# Patient Record
Sex: MALE | Race: WHITE | NOT HISPANIC OR LATINO | URBAN - METROPOLITAN AREA
[De-identification: names, ages, dates, MRNs, and addresses within clinical notes are randomized per-mention and may not be internally consistent; named-entity substitution may affect disease eponyms.]

---

## 2018-05-24 ENCOUNTER — OUTPATIENT (OUTPATIENT)
Dept: OUTPATIENT SERVICES | Facility: HOSPITAL | Age: 63
LOS: 1 days | Discharge: HOME | End: 2018-05-24

## 2018-05-24 DIAGNOSIS — R10.10 UPPER ABDOMINAL PAIN, UNSPECIFIED: ICD-10-CM

## 2020-11-15 ENCOUNTER — EMERGENCY (EMERGENCY)
Facility: HOSPITAL | Age: 65
LOS: 0 days | Discharge: HOME | End: 2020-11-15
Attending: EMERGENCY MEDICINE | Admitting: EMERGENCY MEDICINE
Payer: COMMERCIAL

## 2020-11-15 VITALS
DIASTOLIC BLOOD PRESSURE: 110 MMHG | RESPIRATION RATE: 18 BRPM | SYSTOLIC BLOOD PRESSURE: 170 MMHG | TEMPERATURE: 99 F | HEART RATE: 89 BPM

## 2020-11-15 DIAGNOSIS — I10 ESSENTIAL (PRIMARY) HYPERTENSION: ICD-10-CM

## 2020-11-15 PROCEDURE — 99283 EMERGENCY DEPT VISIT LOW MDM: CPT

## 2020-11-15 NOTE — ED PROVIDER NOTE - PATIENT PORTAL LINK FT
You can access the FollowMyHealth Patient Portal offered by [facility] by registering at the following website: [email or web address]. By joining [email or web address]’s FollowMyHealth portal, you will also be able to view your health information using other applications (apps) compatible with our system.

## 2020-11-15 NOTE — ED PROVIDER NOTE - NS ED ROS FT
Constitutional: See HPI.  Eyes: No visual changes, eye pain or discharge. No Photophobia  ENMT: No hearing changes, pain, discharge or infections. No neck pain or stiffness. No limited ROM  Cardiac: No  edema. No chest pain with exertion.  Respiratory: No cough or respiratory distress.   GI: No nausea, vomiting, diarrhea or abdominal pain.  : No dysuria, frequency or burning. No Discharge  MS: No myalgia, muscle weakness, joint pain or back pain.  Neuro: No headache or weakness. No LOC.  Skin: No skin rash.  Except as documented in the HPI, all other systems are negative.

## 2020-11-15 NOTE — ED PROVIDER NOTE - OBJECTIVE STATEMENT
66 y/o M p/w shortness of breath when he has N95 on at work only, denies any shortness of breath now. No shortness of breath w/ exertion or chest pain, no fevers/chills, no cough or congestion,

## 2020-11-15 NOTE — ED ADULT NURSE NOTE - CHIEF COMPLAINT QUOTE
Patient who is employee at [employer] who stated he felt anxiety and SOB due to wearing 2 masks at work. Denies SOB CP fevers and chills currently

## 2020-11-15 NOTE — ED ADULT NURSE NOTE - OBJECTIVE STATEMENT
pt here reports pt here reports was at work wearing N95 and blue mask over when became sob. currently denies BL lungs CTA/ RR even unlabored resting comfortably . no cp/cough . as per pt symptoms have resolved. no other complaints.

## 2020-11-15 NOTE — ED PROVIDER NOTE - PHYSICAL EXAMINATION
VITAL SIGNS: I have reviewed nursing notes and confirm.  CONSTITUTIONAL: well-appearing, non-toxic, NAD  SKIN: Warm dry, normal skin turgor  HEAD: NCAT  EYES: EOMI, PERRLA, no scleral icterus  ENT: Moist mucous membranes, normal pharynx with no erythema or exudates  NECK: Supple; non tender. Full ROM. No cervical LAD  CARD: RRR, no murmurs, rubs or gallops  RESP: clear to ausculation b/l.  No rales, rhonchi, or wheezing.  ABD: soft, + BS, non-tender, non-distended, no rebound or guarding. No CVA tenderness  EXT: Full ROM, no bony tenderness, no pedal edema, no calf tenderness  NEURO: normal motor. normal sensory. Normal gait.  PSYCH: Cooperative, appropriate.

## 2020-11-15 NOTE — ED PROVIDER NOTE - ATTENDING CONTRIBUTION TO CARE
65yr old male here for eval. 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 he was wearing 2 masks, was breathing heavy due to that, and was sent here for eval. pt denies any compalints, states he has no sx's, and that he was sent here but doesn't feel he needed to be sent. pt doesn't want BP to be rechecked. here on exam pt no distress, s1s2 ctab soft nt/nd, neuro grossly unremarkable.      impression sob at NH, no sx's now bp elevated but pt asymptomatic and doesn't want any repeat bp testing

## 2020-11-15 NOTE — ED ADULT TRIAGE NOTE - CHIEF COMPLAINT QUOTE
Patient who is employee at [employer] who stated he felt anxiety and SOB due to wearing 2 masks at work. Denies SOB CP fevers and chills currently

## 2021-10-04 ENCOUNTER — INPATIENT (INPATIENT)
Facility: HOSPITAL | Age: 66
LOS: 28 days | Discharge: NURSING, REHAB OR LONG-TERM CARE | End: 2021-11-02
Attending: HOSPITALIST | Admitting: HOSPITALIST
Payer: MEDICARE

## 2021-10-04 VITALS
DIASTOLIC BLOOD PRESSURE: 58 MMHG | HEART RATE: 170 BPM | SYSTOLIC BLOOD PRESSURE: 102 MMHG | OXYGEN SATURATION: 80 % | WEIGHT: 164.91 LBS | RESPIRATION RATE: 26 BRPM

## 2021-10-04 DIAGNOSIS — I85.11 SECONDARY ESOPHAGEAL VARICES WITH BLEEDING: ICD-10-CM

## 2021-10-04 DIAGNOSIS — K31.89 OTHER DISEASES OF STOMACH AND DUODENUM: ICD-10-CM

## 2021-10-04 DIAGNOSIS — M79.A3 NONTRAUMATIC COMPARTMENT SYNDROME OF ABDOMEN: ICD-10-CM

## 2021-10-04 DIAGNOSIS — Z51.5 ENCOUNTER FOR PALLIATIVE CARE: ICD-10-CM

## 2021-10-04 DIAGNOSIS — E87.2 ACIDOSIS: ICD-10-CM

## 2021-10-04 DIAGNOSIS — I81 PORTAL VEIN THROMBOSIS: ICD-10-CM

## 2021-10-04 DIAGNOSIS — I86.4 GASTRIC VARICES: ICD-10-CM

## 2021-10-04 DIAGNOSIS — K92.0 HEMATEMESIS: ICD-10-CM

## 2021-10-04 DIAGNOSIS — E83.39 OTHER DISORDERS OF PHOSPHORUS METABOLISM: ICD-10-CM

## 2021-10-04 DIAGNOSIS — K74.60 UNSPECIFIED CIRRHOSIS OF LIVER: ICD-10-CM

## 2021-10-04 DIAGNOSIS — I47.2 VENTRICULAR TACHYCARDIA: ICD-10-CM

## 2021-10-04 DIAGNOSIS — E87.1 HYPO-OSMOLALITY AND HYPONATREMIA: ICD-10-CM

## 2021-10-04 DIAGNOSIS — I10 ESSENTIAL (PRIMARY) HYPERTENSION: ICD-10-CM

## 2021-10-04 DIAGNOSIS — R41.82 ALTERED MENTAL STATUS, UNSPECIFIED: ICD-10-CM

## 2021-10-04 DIAGNOSIS — K76.7 HEPATORENAL SYNDROME: ICD-10-CM

## 2021-10-04 DIAGNOSIS — J44.9 CHRONIC OBSTRUCTIVE PULMONARY DISEASE, UNSPECIFIED: ICD-10-CM

## 2021-10-04 DIAGNOSIS — D62 ACUTE POSTHEMORRHAGIC ANEMIA: ICD-10-CM

## 2021-10-04 DIAGNOSIS — N17.9 ACUTE KIDNEY FAILURE, UNSPECIFIED: ICD-10-CM

## 2021-10-04 DIAGNOSIS — T83.89XA OTHER SPECIFIED COMPLICATION OF GENITOURINARY PROSTHETIC DEVICES, IMPLANTS AND GRAFTS, INITIAL ENCOUNTER: ICD-10-CM

## 2021-10-04 DIAGNOSIS — A41.9 SEPSIS, UNSPECIFIED ORGANISM: ICD-10-CM

## 2021-10-04 DIAGNOSIS — R18.8 OTHER ASCITES: ICD-10-CM

## 2021-10-04 DIAGNOSIS — K76.6 PORTAL HYPERTENSION: ICD-10-CM

## 2021-10-04 DIAGNOSIS — R65.21 SEVERE SEPSIS WITH SEPTIC SHOCK: ICD-10-CM

## 2021-10-04 DIAGNOSIS — D69.6 THROMBOCYTOPENIA, UNSPECIFIED: ICD-10-CM

## 2021-10-04 DIAGNOSIS — B18.2 CHRONIC VIRAL HEPATITIS C: ICD-10-CM

## 2021-10-04 DIAGNOSIS — E87.0 HYPEROSMOLALITY AND HYPERNATREMIA: ICD-10-CM

## 2021-10-04 DIAGNOSIS — K72.90 HEPATIC FAILURE, UNSPECIFIED WITHOUT COMA: ICD-10-CM

## 2021-10-04 DIAGNOSIS — E87.5 HYPERKALEMIA: ICD-10-CM

## 2021-10-04 DIAGNOSIS — B96.89 OTHER SPECIFIED BACTERIAL AGENTS AS THE CAUSE OF DISEASES CLASSIFIED ELSEWHERE: ICD-10-CM

## 2021-10-04 DIAGNOSIS — E87.6 HYPOKALEMIA: ICD-10-CM

## 2021-10-04 DIAGNOSIS — C22.0 LIVER CELL CARCINOMA: ICD-10-CM

## 2021-10-04 DIAGNOSIS — J96.00 ACUTE RESPIRATORY FAILURE, UNSPECIFIED WHETHER WITH HYPOXIA OR HYPERCAPNIA: ICD-10-CM

## 2021-10-04 DIAGNOSIS — K22.4 DYSKINESIA OF ESOPHAGUS: ICD-10-CM

## 2021-10-04 LAB
ALBUMIN SERPL ELPH-MCNC: 1.8 G/DL — LOW (ref 3.5–5.2)
ALBUMIN SERPL ELPH-MCNC: 1.8 G/DL — LOW (ref 3.5–5.2)
ALBUMIN SERPL ELPH-MCNC: 1.9 G/DL — LOW (ref 3.5–5.2)
ALP SERPL-CCNC: 247 U/L — HIGH (ref 30–115)
ALP SERPL-CCNC: 260 U/L — HIGH (ref 30–115)
ALP SERPL-CCNC: 290 U/L — HIGH (ref 30–115)
ALT FLD-CCNC: 115 U/L — HIGH (ref 0–41)
ALT FLD-CCNC: 143 U/L — HIGH (ref 0–41)
ALT FLD-CCNC: 159 U/L — HIGH (ref 0–41)
AMMONIA BLD-MCNC: 188 UMOL/L — CRITICAL HIGH (ref 11–55)
ANION GAP SERPL CALC-SCNC: 13 MMOL/L — SIGNIFICANT CHANGE UP (ref 7–14)
ANION GAP SERPL CALC-SCNC: 14 MMOL/L — SIGNIFICANT CHANGE UP (ref 7–14)
ANION GAP SERPL CALC-SCNC: 16 MMOL/L — HIGH (ref 7–14)
ANION GAP SERPL CALC-SCNC: 19 MMOL/L — HIGH (ref 7–14)
ANION GAP SERPL CALC-SCNC: 20 MMOL/L — HIGH (ref 7–14)
ANISOCYTOSIS BLD QL: SLIGHT — SIGNIFICANT CHANGE UP
APPEARANCE UR: CLEAR — SIGNIFICANT CHANGE UP
APTT BLD: 29.4 SEC — SIGNIFICANT CHANGE UP (ref 27–39.2)
AST SERPL-CCNC: 106 U/L — HIGH (ref 0–41)
AST SERPL-CCNC: 136 U/L — HIGH (ref 0–41)
AST SERPL-CCNC: 211 U/L — HIGH (ref 0–41)
BASE EXCESS BLDV CALC-SCNC: -10.1 MMOL/L — LOW (ref -2–3)
BASE EXCESS BLDV CALC-SCNC: -7.3 MMOL/L — LOW (ref -2–3)
BASE EXCESS BLDV CALC-SCNC: -9.5 MMOL/L — LOW (ref -2–3)
BASE EXCESS BLDV CALC-SCNC: -9.5 MMOL/L — LOW (ref -2–3)
BASOPHILS # BLD AUTO: 0 K/UL — SIGNIFICANT CHANGE UP (ref 0–0.2)
BASOPHILS NFR BLD AUTO: 0 % — SIGNIFICANT CHANGE UP (ref 0–1)
BILIRUB SERPL-MCNC: 1.1 MG/DL — SIGNIFICANT CHANGE UP (ref 0.2–1.2)
BILIRUB SERPL-MCNC: 1.1 MG/DL — SIGNIFICANT CHANGE UP (ref 0.2–1.2)
BILIRUB SERPL-MCNC: 2.5 MG/DL — HIGH (ref 0.2–1.2)
BILIRUB UR-MCNC: NEGATIVE — SIGNIFICANT CHANGE UP
BLD GP AB SCN SERPL QL: SIGNIFICANT CHANGE UP
BUN SERPL-MCNC: 68 MG/DL — CRITICAL HIGH (ref 10–20)
BUN SERPL-MCNC: 68 MG/DL — CRITICAL HIGH (ref 10–20)
BUN SERPL-MCNC: 69 MG/DL — CRITICAL HIGH (ref 10–20)
BUN SERPL-MCNC: 70 MG/DL — CRITICAL HIGH (ref 10–20)
BUN SERPL-MCNC: 75 MG/DL — CRITICAL HIGH (ref 10–20)
BURR CELLS BLD QL SMEAR: PRESENT — SIGNIFICANT CHANGE UP
CA-I SERPL-SCNC: 1.06 MMOL/L — LOW (ref 1.15–1.33)
CA-I SERPL-SCNC: 1.16 MMOL/L — SIGNIFICANT CHANGE UP (ref 1.15–1.33)
CALCIUM SERPL-MCNC: 7.3 MG/DL — LOW (ref 8.5–10.1)
CALCIUM SERPL-MCNC: 7.5 MG/DL — LOW (ref 8.5–10.1)
CALCIUM SERPL-MCNC: 7.5 MG/DL — LOW (ref 8.5–10.1)
CALCIUM SERPL-MCNC: 7.6 MG/DL — LOW (ref 8.5–10.1)
CALCIUM SERPL-MCNC: 7.9 MG/DL — LOW (ref 8.5–10.1)
CHLORIDE SERPL-SCNC: 88 MMOL/L — LOW (ref 98–110)
CHLORIDE SERPL-SCNC: 91 MMOL/L — LOW (ref 98–110)
CHLORIDE SERPL-SCNC: 91 MMOL/L — LOW (ref 98–110)
CHLORIDE SERPL-SCNC: 92 MMOL/L — LOW (ref 98–110)
CHLORIDE SERPL-SCNC: 93 MMOL/L — LOW (ref 98–110)
CO2 SERPL-SCNC: 11 MMOL/L — LOW (ref 17–32)
CO2 SERPL-SCNC: 12 MMOL/L — LOW (ref 17–32)
CO2 SERPL-SCNC: 14 MMOL/L — LOW (ref 17–32)
CO2 SERPL-SCNC: 15 MMOL/L — LOW (ref 17–32)
CO2 SERPL-SCNC: 15 MMOL/L — LOW (ref 17–32)
COLOR SPEC: YELLOW — SIGNIFICANT CHANGE UP
CREAT ?TM UR-MCNC: <4 MG/DL — SIGNIFICANT CHANGE UP
CREAT SERPL-MCNC: 0.9 MG/DL — SIGNIFICANT CHANGE UP (ref 0.7–1.5)
CREAT SERPL-MCNC: 1 MG/DL — SIGNIFICANT CHANGE UP (ref 0.7–1.5)
CREAT SERPL-MCNC: 1 MG/DL — SIGNIFICANT CHANGE UP (ref 0.7–1.5)
CREAT SERPL-MCNC: 1.1 MG/DL — SIGNIFICANT CHANGE UP (ref 0.7–1.5)
CREAT SERPL-MCNC: 1.2 MG/DL — SIGNIFICANT CHANGE UP (ref 0.7–1.5)
DIFF PNL FLD: NEGATIVE — SIGNIFICANT CHANGE UP
EOSINOPHIL # BLD AUTO: 0 K/UL — SIGNIFICANT CHANGE UP (ref 0–0.7)
EOSINOPHIL NFR BLD AUTO: 0 % — SIGNIFICANT CHANGE UP (ref 0–8)
GAS PNL BLDV: 115 MMOL/L — CRITICAL LOW (ref 136–145)
GAS PNL BLDV: 116 MMOL/L — CRITICAL LOW (ref 136–145)
GAS PNL BLDV: 122 MMOL/L — LOW (ref 136–145)
GAS PNL BLDV: SIGNIFICANT CHANGE UP
GIANT PLATELETS BLD QL SMEAR: PRESENT — SIGNIFICANT CHANGE UP
GLUCOSE BLDC GLUCOMTR-MCNC: 163 MG/DL — HIGH (ref 70–99)
GLUCOSE BLDC GLUCOMTR-MCNC: 187 MG/DL — HIGH (ref 70–99)
GLUCOSE BLDC GLUCOMTR-MCNC: 188 MG/DL — HIGH (ref 70–99)
GLUCOSE BLDC GLUCOMTR-MCNC: 209 MG/DL — HIGH (ref 70–99)
GLUCOSE BLDC GLUCOMTR-MCNC: 210 MG/DL — HIGH (ref 70–99)
GLUCOSE SERPL-MCNC: 160 MG/DL — HIGH (ref 70–99)
GLUCOSE SERPL-MCNC: 185 MG/DL — HIGH (ref 70–99)
GLUCOSE SERPL-MCNC: 199 MG/DL — HIGH (ref 70–99)
GLUCOSE SERPL-MCNC: 261 MG/DL — HIGH (ref 70–99)
GLUCOSE SERPL-MCNC: 273 MG/DL — HIGH (ref 70–99)
GLUCOSE UR QL: ABNORMAL
HCO3 BLDV-SCNC: 14 MMOL/L — LOW (ref 22–29)
HCO3 BLDV-SCNC: 17 MMOL/L — LOW (ref 22–29)
HCT VFR BLD CALC: 24 % — LOW (ref 42–52)
HCT VFR BLD CALC: 24.5 % — LOW (ref 42–52)
HCT VFR BLDA CALC: 26 % — LOW (ref 39–51)
HCT VFR BLDA CALC: 41 % — SIGNIFICANT CHANGE UP (ref 39–51)
HGB BLD CALC-MCNC: 13.7 G/DL — SIGNIFICANT CHANGE UP (ref 12.6–17.4)
HGB BLD CALC-MCNC: 8.8 G/DL — LOW (ref 12.6–17.4)
HGB BLD-MCNC: 7.9 G/DL — LOW (ref 14–18)
HGB BLD-MCNC: 8.3 G/DL — LOW (ref 14–18)
INR BLD: 1.73 RATIO — HIGH (ref 0.65–1.3)
KETONES UR-MCNC: NEGATIVE — SIGNIFICANT CHANGE UP
LACTATE BLDV-MCNC: 2.9 MMOL/L — HIGH (ref 0.5–2)
LACTATE BLDV-MCNC: 4.6 MMOL/L — CRITICAL HIGH (ref 0.5–2)
LACTATE BLDV-MCNC: 5.4 MMOL/L — CRITICAL HIGH (ref 0.5–2)
LACTATE BLDV-MCNC: 5.5 MMOL/L — CRITICAL HIGH (ref 0.5–2)
LEUKOCYTE ESTERASE UR-ACNC: NEGATIVE — SIGNIFICANT CHANGE UP
LIDOCAIN IGE QN: 71 U/L — HIGH (ref 7–60)
LYMPHOCYTES # BLD AUTO: 1.4 K/UL — SIGNIFICANT CHANGE UP (ref 1.2–3.4)
LYMPHOCYTES # BLD AUTO: 5.3 % — LOW (ref 20.5–51.1)
MACROCYTES BLD QL: SLIGHT — SIGNIFICANT CHANGE UP
MAGNESIUM SERPL-MCNC: 2.4 MG/DL — SIGNIFICANT CHANGE UP (ref 1.8–2.4)
MANUAL SMEAR VERIFICATION: SIGNIFICANT CHANGE UP
MCHC RBC-ENTMCNC: 28.3 PG — SIGNIFICANT CHANGE UP (ref 27–31)
MCHC RBC-ENTMCNC: 28.8 PG — SIGNIFICANT CHANGE UP (ref 27–31)
MCHC RBC-ENTMCNC: 32.9 G/DL — SIGNIFICANT CHANGE UP (ref 32–37)
MCHC RBC-ENTMCNC: 33.9 G/DL — SIGNIFICANT CHANGE UP (ref 32–37)
MCV RBC AUTO: 85.1 FL — SIGNIFICANT CHANGE UP (ref 80–94)
MCV RBC AUTO: 86 FL — SIGNIFICANT CHANGE UP (ref 80–94)
MONOCYTES # BLD AUTO: 2.09 K/UL — HIGH (ref 0.1–0.6)
MONOCYTES NFR BLD AUTO: 7.9 % — SIGNIFICANT CHANGE UP (ref 1.7–9.3)
MYELOCYTES NFR BLD: 0.9 % — HIGH (ref 0–0)
NEUTROPHILS # BLD AUTO: 22.7 K/UL — HIGH (ref 1.4–6.5)
NEUTROPHILS NFR BLD AUTO: 84.1 % — HIGH (ref 42.2–75.2)
NEUTS BAND # BLD: 1.8 % — SIGNIFICANT CHANGE UP (ref 0–6)
NITRITE UR-MCNC: NEGATIVE — SIGNIFICANT CHANGE UP
NRBC # BLD: 0 /100 WBCS — SIGNIFICANT CHANGE UP (ref 0–0)
OSMOLALITY UR: 606 MOS/KG — SIGNIFICANT CHANGE UP (ref 50–1200)
PCO2 BLDV: 25 MMHG — LOW (ref 42–55)
PCO2 BLDV: 31 MMHG — LOW (ref 42–55)
PH BLDV: 7.35 — SIGNIFICANT CHANGE UP (ref 7.32–7.43)
PH BLDV: 7.36 — SIGNIFICANT CHANGE UP (ref 7.32–7.43)
PH UR: 6 — SIGNIFICANT CHANGE UP (ref 5–8)
PLAT MORPH BLD: NORMAL — SIGNIFICANT CHANGE UP
PLATELET # BLD AUTO: 294 K/UL — SIGNIFICANT CHANGE UP (ref 130–400)
PLATELET # BLD AUTO: 508 K/UL — HIGH (ref 130–400)
PO2 BLDV: 36 MMHG — SIGNIFICANT CHANGE UP
PO2 BLDV: 38 MMHG — SIGNIFICANT CHANGE UP
PO2 BLDV: 39 MMHG — SIGNIFICANT CHANGE UP
PO2 BLDV: 51 MMHG — SIGNIFICANT CHANGE UP
POIKILOCYTOSIS BLD QL AUTO: SLIGHT — SIGNIFICANT CHANGE UP
POLYCHROMASIA BLD QL SMEAR: SIGNIFICANT CHANGE UP
POTASSIUM BLDV-SCNC: 6.4 MMOL/L — CRITICAL HIGH (ref 3.5–5.1)
POTASSIUM BLDV-SCNC: 6.6 MMOL/L — CRITICAL HIGH (ref 3.5–5.1)
POTASSIUM BLDV-SCNC: 8 MMOL/L — CRITICAL HIGH (ref 3.5–5.1)
POTASSIUM SERPL-MCNC: 6.1 MMOL/L — CRITICAL HIGH (ref 3.5–5)
POTASSIUM SERPL-MCNC: 6.3 MMOL/L — CRITICAL HIGH (ref 3.5–5)
POTASSIUM SERPL-MCNC: 6.6 MMOL/L — CRITICAL HIGH (ref 3.5–5)
POTASSIUM SERPL-MCNC: 6.8 MMOL/L — CRITICAL HIGH (ref 3.5–5)
POTASSIUM SERPL-MCNC: SIGNIFICANT CHANGE UP MMOL/L (ref 3.5–5)
POTASSIUM SERPL-SCNC: 6.1 MMOL/L — CRITICAL HIGH (ref 3.5–5)
POTASSIUM SERPL-SCNC: 6.3 MMOL/L — CRITICAL HIGH (ref 3.5–5)
POTASSIUM SERPL-SCNC: 6.6 MMOL/L — CRITICAL HIGH (ref 3.5–5)
POTASSIUM SERPL-SCNC: 6.8 MMOL/L — CRITICAL HIGH (ref 3.5–5)
POTASSIUM SERPL-SCNC: SIGNIFICANT CHANGE UP MMOL/L (ref 3.5–5)
PROT SERPL-MCNC: 4.8 G/DL — LOW (ref 6–8)
PROT SERPL-MCNC: 5.3 G/DL — LOW (ref 6–8)
PROT SERPL-MCNC: 6.1 G/DL — SIGNIFICANT CHANGE UP (ref 6–8)
PROT UR-MCNC: NEGATIVE — SIGNIFICANT CHANGE UP
PROTHROM AB SERPL-ACNC: 19.8 SEC — HIGH (ref 9.95–12.87)
RBC # BLD: 2.79 M/UL — LOW (ref 4.7–6.1)
RBC # BLD: 2.88 M/UL — LOW (ref 4.7–6.1)
RBC # FLD: 19.3 % — HIGH (ref 11.5–14.5)
RBC # FLD: 19.7 % — HIGH (ref 11.5–14.5)
RBC BLD AUTO: ABNORMAL
SAO2 % BLDV: 55.5 % — SIGNIFICANT CHANGE UP
SAO2 % BLDV: 59.1 % — SIGNIFICANT CHANGE UP
SAO2 % BLDV: 60.3 % — SIGNIFICANT CHANGE UP
SAO2 % BLDV: 80.4 % — SIGNIFICANT CHANGE UP
SARS-COV-2 RNA SPEC QL NAA+PROBE: SIGNIFICANT CHANGE UP
SODIUM SERPL-SCNC: 119 MMOL/L — CRITICAL LOW (ref 135–146)
SODIUM SERPL-SCNC: 120 MMOL/L — LOW (ref 135–146)
SODIUM SERPL-SCNC: 125 MMOL/L — LOW (ref 135–146)
SODIUM UR-SCNC: 20 MMOL/L — SIGNIFICANT CHANGE UP
SP GR SPEC: 1.02 — SIGNIFICANT CHANGE UP (ref 1.01–1.03)
TROPONIN T SERPL-MCNC: <0.01 NG/ML — SIGNIFICANT CHANGE UP
UROBILINOGEN FLD QL: SIGNIFICANT CHANGE UP
UUN UR-MCNC: 940 MG/DL — SIGNIFICANT CHANGE UP
WBC # BLD: 26.1 K/UL — HIGH (ref 4.8–10.8)
WBC # BLD: 26.43 K/UL — HIGH (ref 4.8–10.8)
WBC # FLD AUTO: 26.1 K/UL — HIGH (ref 4.8–10.8)
WBC # FLD AUTO: 26.43 K/UL — HIGH (ref 4.8–10.8)

## 2021-10-04 PROCEDURE — 93010 ELECTROCARDIOGRAM REPORT: CPT | Mod: 77

## 2021-10-04 PROCEDURE — 99291 CRITICAL CARE FIRST HOUR: CPT | Mod: 25

## 2021-10-04 PROCEDURE — 74177 CT ABD & PELVIS W/CONTRAST: CPT | Mod: 26,MA

## 2021-10-04 PROCEDURE — 93010 ELECTROCARDIOGRAM REPORT: CPT | Mod: 76

## 2021-10-04 PROCEDURE — 71275 CT ANGIOGRAPHY CHEST: CPT | Mod: 26,MA

## 2021-10-04 PROCEDURE — 99222 1ST HOSP IP/OBS MODERATE 55: CPT

## 2021-10-04 PROCEDURE — 76705 ECHO EXAM OF ABDOMEN: CPT | Mod: 26

## 2021-10-04 PROCEDURE — 92960 CARDIOVERSION ELECTRIC EXT: CPT

## 2021-10-04 PROCEDURE — 99254 IP/OBS CNSLTJ NEW/EST MOD 60: CPT | Mod: 25

## 2021-10-04 PROCEDURE — 99291 CRITICAL CARE FIRST HOUR: CPT

## 2021-10-04 PROCEDURE — 43244 EGD VARICES LIGATION: CPT | Mod: XU

## 2021-10-04 PROCEDURE — 43255 EGD CONTROL BLEEDING ANY: CPT

## 2021-10-04 PROCEDURE — 93970 EXTREMITY STUDY: CPT | Mod: 26

## 2021-10-04 PROCEDURE — 71045 X-RAY EXAM CHEST 1 VIEW: CPT | Mod: 26

## 2021-10-04 RX ORDER — SODIUM ZIRCONIUM CYCLOSILICATE 10 G/10G
10 POWDER, FOR SUSPENSION ORAL
Refills: 0 | Status: DISCONTINUED | OUTPATIENT
Start: 2021-10-04 | End: 2021-10-04

## 2021-10-04 RX ORDER — INSULIN HUMAN 100 [IU]/ML
10 INJECTION, SOLUTION SUBCUTANEOUS ONCE
Refills: 0 | Status: COMPLETED | OUTPATIENT
Start: 2021-10-04 | End: 2021-10-04

## 2021-10-04 RX ORDER — PANTOPRAZOLE SODIUM 20 MG/1
4 TABLET, DELAYED RELEASE ORAL
Qty: 80 | Refills: 0 | Status: DISCONTINUED | OUTPATIENT
Start: 2021-10-04 | End: 2021-10-05

## 2021-10-04 RX ORDER — MIDAZOLAM HYDROCHLORIDE 1 MG/ML
0.02 INJECTION, SOLUTION INTRAMUSCULAR; INTRAVENOUS
Qty: 100 | Refills: 0 | Status: DISCONTINUED | OUTPATIENT
Start: 2021-10-04 | End: 2021-10-04

## 2021-10-04 RX ORDER — SODIUM POLYSTYRENE SULFONATE 4.1 MEQ/G
30 POWDER, FOR SUSPENSION ORAL ONCE
Refills: 0 | Status: DISCONTINUED | OUTPATIENT
Start: 2021-10-04 | End: 2021-10-04

## 2021-10-04 RX ORDER — INSULIN GLARGINE 100 [IU]/ML
15 INJECTION, SOLUTION SUBCUTANEOUS AT BEDTIME
Refills: 0 | Status: DISCONTINUED | OUTPATIENT
Start: 2021-10-04 | End: 2021-10-08

## 2021-10-04 RX ORDER — SODIUM ZIRCONIUM CYCLOSILICATE 10 G/10G
10 POWDER, FOR SUSPENSION ORAL ONCE
Refills: 0 | Status: COMPLETED | OUTPATIENT
Start: 2021-10-04 | End: 2021-10-04

## 2021-10-04 RX ORDER — DEXTROSE 50 % IN WATER 50 %
25 SYRINGE (ML) INTRAVENOUS ONCE
Refills: 0 | Status: DISCONTINUED | OUTPATIENT
Start: 2021-10-04 | End: 2021-11-02

## 2021-10-04 RX ORDER — MIDAZOLAM HYDROCHLORIDE 1 MG/ML
5 INJECTION, SOLUTION INTRAMUSCULAR; INTRAVENOUS ONCE
Refills: 0 | Status: DISCONTINUED | OUTPATIENT
Start: 2021-10-04 | End: 2021-10-04

## 2021-10-04 RX ORDER — DEXTROSE 50 % IN WATER 50 %
50 SYRINGE (ML) INTRAVENOUS ONCE
Refills: 0 | Status: COMPLETED | OUTPATIENT
Start: 2021-10-04 | End: 2021-10-04

## 2021-10-04 RX ORDER — GLUCAGON INJECTION, SOLUTION 0.5 MG/.1ML
1 INJECTION, SOLUTION SUBCUTANEOUS ONCE
Refills: 0 | Status: DISCONTINUED | OUTPATIENT
Start: 2021-10-04 | End: 2021-11-02

## 2021-10-04 RX ORDER — CHLORHEXIDINE GLUCONATE 213 G/1000ML
15 SOLUTION TOPICAL EVERY 12 HOURS
Refills: 0 | Status: DISCONTINUED | OUTPATIENT
Start: 2021-10-04 | End: 2021-10-05

## 2021-10-04 RX ORDER — CALCIUM CHLORIDE
500 POWDER (GRAM) MISCELLANEOUS ONCE
Refills: 0 | Status: DISCONTINUED | OUTPATIENT
Start: 2021-10-04 | End: 2021-10-04

## 2021-10-04 RX ORDER — SODIUM CHLORIDE 9 MG/ML
1000 INJECTION, SOLUTION INTRAVENOUS
Refills: 0 | Status: DISCONTINUED | OUTPATIENT
Start: 2021-10-04 | End: 2021-11-02

## 2021-10-04 RX ORDER — POLYETHYLENE GLYCOL 3350 17 G/17G
17 POWDER, FOR SOLUTION ORAL DAILY
Refills: 0 | Status: DISCONTINUED | OUTPATIENT
Start: 2021-10-04 | End: 2021-10-04

## 2021-10-04 RX ORDER — OCTREOTIDE ACETATE 200 UG/ML
50 INJECTION, SOLUTION INTRAVENOUS; SUBCUTANEOUS
Qty: 500 | Refills: 0 | Status: DISCONTINUED | OUTPATIENT
Start: 2021-10-04 | End: 2021-10-10

## 2021-10-04 RX ORDER — CALCIUM GLUCONATE 100 MG/ML
1 VIAL (ML) INTRAVENOUS ONCE
Refills: 0 | Status: COMPLETED | OUTPATIENT
Start: 2021-10-04 | End: 2021-10-04

## 2021-10-04 RX ORDER — PANTOPRAZOLE SODIUM 20 MG/1
40 TABLET, DELAYED RELEASE ORAL EVERY 12 HOURS
Refills: 0 | Status: DISCONTINUED | OUTPATIENT
Start: 2021-10-04 | End: 2021-10-04

## 2021-10-04 RX ORDER — DEXMEDETOMIDINE HYDROCHLORIDE IN 0.9% SODIUM CHLORIDE 4 UG/ML
0.2 INJECTION INTRAVENOUS
Qty: 200 | Refills: 0 | Status: DISCONTINUED | OUTPATIENT
Start: 2021-10-04 | End: 2021-10-05

## 2021-10-04 RX ORDER — CHLORHEXIDINE GLUCONATE 213 G/1000ML
1 SOLUTION TOPICAL DAILY
Refills: 0 | Status: DISCONTINUED | OUTPATIENT
Start: 2021-10-04 | End: 2021-11-02

## 2021-10-04 RX ORDER — FLUMAZENIL 0.1 MG/ML
0.2 VIAL (ML) INTRAVENOUS ONCE
Refills: 0 | Status: COMPLETED | OUTPATIENT
Start: 2021-10-04 | End: 2021-10-04

## 2021-10-04 RX ORDER — ALBUTEROL 90 UG/1
10 AEROSOL, METERED ORAL ONCE
Refills: 0 | Status: COMPLETED | OUTPATIENT
Start: 2021-10-04 | End: 2021-10-04

## 2021-10-04 RX ORDER — FENTANYL CITRATE 50 UG/ML
25 INJECTION INTRAVENOUS ONCE
Refills: 0 | Status: DISCONTINUED | OUTPATIENT
Start: 2021-10-04 | End: 2021-10-04

## 2021-10-04 RX ORDER — HEPARIN SODIUM 5000 [USP'U]/ML
5000 INJECTION INTRAVENOUS; SUBCUTANEOUS EVERY 8 HOURS
Refills: 0 | Status: DISCONTINUED | OUTPATIENT
Start: 2021-10-04 | End: 2021-10-04

## 2021-10-04 RX ORDER — INSULIN LISPRO 100/ML
VIAL (ML) SUBCUTANEOUS
Refills: 0 | Status: DISCONTINUED | OUTPATIENT
Start: 2021-10-04 | End: 2021-11-02

## 2021-10-04 RX ORDER — SODIUM CHLORIDE 9 MG/ML
3000 INJECTION INTRAMUSCULAR; INTRAVENOUS; SUBCUTANEOUS ONCE
Refills: 0 | Status: COMPLETED | OUTPATIENT
Start: 2021-10-04 | End: 2021-10-04

## 2021-10-04 RX ORDER — BUDESONIDE AND FORMOTEROL FUMARATE DIHYDRATE 160; 4.5 UG/1; UG/1
2 AEROSOL RESPIRATORY (INHALATION)
Refills: 0 | Status: DISCONTINUED | OUTPATIENT
Start: 2021-10-04 | End: 2021-11-02

## 2021-10-04 RX ORDER — FOLIC ACID 0.8 MG
1 TABLET ORAL DAILY
Refills: 0 | Status: DISCONTINUED | OUTPATIENT
Start: 2021-10-04 | End: 2021-10-04

## 2021-10-04 RX ORDER — INSULIN LISPRO 100/ML
5 VIAL (ML) SUBCUTANEOUS
Refills: 0 | Status: DISCONTINUED | OUTPATIENT
Start: 2021-10-04 | End: 2021-11-02

## 2021-10-04 RX ORDER — CEFEPIME 1 G/1
2000 INJECTION, POWDER, FOR SOLUTION INTRAMUSCULAR; INTRAVENOUS EVERY 8 HOURS
Refills: 0 | Status: DISCONTINUED | OUTPATIENT
Start: 2021-10-04 | End: 2021-10-07

## 2021-10-04 RX ORDER — LACTULOSE 10 G/15ML
20 SOLUTION ORAL THREE TIMES A DAY
Refills: 0 | Status: DISCONTINUED | OUTPATIENT
Start: 2021-10-04 | End: 2021-10-04

## 2021-10-04 RX ORDER — ENOXAPARIN SODIUM 100 MG/ML
70 INJECTION SUBCUTANEOUS EVERY 12 HOURS
Refills: 0 | Status: DISCONTINUED | OUTPATIENT
Start: 2021-10-04 | End: 2021-10-04

## 2021-10-04 RX ORDER — SENNA PLUS 8.6 MG/1
2 TABLET ORAL AT BEDTIME
Refills: 0 | Status: DISCONTINUED | OUTPATIENT
Start: 2021-10-04 | End: 2021-10-04

## 2021-10-04 RX ORDER — SODIUM ZIRCONIUM CYCLOSILICATE 10 G/10G
5 POWDER, FOR SUSPENSION ORAL EVERY 12 HOURS
Refills: 0 | Status: DISCONTINUED | OUTPATIENT
Start: 2021-10-04 | End: 2021-10-04

## 2021-10-04 RX ORDER — SODIUM BICARBONATE 1 MEQ/ML
0.25 SYRINGE (ML) INTRAVENOUS
Qty: 150 | Refills: 0 | Status: DISCONTINUED | OUTPATIENT
Start: 2021-10-04 | End: 2021-10-05

## 2021-10-04 RX ORDER — DEXTROSE 50 % IN WATER 50 %
15 SYRINGE (ML) INTRAVENOUS ONCE
Refills: 0 | Status: DISCONTINUED | OUTPATIENT
Start: 2021-10-04 | End: 2021-11-02

## 2021-10-04 RX ORDER — SODIUM CHLORIDE 9 MG/ML
1000 INJECTION INTRAMUSCULAR; INTRAVENOUS; SUBCUTANEOUS ONCE
Refills: 0 | Status: COMPLETED | OUTPATIENT
Start: 2021-10-04 | End: 2021-10-04

## 2021-10-04 RX ORDER — PANTOPRAZOLE SODIUM 20 MG/1
40 TABLET, DELAYED RELEASE ORAL
Refills: 0 | Status: DISCONTINUED | OUTPATIENT
Start: 2021-10-04 | End: 2021-10-04

## 2021-10-04 RX ORDER — VANCOMYCIN HCL 1 G
1000 VIAL (EA) INTRAVENOUS ONCE
Refills: 0 | Status: COMPLETED | OUTPATIENT
Start: 2021-10-04 | End: 2021-10-04

## 2021-10-04 RX ORDER — APIXABAN 2.5 MG/1
5 TABLET, FILM COATED ORAL EVERY 12 HOURS
Refills: 0 | Status: DISCONTINUED | OUTPATIENT
Start: 2021-10-04 | End: 2021-10-04

## 2021-10-04 RX ORDER — CALCIUM GLUCONATE 100 MG/ML
2 VIAL (ML) INTRAVENOUS ONCE
Refills: 0 | Status: COMPLETED | OUTPATIENT
Start: 2021-10-04 | End: 2021-10-04

## 2021-10-04 RX ORDER — CEFEPIME 1 G/1
2000 INJECTION, POWDER, FOR SOLUTION INTRAMUSCULAR; INTRAVENOUS ONCE
Refills: 0 | Status: COMPLETED | OUTPATIENT
Start: 2021-10-04 | End: 2021-10-04

## 2021-10-04 RX ORDER — CEFTRIAXONE 500 MG/1
2000 INJECTION, POWDER, FOR SOLUTION INTRAMUSCULAR; INTRAVENOUS EVERY 24 HOURS
Refills: 0 | Status: DISCONTINUED | OUTPATIENT
Start: 2021-10-04 | End: 2021-10-04

## 2021-10-04 RX ORDER — NOREPINEPHRINE BITARTRATE/D5W 8 MG/250ML
0.05 PLASTIC BAG, INJECTION (ML) INTRAVENOUS
Qty: 8 | Refills: 0 | Status: DISCONTINUED | OUTPATIENT
Start: 2021-10-04 | End: 2021-10-16

## 2021-10-04 RX ORDER — DEXTROSE 50 % IN WATER 50 %
12.5 SYRINGE (ML) INTRAVENOUS ONCE
Refills: 0 | Status: DISCONTINUED | OUTPATIENT
Start: 2021-10-04 | End: 2021-11-02

## 2021-10-04 RX ADMIN — Medication 0.2 MILLIGRAM(S): at 22:05

## 2021-10-04 RX ADMIN — Medication 100 GRAM(S): at 09:13

## 2021-10-04 RX ADMIN — OCTREOTIDE ACETATE 10 MICROGRAM(S)/HR: 200 INJECTION, SOLUTION INTRAVENOUS; SUBCUTANEOUS at 21:00

## 2021-10-04 RX ADMIN — SODIUM CHLORIDE 2000 MILLILITER(S): 9 INJECTION INTRAMUSCULAR; INTRAVENOUS; SUBCUTANEOUS at 22:00

## 2021-10-04 RX ADMIN — CEFEPIME 100 MILLIGRAM(S): 1 INJECTION, POWDER, FOR SOLUTION INTRAMUSCULAR; INTRAVENOUS at 02:04

## 2021-10-04 RX ADMIN — Medication 200 GRAM(S): at 01:50

## 2021-10-04 RX ADMIN — INSULIN HUMAN 10 UNIT(S): 100 INJECTION, SOLUTION SUBCUTANEOUS at 11:07

## 2021-10-04 RX ADMIN — PANTOPRAZOLE SODIUM 40 MILLIGRAM(S): 20 TABLET, DELAYED RELEASE ORAL at 05:12

## 2021-10-04 RX ADMIN — Medication 250 MILLIGRAM(S): at 02:04

## 2021-10-04 RX ADMIN — Medication 50 MILLILITER(S): at 01:51

## 2021-10-04 RX ADMIN — CEFEPIME 100 MILLIGRAM(S): 1 INJECTION, POWDER, FOR SOLUTION INTRAMUSCULAR; INTRAVENOUS at 22:19

## 2021-10-04 RX ADMIN — LACTULOSE 20 GRAM(S): 10 SOLUTION ORAL at 13:47

## 2021-10-04 RX ADMIN — Medication 50 MILLILITER(S): at 18:05

## 2021-10-04 RX ADMIN — Medication 125 MEQ/KG/HR: at 02:38

## 2021-10-04 RX ADMIN — SODIUM ZIRCONIUM CYCLOSILICATE 10 GRAM(S): 10 POWDER, FOR SUSPENSION ORAL at 05:11

## 2021-10-04 RX ADMIN — INSULIN HUMAN 10 UNIT(S): 100 INJECTION, SOLUTION SUBCUTANEOUS at 01:50

## 2021-10-04 RX ADMIN — SODIUM CHLORIDE 3000 MILLILITER(S): 9 INJECTION INTRAMUSCULAR; INTRAVENOUS; SUBCUTANEOUS at 02:04

## 2021-10-04 RX ADMIN — INSULIN GLARGINE 15 UNIT(S): 100 INJECTION, SOLUTION SUBCUTANEOUS at 22:52

## 2021-10-04 RX ADMIN — INSULIN HUMAN 10 UNIT(S): 100 INJECTION, SOLUTION SUBCUTANEOUS at 18:05

## 2021-10-04 RX ADMIN — LACTULOSE 20 GRAM(S): 10 SOLUTION ORAL at 05:11

## 2021-10-04 RX ADMIN — ALBUTEROL 10 MILLIGRAM(S): 90 AEROSOL, METERED ORAL at 05:29

## 2021-10-04 RX ADMIN — DEXMEDETOMIDINE HYDROCHLORIDE IN 0.9% SODIUM CHLORIDE 3.74 MICROGRAM(S)/KG/HR: 4 INJECTION INTRAVENOUS at 21:00

## 2021-10-04 RX ADMIN — PANTOPRAZOLE SODIUM 374 MG/KG/HR: 20 TABLET, DELAYED RELEASE ORAL at 21:00

## 2021-10-04 RX ADMIN — OCTREOTIDE ACETATE 10 MICROGRAM(S)/HR: 200 INJECTION, SOLUTION INTRAVENOUS; SUBCUTANEOUS at 06:42

## 2021-10-04 RX ADMIN — Medication 0.2 MILLIGRAM(S): at 21:59

## 2021-10-04 RX ADMIN — Medication 1 TABLET(S): at 13:47

## 2021-10-04 RX ADMIN — MIDAZOLAM HYDROCHLORIDE 5 MILLIGRAM(S): 1 INJECTION, SOLUTION INTRAMUSCULAR; INTRAVENOUS at 21:40

## 2021-10-04 RX ADMIN — Medication 1 MILLIGRAM(S): at 13:48

## 2021-10-04 RX ADMIN — Medication 7.01 MICROGRAM(S)/KG/MIN: at 22:45

## 2021-10-04 RX ADMIN — Medication 50 MILLILITER(S): at 06:42

## 2021-10-04 RX ADMIN — Medication 125 MEQ/KG/HR: at 21:00

## 2021-10-04 RX ADMIN — CEFTRIAXONE 100 MILLIGRAM(S): 500 INJECTION, POWDER, FOR SOLUTION INTRAMUSCULAR; INTRAVENOUS at 05:47

## 2021-10-04 RX ADMIN — CEFEPIME 100 MILLIGRAM(S): 1 INJECTION, POWDER, FOR SOLUTION INTRAMUSCULAR; INTRAVENOUS at 13:59

## 2021-10-04 RX ADMIN — INSULIN HUMAN 10 UNIT(S): 100 INJECTION, SOLUTION SUBCUTANEOUS at 06:42

## 2021-10-04 NOTE — ED PROVIDER NOTE - DATE/TIME 1
Check HCV viral genotype(apparently was not drawn); in view of the CKD, recommend 12 weeks of Glecapravir-Pibrentasvir daily    04-Oct-2021 01:14

## 2021-10-04 NOTE — ED ADULT NURSE REASSESSMENT NOTE - NS ED NURSE REASSESS COMMENT FT1
Pt remains alert & oriented x4. CTs complete. Cardiac & o2 monitoring maintained. Pt denies any pain or discomfort at this moment. Repeat EKG done & reviewed by MD. Pt remains alert & oriented x4. CTs complete. Cardiac & o2 monitoring maintained. Pt denies any pain or discomfort at this moment. Repeat EKG done & reviewed by MD. RCW port accessed. Patient has RLQ denver cath, not accessed.

## 2021-10-04 NOTE — CONSULT NOTE ADULT - ASSESSMENT
67 yo male, with h/o HTN, drug abuse, Hepatitis C s/p INF, Liver cirrhosis s/p [place] shunt, COPD, portal vein thrombosis on Eliquis, HCC since January 2021 on chemotherapy, presented for weakness  Patient reports having weakness, decreased po intake since few weeks. He also has not been sleeping. He reports constipation and hematemesis for 1 day, minimal amount.    -Hematemesis/melena DD: likely from esophageal varices  -Decompensated liver cirrhosis sec to hepatitis C MELD Na score 26  -HCC on chemotherapy   -Left main and right portal vein thrombus on eliquis last dose 10/3 AM  -Transaminitis Mixed pattern  -Severe hyponatremia/Hyperkalemia    recs:   will plan for emergent EGD for GI bleed  Active type and screen  2 18 gauge  c/w IV PPI  c/w octreotide  on cefepime  small ascites s/p denver catheter (Please send the fluid for analysis and please talk to IR for removal of fluid)   67 yo male, with h/o HTN, drug abuse, Hepatitis C s/p INF, Liver cirrhosis s/p [place] shunt, COPD, portal vein thrombosis on Eliquis, HCC since January 2021 on chemotherapy, presented for weakness  Patient reports having weakness, decreased po intake since few weeks. He also has not been sleeping. He reports constipation and hematemesis for 1 day, minimal amount.    -Hematemesis/melena DD: likely from esophageal varices  -Decompensated liver cirrhosis sec to hepatitis C MELD Na score 26  -HCC on chemotherapy   -Left main and right portal vein thrombus on eliquis last dose 10/3 AM  -Transaminitis Mixed pattern  -Severe hyponatremia/Hyperkalemia    recs:   will plan for emergent EGD for GI bleed  Active type and screen  2 18 gauge  c/w IV PPI  c/w octreotide  on cefepime  small ascites s/p denver catheter (Please send the fluid for analysis and please talk to IR for removal of fluid)  aldosterone was stopped due to hyperkalemia  please obtain records from [facility]

## 2021-10-04 NOTE — CHART NOTE - NSCHARTNOTEFT_GEN_A_CORE
I discontinued all po Meds  patient intubated and has to Be NPO for now     make sure to resume   rifaximin  lactulose  miralax   multivitamin  folic acid  Lokelma    Patient off AC as per GI

## 2021-10-04 NOTE — ED PROVIDER NOTE - PHYSICAL EXAMINATION
CONST: well nourished  HEAD:  normocephalic, atraumatic  EYES: PERRLA, conjunctivae without injection, drainage or discharge  ENMT:  tympanic membranes pearly gray with normal landmarks; nasal mucosa moist; mouth moist without ulcerations or lesions; throat moist without erythema, exudate, ulcerations or lesions  NECK:  supple, no masses, no significant lymphadenopathy  CARDIAC:  v tach  RESP:  respiratory rate and effort appear normal for age; lungs are clear to auscultation bilaterally; no rales or wheezes  ABDOMEN:  + ascites with drain, soft, nontender, + BS  LYMPHATICS:  no significant lymphadenopathy  MUSCULOSKELETAL/NEURO: CN II-XII grossly intact, sensation intact throughout, HOWARD, normal movement, normal tone  SKIN: + jaundice, well-perfused; warm and dry

## 2021-10-04 NOTE — CONSULT NOTE ADULT - SUBJECTIVE AND OBJECTIVE BOX
Gastroenterology Consultation:    Patient is a 66y old  Male who presents with a chief complaint of weakness  Hyperkalemia  VT (04 Oct 2021 17:43)      Admitted on: 10-04-21  HPI:  67 yo male, with h/o HTN, drug abuse, Hepatitis C s/p INF, Liver cirrhosis s/p [place] shunt, COPD, portal vein thrombosis on Eliquis, HCC since January 2021 on chemotherapy, presented for weakness  Patient reports having weakness, decreased po intake since few weeks. He also has not been sleeping. He reports constipation and hematemesis for 1 day, minimal amount.  Denies fever, chills, chest pain, cough, sputum , SOB, diarrhea, dysuria  He has a [place] shunt  that was drained one day prior to admission    ED course : mental status alteration, sustained VT  bpm with BP 82/49 mmHg s/p shock + calcium gluconate  Patient has Hyperkalemia 6.8 CO2 14 s/p Bicarb drip, renal called, sp berry with good UO   (04 Oct 2021 06:15)      Prior EGD: long time ago as per patient  Prior Colonoscopy: long time ago as per patient      PAST MEDICAL & SURGICAL HISTORY:  HTN (hypertension)    Hepatitis C  2007    Drug abuse    Cancer, hepatocellular  January 2021    COPD, mild        FAMILY HISTORY:      Social History:  Tobacco:  Alcohol:  Drugs:    Home Medications:  Eliquis 5 mg oral tablet: 1 tab(s) orally 2 times a day (04 Oct 2021 06:13)  fluticasone-salmeterol 55 mcg-14 mcg/inh inhalation powder: 1 puff(s) inhaled 2 times a day (04 Oct 2021 06:14)  Protonix 40 mg oral delayed release tablet: 1 tab(s) orally once a day (04 Oct 2021 06:13)  spironolactone 50 mg oral tablet: 1 tab(s) orally once a day (04 Oct 2021 06:13)    MEDICATIONS  (STANDING):  budesonide  80 MICROgram(s)/formoterol 4.5 MICROgram(s) Inhaler 2 Puff(s) Inhalation two times a day  cefepime   IVPB 2000 milliGRAM(s) IV Intermittent every 8 hours  chlorhexidine 4% Liquid 1 Application(s) Topical daily  dextrose 40% Gel 15 Gram(s) Oral once  dextrose 5%. 1000 milliLiter(s) (50 mL/Hr) IV Continuous <Continuous>  dextrose 5%. 1000 milliLiter(s) (100 mL/Hr) IV Continuous <Continuous>  dextrose 50% Injectable 25 Gram(s) IV Push once  dextrose 50% Injectable 12.5 Gram(s) IV Push once  dextrose 50% Injectable 25 Gram(s) IV Push once  enoxaparin Injectable 70 milliGRAM(s) SubCutaneous every 12 hours  folic acid 1 milliGRAM(s) Oral daily  glucagon  Injectable 1 milliGRAM(s) IntraMuscular once  insulin glargine Injectable (LANTUS) 15 Unit(s) SubCutaneous at bedtime  insulin lispro (ADMELOG) corrective regimen sliding scale   SubCutaneous three times a day before meals  insulin lispro Injectable (ADMELOG) 5 Unit(s) SubCutaneous three times a day before meals  lactulose Syrup 20 Gram(s) Oral three times a day  multivitamin 1 Tablet(s) Oral daily  octreotide  Infusion 50 MICROgram(s)/Hr (10 mL/Hr) IV Continuous <Continuous>  pantoprazole    Tablet 40 milliGRAM(s) Oral every 12 hours  polyethylene glycol 3350 17 Gram(s) Oral daily  propranolol 10 milliGRAM(s) Oral two times a day  rifAXIMin 550 milliGRAM(s) Oral two times a day  senna 2 Tablet(s) Oral at bedtime  sodium bicarbonate  Infusion 0.251 mEq/kG/Hr (125 mL/Hr) IV Continuous <Continuous>  sodium zirconium cyclosilicate 10 Gram(s) Oral two times a day    MEDICATIONS  (PRN):      Allergies  No Known Allergies      Review of Systems:   Constitutional:  No Fever, No Chills  ENT/Mouth:  No Hearing Changes,  No Difficulty Swallowing  Eyes:  No Eye Pain, No Vision Changes  Cardiovascular:  No Chest Pain, No Palpitations  Respiratory:  No Cough, No Dyspnea  Gastrointestinal:  As described in HPI  Musculoskeletal:  No Joint Swelling, No Back Pain  Skin:  No Skin Lesions, No Jaundice  Neuro:  No Syncope, No Dizziness  Heme/Lymph:  No Bruising, No Bleeding.          Physical Examination:  T(C): 37 (10-04-21 @ 18:03), Max: 37 (10-04-21 @ 02:55)  HR: 102 (10-04-21 @ 18:03) (92 - 210)  BP: 108/58 (10-04-21 @ 18:03) (80/52 - 108/58)  RR: 22 (10-04-21 @ 18:03) (18 - 26)  SpO2: 100% (10-04-21 @ 18:03) (80% - 100%)    Weight (kg): 74.8 (10-04-21 @ 18:03)    10-04-21 @ 07:01  -  10-04-21 @ 18:28  --------------------------------------------------------  IN: 0 mL / OUT: 800 mL / NET: -800 mL        Constitutional: No acute distress.  Eyes:. Conjunctivae are clear, Sclera is non-icteric.  Ears Nose and Throat: The external ears are normal appearing,  Oral mucosa is pink and moist.  Respiratory:  No signs of respiratory distress. Lung sounds are clear bilaterally.  Cardiovascular:  S1 S2, Regular rate and rhythm.  GI: Abdomen is distended, non tender, +[place] catheter  Neuro: No Tremor, No involuntary movements  Skin: No rashes, No Jaundice.          Data:                        7.9    26.10 )-----------( 294      ( 04 Oct 2021 14:42 )             24.0     Hgb Trend:  7.9  10-04-21 @ 14:42  8.3  10-04-21 @ 01:00        10-04    125<L>  |  91<L>  |  70<HH>  ----------------------------<  160<H>  6.1<HH>   |  15<L>  |  1.0    Ca    7.5<L>      04 Oct 2021 14:42  Mg     2.4     10-04    TPro  5.3<L>  /  Alb  1.8<L>  /  TBili  1.1  /  DBili  x   /  AST  211<H>  /  ALT  159<H>  /  AlkPhos  260<H>  10-04    Liver panel trend:  TBili 1.1   /      /      /   AlkP 260   /   Tptn 5.3   /   Alb 1.8    /   DBili --      10-04  TBili 1.1   /      /      /   AlkP 247   /   Tptn 4.8   /   Alb 1.8    /   DBili --      10-04  TBili 2.5   /      /      /   AlkP 290   /   Tptn 6.1   /   Alb 1.9    /   DBili --      10-04      PT/INR - ( 04 Oct 2021 01:00 )   PT: 19.80 sec;   INR: 1.73 ratio         PTT - ( 04 Oct 2021 01:00 )  PTT:29.4 sec        Radiology:  CT Abdomen and Pelvis w/ IV Cont:   EXAM:  CT ABDOMEN AND PELVIS IC        EXAM:  CT ANGIO CHEST PULM ART WAWIC          *** ADDENDUM 10/04/2021  ***    Findings were discussed with Dr. Ventura at 6:38 AM    --- End of Report ---    *** END OF ADDENDUM 10/04/2021  ***        PROCEDURE DATE:  10/04/2021            INTERPRETATION:  CLINICAL STATEMENT: Chest pain, shortness of breath and hemoptysis. History of HCC and chronic hepatitis C.    TECHNIQUE: Multislice helical sections were obtained from the thoracic inlet to the lung bases during rapid administration of 100cc Omnipaque 350 intravenous contrast using a CTA protocol. Subsequently, axial CT sections were obtained from the domes of the diaphragms to the pubic symphysis. Thin sections were reconstructed through the pulmonary vasculature. MIP reformats were added.    COMPARISON CT: None.      FINDINGS:    CHEST:    PULMONARY EMBOLUS: No evidence for acute central or segmental pulmonary embolus.    LUNGS/PLEURA/PERICARDIUM:: Centrilobular and paraseptal emphysematous changes. Trace debris within the trachea. No focal consolidation, pleural effusion, or pneumothorax.    MEDIASTINUM/THORACIC NODES: Visualized portion of the thyroid gland is unremarkable. No enlarged thoracic lymph nodes. Heart size is within normal limits. No pericardial effusion. 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 Normal caliber main pulmonary artery. Right chest wall Mediport catheter with tip in the proximal right atrium.      ABDOMEN/PELVIS:    HEPATOBILIARY: The liver demonstrates morphologic changes of cirrhosis. There are secondary signs of portal hypertension such as paraesophageal varices. There is diffuse heterogeneity of the liver with innumerable hypodensities, inherently limited in evaluation on this single phase of CT contrast. A more focal 8 cm region of hypoattenuation in the posterior right hepatic lobe may correspond to the provided history of HCC, again limited. There is contrast filling of the splenic vein and SMV with hypodense main portal vein, left main and right portal veins compatible with thrombus. Cholelithiasis    SPLEEN: Mildly enlarged spleen to 13.5 cm.    PANCREAS: Unremarkable.    ADRENAL GLANDS: Unremarkable.    KIDNEYS: Symmetric pattern of renal enhancement. No hydronephrosis. Left upper pole cyst and subcentimeter hypodensity too small to characterize. 1.2 cm left renal hypodensity (8/145) measures higher attenuation than simple fluid and is indeterminate.    ABDOMINOPELVIC NODES: Unremarkable.    PELVIC ORGANS: Berry catheter within the urinary bladder.    PERITONEUM/MESENTERY/BOWEL: No evidence for bowel obstruction. Questionable gastric wall thickening. A catheter enters the right abdomen with tip in the left lower quadrant. Overall small volume abdominopelvic ascites. No free air..    BONES/SOFT TISSUES: Degenerative changes of the spine.    OTHER: Calcific atherosclerotic disease of the aorta and its branches.      IMPRESSION:    No evidence for acute pulmonary embolus.    Cirrhosis and portal hypertension.    Innumerable hepatic hypodensities with more focal 8 cm region of hypoattenuation in the posterior right hepatic lobe, limited in evaluation on a single phase of contrast but may correspond to reported history of HCC.    There is hypoattenuation within the a portion of the left, the right and main portal veins with extension into the portal confluence and a portion of the splenic and superior mesenteric veins compatible with portal vein thrombus. The caliber of these veins are larger than the opacified portions of the splenic vein, SMV and IMV and therefore may be acute.    Small volume ascites.    Suggestion of gastric wall thickening. Correlate for gastritis.    --- End of Report ---      ***Please see theaddendum at the top of this report. It may contain additional important information or changes.****      BROOKS CONCEPCION MD; Resident Radiologist  This document has been electronically signed.  KIA GABRIEL MD; Attending Radiologist  This document has been electronically signed. Oct  4 2021  6:33AM  Addend:KIA GABRIEL MD; Attending Radiologist  This addendum was electronically signed on: Oct  4 2021  8:18AM. (10-04-21 @ 04:30)       Gastroenterology Consultation:    Patient is a 66y old  Male who presents with a chief complaint of weakness  Hyperkalemia  VT (04 Oct 2021 17:43)      Admitted on: 10-04-21  HPI:  65 yo male, with h/o HTN, drug abuse, Hepatitis C s/p INF, Liver cirrhosis s/p [place] shunt, COPD, portal vein thrombosis on Eliquis, HCC since January 2021 on chemotherapy, presented for weakness  Patient reports having weakness, decreased po intake since few weeks. He also has not been sleeping. He reports constipation and hematemesis for 1 day, minimal amount.  Denies fever, chills, chest pain, cough, sputum , SOB, diarrhea, dysuria  He has a [place] shunt  that was drained one day prior to admission    ED course : mental status alteration, sustained VT  bpm with BP 82/49 mmHg s/p shock + calcium gluconate  Patient has Hyperkalemia 6.8 CO2 14 s/p Bicarb drip, renal called, sp berry with good UO   (04 Oct 2021 06:15)      Prior EGD: long time ago as per patient  Prior Colonoscopy: long time ago as per patient      PAST MEDICAL & SURGICAL HISTORY:  HTN (hypertension)    Hepatitis C  2007    Drug abuse    Cancer, hepatocellular  January 2021    COPD, mild        FAMILY HISTORY:  No family h/o Gi cnacers    Social History:  Tobacco: N  Alcohol: N  Drugs: N    Home Medications:  Eliquis 5 mg oral tablet: 1 tab(s) orally 2 times a day (04 Oct 2021 06:13)  fluticasone-salmeterol 55 mcg-14 mcg/inh inhalation powder: 1 puff(s) inhaled 2 times a day (04 Oct 2021 06:14)  Protonix 40 mg oral delayed release tablet: 1 tab(s) orally once a day (04 Oct 2021 06:13)  spironolactone 50 mg oral tablet: 1 tab(s) orally once a day (04 Oct 2021 06:13)    MEDICATIONS  (STANDING):  budesonide  80 MICROgram(s)/formoterol 4.5 MICROgram(s) Inhaler 2 Puff(s) Inhalation two times a day  cefepime   IVPB 2000 milliGRAM(s) IV Intermittent every 8 hours  chlorhexidine 4% Liquid 1 Application(s) Topical daily  dextrose 40% Gel 15 Gram(s) Oral once  dextrose 5%. 1000 milliLiter(s) (50 mL/Hr) IV Continuous <Continuous>  dextrose 5%. 1000 milliLiter(s) (100 mL/Hr) IV Continuous <Continuous>  dextrose 50% Injectable 25 Gram(s) IV Push once  dextrose 50% Injectable 12.5 Gram(s) IV Push once  dextrose 50% Injectable 25 Gram(s) IV Push once  enoxaparin Injectable 70 milliGRAM(s) SubCutaneous every 12 hours  folic acid 1 milliGRAM(s) Oral daily  glucagon  Injectable 1 milliGRAM(s) IntraMuscular once  insulin glargine Injectable (LANTUS) 15 Unit(s) SubCutaneous at bedtime  insulin lispro (ADMELOG) corrective regimen sliding scale   SubCutaneous three times a day before meals  insulin lispro Injectable (ADMELOG) 5 Unit(s) SubCutaneous three times a day before meals  lactulose Syrup 20 Gram(s) Oral three times a day  multivitamin 1 Tablet(s) Oral daily  octreotide  Infusion 50 MICROgram(s)/Hr (10 mL/Hr) IV Continuous <Continuous>  pantoprazole    Tablet 40 milliGRAM(s) Oral every 12 hours  polyethylene glycol 3350 17 Gram(s) Oral daily  propranolol 10 milliGRAM(s) Oral two times a day  rifAXIMin 550 milliGRAM(s) Oral two times a day  senna 2 Tablet(s) Oral at bedtime  sodium bicarbonate  Infusion 0.251 mEq/kG/Hr (125 mL/Hr) IV Continuous <Continuous>  sodium zirconium cyclosilicate 10 Gram(s) Oral two times a day    MEDICATIONS  (PRN):      Allergies  No Known Allergies      Review of Systems:   Constitutional:  No Fever, No Chills  ENT/Mouth:  No Hearing Changes,  No Difficulty Swallowing  Eyes:  No Eye Pain, No Vision Changes  Cardiovascular:  No Chest Pain, No Palpitations  Respiratory:  No Cough, No Dyspnea  Gastrointestinal:  As described in HPI  Musculoskeletal:  No Joint Swelling, No Back Pain  Skin:  No Skin Lesions, No Jaundice  Neuro:  No Syncope, No Dizziness  Heme/Lymph:  No Bruising, No Bleeding.          Physical Examination:  T(C): 37 (10-04-21 @ 18:03), Max: 37 (10-04-21 @ 02:55)  HR: 102 (10-04-21 @ 18:03) (92 - 210)  BP: 108/58 (10-04-21 @ 18:03) (80/52 - 108/58)  RR: 22 (10-04-21 @ 18:03) (18 - 26)  SpO2: 100% (10-04-21 @ 18:03) (80% - 100%)    Weight (kg): 74.8 (10-04-21 @ 18:03)    10-04-21 @ 07:01  -  10-04-21 @ 18:28  --------------------------------------------------------  IN: 0 mL / OUT: 800 mL / NET: -800 mL        Constitutional: No acute distress.  Eyes:. Conjunctivae are clear, Sclera is non-icteric.  Ears Nose and Throat: The external ears are normal appearing,  Oral mucosa is pink and moist.  Respiratory:  No signs of respiratory distress. Lung sounds are clear bilaterally.  Cardiovascular:  S1 S2, Regular rate and rhythm.  GI: Abdomen is distended, non tender, +[place] catheter  Neuro: No Tremor, No involuntary movements  Skin: No rashes, No Jaundice.          Data:                        7.9    26.10 )-----------( 294      ( 04 Oct 2021 14:42 )             24.0     Hgb Trend:  7.9  10-04-21 @ 14:42  8.3  10-04-21 @ 01:00        10-04    125<L>  |  91<L>  |  70<HH>  ----------------------------<  160<H>  6.1<HH>   |  15<L>  |  1.0    Ca    7.5<L>      04 Oct 2021 14:42  Mg     2.4     10-04    TPro  5.3<L>  /  Alb  1.8<L>  /  TBili  1.1  /  DBili  x   /  AST  211<H>  /  ALT  159<H>  /  AlkPhos  260<H>  10-04    Liver panel trend:  TBili 1.1   /      /      /   AlkP 260   /   Tptn 5.3   /   Alb 1.8    /   DBili --      10-04  TBili 1.1   /      /      /   AlkP 247   /   Tptn 4.8   /   Alb 1.8    /   DBili --      10-04  TBili 2.5   /      /      /   AlkP 290   /   Tptn 6.1   /   Alb 1.9    /   DBili --      10-04      PT/INR - ( 04 Oct 2021 01:00 )   PT: 19.80 sec;   INR: 1.73 ratio         PTT - ( 04 Oct 2021 01:00 )  PTT:29.4 sec        Radiology:  CT Abdomen and Pelvis w/ IV Cont:   EXAM:  CT ABDOMEN AND PELVIS IC        EXAM:  CT ANGIO CHEST PULM ART WAWIC          *** ADDENDUM 10/04/2021  ***    Findings were discussed with Dr. Ventura at 6:38 AM    --- End of Report ---    *** END OF ADDENDUM 10/04/2021  ***        PROCEDURE DATE:  10/04/2021            INTERPRETATION:  CLINICAL STATEMENT: Chest pain, shortness of breath and hemoptysis. History of HCC and chronic hepatitis C.    TECHNIQUE: Multislice helical sections were obtained from the thoracic inlet to the lung bases during rapid administration of 100cc Omnipaque 350 intravenous contrast using a CTA protocol. Subsequently, axial CT sections were obtained from the domes of the diaphragms to the pubic symphysis. Thin sections were reconstructed through the pulmonary vasculature. MIP reformats were added.    COMPARISON CT: None.      FINDINGS:    CHEST:    PULMONARY EMBOLUS: No evidence for acute central or segmental pulmonary embolus.    LUNGS/PLEURA/PERICARDIUM:: Centrilobular and paraseptal emphysematous changes. Trace debris within the trachea. No focal consolidation, pleural effusion, or pneumothorax.    MEDIASTINUM/THORACIC NODES: Visualized portion of the thyroid gland is unremarkable. No enlarged thoracic lymph nodes. Heart size is within normal limits. No pericardial effusion. 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 Normal caliber main pulmonary artery. Right chest wall Mediport catheter with tip in the proximal right atrium.      ABDOMEN/PELVIS:    HEPATOBILIARY: The liver demonstrates morphologic changes of cirrhosis. There are secondary signs of portal hypertension such as paraesophageal varices. There is diffuse heterogeneity of the liver with innumerable hypodensities, inherently limited in evaluation on this single phase of CT contrast. A more focal 8 cm region of hypoattenuation in the posterior right hepatic lobe may correspond to the provided history of HCC, again limited. There is contrast filling of the splenic vein and SMV with hypodense main portal vein, left main and right portal veins compatible with thrombus. Cholelithiasis    SPLEEN: Mildly enlarged spleen to 13.5 cm.    PANCREAS: Unremarkable.    ADRENAL GLANDS: Unremarkable.    KIDNEYS: Symmetric pattern of renal enhancement. No hydronephrosis. Left upper pole cyst and subcentimeter hypodensity too small to characterize. 1.2 cm left renal hypodensity (8/145) measures higher attenuation than simple fluid and is indeterminate.    ABDOMINOPELVIC NODES: Unremarkable.    PELVIC ORGANS: Berry catheter within the urinary bladder.    PERITONEUM/MESENTERY/BOWEL: No evidence for bowel obstruction. Questionable gastric wall thickening. A catheter enters the right abdomen with tip in the left lower quadrant. Overall small volume abdominopelvic ascites. No free air..    BONES/SOFT TISSUES: Degenerative changes of the spine.    OTHER: Calcific atherosclerotic disease of the aorta and its branches.      IMPRESSION:    No evidence for acute pulmonary embolus.    Cirrhosis and portal hypertension.    Innumerable hepatic hypodensities with more focal 8 cm region of hypoattenuation in the posterior right hepatic lobe, limited in evaluation on a single phase of contrast but may correspond to reported history of HCC.    There is hypoattenuation within the a portion of the left, the right and main portal veins with extension into the portal confluence and a portion of the splenic and superior mesenteric veins compatible with portal vein thrombus. The caliber of these veins are larger than the opacified portions of the splenic vein, SMV and IMV and therefore may be acute.    Small volume ascites.    Suggestion of gastric wall thickening. Correlate for gastritis.    --- End of Report ---      ***Please see theaddendum at the top of this report. It may contain additional important information or changes.****      BROOKS CONCEPCION MD; Resident Radiologist  This document has been electronically signed.  KIA GABRIEL MD; Attending Radiologist  This document has been electronically signed. Oct  4 2021  6:33AM  Addend:KIA GABRIEL MD; Attending Radiologist  This addendum was electronically signed on: Oct  4 2021  8:18AM. (10-04-21 @ 04:30)

## 2021-10-04 NOTE — H&P ADULT - NSHPLABSRESULTS_GEN_ALL_CORE
8.3    26.43 )-----------( 508      ( 04 Oct 2021 01:00 )             24.5     10-04    120<L>  |  93<L>  |  68<HH>  ----------------------------<  273<H>  6.8<HH>   |  14<L>  |  0.9    Ca    7.3<L>      04 Oct 2021 03:39    TPro  4.8<L>  /  Alb  1.8<L>  /  TBili  1.1  /  DBili  x   /  AST  106<H>  /  ALT  115<H>  /  AlkPhos  247<H>  10-04      < from: CT Abdomen and Pelvis w/ IV Cont (10.04.21 @ 04:30) >      No pulmonary embolism.    Irregular, nodular liver contour with multiple areas of ill-defined hypodensity and moderate volume abdominal pelvic ascites. Findings are compatible with reported HCC/chronic hepatocellular disease.    < end of copied text >

## 2021-10-04 NOTE — CHART NOTE - NSCHARTNOTEFT_GEN_A_CORE
patient had one BM c/w melena  patient was agitated on max dose of precedex  Given Versed 5 mg iv push resulting in hypotension MAP dropped to 50 and patient was deeply sedated  s/p NS bolus and 0.2 flumazenil x 2  effect reversed

## 2021-10-04 NOTE — SWALLOW BEDSIDE ASSESSMENT ADULT - SWALLOW EVAL: DIAGNOSIS
mild oral dysphagia for Dysphagia Diet II mechanical soft consistency with ground meat, thins w/no overt s/s of aspiration/penetration

## 2021-10-04 NOTE — CONSULT NOTE ADULT - SUBJECTIVE AND OBJECTIVE BOX
HISTORY OF PRESENT ILLNESS:   65 yo male, with h/o HTN, drug abuse, Hepatitis C and HCC on chemo, Liver cirrhosis s/p [place] shunt, COPD, portal vein thrombosis on Eliquis, HCC since January 2021 on chemotherapy, presented for weakness  Patient reports having weakness, decreased po intake since few weeks. He also has not been sleeping. He reports constipation and hematemesis for 1 day, minimal amount.  Denies fever, chills, chest pain, cough, sputum , SOB, diarrhea, dysuria      PAST MEDICAL & SURGICAL HISTORY  HTN (hypertension)    Hepatitis C  2007    Drug abuse    Cancer, hepatocellular  January 2021    COPD, mild        FAMILY HISTORY:  FAMILY HISTORY:      SOCIAL HISTORY:  []smoker  []Alcohol  []Drug    ROS:  Negative except as mentioned in HPI    ALLERGIES:  No Known Allergies      MEDICATIONS:  MEDICATIONS  (STANDING):  budesonide  80 MICROgram(s)/formoterol 4.5 MICROgram(s) Inhaler 2 Puff(s) Inhalation two times a day  cefepime   IVPB 2000 milliGRAM(s) IV Intermittent every 8 hours  chlorhexidine 4% Liquid 1 Application(s) Topical daily  dextrose 40% Gel 15 Gram(s) Oral once  dextrose 5%. 1000 milliLiter(s) (50 mL/Hr) IV Continuous <Continuous>  dextrose 5%. 1000 milliLiter(s) (100 mL/Hr) IV Continuous <Continuous>  dextrose 50% Injectable 25 Gram(s) IV Push once  dextrose 50% Injectable 12.5 Gram(s) IV Push once  dextrose 50% Injectable 25 Gram(s) IV Push once  dextrose 50% Injectable 50 milliLiter(s) IV Push once  enoxaparin Injectable 70 milliGRAM(s) SubCutaneous every 12 hours  folic acid 1 milliGRAM(s) Oral daily  glucagon  Injectable 1 milliGRAM(s) IntraMuscular once  insulin glargine Injectable (LANTUS) 15 Unit(s) SubCutaneous at bedtime  insulin lispro (ADMELOG) corrective regimen sliding scale   SubCutaneous three times a day before meals  insulin lispro Injectable (ADMELOG) 5 Unit(s) SubCutaneous three times a day before meals  insulin regular  human recombinant 10 Unit(s) IV Push once  lactulose Syrup 20 Gram(s) Oral three times a day  multivitamin 1 Tablet(s) Oral daily  octreotide  Infusion 50 MICROgram(s)/Hr (10 mL/Hr) IV Continuous <Continuous>  pantoprazole    Tablet 40 milliGRAM(s) Oral every 12 hours  polyethylene glycol 3350 17 Gram(s) Oral daily  propranolol 10 milliGRAM(s) Oral two times a day  rifAXIMin 550 milliGRAM(s) Oral two times a day  senna 2 Tablet(s) Oral at bedtime  sodium bicarbonate  Infusion 0.251 mEq/kG/Hr (125 mL/Hr) IV Continuous <Continuous>  sodium zirconium cyclosilicate 10 Gram(s) Oral two times a day    MEDICATIONS  (PRN):      HOME MEDICATIONS:  Home Medications:  Eliquis 5 mg oral tablet: 1 tab(s) orally 2 times a day (04 Oct 2021 06:13)  fluticasone-salmeterol 55 mcg-14 mcg/inh inhalation powder: 1 puff(s) inhaled 2 times a day (04 Oct 2021 06:14)  Protonix 40 mg oral delayed release tablet: 1 tab(s) orally once a day (04 Oct 2021 06:13)  spironolactone 50 mg oral tablet: 1 tab(s) orally once a day (04 Oct 2021 06:13)      VITALS:   T(F): 98.6 (10-04 @ 15:30), Max: 98.6 (10-04 @ 02:55)  HR: 102 (10-04 @ 15:27) (92 - 210)  BP: 108/58 (10-04 @ 15:27) (80/52 - 108/58)  BP(mean): 72 (10-04 @ 05:35) (72 - 72)  RR: 22 (10-04 @ 15:27) (18 - 26)  SpO2: 100% (10-04 @ 15:27) (80% - 100%)    I&O's Summary    04 Oct 2021 07:01  -  04 Oct 2021 17:43  --------------------------------------------------------  IN: 0 mL / OUT: 800 mL / NET: -800 mL        PHYSICAL EXAM:  GEN: Not in acute distress  HEENT: NCAT, PERRL, EOMI  LUNGS: Clear to auscultation bilaterally   CARDIOVASCULAR: RRR, S1/S2 present, no murmurs, rubs or gallops, no JVD, + PP bilaterally  ABD: Soft, non-tender, non-distended  EXT: No ALIX  SKIN: Intact  NEURO: AAOx3    LABS:                        7.9    26.10 )-----------( 294      ( 04 Oct 2021 14:42 )             24.0     10-04    125<L>  |  91<L>  |  70<HH>  ----------------------------<  160<H>  6.1<HH>   |  15<L>  |  1.0    Ca    7.5<L>      04 Oct 2021 14:42  Mg     2.4     10-04    TPro  5.3<L>  /  Alb  1.8<L>  /  TBili  1.1  /  DBili  x   /  AST  211<H>  /  ALT  159<H>  /  AlkPhos  260<H>  10-04    PT/INR - ( 04 Oct 2021 01:00 )   PT: 19.80 sec;   INR: 1.73 ratio         PTT - ( 04 Oct 2021 01:00 )  PTT:29.4 sec  Troponin T, Serum: <0.01 ng/mL (10-04-21 @ 01:00)    Troponin <0.01, CKMB --, CK --/ 10-04-21 @ 01:00        Hemoglobin A1C   Thyroid      -CXR:  -TTE:  -CCTA:  -STRESS TEST:  -CATHETERIZATION:  -ECG:   -Telemetry:   HISTORY OF PRESENT ILLNESS:   65 yo male, with h/o HTN, drug abuse, Hepatitis C and HCC on chemo, Liver cirrhosis s/p [place] shunt, COPD, portal vein thrombosis on Eliquis, HCC since January 2021 on chemotherapy, presented for weakness and hematemesis. Cardiology consulted for EKG abnormality and pre-op for urgent EGD. Patient reports having weakness, decreased po intake since few weeks. He also has not been sleeping. He reports constipation and hematemesis for 1 day, minimal amount. Denies fever, chills, chest pain, cough, sputum , SOB, diarrhea, dysuria. Patient had no documented cardiac history. Reports baseline ambulating 3-4 blocks or 1-2 flights of stairs without exertional symptoms.      PAST MEDICAL & SURGICAL HISTORY  HTN (hypertension)    Hepatitis C  2007    Drug abuse    Cancer, hepatocellular  January 2021    COPD, mild        FAMILY HISTORY:  FAMILY HISTORY:      SOCIAL HISTORY:  []smoker  []Alcohol  [x]Drug    ROS:  Negative except as mentioned in HPI    ALLERGIES:  No Known Allergies      MEDICATIONS:  MEDICATIONS  (STANDING):  budesonide  80 MICROgram(s)/formoterol 4.5 MICROgram(s) Inhaler 2 Puff(s) Inhalation two times a day  cefepime   IVPB 2000 milliGRAM(s) IV Intermittent every 8 hours  chlorhexidine 4% Liquid 1 Application(s) Topical daily  dextrose 40% Gel 15 Gram(s) Oral once  dextrose 5%. 1000 milliLiter(s) (50 mL/Hr) IV Continuous <Continuous>  dextrose 5%. 1000 milliLiter(s) (100 mL/Hr) IV Continuous <Continuous>  dextrose 50% Injectable 25 Gram(s) IV Push once  dextrose 50% Injectable 12.5 Gram(s) IV Push once  dextrose 50% Injectable 25 Gram(s) IV Push once  dextrose 50% Injectable 50 milliLiter(s) IV Push once  enoxaparin Injectable 70 milliGRAM(s) SubCutaneous every 12 hours  folic acid 1 milliGRAM(s) Oral daily  glucagon  Injectable 1 milliGRAM(s) IntraMuscular once  insulin glargine Injectable (LANTUS) 15 Unit(s) SubCutaneous at bedtime  insulin lispro (ADMELOG) corrective regimen sliding scale   SubCutaneous three times a day before meals  insulin lispro Injectable (ADMELOG) 5 Unit(s) SubCutaneous three times a day before meals  insulin regular  human recombinant 10 Unit(s) IV Push once  lactulose Syrup 20 Gram(s) Oral three times a day  multivitamin 1 Tablet(s) Oral daily  octreotide  Infusion 50 MICROgram(s)/Hr (10 mL/Hr) IV Continuous <Continuous>  pantoprazole    Tablet 40 milliGRAM(s) Oral every 12 hours  polyethylene glycol 3350 17 Gram(s) Oral daily  propranolol 10 milliGRAM(s) Oral two times a day  rifAXIMin 550 milliGRAM(s) Oral two times a day  senna 2 Tablet(s) Oral at bedtime  sodium bicarbonate  Infusion 0.251 mEq/kG/Hr (125 mL/Hr) IV Continuous <Continuous>  sodium zirconium cyclosilicate 10 Gram(s) Oral two times a day    MEDICATIONS  (PRN):      HOME MEDICATIONS:  Home Medications:  Eliquis 5 mg oral tablet: 1 tab(s) orally 2 times a day (04 Oct 2021 06:13)  fluticasone-salmeterol 55 mcg-14 mcg/inh inhalation powder: 1 puff(s) inhaled 2 times a day (04 Oct 2021 06:14)  Protonix 40 mg oral delayed release tablet: 1 tab(s) orally once a day (04 Oct 2021 06:13)  spironolactone 50 mg oral tablet: 1 tab(s) orally once a day (04 Oct 2021 06:13)      VITALS:   T(F): 98.6 (10-04 @ 15:30), Max: 98.6 (10-04 @ 02:55)  HR: 102 (10-04 @ 15:27) (92 - 210)  BP: 108/58 (10-04 @ 15:27) (80/52 - 108/58)  BP(mean): 72 (10-04 @ 05:35) (72 - 72)  RR: 22 (10-04 @ 15:27) (18 - 26)  SpO2: 100% (10-04 @ 15:27) (80% - 100%)    I&O's Summary    04 Oct 2021 07:01  -  04 Oct 2021 17:43  --------------------------------------------------------  IN: 0 mL / OUT: 800 mL / NET: -800 mL        PHYSICAL EXAM:  GEN: Not in acute distress  HEENT: NCAT, PERRL, EOMI  LUNGS: Clear to auscultation bilaterally   CARDIOVASCULAR: tachycardia  ABD: Soft, non-tender, non-distended  EXT: No ALIX  SKIN: Intact  NEURO: AAOx3    LABS:                        7.9    26.10 )-----------( 294      ( 04 Oct 2021 14:42 )             24.0     10-04    125<L>  |  91<L>  |  70<HH>  ----------------------------<  160<H>  6.1<HH>   |  15<L>  |  1.0    Ca    7.5<L>      04 Oct 2021 14:42  Mg     2.4     10-04    TPro  5.3<L>  /  Alb  1.8<L>  /  TBili  1.1  /  DBili  x   /  AST  211<H>  /  ALT  159<H>  /  AlkPhos  260<H>  10-04    PT/INR - ( 04 Oct 2021 01:00 )   PT: 19.80 sec;   INR: 1.73 ratio         PTT - ( 04 Oct 2021 01:00 )  PTT:29.4 sec  Troponin T, Serum: <0.01 ng/mL (10-04-21 @ 01:00)    Troponin <0.01, CKMB --, CK --/ 10-04-21 @ 01:00        Hemoglobin A1C   Thyroid    EKG: sinus, widened QRS and peaked T wave related to hyperK. No recordings of VT.

## 2021-10-04 NOTE — CONSULT NOTE ADULT - ASSESSMENT
* SUMMARY: Patient presented with multiple comorbidities. No documented cardiac history of active cardiac symptoms at present. EKG changes related to hyperK.   - Please correct K immediately to prevent further arrythmia.  - Patient at elevated risk to proceed with low risk surgery. The planned procedure is urgent. No additional cardiac testing required at this time.    * Patient-based characteristics (Functional capacity)  Patient is able to achieve more than 4 MET (walk 4 blocks, climb 2 flights of stairs, etc...)          Y [X] / N []    High-risk patient features:  - Recent (<30 days) or active MI          Y [] / N [X]  - Unstable or severe angina          Y [] / N [X]  - Decompensated heart failure, or worsening or new-onset heart failure          Y [] / N [X]  - Severe valvular disease          Y [] / N [X]  - Significant arrhythmia (Tachy- or Bradyarrhythmia)          Y [] / N [X] EKG reveals sinus tach with QRS/T wave changes related to electrolyte abnormalities.    * Surgery/Procedure-based characteristics (Type of surgery)  - Low-risk procedure (outpatient procedure, elective, endoscopy, etc...)          Y [x] / N []  - Elevated or Moderate-risk procedure (Inpatient)          Y [] / N []  - High-risk procedure (urgent/emergent procedure, Intrathoracic, vascular, etc...)          Y [] / N []    * Revised Cardiac Risk Index (RCRI)  1- History of ischemic heart disease          Y [] / N [X]  2- History of congestive heart failure          Y [] / N [X]  3- History of stroke/TIA          Y [] / N [X]  4- History of insulin-dependent diabetes          Y [] / N [X]  5- Chronic kidney disease (Cr >2mg/dL)          Y [] / N [X]  6- Undergoing suprainguinal vascular, intraperitoneal, or intrathoracic surgery          Y [] / N [X]    * IMPRESSION & RECOMMENDATIONS:  Elevated-risk patient for a Low (<1%) risk surgery/procedure  No further cardiac work-up is needed at the moment. Will proceed with procedure after correction of electrolyte abnormalities.    - This consult serves only as a tono-operative cardiac risk stratification and evaluation to predict 30-days cardiac complications risk and mortality. The decision to proceed with the surgery/procedure is made by the performing physician and the patient -

## 2021-10-04 NOTE — ED PROVIDER NOTE - ATTENDING CONTRIBUTION TO CARE
66 y.o. male, PMH of HCC (not currently on chemo since 8/2021), chronic Hep C, HTN, COPD, BIBA for weakness, decreased appetite for days, CP/SOB which started today associated with hemoptysis. No fever/chills, abdominal pain. On exam, pt appears weak/uncomfortable, awake/alert, head NC/AT, CN II-XII intact, MM dry, lungs CTA B/L, CV S1S2 tachy/regular, abdomen soft/NT/ND/(+)BS/(+) PleurX drain in the abdomen, ext (-) edema, motor 5/5x4, sensation intact. EKG (+) vtach. Pt is hypotensive. Pt shocked with conversion into SR. IVF started. QRS complex noted to be wide. Ca given for possible hyperkalemia. Will do labs/CT/UA/XR and reevaluate.

## 2021-10-04 NOTE — H&P ADULT - NSHPSOCIALHISTORY_GEN_ALL_CORE
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

## 2021-10-04 NOTE — ED ADULT NURSE NOTE - TEMPLATE LIST FOR HEAD TO TOE ASSESSMENT
Quality 155 (Denominator): Falls Plan Of Care: Plan of Care not Documented, Reason not Otherwise Specified Quality 110: Preventive Care And Screening: Influenza Immunization: Influenza Immunization previously received during influenza season Quality 47: Advance Care Plan: Advance Care Planning discussed and documented in the medical record; patient did not wish or was not able to name a surrogate decision maker or provide an advance care plan. Quality 131: Pain Assessment And Follow-Up: Pain assessment using a standardized tool is documented as negative, no follow-up plan required Detail Level: Detailed Quality 111:Pneumonia Vaccination Status For Older Adults: Pneumococcal Vaccination not Administered or Previously Received, Reason not Otherwise Specified Quality 154 Part A: Falls: Risk Assessment (Should Be Reported With Measure 155.): Falls risk assessment completed and documented in the past 12 months. Quality 402: Tobacco Use And Help With Quitting Among Adolescents: Patient screened for tobacco and never smoked Quality 431: Preventive Care And Screening: Unhealthy Alcohol Use - Screening: Patient screened for unhealthy alcohol use using a single question and scores less than 2 times per year Quality 154 Part B: Falls: Risk Screening (Should Be Reported With Measure 155.): Patient screened for future fall risk; documentation of no falls in the past year or only one fall without injury in the past year General

## 2021-10-04 NOTE — CHART NOTE - NSCHARTNOTEFT_GEN_A_CORE
Findings:   Esophagus Protruding lesions 4 cords of grade IV varices were seen in the lower third of the esophagus and middle third of the esophagus. There were stigmata of recent bleeding. 5 bands were applied for hemostasis successfully. Bands were placed using the [employer] multiband ligator kit which was attached to the tip of the endoscope.   Stomach Mucosa Diffuse congestion, petechiae and mosaic mucosal pattern of the mucosa was noted in the fundus and stomach body. These findings were suggestive of portal hypertensive gastropathy.   Duodenum Mucosa Normal mucosa was noted in the whole examined duodenum.      Plan  NPO   Continue PPI drip  Continue octreotide drip  NO NG tube  IR evaluation, if he rebeleeds again will need TIPS  Keep intubated

## 2021-10-04 NOTE — CONSULT NOTE ADULT - PROBLEM SELECTOR RECOMMENDATION 9
GOC unable to be discussed as patient does not have capacity and the primary team has yet to contact a NOK. Patient with overall poor prognosis.  -Full code  -Ongoing medical management  -Primary team continues to look for a NOK/surrogate  -Palliative care will be available for GOC discussions as able/appropriate

## 2021-10-04 NOTE — H&P ADULT - NSHPPHYSICALEXAM_GEN_ALL_CORE
PHYSICAL EXAM:      Constitutional: NAD    Eyes: pale conjunctiva    ENMT: dry mouth, evidence of blood back throat    Neck: no JVD    Respiratory: clear to auscultation b/l     Cardiovascular: RRR no audible murmur, regular s1s2    Gastrointestinal: soft, distended, dullness, decreased BS , hepatomegaly, [place] shunt site clean    Extremities: no LE edema or tenderness    Neurological: Alert x oriented x 3 non focal, grossly intact, slow speech, asterexis    Skin: pale PHYSICAL EXAM:      Constitutional: ILL looking    Eyes: pale conjunctiva    ENMT: dry mouth, evidence of blood back throat    Neck: no JVD    Respiratory: clear to auscultation b/l     Cardiovascular: RRR no audible murmur, regular s1s2    Gastrointestinal: soft, distended, dullness, decreased BS , hepatomegaly, [place] shunt site clean    Extremities: no LE edema or tenderness    Neurological: Alert x oriented x 3 non focal, grossly intact, slow speech, asterexis    Skin: pale

## 2021-10-04 NOTE — H&P ADULT - NSICDXPASTMEDICALHX_GEN_ALL_CORE_FT
PAST MEDICAL HISTORY:  Cancer, hepatocellular January 2021    COPD, mild     Drug abuse     Hepatitis C 2007    HTN (hypertension)

## 2021-10-04 NOTE — H&P ADULT - ASSESSMENT
IMPRESSION:    Hepatic encephalopathy  Decompensated liver cirrhosis. Has [place] shunt  Sepsis likely from SBP  Hematemesis  Acute portal Vein thrombosis   Hyperkalemia with sustained VT s/p shock  Hyponatremia likely from volume overload  Hepatocellular carcinoma with transaminitis on chemotherapy  Hepatitis C treated with INF  COPD  Hypertension, now hypotensive     PLAN:    CNS: avoid cns depressants seizure precautions     HEENT: Oral care    PULMONARY:  HOB @ 45 degrees    CARDIOVASCULAR: Keep I < O , check cardiac ECHO, propranolol 10 q 12 keep HR 50-60    GI: GI prophylaxis.  Feeding , octreotide drip, GI eval for EGD, bowel regimen aim 3 BM/day , rifaximin q 12 , abdominal US    RENAL: Bicarb drip 125 cc/hr , lokelma BID, low K diet, Follow up lytes.  Correct as needed, nephro consult     INFECTIOUS DISEASE: Follow up cultures, peritoneal fluid analysis and culture, cefepime 2 g q 8 ; HepC viral load    HEMATOLOGICAL:  Hold home Eliquis therapeutic Lovenox for portal vein thrombosis; Vascular consult    ENDOCRINE:  Follow up FS.  Insulin protocol if needed. keep -180. avoid hypoglycemia    MUSCULOSKELETAL: bed rest for now    Poor prognosis    Dispo ICU    Full Code      IMPRESSION:    Hepatic encephalopathy ( ammonia 188)  Decompensated liver cirrhosis. Has [place] cathter  Sepsis ro SBP  Hematemesis ( esophageal/ gastric varices)  Acute portal Vein thrombosis   Hyperkalemia with sustained VT s/p shock, creat 0.9 was on aldactone  Hyponatremia likely from volume overload  Hepatocellular carcinoma with transaminitis on chemotherapy  Hepatitis C treated with INF  COPD      PLAN:    CNS: avoid cns depressants seizure precautions     HEENT: Oral care    PULMONARY:  HOB @ 45 degrees    CARDIOVASCULAR: Keep I < O , check cardiac ECHO, propranolol 10 q 12 keep HR 50-60    GI: GI prophylaxis.  Feeding , octreotide/ protonix drip, GI eval for EGD, bowel regimen aim 3 BM/day , rifaximin q 12, send fluid for gram satin / cx)    RENAL: Bicarb drip 125 cc/hr , lokelma tID, low K diet, Follow up lytes.  Correct as needed, nephro consult ( called)    INFECTIOUS DISEASE: Follow up cultures, peritoneal fluid analysis and culture, cefepime 2 g q 8 ; HepC viral load, vanco x1    HEMATOLOGICAL:  Hold home Eliquis therapeutic Lovenox for portal vein thrombosis; Vascular consult    ENDOCRINE:  Follow up FS.  Insulin protocol if needed. keep -180. avoid hypoglycemia    MUSCULOSKELETAL: bed rest for now    Poor prognosis    Dispo ICU    palliative care eval

## 2021-10-04 NOTE — ED PROVIDER NOTE - OBJECTIVE STATEMENT
Pt is a 65 y/o male with PMH of HCC (not currently on chemo since 8/2021), chronic Hep C, HTN presenting for chest pain, SOB and hemoptysis. Pt complaining of mid sternal nonradiating chest pain. Reports decreased po intake for 7 days. On arrival, ECG showed v tach at 170 bpm, hypoxic 80% on RA and /58. Pt discontinued chemo in 8/2021 due to running out of insurance. No fever, chills, vomiting or diarrhea.

## 2021-10-04 NOTE — H&P ADULT - ATTENDING COMMENTS
events noted,  liver cirrhosis./ hep c/ HCC/ [place] abdonen/ presented with hematemesis/ severe acidosis/ hyperkalemia/ v tach for cardioversion/ sepsis present on admission RO SBP/ sp berry with good urine OP  hematemesis/ octreotide/ protonix/ serial CBC/ lactulose/ rifaximin  Serial CMP for hyerpk/ renal eval, dc aldactone  sent perit fluid for gram stain/ cx  IV ABX/ bicarb drip  LE doppler  all over very poor prognosis  palliative care eval events noted,  liver cirrhosis./ hep c/ HCC/ [place] abdonen/ presented with hematemesis/ severe acidosis/ hyperkalemia/ v tach for cardioversion/ sepsis present on admission RO SBP/ sp berry with good urine OP  hematemesis/ octreotide/ protonix/ serial CBC/ lactulose/ rifaximin  Serial CMP for hyerpk/ hyponatremia renal eval, dc aldactone  sent perit fluid for gram stain/ cx  cotisol level/ hydrocortisone 100 q 8  IV ABX/ bicarb drip  LE doppler  all over very poor prognosis  palliative care eval

## 2021-10-04 NOTE — CONSULT NOTE ADULT - ASSESSMENT
Pt with Decompensated liver cirrhosis, Hep C-s/p IFN, ascites -[place] cath, HCCa with transaminitis on chemotherapy  presents with weakness, found to have K 6.8 (was on Aldactone) , sustained VT -s/p shock; met acidosis, hepatic encephalopathy - Ammonia 188.    Hyperkalemia - due to volume depletion and aldosterone blockade  - improving with IVF and Insulin/Glucose, Lokelma  - last K 6.1, pt is making urine  Hyponatremia - Urine na 20, Osm 600 c/w intravascular volume depletion  -improving with hydration  - avoid rise in Na>8 mE/day  Met acidosis - high lactic acid, hypoperfusion, improving  - cont Bicar b drip at 125 cc/hr  Sepsis - r/o SBP - cont Cefipime  - f/u BCx, ID consult  Hematemesis ( esophageal/ gastric varices)  Acute portal Vein thrombosis - cont eliquis, seen by vasc sx  GI f/u    will follow

## 2021-10-04 NOTE — CONSULT NOTE ADULT - SUBJECTIVE AND OBJECTIVE BOX
SUZANNE BAZZI          MRN-[id number]              CC: weakness    HPI:  65 yo male, with h/o HTN, drug abuse, Hepatitis C s/p INF, Liver cirrhosis s/p [place] shunt, COPD, DVT? on Eliquis, HCC since 2021 on chemotherapy, presented for weakness  Patient reports having weakness, decreased po intake since few weeks. He also has not been sleeping. He reports constipation and hematemesis for 1 day, minimal amount.  Denies fever, chills, chest pain, cough, sputum , SOB, diarrhea, dysuria  He has a [place] shunt  that was drained one day prior to admission    ED course : mental status alteration, sustained VT  bpm with BP 82/49 mmHg s/p shock + calcium gluconate  Patient has Hyperkalemia 6.8 CO2 14 s/p Bicarb drip, renal called, sp berry with good UO   (04 Oct 2021 06:15)      PAST MEDICAL & SURGICAL HISTORY:  HTN (hypertension)    Hepatitis C      Drug abuse    Cancer, hepatocellular  2021    COPD, mild    FAMILY HISTORY:   Reviewed and found non contributory in mother or father    SOCIAL HISTORY: Pt resides at home      ROS:	    Patient was altered and unable to complete full ROS, but able to deny any symptoms asked                     Dyspnea (Jeffrey 0-10): 0                       N/V (Y/N): No                             Secretions (Y/N) : No                                          Agitation(Y/N): No                              Pain (Y/N): No                                 -Provocation/Palliation: N/A  -Quality/Quantity: N/A  -Radiating: N/A  -Severity: No pain  -Timing/Frequency: N/A  -Impact on ADLs: N/A    General:  +tired  HEENT:    Denied  Neck:  Denied  CVS:  Denied  Resp:  Denied  GI:  Denied    :  Denied  Musc:  Denied  Neuro:  Denied  Psych:  Denied  Skin:  Denied  Lymph:  Denied    Last BM: Unknown    Allergies  No Known Allergies    Opiate Naive (Y/N):  Y  -iStop reviewed (Y/N):  Y  Ref#:   [id number]  Last Rx 2021 alprazolam 0.5mg           Medications:	      MEDICATIONS  (STANDING):  budesonide  80 MICROgram(s)/formoterol 4.5 MICROgram(s) Inhaler 2 Puff(s) Inhalation two times a day  cefepime   IVPB 2000 milliGRAM(s) IV Intermittent every 8 hours  chlorhexidine 0.12% Liquid 15 milliLiter(s) Oral Mucosa every 12 hours  chlorhexidine 4% Liquid 1 Application(s) Topical daily  dexMEDEtomidine Infusion 0.2 MICROgram(s)/kG/Hr (3.74 mL/Hr) IV Continuous <Continuous>  dextrose 40% Gel 15 Gram(s) Oral once  dextrose 5%. 1000 milliLiter(s) (50 mL/Hr) IV Continuous <Continuous>  dextrose 5%. 1000 milliLiter(s) (100 mL/Hr) IV Continuous <Continuous>  dextrose 50% Injectable 25 Gram(s) IV Push once  dextrose 50% Injectable 12.5 Gram(s) IV Push once  dextrose 50% Injectable 25 Gram(s) IV Push once  folic acid 1 milliGRAM(s) Oral daily  glucagon  Injectable 1 milliGRAM(s) IntraMuscular once  insulin glargine Injectable (LANTUS) 15 Unit(s) SubCutaneous at bedtime  insulin lispro (ADMELOG) corrective regimen sliding scale   SubCutaneous three times a day before meals  insulin lispro Injectable (ADMELOG) 5 Unit(s) SubCutaneous three times a day before meals  lactulose Syrup 20 Gram(s) Oral three times a day  multivitamin 1 Tablet(s) Oral daily  octreotide  Infusion 50 MICROgram(s)/Hr (10 mL/Hr) IV Continuous <Continuous>  pantoprazole Infusion - Peds 4 mG/kG/Hr (374 mL/Hr) IV Continuous <Continuous>  polyethylene glycol 3350 17 Gram(s) Oral daily  propranolol 10 milliGRAM(s) Oral two times a day  rifAXIMin 550 milliGRAM(s) Oral two times a day  senna 2 Tablet(s) Oral at bedtime  sodium bicarbonate  Infusion 0.251 mEq/kG/Hr (125 mL/Hr) IV Continuous <Continuous>  sodium zirconium cyclosilicate 10 Gram(s) Oral two times a day    MEDICATIONS  (PRN):      Labs:	    CBC:                        7.9    26.10 )-----------( 294      ( 04 Oct 2021 14:42 )             24.0     CMP:    10-04    125<L>  |  91<L>  |  70<HH>  ----------------------------<  160<H>  6.1<HH>   |  15<L>  |  1.0    Ca    7.5<L>      04 Oct 2021 14:42  Mg     2.4     10    TPro  5.3<L>  /  Alb  1.8<L>  /  TBili  1.1  /  DBili  x   /  AST  211<H>  /  ALT  159<H>  /  AlkPhos  260<H>  10-04       PT/INR - ( 04 Oct 2021 01:00 )   PT: 19.80 sec;   INR: 1.73 ratio         PTT - ( 04 Oct 2021 01:00 )  PTT:29.4 sec     Urinalysis Basic - ( 04 Oct 2021 04:38 )    Color: Yellow / Appearance: Clear / S.025 / pH: x  Gluc: x / Ketone: Negative  / Bili: Negative / Urobili: <2 mg/dL   Blood: x / Protein: Negative / Nitrite: Negative   Leuk Esterase: Negative / RBC: x / WBC x   Sq Epi: x / Non Sq Epi: x / Bacteria: x    Radiology:	   CT AP  No evidence for acute pulmonary embolus.    Cirrhosis and portal hypertension.    Innumerable hepatic hypodensities with more focal 8 cm region of hypoattenuation in the posterior right hepatic lobe, limited in evaluation on a single phase of contrast but may correspond to reported history of HCC.    There is hypoattenuation within the a portion of the left, the right and main portal veins with extension into the portal confluence and a portion of the splenic and superior mesenteric veins compatible with portal vein thrombus. The caliber of these veins are larger than the opacified portions of the splenic vein, SMV and IMV and therefore may be acute.    Small volume ascites.    Suggestion of gastric wall thickening. Correlate for gastritis.        EK Lead ECG:   Ventricular Rate 194 BPM    Atrial Rate 99 BPM    QRS Duration 76 ms    Q-T Interval 172 ms    QTC Calculation(Bazett) 309 ms    P Axis 71 degrees    R Axis 59 degrees    T Axis 58 degrees    Diagnosis Line Sinus tachycardia  Low voltage QRS  Nonspecific T wave abnormality  Abnormal ECG      Imaging Personally Reviewed:  [x] YES  [ ] NO    Consultant(s) Notes Reviewed:  [x] YES  [ ] NO  Care Discussed with Consultants/Other Providers [x] YES  [ ] NO    PEx:	  T(C): 37 (10-04-21 @ 18:03), Max: 37 (10-04-21 @ 02:55)  HR: 102 (10-04-21 @ 18:03) (92 - 210)  BP: 108/58 (10-04-21 @ 18:03) (80/52 - 108/58)  RR: 22 (10-04-21 @ 18:03) (18 - 26)  SpO2: 100% (10-04-21 @ 18:03) (80% - 100%)  Wt(kg): --  Daily     Daily     General:  found in bed in NAD, vitals as above  HEENT:  EOMI  CVS: tachycardic  Resp: Unlabored Non tachypneic No increased WOB  Musc: No clubbing  Neuro: Follows simple commands, able to answer simple questions  Psych: Calm, AAOx1-2  Skin: no rash  Lymph: no adenopathy     Preadmit Karnofsky: Unknown %           Current Karnofsky:    30 %  http://www.npcrc.org/files/news/karnofsky_performance_scale.pdf   http://www.npcrc.org/files/news/palliative_performance_scale_[email or web address].pdf  Cachexia (Y/N): N  BMI: Unknown    Advanced Directives:	     Full Code  Decision maker: The patient is unable to participate in complex medical decision making conversations.   Legal surrogate: Unknown    REFERRALS	        Palliative Med        Unit SW/Case [facility]

## 2021-10-04 NOTE — ED ADULT NURSE NOTE - NSIMPLEMENTINTERV_GEN_ALL_ED
Implemented All Fall with Harm Risk Interventions:  [place] to call system. Call bell, personal items and telephone within reach. Instruct patient to call for assistance. Room bathroom lighting operational. Non-slip footwear when patient is off stretcher. Physically safe environment: no spills, clutter or unnecessary equipment. Stretcher in lowest position, wheels locked, appropriate side rails in place. Provide visual cue, wrist band, yellow gown, etc. Monitor gait and stability. Monitor for mental status changes and reorient to person, place, and time. Review medications for side effects contributing to fall risk. Reinforce activity limits and safety measures with patient and family. Provide visual clues: red socks.

## 2021-10-04 NOTE — ED PROVIDER NOTE - NS ED ROS FT
Review of Systems:  CONSTITUTIONAL: No fever, No diaphoresis, No weight change  SKIN: No rash  HEMATOLOGIC: No abnormal bleeding or bruising  EYES: No eye pain, No blurred vision  ENT: No change in hearing, No sore throat, No neck pain, No rhinorrhea, No ear pain  RESPIRATORY: + shortness of breath, + hemoptysis, No cough  CARDIAC: + chest pain, No palpitations  GI: No abdominal pain, No nausea, No vomiting, No diarrhea, No constipation, No bright red blood per rectum or melena. No flank pain  : No dysuria, frequency, hematuria.   ENDO: No polydypsia, No polyuria, No heat/cold intolerance  MUSCULOSKELETAL: No joint pain, No swelling, No back pain  NEUROLOGIC: No numbness, No focal weakness, No headache, No dizziness  All other systems negative, unless specified in HPI

## 2021-10-04 NOTE — CONSULT NOTE ADULT - PROBLEM SELECTOR RECOMMENDATION 2
-ongoing treatment per MICU team, GI, nephrology  Recommend non-pharmacological interventions to prevent/treat delirium  - maintain day/night light cycles  - optimize sleep-wake cycle, minimize environmental noise  - reorientation frequently  - use verbal redirection as first line  - minimize restraints and lines  - ensure good bladder/bowel function  - ensure adequate pain control, use opioid sparing regimens when possible  - minimize use of anticholinergic, antihistaminic, and benzodiazepine medications

## 2021-10-04 NOTE — CONSULT NOTE ADULT - ASSESSMENT
65 yo male, with h/o HTN, drug abuse, Hepatitis C s/p INF, Liver cirrhosis s/p [place] shunt, COPD, DVT? on Eliquis, HCC since January 2021 on chemotherapy, presented for weakness  Patient reports having weakness, decreased po intake since few weeks. He also has not been sleeping. He reports constipation and hematemesis for 1 day, minimal amount.    Vascular surgery called for CT findings of portal vein thrombosis  continue Eliquis  treat abdominal pelvic ascites  follow up in the office in 2 weeks after discharge for repeat venous dplx    SPECTRA [id number]

## 2021-10-04 NOTE — ED ADULT NURSE REASSESSMENT NOTE - NS ED NURSE REASSESS COMMENT FT1
Pt. at ENDO. Spoke with SHEELA Tarango from Endo, informing that patient will be going over to PACU after procedure. Call made to PACU spoke with SHEELA Funez, report was given from ED stand point.

## 2021-10-04 NOTE — H&P ADULT - HISTORY OF PRESENT ILLNESS
67 yo male, with h/o HTN, drug abuse, Hepatitis C s/p INF, Liver cirrhosis s/p [place] shunt, COPD, DVT? on Eliquis, HCC since January 2021 on chemotherapy, presented for weakness  Patient reports having weakness, decreased po intake since few weeks. He also has not been sleeping. He reports constipation and hematemesis for 1 day, minimal amount.  Denies fever, chills, chest pain, cough, sputum , SOB, diarrhea, dysuria  He has a [place] shunt  that was drained one day prior to admission    ED course : mental status alteration, sustained VT  bpm with BP 82/49 mmHg s/p shock + calcium gluconate  Patient has Hyperkalemia 6.8 CO2 14 s/p Bicarb drip   65 yo male, with h/o HTN, drug abuse, Hepatitis C s/p INF, Liver cirrhosis s/p [place] shunt, COPD, DVT? on Eliquis, HCC since January 2021 on chemotherapy, presented for weakness  Patient reports having weakness, decreased po intake since few weeks. He also has not been sleeping. He reports constipation and hematemesis for 1 day, minimal amount.  Denies fever, chills, chest pain, cough, sputum , SOB, diarrhea, dysuria  He has a [place] shunt  that was drained one day prior to admission    ED course : mental status alteration, sustained VT  bpm with BP 82/49 mmHg s/p shock + calcium gluconate  Patient has Hyperkalemia 6.8 CO2 14 s/p Bicarb drip, renal called, sp berry with good UO

## 2021-10-04 NOTE — CONSULT NOTE ADULT - SUBJECTIVE AND OBJECTIVE BOX
VASCULAR SURGERY CONSULT NOTE      HPI:  65 yo male, with h/o HTN, drug abuse, Hepatitis C s/p INF, Liver cirrhosis s/p [place] shunt, COPD, DVT? on Eliquis, HCC since 2021 on chemotherapy, presented for weakness  Patient reports having weakness, decreased po intake since few weeks. He also has not been sleeping. He reports constipation and hematemesis for 1 day, minimal amount.  Denies fever, chills, chest pain, cough, sputum , SOB, diarrhea, dysuria  He has a [place] shunt  that was drained one day prior to admission    ED course : mental status alteration, sustained VT  bpm with BP 82/49 mmHg s/p shock + calcium gluconate  Patient has Hyperkalemia 6.8 CO2 14 s/p Bicarb drip, renal called, sp berry with good UO   (04 Oct 2021 06:15)        PAST MEDICAL & SURGICAL HISTORY:  HTN (hypertension)    Hepatitis C      Drug abuse    Cancer, hepatocellular  2021    COPD, mild      No Known Allergies    Home Medications:  Eliquis 5 mg oral tablet: 1 tab(s) orally 2 times a day (04 Oct 2021 06:13)  fluticasone-salmeterol 55 mcg-14 mcg/inh inhalation powder: 1 puff(s) inhaled 2 times a day (04 Oct 2021 06:14)  Protonix 40 mg oral delayed release tablet: 1 tab(s) orally once a day (04 Oct 2021 06:13)  spironolactone 50 mg oral tablet: 1 tab(s) orally once a day (04 Oct 2021 06:13)    No permtinent family history of PVD    REVIEW OF SYSTEMS:  GENERAL:                                         negative  SKIN:                                                 negative  OPTHALMOLOGIC:                          negative  ENMT:                                               negative  RESPIRATORY AND THORAX:        negative  CARDIOVASCULAR:                         see HPI  GASTROINTESTINAL:                       negative  NEPHROLOGY:                                  negative  MUSCULOSKELETAL:                       negative  NEUROLOGIC:                                   negative  PSYCHIATRIC:                                    negative  HEMATOLOGY/LYMPHATICS:        see HPI  ENDOCRINE:                                     negative  ALLERGIC/IMMUNOLOGIC:            negative    12 point ROS otherwise normal except as stated in HPI    PHYSICAL EXAM  Vital Signs Last 24 Hrs  T(C): 36.6 (04 Oct 2021 08:24), Max: 37 (04 Oct 2021 02:55)  T(F): 97.9 (04 Oct 2021 08:24), Max: 98.6 (04 Oct 2021 02:55)  HR: 94 (04 Oct 2021 08:24) (92 - 210)  BP: 84/60 (04 Oct 2021 08:24) (80/52 - 106/66)  BP(mean): 72 (04 Oct 2021 05:35) (72 - 72)  RR: 26 (04 Oct 2021 08:24) (18 - 26)  SpO2: 99% (04 Oct 2021 08:24) (80% - 100%)    Appearance: Normal	  HEENT:   Normal oral mucosa, PERRL, EOMI	  Neck: Supple, - JVD;    Cardiovascular: Normal S1 S2, No JVD, No murmurs,   Respiratory: Lungs clear to auscultation, No Rales, Rhonchi, Wheezing	  Gastrointestinal:  distended, decreased BS , hepatomegaly, [place] shunt site clean	  Skin: No rashes, No ecchymoses, No cyanosis  Extremities: Normal range of motion, No clubbing, cyanosis or edema  Neurologic: Non-focal  Psychiatry: A & O x 3, Mood & affect appropriate        MEDICATIONS:   MEDICATIONS  (STANDING):  budesonide  80 MICROgram(s)/formoterol 4.5 MICROgram(s) Inhaler 2 Puff(s) Inhalation two times a day  calcium gluconate IVPB 1 Gram(s) IV Intermittent once  cefepime   IVPB 2000 milliGRAM(s) IV Intermittent every 8 hours  chlorhexidine 4% Liquid 1 Application(s) Topical daily  dextrose 40% Gel 15 Gram(s) Oral once  dextrose 5%. 1000 milliLiter(s) (50 mL/Hr) IV Continuous <Continuous>  dextrose 5%. 1000 milliLiter(s) (100 mL/Hr) IV Continuous <Continuous>  dextrose 50% Injectable 25 Gram(s) IV Push once  dextrose 50% Injectable 12.5 Gram(s) IV Push once  dextrose 50% Injectable 25 Gram(s) IV Push once  enoxaparin Injectable 70 milliGRAM(s) SubCutaneous every 12 hours  folic acid 1 milliGRAM(s) Oral daily  glucagon  Injectable 1 milliGRAM(s) IntraMuscular once  insulin glargine Injectable (LANTUS) 15 Unit(s) SubCutaneous at bedtime  insulin lispro (ADMELOG) corrective regimen sliding scale   SubCutaneous three times a day before meals  insulin lispro Injectable (ADMELOG) 5 Unit(s) SubCutaneous three times a day before meals  lactulose Syrup 20 Gram(s) Oral three times a day  multivitamin 1 Tablet(s) Oral daily  octreotide  Infusion 50 MICROgram(s)/Hr (10 mL/Hr) IV Continuous <Continuous>  pantoprazole    Tablet 40 milliGRAM(s) Oral every 12 hours  polyethylene glycol 3350 17 Gram(s) Oral daily  propranolol 10 milliGRAM(s) Oral two times a day  rifAXIMin 550 milliGRAM(s) Oral two times a day  senna 2 Tablet(s) Oral at bedtime  sodium bicarbonate  Infusion 0.251 mEq/kG/Hr (125 mL/Hr) IV Continuous <Continuous>  sodium zirconium cyclosilicate 10 Gram(s) Oral two times a day    MEDICATIONS  (PRN):      LAB/STUDIES:                        8.3    26.43 )-----------( 508      ( 04 Oct 2021 01:00 )             24.5     10-04    120<L>  |  93<L>  |  68<HH>  ----------------------------<  273<H>  6.8<HH>   |  14<L>  |  0.9    Ca    7.3<L>      04 Oct 2021 03:39    TPro  4.8<L>  /  Alb  1.8<L>  /  TBili  1.1  /  DBili  x   /  AST  106<H>  /  ALT  115<H>  /  AlkPhos  247<H>  10-04    PT/INR - ( 04 Oct 2021 01:00 )   PT: 19.80 sec;   INR: 1.73 ratio         PTT - ( 04 Oct 2021 01:00 )  PTT:29.4 sec  LIVER FUNCTIONS - ( 04 Oct 2021 02:05 )  Alb: 1.8 g/dL / Pro: 4.8 g/dL / ALK PHOS: 247 U/L / ALT: 115 U/L / AST: 106 U/L / GGT: x             Urinalysis Basic - ( 04 Oct 2021 04:38 )    Color: Yellow / Appearance: Clear / S.025 / pH: x  Gluc: x / Ketone: Negative  / Bili: Negative / Urobili: <2 mg/dL   Blood: x / Protein: Negative / Nitrite: Negative   Leuk Esterase: Negative / RBC: x / WBC x   Sq Epi: x / Non Sq Epi: x / Bacteria: x      CARDIAC MARKERS ( 04 Oct 2021 01:00 )  x     / <0.01 ng/mL / x     / x     / x          IMAGING:    < from: CT Abdomen and Pelvis w/ IV Cont (10.04.21 @ 04:30) >  There is hypoattenuation within the a portion of the left, the right and main portal veins with extension into the portal confluence and a portion of the splenic and superior mesenteric veins compatible with portal vein thrombus. The caliber of these veins are larger than the opacified portions of the splenic vein, SMV and IMV and therefore may be acute.        < from: CT Angio Chest PE Protocol w/ IV Cont (10.04.21 @ 04:30) >  No evidence for acute pulmonary embolus.

## 2021-10-04 NOTE — ED PROVIDER NOTE - PROGRESS NOTE DETAILS
PS: Pt in v tach, shocked now in sinus. Labs significant for Na 115 and K 8.0. 3L NS, 2g calcium gluconate, insulin, dextrose and albuterol ordered. TA: Spoke with Dr. De La Rosa (nephro); recommended adding bicarb drip.  Pending TA: Spoke with Dr. De La Rosa; stated patient can get lokelma and admit to ICU. PS: Portal vein thrombus on CT Abdomen. Pt already on eliquis. Vascular aware

## 2021-10-04 NOTE — CONSULT NOTE ADULT - ASSESSMENT
66yMale being evaluated for GOC.    Patient seen at bedside. He was oriented to self and symptoms. He denied any symptoms.    Primary team has been unable to contact a NOK/surrogate.  They will continue to look for a NOK.  Palliative care will be available for additional GOC discussions if a surrogate can be found or if the patient is able to make decisions for himself.      MEDD (morphine equivalent daily dose): NA      See Recs below.    Please call [phone] with questions or concerns 24/7.   We will continue to follow.     Discussed with primary MD.

## 2021-10-05 DIAGNOSIS — K74.60 UNSPECIFIED CIRRHOSIS OF LIVER: ICD-10-CM

## 2021-10-05 LAB
ALBUMIN SERPL ELPH-MCNC: 1.8 G/DL — LOW (ref 3.5–5.2)
ALP SERPL-CCNC: 317 U/L — HIGH (ref 30–115)
ALT FLD-CCNC: 667 U/L — HIGH (ref 0–41)
ANION GAP SERPL CALC-SCNC: 17 MMOL/L — HIGH (ref 7–14)
AST SERPL-CCNC: 1079 U/L — HIGH (ref 0–41)
B PERT IGG+IGM PNL SER: CLEAR — SIGNIFICANT CHANGE UP
B-OH-BUTYR SERPL-SCNC: <0.2 MMOL/L — SIGNIFICANT CHANGE UP
BASE EXCESS BLDA CALC-SCNC: -0.3 MMOL/L — SIGNIFICANT CHANGE UP (ref -2–3)
BASE EXCESS BLDA CALC-SCNC: 0.4 MMOL/L — SIGNIFICANT CHANGE UP (ref -2–3)
BILIRUB DIRECT SERPL-MCNC: 0.7 MG/DL — HIGH (ref 0–0.2)
BILIRUB INDIRECT FLD-MCNC: 0.5 MG/DL — SIGNIFICANT CHANGE UP (ref 0.2–1.2)
BILIRUB SERPL-MCNC: 1.2 MG/DL — SIGNIFICANT CHANGE UP (ref 0.2–1.2)
BLD GP AB SCN SERPL QL: SIGNIFICANT CHANGE UP
BUN SERPL-MCNC: 78 MG/DL — CRITICAL HIGH (ref 10–20)
CALCIUM SERPL-MCNC: 7.2 MG/DL — LOW (ref 8.5–10.1)
CHLORIDE SERPL-SCNC: 91 MMOL/L — LOW (ref 98–110)
CO2 SERPL-SCNC: 20 MMOL/L — SIGNIFICANT CHANGE UP (ref 17–32)
COLOR FLD: SIGNIFICANT CHANGE UP
CREAT SERPL-MCNC: 1.2 MG/DL — SIGNIFICANT CHANGE UP (ref 0.7–1.5)
ETHANOL SERPL-MCNC: <10 MG/DL — SIGNIFICANT CHANGE UP
FLUID INTAKE SUBSTANCE CLASS: SIGNIFICANT CHANGE UP
FLUID SEGMENTED GRANULOCYTES: 6 % — SIGNIFICANT CHANGE UP
FOLATE SERPL-MCNC: 10.7 NG/ML — SIGNIFICANT CHANGE UP
FOLATE+VIT B12 SERBLD-IMP: 10 % — SIGNIFICANT CHANGE UP
GAS PNL BLDA: SIGNIFICANT CHANGE UP
GAS PNL BLDA: SIGNIFICANT CHANGE UP
GLUCOSE BLDC GLUCOMTR-MCNC: 113 MG/DL — HIGH (ref 70–99)
GLUCOSE BLDC GLUCOMTR-MCNC: 162 MG/DL — HIGH (ref 70–99)
GLUCOSE BLDC GLUCOMTR-MCNC: 170 MG/DL — HIGH (ref 70–99)
GLUCOSE BLDC GLUCOMTR-MCNC: 248 MG/DL — HIGH (ref 70–99)
GLUCOSE BLDC GLUCOMTR-MCNC: 97 MG/DL — SIGNIFICANT CHANGE UP (ref 70–99)
GLUCOSE SERPL-MCNC: 232 MG/DL — HIGH (ref 70–99)
HCO3 BLDA-SCNC: 22 MMOL/L — SIGNIFICANT CHANGE UP (ref 21–28)
HCO3 BLDA-SCNC: 23 MMOL/L — SIGNIFICANT CHANGE UP (ref 21–28)
HCT VFR BLD CALC: 16.7 % — LOW (ref 42–52)
HCT VFR BLD CALC: 28.8 % — LOW (ref 42–52)
HCT VFR BLD CALC: 29.1 % — LOW (ref 42–52)
HCV AB S/CO SERPL IA: 75.9 COI — HIGH
HCV AB SERPL-IMP: REACTIVE
HGB BLD-MCNC: 10 G/DL — LOW (ref 14–18)
HGB BLD-MCNC: 5.5 G/DL — CRITICAL LOW (ref 14–18)
HGB BLD-MCNC: 9.8 G/DL — LOW (ref 14–18)
HOROWITZ INDEX BLDA+IHG-RTO: 40 — SIGNIFICANT CHANGE UP
LACTATE SERPL-SCNC: 2.9 MMOL/L — HIGH (ref 0.7–2)
LACTATE SERPL-SCNC: 4.8 MMOL/L — CRITICAL HIGH (ref 0.7–2)
LYMPHOCYTES # FLD: 58 — SIGNIFICANT CHANGE UP
MAGNESIUM SERPL-MCNC: 2.3 MG/DL — SIGNIFICANT CHANGE UP (ref 1.8–2.4)
MCHC RBC-ENTMCNC: 28.2 PG — SIGNIFICANT CHANGE UP (ref 27–31)
MCHC RBC-ENTMCNC: 29 PG — SIGNIFICANT CHANGE UP (ref 27–31)
MCHC RBC-ENTMCNC: 29.1 PG — SIGNIFICANT CHANGE UP (ref 27–31)
MCHC RBC-ENTMCNC: 32.9 G/DL — SIGNIFICANT CHANGE UP (ref 32–37)
MCHC RBC-ENTMCNC: 34 G/DL — SIGNIFICANT CHANGE UP (ref 32–37)
MCHC RBC-ENTMCNC: 34.4 G/DL — SIGNIFICANT CHANGE UP (ref 32–37)
MCV RBC AUTO: 84.6 FL — SIGNIFICANT CHANGE UP (ref 80–94)
MCV RBC AUTO: 85.2 FL — SIGNIFICANT CHANGE UP (ref 80–94)
MCV RBC AUTO: 85.6 FL — SIGNIFICANT CHANGE UP (ref 80–94)
MESOTHL CELL # FLD: 1 % — SIGNIFICANT CHANGE UP
MONOS+MACROS # FLD: 25 % — SIGNIFICANT CHANGE UP
MRSA PCR RESULT.: NEGATIVE — SIGNIFICANT CHANGE UP
NRBC # BLD: 0 /100 WBCS — SIGNIFICANT CHANGE UP (ref 0–0)
OSMOLALITY SERPL: 297 MOS/KG — SIGNIFICANT CHANGE UP (ref 280–301)
PCO2 BLDA: 29 MMHG — LOW (ref 35–48)
PCO2 BLDA: 30 MMHG — LOW (ref 35–48)
PH BLDA: 7.48 — HIGH (ref 7.35–7.45)
PH BLDA: 7.5 — HIGH (ref 7.35–7.45)
PHOSPHATE SERPL-MCNC: 5.9 MG/DL — HIGH (ref 2.1–4.9)
PLATELET # BLD AUTO: 314 K/UL — SIGNIFICANT CHANGE UP (ref 130–400)
PLATELET # BLD AUTO: 324 K/UL — SIGNIFICANT CHANGE UP (ref 130–400)
PLATELET # BLD AUTO: 378 K/UL — SIGNIFICANT CHANGE UP (ref 130–400)
PO2 BLDA: 149 MMHG — HIGH (ref 83–108)
PO2 BLDA: 193 MMHG — HIGH (ref 83–108)
POTASSIUM SERPL-MCNC: 5.8 MMOL/L — HIGH (ref 3.5–5)
POTASSIUM SERPL-SCNC: 5.8 MMOL/L — HIGH (ref 3.5–5)
PROT SERPL-MCNC: 5.4 G/DL — LOW (ref 6–8)
RBC # BLD: 1.95 M/UL — LOW (ref 4.7–6.1)
RBC # BLD: 3.38 M/UL — LOW (ref 4.7–6.1)
RBC # BLD: 3.44 M/UL — LOW (ref 4.7–6.1)
RBC # FLD: 17.3 % — HIGH (ref 11.5–14.5)
RBC # FLD: 17.4 % — HIGH (ref 11.5–14.5)
RBC # FLD: 19.2 % — HIGH (ref 11.5–14.5)
RCV VOL RI: 0 /UL — SIGNIFICANT CHANGE UP (ref 0–0)
SALICYLATES SERPL-MCNC: <0.3 MG/DL — LOW (ref 4–30)
SAO2 % BLDA: 99.4 % — HIGH (ref 94–98)
SAO2 % BLDA: 99.6 % — HIGH (ref 94–98)
SODIUM SERPL-SCNC: 128 MMOL/L — LOW (ref 135–146)
TOTAL NUCLEATED CELL COUNT, BODY FLUID: 45 /UL — SIGNIFICANT CHANGE UP
TUBE TYPE: SIGNIFICANT CHANGE UP
VIT B12 SERPL-MCNC: >2000 PG/ML — HIGH (ref 232–1245)
WBC # BLD: 26.7 K/UL — HIGH (ref 4.8–10.8)
WBC # BLD: 27.87 K/UL — HIGH (ref 4.8–10.8)
WBC # BLD: 30.82 K/UL — HIGH (ref 4.8–10.8)
WBC # FLD AUTO: 26.7 K/UL — HIGH (ref 4.8–10.8)
WBC # FLD AUTO: 27.87 K/UL — HIGH (ref 4.8–10.8)
WBC # FLD AUTO: 30.82 K/UL — HIGH (ref 4.8–10.8)

## 2021-10-05 PROCEDURE — 99251: CPT

## 2021-10-05 PROCEDURE — 71045 X-RAY EXAM CHEST 1 VIEW: CPT | Mod: 26,77

## 2021-10-05 PROCEDURE — 99232 SBSQ HOSP IP/OBS MODERATE 35: CPT

## 2021-10-05 PROCEDURE — 99291 CRITICAL CARE FIRST HOUR: CPT

## 2021-10-05 PROCEDURE — 71045 X-RAY EXAM CHEST 1 VIEW: CPT | Mod: 26

## 2021-10-05 PROCEDURE — 99233 SBSQ HOSP IP/OBS HIGH 50: CPT

## 2021-10-05 RX ORDER — PANTOPRAZOLE SODIUM 20 MG/1
8 TABLET, DELAYED RELEASE ORAL
Qty: 80 | Refills: 0 | Status: DISCONTINUED | OUTPATIENT
Start: 2021-10-05 | End: 2021-10-10

## 2021-10-05 RX ORDER — DEXMEDETOMIDINE HYDROCHLORIDE IN 0.9% SODIUM CHLORIDE 4 UG/ML
0.2 INJECTION INTRAVENOUS
Qty: 200 | Refills: 0 | Status: DISCONTINUED | OUTPATIENT
Start: 2021-10-05 | End: 2021-10-05

## 2021-10-05 RX ORDER — MIDAZOLAM HYDROCHLORIDE 1 MG/ML
2 INJECTION, SOLUTION INTRAMUSCULAR; INTRAVENOUS ONCE
Refills: 0 | Status: DISCONTINUED | OUTPATIENT
Start: 2021-10-05 | End: 2021-10-05

## 2021-10-05 RX ORDER — INSULIN HUMAN 100 [IU]/ML
10 INJECTION, SOLUTION SUBCUTANEOUS ONCE
Refills: 0 | Status: COMPLETED | OUTPATIENT
Start: 2021-10-05 | End: 2021-10-05

## 2021-10-05 RX ORDER — DEXMEDETOMIDINE HYDROCHLORIDE IN 0.9% SODIUM CHLORIDE 4 UG/ML
0.2 INJECTION INTRAVENOUS
Qty: 400 | Refills: 0 | Status: DISCONTINUED | OUTPATIENT
Start: 2021-10-05 | End: 2021-10-13

## 2021-10-05 RX ORDER — FENTANYL CITRATE 50 UG/ML
0.5 INJECTION INTRAVENOUS
Qty: 2500 | Refills: 0 | Status: DISCONTINUED | OUTPATIENT
Start: 2021-10-05 | End: 2021-10-06

## 2021-10-05 RX ORDER — ACETAMINOPHEN 500 MG
325 TABLET ORAL ONCE
Refills: 0 | Status: COMPLETED | OUTPATIENT
Start: 2021-10-05 | End: 2021-10-05

## 2021-10-05 RX ORDER — CHLORHEXIDINE GLUCONATE 213 G/1000ML
15 SOLUTION TOPICAL
Refills: 0 | Status: DISCONTINUED | OUTPATIENT
Start: 2021-10-05 | End: 2021-10-13

## 2021-10-05 RX ORDER — IBUPROFEN 200 MG
200 TABLET ORAL ONCE
Refills: 0 | Status: DISCONTINUED | OUTPATIENT
Start: 2021-10-05 | End: 2021-10-05

## 2021-10-05 RX ORDER — LACTULOSE 10 G/15ML
200 SOLUTION ORAL THREE TIMES A DAY
Refills: 0 | Status: DISCONTINUED | OUTPATIENT
Start: 2021-10-05 | End: 2021-10-10

## 2021-10-05 RX ORDER — DEXTROSE 50 % IN WATER 50 %
50 SYRINGE (ML) INTRAVENOUS ONCE
Refills: 0 | Status: COMPLETED | OUTPATIENT
Start: 2021-10-05 | End: 2021-10-05

## 2021-10-05 RX ORDER — SODIUM CHLORIDE 9 MG/ML
1000 INJECTION INTRAMUSCULAR; INTRAVENOUS; SUBCUTANEOUS ONCE
Refills: 0 | Status: COMPLETED | OUTPATIENT
Start: 2021-10-05 | End: 2021-10-05

## 2021-10-05 RX ADMIN — LACTULOSE 200 GRAM(S): 10 SOLUTION ORAL at 22:03

## 2021-10-05 RX ADMIN — CEFEPIME 100 MILLIGRAM(S): 1 INJECTION, POWDER, FOR SOLUTION INTRAMUSCULAR; INTRAVENOUS at 13:07

## 2021-10-05 RX ADMIN — Medication 325 MILLIGRAM(S): at 16:10

## 2021-10-05 RX ADMIN — MIDAZOLAM HYDROCHLORIDE 2 MILLIGRAM(S): 1 INJECTION, SOLUTION INTRAMUSCULAR; INTRAVENOUS at 10:14

## 2021-10-05 RX ADMIN — FENTANYL CITRATE 3.79 MICROGRAM(S)/KG/HR: 50 INJECTION INTRAVENOUS at 12:16

## 2021-10-05 RX ADMIN — CHLORHEXIDINE GLUCONATE 1 APPLICATION(S): 213 SOLUTION TOPICAL at 11:27

## 2021-10-05 RX ADMIN — CEFEPIME 100 MILLIGRAM(S): 1 INJECTION, POWDER, FOR SOLUTION INTRAMUSCULAR; INTRAVENOUS at 06:49

## 2021-10-05 RX ADMIN — INSULIN HUMAN 10 UNIT(S): 100 INJECTION, SOLUTION SUBCUTANEOUS at 08:08

## 2021-10-05 RX ADMIN — INSULIN GLARGINE 15 UNIT(S): 100 INJECTION, SOLUTION SUBCUTANEOUS at 22:50

## 2021-10-05 RX ADMIN — CHLORHEXIDINE GLUCONATE 15 MILLILITER(S): 213 SOLUTION TOPICAL at 17:07

## 2021-10-05 RX ADMIN — CEFEPIME 100 MILLIGRAM(S): 1 INJECTION, POWDER, FOR SOLUTION INTRAMUSCULAR; INTRAVENOUS at 22:02

## 2021-10-05 RX ADMIN — LACTULOSE 200 GRAM(S): 10 SOLUTION ORAL at 16:29

## 2021-10-05 RX ADMIN — Medication 2: at 08:04

## 2021-10-05 RX ADMIN — CHLORHEXIDINE GLUCONATE 15 MILLILITER(S): 213 SOLUTION TOPICAL at 11:27

## 2021-10-05 RX ADMIN — Medication 50 MILLILITER(S): at 08:05

## 2021-10-05 RX ADMIN — SODIUM CHLORIDE 2000 MILLILITER(S): 9 INJECTION INTRAMUSCULAR; INTRAVENOUS; SUBCUTANEOUS at 08:16

## 2021-10-05 RX ADMIN — DEXMEDETOMIDINE HYDROCHLORIDE IN 0.9% SODIUM CHLORIDE 3.79 MICROGRAM(S)/KG/HR: 4 INJECTION INTRAVENOUS at 02:17

## 2021-10-05 RX ADMIN — PANTOPRAZOLE SODIUM 10 MG/HR: 20 TABLET, DELAYED RELEASE ORAL at 02:17

## 2021-10-05 NOTE — PATIENT PROFILE ADULT - PRO INTERPRETER NEED 2
Spoke w mom to notify of Insurance not covering Flovent, will switch to Arnuity.  Mom will call and schedule teaching session for Arnuity w Lelia Brar at [phone] for instruction/ demonstration of proper use.   Mom agreeable to plan.  
English

## 2021-10-05 NOTE — PROGRESS NOTE ADULT - SUBJECTIVE AND OBJECTIVE BOX
Patient is a 66y old  Male who presents with a chief complaint of weakness  Hyperkalemia  VT (04 Oct 2021 20:33)        HPI:  65 yo male, with h/o HTN, drug abuse, Hepatitis C s/p INF, Liver cirrhosis s/p [place] shunt, COPD, DVT? on Eliquis, HCC since 2021 on chemotherapy, presented for weakness  Patient reports having weakness, decreased po intake since few weeks. He also has not been sleeping. He reports constipation and hematemesis for 1 day, minimal amount.  Denies fever, chills, chest pain, cough, sputum , SOB, diarrhea, dysuria  He has a [place] shunt  that was drained one day prior to admission    ED course : mental status alteration, sustained VT  bpm with BP 82/49 mmHg s/p shock + calcium gluconate  Patient has Hyperkalemia 6.8 CO2 14 s/p Bicarb drip, renal called, sp berry with good UO   (04 Oct 2021 06:15)      Pt evaluated on rounds.  I reviewed the radiology tests and hospital record.    I reviewed previous notes on this patient.    Interval Events: No overnight events.    REVIEW OF SYSTEMS:   see HPI      OBJECTIVE:  ICU Vital Signs Last 24 Hrs  T(C): 36.4 (05 Oct 2021 04:00), Max: 37 (04 Oct 2021 15:30)  T(F): 97.5 (05 Oct 2021 04:00), Max: 98.6 (04 Oct 2021 15:30)  HR: 85 (05 Oct 2021 06:00) (76 - 115)  BP: 103/73 (05 Oct 2021 05:00) (61/44 - 141/73)  BP(mean): 87 (05 Oct 2021 05:00) (58 - 88)  RR: 22 (05 Oct 2021 06:00) (10 - 36)  SpO2: 100% (05 Oct 2021 06:00) (99% - 100%)    Mode: AC/ CMV (Assist Control/ Continuous Mandatory Ventilation), RR (machine): 10, TV (machine): 470, FiO2: 40, PEEP: 5, ITime: 5, MAP: 10, PIP: 22    10-04 @ 07:01  -  10-05 @ 06:17  --------------------------------------------------------  IN: 2909.4 mL / OUT: 1270 mL / NET: 1639.4 mL      CAPILLARY BLOOD GLUCOSE      POCT Blood Glucose.: 209 mg/dL (04 Oct 2021 23:33)        PHYSICAL EXAM:     · CONSTITUTIONAL:   not septic appearing,   ill appearing  NAD    · ENMT:   Airway patent,   Nasal mucosa clear.  Mouth with normal mucosa.   No thrush      · CARDIAC:   Normal rate,   regular rhythm.    Heart sounds S1, S2.   No murmurs, no rubs or gallops on auscultation  no edema        CAROTID:   normal systolic impulse  no bruits    · RESPIRATORY:   rales  normal chest expansion  no retractions or use of accessory muscles  palpation of chest is normal with no fremitus  percussion of chest demonstrates no hyperresonance or dullness    · GASTROINTESTINAL:  Abdomen soft, distended  non-tender,   + BS  liver/spleen not palpable    · MUSCULOSKELETAL:   no clubbing, cyanosis      · SKIN:   Skin normal color for race,   warm, dry   No evidence of rash.        · HEME LYMPH:   no splenomegaly.  No cervical  lymphadenopathy.  no inguinal lymphadenopathy    HOSPITAL MEDICATIONS:  MEDICATIONS  (STANDING):  budesonide  80 MICROgram(s)/formoterol 4.5 MICROgram(s) Inhaler 2 Puff(s) Inhalation two times a day  cefepime   IVPB 2000 milliGRAM(s) IV Intermittent every 8 hours  chlorhexidine 0.12% Liquid 15 milliLiter(s) Oral Mucosa two times a day  chlorhexidine 4% Liquid 1 Application(s) Topical daily  dexMEDEtomidine Infusion 0.2 MICROgram(s)/kG/Hr (3.79 mL/Hr) IV Continuous <Continuous>  dextrose 40% Gel 15 Gram(s) Oral once  dextrose 5%. 1000 milliLiter(s) (50 mL/Hr) IV Continuous <Continuous>  dextrose 5%. 1000 milliLiter(s) (100 mL/Hr) IV Continuous <Continuous>  dextrose 50% Injectable 25 Gram(s) IV Push once  dextrose 50% Injectable 12.5 Gram(s) IV Push once  dextrose 50% Injectable 25 Gram(s) IV Push once  glucagon  Injectable 1 milliGRAM(s) IntraMuscular once  insulin glargine Injectable (LANTUS) 15 Unit(s) SubCutaneous at bedtime  insulin lispro (ADMELOG) corrective regimen sliding scale   SubCutaneous three times a day before meals  insulin lispro Injectable (ADMELOG) 5 Unit(s) SubCutaneous three times a day before meals  norepinephrine Infusion 0.05 MICROgram(s)/kG/Min (7.01 mL/Hr) IV Continuous <Continuous>  octreotide  Infusion 50 MICROgram(s)/Hr (10 mL/Hr) IV Continuous <Continuous>  pantoprazole Infusion 8 mG/Hr (10 mL/Hr) IV Continuous <Continuous>  sodium bicarbonate  Infusion 0.251 mEq/kG/Hr (125 mL/Hr) IV Continuous <Continuous>    MEDICATIONS  (PRN):    sodium chloride 0.9% Bolus:   1000 milliLiter(s), IV Bolus, once, infuse over 30 Minute(s), Stop After 1 Doses  sodium chloride 0.9% Bolus:   3000 milliLiter(s), IV Bolus, once, infuse over 60 Minute(s), Stop After 1 Doses  Provider's Contact #: [phone]      LABS:                        5.5    26.70 )-----------( 378      ( 05 Oct 2021 00:41 )             16.7     10-    125<L>  |  91<L>  |  70<HH>  ----------------------------<  160<H>  6.1<HH>   |  15<L>  |  1.0    Ca    7.5<L>      04 Oct 2021 14:42  Mg     2.4     10-    TPro  5.3<L>  /  Alb  1.8<L>  /  TBili  1.1  /  DBili  x   /  AST  211<H>  /  ALT  159<H>  /  AlkPhos  260<H>  10-04    PT/INR - ( 04 Oct 2021 01:00 )   PT: 19.80 sec;   INR: 1.73 ratio         PTT - ( 04 Oct 2021 01:00 )  PTT:29.4 sec  Urinalysis Basic - ( 04 Oct 2021 04:38 )    Color: Yellow / Appearance: Clear / S.025 / pH: x  Gluc: x / Ketone: Negative  / Bili: Negative / Urobili: <2 mg/dL   Blood: x / Protein: Negative / Nitrite: Negative   Leuk Esterase: Negative / RBC: x / WBC x   Sq Epi: x / Non Sq Epi: x / Bacteria: x      Arterial Blood Gas:  10-05 @ 03:46  7.50/29/193/23/99.6/0.4  ABG lactate: --    Venous Blood Gas:  10-04 @ 07:28  7.35/31/51/17/80.4  VBG Lactate: 2.90  Venous Blood Gas:  10-04 @ 02:45  7.36/25/38/14/59.1  VBG Lactate: 4.60  Venous Blood Gas:  10-04 @ 01:19  7.36/25/39/14/60.3  VBG Lactate: 5.50  Venous Blood Gas:  10-04 @ 01:07  7.36/25/36/14/55.5  VBG Lactate: 5.40    CARDIAC MARKERS ( 04 Oct 2021 01:00 )  x     / <0.01 ng/mL / x     / x     / x          COVID-19 PCR: NotDetec (04 Oct 2021 02:17)    Mode: AC/ CMV (Assist Control/ Continuous Mandatory Ventilation)  RR (machine): 10  TV (machine): 470  FiO2: 40  PEEP: 5  ITime: 5  MAP: 10  PIP: 22      ABG - ( 05 Oct 2021 03:46 )  pH, Arterial: 7.50  pH, Blood: x     /  pCO2: 29    /  pO2: 193   / HCO3: 23    / Base Excess: 0.4   /  SaO2: 99.6                  RADIOLOGY: Today I personally interpreted the latest CXR and other pertinent films.

## 2021-10-05 NOTE — PROGRESS NOTE ADULT - SUBJECTIVE AND OBJECTIVE BOX
SUZANNE BAZZI             MRN-[id number]      Patient is a 66y old Male who presents with a chief complaint of weakness  Hyperkalemia  VT (05 Oct 2021 12:39)     SUBJECTIVE:  -Patient seen at bedside  -He was intubated overnight and remains sedated on exam  -No nonverbal signs of pain or distress on exam    ROS:  UNABLE TO OBTAIN  due to: patient intubated    PEx:   T(C): 38.6 (10-05-21 @ 12:00), Max: 38.6 (10-05-21 @ 12:00)  HR: 88 (10-05-21 @ 14:00) (76 - 115)  BP: 90/55 (10-05-21 @ 14:00) (61/44 - 155/88)  RR: 23 (10-05-21 @ 14:00) (10 - 36)  SpO2: 100% (10-05-21 @ 14:00) (100% - 100%)  Wt(kg): --            General:  found in bed intubated in NAD, vitals as above  Eyes: closed  ENMT: ET tube in place  CVS: tachycardic  Resp: Unlabored Non tachypneic No increased WOB  Musc: No clubbing  Neuro: unable to follow commands  Psych: Calm, AAOx0  Skin: no rash  Lymph: no adenopathy     ALLERGIES: No Known Allergies    Labs:	    CBC:                        10.0   27.87 )-----------( 314      ( 05 Oct 2021 13:32 )             29.1     CMP:    10-05    128<L>  |  91<L>  |  78<HH>  ----------------------------<  232<H>  5.8<H>   |  20  |  1.2    Ca    7.2<L>      05 Oct 2021 06:21  Phos  5.9     10-05  Mg     2.3     10-05    TPro  5.4<L>  /  Alb  1.8<L>  /  TBili  1.2  /  DBili  0.7<H>  /  AST  1079<H>  /  ALT  667<H>  /  AlkPhos  317<H>  10-05       PT/INR - ( 04 Oct 2021 01:00 )   PT: 19.80 sec;   INR: 1.73 ratio         PTT - ( 04 Oct 2021 01:00 )  PTT:29.4 sec       RADIOLOGY  CXR  Low lung volumes leads to magnification of the pulmonary interstitium.    EKG  12 Lead ECG:   Systolic BP 80 mmHg    Diastolic BP 51 mmHg    Ventricular Rate 206 BPM    Atrial Rate 241 BPM    QRS Duration 72 ms    Q-T Interval 206 ms    QTC Calculation(Bazett) 381 ms    R Axis 55 degrees    T Axis 63 degrees    Diagnosis Line Supraventricular tachycardia with occasional Premature ventricular complexes  Low voltage QRS  Nonspecific T wave abnormality  Abnormal ECG    Imaging Personally Reviewed:  [x] YES  [ ] NO    Consultant(s) Notes Reviewed:  [x] YES  [ ] NO  Care Discussed with Consultants/Other Providers [x] YES  [ ] NO    Medications:	      MEDICATIONS  (STANDING):  acetaminophen  Suppository .. 325 milliGRAM(s) Rectal once  budesonide  80 MICROgram(s)/formoterol 4.5 MICROgram(s) Inhaler 2 Puff(s) Inhalation two times a day  cefepime   IVPB 2000 milliGRAM(s) IV Intermittent every 8 hours  chlorhexidine 0.12% Liquid 15 milliLiter(s) Oral Mucosa two times a day  chlorhexidine 4% Liquid 1 Application(s) Topical daily  dexMEDEtomidine Infusion 0.2 MICROgram(s)/kG/Hr (3.79 mL/Hr) IV Continuous <Continuous>  dextrose 40% Gel 15 Gram(s) Oral once  dextrose 5%. 1000 milliLiter(s) (50 mL/Hr) IV Continuous <Continuous>  dextrose 5%. 1000 milliLiter(s) (100 mL/Hr) IV Continuous <Continuous>  dextrose 50% Injectable 25 Gram(s) IV Push once  dextrose 50% Injectable 12.5 Gram(s) IV Push once  dextrose 50% Injectable 25 Gram(s) IV Push once  fentaNYL   Infusion. 0.5 MICROgram(s)/kG/Hr (3.79 mL/Hr) IV Continuous <Continuous>  glucagon  Injectable 1 milliGRAM(s) IntraMuscular once  insulin glargine Injectable (LANTUS) 15 Unit(s) SubCutaneous at bedtime  insulin lispro (ADMELOG) corrective regimen sliding scale   SubCutaneous three times a day before meals  insulin lispro Injectable (ADMELOG) 5 Unit(s) SubCutaneous three times a day before meals  norepinephrine Infusion 0.05 MICROgram(s)/kG/Min (7.01 mL/Hr) IV Continuous <Continuous>  octreotide  Infusion 50 MICROgram(s)/Hr (10 mL/Hr) IV Continuous <Continuous>  pantoprazole Infusion 8 mG/Hr (10 mL/Hr) IV Continuous <Continuous>    MEDICATIONS  (PRN):        ADVANCED DIRECTIVES:            FULL CODE         DECISION MAKER: Patient [  ]  Family [  ]  Other [ x] Possibly Friend Fallon      PSYCHOSOCIAL-SPIRITUAL ASSESSMENT:       Reviewed    CURRENT DISPO PLAN:         WILL REMAIN IN HOSPITAL    REFERRALS	        Palliative Med        Unit SW/Case Mgmt

## 2021-10-05 NOTE — PROGRESS NOTE ADULT - ASSESSMENT
ASSESSMENT:     Hepatic encephalopathy ( ammonia 188)  Decompensated liver cirrhosis. Has [place] cathter  Sepsis ro SBP  Hematemesis ( esophageal/ gastric varices)  Acute portal Vein thrombosis   Hyperkalemia with sustained VT s/p shock, creat 0.9 was on aldactone  Hyponatremia likely from volume overload  Hepatocellular carcinoma with transaminitis on chemotherapy  Hepatitis C treated with INF  COPD  HAGMA      PLAN:    CNS: keep sedate as needed      HEENT: Oral care    PULMONARY:  HOB @ 45 degrees  taper O2  decrease MVe   no SBT until GI w/u completed    CARDIOVASCULAR: Keep I < O ,   cardiac ECHO,   propranolol 10 q 12 keep HR 50-60      GI: GI prophylaxis.  NPO,   octreotide/ protonix drip,   GI management   EGD - grade IV lower and mid 1/3 esophageal varices with 5 bands placed successfully.   bowel regimen aim 3 BM/day ,   rifaximin q 12,   send fluid and f/u gram stain and Cx    RENAL:   D/C bicarb drip   lokelma tID,   low K diet,   Follow up lytes.    Correct as needed,   nephro consult     INFECTIOUS DISEASE:   Follow up cultures,   peritoneal fluid analysis and culture, if + remove access   cefepime 2 g q 8 ;   HepC viral load f/u   vanco x1    HEMATOLOGICAL:    Hold home Eliquis  Per vascular - D/C AC and restart once Pt is stable. F/u OP for Duplex  doppler LE   transfuse Hgb >8  serial HH    ENDOCRINE:    check ketone B hydroxy butyrate, ETOH level, serum OSM, ASA  Follow up FS.    Insulin protocol if needed.   keep -180.   avoid hypoglycemia    MUSCULOSKELETAL: bed rest     Poor prognosis    Dispo ICU    palliative care eval

## 2021-10-05 NOTE — PROGRESS NOTE ADULT - ASSESSMENT
65 yo male, with h/o HTN, drug abuse, Hepatitis C s/p INF, Liver cirrhosis s/p [place] shunt, COPD, DVT? on Eliquis, HCC since January 2021 on chemotherapy, presented for weakness  Patient reports having weakness, decreased po intake since few weeks. He also has not been sleeping. He reports constipation and hematemesis for 1 day, minimal amount. Pt had an   EGD with banding of varices yesterday and subsequently had a drop in hgb by 3 pont pt had been given 4 unit prbc today.      PLan  Pt is actively bleeding AC is contraindicated at this time  We recommend AC as per primary team once pt is stable  treat abdominal pelvic ascites  follow up in the office in 2 weeks after discharge for repeat venous dplx  Vascular surgery to sign off  Recall PRN    SPECTRA [id number]

## 2021-10-05 NOTE — PROGRESS NOTE ADULT - SUBJECTIVE AND OBJECTIVE BOX
Gastroenterology progress note:     Patient is a 66y old  Male who presents with a chief complaint of weakness  Hyperkalemia  VT (05 Oct 2021 06:17)       Admitted on: 10-04-21    We are following the patient for cirrhosis and GI bleed     Interval History:  Drop in Hb and 3 episodes of melena yesterday, still intubated on        PAST MEDICAL & SURGICAL HISTORY:  HTN (hypertension)    Hepatitis C  2007    Drug abuse    Cancer, hepatocellular  January 2021    COPD, mild        MEDICATIONS  (STANDING):  budesonide  80 MICROgram(s)/formoterol 4.5 MICROgram(s) Inhaler 2 Puff(s) Inhalation two times a day  cefepime   IVPB 2000 milliGRAM(s) IV Intermittent every 8 hours  chlorhexidine 0.12% Liquid 15 milliLiter(s) Oral Mucosa two times a day  chlorhexidine 4% Liquid 1 Application(s) Topical daily  dexMEDEtomidine Infusion 0.2 MICROgram(s)/kG/Hr (3.79 mL/Hr) IV Continuous <Continuous>  dextrose 40% Gel 15 Gram(s) Oral once  dextrose 5%. 1000 milliLiter(s) (50 mL/Hr) IV Continuous <Continuous>  dextrose 5%. 1000 milliLiter(s) (100 mL/Hr) IV Continuous <Continuous>  dextrose 50% Injectable 25 Gram(s) IV Push once  dextrose 50% Injectable 12.5 Gram(s) IV Push once  dextrose 50% Injectable 25 Gram(s) IV Push once  glucagon  Injectable 1 milliGRAM(s) IntraMuscular once  insulin glargine Injectable (LANTUS) 15 Unit(s) SubCutaneous at bedtime  insulin lispro (ADMELOG) corrective regimen sliding scale   SubCutaneous three times a day before meals  insulin lispro Injectable (ADMELOG) 5 Unit(s) SubCutaneous three times a day before meals  norepinephrine Infusion 0.05 MICROgram(s)/kG/Min (7.01 mL/Hr) IV Continuous <Continuous>  octreotide  Infusion 50 MICROgram(s)/Hr (10 mL/Hr) IV Continuous <Continuous>  pantoprazole Infusion 8 mG/Hr (10 mL/Hr) IV Continuous <Continuous>    MEDICATIONS  (PRN):      Allergies  No Known Allergies      Review of Systems:   Cardiovascular:  No Chest Pain, No Palpitations  Respiratory:  No Cough, No Dyspnea  Gastrointestinal:  As described in HPI    Physical Examination:  T(C): 36.6 (10-05-21 @ 08:00), Max: 37 (10-04-21 @ 15:30)  HR: 88 (10-05-21 @ 08:00) (76 - 115)  BP: 95/61 (10-05-21 @ 08:00) (61/44 - 141/73)  RR: 25 (10-05-21 @ 08:00) (10 - 36)  SpO2: 100% (10-05-21 @ 08:00) (100% - 100%)  Weight (kg): 75.7 (10-04-21 @ 23:55)    10-04-21 @ 07:01  -  10-05-21 @ 07:00  --------------------------------------------------------  IN: 3205.5 mL / OUT: 1290 mL / NET: 1915.5 mL    10-05-21 @ 07:01  -  10-05-21 @ 09:36  --------------------------------------------------------  IN: 442.2 mL / OUT: 60 mL / NET: 382.2 mL      Constitutional: No acute distress.  Respiratory:  No signs of respiratory distress. Lung sounds are clear bilaterally.  Cardiovascular:  S1 S2, Regular rate and rhythm.  Abdominal: Abdomen is soft, symmetric, and non-tender without distention. There are no visible lesions or scars. Bowel sounds are present and normoactive in all four quadrants. No masses, hepatomegaly, or splenomegaly are noted.   Skin: No rashes, No Jaundice.        Data:                        9.8    30.82 )-----------( 324      ( 05 Oct 2021 08:15 )             28.8     Hgb trend:  9.8  10-05-21 @ 08:15  5.5  10-05-21 @ 00:41  7.9  10-04-21 @ 14:42  8.3  10-04-21 @ 01:00      10-05-21 @ 07:01  -  10-05-21 @ 09:36  --------------------------------------------------------  IN: 300 mL      10-05    128<L>  |  91<L>  |  78<HH>  ----------------------------<  232<H>  5.8<H>   |  20  |  1.2    Ca    7.2<L>      05 Oct 2021 06:21  Phos  5.9     10-05  Mg     2.3     10-05    TPro  5.4<L>  /  Alb  1.8<L>  /  TBili  1.2  /  DBili  0.7<H>  /  AST  1079<H>  /  ALT  667<H>  /  AlkPhos  317<H>  10-05    Liver panel trend:  TBili 1.2   /   AST 1079   /      /   AlkP 317   /   Tptn 5.4   /   Alb 1.8    /   DBili 0.7      10-05  TBili 1.1   /      /      /   AlkP 260   /   Tptn 5.3   /   Alb 1.8    /   DBili --      10-04  TBili 1.1   /      /      /   AlkP 247   /   Tptn 4.8   /   Alb 1.8    /   DBili --      10-04  TBili 2.5   /      /      /   AlkP 290   /   Tptn 6.1   /   Alb 1.9    /   DBili --      10-04      PT/INR - ( 04 Oct 2021 01:00 )   PT: 19.80 sec;   INR: 1.73 ratio         PTT - ( 04 Oct 2021 01:00 )  PTT:29.4 sec       Radiology:       Gastroenterology progress note:     Patient is a 66y old  Male who presents with a chief complaint of weakness  Hyperkalemia  VT (05 Oct 2021 06:17)       Admitted on: 10-04-21    We are following the patient for cirrhosis and GI bleed     Interval History:  Drop in Hb and 3 episodes of melena yesterday, still intubated on pressors, precedex.        PAST MEDICAL & SURGICAL HISTORY:  HTN (hypertension)    Hepatitis C  2007    Drug abuse    Cancer, hepatocellular  January 2021    COPD, mild        MEDICATIONS  (STANDING):  budesonide  80 MICROgram(s)/formoterol 4.5 MICROgram(s) Inhaler 2 Puff(s) Inhalation two times a day  cefepime   IVPB 2000 milliGRAM(s) IV Intermittent every 8 hours  chlorhexidine 0.12% Liquid 15 milliLiter(s) Oral Mucosa two times a day  chlorhexidine 4% Liquid 1 Application(s) Topical daily  dexMEDEtomidine Infusion 0.2 MICROgram(s)/kG/Hr (3.79 mL/Hr) IV Continuous <Continuous>  dextrose 40% Gel 15 Gram(s) Oral once  dextrose 5%. 1000 milliLiter(s) (50 mL/Hr) IV Continuous <Continuous>  dextrose 5%. 1000 milliLiter(s) (100 mL/Hr) IV Continuous <Continuous>  dextrose 50% Injectable 25 Gram(s) IV Push once  dextrose 50% Injectable 12.5 Gram(s) IV Push once  dextrose 50% Injectable 25 Gram(s) IV Push once  glucagon  Injectable 1 milliGRAM(s) IntraMuscular once  insulin glargine Injectable (LANTUS) 15 Unit(s) SubCutaneous at bedtime  insulin lispro (ADMELOG) corrective regimen sliding scale   SubCutaneous three times a day before meals  insulin lispro Injectable (ADMELOG) 5 Unit(s) SubCutaneous three times a day before meals  norepinephrine Infusion 0.05 MICROgram(s)/kG/Min (7.01 mL/Hr) IV Continuous <Continuous>  octreotide  Infusion 50 MICROgram(s)/Hr (10 mL/Hr) IV Continuous <Continuous>  pantoprazole Infusion 8 mG/Hr (10 mL/Hr) IV Continuous <Continuous>    MEDICATIONS  (PRN):      Allergies  No Known Allergies      Review of Systems:   couldn't obtain   Physical Examination:  T(C): 36.6 (10-05-21 @ 08:00), Max: 37 (10-04-21 @ 15:30)  HR: 88 (10-05-21 @ 08:00) (76 - 115)  BP: 95/61 (10-05-21 @ 08:00) (61/44 - 141/73)  RR: 25 (10-05-21 @ 08:00) (10 - 36)  SpO2: 100% (10-05-21 @ 08:00) (100% - 100%)  Weight (kg): 75.7 (10-04-21 @ 23:55)    10-04-21 @ 07:01  -  10-05-21 @ 07:00  --------------------------------------------------------  IN: 3205.5 mL / OUT: 1290 mL / NET: 1915.5 mL    10-05-21 @ 07:01  -  10-05-21 @ 09:36  --------------------------------------------------------  IN: 442.2 mL / OUT: 60 mL / NET: 382.2 mL      Constitutional: intubated  Respiratory:  No signs of respiratory distress. Lung sounds are clear bilaterally.  Cardiovascular:  S1 S2, Regular rate and rhythm.  Abdominal: Abdomen is soft, symmetric, and non-tender without distention.   Skin: No rashes, No Jaundice.        Data:                        9.8    30.82 )-----------( 324      ( 05 Oct 2021 08:15 )             28.8     Hgb trend:  9.8  10-05-21 @ 08:15  5.5  10-05-21 @ 00:41  7.9  10-04-21 @ 14:42  8.3  10-04-21 @ 01:00      10-05-21 @ 07:01  -  10-05-21 @ 09:36  --------------------------------------------------------  IN: 300 mL      10-05    128<L>  |  91<L>  |  78<HH>  ----------------------------<  232<H>  5.8<H>   |  20  |  1.2    Ca    7.2<L>      05 Oct 2021 06:21  Phos  5.9     10-05  Mg     2.3     10-05    TPro  5.4<L>  /  Alb  1.8<L>  /  TBili  1.2  /  DBili  0.7<H>  /  AST  1079<H>  /  ALT  667<H>  /  AlkPhos  317<H>  10-05    Liver panel trend:  TBili 1.2   /   AST 1079   /      /   AlkP 317   /   Tptn 5.4   /   Alb 1.8    /   DBili 0.7      10-05  TBili 1.1   /      /      /   AlkP 260   /   Tptn 5.3   /   Alb 1.8    /   DBili --      10-04  TBili 1.1   /      /      /   AlkP 247   /   Tptn 4.8   /   Alb 1.8    /   DBili --      10-04  TBili 2.5   /      /      /   AlkP 290   /   Tptn 6.1   /   Alb 1.9    /   DBili --      10-04      PT/INR - ( 04 Oct 2021 01:00 )   PT: 19.80 sec;   INR: 1.73 ratio         PTT - ( 04 Oct 2021 01:00 )  PTT:29.4 sec       Radiology:

## 2021-10-05 NOTE — PROGRESS NOTE ADULT - ASSESSMENT
66yMale being evaluated for GOC.    Patient seen at bedside. He was intubated and unable to participate in exam.    Spoke with primary team today.  It was noted that the only NOK that was able to be reached was patient's friend, Fallon.  Shared with primary team that if the patient's friend is the only contact that can be reached and no other NOK/family can be contacted, Fallon can act as the patient's surrogate if she wishes.  Primary team to speak with Fallon, and palliative care will follow up tomorrow as appropriate.      MEDD (morphine equivalent daily dose): NA      See Recs below.    Please call [phone] with questions or concerns 24/7.   We will continue to follow.     Discussed with primary MD.

## 2021-10-05 NOTE — CHART NOTE - NSCHARTNOTEFT_GEN_A_CORE
Pt remained hypotensive (~70's systolic) on levophed after transfer to CCU.     Stat CBC ~12:30am showed Hb 5.5.     2u pRBC ordered. Pt's girlfriend Fallon was made aware of transfusion, however 2 physician consent was obtained because there is no documentation that she is the healthcare proxy.    Spoke with IR Fellow ~3am: Pt is a poor candidate for TIPS and has several contraindications for procedure; however, day-team will assess patient for possible removal of [place] cath.

## 2021-10-05 NOTE — PROGRESS NOTE ADULT - ASSESSMENT
Pt with Decompensated liver cirrhosis, Hep C-s/p IFN, ascites -[place] cath, HCCa with transaminitis on chemotherapy  presents with weakness, found to have K 6.8 (was on Aldactone) , sustained VT -s/p shock; met acidosis, hepatic encephalopathy - Ammonia 188.    Hyperkalemia - due to volume depletion and aldosterone blockade, GIB  - improving with IVF and Insulin/Glucose, Lokelma  - last K 5.8, pt is making urine  Hyponatremia - Urine na 20, Osm 600 c/w intravascular volume depletion  -improving with hydration  - avoid rise in Na>8 mE/day  Met acidosis - high lactic acid, hypoperfusion, improving  - off Bicar b drip,   Sepsis - r/o SBP - cont Cefipime  - f/u BCx, ID consult  -pressors requiring  Hematemesis ( esophageal/ gastric varices) on OCtrotide  Acute portal Vein thrombosis - cont eliquis, seen by vasc sx  Prognosis guarded    will follow

## 2021-10-05 NOTE — PROGRESS NOTE ADULT - ASSESSMENT
ASSESSMENT:     Hepatic encephalopathy ( ammonia 188)  Decompensated liver cirrhosis. Has [place] cathter  Sepsis r/o SBP  Upper GI Bleed ( esophageal/ gastric varices)  Acute portal Vein thrombosis   Hyperkalemia with sustained VT s/p shock, creat 0.9 was on aldactone  Hyponatremia likely from volume overload  Hepatocellular carcinoma with transaminitis on chemotherapy  Hepatitis C treated with INF  COPD  HAGMA      PLAN:    CNS: keep sedate as needed      HEENT: Oral care    PULMONARY:    - HOB @ 45 degrees  - taper O2  - decrease MVe   - Decrease TV to 420  - no SBT until GI w/u completed    CARDIOVASCULAR:   - Keep I < O ,   - cardiac ECHO,   - holding propranolol 10mg q 12 keep HR 50-60      GI: GI prophylaxis.  NPO,   - octreotide/ protonix drip,   - GI management   - EGD - grade IV lower and mid 1/3 esophageal varices with 5 bands placed successfully.   - Lactulose bowel regimen aim 3 BM/day , (Will be giving enemas for now)  - NO NGT can be placed this time   - rifaximin q 12, (on hold for now)  - f/u Paracentesis and send fluid and f/u gram stain and Cx    RENAL:   - D/C bicarb drip  - lokelma tID,  - low K diet,   - Follow up lytes.    - Correct as needed,  - nephro consult     INFECTIOUS DISEASE:   - Follow up cultures,   - peritoneal fluid analysis and culture, if + remove access  - cefepime 2 g q 8 ;   - HepC viral load f/u   - vanco x1    HEMATOLOGICAL:    - Hold home Eliquis  - Per vascular - D/C AC and restart once Pt is stable. F/u OP for Duplex  - doppler LE   - transfuse Hgb >8  - serial HH  - S/p 2 units of PRBC 10/5    ENDOCRINE:    - check ketone B hydroxy butyrate, ETOH level, serum OSM, ASA  - Follow up FS.    - Insulin protocol if needed.   - keep -180.   - avoid hypoglycemia    MUSCULOSKELETAL: bed rest     Poor prognosis    Dispo ICU    palliative care eval ASSESSMENT:     Hepatic encephalopathy ( ammonia 188)  Decompensated liver cirrhosis. Has [place] cathter  Sepsis r/o SBP  Upper GI Bleed ( esophageal/ gastric varices)  Acute portal Vein thrombosis   Hyperkalemia with sustained VT s/p shock, creat 0.9 was on aldactone  Hyponatremia likely from volume overload  Hepatocellular carcinoma with transaminitis on chemotherapy  Hepatitis C treated with INF  COPD  HAGMA      PLAN:    CNS: keep sedate as needed      HEENT: Oral care    PULMONARY:    - HOB @ 45 degrees  - taper O2  - decrease MVe   - Decrease TV to 420  - no SBT until GI w/u completed    CARDIOVASCULAR:   - Keep I < O ,   - cardiac ECHO,   - holding propranolol 10mg q 12 keep HR 50-60      GI: GI prophylaxis.  NPO,   - octreotide/ protonix drip,   - GI management   - EGD - grade IV lower and mid 1/3 esophageal varices with 5 bands placed successfully.   - Lactulose bowel regimen aim 3 BM/day , (Will be giving enemas for now)  - NO NGT can be placed this time   - rifaximin q 12, (on hold for now)  - will take out [place] catheter and send fluid and f/u gram stain and Cx    RENAL:   - D/C bicarb drip  - lokelma tID,  - low K diet,   - Follow up lytes.    - Correct as needed,  - f/u nephro consult     INFECTIOUS DISEASE:   - Follow up cultures,   - peritoneal fluid analysis and culture, if + remove access  - cefepime 2 g q 8 ;   - HepC viral load f/u   - vanco x1    HEMATOLOGICAL:    - Hold home Eliquis  - Per vascular - D/C AC and restart once Pt is stable. F/u OP for Duplex  - doppler LE   - transfuse Hgb >8  - serial HH  - S/p 2 units of PRBC 10/5    ENDOCRINE:    - check ketone B hydroxy butyrate, ETOH level, serum OSM, ASA  - Follow up FS.    - Insulin protocol if needed.   - keep -180.   - avoid hypoglycemia    MUSCULOSKELETAL: bed rest     Poor prognosis    Dispo ICU    palliative care eval ASSESSMENT:     Hepatic encephalopathy ( ammonia 188)  Decompensated liver cirrhosis. Has [place] cathter  Sepsis r/o SBP  Upper GI Bleed ( esophageal/ gastric varices)  Acute portal Vein thrombosis   Hyperkalemia with sustained VT s/p shock, creat 0.9 was on aldactone  Hyponatremia likely from volume overload  Hepatocellular carcinoma with transaminitis on chemotherapy  Hepatitis C treated with INF  COPD  HAGMA      PLAN:    CNS: keep sedate as needed      HEENT: Oral care    PULMONARY:    - HOB @ 45 degrees  - taper O2  - decrease MVe   - Decrease TV to 420  - no SBT until GI w/u completed    CARDIOVASCULAR:   - Keep I < O ,   - cardiac ECHO,   - holding propranolol 10mg q 12 keep HR 50-60    GI: GI prophylaxis.  NPO,   - octreotide/ protonix drip,   - GI management   - EGD - grade IV lower and mid 1/3 esophageal varices with 5 bands placed successfully.   - Lactulose bowel regimen aim 3 BM/day , (Will be giving enemas for now)  - NO NGT can be placed this time   - rifaximin q 12, (on hold for now)  - will take out [place] catheter and send fluid and f/u gram stain and Cx  - Spoke to Dr. Rhodes (Patient's gastroenterologist) [phone]    RENAL:   - D/C bicarb drip  - lokelma tID,  - low K diet,   - Follow up lytes.    - Correct as needed,  - f/u nephro consult     INFECTIOUS DISEASE:   - Follow up cultures,   - peritoneal fluid analysis and culture, if + remove access  - cefepime 2 g q 8 ;   - HepC viral load f/u   - vanco x1    HEMATOLOGICAL:    - Hold home Eliquis  - Per vascular - D/C AC and restart once Pt is stable. F/u OP for Duplex  - doppler LE   - transfuse Hgb >8  - serial HH  - S/p 2 units of PRBC 10/5    ENDOCRINE:    - check ketone B hydroxy butyrate, ETOH level, serum OSM, ASA  - Follow up FS.    - Insulin protocol if needed.   - keep -180.   - avoid hypoglycemia    MUSCULOSKELETAL: bed rest     Poor prognosis    Dispo ICU    palliative care eval ASSESSMENT:     Hepatic encephalopathy ( ammonia 188)  Decompensated liver cirrhosis. Has [place] cathter  Sepsis r/o SBP  Upper GI Bleed ( esophageal/ gastric varices)  Acute portal Vein thrombosis   Hyperkalemia with sustained VT s/p shock, creat 0.9 was on aldactone  Hyponatremia likely from volume overload  Hepatocellular carcinoma with transaminitis on chemotherapy  Hepatitis C treated with INF  COPD  HAGMA      PLAN:    CNS: keep sedate as needed      HEENT: Oral care    PULMONARY:    - HOB @ 45 degrees  - taper O2  - decrease MVe   - Decrease TV to 420  - no SBT until GI w/u completed    CARDIOVASCULAR:   - Keep I < O ,   - cardiac ECHO,   - holding propranolol 10mg q 12 keep HR 50-60    GI: GI prophylaxis.  NPO,   - octreotide/ protonix drip,   - GI management   - EGD - grade IV lower and mid 1/3 esophageal varices with 5 bands placed successfully.   - Lactulose bowel regimen aim 3 BM/day , (Will be giving enemas for now)  - NO NGT can be placed this time   - rifaximin q 12, (on hold for now)  - will drain [place] catheter and send fluid and f/u gram stain and Cx  - IR was consulted: Pt is a poor candidate for TIPS and has several contraindications for procedure  - Spoke to Dr. Rhodes (Patient's gastroenterologist) [phone]    RENAL:   - D/C bicarb drip  - lokelma tID,  - low K diet,   - Follow up lytes.    - Correct as needed,  - f/u nephro consult     INFECTIOUS DISEASE:   - Follow up cultures,   - peritoneal fluid analysis and culture, if + remove access  - cefepime 2 g q 8 ;   - HepC viral load f/u   - vanco x1    HEMATOLOGICAL:    - Hold home Eliquis  - Per vascular - D/C AC and restart once Pt is stable. F/u OP for Duplex  - doppler LE   - transfuse Hgb >8  - serial HH  - S/p 2 units of PRBC 10/5    ENDOCRINE:    - check ketone B hydroxy butyrate, ETOH level, serum OSM, ASA  - Follow up FS.    - Insulin protocol if needed.   - keep -180.   - avoid hypoglycemia    MUSCULOSKELETAL: bed rest     Poor prognosis    Dispo ICU    palliative care eval ASSESSMENT:     Hepatic encephalopathy ( ammonia 188)  Decompensated liver cirrhosis. Has [place] cathter  Sepsis r/o SBP  Upper GI Bleed ( esophageal/ gastric varices)  Acute portal Vein thrombosis   Hyperkalemia with sustained VT s/p shock, creat 0.9 was on aldactone  Hyponatremia likely from volume overload  Hepatocellular carcinoma with transaminitis on chemotherapy  Hepatitis C treated with INF  COPD  HAGMA      PLAN:    CNS: keep sedate as needed      HEENT: Oral care    PULMONARY:    - HOB @ 45 degrees  - taper O2  - decrease MVe   - Decrease TV to 420  - no SBT until GI w/u completed    CARDIOVASCULAR:   - Keep I < O ,   - cardiac ECHO,   - holding propranolol 10mg q 12 keep HR 50-60    GI: GI prophylaxis.  NPO,   - octreotide/ protonix drip,   - GI management   - EGD - grade IV lower and mid 1/3 esophageal varices with 5 bands placed successfully.   - Lactulose bowel regimen aim 3 BM/day , (Will be giving enemas for now)  - NO NGT can be placed this time   - rifaximin q 12, (on hold for now)  - will drain [place] catheter and send fluid and f/u gram stain and Cx  - IR was consulted: Pt is a poor candidate for TIPS and has several contraindications for procedure.   - reached out to IR to drain the [place] catheter    - Spoke to Dr. Rhodes (Patient's gastroenterologist) [phone]  - Reached put to patient's oncologist Dr. Gibson [phone], expecting a call back   - [facility] is sending patient's medical records     RENAL:   - D/C bicarb drip  - lokelma tID,  - low K diet,   - Follow up lytes.    - Correct as needed,  - f/u nephro consult     INFECTIOUS DISEASE:   - Follow up cultures,   - peritoneal fluid analysis and culture, if + remove access  - cefepime 2 g q 8 ;   - HepC viral load f/u   - vanco x1    HEMATOLOGICAL:    - Hold home Eliquis  - Per vascular - D/C AC and restart once Pt is stable. F/u OP for Duplex  - doppler LE   - transfuse Hgb >8  - serial HH  - S/p 2 units of PRBC 10/5    ENDOCRINE:    - check ketone B hydroxy butyrate, ETOH level, serum OSM, ASA  - Follow up FS.    - Insulin protocol if needed.   - keep -180.   - avoid hypoglycemia    MUSCULOSKELETAL: bed rest     Poor prognosis    Dispo ICU    palliative care eval ASSESSMENT:     Hepatic encephalopathy ( ammonia 188)  Decompensated liver cirrhosis. Has [place] cathter  Sepsis r/o SBP  Upper GI Bleed ( esophageal/ gastric varices)  Acute portal Vein thrombosis   Hyperkalemia with sustained VT s/p shock, creat 0.9 was on aldactone  Hyponatremia likely from volume overload  Hepatocellular carcinoma with transaminitis on chemotherapy  Hepatitis C treated with INF  COPD  HAGMA      PLAN:    CNS: keep sedate as needed      HEENT: Oral care    PULMONARY:    - HOB @ 45 degrees  - taper O2  - decrease MVe   - Decrease TV to 420  - no SBT until GI w/u completed    CARDIOVASCULAR:   - Keep I < O ,   - cardiac ECHO,   - holding propranolol 10mg q 12 keep HR 50-60    GI: GI prophylaxis.  NPO,   - octreotide/ protonix drip,   - GI management   - EGD - grade IV lower and mid 1/3 esophageal varices with 5 bands placed successfully.   - Lactulose bowel regimen aim 3 BM/day , (Will be giving enemas for now)  - NO NGT can be placed this time   - rifaximin q 12, (on hold for now)  - will drain [place] catheter and send fluid and f/u gram stain and Cx  - IR was consulted: Pt is a poor candidate for TIPS and has several contraindications for procedure.   - reached out to IR to drain the [place] catheter    - Spoke to Dr. Rhodes (Patient's gastroenterologist) [phone]  - [facility] is sending patient's medical records   - Reached put to patient's oncologist Dr. Gibson [phone],Patient is on Elotuzumab and Bevacizumab, last chemo was 9/2/2021, patient wasn't able to get anymore chemo therapy due to lack of insurance     RENAL:   - D/C bicarb drip  - lokelma tID,  - low K diet,   - Follow up lytes.    - Correct as needed,  - f/u nephro consult     INFECTIOUS DISEASE:   - Follow up cultures,   - peritoneal fluid analysis and culture, if + remove access  - cefepime 2 g q 8 ;   - HepC viral load f/u   - vanco x1    HEMATOLOGICAL:    - Hold home Eliquis  - Per vascular - D/C AC and restart once Pt is stable. F/u OP for Duplex  - doppler LE   - transfuse Hgb >8  - serial HH  - S/p 2 units of PRBC 10/5    ENDOCRINE:    - check ketone B hydroxy butyrate, ETOH level, serum OSM, ASA  - Follow up FS.    - Insulin protocol if needed.   - keep -180.   - avoid hypoglycemia    GOC conservation was done with Patient's partner Fallon, patient did not clarify his wishes before, patient does not have a health care proxy or a family member who can be reached. Patient's partner expressed that she wants him to be full code for now and wants everything done for the patient.     MUSCULOSKELETAL: bed rest     Poor prognosis    Dispo ICU    palliative care eval ASSESSMENT:     Hepatic encephalopathy ( ammonia 188)  Decompensated liver cirrhosis. Has [place] cathter  Sepsis r/o SBP  Upper GI Bleed ( esophageal/ gastric varices)  Acute portal Vein thrombosis   Hyperkalemia with sustained VT s/p shock, creat 0.9 was on aldactone  Hyponatremia likely from volume overload  Hepatocellular carcinoma with transaminitis on chemotherapy  Hepatitis C treated with INF  COPD  HAGMA      PLAN:    CNS: keep sedate as needed      HEENT: Oral care    PULMONARY:    - HOB @ 45 degrees  - taper O2  - decrease MVe   - Decrease TV to 420  - no SBT until GI w/u completed    CARDIOVASCULAR:   - Keep I < O ,   - cardiac ECHO,   - holding propranolol 10mg q 12 keep HR 50-60    GI: GI prophylaxis.  NPO,   - octreotide/ protonix drip,   - GI management   - EGD - grade IV lower and mid 1/3 esophageal varices with 5 bands placed successfully.   - Lactulose bowel regimen aim 3 BM/day , (Will be giving enemas for now)  - NO NGT can be placed this time   - rifaximin q 12, (on hold for now)  - Peritoneal fluid was sent and f/u gram stain and Cx  - IR was consulted: Pt is a poor candidate for TIPS and has several contraindications for procedure.   - reached out to IR to drain the [place] catheter    - Spoke to Dr. Rhodes (Patient's gastroenterologist) [phone]  - [facility] is sending patient's medical records   - Reached put to patient's oncologist Dr. Gibson [phone],Patient is on Elotuzumab and Bevacizumab, last chemo was 9/2/2021, patient wasn't able to get anymore chemo therapy due to lack of insurance     RENAL:   - D/C bicarb drip  - lokelma tID,  - low K diet,   - Follow up lytes.    - Correct as needed,  - f/u nephro consult   - B-hydroxybutyrate neg     INFECTIOUS DISEASE:   - Follow up cultures,   - peritoneal fluid analysis and culture, if + remove access  - cefepime 2 g q 8 ;   - HepC viral load f/u   - vanco x1    HEMATOLOGICAL:    - Hold home Eliquis  - Per vascular - D/C AC and restart once Pt is stable. F/u OP for Duplex  - doppler LE   - transfuse Hgb >8  - serial HH  - S/p 2 units of PRBC 10/5    ENDOCRINE:    - check ketone B hydroxy butyrate, ETOH level, serum OSM, ASA  - Follow up FS.    - Insulin protocol if needed.   - keep -180.   - avoid hypoglycemia    GOC conservation was done with Patient's partner Fallon, patient did not clarify his wishes before, patient does not have a health care proxy or a family member who can be reached. Patient's partner expressed that she wants him to be full code for now and wants everything done for the patient.     MUSCULOSKELETAL: bed rest     Poor prognosis    Dispo ICU    palliative care eval ASSESSMENT:     Hepatic encephalopathy ( ammonia 188)  Decompensated liver cirrhosis. Has [place] cathter  Sepsis r/o SBP  Upper GI Bleed ( esophageal/ gastric varices)  Acute portal Vein thrombosis   Hyperkalemia with sustained VT s/p shock, creat 0.9 was on aldactone  Hyponatremia likely from volume overload  Hepatocellular carcinoma with transaminitis on chemotherapy  Hepatitis C treated with INF  COPD  HAGMA      PLAN:    CNS: keep sedate as needed      HEENT: Oral care    PULMONARY:    - HOB @ 45 degrees  - taper O2  - decrease MVe   - Decrease TV to 420  - no SBT until GI w/u completed    CARDIOVASCULAR:   - Keep I < O ,   - cardiac ECHO,   - holding propranolol 10mg q 12 keep HR 50-60    GI: GI prophylaxis.  NPO,   - octreotide/ protonix drip,   - GI management   - EGD - grade IV lower and mid 1/3 esophageal varices with 5 bands placed successfully.   - Lactulose bowel regimen aim 3 BM/day , (Will be giving enemas for now)  - NO NGT can be placed this time   - rifaximin q 12, (on hold for now)  - Peritoneal fluid was sent and f/u gram stain and Cx  - IR was consulted: Pt is a poor candidate for TIPS and has several contraindications for procedure.   - reached out to IR to drain the [place] catheter    - Spoke to Dr. Rhodes (Patient's gastroenterologist) [phone]  - [facility] is sending patient's medical records   - Reached put to patient's oncologist Dr. Gibson [phone],Patient is on Elotuzumab and Bevacizumab, last chemo was 9/2/2021, patient wasn't able to get anymore chemo therapy due to lack of insurance     RENAL:   - D/C bicarb drip  - lokelma tID,  - low K diet,   - Follow up lytes.    - Correct as needed,  - f/u nephro consult   - B-hydroxybutyrate neg     INFECTIOUS DISEASE:   - Follow up cultures,   - peritoneal fluid analysis and culture, if + remove access  - cefepime 2 g q 8 ;   - HepC viral load f/u   - vanco x1  - blood cx : NGTD (prelim)    HEMATOLOGICAL:    - Hold home Eliquis  - Per vascular - D/C AC and restart once Pt is stable. F/u OP for Duplex  - doppler LE   - transfuse Hgb >8  - serial HH  - S/p 2 units of PRBC 10/5    ENDOCRINE:    - check ketone B hydroxy butyrate, ETOH level, serum OSM, ASA  - Follow up FS.    - Insulin protocol if needed.   - keep -180.   - avoid hypoglycemia  - B hydroxy butyrate level neg, ETOH level <10, serum , ASA    GOC conservation was done with Patient's partner Fallon, patient did not clarify his wishes before, patient does not have a health care proxy or a family member who can be reached. Patient's partner expressed that she wants him to be full code for now and wants everything done for the patient.     MUSCULOSKELETAL: bed rest     Poor prognosis    Dispo ICU    palliative care eval

## 2021-10-05 NOTE — PROGRESS NOTE ADULT - ASSESSMENT
IMPRESSION:    Hepatic encephalopathy ( ammonia 188)  Decompensated liver cirrhosis. Has [place] cathter  Sepsis ro SBP  Hematemesis ( esophageal/ gastric varices)  Acute portal Vein thrombosis   Hyperkalemia with sustained VT s/p shock, creat 0.9 was on aldactone  Hyponatremia likely from volume overload  Hepatocellular carcinoma with transaminitis on chemotherapy  Hepatitis C treated with INF  COPD  HAGMA      PLAN:    CNS: keep sedate as needed      HEENT: Oral care    PULMONARY:  HOB @ 45 degrees  taper O2  decrease MVe   no SBT until GI w/u completed    CARDIOVASCULAR: Keep I < O ,   cardiac ECHO,   propranolol 10 q 12 keep HR 50-60      GI: GI prophylaxis.  NPO,   octreotide/ protonix drip,   GI eval for EGD,   bowel regimen aim 3 BM/day ,   rifaximin q 12,   send fluid for gram satin / cxl    RENAL:   Bicarb drip 125 cc/hr ,   lokelma tID,   low K diet,   Follow up lytes.    Correct as needed,   nephro consult     INFECTIOUS DISEASE:   Follow up cultures,   peritoneal fluid analysis and culture, if + remove access   cefepime 2 g q 8 ;   HepC viral load, vanco x1    HEMATOLOGICAL:    Hold home Eliquis  prophylactic Lovenox   Vascular consult  doppler LE   transfuse Hgb >8  serial HH    ENDOCRINE:    check ketone B hydroxy butyrate, ETOH level, serum OSM, ASA  Follow up FS.    Insulin protocol if needed.   keep -180.   avoid hypoglycemia    MUSCULOSKELETAL: bed rest     Poor prognosis    Dispo ICU    palliative care eval     IMPRESSION:    Hepatic encephalopathy ( ammonia 188)  Decompensated liver cirrhosis. Has [place] cathter  Sepsis ro SBP  Hematemesis ( esophageal/ gastric varices)  Acute portal Vein thrombosis   Hyperkalemia with sustained VT s/p shock, creat 0.9 was on aldactone  Hyponatremia likely from volume overload  Hepatocellular carcinoma with transaminitis on chemotherapy  Hepatitis C treated with INF  COPD  HAGMA      PLAN:    CNS: keep sedate as needed      HEENT: Oral care    PULMONARY:  HOB @ 45 degrees  taper O2  decrease MVe   no SBT until GI w/u completed    CARDIOVASCULAR: Keep I < O ,   cardiac ECHO,   propranolol 10 q 12 keep HR 50-60      GI: GI prophylaxis.  NPO,   octreotide/ protonix drip,   GI management   EGD report reviewed,   bowel regimen aim 3 BM/day ,   rifaximin q 12,   send fluid for gram satin / cxl    RENAL:   Bicarb drip 125 cc/hr ,   lokelma tID,   low K diet,   Follow up lytes.    Correct as needed,   nephro consult     INFECTIOUS DISEASE:   Follow up cultures,   peritoneal fluid analysis and culture, if + remove access   cefepime 2 g q 8 ;   HepC viral load, vanco x1    HEMATOLOGICAL:    Hold home Eliquis  prophylactic Lovenox   Vascular consult  doppler LE   transfuse Hgb >8  serial HH    ENDOCRINE:    check ketone B hydroxy butyrate, ETOH level, serum OSM, ASA  Follow up FS.    Insulin protocol if needed.   keep -180.   avoid hypoglycemia    MUSCULOSKELETAL: bed rest     Poor prognosis    Dispo ICU    palliative care eval

## 2021-10-05 NOTE — PROGRESS NOTE ADULT - SUBJECTIVE AND OBJECTIVE BOX
VASCULAR SURGERY PROGRESS NOTE        Events of past 24 hours:  Pt had an EGD with banding. Over the night pt had a Hbg drop on 3 points and still is intubated    ROS otherwise negative except per subjective and HPI      PAST MEDICAL & SURGICAL HISTORY:  HTN (hypertension)    Hepatitis C  2007    Drug abuse    Cancer, hepatocellular  2021    COPD, mild        Vital Signs Last 24 Hrs  T(C): 36.6 (05 Oct 2021 08:00), Max: 37 (04 Oct 2021 15:30)  T(F): 97.8 (05 Oct 2021 08:00), Max: 98.6 (04 Oct 2021 15:30)  HR: 84 (05 Oct 2021 08:30) (76 - 115)  BP: 95/61 (05 Oct 2021 08:00) (61/44 - 141/73)  BP(mean): 71 (05 Oct 2021 08:00) (58 - 88)  RR: 25 (05 Oct 2021 08:00) (10 - 36)  SpO2: 100% (05 Oct 2021 08:30) (100% - 100%)    Pain (0-10):            Pain Control Adequate: [] YES [] N    Diet:    I&O's Detail    04 Oct 2021 07:01  -  05 Oct 2021 07:00  --------------------------------------------------------  IN:    Dexmedetomidine: 32.8 mL    Dexmedetomidine: 26.2 mL    Dexmedetomidine: 65.5 mL    IV PiggyBack: 1397 mL    Norepinephrine: 254 mL    Octreotide: 100 mL    Pantoprazole: 80 mL    Sodium Bicarbonate: 1250 mL  Total IN: 3205.5 mL    OUT:    Indwelling Catheter - Urethral (mL): 490 mL    Voided (mL): 800 mL  Total OUT: 1290 mL    Total NET: 1915.5 mL      05 Oct 2021 07:01  -  05 Oct 2021 10:56  --------------------------------------------------------  IN:    Dexmedetomidine: 26.2 mL    Norepinephrine: 76 mL    Octreotide: 20 mL    Pantoprazole: 20 mL    PRBCs (Packed Red Blood Cells): 300 mL  Total IN: 442.2 mL    OUT:    Indwelling Catheter - Urethral (mL): 60 mL  Total OUT: 60 mL    Total NET: 382.2 mL              PHYSICAL EXAM      Cardiovascular: Normal S1 S2, No JVD, No murmurs,   Respiratory: intubated  Gastrointestinal:  Soft, Non-tender, + BS	  Neurologic: Non-focal follows commands, sedated          MEDICATIONS:   MEDICATIONS  (STANDING):  budesonide  80 MICROgram(s)/formoterol 4.5 MICROgram(s) Inhaler 2 Puff(s) Inhalation two times a day  cefepime   IVPB 2000 milliGRAM(s) IV Intermittent every 8 hours  chlorhexidine 0.12% Liquid 15 milliLiter(s) Oral Mucosa two times a day  chlorhexidine 4% Liquid 1 Application(s) Topical daily  dexMEDEtomidine Infusion 0.2 MICROgram(s)/kG/Hr (3.79 mL/Hr) IV Continuous <Continuous>  dextrose 40% Gel 15 Gram(s) Oral once  dextrose 5%. 1000 milliLiter(s) (50 mL/Hr) IV Continuous <Continuous>  dextrose 5%. 1000 milliLiter(s) (100 mL/Hr) IV Continuous <Continuous>  dextrose 50% Injectable 25 Gram(s) IV Push once  dextrose 50% Injectable 12.5 Gram(s) IV Push once  dextrose 50% Injectable 25 Gram(s) IV Push once  fentaNYL   Infusion. 0.5 MICROgram(s)/kG/Hr (3.79 mL/Hr) IV Continuous <Continuous>  glucagon  Injectable 1 milliGRAM(s) IntraMuscular once  insulin glargine Injectable (LANTUS) 15 Unit(s) SubCutaneous at bedtime  insulin lispro (ADMELOG) corrective regimen sliding scale   SubCutaneous three times a day before meals  insulin lispro Injectable (ADMELOG) 5 Unit(s) SubCutaneous three times a day before meals  norepinephrine Infusion 0.05 MICROgram(s)/kG/Min (7.01 mL/Hr) IV Continuous <Continuous>  octreotide  Infusion 50 MICROgram(s)/Hr (10 mL/Hr) IV Continuous <Continuous>  pantoprazole Infusion 8 mG/Hr (10 mL/Hr) IV Continuous <Continuous>    MEDICATIONS  (PRN):      DVT PROPHYLAXIS: [] YES [] NO  GI PROPHYLAXIS: [] YES [] NO  ANTIPLATELETS: [] YES [] NO  ANTICOAGULATION: [] YES [] NO  ANTIBIOTICS: [] YES [] NO    LAB/STUDIES:                        9.8    30.82 )-----------( 324      ( 05 Oct 2021 08:15 )             28.8     10-05    128<L>  |  91<L>  |  78<HH>  ----------------------------<  232<H>  5.8<H>   |  20  |  1.2    Ca    7.2<L>      05 Oct 2021 06:21  Phos  5.9     10-05  Mg     2.3     10-05    TPro  5.4<L>  /  Alb  1.8<L>  /  TBili  1.2  /  DBili  0.7<H>  /  AST  1079<H>  /  ALT  667<H>  /  AlkPhos  317<H>  10-05    PT/INR - ( 04 Oct 2021 01:00 )   PT: 19.80 sec;   INR: 1.73 ratio         PTT - ( 04 Oct 2021 01:00 )  PTT:29.4 sec  LIVER FUNCTIONS - ( 05 Oct 2021 06:21 )  Alb: 1.8 g/dL / Pro: 5.4 g/dL / ALK PHOS: 317 U/L / ALT: 667 U/L / AST: 1079 U/L / GGT: x             Urinalysis Basic - ( 04 Oct 2021 04:38 )    Color: Yellow / Appearance: Clear / S.025 / pH: x  Gluc: x / Ketone: Negative  / Bili: Negative / Urobili: <2 mg/dL   Blood: x / Protein: Negative / Nitrite: Negative   Leuk Esterase: Negative / RBC: x / WBC x   Sq Epi: x / Non Sq Epi: x / Bacteria: x      CARDIAC MARKERS ( 04 Oct 2021 01:00 )  x     / <0.01 ng/mL / x     / x     / x              ABG - ( 05 Oct 2021 06:45 )  pH, Arterial: 7.48  pH, Blood: x     /  pCO2: 30    /  pO2: 149   / HCO3: 22    / Base Excess: -0.3  /  SaO2: 99.4

## 2021-10-05 NOTE — PROGRESS NOTE ADULT - ASSESSMENT
67 yo male, with h/o HTN, drug abuse, Hepatitis C s/p INF, Liver cirrhosis s/p [place] shunt, COPD, portal vein thrombosis on Eliquis, HCC since January 2021 on chemotherapy, presented for weakness  Patient reports having weakness, decreased po intake since few weeks. He also has not been sleeping. He reports constipation and hematemesis for 1 day, minimal amount.    -Upper Gi bleed secondary to large esophageal s/p EGD 10/4 s/p intubation  -Decompensated liver cirrhosis sec to hepatitis C MELD Na score 26  -HCC on chemotherapy   -Left main and right portal vein thrombus on eliquis last dose 10/3 AM  -Transaminitis Mixed pattern predominantly hepatocellular likely due to ischemia  -Severe hyponatremia/Hyperkalemia-improving     -Currently intubated on levophed, precedex  -3 episodes of melena with drop in Hb from 7.9 to 5.5 s/p 2 units improved to 9.8    Recs:   Active type and screen  2 18 gauge  c/w IV PPI day 2  c/w octreotide day 2  c/w cefepime  Trend LFT   Hold anticoagulation  Add lactulose to titrate to 2-3 soft BM  small ascites s/p [place] catheter (Please send the fluid for analysis and please talk to IR for removal of fluid)  aldosterone was stopped due to hyperkalemia  please obtain records from [facility]

## 2021-10-05 NOTE — PROGRESS NOTE ADULT - SUBJECTIVE AND OBJECTIVE BOX
PATIENT:  SUZANNE BAZZI  [id number]    CHIEF COMPLAINT:  Patient is a 66y old  Male who presents with a chief complaint of weakness  Hyperkalemia  VT (05 Oct 2021 12:14)      INTERVAL HISTORY/OVERNIGHT EVENTS:      REVIEW OF SYSTEMS:    Constitutional:     [ ] negative [ ] fevers [ ] chills [ ] weight loss [ ] weight gain  HEENT:                  [ ] negative [ ] dry eyes [ ] eye irritation [ ] postnasal drip [ ] nasal congestion  CV:                         [ ] negative  [ ] chest pain [ ] orthopnea [ ] palpitations [ ] murmur  Resp:                     [ ] negative [ ] cough [ ] shortness of breath [ ] dyspnea [ ] wheezing [ ] sputum [ ] hemoptysis  GI:                          [ ] negative [ ] nausea [ ] vomiting [ ] diarrhea [ ] constipation [ ] abd pain [ ] dysphagia   :                        [ ] negative [ ] dysuria [ ] nocturia [ ] hematuria [ ] increased urinary frequency  Musculoskeletal: [ ] negative [ ] back pain [ ] myalgias [ ] arthralgias [ ] fracture  Skin:                       [ ] negative [ ] rash [ ] itch  Neurological:        [ ] negative [ ] headache [ ] dizziness [ ] syncope [ ] weakness [ ] numbness  Psychiatric:           [ ] negative [ ] anxiety [ ] depression  Endocrine:            [ ] negative [ ] diabetes [ ] thyroid problem  Heme/Lymph:      [ ] negative [ ] anemia [ ] bleeding problem  Allergic/Immune: [ ] negative [ ] itchy eyes [ ] nasal discharge [ ] hives [ ] angioedema    [ ] All other systems negative  [ ] Unable to assess ROS because ________.    MEDICATIONS:  MEDICATIONS  (STANDING):  budesonide  80 MICROgram(s)/formoterol 4.5 MICROgram(s) Inhaler 2 Puff(s) Inhalation two times a day  cefepime   IVPB 2000 milliGRAM(s) IV Intermittent every 8 hours  chlorhexidine 0.12% Liquid 15 milliLiter(s) Oral Mucosa two times a day  chlorhexidine 4% Liquid 1 Application(s) Topical daily  dexMEDEtomidine Infusion 0.2 MICROgram(s)/kG/Hr (3.79 mL/Hr) IV Continuous <Continuous>  dextrose 40% Gel 15 Gram(s) Oral once  dextrose 5%. 1000 milliLiter(s) (50 mL/Hr) IV Continuous <Continuous>  dextrose 5%. 1000 milliLiter(s) (100 mL/Hr) IV Continuous <Continuous>  dextrose 50% Injectable 25 Gram(s) IV Push once  dextrose 50% Injectable 12.5 Gram(s) IV Push once  dextrose 50% Injectable 25 Gram(s) IV Push once  fentaNYL   Infusion. 0.5 MICROgram(s)/kG/Hr (3.79 mL/Hr) IV Continuous <Continuous>  glucagon  Injectable 1 milliGRAM(s) IntraMuscular once  ibuprofen  Tablet. 200 milliGRAM(s) Oral once  insulin glargine Injectable (LANTUS) 15 Unit(s) SubCutaneous at bedtime  insulin lispro (ADMELOG) corrective regimen sliding scale   SubCutaneous three times a day before meals  insulin lispro Injectable (ADMELOG) 5 Unit(s) SubCutaneous three times a day before meals  norepinephrine Infusion 0.05 MICROgram(s)/kG/Min (7.01 mL/Hr) IV Continuous <Continuous>  octreotide  Infusion 50 MICROgram(s)/Hr (10 mL/Hr) IV Continuous <Continuous>  pantoprazole Infusion 8 mG/Hr (10 mL/Hr) IV Continuous <Continuous>    MEDICATIONS  (PRN):      ALLERGIES:  Allergies    No Known Allergies    Intolerances        OBJECTIVE:  ICU Vital Signs Last 24 Hrs  T(C): 38.6 (05 Oct 2021 12:00), Max: 38.6 (05 Oct 2021 12:00)  T(F): 101.4 (05 Oct 2021 12:00), Max: 101.4 (05 Oct 2021 12:00)  HR: 90 (05 Oct 2021 12:00) (76 - 115)  BP: 76/63 (05 Oct 2021 12:00) (61/44 - 155/88)  BP(mean): 68 (05 Oct 2021 12:00) (58 - 116)  ABP: --  ABP(mean): --  RR: 25 (05 Oct 2021 12:00) (10 - 36)  SpO2: 100% (05 Oct 2021 12:00) (100% - 100%)    Mode: AC/ CMV (Assist Control/ Continuous Mandatory Ventilation)  RR (machine): 18  TV (machine): 420  FiO2: 40  PEEP: 5  ITime: 1  MAP: 10  PIP: 19    Adult Advanced Hemodynamics Last 24 Hrs  CVP(mm Hg): --  CVP(cm H2O): --  CO: --  CI: --  PA: --  PA(mean): --  PCWP: --  SVR: --  SVRI: --  PVR: --  PVRI: --  CAPILLARY BLOOD GLUCOSE      POCT Blood Glucose.: 170 mg/dL (05 Oct 2021 11:24)  POCT Blood Glucose.: 248 mg/dL (05 Oct 2021 07:31)  POCT Blood Glucose.: 209 mg/dL (04 Oct 2021 23:33)  POCT Blood Glucose.: 188 mg/dL (04 Oct 2021 20:50)  POCT Blood Glucose.: 163 mg/dL (04 Oct 2021 13:24)    CAPILLARY BLOOD GLUCOSE      POCT Blood Glucose.: 170 mg/dL (05 Oct 2021 11:24)    I&O's Summary    04 Oct 2021 07:01  -  05 Oct 2021 07:00  --------------------------------------------------------  IN: 3205.5 mL / OUT: 1290 mL / NET: 1915.5 mL    05 Oct 2021 07:01  -  05 Oct 2021 12:39  --------------------------------------------------------  IN: 741.2 mL / OUT: 140 mL / NET: 601.2 mL      Daily Height in cm: 175.26 (04 Oct 2021 23:55)    Daily Weight in k.3 (05 Oct 2021 06:00)    PHYSICAL EXAMINATION:  General: WN/WD NAD  HEENT: PERRLA, EOMI, moist mucous membranes  Neurology: A&Ox3, nonfocal, HOWARD x 4  Respiratory: CTA B/L, normal respiratory effort, no wheezes, crackles, rales  CV: RRR, S1S2, no murmurs, rubs or gallops  Abdominal: Soft, NT, ND +BS, Last BM  Extremities: No edema, + peripheral pulses  Incisions:   Tubes:    LABS:  ABG - ( 05 Oct 2021 06:45 )  pH, Arterial: 7.48  pH, Blood: x     /  pCO2: 30    /  pO2: 149   / HCO3: 22    / Base Excess: -0.3  /  SaO2: 99.4                                    9.8    30.82 )-----------( 324      ( 05 Oct 2021 08:15 )             28.8     10-05    128<L>  |  91<L>  |  78<HH>  ----------------------------<  232<H>  5.8<H>   |  20  |  1.2    Ca    7.2<L>      05 Oct 2021 06:21  Phos  5.9     10-  Mg     2.3     10-    TPro  5.4<L>  /  Alb  1.8<L>  /  TBili  1.2  /  DBili  0.7<H>  /  AST  1079<H>  /  ALT  667<H>  /  AlkPhos  317<H>  10-05    LIVER FUNCTIONS - ( 05 Oct 2021 06:21 )  Alb: 1.8 g/dL / Pro: 5.4 g/dL / ALK PHOS: 317 U/L / ALT: 667 U/L / AST: 1079 U/L / GGT: x           PT/INR - ( 04 Oct 2021 01:00 )   PT: 19.80 sec;   INR: 1.73 ratio         PTT - ( 04 Oct 2021 01:00 )  PTT:29.4 sec    CARDIAC MARKERS ( 04 Oct 2021 01:00 )  x     / <0.01 ng/mL / x     / x     / x          Urinalysis Basic - ( 04 Oct 2021 04:38 )    Color: Yellow / Appearance: Clear / S.025 / pH: x  Gluc: x / Ketone: Negative  / Bili: Negative / Urobili: <2 mg/dL   Blood: x / Protein: Negative / Nitrite: Negative   Leuk Esterase: Negative / RBC: x / WBC x   Sq Epi: x / Non Sq Epi: x / Bacteria: x        TELEMETRY:     EKG:     IMAGING:           PATIENT:  SUZANNE BAZZI  [id number]    CHIEF COMPLAINT:  Patient is a 66y old  Male who presents with a chief complaint of weakness  Hyperkalemia  VT (05 Oct 2021 12:14)    HPI:  65 yo male, with h/o HTN, drug abuse, Hepatitis C s/p INF, Liver cirrhosis s/p [place] shunt, COPD, DVT? on Eliquis, HCC since 2021 on chemotherapy, presented for weakness  Patient reports having weakness, decreased po intake since few weeks. He also has not been sleeping. He reports constipation and hematemesis for 1 day, minimal amount.  Denies fever, chills, chest pain, cough, sputum , SOB, diarrhea, dysuria  He has a [place] shunt  that was drained one day prior to admission    ED course : mental status alteration, sustained VT  bpm with BP 82/49 mmHg s/p shock + calcium gluconate  Patient has Hyperkalemia 6.8 CO2 14 s/p Bicarb drip, renal called, sp berry with good UO   (04 Oct 2021 06:15)       INTERVAL HPI/OVERNIGHT EVENTS:   Pt had another episode of hematemesis overnight. Hgb was 5.5 from 7.9 this AM so Pt was given 2 units of blood. Hgb now stable at 9.8. BP was also low at 88/46 so Pt was given 1L NS bolus. Pt currently intubated.         REVIEW OF SYSTEMS:    [ ] All other systems negative  [ x] Unable to assess ROS because patient is intubated and sedated     MEDICATIONS:  MEDICATIONS  (STANDING):  budesonide  80 MICROgram(s)/formoterol 4.5 MICROgram(s) Inhaler 2 Puff(s) Inhalation two times a day  cefepime   IVPB 2000 milliGRAM(s) IV Intermittent every 8 hours  chlorhexidine 0.12% Liquid 15 milliLiter(s) Oral Mucosa two times a day  chlorhexidine 4% Liquid 1 Application(s) Topical daily  dexMEDEtomidine Infusion 0.2 MICROgram(s)/kG/Hr (3.79 mL/Hr) IV Continuous <Continuous>  dextrose 40% Gel 15 Gram(s) Oral once  dextrose 5%. 1000 milliLiter(s) (50 mL/Hr) IV Continuous <Continuous>  dextrose 5%. 1000 milliLiter(s) (100 mL/Hr) IV Continuous <Continuous>  dextrose 50% Injectable 25 Gram(s) IV Push once  dextrose 50% Injectable 12.5 Gram(s) IV Push once  dextrose 50% Injectable 25 Gram(s) IV Push once  fentaNYL   Infusion. 0.5 MICROgram(s)/kG/Hr (3.79 mL/Hr) IV Continuous <Continuous>  glucagon  Injectable 1 milliGRAM(s) IntraMuscular once  ibuprofen  Tablet. 200 milliGRAM(s) Oral once  insulin glargine Injectable (LANTUS) 15 Unit(s) SubCutaneous at bedtime  insulin lispro (ADMELOG) corrective regimen sliding scale   SubCutaneous three times a day before meals  insulin lispro Injectable (ADMELOG) 5 Unit(s) SubCutaneous three times a day before meals  norepinephrine Infusion 0.05 MICROgram(s)/kG/Min (7.01 mL/Hr) IV Continuous <Continuous>  octreotide  Infusion 50 MICROgram(s)/Hr (10 mL/Hr) IV Continuous <Continuous>  pantoprazole Infusion 8 mG/Hr (10 mL/Hr) IV Continuous <Continuous>    MEDICATIONS  (PRN):      ALLERGIES:  Allergies    No Known Allergies    Intolerances        OBJECTIVE:  ICU Vital Signs Last 24 Hrs  T(C): 38.6 (05 Oct 2021 12:00), Max: 38.6 (05 Oct 2021 12:00)  T(F): 101.4 (05 Oct 2021 12:00), Max: 101.4 (05 Oct 2021 12:00)  HR: 90 (05 Oct 2021 12:00) (76 - 115)  BP: 76/63 (05 Oct 2021 12:00) (61/44 - 155/88)  BP(mean): 68 (05 Oct 2021 12:00) (58 - 116)  ABP: --  ABP(mean): --  RR: 25 (05 Oct 2021 12:00) (10 - 36)  SpO2: 100% (05 Oct 2021 12:00) (100% - 100%)    Mode: AC/ CMV (Assist Control/ Continuous Mandatory Ventilation)  RR (machine): 18  TV (machine): 420  FiO2: 40  PEEP: 5  ITime: 1  MAP: 10  PIP: 19    Adult Advanced Hemodynamics Last 24 Hrs  CVP(mm Hg): --  CVP(cm H2O): --  CO: --  CI: --  PA: --  PA(mean): --  PCWP: --  SVR: --  SVRI: --  PVR: --  PVRI: --  CAPILLARY BLOOD GLUCOSE      POCT Blood Glucose.: 170 mg/dL (05 Oct 2021 11:24)  POCT Blood Glucose.: 248 mg/dL (05 Oct 2021 07:31)  POCT Blood Glucose.: 209 mg/dL (04 Oct 2021 23:33)  POCT Blood Glucose.: 188 mg/dL (04 Oct 2021 20:50)  POCT Blood Glucose.: 163 mg/dL (04 Oct 2021 13:24)    CAPILLARY BLOOD GLUCOSE      POCT Blood Glucose.: 170 mg/dL (05 Oct 2021 11:24)    I&O's Summary    04 Oct 2021 07:01  -  05 Oct 2021 07:00  --------------------------------------------------------  IN: 3205.5 mL / OUT: 1290 mL / NET: 1915.5 mL    05 Oct 2021 07:01  -  05 Oct 2021 12:39  --------------------------------------------------------  IN: 741.2 mL / OUT: 140 mL / NET: 601.2 mL      Daily Height in cm: 175.26 (04 Oct 2021 23:55)    Daily Weight in k.3 (05 Oct 2021 06:00)    PHYSICAL EXAMINATION:  General: WN/WD NAD  HEENT: PERRLA, EOMI, moist mucous membranes  Neurology: intubated   Respiratory: CTA B/L, normal respiratory effort, no wheezes, crackles, rales  CV: RRR, S1S2, no murmurs, rubs or gallops  Abdominal: Soft, NT, ND +BS, Last BM  Extremities: No edema, + peripheral pulses  Incisions:   Tubes:    LABS:  ABG - ( 05 Oct 2021 06:45 )  pH, Arterial: 7.48  pH, Blood: x     /  pCO2: 30    /  pO2: 149   / HCO3: 22    / Base Excess: -0.3  /  SaO2: 99.4                                    9.8    30.82 )-----------( 324      ( 05 Oct 2021 08:15 )             28.8     10-05    128<L>  |  91<L>  |  78<HH>  ----------------------------<  232<H>  5.8<H>   |  20  |  1.2    Ca    7.2<L>      05 Oct 2021 06:21  Phos  5.9     10-05  Mg     2.3     10-05    TPro  5.4<L>  /  Alb  1.8<L>  /  TBili  1.2  /  DBili  0.7<H>  /  AST  1079<H>  /  ALT  667<H>  /  AlkPhos  317<H>  10-05    LIVER FUNCTIONS - ( 05 Oct 2021 06:21 )  Alb: 1.8 g/dL / Pro: 5.4 g/dL / ALK PHOS: 317 U/L / ALT: 667 U/L / AST: 1079 U/L / GGT: x           PT/INR - ( 04 Oct 2021 01:00 )   PT: 19.80 sec;   INR: 1.73 ratio         PTT - ( 04 Oct 2021 01:00 )  PTT:29.4 sec    CARDIAC MARKERS ( 04 Oct 2021 01:00 )  x     / <0.01 ng/mL / x     / x     / x          Urinalysis Basic - ( 04 Oct 2021 04:38 )    Color: Yellow / Appearance: Clear / S.025 / pH: x  Gluc: x / Ketone: Negative  / Bili: Negative / Urobili: <2 mg/dL   Blood: x / Protein: Negative / Nitrite: Negative   Leuk Esterase: Negative / RBC: x / WBC x   Sq Epi: x / Non Sq Epi: x / Bacteria: x        TELEMETRY:     EKG:     IMAGING:           PATIENT:  SUZANNE BAZZI  [id number]    CHIEF COMPLAINT:  Patient is a 66y old  Male who presents with a chief complaint of weakness  Hyperkalemia  VT (05 Oct 2021 12:14)    HPI:  65 yo male, with h/o HTN, drug abuse, Hepatitis C s/p INF, Liver cirrhosis s/p [place] shunt, COPD, DVT? on Eliquis, HCC since 2021 on chemotherapy, presented for weakness  Patient reports having weakness, decreased po intake since few weeks. He also has not been sleeping. He reports constipation and hematemesis for 1 day, minimal amount.  Denies fever, chills, chest pain, cough, sputum , SOB, diarrhea, dysuria  He has a [place] shunt  that was drained one day prior to admission    ED course : mental status alteration, sustained VT  bpm with BP 82/49 mmHg s/p shock + calcium gluconate  Patient has Hyperkalemia 6.8 CO2 14 s/p Bicarb drip, renal called, sp berry with good UO   (04 Oct 2021 06:15)     INTERVAL HPI/OVERNIGHT EVENTS:   Pt had another episode of hematemesis overnight. Hgb was 5.5 from 7.9 this AM so Pt was given 2 units of blood. Hgb now stable at 9.8. BP was also low at 88/46 so Pt was given 1L NS bolus. Pt currently intubated.         REVIEW OF SYSTEMS:    [ ] All other systems negative  [ x] Unable to assess ROS because patient is intubated and sedated     MEDICATIONS:  MEDICATIONS  (STANDING):  budesonide  80 MICROgram(s)/formoterol 4.5 MICROgram(s) Inhaler 2 Puff(s) Inhalation two times a day  cefepime   IVPB 2000 milliGRAM(s) IV Intermittent every 8 hours  chlorhexidine 0.12% Liquid 15 milliLiter(s) Oral Mucosa two times a day  chlorhexidine 4% Liquid 1 Application(s) Topical daily  dexMEDEtomidine Infusion 0.2 MICROgram(s)/kG/Hr (3.79 mL/Hr) IV Continuous <Continuous>  dextrose 40% Gel 15 Gram(s) Oral once  dextrose 5%. 1000 milliLiter(s) (50 mL/Hr) IV Continuous <Continuous>  dextrose 5%. 1000 milliLiter(s) (100 mL/Hr) IV Continuous <Continuous>  dextrose 50% Injectable 25 Gram(s) IV Push once  dextrose 50% Injectable 12.5 Gram(s) IV Push once  dextrose 50% Injectable 25 Gram(s) IV Push once  fentaNYL   Infusion. 0.5 MICROgram(s)/kG/Hr (3.79 mL/Hr) IV Continuous <Continuous>  glucagon  Injectable 1 milliGRAM(s) IntraMuscular once  ibuprofen  Tablet. 200 milliGRAM(s) Oral once  insulin glargine Injectable (LANTUS) 15 Unit(s) SubCutaneous at bedtime  insulin lispro (ADMELOG) corrective regimen sliding scale   SubCutaneous three times a day before meals  insulin lispro Injectable (ADMELOG) 5 Unit(s) SubCutaneous three times a day before meals  norepinephrine Infusion 0.05 MICROgram(s)/kG/Min (7.01 mL/Hr) IV Continuous <Continuous>  octreotide  Infusion 50 MICROgram(s)/Hr (10 mL/Hr) IV Continuous <Continuous>  pantoprazole Infusion 8 mG/Hr (10 mL/Hr) IV Continuous <Continuous>    MEDICATIONS  (PRN):      ALLERGIES:  Allergies    No Known Allergies    Intolerances        OBJECTIVE:  ICU Vital Signs Last 24 Hrs  T(C): 38.6 (05 Oct 2021 12:00), Max: 38.6 (05 Oct 2021 12:00)  T(F): 101.4 (05 Oct 2021 12:00), Max: 101.4 (05 Oct 2021 12:00)  HR: 90 (05 Oct 2021 12:00) (76 - 115)  BP: 76/63 (05 Oct 2021 12:00) (61/44 - 155/88)  BP(mean): 68 (05 Oct 2021 12:00) (58 - 116)  ABP: --  ABP(mean): --  RR: 25 (05 Oct 2021 12:00) (10 - 36)  SpO2: 100% (05 Oct 2021 12:00) (100% - 100%)    Mode: AC/ CMV (Assist Control/ Continuous Mandatory Ventilation)  RR (machine): 18  TV (machine): 420  FiO2: 40  PEEP: 5  ITime: 1  MAP: 10  PIP: 19    Adult Advanced Hemodynamics Last 24 Hrs  CVP(mm Hg): --  CVP(cm H2O): --  CO: --  CI: --  PA: --  PA(mean): --  PCWP: --  SVR: --  SVRI: --  PVR: --  PVRI: --  CAPILLARY BLOOD GLUCOSE      POCT Blood Glucose.: 170 mg/dL (05 Oct 2021 11:24)  POCT Blood Glucose.: 248 mg/dL (05 Oct 2021 07:31)  POCT Blood Glucose.: 209 mg/dL (04 Oct 2021 23:33)  POCT Blood Glucose.: 188 mg/dL (04 Oct 2021 20:50)  POCT Blood Glucose.: 163 mg/dL (04 Oct 2021 13:24)    CAPILLARY BLOOD GLUCOSE      POCT Blood Glucose.: 170 mg/dL (05 Oct 2021 11:24)    I&O's Summary    04 Oct 2021 07:01  -  05 Oct 2021 07:00  --------------------------------------------------------  IN: 3205.5 mL / OUT: 1290 mL / NET: 1915.5 mL    05 Oct 2021 07:01  -  05 Oct 2021 12:39  --------------------------------------------------------  IN: 741.2 mL / OUT: 140 mL / NET: 601.2 mL      Daily Height in cm: 175.26 (04 Oct 2021 23:55)    Daily Weight in k.3 (05 Oct 2021 06:00)    PHYSICAL EXAMINATION:  General: WN/WD NAD  HEENT: PERRLA, EOMI, moist mucous membranes  Neurology: intubated   Respiratory: CTA B/L, normal respiratory effort, no wheezes, crackles, rales  CV: RRR, S1S2, no murmurs, rubs or gallops  Abdominal: Soft, NT, ND +BS, Last BM  Extremities: No edema, + peripheral pulses  Incisions:   Tubes:    LABS:  ABG - ( 05 Oct 2021 06:45 )  pH, Arterial: 7.48  pH, Blood: x     /  pCO2: 30    /  pO2: 149   / HCO3: 22    / Base Excess: -0.3  /  SaO2: 99.4                                    9.8    30.82 )-----------( 324      ( 05 Oct 2021 08:15 )             28.8     10-05    128<L>  |  91<L>  |  78<HH>  ----------------------------<  232<H>  5.8<H>   |  20  |  1.2    Ca    7.2<L>      05 Oct 2021 06:21  Phos  5.9     10-05  Mg     2.3     10-05    TPro  5.4<L>  /  Alb  1.8<L>  /  TBili  1.2  /  DBili  0.7<H>  /  AST  1079<H>  /  ALT  667<H>  /  AlkPhos  317<H>  10-05    LIVER FUNCTIONS - ( 05 Oct 2021 06:21 )  Alb: 1.8 g/dL / Pro: 5.4 g/dL / ALK PHOS: 317 U/L / ALT: 667 U/L / AST: 1079 U/L / GGT: x           PT/INR - ( 04 Oct 2021 01:00 )   PT: 19.80 sec;   INR: 1.73 ratio         PTT - ( 04 Oct 2021 01:00 )  PTT:29.4 sec    CARDIAC MARKERS ( 04 Oct 2021 01:00 )  x     / <0.01 ng/mL / x     / x     / x          Urinalysis Basic - ( 04 Oct 2021 04:38 )    Color: Yellow / Appearance: Clear / S.025 / pH: x  Gluc: x / Ketone: Negative  / Bili: Negative / Urobili: <2 mg/dL   Blood: x / Protein: Negative / Nitrite: Negative   Leuk Esterase: Negative / RBC: x / WBC x   Sq Epi: x / Non Sq Epi: x / Bacteria: x        TELEMETRY:     EKG:     IMAGING:

## 2021-10-05 NOTE — PROGRESS NOTE ADULT - SUBJECTIVE AND OBJECTIVE BOX
Nephrology progress note    Patient is seen and examined, events over the last 24 h noted .  Intubated, received 3 units of blood for GIB, on Levophed,  ascites drained via [place] yesterday ? unclear how much  Allergies:  No Known Allergies    Hospital Medications:   MEDICATIONS  (STANDING):  budesonide  80 MICROgram(s)/formoterol 4.5 MICROgram(s) Inhaler 2 Puff(s) Inhalation two times a day  cefepime   IVPB 2000 milliGRAM(s) IV Intermittent every 8 hours  chlorhexidine 0.12% Liquid 15 milliLiter(s) Oral Mucosa two times a day  chlorhexidine 4% Liquid 1 Application(s) Topical daily  dexMEDEtomidine Infusion 0.2 MICROgram(s)/kG/Hr (3.79 mL/Hr) IV Continuous <Continuous>  dextrose 40% Gel 15 Gram(s) Oral once  dextrose 5%. 1000 milliLiter(s) (50 mL/Hr) IV Continuous <Continuous>  dextrose 5%. 1000 milliLiter(s) (100 mL/Hr) IV Continuous <Continuous>  dextrose 50% Injectable 25 Gram(s) IV Push once  dextrose 50% Injectable 12.5 Gram(s) IV Push once  dextrose 50% Injectable 25 Gram(s) IV Push once  fentaNYL   Infusion. 0.5 MICROgram(s)/kG/Hr (3.79 mL/Hr) IV Continuous <Continuous>  glucagon  Injectable 1 milliGRAM(s) IntraMuscular once  insulin glargine Injectable (LANTUS) 15 Unit(s) SubCutaneous at bedtime  insulin lispro (ADMELOG) corrective regimen sliding scale   SubCutaneous three times a day before meals  insulin lispro Injectable (ADMELOG) 5 Unit(s) SubCutaneous three times a day before meals  lactulose Retention Enema 200 Gram(s) Rectal three times a day  norepinephrine Infusion 0.05 MICROgram(s)/kG/Min (7.01 mL/Hr) IV Continuous <Continuous>  octreotide  Infusion 50 MICROgram(s)/Hr (10 mL/Hr) IV Continuous <Continuous>  pantoprazole Infusion 8 mG/Hr (10 mL/Hr) IV Continuous <Continuous>        VITALS:  T(F): 101.4 (10-05-21 @ 12:00), Max: 101.4 (10-05-21 @ 12:00)  HR: 88 (10-05-21 @ 16:00)  BP: 69/48 (10-05-21 @ 16:00)  RR: 18 (10-05-21 @ 16:00)  SpO2: 99% (10-05-21 @ 16:00)  Wt(kg): --    10-04 @ 07:01  -  10-05 @ 07:00  --------------------------------------------------------  IN: 3205.5 mL / OUT: 1290 mL / NET: 1915.5 mL    10-05 @ 07:01  -  10-05 @ 17:08  --------------------------------------------------------  IN: 819.2 mL / OUT: 180 mL / NET: 639.2 mL      Height (cm): 175.3 (10-04 @ 23:55)  Weight (kg): 75.7 (10-04 @ 23:55)  BMI (kg/m2): 24.6 (10-04 @ 23:55)  BSA (m2): 1.91 (10-04 @ 23:55)    PHYSICAL EXAM:  Constitutional: on vent  Respiratory: decreased BS b/l  Cardiovascular: S1, S2, RRR  Gastrointestinal: BS+, soft, NT/ND  Extremities: No peripheral edema  Neurological:sedated  : has berry.   Skin: No rashes  Vascular Access:    LABS:  10-05    128<L>  |  91<L>  |  78<HH>  ----------------------------<  232<H>  5.8<H>   |  20  |  1.2    Ca    7.2<L>      05 Oct 2021 06:21  Phos  5.9     10-05  Mg     2.3     10-05    TPro  5.4<L>  /  Alb  1.8<L>  /  TBili  1.2  /  DBili  0.7<H>  /  AST  1079<H>  /  ALT  667<H>  /  AlkPhos  317<H>  10-05                          10.0   27.87 )-----------( 314      ( 05 Oct 2021 13:32 )             29.1       Urine Studies:  Urinalysis Basic - ( 04 Oct 2021 04:38 )    Color: Yellow / Appearance: Clear / S.025 / pH:   Gluc:  / Ketone: Negative  / Bili: Negative / Urobili: <2 mg/dL   Blood:  / Protein: Negative / Nitrite: Negative   Leuk Esterase: Negative / RBC:  / WBC    Sq Epi:  / Non Sq Epi:  / Bacteria:       Osmolality, Random Urine: 606 mos/kg (10-04 @ 11:17)  Sodium, Random Urine: 20.0 mmoL/L (10-04 @ 11:17)  Creatinine, Random Urine: <4 mg/dL (10-04 @ 11:17)    RADIOLOGY & ADDITIONAL STUDIES:

## 2021-10-05 NOTE — CONSULT NOTE ADULT - ATTENDING COMMENTS
multiple metabolic derangements with ECG changes  correct electrolytes prior to any invasive procedures  recall prn
recommend bedside diagnostic paracentesis   MELD > 20 -  too high risk to perform TIPS at this time.
66-year-old man known to have a Hx of substance abuse, HCV  SP tx with tenofovor, history of cirrhosis decompensated by HCC ?TIV on chemo (sorifinib?), ascites recently SP permanent catheter placed at [email or web address]], Unclear hx of hepatic encephalopathy but currently at least PSE grade 2-3, denies Hx of varies/bleeding who presented to the ED for “spitting up blood at home”. In the ED he had 3 large melanic BMs. In the ED he had reportedly an episode of VT SP cardio version, He also has profound hyponatremia and hyperkelimia.  We recommend Stat cardiac evaluation to perform safely an urgent EGD for suspected variceal bleeding. Rocephine, octreotide and PPI drips. paracentesis and remove the abdominal catheter. Intubation for airway protection. Discussed with patient and available  (allegedly girlfriend) the prognosis which is grim.  Hold AC for now and clarify (with radiology once stable or [facility] [[email or web address]]) whether the reported PVT is tumor in vein (given the history of HCC) or an actual chronic thromboenbolus that warrants AC.

## 2021-10-05 NOTE — CONSULT NOTE ADULT - SUBJECTIVE AND OBJECTIVE BOX
INTERVENTIONAL RADIOLOGY CONSULT:     Procedure Requested: ascites    HPI:  67 yo male, with h/o HTN, drug abuse, Hepatitis C s/p INF, Liver cirrhosis s/p [place] shunt, COPD, DVT? on Eliquis, HCC since January 2021 on chemotherapy, presented for weakness  Patient reports having weakness, decreased po intake since few weeks. He also has not been sleeping. He reports constipation and hematemesis for 1 day, minimal amount.  Denies fever, chills, chest pain, cough, sputum , SOB, diarrhea, dysuria  He has a [place] shunt  that was drained one day prior to admission    ED course : mental status alteration, sustained VT  bpm with BP 82/49 mmHg s/p shock + calcium gluconate  Patient has Hyperkalemia 6.8 CO2 14 s/p Bicarb drip, renal called, sp berry with good UO   (04 Oct 2021 06:15)      PAST MEDICAL & SURGICAL HISTORY:  HTN (hypertension)    Hepatitis C  2007    Drug abuse    Cancer, hepatocellular  January 2021    COPD, mild        MEDICATIONS  (STANDING):  budesonide  80 MICROgram(s)/formoterol 4.5 MICROgram(s) Inhaler 2 Puff(s) Inhalation two times a day  cefepime   IVPB 2000 milliGRAM(s) IV Intermittent every 8 hours  chlorhexidine 0.12% Liquid 15 milliLiter(s) Oral Mucosa two times a day  chlorhexidine 4% Liquid 1 Application(s) Topical daily  dexMEDEtomidine Infusion 0.2 MICROgram(s)/kG/Hr (3.79 mL/Hr) IV Continuous <Continuous>  dextrose 40% Gel 15 Gram(s) Oral once  dextrose 5%. 1000 milliLiter(s) (50 mL/Hr) IV Continuous <Continuous>  dextrose 5%. 1000 milliLiter(s) (100 mL/Hr) IV Continuous <Continuous>  dextrose 50% Injectable 25 Gram(s) IV Push once  dextrose 50% Injectable 12.5 Gram(s) IV Push once  dextrose 50% Injectable 25 Gram(s) IV Push once  fentaNYL   Infusion. 0.5 MICROgram(s)/kG/Hr (3.79 mL/Hr) IV Continuous <Continuous>  glucagon  Injectable 1 milliGRAM(s) IntraMuscular once  insulin glargine Injectable (LANTUS) 15 Unit(s) SubCutaneous at bedtime  insulin lispro (ADMELOG) corrective regimen sliding scale   SubCutaneous three times a day before meals  insulin lispro Injectable (ADMELOG) 5 Unit(s) SubCutaneous three times a day before meals  norepinephrine Infusion 0.05 MICROgram(s)/kG/Min (7.01 mL/Hr) IV Continuous <Continuous>  octreotide  Infusion 50 MICROgram(s)/Hr (10 mL/Hr) IV Continuous <Continuous>  pantoprazole Infusion 8 mG/Hr (10 mL/Hr) IV Continuous <Continuous>    MEDICATIONS  (PRN):      Allergies    No Known Allergies    Intolerances        Social History:   Reviewed in chart    FAMILY HISTORY:  Reviewed in chart    Physical Exam:   Vital Signs Last 24 Hrs  T(C): 36.6 (05 Oct 2021 08:00), Max: 37 (04 Oct 2021 15:30)  T(F): 97.8 (05 Oct 2021 08:00), Max: 98.6 (04 Oct 2021 15:30)  HR: 84 (05 Oct 2021 08:30) (76 - 115)  BP: 95/61 (05 Oct 2021 08:00) (61/44 - 141/73)  BP(mean): 71 (05 Oct 2021 08:00) (58 - 88)  RR: 25 (05 Oct 2021 08:00) (10 - 36)  SpO2: 100% (05 Oct 2021 08:30) (100% - 100%)      Labs:                         9.8    30.82 )-----------( 324      ( 05 Oct 2021 08:15 )             28.8     10-05    128<L>  |  91<L>  |  78<HH>  ----------------------------<  232<H>  5.8<H>   |  20  |  1.2    Ca    7.2<L>      05 Oct 2021 06:21  Phos  5.9     10-05  Mg     2.3     10-05    TPro  5.4<L>  /  Alb  1.8<L>  /  TBili  1.2  /  DBili  0.7<H>  /  AST  1079<H>  /  ALT  667<H>  /  AlkPhos  317<H>  10-05    PT/INR - ( 04 Oct 2021 01:00 )   PT: 19.80 sec;   INR: 1.73 ratio         PTT - ( 04 Oct 2021 01:00 )  PTT:29.4 sec    Pertinent labs:                      9.8    30.82 )-----------( 324      ( 05 Oct 2021 08:15 )             28.8       10-05    128<L>  |  91<L>  |  78<HH>  ----------------------------<  232<H>  5.8<H>   |  20  |  1.2    Ca    7.2<L>      05 Oct 2021 06:21  Phos  5.9     10-05  Mg     2.3     10-05    TPro  5.4<L>  /  Alb  1.8<L>  /  TBili  1.2  /  DBili  0.7<H>  /  AST  1079<H>  /  ALT  667<H>  /  AlkPhos  317<H>  10-05      PT/INR - ( 04 Oct 2021 01:00 )   PT: 19.80 sec;   INR: 1.73 ratio         PTT - ( 04 Oct 2021 01:00 )  PTT:29.4 sec    Radiology & Additional Studies:     Radiology imaging reviewed.       ASSESSMENT/ PLAN:     66 year old  male with hep c cirrhosis s/p INF s/p [place] shunt, HCC treated since Jan 2021, COPD, DVT on Eliquis presenting with multiple medical problems. Patient with hepatic encephalopathy, acute portal vein thrombosis, sepsis with ascites and suspicion for SBP, hematemesis. GI EGD s/p 5 bands of esophageal varices. Patient anemic from GIB, requiring transfusions. Currently on levophed for BP support. IR consulted for ascites, paracentesis for fluid analysis and cultures.    -Patient is in ICU and on pressors for BP support. Anemic with risk for GIB, esophageal varices  -Recommend paracentesis at bedside  -follow up para fluid analysis and cultures    Thank you for the courtesy of this consult, please call [phone] with any further questions.

## 2021-10-05 NOTE — PROGRESS NOTE ADULT - SUBJECTIVE AND OBJECTIVE BOX
Patient is a 66y old  Male who presents with a chief complaint of weakness  Hyperkalemia  VT (05 Oct 2021 10:55)    HPI:  67 yo male, with h/o HTN, drug abuse, Hepatitis C s/p INF, Liver cirrhosis s/p [place] shunt, COPD, DVT? on Eliquis, HCC since 2021 on chemotherapy, presented for weakness  Patient reports having weakness, decreased po intake since few weeks. He also has not been sleeping. He reports constipation and hematemesis for 1 day, minimal amount.  Denies fever, chills, chest pain, cough, sputum , SOB, diarrhea, dysuria  He has a [place] shunt  that was drained one day prior to admission    ED course : mental status alteration, sustained VT  bpm with BP 82/49 mmHg s/p shock + calcium gluconate  Patient has Hyperkalemia 6.8 CO2 14 s/p Bicarb drip, renal called, sp berry with good UO   (04 Oct 2021 06:15)       INTERVAL HPI/OVERNIGHT EVENTS:   Pt had another episode of hematemesis overnight. Hgb was 5.5 from 7.9 this AM so Pt was given 2 units of blood. Hgb now stable at 9.8. BP was also low at 88/46 so Pt was given 1L NS bolus. Pt currently intubated.     Subjective:    ICU Vital Signs Last 24 Hrs  T(C): 36.6 (05 Oct 2021 08:00), Max: 37 (04 Oct 2021 15:30)  T(F): 97.8 (05 Oct 2021 08:00), Max: 98.6 (04 Oct 2021 15:30)  HR: 84 (05 Oct 2021 08:30) (76 - 115)  BP: 95/61 (05 Oct 2021 08:00) (61/44 - 141/73)  BP(mean): 71 (05 Oct 2021 08:00) (58 - 88)  ABP: --  ABP(mean): --  RR: 25 (05 Oct 2021 08:00) (10 - 36)  SpO2: 100% (05 Oct 2021 08:30) (100% - 100%)    I&O's Summary    04 Oct 2021 07:01  -  05 Oct 2021 07:00  --------------------------------------------------------  IN: 3205.5 mL / OUT: 1290 mL / NET: 1915.5 mL    05 Oct 2021 07:01  -  05 Oct 2021 11:25  --------------------------------------------------------  IN: 442.2 mL / OUT: 60 mL / NET: 382.2 mL      Mode: AC/ CMV (Assist Control/ Continuous Mandatory Ventilation)  RR (machine): 18  TV (machine): 420  FiO2: 40  PEEP: 5  ITime: 1  MAP: 10  PIP: 19      Daily Height in cm: 175.26 (04 Oct 2021 23:55)    Daily Weight in k.3 (05 Oct 2021 06:00)    Adult Advanced Hemodynamics Last 24 Hrs  CVP(mm Hg): --  CVP(cm H2O): --  CO: --  CI: --  PA: --  PA(mean): --  PCWP: --  SVR: --  SVRI: --  PVR: --  PVRI: --    EKG/Telemetry Events:    MEDICATIONS  (STANDING):  budesonide  80 MICROgram(s)/formoterol 4.5 MICROgram(s) Inhaler 2 Puff(s) Inhalation two times a day  cefepime   IVPB 2000 milliGRAM(s) IV Intermittent every 8 hours  chlorhexidine 0.12% Liquid 15 milliLiter(s) Oral Mucosa two times a day  chlorhexidine 4% Liquid 1 Application(s) Topical daily  dexMEDEtomidine Infusion 0.2 MICROgram(s)/kG/Hr (3.79 mL/Hr) IV Continuous <Continuous>  dextrose 40% Gel 15 Gram(s) Oral once  dextrose 5%. 1000 milliLiter(s) (50 mL/Hr) IV Continuous <Continuous>  dextrose 5%. 1000 milliLiter(s) (100 mL/Hr) IV Continuous <Continuous>  dextrose 50% Injectable 25 Gram(s) IV Push once  dextrose 50% Injectable 12.5 Gram(s) IV Push once  dextrose 50% Injectable 25 Gram(s) IV Push once  fentaNYL   Infusion. 0.5 MICROgram(s)/kG/Hr (3.79 mL/Hr) IV Continuous <Continuous>  glucagon  Injectable 1 milliGRAM(s) IntraMuscular once  insulin glargine Injectable (LANTUS) 15 Unit(s) SubCutaneous at bedtime  insulin lispro (ADMELOG) corrective regimen sliding scale   SubCutaneous three times a day before meals  insulin lispro Injectable (ADMELOG) 5 Unit(s) SubCutaneous three times a day before meals  norepinephrine Infusion 0.05 MICROgram(s)/kG/Min (7.01 mL/Hr) IV Continuous <Continuous>  octreotide  Infusion 50 MICROgram(s)/Hr (10 mL/Hr) IV Continuous <Continuous>  pantoprazole Infusion 8 mG/Hr (10 mL/Hr) IV Continuous <Continuous>    MEDICATIONS  (PRN):      PHYSICAL EXAM:  GENERAL:   HEAD:  Atraumatic, Normocephalic  EYES: EOMI, PERRLA, conjunctiva and sclera clear  NECK: Supple, No JVD, Normal thyroid, no enlarged nodes  NERVOUS SYSTEM:  Alert & Awake.   CHEST/LUNG: B/L good air entry; No rales, rhonchi, or wheezing  HEART: S1S2 normal, no S3, Regular rate and rhythm; No murmurs  ABDOMEN: Soft, Nontender, Nondistended; Bowel sounds present  EXTREMITIES:  2+ Peripheral Pulses, No clubbing, cyanosis, or edema  LYMPH: No lymphadenopathy noted  SKIN: No rashes or lesions    LABS:                        9.8    30.82 )-----------( 324      ( 05 Oct 2021 08:15 )             28.8     10-05    128<L>  |  91<L>  |  78<HH>  ----------------------------<  232<H>  5.8<H>   |  20  |  1.2    Ca    7.2<L>      05 Oct 2021 06:21  Phos  5.9     10-05  Mg     2.3     10-05    TPro  5.4<L>  /  Alb  1.8<L>  /  TBili  1.2  /  DBili  0.7<H>  /  AST  1079<H>  /  ALT  667<H>  /  AlkPhos  317<H>  10-05    LIVER FUNCTIONS - ( 05 Oct 2021 06:21 )  Alb: 1.8 g/dL / Pro: 5.4 g/dL / ALK PHOS: 317 U/L / ALT: 667 U/L / AST: 1079 U/L / GGT: x           PT/INR - ( 04 Oct 2021 01:00 )   PT: 19.80 sec;   INR: 1.73 ratio         PTT - ( 04 Oct 2021 01:00 )  PTT:29.4 sec  CAPILLARY BLOOD GLUCOSE      POCT Blood Glucose.: 248 mg/dL (05 Oct 2021 07:31)  POCT Blood Glucose.: 209 mg/dL (04 Oct 2021 23:33)  POCT Blood Glucose.: 188 mg/dL (04 Oct 2021 20:50)  POCT Blood Glucose.: 163 mg/dL (04 Oct 2021 13:24)    ABG - ( 05 Oct 2021 06:45 )  pH, Arterial: 7.48  pH, Blood: x     /  pCO2: 30    /  pO2: 149   / HCO3: 22    / Base Excess: -0.3  /  SaO2: 99.4                CARDIAC MARKERS ( 04 Oct 2021 01:00 )  x     / <0.01 ng/mL / x     / x     / x          Urinalysis Basic - ( 04 Oct 2021 04:38 )    Color: Yellow / Appearance: Clear / S.025 / pH: x  Gluc: x / Ketone: Negative  / Bili: Negative / Urobili: <2 mg/dL   Blood: x / Protein: Negative / Nitrite: Negative   Leuk Esterase: Negative / RBC: x / WBC x   Sq Epi: x / Non Sq Epi: x / Bacteria: x          RADIOLOGY & ADDITIONAL TESTS:  CXR:        Care Discussed with Consultants/Other Providers [ x] YES  [ ] NO

## 2021-10-06 LAB
ALBUMIN FLD-MCNC: 0.2 G/DL — SIGNIFICANT CHANGE UP
ALBUMIN SERPL ELPH-MCNC: 1.6 G/DL — LOW (ref 3.5–5.2)
ALBUMIN SERPL ELPH-MCNC: 1.7 G/DL — LOW (ref 3.5–5.2)
ALBUMIN SERPL ELPH-MCNC: 1.8 G/DL — LOW (ref 3.5–5.2)
ALP SERPL-CCNC: 281 U/L — HIGH (ref 30–115)
ALP SERPL-CCNC: 282 U/L — HIGH (ref 30–115)
ALP SERPL-CCNC: 296 U/L — HIGH (ref 30–115)
ALT FLD-CCNC: 424 U/L — HIGH (ref 0–41)
ALT FLD-CCNC: 458 U/L — HIGH (ref 0–41)
ALT FLD-CCNC: 511 U/L — HIGH (ref 0–41)
ANION GAP SERPL CALC-SCNC: 15 MMOL/L — HIGH (ref 7–14)
ANION GAP SERPL CALC-SCNC: 17 MMOL/L — HIGH (ref 7–14)
ANION GAP SERPL CALC-SCNC: 18 MMOL/L — HIGH (ref 7–14)
APTT BLD: 33.2 SEC — SIGNIFICANT CHANGE UP (ref 27–39.2)
AST SERPL-CCNC: 236 U/L — HIGH (ref 0–41)
AST SERPL-CCNC: 282 U/L — HIGH (ref 0–41)
AST SERPL-CCNC: 383 U/L — HIGH (ref 0–41)
BASE EXCESS BLDA CALC-SCNC: -3.8 MMOL/L — LOW (ref -2–3)
BILIRUB SERPL-MCNC: 1.7 MG/DL — HIGH (ref 0.2–1.2)
BILIRUB SERPL-MCNC: 1.9 MG/DL — HIGH (ref 0.2–1.2)
BILIRUB SERPL-MCNC: 2.2 MG/DL — HIGH (ref 0.2–1.2)
BUN SERPL-MCNC: 86 MG/DL — CRITICAL HIGH (ref 10–20)
BUN SERPL-MCNC: 87 MG/DL — CRITICAL HIGH (ref 10–20)
CALCIUM SERPL-MCNC: 7.1 MG/DL — LOW (ref 8.5–10.1)
CALCIUM SERPL-MCNC: 7.3 MG/DL — LOW (ref 8.5–10.1)
CALCIUM SERPL-MCNC: 7.5 MG/DL — LOW (ref 8.5–10.1)
CHLORIDE SERPL-SCNC: 95 MMOL/L — LOW (ref 98–110)
CHLORIDE SERPL-SCNC: 96 MMOL/L — LOW (ref 98–110)
CHLORIDE SERPL-SCNC: 96 MMOL/L — LOW (ref 98–110)
CO2 SERPL-SCNC: 16 MMOL/L — LOW (ref 17–32)
CO2 SERPL-SCNC: 17 MMOL/L — SIGNIFICANT CHANGE UP (ref 17–32)
CO2 SERPL-SCNC: 18 MMOL/L — SIGNIFICANT CHANGE UP (ref 17–32)
COVID-19 SPIKE DOMAIN AB INTERP: POSITIVE
COVID-19 SPIKE DOMAIN ANTIBODY RESULT: >250 U/ML — HIGH
CREAT SERPL-MCNC: 1.5 MG/DL — SIGNIFICANT CHANGE UP (ref 0.7–1.5)
CREAT SERPL-MCNC: 1.5 MG/DL — SIGNIFICANT CHANGE UP (ref 0.7–1.5)
CREAT SERPL-MCNC: 1.6 MG/DL — HIGH (ref 0.7–1.5)
GLUCOSE BLDC GLUCOMTR-MCNC: 170 MG/DL — HIGH (ref 70–99)
GLUCOSE BLDC GLUCOMTR-MCNC: 187 MG/DL — HIGH (ref 70–99)
GLUCOSE BLDC GLUCOMTR-MCNC: 189 MG/DL — HIGH (ref 70–99)
GLUCOSE FLD-MCNC: 220 MG/DL — SIGNIFICANT CHANGE UP
GLUCOSE SERPL-MCNC: 132 MG/DL — HIGH (ref 70–99)
GLUCOSE SERPL-MCNC: 176 MG/DL — HIGH (ref 70–99)
GLUCOSE SERPL-MCNC: 191 MG/DL — HIGH (ref 70–99)
GRAM STN FLD: SIGNIFICANT CHANGE UP
HCO3 BLDA-SCNC: 21 MMOL/L — SIGNIFICANT CHANGE UP (ref 21–28)
HCT VFR BLD CALC: 28.3 % — LOW (ref 42–52)
HCT VFR BLD CALC: 28.3 % — LOW (ref 42–52)
HCT VFR BLD CALC: 28.4 % — LOW (ref 42–52)
HCT VFR BLD CALC: 28.8 % — LOW (ref 42–52)
HGB BLD-MCNC: 9.6 G/DL — LOW (ref 14–18)
HGB BLD-MCNC: 9.6 G/DL — LOW (ref 14–18)
HGB BLD-MCNC: 9.8 G/DL — LOW (ref 14–18)
HGB BLD-MCNC: 9.8 G/DL — LOW (ref 14–18)
INR BLD: 2.38 RATIO — HIGH (ref 0.65–1.3)
LACTATE SERPL-SCNC: 2.8 MMOL/L — HIGH (ref 0.7–2)
LDH SERPL L TO P-CCNC: 93 U/L — SIGNIFICANT CHANGE UP
MAGNESIUM SERPL-MCNC: 2.3 MG/DL — SIGNIFICANT CHANGE UP (ref 1.8–2.4)
MCHC RBC-ENTMCNC: 28.7 PG — SIGNIFICANT CHANGE UP (ref 27–31)
MCHC RBC-ENTMCNC: 28.9 PG — SIGNIFICANT CHANGE UP (ref 27–31)
MCHC RBC-ENTMCNC: 29.2 PG — SIGNIFICANT CHANGE UP (ref 27–31)
MCHC RBC-ENTMCNC: 29.4 PG — SIGNIFICANT CHANGE UP (ref 27–31)
MCHC RBC-ENTMCNC: 33.3 G/DL — SIGNIFICANT CHANGE UP (ref 32–37)
MCHC RBC-ENTMCNC: 33.8 G/DL — SIGNIFICANT CHANGE UP (ref 32–37)
MCHC RBC-ENTMCNC: 34.6 G/DL — SIGNIFICANT CHANGE UP (ref 32–37)
MCHC RBC-ENTMCNC: 34.6 G/DL — SIGNIFICANT CHANGE UP (ref 32–37)
MCV RBC AUTO: 84.2 FL — SIGNIFICANT CHANGE UP (ref 80–94)
MCV RBC AUTO: 85 FL — SIGNIFICANT CHANGE UP (ref 80–94)
MCV RBC AUTO: 85 FL — SIGNIFICANT CHANGE UP (ref 80–94)
MCV RBC AUTO: 86.7 FL — SIGNIFICANT CHANGE UP (ref 80–94)
MELD SCORE WITH DIALYSIS: 33 POINTS — SIGNIFICANT CHANGE UP
MELD SCORE WITHOUT DIALYSIS: 27 POINTS — SIGNIFICANT CHANGE UP
NRBC # BLD: 0 /100 WBCS — SIGNIFICANT CHANGE UP (ref 0–0)
PCO2 BLDA: 35 MMHG — SIGNIFICANT CHANGE UP (ref 35–48)
PH BLDA: 7.38 — SIGNIFICANT CHANGE UP (ref 7.35–7.45)
PLATELET # BLD AUTO: 310 K/UL — SIGNIFICANT CHANGE UP (ref 130–400)
PLATELET # BLD AUTO: 313 K/UL — SIGNIFICANT CHANGE UP (ref 130–400)
PLATELET # BLD AUTO: 328 K/UL — SIGNIFICANT CHANGE UP (ref 130–400)
PLATELET # BLD AUTO: 336 K/UL — SIGNIFICANT CHANGE UP (ref 130–400)
PO2 BLDA: 142 MMHG — HIGH (ref 83–108)
POTASSIUM SERPL-MCNC: 6 MMOL/L — CRITICAL HIGH (ref 3.5–5)
POTASSIUM SERPL-MCNC: 6.4 MMOL/L — CRITICAL HIGH (ref 3.5–5)
POTASSIUM SERPL-SCNC: 6 MMOL/L — CRITICAL HIGH (ref 3.5–5)
POTASSIUM SERPL-SCNC: 6.4 MMOL/L — CRITICAL HIGH (ref 3.5–5)
PROT FLD-MCNC: <1 G/DL — SIGNIFICANT CHANGE UP
PROT SERPL-MCNC: 5.3 G/DL — LOW (ref 6–8)
PROT SERPL-MCNC: 5.3 G/DL — LOW (ref 6–8)
PROT SERPL-MCNC: 5.4 G/DL — LOW (ref 6–8)
PROTHROM AB SERPL-ACNC: 27.1 SEC — HIGH (ref 9.95–12.87)
RBC # BLD: 3.32 M/UL — LOW (ref 4.7–6.1)
RBC # BLD: 3.33 M/UL — LOW (ref 4.7–6.1)
RBC # BLD: 3.34 M/UL — LOW (ref 4.7–6.1)
RBC # BLD: 3.36 M/UL — LOW (ref 4.7–6.1)
RBC # FLD: 17.9 % — HIGH (ref 11.5–14.5)
RBC # FLD: 18.2 % — HIGH (ref 11.5–14.5)
RBC # FLD: 18.4 % — HIGH (ref 11.5–14.5)
RBC # FLD: 18.4 % — HIGH (ref 11.5–14.5)
SAO2 % BLDA: 99.4 % — HIGH (ref 94–98)
SARS-COV-2 IGG+IGM SERPL QL IA: >250 U/ML — HIGH
SARS-COV-2 IGG+IGM SERPL QL IA: POSITIVE
SODIUM SERPL-SCNC: 129 MMOL/L — LOW (ref 135–146)
SODIUM SERPL-SCNC: 129 MMOL/L — LOW (ref 135–146)
SODIUM SERPL-SCNC: 130 MMOL/L — LOW (ref 135–146)
SPECIMEN SOURCE: SIGNIFICANT CHANGE UP
WBC # BLD: 26.25 K/UL — HIGH (ref 4.8–10.8)
WBC # BLD: 27.76 K/UL — HIGH (ref 4.8–10.8)
WBC # BLD: 28.48 K/UL — HIGH (ref 4.8–10.8)
WBC # BLD: 31.2 K/UL — HIGH (ref 4.8–10.8)
WBC # FLD AUTO: 26.25 K/UL — HIGH (ref 4.8–10.8)
WBC # FLD AUTO: 27.76 K/UL — HIGH (ref 4.8–10.8)
WBC # FLD AUTO: 28.48 K/UL — HIGH (ref 4.8–10.8)
WBC # FLD AUTO: 31.2 K/UL — HIGH (ref 4.8–10.8)

## 2021-10-06 PROCEDURE — 93306 TTE W/DOPPLER COMPLETE: CPT | Mod: 26

## 2021-10-06 PROCEDURE — 99233 SBSQ HOSP IP/OBS HIGH 50: CPT

## 2021-10-06 PROCEDURE — 99291 CRITICAL CARE FIRST HOUR: CPT

## 2021-10-06 RX ORDER — SODIUM POLYSTYRENE SULFONATE 4.1 MEQ/G
15 POWDER, FOR SUSPENSION ORAL DAILY
Refills: 0 | Status: COMPLETED | OUTPATIENT
Start: 2021-10-06 | End: 2021-10-06

## 2021-10-06 RX ORDER — FENTANYL CITRATE 50 UG/ML
0.5 INJECTION INTRAVENOUS
Qty: 5000 | Refills: 0 | Status: DISCONTINUED | OUTPATIENT
Start: 2021-10-06 | End: 2021-10-06

## 2021-10-06 RX ORDER — SODIUM POLYSTYRENE SULFONATE 4.1 MEQ/G
15 POWDER, FOR SUSPENSION ORAL DAILY
Refills: 0 | Status: DISCONTINUED | OUTPATIENT
Start: 2021-10-06 | End: 2021-10-07

## 2021-10-06 RX ORDER — FENTANYL CITRATE 50 UG/ML
0.5 INJECTION INTRAVENOUS
Qty: 2500 | Refills: 0 | Status: DISCONTINUED | OUTPATIENT
Start: 2021-10-06 | End: 2021-10-06

## 2021-10-06 RX ORDER — METRONIDAZOLE 500 MG
TABLET ORAL
Refills: 0 | Status: DISCONTINUED | OUTPATIENT
Start: 2021-10-06 | End: 2021-10-13

## 2021-10-06 RX ORDER — FENTANYL CITRATE 50 UG/ML
1.25 INJECTION INTRAVENOUS
Qty: 2500 | Refills: 0 | Status: DISCONTINUED | OUTPATIENT
Start: 2021-10-06 | End: 2021-10-06

## 2021-10-06 RX ORDER — SODIUM ZIRCONIUM CYCLOSILICATE 10 G/10G
10 POWDER, FOR SUSPENSION ORAL ONCE
Refills: 0 | Status: DISCONTINUED | OUTPATIENT
Start: 2021-10-06 | End: 2021-10-06

## 2021-10-06 RX ORDER — CALCIUM GLUCONATE 100 MG/ML
1 VIAL (ML) INTRAVENOUS ONCE
Refills: 0 | Status: COMPLETED | OUTPATIENT
Start: 2021-10-06 | End: 2021-10-06

## 2021-10-06 RX ORDER — FENTANYL CITRATE 50 UG/ML
0.5 INJECTION INTRAVENOUS
Qty: 2500 | Refills: 0 | Status: DISCONTINUED | OUTPATIENT
Start: 2021-10-06 | End: 2021-10-13

## 2021-10-06 RX ORDER — DEXTROSE 50 % IN WATER 50 %
50 SYRINGE (ML) INTRAVENOUS ONCE
Refills: 0 | Status: COMPLETED | OUTPATIENT
Start: 2021-10-06 | End: 2021-10-06

## 2021-10-06 RX ORDER — INSULIN HUMAN 100 [IU]/ML
10 INJECTION, SOLUTION SUBCUTANEOUS ONCE
Refills: 0 | Status: COMPLETED | OUTPATIENT
Start: 2021-10-06 | End: 2021-10-06

## 2021-10-06 RX ORDER — METRONIDAZOLE 500 MG
500 TABLET ORAL EVERY 8 HOURS
Refills: 0 | Status: DISCONTINUED | OUTPATIENT
Start: 2021-10-06 | End: 2021-10-13

## 2021-10-06 RX ORDER — METRONIDAZOLE 500 MG
500 TABLET ORAL ONCE
Refills: 0 | Status: COMPLETED | OUTPATIENT
Start: 2021-10-06 | End: 2021-10-06

## 2021-10-06 RX ORDER — SODIUM CHLORIDE 9 MG/ML
500 INJECTION INTRAMUSCULAR; INTRAVENOUS; SUBCUTANEOUS ONCE
Refills: 0 | Status: COMPLETED | OUTPATIENT
Start: 2021-10-06 | End: 2021-10-06

## 2021-10-06 RX ORDER — PHYTONADIONE (VIT K1) 5 MG
10 TABLET ORAL DAILY
Refills: 0 | Status: DISCONTINUED | OUTPATIENT
Start: 2021-10-06 | End: 2021-10-07

## 2021-10-06 RX ADMIN — Medication 1: at 08:00

## 2021-10-06 RX ADMIN — Medication 7.01 MICROGRAM(S)/KG/MIN: at 22:15

## 2021-10-06 RX ADMIN — INSULIN GLARGINE 15 UNIT(S): 100 INJECTION, SOLUTION SUBCUTANEOUS at 22:14

## 2021-10-06 RX ADMIN — Medication 100 MILLIGRAM(S): at 12:52

## 2021-10-06 RX ADMIN — CHLORHEXIDINE GLUCONATE 15 MILLILITER(S): 213 SOLUTION TOPICAL at 16:58

## 2021-10-06 RX ADMIN — Medication 1: at 12:04

## 2021-10-06 RX ADMIN — SODIUM POLYSTYRENE SULFONATE 15 GRAM(S): 4.1 POWDER, FOR SUSPENSION ORAL at 16:58

## 2021-10-06 RX ADMIN — Medication 50 MILLILITER(S): at 06:02

## 2021-10-06 RX ADMIN — CHLORHEXIDINE GLUCONATE 1 APPLICATION(S): 213 SOLUTION TOPICAL at 11:18

## 2021-10-06 RX ADMIN — INSULIN HUMAN 10 UNIT(S): 100 INJECTION, SOLUTION SUBCUTANEOUS at 06:02

## 2021-10-06 RX ADMIN — CEFEPIME 100 MILLIGRAM(S): 1 INJECTION, POWDER, FOR SOLUTION INTRAMUSCULAR; INTRAVENOUS at 22:07

## 2021-10-06 RX ADMIN — Medication 50 MILLILITER(S): at 17:29

## 2021-10-06 RX ADMIN — INSULIN HUMAN 10 UNIT(S): 100 INJECTION, SOLUTION SUBCUTANEOUS at 16:58

## 2021-10-06 RX ADMIN — CEFEPIME 100 MILLIGRAM(S): 1 INJECTION, POWDER, FOR SOLUTION INTRAMUSCULAR; INTRAVENOUS at 06:06

## 2021-10-06 RX ADMIN — SODIUM CHLORIDE 2000 MILLILITER(S): 9 INJECTION INTRAMUSCULAR; INTRAVENOUS; SUBCUTANEOUS at 09:15

## 2021-10-06 RX ADMIN — LACTULOSE 200 GRAM(S): 10 SOLUTION ORAL at 15:59

## 2021-10-06 RX ADMIN — Medication 1: at 16:56

## 2021-10-06 RX ADMIN — Medication 100 MILLIGRAM(S): at 22:06

## 2021-10-06 RX ADMIN — SODIUM POLYSTYRENE SULFONATE 15 GRAM(S): 4.1 POWDER, FOR SUSPENSION ORAL at 11:18

## 2021-10-06 RX ADMIN — Medication 100 GRAM(S): at 18:53

## 2021-10-06 RX ADMIN — CHLORHEXIDINE GLUCONATE 15 MILLILITER(S): 213 SOLUTION TOPICAL at 06:03

## 2021-10-06 RX ADMIN — CEFEPIME 100 MILLIGRAM(S): 1 INJECTION, POWDER, FOR SOLUTION INTRAMUSCULAR; INTRAVENOUS at 16:01

## 2021-10-06 RX ADMIN — LACTULOSE 200 GRAM(S): 10 SOLUTION ORAL at 21:09

## 2021-10-06 RX ADMIN — DEXMEDETOMIDINE HYDROCHLORIDE IN 0.9% SODIUM CHLORIDE 3.79 MICROGRAM(S)/KG/HR: 4 INJECTION INTRAVENOUS at 20:55

## 2021-10-06 RX ADMIN — Medication 100 GRAM(S): at 06:13

## 2021-10-06 NOTE — CONSULT NOTE ADULT - ASSESSMENT
Advanced hepato cellular carcinoma  Cirrhosis  Ascites  Severe anemia secondary to GI bleeding from varices.  Hypotension  Electrolyte abnormalities.  EBER  History of hepatitis C  Leukocytosis  fever.      Please hemoglobin of at least 8  Antibiotics as per ID  Rest of the treatment as per CCU Attending physician

## 2021-10-06 NOTE — PROGRESS NOTE ADULT - SUBJECTIVE AND OBJECTIVE BOX
Nephrology progress note    THIS IS AN INCOMPLETE NOTE . FULL NOTE TO FOLLOW SHORTLY    Patient is seen and examined, events over the last 24 h noted .    Allergies:  No Known Allergies    Hospital Medications:   MEDICATIONS  (STANDING):  budesonide  80 MICROgram(s)/formoterol 4.5 MICROgram(s) Inhaler 2 Puff(s) Inhalation two times a day  cefepime   IVPB 2000 milliGRAM(s) IV Intermittent every 8 hours  chlorhexidine 0.12% Liquid 15 milliLiter(s) Oral Mucosa two times a day  chlorhexidine 4% Liquid 1 Application(s) Topical daily  dexMEDEtomidine Infusion 0.2 MICROgram(s)/kG/Hr (3.79 mL/Hr) IV Continuous <Continuous>  dextrose 40% Gel 15 Gram(s) Oral once  dextrose 5%. 1000 milliLiter(s) (50 mL/Hr) IV Continuous <Continuous>  dextrose 5%. 1000 milliLiter(s) (100 mL/Hr) IV Continuous <Continuous>  dextrose 50% Injectable 25 Gram(s) IV Push once  dextrose 50% Injectable 12.5 Gram(s) IV Push once  dextrose 50% Injectable 25 Gram(s) IV Push once  glucagon  Injectable 1 milliGRAM(s) IntraMuscular once  insulin glargine Injectable (LANTUS) 15 Unit(s) SubCutaneous at bedtime  insulin lispro (ADMELOG) corrective regimen sliding scale   SubCutaneous three times a day before meals  insulin lispro Injectable (ADMELOG) 5 Unit(s) SubCutaneous three times a day before meals  lactulose Retention Enema 200 Gram(s) Rectal three times a day  norepinephrine Infusion 0.05 MICROgram(s)/kG/Min (7.01 mL/Hr) IV Continuous <Continuous>  octreotide  Infusion 50 MICROgram(s)/Hr (10 mL/Hr) IV Continuous <Continuous>  pantoprazole Infusion 8 mG/Hr (10 mL/Hr) IV Continuous <Continuous>  sodium polystyrene sulfonate Enema 15 Gram(s) Rectal daily        VITALS:  T(F): 100.3 (10-06-21 @ 09:00), Max: 101.4 (10-05-21 @ 12:00)  HR: 84 (10-06-21 @ 09:00)  BP: 98/63 (10-06-21 @ 09:00)  RR: 16 (10-06-21 @ 09:00)  SpO2: 99% (10-06-21 @ 09:00)  Wt(kg): --    10-04 @ 07:  -  10-05 @ 07:00  --------------------------------------------------------  IN: 3205.5 mL / OUT: 1290 mL / NET: 1915.5 mL    10-05 @ :  -  10-06 @ 07:00  --------------------------------------------------------  IN: 2589.2 mL / OUT: 460 mL / NET: 2129.2 mL    10-06 @ 07:01  -  10-06 @ 09:47  --------------------------------------------------------  IN: 0 mL / OUT: 45 mL / NET: -45 mL          PHYSICAL EXAM:  Constitutional: NAD  HEENT: anicteric sclera, oropharynx clear, MMM  Neck: No JVD  Respiratory: CTAB, no wheezes, rales or rhonchi  Cardiovascular: S1, S2, RRR  Gastrointestinal: BS+, soft, NT/ND  Extremities: No cyanosis or clubbing. No peripheral edema  :  No berry.   Skin: No rashes    LABS:  10-06    129<L>  |  96<L>  |  86<HH>  ----------------------------<  132<H>  6.4<HH>   |  18  |  1.5    Potassium Trend: 6.4<--, 5.8<--, 6.1<--, 6.3<--, 6.8<--  SODIUM TREND:  Sodium 129 [10-06 @ 04:50]  Sodium 128 [10-05 @ 06:21]  Sodium 125 [10-04 @ 14:42]  Sodium 120 [10-04 @ 10:34]  Sodium 120 [10-04 @ 03:39]  Sodium 119 [10-04 @ 02:05]  Sodium 120 [10-04 @ 01:00]    Ca    7.1<L>      06 Oct 2021 04:50  Phos  5.9     10-05  Mg     2.3     10-06    TPro  5.3<L>  /  Alb  1.7<L>  /  TBili  1.7<H>  /  DBili      /  AST  383<H>  /  ALT  511<H>  /  AlkPhos  282<H>  10-06                          9.8    28.48 )-----------( 310      ( 06 Oct 2021 04:50 )             28.3       Urine Studies:  Urinalysis Basic - ( 04 Oct 2021 04:38 )    Color: Yellow / Appearance: Clear / S.025 / pH:   Gluc:  / Ketone: Negative  / Bili: Negative / Urobili: <2 mg/dL   Blood:  / Protein: Negative / Nitrite: Negative   Leuk Esterase: Negative / RBC:  / WBC    Sq Epi:  / Non Sq Epi:  / Bacteria:       Osmolality, Random Urine: 606 mos/kg (10-04 @ 11:17)  Sodium, Random Urine: 20.0 mmoL/L (10-04 @ 11:17)  Creatinine, Random Urine: <4 mg/dL (10-04 @ 11:17)    RADIOLOGY & ADDITIONAL STUDIES:   Nephrology progress note  Patient is seen and examined, events over the last 24 h noted .  intubated on MV    Allergies:  No Known Allergies    Hospital Medications:   MEDICATIONS  (STANDING):  budesonide  80 MICROgram(s)/formoterol 4.5 MICROgram(s) Inhaler 2 Puff(s) Inhalation two times a day  cefepime   IVPB 2000 milliGRAM(s) IV Intermittent every 8 hours  dexMEDEtomidine Infusion 0.2 MICROgram(s)/kG/Hr (3.79 mL/Hr) IV Continuous <Continuous>  glucagon  Injectable 1 milliGRAM(s) IntraMuscular once  insulin glargine Injectable (LANTUS) 15 Unit(s) SubCutaneous at bedtime  insulin lispro (ADMELOG) corrective regimen sliding scale   SubCutaneous three times a day before meals  insulin lispro Injectable (ADMELOG) 5 Unit(s) SubCutaneous three times a day before meals  lactulose Retention Enema 200 Gram(s) Rectal three times a day  norepinephrine Infusion 0.05 MICROgram(s)/kG/Min (7.01 mL/Hr) IV Continuous <Continuous>  octreotide  Infusion 50 MICROgram(s)/Hr (10 mL/Hr) IV Continuous <Continuous>  pantoprazole Infusion 8 mG/Hr (10 mL/Hr) IV Continuous <Continuous>  sodium polystyrene sulfonate Enema 15 Gram(s) Rectal daily        VITALS:  T(F): 100.3 (10-06-21 @ 09:00), Max: 101.4 (10-05-21 @ 12:00)  HR: 84 (10-06-21 @ 09:00)  BP: 98/63 (10-06-21 @ 09:00)  RR: 16 (10-06-21 @ 09:00)  SpO2: 99% (10-06-21 @ 09:00)      10-04 @ :  -  10-05 @ 07:00  --------------------------------------------------------  IN: 3205.5 mL / OUT: 1290 mL / NET: 1915.5 mL    10-05 @ 07:01  -  10-06 @ 07:00  --------------------------------------------------------  IN: 2589.2 mL / OUT: 460 mL / NET: 2129.2 mL    10-06 @ 07:01  -  10-06 @ 09:47  --------------------------------------------------------  IN: 0 mL / OUT: 45 mL / NET: -45 mL          PHYSICAL EXAM:  Constitutional: intubated on MV  Neck: No JVD  Respiratory: Crackles at base   Cardiovascular: S1, S2, RRR  Gastrointestinal: BS+, soft, NT/ND  Extremities: No cyanosis or clubbing. No peripheral edema  :  kristi  Skin: No rashes    LABS:  10-06    129<L>  |  96<L>  |  86<HH>  ----------------------------<  132<H>  6.4<HH>   |  18  |  1.5    Potassium Trend: 6.4<--, 5.8<--, 6.1<--, 6.3<--, 6.8<--  SODIUM TREND:  Sodium 129 [10-06 @ 04:50]  Sodium 128 [10-05 @ 06:21]  Sodium 125 [10-04 @ 14:42]  Sodium 120 [10-04 @ 10:34]  Sodium 120 [10-04 @ 03:39]  Sodium 119 [10-04 @ 02:05]  Sodium 120 [10-04 @ 01:00]    Ca    7.1<L>      06 Oct 2021 04:50  Phos  5.9     10-05  Mg     2.3     10-06    TPro  5.3<L>  /  Alb  1.7<L>  /  TBili  1.7<H>  /  DBili      /  AST  383<H>  /  ALT  511<H>  /  AlkPhos  282<H>  10-06                          9.8    28.48 )-----------( 310      ( 06 Oct 2021 04:50 )             28.3       Urine Studies:  Urinalysis Basic - ( 04 Oct 2021 04:38 )    Color: Yellow / Appearance: Clear / S.025 / pH:   Gluc:  / Ketone: Negative  / Bili: Negative / Urobili: <2 mg/dL   Blood:  / Protein: Negative / Nitrite: Negative   Leuk Esterase: Negative / RBC:  / WBC    Sq Epi:  / Non Sq Epi:  / Bacteria:       Osmolality, Random Urine: 606 mos/kg (10-04 @ 11:17)  Sodium, Random Urine: 20.0 mmoL/L (10-04 @ 11:17)  Creatinine, Random Urine: <4 mg/dL (10-04 @ 11:17)    RADIOLOGY & ADDITIONAL STUDIES:

## 2021-10-06 NOTE — PROGRESS NOTE ADULT - SUBJECTIVE AND OBJECTIVE BOX
Patient is a 66y old  Male who presents with a chief complaint of weakness  Hyperkalemia  VT (05 Oct 2021 17:07)        HPI:  67 yo male, with h/o HTN, drug abuse, Hepatitis C s/p INF, Liver cirrhosis s/p [place] shunt, COPD, DVT? on Eliquis, HCC since January 2021 on chemotherapy, presented for weakness  Patient reports having weakness, decreased po intake since few weeks. He also has not been sleeping. He reports constipation and hematemesis for 1 day, minimal amount.  Denies fever, chills, chest pain, cough, sputum , SOB, diarrhea, dysuria  He has a [place] shunt  that was drained one day prior to admission    ED course : mental status alteration, sustained VT  bpm with BP 82/49 mmHg s/p shock + calcium gluconate  Patient has Hyperkalemia 6.8 CO2 14 s/p Bicarb drip, renal called, sp berry with good UO   (04 Oct 2021 06:15)      Pt evaluated on rounds.  I reviewed the radiology tests and hospital record.    I reviewed previous notes on this patient.    Interval Events: No overnight events.    REVIEW OF SYSTEMS:   see HPI      OBJECTIVE:  ICU Vital Signs Last 24 Hrs  T(C): 38 (06 Oct 2021 06:00), Max: 38.6 (05 Oct 2021 12:00)  T(F): 100.4 (06 Oct 2021 06:00), Max: 101.4 (05 Oct 2021 12:00)  HR: 95 (06 Oct 2021 07:00) (84 - 95)  BP: 83/58 (06 Oct 2021 06:00) (69/48 - 155/88)  BP(mean): 65 (06 Oct 2021 06:00) (62 - 116)  ABP: --  ABP(mean): --  RR: 18 (06 Oct 2021 07:00) (12 - 29)  SpO2: 99% (06 Oct 2021 07:00) (98% - 100%)    Mode: AC/ CMV (Assist Control/ Continuous Mandatory Ventilation), RR (machine): 18, TV (machine): 420, FiO2: 30, PEEP: 5, ITime: 1, MAP: 11, PIP: 28    10-04 @ 07:01  -  10-05 @ 07:00  --------------------------------------------------------  IN: 3205.5 mL / OUT: 1290 mL / NET: 1915.5 mL    10-05 @ 07:01  -  10-06 @ 06:24  --------------------------------------------------------  IN: 2589.2 mL / OUT: 460 mL / NET: 2129.2 mL      CAPILLARY BLOOD GLUCOSE      POCT Blood Glucose.: 113 mg/dL (05 Oct 2021 22:12)        PHYSICAL EXAM:     · CONSTITUTIONAL:   septic appearing,   well nourished,   NAD    · ENMT:   Airway patent,   Nasal mucosa clear.  Mouth with normal mucosa.   No thrush    · EYES:   Clear bilaterally,   pupils equal,   round and reactive to light.    · CARDIAC:   Normal rate,   regular rhythm.    Heart sounds S1, S2.   No murmurs, no rubs or gallops on auscultation  no edema        CAROTID:   normal systolic impulse  no bruits    · RESPIRATORY:   rales  normal chest expansion  no retractions or use of accessory muscles  palpation of chest is normal with no fremitus  percussion of chest demonstrates no hyperresonance or dullness    · GASTROINTESTINAL:  Abdomen soft,   non-tender,   + BS  liver/spleen not palpable    · MUSCULOSKELETAL:   no clubbing, cyanosis      · SKIN:   Skin normal color for race,   warm, dry   No evidence of rash.        · HEME LYMPH:   no splenomegaly.  No cervical  lymphadenopathy.  no inguinal lymphadenopathy    HOSPITAL MEDICATIONS:  MEDICATIONS  (STANDING):  budesonide  80 MICROgram(s)/formoterol 4.5 MICROgram(s) Inhaler 2 Puff(s) Inhalation two times a day  cefepime   IVPB 2000 milliGRAM(s) IV Intermittent every 8 hours  chlorhexidine 0.12% Liquid 15 milliLiter(s) Oral Mucosa two times a day  chlorhexidine 4% Liquid 1 Application(s) Topical daily  dexMEDEtomidine Infusion 0.2 MICROgram(s)/kG/Hr (3.79 mL/Hr) IV Continuous <Continuous>  dextrose 40% Gel 15 Gram(s) Oral once  dextrose 5%. 1000 milliLiter(s) (50 mL/Hr) IV Continuous <Continuous>  dextrose 5%. 1000 milliLiter(s) (100 mL/Hr) IV Continuous <Continuous>  dextrose 50% Injectable 25 Gram(s) IV Push once  dextrose 50% Injectable 12.5 Gram(s) IV Push once  dextrose 50% Injectable 25 Gram(s) IV Push once  fentaNYL   Infusion. 0.5 MICROgram(s)/kG/Hr (3.79 mL/Hr) IV Continuous <Continuous>  glucagon  Injectable 1 milliGRAM(s) IntraMuscular once  insulin glargine Injectable (LANTUS) 15 Unit(s) SubCutaneous at bedtime  insulin lispro (ADMELOG) corrective regimen sliding scale   SubCutaneous three times a day before meals  insulin lispro Injectable (ADMELOG) 5 Unit(s) SubCutaneous three times a day before meals  lactulose Retention Enema 200 Gram(s) Rectal three times a day  norepinephrine Infusion 0.05 MICROgram(s)/kG/Min (7.01 mL/Hr) IV Continuous <Continuous>  octreotide  Infusion 50 MICROgram(s)/Hr (10 mL/Hr) IV Continuous <Continuous>  pantoprazole Infusion 8 mG/Hr (10 mL/Hr) IV Continuous <Continuous>    MEDICATIONS  (PRN):    sodium chloride 0.9% Bolus:   1000 milliLiter(s), IV Bolus, once, infuse over 30 Minute(s), Stop After 1 Doses  sodium chloride 0.9% Bolus:   1000 milliLiter(s), IV Bolus, once, infuse over 30 Minute(s), Stop After 1 Doses  sodium chloride 0.9% Bolus:   3000 milliLiter(s), IV Bolus, once, infuse over 60 Minute(s), Stop After 1 Doses  Provider's Contact #: [phone]      LABS:                        9.8    28.48 )-----------( 310      ( 06 Oct 2021 04:50 )             28.3     10-06    129<L>  |  96<L>  |  86<HH>  ----------------------------<  132<H>  6.4<HH>   |  18  |  1.5    Ca    7.1<L>      06 Oct 2021 04:50  Phos  5.9     10-05  Mg     2.3     10-06    TPro  5.3<L>  /  Alb  1.7<L>  /  TBili  1.7<H>  /  DBili  x   /  AST  383<H>  /  ALT  511<H>  /  AlkPhos  282<H>  10-06    PT/INR - ( 06 Oct 2021 04:50 )   PT: 27.10 sec;   INR: 2.38 ratio         PTT - ( 06 Oct 2021 04:50 )  PTT:33.2 sec    Arterial Blood Gas:  10-06 @ 03:21  7.38/35/142/21/99.4/-3.8  ABG lactate: --  Arterial Blood Gas:  10-05 @ 15:22  7.45/33/142/23/99.4/-0.5  ABG lactate: --  Arterial Blood Gas:  10-05 @ 06:45  7.48/30/149/22/99.4/-0.3  ABG lactate: --  Arterial Blood Gas:  10-05 @ 03:46  7.50/29/193/23/99.6/0.4  ABG lactate: --    Venous Blood Gas:  10-04 @ 07:28  7.35/31/51/17/80.4  VBG Lactate: 2.90        COVID-19 PCR: NotDetec (04 Oct 2021 02:17)    Mode: AC/ CMV (Assist Control/ Continuous Mandatory Ventilation)  RR (machine): 18  TV (machine): 420  FiO2: 30  PEEP: 5  ITime: 1  MAP: 11  PIP: 28      ABG - ( 06 Oct 2021 03:21 )  pH, Arterial: 7.38  pH, Blood: x     /  pCO2: 35    /  pO2: 142   / HCO3: 21    / Base Excess: -3.8  /  SaO2: 99.4                  RADIOLOGY: Today I personally interpreted the latest CXR and other pertinent films.

## 2021-10-06 NOTE — PROGRESS NOTE ADULT - SUBJECTIVE AND OBJECTIVE BOX
Gastroenterology progress note:     Patient is a 66y old  Male who presents with a chief complaint of weakness  Hyperkalemia  VT (06 Oct 2021 07:19)       Admitted on: 10-04-21    We are following the patient for:     Interval History:         PAST MEDICAL & SURGICAL HISTORY:  HTN (hypertension)    Hepatitis C  2007    Drug abuse    Cancer, hepatocellular  January 2021    COPD, mild        MEDICATIONS  (STANDING):  budesonide  80 MICROgram(s)/formoterol 4.5 MICROgram(s) Inhaler 2 Puff(s) Inhalation two times a day  cefepime   IVPB 2000 milliGRAM(s) IV Intermittent every 8 hours  chlorhexidine 0.12% Liquid 15 milliLiter(s) Oral Mucosa two times a day  chlorhexidine 4% Liquid 1 Application(s) Topical daily  dexMEDEtomidine Infusion 0.2 MICROgram(s)/kG/Hr (3.79 mL/Hr) IV Continuous <Continuous>  dextrose 40% Gel 15 Gram(s) Oral once  dextrose 5%. 1000 milliLiter(s) (50 mL/Hr) IV Continuous <Continuous>  dextrose 5%. 1000 milliLiter(s) (100 mL/Hr) IV Continuous <Continuous>  dextrose 50% Injectable 25 Gram(s) IV Push once  dextrose 50% Injectable 12.5 Gram(s) IV Push once  dextrose 50% Injectable 25 Gram(s) IV Push once  glucagon  Injectable 1 milliGRAM(s) IntraMuscular once  insulin glargine Injectable (LANTUS) 15 Unit(s) SubCutaneous at bedtime  insulin lispro (ADMELOG) corrective regimen sliding scale   SubCutaneous three times a day before meals  insulin lispro Injectable (ADMELOG) 5 Unit(s) SubCutaneous three times a day before meals  lactulose Retention Enema 200 Gram(s) Rectal three times a day  norepinephrine Infusion 0.05 MICROgram(s)/kG/Min (7.01 mL/Hr) IV Continuous <Continuous>  octreotide  Infusion 50 MICROgram(s)/Hr (10 mL/Hr) IV Continuous <Continuous>  pantoprazole Infusion 8 mG/Hr (10 mL/Hr) IV Continuous <Continuous>  sodium polystyrene sulfonate Enema 15 Gram(s) Rectal daily    MEDICATIONS  (PRN):      Allergies  No Known Allergies      Review of Systems:   Cardiovascular:  No Chest Pain, No Palpitations  Respiratory:  No Cough, No Dyspnea  Gastrointestinal:  As described in HPI    Physical Examination:  T(C): 37.9 (10-06-21 @ 09:00), Max: 38.6 (10-05-21 @ 12:00)  HR: 84 (10-06-21 @ 09:00) (84 - 95)  BP: 98/63 (10-06-21 @ 09:00) (69/48 - 155/88)  RR: 16 (10-06-21 @ 09:00) (12 - 29)  SpO2: 99% (10-06-21 @ 09:00) (98% - 100%)      10-05-21 @ 07:01  -  10-06-21 @ 07:00  --------------------------------------------------------  IN: 2589.2 mL / OUT: 460 mL / NET: 2129.2 mL    10-06-21 @ 07:01  -  10-06-21 @ 09:32  --------------------------------------------------------  IN: 0 mL / OUT: 45 mL / NET: -45 mL      Constitutional: No acute distress.  Respiratory:  No signs of respiratory distress. Lung sounds are clear bilaterally.  Cardiovascular:  S1 S2, Regular rate and rhythm.  Abdominal: Abdomen is soft, symmetric, and non-tender without distention. There are no visible lesions or scars. Bowel sounds are present and normoactive in all four quadrants. No masses, hepatomegaly, or splenomegaly are noted.   Skin: No rashes, No Jaundice.        Data:                        9.8    28.48 )-----------( 310      ( 06 Oct 2021 04:50 )             28.3     Hgb trend:  9.8  10-06-21 @ 04:50  9.8  10-06-21 @ 00:30  10.0  10-05-21 @ 13:32  9.8  10-05-21 @ 08:15  5.5  10-05-21 @ 00:41  7.9  10-04-21 @ 14:42  8.3  10-04-21 @ 01:00      10-05-21 @ 07:01  -  10-06-21 @ 07:00  --------------------------------------------------------  IN: 300 mL      10-06    129<L>  |  96<L>  |  86<HH>  ----------------------------<  132<H>  6.4<HH>   |  18  |  1.5    Ca    7.1<L>      06 Oct 2021 04:50  Phos  5.9     10-05  Mg     2.3     10-06    TPro  5.3<L>  /  Alb  1.7<L>  /  TBili  1.7<H>  /  DBili  x   /  AST  383<H>  /  ALT  511<H>  /  AlkPhos  282<H>  10-06    Liver panel trend:  TBili 1.7   /      /      /   AlkP 282   /   Tptn 5.3   /   Alb 1.7    /   DBili --      10-06  TBili 1.2   /   AST 1079   /      /   AlkP 317   /   Tptn 5.4   /   Alb 1.8    /   DBili 0.7      10-05  TBili 1.1   /      /      /   AlkP 260   /   Tptn 5.3   /   Alb 1.8    /   DBili --      10-04  TBili 1.1   /      /      /   AlkP 247   /   Tptn 4.8   /   Alb 1.8    /   DBili --      10-04  TBili 2.5   /      /      /   AlkP 290   /   Tptn 6.1   /   Alb 1.9    /   DBili --      10-04      PT/INR - ( 06 Oct 2021 04:50 )   PT: 27.10 sec;   INR: 2.38 ratio         PTT - ( 06 Oct 2021 04:50 )  PTT:33.2 sec    Culture - Fungal, Body Fluid (collected 05 Oct 2021 18:53)  Source: .Body Fluid Peritoneal Fluid  Preliminary Report (06 Oct 2021 06:52):    Testing in progress    Culture - Body Fluid with Gram Stain (collected 05 Oct 2021 18:53)  Source: .Body Fluid Peritoneal Fluid  Gram Stain (06 Oct 2021 02:48):    polymorphonuclear leukocytes seen    No organisms seen    by cytocentrifuge    Culture - Blood (collected 04 Oct 2021 02:15)  Source: .Blood Blood  Preliminary Report (05 Oct 2021 14:01):    No growth to date.    Culture - Blood (collected 04 Oct 2021 02:10)  Source: .Blood Blood  Preliminary Report (05 Oct 2021 14:01):    No growth to date.         Radiology:    US Abdomen Upper Quadrant Right:   EXAM:  US ABDOMEN RT UPR QUADRANT            PROCEDURE DATE:  10/04/2021            INTERPRETATION:  CLINICAL INFORMATION: History of hepatitis C complicated by cirrhosis, follow-up exam.    COMPARISON: CT of the abdomen and pelvis performed on 10/4/2021 and abdominal sonogram performed on 5/24/2018.    TECHNIQUE: Sonography of the right upper quadrant.    FINDINGS:    Liver: Cirrhotic morphology. No flow demonstrated in the main portal vein.  Bile ducts: Normal caliber. Common bile duct measures 0.6 cm.  Gallbladder: Incompletely distended containing echogenic sludge and stones. Negatively reported sonographic Hurtado's sign.  Pancreas: Visualized portions are sonographically unremarkable.  Right kidney: 14.2 cm. No hydronephrosis.  Ascites: Perihepatic ascites.  IVC: Visualized portions are normal in caliber.    IMPRESSION:      1. 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  2. Cholelithiasis and echogenic tumefactive sludge in the otherwise unremarkable gallbladder.    --- End of Report ---            MONY RAWLS MD; Resident Radiologist  This document has been electronically signed.  MARLENI SANFORD MD; Attending Radiologist  This document has been electronically signed. Oct  5 2021  9:57AM (10-04-21 @ 18:11)     Gastroenterology progress note:     Patient is a 66y old  Male who presents with a chief complaint of weakness  Hyperkalemia  VT (06 Oct 2021 07:19)       Admitted on: 10-04-21    We are following the patient for: cirrhosis and Gi bleed     Interval History:  5 episodes of melena last night.        PAST MEDICAL & SURGICAL HISTORY:  HTN (hypertension)    Hepatitis C  2007    Drug abuse    Cancer, hepatocellular  January 2021    COPD, mild        MEDICATIONS  (STANDING):  budesonide  80 MICROgram(s)/formoterol 4.5 MICROgram(s) Inhaler 2 Puff(s) Inhalation two times a day  cefepime   IVPB 2000 milliGRAM(s) IV Intermittent every 8 hours  chlorhexidine 0.12% Liquid 15 milliLiter(s) Oral Mucosa two times a day  chlorhexidine 4% Liquid 1 Application(s) Topical daily  dexMEDEtomidine Infusion 0.2 MICROgram(s)/kG/Hr (3.79 mL/Hr) IV Continuous <Continuous>  dextrose 40% Gel 15 Gram(s) Oral once  dextrose 5%. 1000 milliLiter(s) (50 mL/Hr) IV Continuous <Continuous>  dextrose 5%. 1000 milliLiter(s) (100 mL/Hr) IV Continuous <Continuous>  dextrose 50% Injectable 25 Gram(s) IV Push once  dextrose 50% Injectable 12.5 Gram(s) IV Push once  dextrose 50% Injectable 25 Gram(s) IV Push once  glucagon  Injectable 1 milliGRAM(s) IntraMuscular once  insulin glargine Injectable (LANTUS) 15 Unit(s) SubCutaneous at bedtime  insulin lispro (ADMELOG) corrective regimen sliding scale   SubCutaneous three times a day before meals  insulin lispro Injectable (ADMELOG) 5 Unit(s) SubCutaneous three times a day before meals  lactulose Retention Enema 200 Gram(s) Rectal three times a day  norepinephrine Infusion 0.05 MICROgram(s)/kG/Min (7.01 mL/Hr) IV Continuous <Continuous>  octreotide  Infusion 50 MICROgram(s)/Hr (10 mL/Hr) IV Continuous <Continuous>  pantoprazole Infusion 8 mG/Hr (10 mL/Hr) IV Continuous <Continuous>  sodium polystyrene sulfonate Enema 15 Gram(s) Rectal daily    MEDICATIONS  (PRN):      Allergies  No Known Allergies      Review of Systems:   Cardiovascular:  No Chest Pain, No Palpitations  Respiratory:  No Cough, No Dyspnea  Gastrointestinal:  As described in HPI    Physical Examination:  T(C): 37.9 (10-06-21 @ 09:00), Max: 38.6 (10-05-21 @ 12:00)  HR: 84 (10-06-21 @ 09:00) (84 - 95)  BP: 98/63 (10-06-21 @ 09:00) (69/48 - 155/88)  RR: 16 (10-06-21 @ 09:00) (12 - 29)  SpO2: 99% (10-06-21 @ 09:00) (98% - 100%)      10-05-21 @ 07:01  -  10-06-21 @ 07:00  --------------------------------------------------------  IN: 2589.2 mL / OUT: 460 mL / NET: 2129.2 mL    10-06-21 @ 07:01  -  10-06-21 @ 09:32  --------------------------------------------------------  IN: 0 mL / OUT: 45 mL / NET: -45 mL      Constitutional: Still intubated  Respiratory:  No signs of respiratory distress. Lung sounds are clear bilaterally.  Cardiovascular:  S1 S2, Regular rate and rhythm.  Abdominal: Abdomen is soft, symmetric, and non-tender without distention. There are no visible lesions or scars. Bowel sounds are present and normoactive in all four quadrants. No masses, hepatomegaly, or splenomegaly are noted.   Skin: No rashes, No Jaundice.        Data:                        9.8    28.48 )-----------( 310      ( 06 Oct 2021 04:50 )             28.3     Hgb trend:  9.8  10-06-21 @ 04:50  9.8  10-06-21 @ 00:30  10.0  10-05-21 @ 13:32  9.8  10-05-21 @ 08:15  5.5  10-05-21 @ 00:41  7.9  10-04-21 @ 14:42  8.3  10-04-21 @ 01:00      10-05-21 @ 07:01  -  10-06-21 @ 07:00  --------------------------------------------------------  IN: 300 mL      10-06    129<L>  |  96<L>  |  86<HH>  ----------------------------<  132<H>  6.4<HH>   |  18  |  1.5    Ca    7.1<L>      06 Oct 2021 04:50  Phos  5.9     10-05  Mg     2.3     10-06    TPro  5.3<L>  /  Alb  1.7<L>  /  TBili  1.7<H>  /  DBili  x   /  AST  383<H>  /  ALT  511<H>  /  AlkPhos  282<H>  10-06    Liver panel trend:  TBili 1.7   /      /      /   AlkP 282   /   Tptn 5.3   /   Alb 1.7    /   DBili --      10-06  TBili 1.2   /   AST 1079   /      /   AlkP 317   /   Tptn 5.4   /   Alb 1.8    /   DBili 0.7      10-05  TBili 1.1   /      /      /   AlkP 260   /   Tptn 5.3   /   Alb 1.8    /   DBili --      10-04  TBili 1.1   /      /      /   AlkP 247   /   Tptn 4.8   /   Alb 1.8    /   DBili --      10-04  TBili 2.5   /      /      /   AlkP 290   /   Tptn 6.1   /   Alb 1.9    /   DBili --      10-04      PT/INR - ( 06 Oct 2021 04:50 )   PT: 27.10 sec;   INR: 2.38 ratio         PTT - ( 06 Oct 2021 04:50 )  PTT:33.2 sec    Culture - Fungal, Body Fluid (collected 05 Oct 2021 18:53)  Source: .Body Fluid Peritoneal Fluid  Preliminary Report (06 Oct 2021 06:52):    Testing in progress    Culture - Body Fluid with Gram Stain (collected 05 Oct 2021 18:53)  Source: .Body Fluid Peritoneal Fluid  Gram Stain (06 Oct 2021 02:48):    polymorphonuclear leukocytes seen    No organisms seen    by cytocentrifuge    Culture - Blood (collected 04 Oct 2021 02:15)  Source: .Blood Blood  Preliminary Report (05 Oct 2021 14:01):    No growth to date.    Culture - Blood (collected 04 Oct 2021 02:10)  Source: .Blood Blood  Preliminary Report (05 Oct 2021 14:01):    No growth to date.         Radiology:    US Abdomen Upper Quadrant Right:   EXAM:  US ABDOMEN RT UPR QUADRANT            PROCEDURE DATE:  10/04/2021            INTERPRETATION:  CLINICAL INFORMATION: History of hepatitis C complicated by cirrhosis, follow-up exam.    COMPARISON: CT of the abdomen and pelvis performed on 10/4/2021 and abdominal sonogram performed on 5/24/2018.    TECHNIQUE: Sonography of the right upper quadrant.    FINDINGS:    Liver: Cirrhotic morphology. No flow demonstrated in the main portal vein.  Bile ducts: Normal caliber. Common bile duct measures 0.6 cm.  Gallbladder: Incompletely distended containing echogenic sludge and stones. Negatively reported sonographic Hurtado's sign.  Pancreas: Visualized portions are sonographically unremarkable.  Right kidney: 14.2 cm. No hydronephrosis.  Ascites: Perihepatic ascites.  IVC: Visualized portions are normal in caliber.    IMPRESSION:      1. 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  2. Cholelithiasis and echogenic tumefactive sludge in the otherwise unremarkable gallbladder.    --- End of Report ---            MONY RAWLS MD; Resident Radiologist  This document has been electronically signed.  MARLENI SANFORD MD; Attending Radiologist  This document has been electronically signed. Oct  5 2021  9:57AM (10-04-21 @ 18:11)

## 2021-10-06 NOTE — DIETITIAN INITIAL EVALUATION ADULT. - PERTINENT MEDS FT
insulin glargine, insulin lispro, phytonadione, kayexalate enema, lactulose, protonix, levophed at 7.01 mL/hr

## 2021-10-06 NOTE — PROGRESS NOTE ADULT - SUBJECTIVE AND OBJECTIVE BOX
PATIENT:  SUZANNE BAZZI  [id number]    CHIEF COMPLAINT:  Patient is a 66y old  Male who presents with a chief complaint of weakness  Hyperkalemia  VT (06 Oct 2021 06:22)      INTERVAL HISTORY/OVERNIGHT EVENTS:    65 yo male, with h/o HTN, drug abuse, Hepatitis C s/p INF, Liver cirrhosis s/p [place] shunt, COPD, DVT? on Eliquis, HCC since 2021 on chemotherapy, presented for weakness  Patient reports having weakness, decreased po intake since few weeks. He also has not been sleeping. He reports constipation and hematemesis for 1 day, minimal amount.  Denies fever, chills, chest pain, cough, sputum , SOB, diarrhea, dysuria  He has a [place] shunt  that was drained one day prior to admission    ED course : mental status alteration, sustained VT  bpm with BP 82/49 mmHg s/p shock + calcium gluconate  Patient has Hyperkalemia 6.8 CO2 14 s/p Bicarb drip, renal called, sp berry with good UO   (04 Oct 2021 06:15)     INTERVAL HPI/OVERNIGHT EVENTS:   10/05/2021: Pt had another episode of hematemesis overnight. Hgb was 5.5 from 7.9 this AM so Pt was given 2 units of blood. Hgb now stable at 9.8. BP was also low at 88/46 so Pt was given 1L NS bolus. Pt currently intubated.   10/06/2021 Patient had 5 episodes of melena overnight, patient remains hypotensive, patient is easily arousable. Patient was febrile over night       REVIEW OF SYSTEMS:      [ ] All other systems negative  [x ] Unable to assess ROS because patient is intubated     MEDICATIONS:  MEDICATIONS  (STANDING):  budesonide  80 MICROgram(s)/formoterol 4.5 MICROgram(s) Inhaler 2 Puff(s) Inhalation two times a day  cefepime   IVPB 2000 milliGRAM(s) IV Intermittent every 8 hours  chlorhexidine 0.12% Liquid 15 milliLiter(s) Oral Mucosa two times a day  chlorhexidine 4% Liquid 1 Application(s) Topical daily  dexMEDEtomidine Infusion 0.2 MICROgram(s)/kG/Hr (3.79 mL/Hr) IV Continuous <Continuous>  dextrose 40% Gel 15 Gram(s) Oral once  dextrose 5%. 1000 milliLiter(s) (50 mL/Hr) IV Continuous <Continuous>  dextrose 5%. 1000 milliLiter(s) (100 mL/Hr) IV Continuous <Continuous>  dextrose 50% Injectable 25 Gram(s) IV Push once  dextrose 50% Injectable 12.5 Gram(s) IV Push once  dextrose 50% Injectable 25 Gram(s) IV Push once  fentaNYL   Infusion..... 1.248 MICROgram(s)/kG/Hr (1.89 mL/Hr) IV Continuous <Continuous>  glucagon  Injectable 1 milliGRAM(s) IntraMuscular once  insulin glargine Injectable (LANTUS) 15 Unit(s) SubCutaneous at bedtime  insulin lispro (ADMELOG) corrective regimen sliding scale   SubCutaneous three times a day before meals  insulin lispro Injectable (ADMELOG) 5 Unit(s) SubCutaneous three times a day before meals  lactulose Retention Enema 200 Gram(s) Rectal three times a day  norepinephrine Infusion 0.05 MICROgram(s)/kG/Min (7.01 mL/Hr) IV Continuous <Continuous>  octreotide  Infusion 50 MICROgram(s)/Hr (10 mL/Hr) IV Continuous <Continuous>  pantoprazole Infusion 8 mG/Hr (10 mL/Hr) IV Continuous <Continuous>    MEDICATIONS  (PRN):      ALLERGIES:  Allergies    No Known Allergies    Intolerances        OBJECTIVE:  ICU Vital Signs Last 24 Hrs  T(C): 37.7 (06 Oct 2021 07:00), Max: 38.6 (05 Oct 2021 12:00)  T(F): 99.8 (06 Oct 2021 07:00), Max: 101.4 (05 Oct 2021 12:00)  HR: 95 (06 Oct 2021 07:00) (84 - 95)  BP: 83/58 (06 Oct 2021 06:00) (69/48 - 155/88)  BP(mean): 65 (06 Oct 2021 06:00) (62 - 116)  ABP: --  ABP(mean): --  RR: 18 (06 Oct 2021 07:00) (12 - 29)  SpO2: 99% (06 Oct 2021 07:00) (98% - 100%)    Mode: AC/ CMV (Assist Control/ Continuous Mandatory Ventilation)  RR (machine): 18  TV (machine): 420  FiO2: 30  PEEP: 5  ITime: 1  MAP: 11  PIP: 28    Adult Advanced Hemodynamics Last 24 Hrs  CVP(mm Hg): --  CVP(cm H2O): --  CO: --  CI: --  PA: --  PA(mean): --  PCWP: --  SVR: --  SVRI: --  PVR: --  PVRI: --  CAPILLARY BLOOD GLUCOSE      POCT Blood Glucose.: 113 mg/dL (05 Oct 2021 22:12)  POCT Blood Glucose.: 97 mg/dL (05 Oct 2021 16:07)  POCT Blood Glucose.: 170 mg/dL (05 Oct 2021 11:24)  POCT Blood Glucose.: 248 mg/dL (05 Oct 2021 07:31)    CAPILLARY BLOOD GLUCOSE      POCT Blood Glucose.: 113 mg/dL (05 Oct 2021 22:12)    I&O's Summary    05 Oct 2021 07:01  -  06 Oct 2021 07:00  --------------------------------------------------------  IN: 2589.2 mL / OUT: 460 mL / NET: 2129.2 mL      Daily     Daily Weight in k (06 Oct 2021 06:00)    PHYSICAL EXAMINATION:  General: WN/WD NAD  HEENT: PERRLA, EOMI, moist mucous membranes  Neurology: intubated   Respiratory: CTA B/L, normal respiratory effort, no wheezes, crackles, rales  CV: RRR, S1S2, no murmurs, rubs or gallops  Abdominal: Soft, NT, + distended, +BS, Last BM  Extremities: No edema, + peripheral pulses  Incisions:   Tubes:    LABS:  ABG - ( 06 Oct 2021 03:21 )  pH, Arterial: 7.38  pH, Blood: x     /  pCO2: 35    /  pO2: 142   / HCO3: 21    / Base Excess: -3.8  /  SaO2: 99.4                                    9.8    28.48 )-----------( 310      ( 06 Oct 2021 04:50 )             28.3     10-06    129<L>  |  96<L>  |  86<HH>  ----------------------------<  132<H>  6.4<HH>   |  18  |  1.5    Ca    7.1<L>      06 Oct 2021 04:50  Phos  5.9     10-05  Mg     2.3     10-06    TPro  5.3<L>  /  Alb  1.7<L>  /  TBili  1.7<H>  /  DBili  x   /  AST  383<H>  /  ALT  511<H>  /  AlkPhos  282<H>  10-06    LIVER FUNCTIONS - ( 06 Oct 2021 04:50 )  Alb: 1.7 g/dL / Pro: 5.3 g/dL / ALK PHOS: 282 U/L / ALT: 511 U/L / AST: 383 U/L / GGT: x           PT/INR - ( 06 Oct 2021 04:50 )   PT: 27.10 sec;   INR: 2.38 ratio         PTT - ( 06 Oct 2021 04:50 )  PTT:33.2 sec            TELEMETRY:     EKG:     IMAGING:

## 2021-10-06 NOTE — DIETITIAN INITIAL EVALUATION ADULT. - OTHER INFO
Pertinent Medical Information: Admitted with weakness - decreased po intake over past few weeks + constipation & hematemesis x1 day PTP. Noted hyperkalemia (noted d/t volume depletion and aldosterone blockade, GIB), VT on admission. Hematemesis noted overnight 10/5 + 5 episodes of melena overnight 10/6. Upper GI Bleed; s/p EGD (esophageal/ gastric varices) noted. No NGT can be placed at this time per GI. Hepatic encephalopathy; decompensated liver cirrhosis. COPD noted. Intubated at this time. Sepsis noted on pressor. Tmax 24 hours 38.1. VE 12.    Ht: 175.3 cm. Wt: 75.7 kg (10/4 dosing wt). Daily wts this admit: 78.3 kg (10/5), 82 kg (10/6). BMI: 24.6 (using dosing wt). IBW: 72.7 kg.    Abd noted distended. No edema noted. Last BM 10/6 (melena). Skin noted intact.    Currently NPO as of 10/4.

## 2021-10-06 NOTE — CONSULT NOTE ADULT - ASSESSMENT
ASSESSMENT  65 yo male, with h/o HTN, drug abuse, Hepatitis C s/p INF, Liver cirrhosis s/p [place] shunt, COPD, DVT? on Eliquis, HCC since January 2021 on chemotherapy, presented for weakness    IMPRESSION  #Decompensated Cirrhosis   #Hep C with HCC on chemotherapy  #Variceal Bleed    #Fevers + Leukocytosis   - BLood Cx 10/4 NG  - s/p paracentesis 10/5 - negative for SBP  #Abx allergy: NKDA      RECOMMENDATIONS  - no clear bacterial source of infection at this time - leukocytosis and fevers possibly reactive from severe variceal bleed  - repeat 2 sets of blood cultures during next fevers  - follow-up fungitell  - continue cefepime - decrease to 1g q 12 hours for CrCl   - continue flagyl 500 mg TID  - check serum cryptococcal antigen   - if with worsening clinical decompensation - ie worsening pressor requirements not attributable to acute anemia, can start caspofungin 70 mg x 1 - followed by 35 mg daily     Please call or message on [employer] Teams if with any questions.  Spectra [phone]

## 2021-10-06 NOTE — DIETITIAN INITIAL EVALUATION ADULT. - ADD RECOMMEND
Recommendation: Enteral nutrition contraindicated at this time in setting of abd distention, pressor use with hypotension + melena reported. Consult nutrition support team to evaluate.

## 2021-10-06 NOTE — CONSULT NOTE ADULT - SUBJECTIVE AND OBJECTIVE BOX
Patient is a 66y old  Male who presents with a chief complaint of weakness  Hyperkalemia  VT (06 Oct 2021 15:33)  Patient seen in CCU this morning at 9:10AM  Patient known to me since January 2021 when he presented with HCC. He has massive ascites. Interventional radiology inserted a peritoneal drainage catheter after frequent paracentesis initially.   Now, patient does frequent drainage at least once a week at home.  Patient was started on Bevacizumab and Atezolizumab every 3 weeks thru port a cath  Patient had partial response after 4 cycles on PET CT scan and he was continued on the same therapy.  He is on Apixaban for portal vein thrombosis secondary to tumor.  Now he is admitted with GI bleeding with severe hypotension, mental changes and severe anemia.  He is S/P upper endoscopy on 10/04/2021 which showed variceal bleeding.  After the endoscopy, apparently, he decompensated,  intubated and he was moved to CCU.  He has leukocytosis and fever of 100.9 since yesterday.  All the history was obtained from the chart because patient is intubated and sedated.      HPI:  65 yo male, with h/o HTN, drug abuse, Hepatitis C s/p INF, Liver cirrhosis s/p [place] shunt, COPD, DVT? on Eliquis, HCC since January 2021 on chemotherapy, presented for weakness  Patient reports having weakness, decreased po intake since few weeks. He also has not been sleeping. He reports constipation and hematemesis for 1 day, minimal amount.  Denies fever, chills, chest pain, cough, sputum , SOB, diarrhea, dysuria  He has a [place] shunt  that was drained one day prior to admission    ED course : mental status alteration, sustained VT  bpm with BP 82/49 mmHg s/p shock + calcium gluconate  Patient has Hyperkalemia 6.8 CO2 14 s/p Bicarb drip, renal called, sp berry with good UO   (04 Oct 2021 06:15)       ROS:  Negative except for:    PAST MEDICAL & SURGICAL HISTORY:  HTN (hypertension)    Hepatitis C  2007    Drug abuse    Cancer, hepatocellular  January 2021    COPD, mild        SOCIAL HISTORY:    FAMILY HISTORY:      MEDICATIONS  (STANDING):  budesonide  80 MICROgram(s)/formoterol 4.5 MICROgram(s) Inhaler 2 Puff(s) Inhalation two times a day  calcium gluconate IVPB 1 Gram(s) IV Intermittent once  cefepime   IVPB 2000 milliGRAM(s) IV Intermittent every 8 hours  chlorhexidine 0.12% Liquid 15 milliLiter(s) Oral Mucosa two times a day  chlorhexidine 4% Liquid 1 Application(s) Topical daily  dexMEDEtomidine Infusion 0.2 MICROgram(s)/kG/Hr (3.79 mL/Hr) IV Continuous <Continuous>  dextrose 40% Gel 15 Gram(s) Oral once  dextrose 5%. 1000 milliLiter(s) (50 mL/Hr) IV Continuous <Continuous>  dextrose 5%. 1000 milliLiter(s) (100 mL/Hr) IV Continuous <Continuous>  dextrose 50% Injectable 25 Gram(s) IV Push once  dextrose 50% Injectable 12.5 Gram(s) IV Push once  dextrose 50% Injectable 25 Gram(s) IV Push once  fentaNYL   Infusion. 0.5 MICROgram(s)/kG/Hr (3.79 mL/Hr) IV Continuous <Continuous>  glucagon  Injectable 1 milliGRAM(s) IntraMuscular once  insulin glargine Injectable (LANTUS) 15 Unit(s) SubCutaneous at bedtime  insulin lispro (ADMELOG) corrective regimen sliding scale   SubCutaneous three times a day before meals  insulin lispro Injectable (ADMELOG) 5 Unit(s) SubCutaneous three times a day before meals  insulin regular  human recombinant 10 Unit(s) IV Push once  lactulose Retention Enema 200 Gram(s) Rectal three times a day  metroNIDAZOLE  IVPB      metroNIDAZOLE  IVPB 500 milliGRAM(s) IV Intermittent every 8 hours  norepinephrine Infusion 0.05 MICROgram(s)/kG/Min (7.01 mL/Hr) IV Continuous <Continuous>  octreotide  Infusion 50 MICROgram(s)/Hr (10 mL/Hr) IV Continuous <Continuous>  pantoprazole Infusion 8 mG/Hr (10 mL/Hr) IV Continuous <Continuous>  phytonadione   Solution 10 milliGRAM(s) Oral daily  sodium polystyrene sulfonate Enema 15 Gram(s) Rectal daily  sodium polystyrene sulfonate Enema 15 Gram(s) Rectal daily    MEDICATIONS  (PRN):      Allergies    No Known Allergies    Intolerances        Vital Signs Last 24 Hrs  T(C): 37.6 (06 Oct 2021 16:00), Max: 38.3 (05 Oct 2021 18:00)  T(F): 99.6 (06 Oct 2021 16:00), Max: 100.9 (05 Oct 2021 18:00)  HR: 98 (06 Oct 2021 16:00) (84 - 98)  BP: 104/69 (06 Oct 2021 16:00) (77/55 - 106/65)  BP(mean): 80 (06 Oct 2021 16:00) (62 - 80)  RR: 18 (06 Oct 2021 16:00) (12 - 19)  SpO2: 98% (06 Oct 2021 16:00) (98% - 100%)    PHYSICAL EXAM  General: adult in NAD  HEENT: clear oropharynx, anicteric sclera, pink conjunctiva  Neck: supple  CV: normal S1/S2 with no murmur rubs or gallops  Lungs: positive air movement b/l ant lungs,clear to auscultation, no wheezes, no rales  Abdomen: Distended, ascites  Ext: no clubbing cyanosis or edema  Skin: no rashes and no petechiae  Neuro: Intubated and sedated      LABS:                          9.6    27.76 )-----------( 328      ( 06 Oct 2021 14:28 )             28.4         Mean Cell Volume : 85.0 fL  Mean Cell Hemoglobin : 28.7 pg  Mean Cell Hemoglobin Concentration : 33.8 g/dL  Auto Neutrophil # : x  Auto Lymphocyte # : x  Auto Monocyte # : x  Auto Eosinophil # : x  Auto Basophil # : x  Auto Neutrophil % : x  Auto Lymphocyte % : x  Auto Monocyte % : x  Auto Eosinophil % : x  Auto Basophil % : x      Serial CBC's  10-06 @ 14:28  Hct-28.4 / Hgb-9.6 / Plat-328 / RBC-3.34 / WBC-27.76  Serial CBC's  10-06 @ 04:50  Hct-28.3 / Hgb-9.8 / Plat-310 / RBC-3.33 / WBC-28.48  Serial CBC's  10-06 @ 00:30  Hct-28.3 / Hgb-9.8 / Plat-313 / RBC-3.36 / WBC-31.20  Serial CBC's  10-05 @ 13:32  Hct-29.1 / Hgb-10.0 / Plat-314 / RBC-3.44 / WBC-27.87  Serial CBC's  10-05 @ 08:15  Hct-28.8 / Hgb-9.8 / Plat-324 / RBC-3.38 / WBC-30.82  Serial CBC's  10-05 @ 00:41  Hct-16.7 / Hgb-5.5 / Plat-378 / RBC-1.95 / WBC-26.70  Serial CBC's  10-04 @ 14:42  Hct-24.0 / Hgb-7.9 / Plat-294 / RBC-2.79 / WBC-26.10  Serial CBC's  10-04 @ 01:00  Hct-24.5 / Hgb-8.3 / Plat-508 / RBC-2.88 / WBC-26.43      10-06    129<L>  |  96<L>  |  87<HH>  ----------------------------<  176<H>  6.0<HH>   |  16<L>  |  1.6<H>    Ca    7.3<L>      06 Oct 2021 14:27  Phos  5.9     10-05  Mg     2.3     10-06    TPro  5.3<L>  /  Alb  1.8<L>  /  TBili  1.9<H>  /  DBili  x   /  AST  282<H>  /  ALT  458<H>  /  AlkPhos  296<H>  10-06      PT/INR - ( 06 Oct 2021 04:50 )   PT: 27.10 sec;   INR: 2.38 ratio         PTT - ( 06 Oct 2021 04:50 )  PTT:33.2 sec    Folate, Serum: 10.7 ng/mL (10-05 @ 06:21)  Vitamin B12, Serum: >2000 pg/mL (10-05 @ 06:21)              BLOOD SMEAR INTERPRETATION:       RADIOLOGY & ADDITIONAL STUDIES:

## 2021-10-06 NOTE — PROGRESS NOTE ADULT - ASSESSMENT
IMPRESSION:    Hepatic encephalopathy ( ammonia 188)  Decompensated liver cirrhosis. Has [place] cathter  Sepsis ro SBP  Hematemesis ( esophageal/ gastric varices)  Acute portal Vein thrombosis   Hyperkalemia with sustained VT s/p shock, creat 0.9 was on aldactone  Hyponatremia likely from volume overload  Hepatocellular carcinoma with transaminitis on chemotherapy  Hepatitis C treated with INF  COPD  HAGMA      PLAN:    CNS: SAT  keep sedate as needed if stays on vent      HEENT: Oral care    PULMONARY:  HOB @ 45 degrees  continue O2 as necessary to maintain sats > 90%<94  SBT if no plans for repeat EGD    CARDIOVASCULAR: Keep I < O ,   cardiac ECHO,   propranolol 10 q 12 keep HR 50-60      GI: GI prophylaxis.  NPO,   octreotide/ protonix drip,   GI management   EGD report reviewed,   bowel regimen aim 3 BM/day ,   rifaximin q 12,   send fluid for gram satin / cxl    RENAL:   lokelma tID,  low K diet,   Follow up lytes.    Correct as needed,  nephro consult     INFECTIOUS DISEASE:   Follow up cultures,   nasal MRSA  peritoneal fluid analysis and culture,   cefepime 2 g q 8 ;   HepC viral load,     HEMATOLOGICAL:    Hold home Eliquis  prophylactic Lovenox   doppler LE neg  transfuse Hgb >8  serial HH    ENDOCRINE:    Follow up FS.    Insulin protocol if needed.   keep -180.   avoid hypoglycemia    MUSCULOSKELETAL: bed rest     Poor prognosis    Dispo ICU    palliative care eval reviewed

## 2021-10-06 NOTE — PROGRESS NOTE ADULT - ASSESSMENT
Pt with Decompensated liver cirrhosis, Hep C-s/p IFN, ascites -[place] cath, HCCa with transaminitis on chemotherapy  presents with weakness, found to have K 6.8 (was on Aldactone) , sustained VT -s/p shock; met acidosis, hepatic encephalopathy - Ammonia 188.    Hyperkalemia - due to volume depletion and aldosterone blockade, GIB  - improving with IVF and Insulin/Glucose, Lokelma  off lokelma now please resume at 10 g po q8h if possible   - last K 6.4 from 5.8, pt is making urine  Hyponatremia - Urine na 20, Osm 600 c/w intravascular volume depletion  -improving with hydration continue   - avoid rise in Na>8 mE/day  Met acidosis - high lactic acid, hypoperfusion, improving  - off Bicarb drip,   Sepsis - r/o SBP - cont Cefipime  -pressors requiring  Hematemesis ( esophageal/ gastric varices) on OCtrotide appreciate GI  Acute portal Vein thrombosis - cont eliquis, seen by vasc sx  Prognosis guarded    will follow

## 2021-10-06 NOTE — PROGRESS NOTE ADULT - ASSESSMENT
IMPRESSION    Hepatic encephalopathy ( ammonia 188)  Decompensated liver cirrhosis. Has Denver cathter  Sepsis r/o SBP  Upper GI Bleed ( esophageal/ gastric varices)  Acute portal Vein thrombosis   Hyperkalemia with sustained VT s/p shock, creat 0.9 was on aldactone  Hyponatremia likely from volume overload  Hepatocellular carcinoma with transaminitis on chemotherapy  Hepatitis C treated with INF  COPD  HAGMA      PLAN:    CNS: keep sedate as needed      HEENT: Oral care    PULMONARY:    - HOB @ 45 degrees  - taper O2  - decrease MVe   - Decrease TV to 420  - no SBT until GI w/u completed    CARDIOVASCULAR:   - Keep I < O ,   - cardiac ECHO,   - holding propranolol 10mg q 12 keep HR 50-60    GI: GI prophylaxis.  NPO,   - octreotide/ protonix drip,   - GI management   - EGD - grade IV lower and mid 1/3 esophageal varices with 5 bands placed successfully.   - Lactulose bowel regimen aim 3 BM/day , (Will be giving enemas for now)  - NO NGT can be placed this time   - rifaximin q 12, (on hold for now)  - Peritoneal fluid was sent and f/u gram stain and Cx  - IR was consulted: Pt is a poor candidate for TIPS and has several contraindications for procedure.   - reached out to IR to drain the Denver catheter    - Spoke to Dr. Rhodes (Patient's gastroenterologist) [phone]  - [facility] is sending patient's medical records   - Reached put to patient's oncologist Dr. Gibson [phone],Patient is on Elotuzumab and Bevacizumab, last chemo was 9/2/2021, patient wasn't able to get anymore chemo therapy due to lack of insurance     RENAL:   - D/C bicarb drip  - lokelma tID,  - low K diet,   - Follow up lytes.    - Correct as needed,  - f/u nephro consult   - B-hydroxybutyrate neg     INFECTIOUS DISEASE:   - Follow up cultures,   - peritoneal fluid analysis and culture, if + remove access  - cefepime 2 g q 8 ;   - HepC viral load f/u   - vanco x1  - blood cx : NGTD (prelim)    HEMATOLOGICAL:    - Hold home Eliquis  - Per vascular - D/C AC and restart once Pt is stable. F/u OP for Duplex  - doppler LE   - transfuse Hgb >8  - serial HH  - S/p 2 units of PRBC 10/5    ENDOCRINE:    - check ketone B hydroxy butyrate, ETOH level, serum OSM, ASA  - Follow up FS.    - Insulin protocol if needed.   - keep -180.   - avoid hypoglycemia  - B hydroxy butyrate level neg, ETOH level <10, serum , ASA    GOC conservation was done with Patient's partner Fallon, patient did not clarify his wishes before, patient does not have a health care proxy or a family member who can be reached. Patient's partner expressed that she wants him to be full code for now and wants everything done for the patient.     MUSCULOSKELETAL: bed rest     Poor prognosis    Dispo ICU    palliative care eval   IMPRESSION    Hepatic encephalopathy ( ammonia 188)  Decompensated liver cirrhosis. Has Denver cathter  Sepsis r/o SBP  Upper GI Bleed ( esophageal/ gastric varices)  Acute portal Vein thrombosis   Hyperkalemia with sustained VT s/p shock, creat 0.9 was on aldactone  Hyponatremia likely from volume overload  Hepatocellular carcinoma with transaminitis on chemotherapy  Hepatitis C treated with INF  COPD  HAGMA      PLAN:    CNS: keep sedate as needed      HEENT: Oral care    PULMONARY:    - HOB @ 45 degrees  - taper O2  - decrease MVe   - Decrease TV to 420  - no SBT until GI w/u completed    CARDIOVASCULAR:   - Keep I < O ,   - cardiac ECHO,   - holding propranolol 10mg q 12 keep HR 50-60  - Will give 500 bolus of NS X1  - Keep bp above 100 systolic     GI: GI prophylaxis.  NPO,   - octreotide/ protonix drip,   - GI management   - EGD - grade IV lower and mid 1/3 esophageal varices with 5 bands placed successfully.   - Lactulose bowel regimen aim 3 BM/day , (Will be giving enemas for now)  - NO NGT can be placed this time   - rifaximin q 12, (on hold for now)  - Peritoneal fluid was sent and f/u gram stain and Cx : negative culture   - IR was consulted: Pt is a poor candidate for TIPS and has several contraindications for procedure.   - reached out to IR to drain the Denver catheter    - Spoke to Dr. Rhodes (Patient's gastroenterologist) [phone]  - [facility] is sending patient's medical records   - Reached put to patient's oncologist Dr. Gibson [phone],Patient is on Elotuzumab and Bevacizumab, last chemo was 9/2/2021, patient wasn't able to get anymore chemo therapy due to lack of insurance     RENAL:   - D/C bicarb drip  - lokelma tID,  - low K diet,   - Follow up lytes.    - Correct as needed,  - f/u nephro consult   - B-hydroxybutyrate neg     INFECTIOUS DISEASE:   - Follow up cultures,   - peritoneal fluid analysis and culture, if + remove access  - cefepime 2 g q 8 ;   - HepC viral load f/u   - vanco x1  - blood cx : NGTD (prelim)    HEMATOLOGICAL:    - Hold home Eliquis  - Per vascular - D/C AC and restart once Pt is stable. F/u OP for Duplex  - doppler LE   - transfuse Hgb >8  - serial HH  - S/p 2 units of PRBC 10/5    ENDOCRINE:    - check ketone B hydroxy butyrate, ETOH level, serum OSM, ASA  - Follow up FS.    - Insulin protocol if needed.   - keep -180.   - avoid hypoglycemia  - B hydroxy butyrate level neg, ETOH level <10, serum , ASA    GOC conservation was done with Patient's partner Fallon, patient did not clarify his wishes before, patient does not have a health care proxy or a family member who can be reached. Patient's partner expressed that she wants him to be full code for now and wants everything done for the patient.     MUSCULOSKELETAL: bed rest     Poor prognosis    Dispo ICU    palliative care eval   IMPRESSION    Hepatic encephalopathy ( ammonia 188)  Decompensated liver cirrhosis. Has [place] cathter  Sepsis r/o SBP  Upper GI Bleed ( esophageal/ gastric varices)  Acute portal Vein thrombosis   Hyperkalemia with sustained VT s/p shock, creat 0.9 was on aldactone  Hyponatremia likely from volume overload  Hepatocellular carcinoma with transaminitis on chemotherapy  Hepatitis C treated with INF  COPD  HAGMA      PLAN:    CNS: keep sedate as needed      HEENT: Oral care    PULMONARY:    - HOB @ 45 degrees  - taper O2  - decrease MVe   - Decrease TV to 420  - no SBT until GI w/u completed    CARDIOVASCULAR:   - Keep I < O ,   - cardiac ECHO,   - holding propranolol 10mg q 12 keep HR 50-60  - Will give 500 bolus of NS X1  - Keep bp above 100 systolic     GI: GI prophylaxis.  NPO,   - octreotide/ protonix drip,   - GI management   - EGD - grade IV lower and mid 1/3 esophageal varices with 5 bands placed successfully.   - Lactulose bowel regimen aim 3 BM/day , (Will be giving enemas for now)  - NO NGT can be placed this time   - rifaximin q 12, (on hold for now)  - Peritoneal fluid was sent and f/u gram stain and Cx : negative culture   - IR was consulted: Pt is a poor candidate for TIPS and has several contraindications for procedure.   - reached out to IR to drain the Denver catheter    - Spoke to Dr. Rhodes (Patient's gastroenterologist) [phone]  - [facility] is sending patient's medical records   - Reached put to patient's oncologist Dr. Gibson [phone],Patient is on Elotuzumab and Bevacizumab, last chemo was 9/2/2021, patient wasn't able to get anymore chemo therapy due to lack of insurance     RENAL:   - D/C bicarb drip  - lokelma tID,  - low K diet,   - Follow up lytes.    - Correct as needed,  - f/u nephro consult   - B-hydroxybutyrate neg     INFECTIOUS DISEASE:   - Follow up cultures,   - peritoneal fluid analysis and culture, if + remove access  - cefepime 2 g q 8 ;   - HepC viral load f/u   - vanco x1  - blood cx : NGTD (prelim)  - f/u UA and Ucx   - f/u procal and fungitail     HEMATOLOGICAL:    - Hold home Eliquis  - Per vascular - D/C AC and restart once Pt is stable. F/u OP for Duplex  - doppler LE   - transfuse Hgb >8  - serial HH  - S/p 2 units of PRBC 10/5    ENDOCRINE:    - check ketone B hydroxy butyrate, ETOH level, serum OSM, ASA  - Follow up FS.    - Insulin protocol if needed.   - keep -180.   - avoid hypoglycemia  - B hydroxy butyrate level neg, ETOH level <10, serum , ASA    GOC conservation was done with Patient's partner Fallon, patient did not clarify his wishes before, patient does not have a health care proxy or a family member who can be reached. Patient's partner expressed that she wants him to be full code for now and wants everything done for the patient.     MUSCULOSKELETAL: bed rest     Poor prognosis    Dispo ICU    palliative care ming   IMPRESSION    Hepatic encephalopathy ( ammonia 188)  Decompensated liver cirrhosis. Has Denver cathter  Sepsis r/o SBP  Upper GI Bleed ( esophageal/ gastric varices)  Acute portal Vein thrombosis   Hyperkalemia with sustained VT s/p shock, creat 0.9 was on aldactone  Hyponatremia likely from volume overload  Hepatocellular carcinoma with transaminitis on chemotherapy  Hepatitis C treated with INF  COPD  HAGMA      PLAN:    CNS: keep sedate as needed  - Trial of SAT, if tolerates then SBT       HEENT: Oral care    PULMONARY:    - HOB @ 45 degrees  - taper O2  - decrease MVe   - Decrease TV to 420  - no SBT until GI w/u completed    CARDIOVASCULAR:   - Keep I < O ,   - cardiac ECHO,   - holding propranolol 10mg q 12 keep HR 50-60  - Will give 500 bolus of NS X1  - Keep bp above 100 systolic     GI: GI prophylaxis.  NPO,   - octreotide/ protonix drip,   - GI management   - EGD - grade IV lower and mid 1/3 esophageal varices with 5 bands placed successfully.   - Lactulose bowel regimen aim 3 BM/day , (Will be giving enemas for now)  - NO NGT can be placed this time   - rifaximin q 12, (on hold for now)  - Peritoneal fluid was sent and f/u gram stain and Cx : negative culture   - IR was consulted: Pt is a poor candidate for TIPS and has several contraindications for procedure.   - reached out to IR to drain the Denver catheter    - Spoke to Dr. Rhodes (Patient's gastroenterologist) [phone]  - [facility] is sending patient's medical records   - Reached put to patient's oncologist Dr. Gibson [phone],Patient is on Elotuzumab and Bevacizumab, last chemo was 9/2/2021, patient wasn't able to get anymore chemo therapy due to lack of insurance     RENAL:   - D/C bicarb drip  - lokelma tID,  - low K diet,   - Follow up lytes.    - Correct as needed,  - f/u nephro consult   - Hyperkalemia Kayexalate enema, insulin +dextrose +calcium gluconate     INFECTIOUS DISEASE:   - Follow up cultures,   - peritoneal fluid analysis and culture, if + remove access  - cefepime 2 g q 8 ;   - HepC viral load : 75.9 and reactive   - vanco x1  - blood cx : NGTD   - Peritoneal fluid cx : NG   - f/u UA and Ucx   - f/u procal and Fungitell     HEMATOLOGICAL:    - Hold home Eliquis  - Per vascular - D/C AC and restart once Pt is stable. F/u OP for Duplex  - doppler LE   - transfuse Hgb >8  - serial HH  - S/p 2 units of PRBC 10/5    ENDOCRINE:    - check ketone B hydroxy butyrate, ETOH level, serum OSM, ASA  - Follow up FS.    - Insulin protocol if needed.   - keep -180.   - avoid hypoglycemia  - B hydroxy butyrate level neg, ETOH level <10, serum , ASA  - B-hydroxybutyrate, salicylate blood alcohol: neg     10/5/2021: GOC conservation was done with Patient's partner Fallon, patient did not clarify his wishes before, patient does not have a health care proxy or a family member who can be reached. Patient's partner expressed that she wants him to be full code for now and wants everything done for the patient.     MUSCULOSKELETAL: bed rest     Poor prognosis    Dispo ICU    palliative care ming   IMPRESSION    Hepatic encephalopathy ( ammonia 188)  Decompensated liver cirrhosis. Has [place] cathter  Sepsis r/o SBP  Upper GI Bleed ( esophageal/ gastric varices)  Acute portal Vein thrombosis   Hyperkalemia with sustained VT s/p shock, creat 0.9 was on aldactone  Hyponatremia likely from volume overload  Hepatocellular carcinoma with transaminitis on chemotherapy  Hepatitis C treated with INF  COPD  HAGMA      PLAN:    CNS: keep sedate as needed  - Trial of SAT, if tolerates then SBT       HEENT: Oral care    PULMONARY:    - HOB @ 45 degrees  - taper O2  - decrease MVe   - Decrease TV to 420  - no SBT until GI w/u completed    CARDIOVASCULAR:   - Keep I < O ,   - cardiac ECHO,   - holding propranolol 10mg q 12 keep HR 50-60  - Will give 500 bolus of NS X1  - Keep bp above 100 systolic     GI: GI prophylaxis.  NPO,   - octreotide/ protonix drip,   - GI management   - EGD - grade IV lower and mid 1/3 esophageal varices with 5 bands placed successfully.   - Lactulose bowel regimen aim 3 BM/day , (Will be giving enemas for now)  - NO NGT can be placed this time   - rifaximin q 12, (on hold for now)  - Peritoneal fluid was sent and f/u gram stain and Cx : negative culture   - IR was consulted: Pt is a poor candidate for TIPS and has several contraindications for procedure.   - reached out to IR to drain the Denver catheter    - starting vit K 10mg daily today, will start after 3 doses   - Spoke to Dr. Rhodes (Patient's gastroenterologist) [phone]  - [facility] is sending patient's medical records   - Reached put to patient's oncologist Dr. Gibson [phone],Patient is on Elotuzumab and Bevacizumab, last chemo was 9/2/2021, patient wasn't able to get anymore chemo therapy due to lack of insurance     RENAL:   - D/C bicarb drip  - lokelma tID,  - low K diet,   - Follow up lytes.    - Correct as needed,  - f/u nephro consult   - Hyperkalemia Kayexalate enema, insulin +dextrose +calcium gluconate     INFECTIOUS DISEASE:   - Follow up cultures,   - peritoneal fluid analysis and culture, if + remove access  - cefepime 2 g q 8 ;   - HepC viral load : 75.9 and reactive   - vanco x1  - blood cx : NGTD   - Peritoneal fluid cx : NG   - f/u UA and Ucx   - f/u procal and Fungitell     HEMATOLOGICAL:    - Hold home Eliquis  - Per vascular - D/C AC and restart once Pt is stable. F/u OP for Duplex  - doppler LE   - transfuse Hgb >8  - serial HH  - S/p 2 units of PRBC 10/5    ENDOCRINE:    - check ketone B hydroxy butyrate, ETOH level, serum OSM, ASA  - Follow up FS.    - Insulin protocol if needed.   - keep -180.   - avoid hypoglycemia  - B hydroxy butyrate level neg, ETOH level <10, serum , ASA  - B-hydroxybutyrate, salicylate blood alcohol: neg     10/5/2021: GOC conservation was done with Patient's partner Fallon, patient did not clarify his wishes before, patient does not have a health care proxy or a family member who can be reached. Patient's partner expressed that she wants him to be full code for now and wants everything done for the patient.     MUSCULOSKELETAL: bed rest     Poor prognosis    Dispo ICU    palliative care eval   IMPRESSION    Hepatic encephalopathy ( ammonia 188)  Decompensated liver cirrhosis. Has [place] cathter  Sepsis r/o SBP  Upper GI Bleed ( esophageal/ gastric varices)  Acute portal Vein thrombosis   Hyperkalemia with sustained VT s/p shock, creat 0.9 was on aldactone  Hyponatremia likely from volume overload  Hepatocellular carcinoma with transaminitis on chemotherapy  Hepatitis C treated with INF  COPD  HAGMA      PLAN:    CNS: keep sedate as needed  - Trial of SAT, if tolerates then SBT       HEENT: Oral care    PULMONARY:    - HOB @ 45 degrees  - taper O2  - decrease MVe   - Decrease TV to 420  - no SBT until GI w/u completed    CARDIOVASCULAR:   - Keep I < O ,   - cardiac ECHO,   - holding propranolol 10mg q 12 keep HR 50-60  - Will give 500 bolus of NS X1  - Keep bp above 100 systolic     GI: GI prophylaxis.  NPO,   - octreotide/ protonix drip,   - GI management   - EGD - grade IV lower and mid 1/3 esophageal varices with 5 bands placed successfully.   - Lactulose bowel regimen aim 3 BM/day , (Will be giving enemas for now)  - NO NGT can be placed this time   - rifaximin q 12, (on hold for now)  - Peritoneal fluid was sent and f/u gram stain and Cx : negative culture   - IR was consulted: Pt is a poor candidate for TIPS and has several contraindications for procedure.   - reached out to IR to drain the Denver catheter    - starting vit K 10mg daily today, will start after 3 doses   - Spoke to Dr. Rhodes (Patient's gastroenterologist) [phone]  - [facility] is sending patient's medical records   - Reached put to patient's oncologist Dr. Gibson [phone],Patient is on Elotuzumab and Bevacizumab, last chemo was 9/2/2021, patient wasn't able to get anymore chemo therapy due to lack of insurance     RENAL:   - D/C bicarb drip  - lokelma tID,  - low K diet,   - Follow up lytes.    - Correct as needed,  - f/u nephro consult   - Hyperkalemia Kayexalate enema, insulin +dextrose +calcium gluconate     INFECTIOUS DISEASE:   - Follow up cultures,   - peritoneal fluid analysis and culture, if + remove access  - cefepime 2 g q 8 ; starting flagyl 500mg iv q8  - HepC viral load : 75.9 and reactive   - vanco x1  - blood cx : NGTD   - Peritoneal fluid cx : NG   - UA: negative   - f/u procal and Fungitell   - ID recs are appreciated     HEMATOLOGICAL:    - Hold home Eliquis  - Per vascular - D/C AC and restart once Pt is stable. F/u OP for Duplex  - doppler LE   - transfuse Hgb >8  - serial HH  - S/p 2 units of PRBC 10/5     ENDOCRINE:    - check ketone B hydroxy butyrate, ETOH level, serum OSM, ASA  - Follow up FS.    - Insulin protocol if needed.   - keep -180.   - avoid hypoglycemia  - B hydroxy butyrate level neg, ETOH level <10, serum , ASA  - B-hydroxybutyrate, salicylate blood alcohol: neg     10/5/2021: GOC conservation was done with Patient's partner Fallon, patient did not clarify his wishes before, patient does not have a health care proxy or a family member who can be reached. Patient's partner expressed that she wants him to be full code for now and wants everything done for the patient.     MUSCULOSKELETAL: bed rest     Poor prognosis    Dispo ICU    palliative care eval

## 2021-10-06 NOTE — DIETITIAN INITIAL EVALUATION ADULT. - OTHER CALCULATIONS
Energy: 1989 kcal/day ([id number]). Protein:  g/day (1.2-1.5 g/kg ABW) - critical illness vs renal function considered. Fluids: per LIP

## 2021-10-06 NOTE — CONSULT NOTE ADULT - SUBJECTIVE AND OBJECTIVE BOX
JLUISSUZANNE  66y, Male  Allergy: No Known Allergies      CHIEF COMPLAINT: weakness  Hyperkalemia  VT (06 Oct 2021 09:46)      LOS  2d    HPI:  65 yo male, with h/o HTN, drug abuse, Hepatitis C s/p INF, Liver cirrhosis s/p [place] shunt, COPD, DVT? on Eliquis, HCC since January 2021 on chemotherapy, presented for weakness  Patient reports having weakness, decreased po intake since few weeks. He also has not been sleeping. He reports constipation and hematemesis for 1 day, minimal amount.  Denies fever, chills, chest pain, cough, sputum , SOB, diarrhea, dysuria  He has a [place] shunt  that was drained one day prior to admission    ED course : mental status alteration, sustained VT  bpm with BP 82/49 mmHg s/p shock + calcium gluconate  Patient has Hyperkalemia 6.8 CO2 14 s/p Bicarb drip, renal called, sp berry with good UO   (04 Oct 2021 06:15)      INFECTIOUS DISEASE HISTORY:  History as above.   Found to have melena   S/p ERCP 10/4 with grade IV varices with stigmata of recent bleeding - 5 band applied    Hgb as low as 5.5 on 10/5.   Remains intubated on levophed.   Fevers starting on 10/5.     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)        ROS  unable to obtain history secondary to patient's mental status    VITALS:  T(F): 100, Max: 100.9 (10-05-21 @ 18:00)  HR: 94  BP: 106/65  RR: 18Vital Signs Last 24 Hrs  T(C): 37.8 (06 Oct 2021 14:00), Max: 38.3 (05 Oct 2021 18:00)  T(F): 100 (06 Oct 2021 14:00), Max: 100.9 (05 Oct 2021 18:00)  HR: 94 (06 Oct 2021 14:00) (84 - 95)  BP: 106/65 (06 Oct 2021 14:00) (69/48 - 106/65)  BP(mean): 75 (06 Oct 2021 14:00) (62 - 87)  RR: 18 (06 Oct 2021 14:00) (12 - 19)  SpO2: 100% (06 Oct 2021 14:00) (98% - 100%)    PHYSICAL EXAM:  Gen: intubated  HEENT: Normocephalic, atraumatic; icteric   Neck: supple, no lymphadenopathy  CV: Regular rate & regular rhythm  Lungs: decreased BS at bases, no fremitus  Abdomen: Soft, BS present  Ext: Warm, well perfused  Neuro: non focal, awake  Skin: no rash, no erythema  Lines: no phlebitis    TESTS & MEASUREMENTS:                        9.6    27.76 )-----------( 328      ( 06 Oct 2021 14:28 )             28.4     10-06    129<L>  |  96<L>  |  86<HH>  ----------------------------<  132<H>  6.4<HH>   |  18  |  1.5    Ca    7.1<L>      06 Oct 2021 04:50  Phos  5.9     10-05  Mg     2.3     10-06    TPro  5.3<L>  /  Alb  1.7<L>  /  TBili  1.7<H>  /  DBili  x   /  AST  383<H>  /  ALT  511<H>  /  AlkPhos  282<H>  10-06    eGFR if Non African American: 48 mL/min/1.73M2 (10-06-21 @ 04:50)  eGFR if African American: 55 mL/min/1.73M2 (10-06-21 @ 04:50)    LIVER FUNCTIONS - ( 06 Oct 2021 04:50 )  Alb: 1.7 g/dL / Pro: 5.3 g/dL / ALK PHOS: 282 U/L / ALT: 511 U/L / AST: 383 U/L / GGT: x               Culture - Fungal, Body Fluid (collected 10-05-21 @ 18:53)  Source: .Body Fluid Peritoneal Fluid  Preliminary Report (10-06-21 @ 06:52):    Testing in progress    Culture - Body Fluid with Gram Stain (collected 10-05-21 @ 18:53)  Source: .Body Fluid Peritoneal Fluid  Gram Stain (10-06-21 @ 02:48):    polymorphonuclear leukocytes seen    No organisms seen    by cytocentrifuge    Culture - Blood (collected 10-04-21 @ 02:15)  Source: .Blood Blood  Preliminary Report (10-05-21 @ 14:01):    No growth to date.    Culture - Blood (collected 10-04-21 @ 02:10)  Source: .Blood Blood  Preliminary Report (10-05-21 @ 14:01):    No growth to date.        Lactate, Blood: 2.9 mmol/L (10-05-21 @ 13:32)  Lactate, Blood: 4.8 mmol/L (10-05-21 @ 06:21)  Blood Gas Venous - Lactate: 2.90 mmol/L (10-04-21 @ 07:28)  Blood Gas Venous - Lactate: 4.60 mmol/L (10-04-21 @ 02:45)  Blood Gas Venous - Lactate: 5.50 mmol/L (10-04-21 @ 01:19)  Blood Gas Venous - Lactate: 5.40 mmol/L (10-04-21 @ 01:07)      INFECTIOUS DISEASES TESTING  MRSA PCR Result.: Negative (10-05-21 @ 05:14)  COVID-19 PCR: NotDetec (10-04-21 @ 02:17)      RADIOLOGY & ADDITIONAL TESTS:  I have personally reviewed the last Chest xray  CXR  Xray Chest 1 View- PORTABLE-Urgent:   EXAM:  XR CHEST PORTABLE URGENT 1V            PROCEDURE DATE:  10/04/2021            INTERPRETATION:  Clinical History / Reason for exam: Chest pain.    Comparison : Chest radiograph None.    Technique/Positioning: Frontal portable, low lung volumes.    Findings:    Support devices: Right-sided central venous catheter.    Cardiac/mediastinum/hilum: Unremarkable.    Lung parenchyma/Pleura: Within normal limits.    Skeleton/soft tissues: Unremarkable.    Impression:    No radiographic evidence ofacute cardiopulmonary disease.        --- End of Report ---              ITZ LLAMAS MD; Attending Interventional Radiologist  This document has been electronically signed. Oct  4 2021  8:42AM (10-04-21 @ 01:43)      CT  CT Angio Chest PE Protocol w/ IV Cont:   EXAM:  CT ABDOMEN AND PELVIS IC        EXAM:  CT ANGIO CHEST PULM ART WAWIC          *** ADDENDUM 10/04/2021  ***    Findings were discussed with Dr. Asaad at 6:38 AM    --- End of Report ---    *** END OF ADDENDUM 10/04/2021  ***        PROCEDURE DATE:  10/04/2021            INTERPRETATION:  CLINICAL STATEMENT: Chest pain, shortness of breath and hemoptysis. History of HCC and chronic hepatitis C.    TECHNIQUE: Multislice helical sections were obtained from the thoracic inlet to the lung basesduring rapid administration of 100cc Omnipaque 350 intravenous contrast using a CTA protocol. Subsequently, axial CT sections were obtained from the domes of the diaphragms to the pubic symphysis. Thin sections were reconstructed through the pulmonary vasculature. MIP reformats were added.    COMPARISON CT: None.      FINDINGS:    CHEST:    PULMONARY EMBOLUS: No evidence for acute central or segmental pulmonary embolus.    LUNGS/PLEURA/PERICARDIUM:: Centrilobular and paraseptal emphysematous changes. Trace debris within the trachea. No focal consolidation, pleural effusion, or pneumothorax.    MEDIASTINUM/THORACIC NODES: Visualized portion of the thyroid gland is unremarkable. No enlarged thoracic lymph nodes. Heart size is within normal limits. No pericardial effusion. 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 Normal caliber main pulmonary artery. Right chest wall Mediport catheter with tip in the proximal right atrium.      ABDOMEN/PELVIS:    HEPATOBILIARY: The liver demonstrates morphologic changes of cirrhosis. There are secondary signs of portal hypertension such as paraesophageal varices. There is diffuse heterogeneity of the liver with innumerable hypodensities, inherently limited in evaluation on this single phase of CT contrast. A more focal 8 cm region of hypoattenuation in the posterior right hepatic lobe may correspond to the provided history of HCC, again limited. There is contrast filling of the splenic vein and SMV with hypodense main portal vein, left main and right portal veins compatible with thrombus. Cholelithiasis    SPLEEN: Mildly enlarged spleen to 13.5 cm.    PANCREAS: Unremarkable.    ADRENAL GLANDS: Unremarkable.    KIDNEYS: Symmetric pattern of renal enhancement. No hydronephrosis. Left upper pole cyst and subcentimeter hypodensity too small to characterize. 1.2 cm left renal hypodensity (8/145) measures higher attenuation than simple fluid and is indeterminate.    ABDOMINOPELVIC NODES: Unremarkable.    PELVIC ORGANS: Berry catheter within the urinary bladder.    PERITONEUM/MESENTERY/BOWEL: No evidence for bowel obstruction. Questionable gastric wall thickening. A catheter enters the right abdomen with tip in the left lower quadrant. Overall small volume abdominopelvic ascites. No free air..    BONES/SOFT TISSUES: Degenerative changes of the spine.    OTHER: Calcific atherosclerotic disease of the aorta and its branches.      IMPRESSION:    No evidence for acute pulmonary embolus.    Cirrhosis and portal hypertension.    Innumerable hepatic hypodensities with more focal 8 cm region of hypoattenuation in the posterior right hepatic lobe, limited in evaluation on a single phase of contrast but may correspond to reported history of HCC.    There is hypoattenuation within the a portion of the left, the right and main portal veins with extension into the portal confluence and a portion of the splenic and superior mesenteric veins compatible with portal vein thrombus. The caliber of these veins are larger than the opacified portions of the splenic vein, SMV and IMV and therefore may be acute.    Small volume ascites.    Suggestion of gastric wall thickening. Correlate for gastritis.    --- End of Report ---      ***Please see theaddendum at the top of this report. It may contain additional important information or changes.****      BROOKS CONCEPCION MD; Resident Radiologist  This document has been electronically signed.  KIA GABRIEL MD; Attending Radiologist  This document has been electronically signed. Oct  4 2021  6:33AM  Addend:KIA GABRIEL MD; Attending Radiologist  This addendum was electronically signed on: Oct  4 2021  8:18AM. (10-04-21 @ 04:30)      CARDIOLOGY TESTING  12 Lead ECG:   Systolic BP 80 mmHg    Diastolic BP 51 mmHg    Ventricular Rate 206 BPM    Atrial Rate 241 BPM    QRS Duration 72 ms    Q-T Interval 206 ms    QTC Calculation(Bazett) 381 ms    R Axis 55 degrees    T Axis 63 degrees    Diagnosis Line Supraventricular tachycardia with occasional Premature ventricular complexes  Low voltage QRS  Nonspecific T wave abnormality  Abnormal ECG    Confirmed by KRISHNA ZARCO MD ([id number]) on 10/4/2021 11:18:34 PM (10-04-21 @ 20:37)  12 Lead ECG:   Ventricular Rate 194 BPM    Atrial Rate 99 BPM    QRS Duration 76 ms    Q-T Interval 172 ms    QTC Calculation(Bazett) 309 ms    P Axis 71 degrees    R Axis 59 degrees    T Axis 58 degrees    Diagnosis Line Sinus tachycardia  Low voltage QRS  Nonspecific T wave abnormality  Abnormal ECG    Confirmed by Leonel Miller ([id number]) on 10/4/2021 6:37:48 AM (10-04-21 @ 04:09)      MEDICATIONS  budesonide  80 MICROgram(s)/formoterol 4.5 MICROgram(s) Inhaler 2 Inhalation two times a day  cefepime   IVPB 2000 IV Intermittent every 8 hours  chlorhexidine 0.12% Liquid 15 Oral Mucosa two times a day  chlorhexidine 4% Liquid 1 Topical daily  dexMEDEtomidine Infusion 0.2 IV Continuous <Continuous>  dextrose 40% Gel 15 Oral once  dextrose 5%. 1000 IV Continuous <Continuous>  dextrose 5%. 1000 IV Continuous <Continuous>  dextrose 50% Injectable 25 IV Push once  dextrose 50% Injectable 12.5 IV Push once  dextrose 50% Injectable 25 IV Push once  fentaNYL   Infusion. 0.5 IV Continuous <Continuous>  glucagon  Injectable 1 IntraMuscular once  insulin glargine Injectable (LANTUS) 15 SubCutaneous at bedtime  insulin lispro (ADMELOG) corrective regimen sliding scale  SubCutaneous three times a day before meals  insulin lispro Injectable (ADMELOG) 5 SubCutaneous three times a day before meals  lactulose Retention Enema 200 Rectal three times a day  metroNIDAZOLE  IVPB     metroNIDAZOLE  IVPB 500 IV Intermittent every 8 hours  norepinephrine Infusion 0.05 IV Continuous <Continuous>  octreotide  Infusion 50 IV Continuous <Continuous>  pantoprazole Infusion 8 IV Continuous <Continuous>  phytonadione   Solution 10 Oral daily  sodium polystyrene sulfonate Enema 15 Rectal daily      Weight  Weight (kg): 75.7 (10-04-21 @ 23:55)    ANTIBIOTICS:  cefepime   IVPB 2000 milliGRAM(s) IV Intermittent every 8 hours  metroNIDAZOLE  IVPB      metroNIDAZOLE  IVPB 500 milliGRAM(s) IV Intermittent every 8 hours      ALLERGIES:  No Known Allergies

## 2021-10-06 NOTE — DIETITIAN INITIAL EVALUATION ADULT. - PERTINENT LABORATORY DATA
10/6: RBC-3.34, H/H-9.6/28.4, POCT gluc-129, K-6.0, Cl-96, BUN-87, creat-1.6, gluc-176, corrected Ca-9.06, POCT gluc-170 (12:01) vs 187 (7:52); 10/5: PO4-5.9

## 2021-10-06 NOTE — PROGRESS NOTE ADULT - ASSESSMENT
65 yo male, with h/o HTN, drug abuse, Hepatitis C s/p INF, Liver cirrhosis s/p [place] shunt, COPD, portal vein thrombosis on Eliquis, HCC since January 2021 on chemotherapy, presented for weakness  Patient reports having weakness, decreased po intake since few weeks. He also has not been sleeping. He reports constipation and hematemesis for 1 day, minimal amount.    -Upper Gi bleed secondary to large esophageal s/p EGD 10/4 s/p intubation  -Decompensated liver cirrhosis sec to hepatitis C MELD Na score 26  -HCC on chemotherapy   -Left main and right portal vein thrombus on eliquis last dose 10/3 AM  -Transaminitis Mixed pattern predominantly hepatocellular likely due to ischemia  -Severe hyponatremia/Hyperkalemia-improving     -Currently intubated on levophed, precedex  -5 episodes of melena last night (on lactulose) no drop in Hb  -s/p ascitic fluid analysis no evidence of SBP, Albumin and total protein pending    Recs:   Active type and screen  2 18 gauge  c/w IV PPI day 3  c/w octreotide day 3  c/w cefepime add flagyl  Trend LFT   Hold anticoagulation  c/w lactulose to titrate to 2-3 soft BM  Vit K PO 10 mg for 3 days  small ascites s/p denver catheter   aldosterone was stopped due to hyperkalemia  please obtain records from [facility] 67 yo male, with h/o HTN, drug abuse, Hepatitis C s/p INF, Liver cirrhosis s/p [place] shunt, COPD, portal vein thrombosis on Eliquis, HCC since January 2021 on chemotherapy, presented for weakness  Patient reports having weakness, decreased po intake since few weeks. He also has not been sleeping. He reports constipation and hematemesis for 1 day, minimal amount.    -Upper Gi bleed secondary to large esophageal s/p EGD 10/4 s/p intubation  -Decompensated liver cirrhosis sec to hepatitis C MELD Na score 26 at the time of admission  -HCC on chemotherapy   -Left main and right portal vein thrombus on eliquis last dose 10/3 AM  -Transaminitis Mixed pattern predominantly hepatocellular likely due to ischemia  -Severe hyponatremia/Hyperkalemia-improving   -Septic shock on Abx    -Currently intubated on levophed, precedex  -5 episodes of melena last night (on lactulose) no drop in Hb  -s/p ascitic fluid analysis no evidence of SBP, Albumin and total protein pending  -MELD na 27 today  -worsening creatinine       Recs:   Active type and screen  2 18 gauge  c/w IV PPI day 3  c/w octreotide day 3  c/w cefepime add flagyl  Trend LFT   Hold anticoagulation  c/w lactulose to titrate to 2-3 soft BM  Vit K PO 10 mg for 3 days  small ascites s/p denver catheter   aldosterone was stopped due to hyperkalemia  please obtain records from [facility]

## 2021-10-06 NOTE — CONSULT NOTE ADULT - TIME BILLING
Initial consultation. Exhaustive compiling of data from charts since patient is intubated and sedated. Coordinated care with attending physician
I have personally seen and examined this patient.    I have reviewed all pertinent clinical information and reviewed all relevant imaging and diagnostic studies personally.   I counseled the patient about diagnostic testing and treatment plan. All questions were answered.   I discussed recommendations with the primary team.

## 2021-10-06 NOTE — CHART NOTE - NSCHARTNOTEFT_GEN_A_CORE
PALLIATIVE MEDICINE INTERDISCIPLINARY TEAM NOTE    Provider:  [x   ]Social Work   [   ]          [x   ] Initial visit [   ] Follow up    Family or contact name / phone #   Met with: [ x  ] Patient:  patient was observed to be resting comfortably  [ x  ] Family   [    ] Other:  T/C to Fallon Whaley, Significant Other (SO)    Primary Language: [   ] English [   ] Other*:                      *Interpretation provided by:    SUPPORT DIAGNOSES            (Check all that apply)  [   ] Psychosocial spiritual assessment (PSSA)  [   ] EOL issues  [   ] Cultural / spiritual concerns  [   ] Pain / suffering  [   ] Dementia / AMS  [   ] Other:  [   ] AD issues  [   ] Grief / loss / sadness  [   ] Discharge issues  [  x ] Distress / coping:  SO    PSYCHOSOCIAL ASSESSMENT OF PATIENT         (Check all that apply)  [ x  ] Initial Assessment            [   ] Reassessment          [   ] Not Applicable this visit    Pain/suffering acuity:  unable to assess  [   ] None to mild (0-3)           [   ] Moderate (4-6)        [   ] High (7-10)    Mental Status:  unable to assess  [   ] Alert/oriented (x3)          [   ] Confused/Altered(x2/x1)         [   ] Non-resp    Functional status:  [   ] Independent w ADLs      [   ] Needs Assistance             [  x ] Bedbound/Full Care    Coping:  unable to assess  [   ] Coping well                     [   ] Coping w/difficulty            [   ] Poor coping    Support system:  [ x  ] Strong                              [   ] Adequate                        [   ] Inadequate      Past history and medications for:   unknown    [ ] Anxiety       [ ] Depression    [ ] Sleep disorders     SPIRITUAL ASSESSMENT  Caodaism/Spiritual practice: ___________________________    Role of organized Pentec[employer]al:  [   ] Important                     [   ] Some (fam tradition, cultural)               [   ] None    Effects on medical care:  [   ] Yes, _____________________________________                         [   ] None    Cultural/Faith need:  [   ] Yes, _____________________________________                         [   ] None    Refer to Pastoral Care:  [   ] Yes           [   ] No, not at this time    SERVICE PROVIDED  [   ]PSSA                                                                             [   ]Discharge support / facilitation  [ x  ]AD / goals of care counseling                                  [   ]EOL / death / bereavement counseling  [ x ]Counseling / support:  SO                                             [   ] Family meeting  [   ]Prayer / sacrament / ritual                                      [   ] Referral   [   ]Other                                                                       NOTE and Plan of Care (PoC):    Patient is a 66 year old male.  Patient was admitted on 10/4/21, dx:  Acute Kidney Failure, Hyperkalemia.    Patient was observed to be resting comfortably.  T/C to Fallon Whaley, Significant Other:  Reviewed and provided information regarding Advanced Directives.  SO discussed that at this time, she wants patient to remain a FC.  SO expressed sadness and concerns regarding patient's disease process.  Support rendered.  Provided information with regard to the financial guidelines for Medicaid.

## 2021-10-07 LAB
ALBUMIN SERPL ELPH-MCNC: 1.6 G/DL — LOW (ref 3.5–5.2)
ALBUMIN SERPL ELPH-MCNC: 1.7 G/DL — LOW (ref 3.5–5.2)
ALBUMIN SERPL ELPH-MCNC: 1.8 G/DL — LOW (ref 3.5–5.2)
ALP SERPL-CCNC: 254 U/L — HIGH (ref 30–115)
ALP SERPL-CCNC: 282 U/L — HIGH (ref 30–115)
ALP SERPL-CCNC: 291 U/L — HIGH (ref 30–115)
ALT FLD-CCNC: 331 U/L — HIGH (ref 0–41)
ALT FLD-CCNC: 335 U/L — HIGH (ref 0–41)
ALT FLD-CCNC: 375 U/L — HIGH (ref 0–41)
AMMONIA BLD-MCNC: 80 UMOL/L — HIGH (ref 11–55)
ANION GAP SERPL CALC-SCNC: 12 MMOL/L — SIGNIFICANT CHANGE UP (ref 7–14)
ANION GAP SERPL CALC-SCNC: 14 MMOL/L — SIGNIFICANT CHANGE UP (ref 7–14)
ANION GAP SERPL CALC-SCNC: 15 MMOL/L — HIGH (ref 7–14)
APTT BLD: 31.8 SEC — SIGNIFICANT CHANGE UP (ref 27–39.2)
AST SERPL-CCNC: 172 U/L — HIGH (ref 0–41)
AST SERPL-CCNC: 186 U/L — HIGH (ref 0–41)
AST SERPL-CCNC: 200 U/L — HIGH (ref 0–41)
BASE EXCESS BLDA CALC-SCNC: -6.4 MMOL/L — LOW (ref -2–3)
BILIRUB SERPL-MCNC: 1.9 MG/DL — HIGH (ref 0.2–1.2)
BILIRUB SERPL-MCNC: 1.9 MG/DL — HIGH (ref 0.2–1.2)
BILIRUB SERPL-MCNC: 2 MG/DL — HIGH (ref 0.2–1.2)
BUN SERPL-MCNC: 93 MG/DL — CRITICAL HIGH (ref 10–20)
BUN SERPL-MCNC: 98 MG/DL — CRITICAL HIGH (ref 10–20)
BUN SERPL-MCNC: 99 MG/DL — CRITICAL HIGH (ref 10–20)
BUN SERPL-MCNC: 99 MG/DL — CRITICAL HIGH (ref 10–20)
CALCIUM SERPL-MCNC: 7.2 MG/DL — LOW (ref 8.5–10.1)
CALCIUM SERPL-MCNC: 7.3 MG/DL — LOW (ref 8.5–10.1)
CALCIUM SERPL-MCNC: 7.4 MG/DL — LOW (ref 8.5–10.1)
CHLORIDE SERPL-SCNC: 101 MMOL/L — SIGNIFICANT CHANGE UP (ref 98–110)
CHLORIDE SERPL-SCNC: 102 MMOL/L — SIGNIFICANT CHANGE UP (ref 98–110)
CHLORIDE SERPL-SCNC: 98 MMOL/L — SIGNIFICANT CHANGE UP (ref 98–110)
CO2 SERPL-SCNC: 15 MMOL/L — LOW (ref 17–32)
CO2 SERPL-SCNC: 17 MMOL/L — SIGNIFICANT CHANGE UP (ref 17–32)
CO2 SERPL-SCNC: 17 MMOL/L — SIGNIFICANT CHANGE UP (ref 17–32)
CREAT SERPL-MCNC: 1.6 MG/DL — HIGH (ref 0.7–1.5)
CREAT SERPL-MCNC: 1.6 MG/DL — HIGH (ref 0.7–1.5)
CREAT SERPL-MCNC: 1.8 MG/DL — HIGH (ref 0.7–1.5)
CRYPTOC AG FLD QL: NEGATIVE — SIGNIFICANT CHANGE UP
GLUCOSE BLDC GLUCOMTR-MCNC: 122 MG/DL — HIGH (ref 70–99)
GLUCOSE BLDC GLUCOMTR-MCNC: 133 MG/DL — HIGH (ref 70–99)
GLUCOSE BLDC GLUCOMTR-MCNC: 151 MG/DL — HIGH (ref 70–99)
GLUCOSE BLDC GLUCOMTR-MCNC: 171 MG/DL — HIGH (ref 70–99)
GLUCOSE BLDC GLUCOMTR-MCNC: 179 MG/DL — HIGH (ref 70–99)
GLUCOSE SERPL-MCNC: 125 MG/DL — HIGH (ref 70–99)
GLUCOSE SERPL-MCNC: 145 MG/DL — HIGH (ref 70–99)
GLUCOSE SERPL-MCNC: 192 MG/DL — HIGH (ref 70–99)
HCO3 BLDA-SCNC: 19 MMOL/L — LOW (ref 21–28)
HCT VFR BLD CALC: 27.2 % — LOW (ref 42–52)
HCT VFR BLD CALC: 27.4 % — LOW (ref 42–52)
HCT VFR BLD CALC: 27.8 % — LOW (ref 42–52)
HGB BLD-MCNC: 9.2 G/DL — LOW (ref 14–18)
HGB BLD-MCNC: 9.3 G/DL — LOW (ref 14–18)
HGB BLD-MCNC: 9.3 G/DL — LOW (ref 14–18)
HOROWITZ INDEX BLDA+IHG-RTO: 30 — SIGNIFICANT CHANGE UP
INR BLD: 2.48 RATIO — HIGH (ref 0.65–1.3)
LACTATE SERPL-SCNC: 2.7 MMOL/L — HIGH (ref 0.7–2)
MAGNESIUM SERPL-MCNC: 2.4 MG/DL — SIGNIFICANT CHANGE UP (ref 1.8–2.4)
MAGNESIUM SERPL-MCNC: 2.6 MG/DL — HIGH (ref 1.8–2.4)
MCHC RBC-ENTMCNC: 29.2 PG — SIGNIFICANT CHANGE UP (ref 27–31)
MCHC RBC-ENTMCNC: 29.3 PG — SIGNIFICANT CHANGE UP (ref 27–31)
MCHC RBC-ENTMCNC: 29.9 PG — SIGNIFICANT CHANGE UP (ref 27–31)
MCHC RBC-ENTMCNC: 33.5 G/DL — SIGNIFICANT CHANGE UP (ref 32–37)
MCHC RBC-ENTMCNC: 33.6 G/DL — SIGNIFICANT CHANGE UP (ref 32–37)
MCHC RBC-ENTMCNC: 34.2 G/DL — SIGNIFICANT CHANGE UP (ref 32–37)
MCV RBC AUTO: 87.3 FL — SIGNIFICANT CHANGE UP (ref 80–94)
MCV RBC AUTO: 87.4 FL — SIGNIFICANT CHANGE UP (ref 80–94)
MCV RBC AUTO: 87.5 FL — SIGNIFICANT CHANGE UP (ref 80–94)
NRBC # BLD: 0 /100 WBCS — SIGNIFICANT CHANGE UP (ref 0–0)
PCO2 BLDA: 37 MMHG — SIGNIFICANT CHANGE UP (ref 35–48)
PH BLDA: 7.32 — LOW (ref 7.35–7.45)
PLATELET # BLD AUTO: 232 K/UL — SIGNIFICANT CHANGE UP (ref 130–400)
PLATELET # BLD AUTO: 236 K/UL — SIGNIFICANT CHANGE UP (ref 130–400)
PLATELET # BLD AUTO: 319 K/UL — SIGNIFICANT CHANGE UP (ref 130–400)
PO2 BLDA: 93 MMHG — SIGNIFICANT CHANGE UP (ref 83–108)
POTASSIUM SERPL-MCNC: 5.2 MMOL/L — HIGH (ref 3.5–5)
POTASSIUM SERPL-MCNC: 5.4 MMOL/L — HIGH (ref 3.5–5)
POTASSIUM SERPL-MCNC: 5.6 MMOL/L — HIGH (ref 3.5–5)
POTASSIUM SERPL-MCNC: 6.1 MMOL/L — CRITICAL HIGH (ref 3.5–5)
POTASSIUM SERPL-SCNC: 5.2 MMOL/L — HIGH (ref 3.5–5)
POTASSIUM SERPL-SCNC: 5.4 MMOL/L — HIGH (ref 3.5–5)
POTASSIUM SERPL-SCNC: 5.6 MMOL/L — HIGH (ref 3.5–5)
POTASSIUM SERPL-SCNC: 6.1 MMOL/L — CRITICAL HIGH (ref 3.5–5)
PROCALCITONIN SERPL-MCNC: 12.5 NG/ML — HIGH (ref 0.02–0.1)
PROCALCITONIN SERPL-MCNC: 15.6 NG/ML — HIGH (ref 0.02–0.1)
PROT SERPL-MCNC: 5.1 G/DL — LOW (ref 6–8)
PROT SERPL-MCNC: 5.1 G/DL — LOW (ref 6–8)
PROT SERPL-MCNC: 5.2 G/DL — LOW (ref 6–8)
PROTHROM AB SERPL-ACNC: 28.3 SEC — HIGH (ref 9.95–12.87)
RBC # BLD: 3.11 M/UL — LOW (ref 4.7–6.1)
RBC # BLD: 3.14 M/UL — LOW (ref 4.7–6.1)
RBC # BLD: 3.18 M/UL — LOW (ref 4.7–6.1)
RBC # FLD: 18.4 % — HIGH (ref 11.5–14.5)
RBC # FLD: 18.7 % — HIGH (ref 11.5–14.5)
RBC # FLD: 18.8 % — HIGH (ref 11.5–14.5)
SAO2 % BLDA: 98.3 % — HIGH (ref 94–98)
SODIUM SERPL-SCNC: 129 MMOL/L — LOW (ref 135–146)
SODIUM SERPL-SCNC: 130 MMOL/L — LOW (ref 135–146)
SODIUM SERPL-SCNC: 132 MMOL/L — LOW (ref 135–146)
WBC # BLD: 24.67 K/UL — HIGH (ref 4.8–10.8)
WBC # BLD: 27.2 K/UL — HIGH (ref 4.8–10.8)
WBC # BLD: 28.21 K/UL — HIGH (ref 4.8–10.8)
WBC # FLD AUTO: 24.67 K/UL — HIGH (ref 4.8–10.8)
WBC # FLD AUTO: 27.2 K/UL — HIGH (ref 4.8–10.8)
WBC # FLD AUTO: 28.21 K/UL — HIGH (ref 4.8–10.8)

## 2021-10-07 PROCEDURE — 93010 ELECTROCARDIOGRAM REPORT: CPT

## 2021-10-07 PROCEDURE — 99291 CRITICAL CARE FIRST HOUR: CPT

## 2021-10-07 PROCEDURE — 71045 X-RAY EXAM CHEST 1 VIEW: CPT | Mod: 26

## 2021-10-07 PROCEDURE — 71045 X-RAY EXAM CHEST 1 VIEW: CPT | Mod: 26,77

## 2021-10-07 PROCEDURE — 99233 SBSQ HOSP IP/OBS HIGH 50: CPT

## 2021-10-07 RX ORDER — CEFEPIME 1 G/1
1000 INJECTION, POWDER, FOR SOLUTION INTRAMUSCULAR; INTRAVENOUS EVERY 12 HOURS
Refills: 0 | Status: DISCONTINUED | OUTPATIENT
Start: 2021-10-07 | End: 2021-10-13

## 2021-10-07 RX ORDER — DEXTROSE 50 % IN WATER 50 %
50 SYRINGE (ML) INTRAVENOUS ONCE
Refills: 0 | Status: COMPLETED | OUTPATIENT
Start: 2021-10-07 | End: 2021-10-07

## 2021-10-07 RX ORDER — INSULIN HUMAN 100 [IU]/ML
10 INJECTION, SOLUTION SUBCUTANEOUS ONCE
Refills: 0 | Status: COMPLETED | OUTPATIENT
Start: 2021-10-07 | End: 2021-10-07

## 2021-10-07 RX ORDER — PHYTONADIONE (VIT K1) 5 MG
10 TABLET ORAL DAILY
Refills: 0 | Status: COMPLETED | OUTPATIENT
Start: 2021-10-07 | End: 2021-10-10

## 2021-10-07 RX ORDER — SODIUM CHLORIDE 9 MG/ML
500 INJECTION INTRAMUSCULAR; INTRAVENOUS; SUBCUTANEOUS ONCE
Refills: 0 | Status: COMPLETED | OUTPATIENT
Start: 2021-10-07 | End: 2021-10-07

## 2021-10-07 RX ORDER — ELECTROLYTE SOLUTION,INJ
1 VIAL (ML) INTRAVENOUS
Refills: 0 | Status: DISCONTINUED | OUTPATIENT
Start: 2021-10-07 | End: 2021-10-07

## 2021-10-07 RX ORDER — SODIUM CHLORIDE 9 MG/ML
1000 INJECTION, SOLUTION INTRAVENOUS
Refills: 0 | Status: DISCONTINUED | OUTPATIENT
Start: 2021-10-07 | End: 2021-10-11

## 2021-10-07 RX ORDER — CASPOFUNGIN ACETATE 7 MG/ML
50 INJECTION, POWDER, LYOPHILIZED, FOR SOLUTION INTRAVENOUS EVERY 24 HOURS
Refills: 0 | Status: DISCONTINUED | OUTPATIENT
Start: 2021-10-08 | End: 2021-10-11

## 2021-10-07 RX ORDER — CASPOFUNGIN ACETATE 7 MG/ML
INJECTION, POWDER, LYOPHILIZED, FOR SOLUTION INTRAVENOUS
Refills: 0 | Status: DISCONTINUED | OUTPATIENT
Start: 2021-10-07 | End: 2021-10-11

## 2021-10-07 RX ORDER — CASPOFUNGIN ACETATE 7 MG/ML
70 INJECTION, POWDER, LYOPHILIZED, FOR SOLUTION INTRAVENOUS ONCE
Refills: 0 | Status: COMPLETED | OUTPATIENT
Start: 2021-10-07 | End: 2021-10-07

## 2021-10-07 RX ORDER — SODIUM POLYSTYRENE SULFONATE 4.1 MEQ/G
15 POWDER, FOR SUSPENSION ORAL
Refills: 0 | Status: DISCONTINUED | OUTPATIENT
Start: 2021-10-07 | End: 2021-10-10

## 2021-10-07 RX ORDER — ENOXAPARIN SODIUM 100 MG/ML
40 INJECTION SUBCUTANEOUS DAILY
Refills: 0 | Status: DISCONTINUED | OUTPATIENT
Start: 2021-10-07 | End: 2021-10-08

## 2021-10-07 RX ORDER — ENOXAPARIN SODIUM 100 MG/ML
70 INJECTION SUBCUTANEOUS EVERY 12 HOURS
Refills: 0 | Status: DISCONTINUED | OUTPATIENT
Start: 2021-10-07 | End: 2021-10-07

## 2021-10-07 RX ADMIN — LACTULOSE 200 GRAM(S): 10 SOLUTION ORAL at 05:14

## 2021-10-07 RX ADMIN — LACTULOSE 200 GRAM(S): 10 SOLUTION ORAL at 22:11

## 2021-10-07 RX ADMIN — Medication 100 MILLIGRAM(S): at 21:06

## 2021-10-07 RX ADMIN — DEXMEDETOMIDINE HYDROCHLORIDE IN 0.9% SODIUM CHLORIDE 3.79 MICROGRAM(S)/KG/HR: 4 INJECTION INTRAVENOUS at 03:55

## 2021-10-07 RX ADMIN — INSULIN HUMAN 10 UNIT(S): 100 INJECTION, SOLUTION SUBCUTANEOUS at 06:02

## 2021-10-07 RX ADMIN — CASPOFUNGIN ACETATE 260 MILLIGRAM(S): 7 INJECTION, POWDER, LYOPHILIZED, FOR SOLUTION INTRAVENOUS at 08:49

## 2021-10-07 RX ADMIN — CHLORHEXIDINE GLUCONATE 15 MILLILITER(S): 213 SOLUTION TOPICAL at 05:14

## 2021-10-07 RX ADMIN — Medication 1 EACH: at 21:04

## 2021-10-07 RX ADMIN — OCTREOTIDE ACETATE 10 MICROGRAM(S)/HR: 200 INJECTION, SOLUTION INTRAVENOUS; SUBCUTANEOUS at 03:54

## 2021-10-07 RX ADMIN — SODIUM CHLORIDE 2000 MILLILITER(S): 9 INJECTION INTRAMUSCULAR; INTRAVENOUS; SUBCUTANEOUS at 07:11

## 2021-10-07 RX ADMIN — PANTOPRAZOLE SODIUM 10 MG/HR: 20 TABLET, DELAYED RELEASE ORAL at 03:55

## 2021-10-07 RX ADMIN — SODIUM CHLORIDE 75 MILLILITER(S): 9 INJECTION, SOLUTION INTRAVENOUS at 12:07

## 2021-10-07 RX ADMIN — CHLORHEXIDINE GLUCONATE 15 MILLILITER(S): 213 SOLUTION TOPICAL at 17:04

## 2021-10-07 RX ADMIN — CEFEPIME 100 MILLIGRAM(S): 1 INJECTION, POWDER, FOR SOLUTION INTRAMUSCULAR; INTRAVENOUS at 17:05

## 2021-10-07 RX ADMIN — Medication 102 MILLIGRAM(S): at 13:00

## 2021-10-07 RX ADMIN — CEFEPIME 100 MILLIGRAM(S): 1 INJECTION, POWDER, FOR SOLUTION INTRAMUSCULAR; INTRAVENOUS at 05:14

## 2021-10-07 RX ADMIN — Medication 100 MILLIGRAM(S): at 13:21

## 2021-10-07 RX ADMIN — INSULIN HUMAN 10 UNIT(S): 100 INJECTION, SOLUTION SUBCUTANEOUS at 03:02

## 2021-10-07 RX ADMIN — CHLORHEXIDINE GLUCONATE 1 APPLICATION(S): 213 SOLUTION TOPICAL at 12:06

## 2021-10-07 RX ADMIN — Medication 50 MILLILITER(S): at 06:03

## 2021-10-07 RX ADMIN — Medication 100 MILLIGRAM(S): at 06:02

## 2021-10-07 RX ADMIN — LACTULOSE 200 GRAM(S): 10 SOLUTION ORAL at 13:22

## 2021-10-07 RX ADMIN — Medication 50 MILLILITER(S): at 03:03

## 2021-10-07 RX ADMIN — INSULIN GLARGINE 15 UNIT(S): 100 INJECTION, SOLUTION SUBCUTANEOUS at 21:15

## 2021-10-07 RX ADMIN — SODIUM POLYSTYRENE SULFONATE 15 GRAM(S): 4.1 POWDER, FOR SUSPENSION ORAL at 18:02

## 2021-10-07 RX ADMIN — SODIUM POLYSTYRENE SULFONATE 15 GRAM(S): 4.1 POWDER, FOR SUSPENSION ORAL at 09:12

## 2021-10-07 RX ADMIN — Medication 1: at 07:38

## 2021-10-07 NOTE — CHART NOTE - NSCHARTNOTEFT_GEN_A_CORE
NUTRITION SUPPORT CONSULTATION    HPI:  67 yo male, with h/o HTN, drug abuse, Hepatitis C s/p INF, Liver cirrhosis s/p [place] shunt, COPD, DVT? on Eliquis, HCC since January 2021 on chemotherapy, presented for weakness  Patient reports having weakness, decreased po intake since few weeks. He also has not been sleeping. He reports constipation and hematemesis for 1 day, minimal amount.  Denies fever, chills, chest pain, cough, sputum , SOB, diarrhea, dysuria  He has a [place] shunt  that was drained one day prior to admission    ED course : mental status alteration, sustained VT  bpm with BP 82/49 mmHg s/p shock + calcium gluconate  Patient has Hyperkalemia 6.8 CO2 14 s/p Bicarb drip, renal called, sp berry with good UO   (04 Oct 2021 06:15)      PAST MEDICAL & SURGICAL HISTORY:  HTN (hypertension)  Hepatitis C  2007  Drug abuse  Cancer, hepatocellular  January 2021  COPD, mild    Allergies  No Known Allergies    MEDICATIONS  (STANDING):  budesonide  80 MICROgram(s)/formoterol 4.5 MICROgram(s) Inhaler 2 Puff(s) Inhalation two times a day  caspofungin IVPB      cefepime   IVPB 1000 milliGRAM(s) IV Intermittent every 12 hours  chlorhexidine 0.12% Liquid 15 milliLiter(s) Oral Mucosa two times a day  chlorhexidine 4% Liquid 1 Application(s) Topical daily  dexMEDEtomidine Infusion 0.2 MICROgram(s)/kG/Hr (3.79 mL/Hr) IV Continuous <Continuous>  dextrose 40% Gel 15 Gram(s) Oral once  dextrose 5%. 1000 milliLiter(s) (50 mL/Hr) IV Continuous <Continuous>  dextrose 5%. 1000 milliLiter(s) (100 mL/Hr) IV Continuous <Continuous>  dextrose 50% Injectable 25 Gram(s) IV Push once  dextrose 50% Injectable 12.5 Gram(s) IV Push once  dextrose 50% Injectable 25 Gram(s) IV Push once  fentaNYL   Infusion. 0.5 MICROgram(s)/kG/Hr (3.79 mL/Hr) IV Continuous <Continuous>  glucagon  Injectable 1 milliGRAM(s) IntraMuscular once  insulin glargine Injectable (LANTUS) 15 Unit(s) SubCutaneous at bedtime  insulin lispro (ADMELOG) corrective regimen sliding scale   SubCutaneous three times a day before meals  insulin lispro Injectable (ADMELOG) 5 Unit(s) SubCutaneous three times a day before meals  lactulose Retention Enema 200 Gram(s) Rectal three times a day  metroNIDAZOLE  IVPB      metroNIDAZOLE  IVPB 500 milliGRAM(s) IV Intermittent every 8 hours  norepinephrine Infusion 0.05 MICROgram(s)/kG/Min (7.01 mL/Hr) IV Continuous <Continuous>  octreotide  Infusion 50 MICROgram(s)/Hr (10 mL/Hr) IV Continuous <Continuous>  pantoprazole Infusion 8 mG/Hr (10 mL/Hr) IV Continuous <Continuous>  phytonadione   Solution 10 milliGRAM(s) Oral daily  sodium polystyrene sulfonate Enema 15 Gram(s) Rectal two times a day    ICU Vital Signs Last 24 Hrs  T(C): 38.2 (07 Oct 2021 08:00), Max: 38.3 (07 Oct 2021 07:00)  T(F): 100.8 (07 Oct 2021 08:00), Max: 100.9 (07 Oct 2021 07:00)  HR: 88 (07 Oct 2021 08:18) (86 - 98)  BP: 96/60 (07 Oct 2021 08:00) (84/51 - 117/62)  BP(mean): 70 (07 Oct 2021 08:00) (41 - 88)  RR: 17 (07 Oct 2021 08:00) (10 - 20)  SpO2: 99% (07 Oct 2021 08:18) (96% - 100%)    Drug Dosing Weight  Height (cm): 175.3 (04 Oct 2021 23:55)  Weight (kg): 75.7 (04 Oct 2021 23:55)  BMI (kg/m2): 24.6 (04 Oct 2021 23:55)  BSA (m2): 1.91 (04 Oct 2021 23:55)    EXAM  Vented, sedated   Abd: +ascites, +[place] cath  Skin: no breakdown  Enteral access: none  IV access: Left IJ MLC, +port    LABS  10-07    129<L>  |  98  |  99<HH>  ----------------------------<  192<H>  5.6<H>   |  17  |  1.6<H>    Ca    7.4<L>      07 Oct 2021 04:20  Mg     2.6     10-07  Phosphorus Level, Serum: 5.9 mg/dL (10.05.21 @ 06:21)    TPro  5.1<L>  /  Alb  1.7<L>  /  TBili  2.0<H>  /  DBili  x   /  AST  200<H>  /  ALT  375<H>  /  AlkPhos  282<H>  10-07                        9.3    27.20 )-----------( 319      ( 07 Oct 2021 04:20 )             27.8     CAPILLARY BLOOD GLUCOSE  POCT Blood Glucose.: 179 mg/dL (07 Oct 2021 07:33)  POCT Blood Glucose.: 189 mg/dL (06 Oct 2021 22:12)  POCT Blood Glucose.: 170 mg/dL (06 Oct 2021 12:01)    Diet, NPO (10-04-21 @ 20:18)    ASSESSMENT  67 yo male, with h/o HTN, drug abuse, Hepatitis C s/p INF, Liver cirrhosis s/p [place] shunt, COPD, DVT? on Eliquis, HCC since January 2021 on chemotherapy, presented for weakness    - advanced hepatocellular carcinoma with transaminitis on chemo  - decompensated cirrhosis, ascites with [place] cath  - hematemesis 2nd esophageal, gastric varices  - severe anemia secondary to variceal bleed  - EBER  - hyperkalemia with sustained VT s/p shock  - hep C  - fevers, leukocytosis  - COPD    PLAN  - NUTRITION SUPPORT CONSULTATION    HPI:  65 yo male, with h/o HTN, drug abuse, Hepatitis C s/p INF, Liver cirrhosis s/p [place] shunt, COPD, DVT? on Eliquis, HCC since January 2021 on chemotherapy, presented for weakness  Patient reports having weakness, decreased po intake since few weeks. He also has not been sleeping. He reports constipation and hematemesis for 1 day, minimal amount.  Denies fever, chills, chest pain, cough, sputum , SOB, diarrhea, dysuria  He has a [place] shunt  that was drained one day prior to admission    ED course : mental status alteration, sustained VT  bpm with BP 82/49 mmHg s/p shock + calcium gluconate  Patient has Hyperkalemia 6.8 CO2 14 s/p Bicarb drip, renal called, sp berry with good UO   (04 Oct 2021 06:15)    10/05/2021: Pt had another episode of hematemesis overnight. Hgb was 5.5 from 7.9 this AM so Pt was given 2 units of blood. Hgb now stable at 9.8. BP was also low at 88/46 so Pt was given 1L NS bolus. Pt currently intubated.   10/06/2021 Patient had 5 episodes of melena overnight, patient remains hypotensive, patient is easily arousable. Patient was febrile over night.   - SAT and SBT was tried. Patient was able to stay off sedation through out the day but was lethargic Failed SBT   10/7/2021: Patient is now having brown stool. Patient became agitated overnight and needed to be sedated overnight. Will be retry SAT then SBT today 10/05/2021: Pt had another episode of hematemesis overnight. Hgb was 5.5 from 7.9 this AM so Pt was given 2 units of blood. Hgb now stable at 9.8. BP was also low at 88/46 so Pt was given 1L NS bolus. Pt currently intubated.     NG/OGT not able to be placed 2nd varices     PAST MEDICAL & SURGICAL HISTORY:  HTN (hypertension)  Hepatitis C  2007  Drug abuse  Cancer, hepatocellular  January 2021  COPD, mild    Allergies  No Known Allergies    MEDICATIONS  (STANDING):  budesonide  80 MICROgram(s)/formoterol 4.5 MICROgram(s) Inhaler 2 Puff(s) Inhalation two times a day  caspofungin IVPB      cefepime   IVPB 1000 milliGRAM(s) IV Intermittent every 12 hours  chlorhexidine 0.12% Liquid 15 milliLiter(s) Oral Mucosa two times a day  chlorhexidine 4% Liquid 1 Application(s) Topical daily  dexMEDEtomidine Infusion 0.2 MICROgram(s)/kG/Hr (3.79 mL/Hr) IV Continuous <Continuous>  dextrose 40% Gel 15 Gram(s) Oral once  dextrose 5%. 1000 milliLiter(s) (50 mL/Hr) IV Continuous <Continuous>  dextrose 5%. 1000 milliLiter(s) (100 mL/Hr) IV Continuous <Continuous>  dextrose 50% Injectable 25 Gram(s) IV Push once  dextrose 50% Injectable 12.5 Gram(s) IV Push once  dextrose 50% Injectable 25 Gram(s) IV Push once  fentaNYL   Infusion. 0.5 MICROgram(s)/kG/Hr (3.79 mL/Hr) IV Continuous <Continuous>  glucagon  Injectable 1 milliGRAM(s) IntraMuscular once  insulin glargine Injectable (LANTUS) 15 Unit(s) SubCutaneous at bedtime  insulin lispro (ADMELOG) corrective regimen sliding scale   SubCutaneous three times a day before meals  insulin lispro Injectable (ADMELOG) 5 Unit(s) SubCutaneous three times a day before meals  lactulose Retention Enema 200 Gram(s) Rectal three times a day  metroNIDAZOLE  IVPB      metroNIDAZOLE  IVPB 500 milliGRAM(s) IV Intermittent every 8 hours  norepinephrine Infusion 0.05 MICROgram(s)/kG/Min (7.01 mL/Hr) IV Continuous <Continuous>  octreotide  Infusion 50 MICROgram(s)/Hr (10 mL/Hr) IV Continuous <Continuous>  pantoprazole Infusion 8 mG/Hr (10 mL/Hr) IV Continuous <Continuous>  phytonadione   Solution 10 milliGRAM(s) Oral daily  sodium polystyrene sulfonate Enema 15 Gram(s) Rectal two times a day    ICU Vital Signs Last 24 Hrs  T(C): 38.2 (07 Oct 2021 08:00), Max: 38.3 (07 Oct 2021 07:00)  T(F): 100.8 (07 Oct 2021 08:00), Max: 100.9 (07 Oct 2021 07:00)  HR: 88 (07 Oct 2021 08:18) (86 - 98)  BP: 96/60 (07 Oct 2021 08:00) (84/51 - 117/62)  BP(mean): 70 (07 Oct 2021 08:00) (41 - 88)  RR: 17 (07 Oct 2021 08:00) (10 - 20)  SpO2: 99% (07 Oct 2021 08:18) (96% - 100%)    Drug Dosing Weight  Height (cm): 175.3 (04 Oct 2021 23:55)  Weight (kg): 75.7 (04 Oct 2021 23:55)  BMI (kg/m2): 24.6 (04 Oct 2021 23:55)  BSA (m2): 1.91 (04 Oct 2021 23:55)    EXAM  Vented, awake  +temporal wasting  Abd: distended +ascites, +[place] cath  Skin: no breakdown  Enteral access: none  IV access: Left IJ MLC, +Rt EJ single lumen    LABS  10-07    129<L>  |  98  |  99<HH>  ----------------------------<  192<H>  5.6<H>   |  17  |  1.6<H>    Ca    7.4<L>      07 Oct 2021 04:20  Mg     2.6     10-07  Phosphorus Level, Serum: 5.9 mg/dL (10.05.21 @ 06:21)    TPro  5.1<L>  /  Alb  1.7<L>  /  TBili  2.0<H>  /  DBili  x   /  AST  200<H>  /  ALT  375<H>  /  AlkPhos  282<H>  10-07                        9.3    27.20 )-----------( 319      ( 07 Oct 2021 04:20 )             27.8     CAPILLARY BLOOD GLUCOSE  POCT Blood Glucose.: 179 mg/dL (07 Oct 2021 07:33)  POCT Blood Glucose.: 189 mg/dL (06 Oct 2021 22:12)  POCT Blood Glucose.: 170 mg/dL (06 Oct 2021 12:01)    Diet, NPO (10-04-21 @ 20:18)    ASSESSMENT  65 yo male, with h/o HTN, drug abuse, Hepatitis C s/p INF, Liver cirrhosis s/p [place] shunt, COPD, DVT? on Eliquis, HCC since January 2021 on chemotherapy, presented for weakness    - advanced hepatocellular carcinoma with transaminitis on chemo  - decompensated cirrhosis, ascites with [place] cath  - hematemesis 2nd esophageal, gastric varices  - severe anemia secondary to variceal bleed  - EBER  - hyperkalemia with sustained VT s/p shock  - hep C  - fevers, leukocytosis  - COPD    PLAN  - start TPN tonight  - d/c IVF's when TPN begins  - bmp, in phos, mg, TG level in am  - will follow NUTRITION SUPPORT CONSULTATION    65 yo male, with h/o HTN, drug abuse, Hepatitis C s/p INF, Liver cirrhosis s/p [place] shunt, COPD, DVT? on Eliquis, HCC since January 2021 on chemotherapy, presented for weakness. Patient reports having weakness, decreased po intake since few weeks. He also has not been sleeping. He reports constipation and hematemesis for 1 day, minimal amount.  Denies fever, chills, chest pain, cough, sputum , SOB, diarrhea, dysuria  He has a [place] shunt  that was drained one day prior to admission    ED course : mental status alteration, sustained VT  bpm with BP 82/49 mmHg s/p shock + calcium gluconate  Patient has Hyperkalemia 6.8 CO2 14 s/p Bicarb drip, renal called, sp berry with good UO   (04 Oct 2021 06:15)    10/05/2021: Pt had another episode of hematemesis overnight. Hgb was 5.5 from 7.9 this AM so Pt was given 2 units of blood. Hgb now stable at 9.8. BP was also low at 88/46 so Pt was given 1L NS bolus. Pt currently intubated.   10/06/2021 Patient had 5 episodes of melena overnight, patient remains hypotensive, patient is easily arousable. Patient was febrile over night.   - SAT and SBT was tried. Patient was able to stay off sedation through out the day but was lethargic Failed SBT   10/7/2021: Patient is now having brown stool. Patient became agitated overnight and needed to be sedated overnight. Will be retry SAT then SBT today 10/05/2021: Pt had another episode of hematemesis overnight. Hgb was 5.5 from 7.9 this AM so Pt was given 2 units of blood. Hgb now stable at 9.8. BP was also low at 88/46 so Pt was given 1L NS bolus. Pt currently intubated.     NG/OGT not able to be placed 2nd varices     PAST MEDICAL & SURGICAL HISTORY:  HTN (hypertension)  Hepatitis C 2007  Drug abuse  Cancer, hepatocellular diagnosed January 2021  COPD, mild    Allergies  No Known Allergies    MEDICATIONS  (STANDING):  budesonide  80 MICROgram(s)/formoterol 4.5 MICROgram(s) Inhaler 2 Puff(s) Inhalation two times a day  caspofungin IVPB      cefepime   IVPB 1000 milliGRAM(s) IV Intermittent every 12 hours  chlorhexidine 0.12% Liquid 15 milliLiter(s) Oral Mucosa two times a day  chlorhexidine 4% Liquid 1 Application(s) Topical daily  dexMEDEtomidine Infusion 0.2 MICROgram(s)/kG/Hr (3.79 mL/Hr) IV Continuous <Continuous>  fentaNYL   Infusion. 0.5 MICROgram(s)/kG/Hr (3.79 mL/Hr) IV Continuous <Continuous>  insulin glargine Injectable (LANTUS) 15 Unit(s) SubCutaneous at bedtime  insulin lispro (ADMELOG) corrective regimen sliding scale   SubCutaneous three times a day before meals  insulin lispro Injectable (ADMELOG) 5 Unit(s) SubCutaneous three times a day before meals  lactulose Retention Enema 200 Gram(s) Rectal three times a day  metroNIDAZOLE  IVPB      metroNIDAZOLE  IVPB 500 milliGRAM(s) IV Intermittent every 8 hours  norepinephrine Infusion 0.05 MICROgram(s)/kG/Min (7.01 mL/Hr) IV Continuous <Continuous>  octreotide  Infusion 50 MICROgram(s)/Hr (10 mL/Hr) IV Continuous <Continuous>  pantoprazole Infusion 8 mG/Hr (10 mL/Hr) IV Continuous <Continuous>  phytonadione   Solution 10 milliGRAM(s) Oral daily  sodium polystyrene sulfonate Enema 15 Gram(s) Rectal two times a day    ICU Vital Signs Last 24 Hrs  T(C): 38.2 (07 Oct 2021 08:00), Max: 38.3 (07 Oct 2021 07:00)  T(F): 100.8 (07 Oct 2021 08:00), Max: 100.9 (07 Oct 2021 07:00)  HR: 88 (07 Oct 2021 08:18) (86 - 98)  BP: 96/60 (07 Oct 2021 08:00) (84/51 - 117/62)  BP(mean): 70 (07 Oct 2021 08:00) (41 - 88)  RR: 17 (07 Oct 2021 08:00) (10 - 20)  SpO2: 99% (07 Oct 2021 08:18) (96% - 100%)    Drug Dosing Weight  Height (cm): 175.3 (04 Oct 2021 23:55)  Weight (kg): 75.7 (04 Oct 2021 23:55)  BMI (kg/m2): 24.6 (04 Oct 2021 23:55)  BSA (m2): 1.91 (04 Oct 2021 23:55)    EXAM  Vented, awake, NO OG OR NG TUBE  +temporal wasting, + jaundice  Abd: distended +ascites, +[place] cath  Skin: no breakdown  Enteral access: none  IV access: Left IJ MLC, +Rt EJ single lumen, R arm periph locked access  PORT - RIGHT CHEST - ACCESSED AND WITH CAPPED EXTENSION TUBING ON IT. DRESSING NOT DATED OR LABELED.    LABS  10-07    129<L>  |  98  |  99<HH>  ----------------------------<  192<H>  5.6<H>   |  17  |  1.6<H>    Ca    7.4<L>      07 Oct 2021 04:20  Mg     2.6     10-07  Phosphorus Level, Serum: 5.9 mg/dL (10.05.21 @ 06:21)    TPro  5.1<L>  /  Alb  1.7<L>  /  TBili  2.0<H>  /  DBili  x   /  AST  200<H>  /  ALT  375<H>  /  AlkPhos  282<H>  10-07                        9.3    27.20 )-----------( 319      ( 07 Oct 2021 04:20 )             27.8     CAPILLARY BLOOD GLUCOSE  POCT Blood Glucose.: 179 mg/dL (07 Oct 2021 07:33)  POCT Blood Glucose.: 189 mg/dL (06 Oct 2021 22:12)  POCT Blood Glucose.: 170 mg/dL (06 Oct 2021 12:01)    Diet, NPO (10-04-21 @ 20:18)    ASSESSMENT  65 yo male, with h/o HTN, drug abuse, Hepatitis C s/p INF, Liver cirrhosis s/p [place] shunt, COPD, DVT? on Eliquis, HCC since January 2021 on chemotherapy, presented for weakness    - advanced hepatocellular carcinoma with transaminitis on chemo  - decompensated cirrhosis, ascites with [place] cath  - hematemesis 2nd esophageal, gastric varices  - severe anemia secondary to variceal bleed  - EBER  - hyperkalemia with sustained VT s/p shock  - hep C  - fevers, leukocytosis  - COPD  - sepsis without clear source    PLAN  - start TPN tonight  - d/c IVF's when TPN begins  - bmp, in phos, mg, TG level in am  - will follow  - discover when port accessed and dressed - if not known or if done > 6 days ago, should flush and heparinized port and remove Thompson needle now  d/w residents NUTRITION SUPPORT CONSULTATION    67 yo male, with h/o HTN, drug abuse, Hepatitis C s/p INF, Liver cirrhosis s/p [place] shunt, COPD, DVT? on Eliquis, HCC since January 2021 on chemotherapy, presented for weakness. Patient reports having weakness, decreased po intake since few weeks. He also has not been sleeping. He reports constipation and hematemesis for 1 day, minimal amount.  Denies fever, chills, chest pain, cough, sputum , SOB, diarrhea, dysuria  He has a [place] shunt  that was drained one day prior to admission    ED course : mental status alteration, sustained VT  bpm with BP 82/49 mmHg s/p shock + calcium gluconate  Patient has Hyperkalemia 6.8 CO2 14 s/p Bicarb drip, renal called, sp berry with good UO   (04 Oct 2021 06:15)    10/05/2021: Pt had another episode of hematemesis overnight. Hgb was 5.5 from 7.9 this AM so Pt was given 2 units of blood. Hgb now stable at 9.8. BP was also low at 88/46 so Pt was given 1L NS bolus. Pt currently intubated.   10/06/2021 Patient had 5 episodes of melena overnight, patient remains hypotensive, patient is easily arousable. Patient was febrile over night.   - SAT and SBT was tried. Patient was able to stay off sedation through out the day but was lethargic Failed SBT   10/7/2021: Patient is now having brown stool. Patient became agitated overnight and needed to be sedated overnight. Will be retry SAT then SBT today 10/05/2021: Pt had another episode of hematemesis overnight. Hgb was 5.5 from 7.9 this AM so Pt was given 2 units of blood. Hgb now stable at 9.8. BP was also low at 88/46 so Pt was given 1L NS bolus. Pt currently intubated.     NG/OGT not able to be placed 2nd varices     PAST MEDICAL & SURGICAL HISTORY:  HTN (hypertension)  Hepatitis C 2007  Drug abuse  Cancer, hepatocellular diagnosed January 2021  COPD, mild    Allergies  No Known Allergies    MEDICATIONS  (STANDING):  budesonide  80 MICROgram(s)/formoterol 4.5 MICROgram(s) Inhaler 2 Puff(s) Inhalation two times a day  caspofungin IVPB      cefepime   IVPB 1000 milliGRAM(s) IV Intermittent every 12 hours  chlorhexidine 0.12% Liquid 15 milliLiter(s) Oral Mucosa two times a day  chlorhexidine 4% Liquid 1 Application(s) Topical daily  dexMEDEtomidine Infusion 0.2 MICROgram(s)/kG/Hr (3.79 mL/Hr) IV Continuous <Continuous>  fentaNYL   Infusion. 0.5 MICROgram(s)/kG/Hr (3.79 mL/Hr) IV Continuous <Continuous>  insulin glargine Injectable (LANTUS) 15 Unit(s) SubCutaneous at bedtime  insulin lispro (ADMELOG) corrective regimen sliding scale   SubCutaneous three times a day before meals  insulin lispro Injectable (ADMELOG) 5 Unit(s) SubCutaneous three times a day before meals  lactulose Retention Enema 200 Gram(s) Rectal three times a day  metroNIDAZOLE  IVPB      metroNIDAZOLE  IVPB 500 milliGRAM(s) IV Intermittent every 8 hours  norepinephrine Infusion 0.05 MICROgram(s)/kG/Min (7.01 mL/Hr) IV Continuous <Continuous>  octreotide  Infusion 50 MICROgram(s)/Hr (10 mL/Hr) IV Continuous <Continuous>  pantoprazole Infusion 8 mG/Hr (10 mL/Hr) IV Continuous <Continuous>  phytonadione   Solution 10 milliGRAM(s) Oral daily  sodium polystyrene sulfonate Enema 15 Gram(s) Rectal two times a day    ICU Vital Signs Last 24 Hrs  T(C): 38.2 (07 Oct 2021 08:00), Max: 38.3 (07 Oct 2021 07:00)  T(F): 100.8 (07 Oct 2021 08:00), Max: 100.9 (07 Oct 2021 07:00)  HR: 88 (07 Oct 2021 08:18) (86 - 98)  BP: 96/60 (07 Oct 2021 08:00) (84/51 - 117/62)  BP(mean): 70 (07 Oct 2021 08:00) (41 - 88)  RR: 17 (07 Oct 2021 08:00) (10 - 20)  SpO2: 99% (07 Oct 2021 08:18) (96% - 100%)    Drug Dosing Weight  Height (cm): 175.3 (04 Oct 2021 23:55)  Weight (kg): 75.7 (04 Oct 2021 23:55)  BMI (kg/m2): 24.6 (04 Oct 2021 23:55)  BSA (m2): 1.91 (04 Oct 2021 23:55)    EXAM  Vented, awake, NO OG OR NG TUBE  +temporal wasting, + jaundice  Abd: distended +ascites, +[place] cath  Skin: no breakdown  Enteral access: none  IV access: Left IJ MLC, +Rt EJ single lumen, R arm periph locked access; Rt CW port accessed  PORT - RIGHT CHEST - ACCESSED AND WITH CAPPED EXTENSION TUBING ON IT. DRESSING NOT DATED OR LABELED.    LABS  10-07    129<L>  |  98  |  99<HH>  ----------------------------<  192<H>  5.6<H>   |  17  |  1.6<H>    Ca    7.4<L>      07 Oct 2021 04:20  Mg     2.6     10-07  Phosphorus Level, Serum: 5.9 mg/dL (10.05.21 @ 06:21)    TPro  5.1<L>  /  Alb  1.7<L>  /  TBili  2.0<H>  /  DBili  x   /  AST  200<H>  /  ALT  375<H>  /  AlkPhos  282<H>  10-07                        9.3    27.20 )-----------( 319      ( 07 Oct 2021 04:20 )             27.8     CAPILLARY BLOOD GLUCOSE  POCT Blood Glucose.: 179 mg/dL (07 Oct 2021 07:33)  POCT Blood Glucose.: 189 mg/dL (06 Oct 2021 22:12)  POCT Blood Glucose.: 170 mg/dL (06 Oct 2021 12:01)    Diet, NPO (10-04-21 @ 20:18)    ASSESSMENT  67 yo male, with h/o HTN, drug abuse, Hepatitis C s/p INF, Liver cirrhosis s/p [place] shunt, COPD, DVT? on Eliquis, HCC since January 2021 on chemotherapy, presented for weakness    - advanced hepatocellular carcinoma with transaminitis on chemo  - decompensated cirrhosis, ascites with [place] cath  - hematemesis 2nd esophageal, gastric varices  - severe anemia secondary to variceal bleed  - EBER  - hyperkalemia with sustained VT s/p shock  - hep C  - fevers, leukocytosis  - COPD  - sepsis without clear source    PLAN  - start TPN tonight  - d/c IVF's when TPN begins  - bmp, in phos, mg, TG level in am  - will follow  - discover when port accessed and dressed - if not known or if done > 6 days ago, should flush and heparinized port and remove Thompson needle now  d/w residents

## 2021-10-07 NOTE — PROGRESS NOTE ADULT - ASSESSMENT
PLAN:    CNS: keep sedate as needed  - Trial of SAT, if tolerates then SBT     HEENT: Oral care    PULMONARY:    - HOB @ 45 degrees  - taper O2  - TV to 420      CARDIOVASCULAR:   - Keep I < O ,   - cardiac ECHO done 10/06, f/u reads   - propranolol 10mg q 12 keep HR 50-60  - Will give 500 bolus of NS X1  - Keep bp above 100 systolic     GI: GI prophylaxis.  NPO,   - octreotide/ protonix drip,   - GI management   - EGD - grade IV lower and mid 1/3 esophageal varices with 5 bands placed successfully.   - Lactulose bowel regimen aim 3 BM/day , (Will be giving enemas for now)  - NO NGT can be placed this time   - rifaximin q 12, (on hold for now)  - Peritoneal fluid was sent and f/u gram stain and Cx : negative culture   - IR was consulted: Pt is a poor candidate for TIPS and has several contraindications for procedure.   - reached out to IR to drain the Denver catheter    - starting vit K 10mg daily today, will start after 3 doses   - Spoke to Dr. Rhodes (Patient's gastroenterologist) [phone]  - [facility] is sending patient's medical records   - Patient was seen  oncologist Dr. Gibson [phone]    RENAL:   - D/C bicarb drip  - lokelma tID,  - low K diet,   - Follow up lytes.    - Correct as needed,  - f/u nephro consult   - Hyperkalemia Kayexalate enema, insulin +dextrose +calcium gluconate     INFECTIOUS DISEASE:   - Follow up cultures,   - peritoneal fluid analysis and culture, if + remove access  - cefepime decreased to 1g q 12 ; flagyl 500mg iv q8. if with worsening clinical decompensation and is not associated with acute anemia, will start caspofungin 70 mg x 1 - followed by 35 mg daily   - HepC viral load : 75.9 and reactive   - vanco x1  - blood cx : NGTD   - Peritoneal fluid cx : NG   - UA: negative   - Procal 15.60   - f/u Fungitell   - ID recs are appreciated     HEMATOLOGICAL:    - Hold home Eliquis  - Per vascular - D/C AC and restart once Pt is stable. F/u OP for Duplex  - doppler LE   - transfuse Hgb >8  - serial HH  - S/p 2 units of PRBC 10/5     ENDOCRINE:    - check ketone B hydroxy butyrate, ETOH level, serum OSM, ASA  - Follow up FS.    - Insulin protocol if needed.   - keep -180.   - avoid hypoglycemia  - B hydroxy butyrate level neg, ETOH level <10, serum , ASA  - B-hydroxybutyrate, salicylate blood alcohol: neg     10/5/2021: GOC conservation was done with Patient's partner Fallon, patient did not clarify his wishes before, patient does not have a health care proxy or a family member who can be reached. Patient's partner expressed that she wants him to be full code for now and wants everything done for the patient.     MUSCULOSKELETAL: bed rest     Poor prognosis    Dispo ICU    palliative care eval   IMPRESSION   Hepatic encephalopathy ( ammonia 188)  Decompensated liver cirrhosis. Has Denver catheter  Sepsis   Hematemesis ( esophageal/ gastric varices)  Acute portal Vein thrombosis   Hyperkalemia with sustained VT s/p shock, creat 0.9 was on aldactone  Hyponatremia likely from volume overload  Hepatocellular carcinoma with transaminitis on chemotherapy  Hepatitis C treated with INF  COPD  HAGMA      PLAN:    CNS: keep sedate as needed  - Trial of SAT, if tolerates then SBT     HEENT: Oral care    PULMONARY:    - HOB @ 45 degrees  - taper O2  - TV to 420      CARDIOVASCULAR:   - Keep I < O ,   - cardiac ECHO done 10/06, f/u reads   - propranolol 10mg q 12 keep HR 50-60  - Will give 500 bolus of NS X1  - Keep bp above 100 systolic     GI: GI prophylaxis.  NPO,   - octreotide/ protonix drip,   - GI management   - EGD - grade IV lower and mid 1/3 esophageal varices with 5 bands placed successfully.   - Lactulose bowel regimen aim 3 BM/day , (Will be giving enemas for now)  - NO NGT can be placed this time   - rifaximin q 12, (on hold for now)  - Peritoneal fluid was sent and f/u gram stain and Cx : negative culture   - IR was consulted: Pt is a poor candidate for TIPS and has several contraindications for procedure.   - reached out to IR to drain the Denver catheter    - starting vit K 10mg daily today, will start after 3 doses   - Spoke to Dr. Rhodes (Patient's gastroenterologist) [phone]  - [facility] is sending patient's medical records   - Patient was seen  oncologist Dr. Gibson [phone]    RENAL:   - D/C bicarb drip  - lokelma tID,  - low K diet,   - Follow up lytes.    - Correct as needed,  - f/u nephro consult   - Hyperkalemia Kayexalate enema, insulin +dextrose +calcium gluconate     INFECTIOUS DISEASE:   - Follow up cultures,   - peritoneal fluid analysis and culture, if + remove access  - cefepime decreased to 1g q 12 ; flagyl 500mg iv q8. if with worsening clinical decompensation and is not associated with acute anemia, will start caspofungin 70 mg x 1 - followed by 35 mg daily   - HepC viral load : 75.9 and reactive   - vanco x1  - blood cx : NGTD   - Peritoneal fluid cx : NG   - UA: negative   - Procal 15.60   - f/u Fungitell   - ID recs are appreciated     HEMATOLOGICAL:    - Hold home Eliquis  - Per vascular - D/C AC and restart once Pt is stable. F/u OP for Duplex  - doppler LE   - transfuse Hgb >8  - serial HH  - S/p 2 units of PRBC 10/5     ENDOCRINE:    - check ketone B hydroxy butyrate, ETOH level, serum OSM, ASA  - Follow up FS.    - Insulin protocol if needed.   - keep -180.   - avoid hypoglycemia  - B hydroxy butyrate level neg, ETOH level <10, serum , ASA  - B-hydroxybutyrate, salicylate blood alcohol: neg     10/5/2021: GOC conservation was done with Patient's partner Fallon, patient did not clarify his wishes before, patient does not have a health care proxy or a family member who can be reached. Patient's partner expressed that she wants him to be full code for now and wants everything done for the patient.     MUSCULOSKELETAL: bed rest     Poor prognosis    Dispo ICU    palliative care eval   IMPRESSION   Hepatic encephalopathy ( ammonia 188)  Decompensated liver cirrhosis. Has Denver catheter  Sepsis   Hematemesis ( esophageal/ gastric varices)  Acute portal Vein thrombosis   Hyperkalemia with sustained VT s/p shock, creat 0.9 was on aldactone  Hyponatremia likely from volume overload  Hepatocellular carcinoma with transaminitis on chemotherapy  Hepatitis C treated with INF  COPD  HAGMA      PLAN:    CNS: keep sedate as needed  - Trial of SAT, if tolerates then SBT     HEENT: Oral care    PULMONARY:    - HOB @ 45 degrees  - taper O2  - TV to 420      CARDIOVASCULAR:   - Keep I < O ,   - cardiac ECHO done 10/06, f/u reads   - propranolol 10mg q 12 keep HR 50-60  - Will give 500 bolus of NS X1  - Keep bp above 100 systolic     GI: GI prophylaxis.  NPO,   - octreotide/ protonix drip,   - GI management   - EGD - grade IV lower and mid 1/3 esophageal varices with 5 bands placed successfully.   - Lactulose bowel regimen aim 3 BM/day , (Will be giving enemas for now)  - NO NGT can be placed this time   - rifaximin q 12, (on hold for now)  - Peritoneal fluid was sent and f/u gram stain and Cx : negative culture   - IR was consulted: Pt is a poor candidate for TIPS and has several contraindications for procedure.   - reached out to IR to drain the Denver catheter    - starting vit K 10mg daily today, will start after 3 doses   - Spoke to Dr. Rhodes (Patient's gastroenterologist) [phone]  - [facility] is sending patient's medical records   - Patient was seen  oncologist Dr. Gibson [phone]    RENAL:   - D/C bicarb drip  - lokelma tID,  - low K diet,   - Follow up lytes.    - Correct as needed,  - f/u nephro consult   - Hyperkalemia Kayexalate enema, insulin +dextrose +calcium gluconate     INFECTIOUS DISEASE:   - Follow up cultures,   - peritoneal fluid analysis and culture, if + remove access  - cefepime decreased to 1g q 12 ; flagyl 500mg iv q8.  starting caspofungin 70 mg x 1 - followed by 35 mg daily   - HepC viral load : 75.9 and reactive   - vanco x1  - blood cx : NGTD   - Peritoneal fluid cx : NG   - UA: negative   - Procal 15.60   - f/u Fungitell   - ID recs are appreciated     HEMATOLOGICAL:    - Hold home Eliquis  - Per vascular - D/C AC and restart once Pt is stable. F/u OP for Duplex  - doppler LE   - transfuse Hgb >8  - serial HH  - S/p 2 units of PRBC 10/5     ENDOCRINE:    - check ketone B hydroxy butyrate, ETOH level, serum OSM, ASA  - Follow up FS.    - Insulin protocol if needed.   - keep -180.   - avoid hypoglycemia  - B hydroxy butyrate level neg, ETOH level <10, serum , ASA  - B-hydroxybutyrate, salicylate blood alcohol: neg   - f/u nutrition eval     10/5/2021: GOC conservation was done with Patient's partner Fallon, patient did not clarify his wishes before, patient does not have a health care proxy or a family member who can be reached. Patient's partner expressed that she wants him to be full code for now and wants everything done for the patient.     MUSCULOSKELETAL: bed rest     Poor prognosis    Dispo ICU    palliative care eval   IMPRESSION   Hepatic encephalopathy ( ammonia 188)  Decompensated liver cirrhosis. Has Denver catheter  Sepsis   Hematemesis ( esophageal/ gastric varices)  Acute portal Vein thrombosis   Hyperkalemia with sustained VT s/p shock, creat 0.9 was on aldactone  Hyponatremia likely from volume overload  Hepatocellular carcinoma with transaminitis on chemotherapy  Hepatitis C treated with INF  COPD  HAGMA      PLAN:    CNS: keep sedate as needed  - Trial of SAT, if tolerates then SBT     HEENT: Oral care    PULMONARY:    - HOB @ 45 degrees  - taper O2  - TV to 420      CARDIOVASCULAR:   - Keep I < O ,   - cardiac ECHO done 10/06, f/u reads   - propranolol 10mg q 12 keep HR 50-60  - Will give 500 bolus of NS X2  - Keep bp above 100 systolic     GI: GI prophylaxis.  NPO,   - octreotide/ protonix drip,   - GI management   - EGD - grade IV lower and mid 1/3 esophageal varices with 5 bands placed successfully.   - Lactulose bowel regimen aim 3 BM/day , (Will be giving enemas for now)  - NO NGT can be placed this time   - rifaximin q 12, (on hold for now)  - Peritoneal fluid was sent and f/u gram stain and Cx : negative culture   - IR was consulted: Pt is a poor candidate for TIPS and has several contraindications for procedure.   - reached out to IR to drain the Denver catheter    - starting vit K 10mg daily today, will start after 3 doses   - Spoke to Dr. Rhodes (Patient's gastroenterologist) [phone]  - [facility] is sending patient's medical records   - Patient was seen  oncologist Dr. Gibson [phone]    RENAL:   - D/C bicarb drip  - lokelma tID,  - low K diet,   - Follow up lytes.    - Correct as needed,  - f/u nephro consult   - Hyperkalemia Kayexalate enema, insulin +dextrose +calcium gluconate     INFECTIOUS DISEASE:   - Follow up cultures,   - peritoneal fluid analysis and culture, if + remove access  - cefepime decreased to 1g q 12 ; flagyl 500mg iv q8.  starting caspofungin 70 mg x 1 - followed by 35 mg daily   - HepC viral load : 75.9 and reactive   - vanco x1  - blood cx : NGTD   - Peritoneal fluid cx : NG   - UA: negative   - Procal 15.60   - f/u Fungitell   - ID recs are appreciated     HEMATOLOGICAL:    - Hold home Eliquis  - Per vascular - D/C AC and restart once Pt is stable. F/u OP for Duplex  - doppler LE   - transfuse Hgb >8  - serial HH  - S/p 2 units of PRBC 10/5     ENDOCRINE:    - check ketone B hydroxy butyrate, ETOH level, serum OSM, ASA  - Follow up FS.    - Insulin protocol if needed.   - keep -180.   - avoid hypoglycemia  - B hydroxy butyrate level neg, ETOH level <10, serum , ASA  - B-hydroxybutyrate, salicylate blood alcohol: neg   - f/u nutrition eval     10/5/2021: GOC conservation was done with Patient's partner Fallon, patient did not clarify his wishes before, patient does not have a health care proxy or a family member who can be reached. Patient's partner expressed that she wants him to be full code for now and wants everything done for the patient.     MUSCULOSKELETAL: bed rest     Poor prognosis    Dispo ICU    palliative care eval

## 2021-10-07 NOTE — PROGRESS NOTE ADULT - SUBJECTIVE AND OBJECTIVE BOX
Gastroenterology progress note:     Patient is a 66y old  Male who presents with a chief complaint of weakness  Hyperkalemia  VT (07 Oct 2021 09:03)       Admitted on: 10-04-21    We are following the patient for cirrhosis and GI bleed     Interval History:  Still intubated failed SBT. had 6 bowel movements dark brown stools       PAST MEDICAL & SURGICAL HISTORY:  HTN (hypertension)    Hepatitis C  2007    Drug abuse    Cancer, hepatocellular  January 2021    COPD, mild        MEDICATIONS  (STANDING):  budesonide  80 MICROgram(s)/formoterol 4.5 MICROgram(s) Inhaler 2 Puff(s) Inhalation two times a day  caspofungin IVPB      cefepime   IVPB 1000 milliGRAM(s) IV Intermittent every 12 hours  chlorhexidine 0.12% Liquid 15 milliLiter(s) Oral Mucosa two times a day  chlorhexidine 4% Liquid 1 Application(s) Topical daily  dexMEDEtomidine Infusion 0.2 MICROgram(s)/kG/Hr (3.79 mL/Hr) IV Continuous <Continuous>  dextrose 40% Gel 15 Gram(s) Oral once  dextrose 5%. 1000 milliLiter(s) (50 mL/Hr) IV Continuous <Continuous>  dextrose 5%. 1000 milliLiter(s) (100 mL/Hr) IV Continuous <Continuous>  dextrose 50% Injectable 25 Gram(s) IV Push once  dextrose 50% Injectable 12.5 Gram(s) IV Push once  dextrose 50% Injectable 25 Gram(s) IV Push once  fentaNYL   Infusion. 0.5 MICROgram(s)/kG/Hr (3.79 mL/Hr) IV Continuous <Continuous>  glucagon  Injectable 1 milliGRAM(s) IntraMuscular once  insulin glargine Injectable (LANTUS) 15 Unit(s) SubCutaneous at bedtime  insulin lispro (ADMELOG) corrective regimen sliding scale   SubCutaneous three times a day before meals  insulin lispro Injectable (ADMELOG) 5 Unit(s) SubCutaneous three times a day before meals  lactated ringers. 1000 milliLiter(s) (75 mL/Hr) IV Continuous <Continuous>  lactulose Retention Enema 200 Gram(s) Rectal three times a day  metroNIDAZOLE  IVPB      metroNIDAZOLE  IVPB 500 milliGRAM(s) IV Intermittent every 8 hours  norepinephrine Infusion 0.05 MICROgram(s)/kG/Min (7.01 mL/Hr) IV Continuous <Continuous>  octreotide  Infusion 50 MICROgram(s)/Hr (10 mL/Hr) IV Continuous <Continuous>  pantoprazole Infusion 8 mG/Hr (10 mL/Hr) IV Continuous <Continuous>  Parenteral Nutrition - Adult 1 Each (70 mL/Hr) TPN Continuous <Continuous>  phytonadione  IVPB 10 milliGRAM(s) IV Intermittent daily  sodium polystyrene sulfonate Enema 15 Gram(s) Rectal two times a day    MEDICATIONS  (PRN):      Allergies  No Known Allergies      Review of Systems:   couldn't obtain     Physical Examination:  T(C): 36.5 (10-07-21 @ 16:00), Max: 38.3 (10-07-21 @ 07:00)  HR: 82 (10-07-21 @ 16:00) (82 - 98)  BP: 104/72 (10-07-21 @ 16:00) (84/51 - 117/62)  RR: 18 (10-07-21 @ 16:00) (14 - 22)  SpO2: 98% (10-07-21 @ 16:00) (96% - 99%)      10-06-21 @ 07:01  -  10-07-21 @ 07:00  --------------------------------------------------------  IN: 3294 mL / OUT: 720 mL / NET: 2574 mL    10-07-21 @ 07:01  -  10-07-21 @ 17:58  --------------------------------------------------------  IN: 1137 mL / OUT: 290 mL / NET: 847 mL      Constitutional: No acute distress.  Respiratory:  No signs of respiratory distress. Lung sounds are clear bilaterally.  Cardiovascular:  S1 S2, Regular rate and rhythm.  Abdominal: Abdomen is soft, symmetric, and non-tender without distention.    Skin: No rashes, No Jaundice.        Data:                        9.2    24.67 )-----------( 232      ( 07 Oct 2021 16:10 )             27.4     Hgb trend:  9.2  10-07-21 @ 16:10  9.3  10-07-21 @ 04:20  9.6  10-06-21 @ 20:00  9.6  10-06-21 @ 14:28  9.8  10-06-21 @ 04:50  9.8  10-06-21 @ 00:30  10.0  10-05-21 @ 13:32  9.8  10-05-21 @ 08:15  5.5  10-05-21 @ 00:41      10-05-21 @ 07:01  -  10-06-21 @ 07:00  --------------------------------------------------------  IN: 300 mL      10-07    132<L>  |  102  |  99<HH>  ----------------------------<  145<H>  5.4<H>   |  15<L>  |  1.8<H>    Ca    7.2<L>      07 Oct 2021 16:10  Mg     2.6     10-07    TPro  5.1<L>  /  Alb  1.6<L>  /  TBili  1.9<H>  /  DBili  x   /  AST  172<H>  /  ALT  331<H>  /  AlkPhos  254<H>  10-07    Liver panel trend:  TBili 1.9   /      /      /   AlkP 254   /   Tptn 5.1   /   Alb 1.6    /   DBili --      10-07  TBili 1.9   /      /      /   AlkP 291   /   Tptn 5.2   /   Alb 1.8    /   DBili --      10-07  TBili 2.0   /      /      /   AlkP 282   /   Tptn 5.1   /   Alb 1.7    /   DBili --      10-07  TBili 2.2   /      /      /   AlkP 281   /   Tptn 5.4   /   Alb 1.6    /   DBili --      10-06  TBili 1.9   /      /      /   AlkP 296   /   Tptn 5.3   /   Alb 1.8    /   DBili --      10-06  TBili 1.7   /      /      /   AlkP 282   /   Tptn 5.3   /   Alb 1.7    /   DBili --      10-06  TBili 1.2   /   AST 1079   /      /   AlkP 317   /   Tptn 5.4   /   Alb 1.8    /   DBili 0.7      10-05  TBili 1.1   /      /      /   AlkP 260   /   Tptn 5.3   /   Alb 1.8    /   DBili --      10-04  TBili 1.1   /      /      /   AlkP 247   /   Tptn 4.8   /   Alb 1.8    /   DBili --      10-04  TBili 2.5   /      /      /   AlkP 290   /   Tptn 6.1   /   Alb 1.9    /   DBili --      10-04      PT/INR - ( 07 Oct 2021 04:20 )   PT: 28.30 sec;   INR: 2.48 ratio         PTT - ( 07 Oct 2021 04:20 )  PTT:31.8 sec    Culture - Fungal, Body Fluid (collected 05 Oct 2021 18:53)  Source: .Body Fluid Peritoneal Fluid  Preliminary Report (06 Oct 2021 06:52):    Testing in progress    Culture - Body Fluid with Gram Stain (collected 05 Oct 2021 18:53)  Source: .Body Fluid Peritoneal Fluid  Gram Stain (06 Oct 2021 02:48):    polymorphonuclear leukocytes seen    No organisms seen    by cytocentrifuge  Preliminary Report (06 Oct 2021 18:41):    No growth         Radiology:

## 2021-10-07 NOTE — PROGRESS NOTE ADULT - ASSESSMENT
IMPRESSION:    Hepatic encephalopathy ( ammonia 188)  Decompensated liver cirrhosis. Has [place] catheter  Sepsis   Hematemesis ( esophageal/ gastric varices)  Acute portal Vein thrombosis   Hyperkalemia with sustained VT s/p shock, creat 0.9 was on aldactone  Hyponatremia likely from volume overload  Hepatocellular carcinoma with transaminitis on chemotherapy  Hepatitis C treated with INF  COPD  HAGMA      PLAN:    CNS: SAT  keep sedate as needed if stays on vent      HEENT: Oral care    PULMONARY:  HOB @ 45 degrees  continue O2 as necessary to maintain sats > 90%<94  SBT     CARDIOVASCULAR: Keep I < O ,   cardiac ECHO,   propranolol 10 q 12 keep HR 50-60      GI: GI prophylaxis.  NPO,   octreotide/ protonix drip,   GI management   EGD report reviewed,   bowel regimen aim 3 BM/day ,   rifaximin q 12,   needs nutrition    RENAL:   lokelma tID,  low K diet,   Follow up lytes.    Correct as needed,  nephro consult     INFECTIOUS DISEASE:   Follow up cultures,   nasal MRSA  peritoneal fluid analysis and culture,   cefepime 2 g q 8 ;   HepC viral load,     HEMATOLOGICAL:    Hold home Eliquis  prophylactic Lovenox   doppler LE neg  transfuse Hgb >8  serial HH    ENDOCRINE:    Follow up FS.    Insulin protocol if needed.   keep -180.   avoid hypoglycemia    MUSCULOSKELETAL: bed rest     Poor prognosis      palliative care eval reviewed

## 2021-10-07 NOTE — PROGRESS NOTE ADULT - SUBJECTIVE AND OBJECTIVE BOX
PATIENT:  SUZANNE BAZZI  [id number]    CHIEF COMPLAINT:  Patient is a 66y old  Male who presents with a chief complaint of weakness  Hyperkalemia  VT (07 Oct 2021 05:59)    67 yo male, with h/o HTN, drug abuse, Hepatitis C s/p INF, Liver cirrhosis s/p [place] shunt, COPD, DVT? on Eliquis, HCC since 2021 on chemotherapy, presented for weakness  Patient reports having weakness, decreased po intake since few weeks. He also has not been sleeping. He reports constipation and hematemesis for 1 day, minimal amount.  Denies fever, chills, chest pain, cough, sputum , SOB, diarrhea, dysuria  He has a [place] shunt  that was drained one day prior to admission    ED course : mental status alteration, sustained VT  bpm with BP 82/49 mmHg s/p shock + calcium gluconate  Patient has Hyperkalemia 6.8 CO2 14 s/p Bicarb drip, renal called, sp berry with good UO   (04 Oct 2021 06:15)     INTERVAL HPI/OVERNIGHT EVENTS:   10/05/2021: Pt had another episode of hematemesis overnight. Hgb was 5.5 from 7.9 this AM so Pt was given 2 units of blood. Hgb now stable at 9.8. BP was also low at 88/46 so Pt was given 1L NS bolus. Pt currently intubated.   10/06/2021 Patient had 5 episodes of melena overnight, patient remains hypotensive, patient is easily arousable. Patient was febrile over night   10/7/2021:      REVIEW OF SYSTEMS:        [ ] All other systems negative  [x ] Unable to assess ROS because patient is intubated     MEDICATIONS:  MEDICATIONS  (STANDING):  budesonide  80 MICROgram(s)/formoterol 4.5 MICROgram(s) Inhaler 2 Puff(s) Inhalation two times a day  cefepime   IVPB 1000 milliGRAM(s) IV Intermittent every 12 hours  chlorhexidine 0.12% Liquid 15 milliLiter(s) Oral Mucosa two times a day  chlorhexidine 4% Liquid 1 Application(s) Topical daily  dexMEDEtomidine Infusion 0.2 MICROgram(s)/kG/Hr (3.79 mL/Hr) IV Continuous <Continuous>  dextrose 40% Gel 15 Gram(s) Oral once  dextrose 5%. 1000 milliLiter(s) (50 mL/Hr) IV Continuous <Continuous>  dextrose 5%. 1000 milliLiter(s) (100 mL/Hr) IV Continuous <Continuous>  dextrose 50% Injectable 25 Gram(s) IV Push once  dextrose 50% Injectable 12.5 Gram(s) IV Push once  dextrose 50% Injectable 25 Gram(s) IV Push once  fentaNYL   Infusion. 0.5 MICROgram(s)/kG/Hr (3.79 mL/Hr) IV Continuous <Continuous>  glucagon  Injectable 1 milliGRAM(s) IntraMuscular once  insulin glargine Injectable (LANTUS) 15 Unit(s) SubCutaneous at bedtime  insulin lispro (ADMELOG) corrective regimen sliding scale   SubCutaneous three times a day before meals  insulin lispro Injectable (ADMELOG) 5 Unit(s) SubCutaneous three times a day before meals  lactulose Retention Enema 200 Gram(s) Rectal three times a day  metroNIDAZOLE  IVPB      metroNIDAZOLE  IVPB 500 milliGRAM(s) IV Intermittent every 8 hours  norepinephrine Infusion 0.05 MICROgram(s)/kG/Min (7.01 mL/Hr) IV Continuous <Continuous>  octreotide  Infusion 50 MICROgram(s)/Hr (10 mL/Hr) IV Continuous <Continuous>  pantoprazole Infusion 8 mG/Hr (10 mL/Hr) IV Continuous <Continuous>  phytonadione   Solution 10 milliGRAM(s) Oral daily  sodium polystyrene sulfonate Enema 15 Gram(s) Rectal daily    MEDICATIONS  (PRN):      ALLERGIES:  Allergies    No Known Allergies    Intolerances        OBJECTIVE:  ICU Vital Signs Last 24 Hrs  T(C): 38.2 (07 Oct 2021 06:00), Max: 38.2 (07 Oct 2021 06:00)  T(F): 100.7 (07 Oct 2021 06:00), Max: 100.7 (07 Oct 2021 06:00)  HR: 86 (07 Oct 2021 06:13) (84 - 98)  BP: 90/63 (07 Oct 2021 06:13) (84/51 - 117/62)  BP(mean): 70 (07 Oct 2021 06:13) (41 - 88)  ABP: --  ABP(mean): --  RR: 18 (07 Oct 2021 06:13) (10 - 20)  SpO2: 99% (07 Oct 2021 06:13) (96% - 100%)    Mode: AC/ CMV (Assist Control/ Continuous Mandatory Ventilation)  RR (machine): 18  TV (machine): 420  FiO2: 30  PEEP: 5  ITime: 1  MAP: 9  PIP: 21    Adult Advanced Hemodynamics Last 24 Hrs  CVP(mm Hg): --  CVP(cm H2O): --  CO: --  CI: --  PA: --  PA(mean): --  PCWP: --  SVR: --  SVRI: --  PVR: --  PVRI: --  CAPILLARY BLOOD GLUCOSE      POCT Blood Glucose.: 189 mg/dL (06 Oct 2021 22:12)  POCT Blood Glucose.: 170 mg/dL (06 Oct 2021 12:01)  POCT Blood Glucose.: 187 mg/dL (06 Oct 2021 07:57)    CAPILLARY BLOOD GLUCOSE      POCT Blood Glucose.: 189 mg/dL (06 Oct 2021 22:12)    I&O's Summary    05 Oct 2021 07:01  -  06 Oct 2021 07:00  --------------------------------------------------------  IN: 2589.2 mL / OUT: 460 mL / NET: 2129.2 mL    06 Oct 2021 07:01  -  07 Oct 2021 06:22  --------------------------------------------------------  IN: 3294 mL / OUT: 720 mL / NET: 2574 mL      Daily     Daily Weight in k.9 (07 Oct 2021 04:00)    PHYSICAL EXAMINATION:  General: WN/WD NAD  HEENT: PERRLA, EOMI, moist mucous membranes  Neurology: intubated   Respiratory: CTA B/L, normal respiratory effort, no wheezes, crackles, rales  CV: RRR, S1S2, no murmurs, rubs or gallops  Abdominal: Soft, NT, + distended, +BS, Last BM  Extremities: No edema, + peripheral pulses  Incisions:   Tubes:    LABS:  ABG - ( 07 Oct 2021 04:54 )  pH, Arterial: 7.32  pH, Blood: x     /  pCO2: 37    /  pO2: 93    / HCO3: 19    / Base Excess: -6.4  /  SaO2: 98.3                                    9.3    27.20 )-----------( 319      ( 07 Oct 2021 04:20 )             27.8     10-07    129<L>  |  98  |  99<HH>  ----------------------------<  192<H>  5.6<H>   |  17  |  1.6<H>    Ca    7.4<L>      07 Oct 2021 04:20  Mg     2.6     10-07    TPro  5.1<L>  /  Alb  1.7<L>  /  TBili  2.0<H>  /  DBili  x   /  AST  200<H>  /  ALT  375<H>  /  AlkPhos  282<H>  10-07    LIVER FUNCTIONS - ( 07 Oct 2021 04:20 )  Alb: 1.7 g/dL / Pro: 5.1 g/dL / ALK PHOS: 282 U/L / ALT: 375 U/L / AST: 200 U/L / GGT: x           PT/INR - ( 07 Oct 2021 04:20 )   PT: 28.30 sec;   INR: 2.48 ratio         PTT - ( 07 Oct 2021 04:20 )  PTT:31.8 sec            TELEMETRY:     EKG:     IMAGING:           PATIENT:  SUZANNE BAZZI  [id number]    CHIEF COMPLAINT:  Patient is a 66y old  Male who presents with a chief complaint of weakness  Hyperkalemia  VT (07 Oct 2021 05:59)    67 yo male, with h/o HTN, drug abuse, Hepatitis C s/p INF, Liver cirrhosis s/p [place] shunt, COPD, DVT? on Eliquis, HCC since 2021 on chemotherapy, presented for weakness  Patient reports having weakness, decreased po intake since few weeks. He also has not been sleeping. He reports constipation and hematemesis for 1 day, minimal amount.  Denies fever, chills, chest pain, cough, sputum , SOB, diarrhea, dysuria  He has a [place] shunt  that was drained one day prior to admission    ED course : mental status alteration, sustained VT  bpm with BP 82/49 mmHg s/p shock + calcium gluconate  Patient has Hyperkalemia 6.8 CO2 14 s/p Bicarb drip, renal called, sp berry with good UO   (04 Oct 2021 06:15)     INTERVAL HPI/OVERNIGHT EVENTS:   10/05/2021: Pt had another episode of hematemesis overnight. Hgb was 5.5 from 7.9 this AM so Pt was given 2 units of blood. Hgb now stable at 9.8. BP was also low at 88/46 so Pt was given 1L NS bolus. Pt currently intubated.   10/06/2021 Patient had 5 episodes of melena overnight, patient remains hypotensive, patient is easily arousable. Patient was febrile over night.   - SAT and SBT was tried. Patient was able to stay off sedation through out the day but was lethargic Failed SBT   10/7/2021: Patient is now having brown stool. Patient became agitated overnight and needed to be sedated overnight. Will be retry SAT then SBT today       REVIEW OF SYSTEMS:        [ ] All other systems negative  [x ] Unable to assess ROS because patient is intubated     MEDICATIONS:  MEDICATIONS  (STANDING):  budesonide  80 MICROgram(s)/formoterol 4.5 MICROgram(s) Inhaler 2 Puff(s) Inhalation two times a day  cefepime   IVPB 1000 milliGRAM(s) IV Intermittent every 12 hours  chlorhexidine 0.12% Liquid 15 milliLiter(s) Oral Mucosa two times a day  chlorhexidine 4% Liquid 1 Application(s) Topical daily  dexMEDEtomidine Infusion 0.2 MICROgram(s)/kG/Hr (3.79 mL/Hr) IV Continuous <Continuous>  dextrose 40% Gel 15 Gram(s) Oral once  dextrose 5%. 1000 milliLiter(s) (50 mL/Hr) IV Continuous <Continuous>  dextrose 5%. 1000 milliLiter(s) (100 mL/Hr) IV Continuous <Continuous>  dextrose 50% Injectable 25 Gram(s) IV Push once  dextrose 50% Injectable 12.5 Gram(s) IV Push once  dextrose 50% Injectable 25 Gram(s) IV Push once  fentaNYL   Infusion. 0.5 MICROgram(s)/kG/Hr (3.79 mL/Hr) IV Continuous <Continuous>  glucagon  Injectable 1 milliGRAM(s) IntraMuscular once  insulin glargine Injectable (LANTUS) 15 Unit(s) SubCutaneous at bedtime  insulin lispro (ADMELOG) corrective regimen sliding scale   SubCutaneous three times a day before meals  insulin lispro Injectable (ADMELOG) 5 Unit(s) SubCutaneous three times a day before meals  lactulose Retention Enema 200 Gram(s) Rectal three times a day  metroNIDAZOLE  IVPB      metroNIDAZOLE  IVPB 500 milliGRAM(s) IV Intermittent every 8 hours  norepinephrine Infusion 0.05 MICROgram(s)/kG/Min (7.01 mL/Hr) IV Continuous <Continuous>  octreotide  Infusion 50 MICROgram(s)/Hr (10 mL/Hr) IV Continuous <Continuous>  pantoprazole Infusion 8 mG/Hr (10 mL/Hr) IV Continuous <Continuous>  phytonadione   Solution 10 milliGRAM(s) Oral daily  sodium polystyrene sulfonate Enema 15 Gram(s) Rectal daily    MEDICATIONS  (PRN):      ALLERGIES:  Allergies    No Known Allergies    Intolerances        OBJECTIVE:  ICU Vital Signs Last 24 Hrs  T(C): 38.2 (07 Oct 2021 06:00), Max: 38.2 (07 Oct 2021 06:00)  T(F): 100.7 (07 Oct 2021 06:00), Max: 100.7 (07 Oct 2021 06:00)  HR: 86 (07 Oct 2021 06:13) (84 - 98)  BP: 90/63 (07 Oct 2021 06:13) (84/51 - 117/62)  BP(mean): 70 (07 Oct 2021 06:13) (41 - 88)  ABP: --  ABP(mean): --  RR: 18 (07 Oct 2021 06:13) (10 - 20)  SpO2: 99% (07 Oct 2021 06:13) (96% - 100%)    Mode: AC/ CMV (Assist Control/ Continuous Mandatory Ventilation)  RR (machine): 18  TV (machine): 420  FiO2: 30  PEEP: 5  ITime: 1  MAP: 9  PIP: 21    Adult Advanced Hemodynamics Last 24 Hrs  CVP(mm Hg): --  CVP(cm H2O): --  CO: --  CI: --  PA: --  PA(mean): --  PCWP: --  SVR: --  SVRI: --  PVR: --  PVRI: --  CAPILLARY BLOOD GLUCOSE      POCT Blood Glucose.: 189 mg/dL (06 Oct 2021 22:12)  POCT Blood Glucose.: 170 mg/dL (06 Oct 2021 12:01)  POCT Blood Glucose.: 187 mg/dL (06 Oct 2021 07:57)    CAPILLARY BLOOD GLUCOSE      POCT Blood Glucose.: 189 mg/dL (06 Oct 2021 22:12)    I&O's Summary    05 Oct 2021 07:01  -  06 Oct 2021 07:00  --------------------------------------------------------  IN: 2589.2 mL / OUT: 460 mL / NET: 2129.2 mL    06 Oct 2021 07:01  -  07 Oct 2021 06:22  --------------------------------------------------------  IN: 3294 mL / OUT: 720 mL / NET: 2574 mL      Daily     Daily Weight in k.9 (07 Oct 2021 04:00)    PHYSICAL EXAMINATION:  General: WN/WD NAD  HEENT: PERRLA, EOMI, moist mucous membranes  Neurology: intubated   Respiratory: CTA B/L, normal respiratory effort, no wheezes, crackles, rales  CV: RRR, S1S2, no murmurs, rubs or gallops  Abdominal: Soft, NT, + distended, +BS, Last BM  Extremities: No edema, + peripheral pulses  Incisions:   Tubes:    LABS:  ABG - ( 07 Oct 2021 04:54 )  pH, Arterial: 7.32  pH, Blood: x     /  pCO2: 37    /  pO2: 93    / HCO3: 19    / Base Excess: -6.4  /  SaO2: 98.3                                    9.3    27.20 )-----------( 319      ( 07 Oct 2021 04:20 )             27.8     10-    129<L>  |  98  |  99<HH>  ----------------------------<  192<H>  5.6<H>   |  17  |  1.6<H>    Ca    7.4<L>      07 Oct 2021 04:20  Mg     2.6     10-07    TPro  5.1<L>  /  Alb  1.7<L>  /  TBili  2.0<H>  /  DBili  x   /  AST  200<H>  /  ALT  375<H>  /  AlkPhos  282<H>  10-    LIVER FUNCTIONS - ( 07 Oct 2021 04:20 )  Alb: 1.7 g/dL / Pro: 5.1 g/dL / ALK PHOS: 282 U/L / ALT: 375 U/L / AST: 200 U/L / GGT: x           PT/INR - ( 07 Oct 2021 04:20 )   PT: 28.30 sec;   INR: 2.48 ratio         PTT - ( 07 Oct 2021 04:20 )  PTT:31.8 sec            TELEMETRY:     EKG:     IMAGING:

## 2021-10-07 NOTE — PROGRESS NOTE ADULT - ASSESSMENT
Pt with Decompensated liver cirrhosis, Hep C-s/p IFN, ascites -[place] cath, HCCa with transaminitis on chemotherapy  presents with weakness, found to have K 6.8 (was on Aldactone) , sustained VT -s/p shock; met acidosis, hepatic encephalopathy - Ammonia 188.    Hyperkalemia - due to volume depletion and aldosterone blockade, GIB  - improving with IVF and Insulin/Glucose, sp SPS enema   - last K 5.6 from 6.4 from 5.8, pt is making urine  Hyponatremia - Urine na 20, Osm 600 c/w intravascular volume depletion  -improving with hydration continue   Met acidosis - high lactic acid, hypoperfusion, improving  - off Bicarb drip,   Sepsis - r/o SBP - cont Cefipime  -pressors requiring  Hematemesis ( esophageal/ gastric varices) on OCtrotide appreciate GI  Acute portal Vein thrombosis - cont eliquis, seen by vasc sx  Prognosis guarded    will follow

## 2021-10-07 NOTE — PROGRESS NOTE ADULT - SUBJECTIVE AND OBJECTIVE BOX
Patient is a 66y old  Male who presents with a chief complaint of weakness  Hyperkalemia  VT (06 Oct 2021 16:52)        HPI:  65 yo male, with h/o HTN, drug abuse, Hepatitis C s/p INF, Liver cirrhosis s/p [place] shunt, COPD, DVT? on Eliquis, HCC since January 2021 on chemotherapy, presented for weakness  Patient reports having weakness, decreased po intake since few weeks. He also has not been sleeping. He reports constipation and hematemesis for 1 day, minimal amount.  Denies fever, chills, chest pain, cough, sputum , SOB, diarrhea, dysuria  He has a [place] shunt  that was drained one day prior to admission    ED course : mental status alteration, sustained VT  bpm with BP 82/49 mmHg s/p shock + calcium gluconate  Patient has Hyperkalemia 6.8 CO2 14 s/p Bicarb drip, renal called, sp berry with good UO   (04 Oct 2021 06:15)      Pt evaluated on rounds.  I reviewed the radiology tests and hospital record.    I reviewed previous notes on this patient.    Interval Events: No overnight events.    REVIEW OF SYSTEMS:   see HPI      OBJECTIVE:  ICU Vital Signs Last 24 Hrs  T(C): 37.9 (07 Oct 2021 04:00), Max: 38.1 (06 Oct 2021 11:00)  T(F): 100.2 (07 Oct 2021 04:00), Max: 100.5 (06 Oct 2021 11:00)  HR: 90 (07 Oct 2021 05:00) (84 - 98)  BP: 86/52 (07 Oct 2021 05:00) (83/58 - 117/62)  BP(mean): 60 (07 Oct 2021 05:00) (41 - 88)    RR: 18 (07 Oct 2021 05:00) (10 - 20)  SpO2: 97% (07 Oct 2021 05:00) (96% - 100%)    Mode: AC/ CMV (Assist Control/ Continuous Mandatory Ventilation), RR (machine): 18, TV (machine): 420, FiO2: 30, PEEP: 5, ITime: 1, MAP: 9, PIP: 21    10-05 @ 07:01  -  10-06 @ 07:00  --------------------------------------------------------  IN: 2589.2 mL / OUT: 460 mL / NET: 2129.2 mL    10-06 @ 07:01  -  10-07 @ 05:59  --------------------------------------------------------  IN: 3199 mL / OUT: 685 mL / NET: 2514 mL      CAPILLARY BLOOD GLUCOSE      POCT Blood Glucose.: 189 mg/dL (06 Oct 2021 22:12)        PHYSICAL EXAM:     · CONSTITUTIONAL:   not septic appearing,   well nourished,   NAD    · ENMT:   Airway patent,   Nasal mucosa clear.  Mouth with normal mucosa.   No thrush    · EYES:   Clear bilaterally,   pupils equal,   round and reactive to light.    · CARDIAC:   Normal rate,   regular rhythm.    Heart sounds S1, S2.   No murmurs, no rubs or gallops on auscultation  no edema        CAROTID:   normal systolic impulse  no bruits    · RESPIRATORY:   rales  normal chest expansion  no retractions or use of accessory muscles  palpation of chest is normal with no fremitus  percussion of chest demonstrates no hyperresonance or dullness    · GASTROINTESTINAL:  Abdomen soft,   non-tender,   + BS  liver/spleen not palpable    · MUSCULOSKELETAL:   no clubbing, cyanosis      · SKIN:   Skin normal color for race,   warm, dry   No evidence of rash.        · HEME LYMPH:   no splenomegaly.  No cervical  lymphadenopathy.  no inguinal lymphadenopathy    HOSPITAL MEDICATIONS:  MEDICATIONS  (STANDING):  budesonide  80 MICROgram(s)/formoterol 4.5 MICROgram(s) Inhaler 2 Puff(s) Inhalation two times a day  cefepime   IVPB 2000 milliGRAM(s) IV Intermittent every 8 hours  chlorhexidine 0.12% Liquid 15 milliLiter(s) Oral Mucosa two times a day  chlorhexidine 4% Liquid 1 Application(s) Topical daily  dexMEDEtomidine Infusion 0.2 MICROgram(s)/kG/Hr (3.79 mL/Hr) IV Continuous <Continuous>  dextrose 40% Gel 15 Gram(s) Oral once  dextrose 5%. 1000 milliLiter(s) (50 mL/Hr) IV Continuous <Continuous>  dextrose 5%. 1000 milliLiter(s) (100 mL/Hr) IV Continuous <Continuous>  dextrose 50% Injectable 25 Gram(s) IV Push once  dextrose 50% Injectable 12.5 Gram(s) IV Push once  dextrose 50% Injectable 25 Gram(s) IV Push once  dextrose 50% Injectable 50 milliLiter(s) IV Push once  fentaNYL   Infusion. 0.5 MICROgram(s)/kG/Hr (3.79 mL/Hr) IV Continuous <Continuous>  glucagon  Injectable 1 milliGRAM(s) IntraMuscular once  insulin glargine Injectable (LANTUS) 15 Unit(s) SubCutaneous at bedtime  insulin lispro (ADMELOG) corrective regimen sliding scale   SubCutaneous three times a day before meals  insulin lispro Injectable (ADMELOG) 5 Unit(s) SubCutaneous three times a day before meals  insulin regular  human recombinant 10 Unit(s) IV Push once  lactulose Retention Enema 200 Gram(s) Rectal three times a day  metroNIDAZOLE  IVPB      metroNIDAZOLE  IVPB 500 milliGRAM(s) IV Intermittent every 8 hours  norepinephrine Infusion 0.05 MICROgram(s)/kG/Min (7.01 mL/Hr) IV Continuous <Continuous>  octreotide  Infusion 50 MICROgram(s)/Hr (10 mL/Hr) IV Continuous <Continuous>  pantoprazole Infusion 8 mG/Hr (10 mL/Hr) IV Continuous <Continuous>  phytonadione   Solution 10 milliGRAM(s) Oral daily  sodium polystyrene sulfonate Enema 15 Gram(s) Rectal daily    MEDICATIONS  (PRN):    sodium chloride 0.9% Bolus:   500 milliLiter(s), IV Bolus, once, infuse over 15 Minute(s), Stop After 1 Doses  sodium chloride 0.9% Bolus:   1000 milliLiter(s), IV Bolus, once, infuse over 30 Minute(s), Stop After 1 Doses  sodium chloride 0.9% Bolus:   1000 milliLiter(s), IV Bolus, once, infuse over 30 Minute(s), Stop After 1 Doses  sodium chloride 0.9% Bolus:   3000 milliLiter(s), IV Bolus, once, infuse over 60 Minute(s), Stop After 1 Doses  Provider's Contact #: [phone]      LABS:                        9.3    27.20 )-----------( 319      ( 07 Oct 2021 04:20 )             27.8     10-07    129<L>  |  98  |  99<HH>  ----------------------------<  192<H>  5.6<H>   |  17  |  1.6<H>    Ca    7.4<L>      07 Oct 2021 04:20  Phos  5.9     10-05  Mg     2.6     10-07    TPro  5.1<L>  /  Alb  1.7<L>  /  TBili  2.0<H>  /  DBili  x   /  AST  200<H>  /  ALT  375<H>  /  AlkPhos  282<H>  10-07    PT/INR - ( 07 Oct 2021 04:20 )   PT: 28.30 sec;   INR: 2.48 ratio         PTT - ( 07 Oct 2021 04:20 )  PTT:31.8 sec    Arterial Blood Gas:  10-07 @ 04:54  7.32/37/93/19/98.3/-6.4  ABG lactate: --  Arterial Blood Gas:  10-06 @ 03:21  7.38/35/142/21/99.4/-3.8  ABG lactate: --  Arterial Blood Gas:  10-05 @ 15:22  7.45/33/142/23/99.4/-0.5  ABG lactate: --  Arterial Blood Gas:  10-05 @ 06:45  7.48/30/149/22/99.4/-0.3  ABG lactate: --          COVID-19 PCR: NotDetec (04 Oct 2021 02:17)    Mode: AC/ CMV (Assist Control/ Continuous Mandatory Ventilation)  RR (machine): 18  TV (machine): 420  FiO2: 30  PEEP: 5  ITime: 1  MAP: 9  PIP: 21      ABG - ( 07 Oct 2021 04:54 )  pH, Arterial: 7.32  pH, Blood: x     /  pCO2: 37    /  pO2: 93    / HCO3: 19    / Base Excess: -6.4  /  SaO2: 98.3                  RADIOLOGY: Today I personally interpreted the latest CXR and other pertinent films.

## 2021-10-07 NOTE — PROGRESS NOTE ADULT - SUBJECTIVE AND OBJECTIVE BOX
Nephrology progress note    THIS IS AN INCOMPLETE NOTE . FULL NOTE TO FOLLOW SHORTLY    Patient is seen and examined, events over the last 24 h noted .    Allergies:  No Known Allergies    Hospital Medications:   MEDICATIONS  (STANDING):  budesonide  80 MICROgram(s)/formoterol 4.5 MICROgram(s) Inhaler 2 Puff(s) Inhalation two times a day  caspofungin IVPB      cefepime   IVPB 1000 milliGRAM(s) IV Intermittent every 12 hours  chlorhexidine 0.12% Liquid 15 milliLiter(s) Oral Mucosa two times a day  chlorhexidine 4% Liquid 1 Application(s) Topical daily  dexMEDEtomidine Infusion 0.2 MICROgram(s)/kG/Hr (3.79 mL/Hr) IV Continuous <Continuous>  dextrose 40% Gel 15 Gram(s) Oral once  dextrose 5%. 1000 milliLiter(s) (50 mL/Hr) IV Continuous <Continuous>  dextrose 5%. 1000 milliLiter(s) (100 mL/Hr) IV Continuous <Continuous>  dextrose 50% Injectable 25 Gram(s) IV Push once  dextrose 50% Injectable 12.5 Gram(s) IV Push once  dextrose 50% Injectable 25 Gram(s) IV Push once  fentaNYL   Infusion. 0.5 MICROgram(s)/kG/Hr (3.79 mL/Hr) IV Continuous <Continuous>  glucagon  Injectable 1 milliGRAM(s) IntraMuscular once  insulin glargine Injectable (LANTUS) 15 Unit(s) SubCutaneous at bedtime  insulin lispro (ADMELOG) corrective regimen sliding scale   SubCutaneous three times a day before meals  insulin lispro Injectable (ADMELOG) 5 Unit(s) SubCutaneous three times a day before meals  lactulose Retention Enema 200 Gram(s) Rectal three times a day  metroNIDAZOLE  IVPB      metroNIDAZOLE  IVPB 500 milliGRAM(s) IV Intermittent every 8 hours  norepinephrine Infusion 0.05 MICROgram(s)/kG/Min (7.01 mL/Hr) IV Continuous <Continuous>  octreotide  Infusion 50 MICROgram(s)/Hr (10 mL/Hr) IV Continuous <Continuous>  pantoprazole Infusion 8 mG/Hr (10 mL/Hr) IV Continuous <Continuous>  phytonadione   Solution 10 milliGRAM(s) Oral daily  sodium polystyrene sulfonate Enema 15 Gram(s) Rectal two times a day        VITALS:  T(F): 100.8 (10-07-21 @ 08:00), Max: 100.9 (10-07-21 @ 07:00)  HR: 88 (10-07-21 @ 08:18)  BP: 96/60 (10-07-21 @ 08:00)  RR: 17 (10-07-21 @ 08:00)  SpO2: 99% (10-07-21 @ 08:18)  Wt(kg): --    10-05 @ 07:01  -  10-06 @ 07:00  --------------------------------------------------------  IN: 2589.2 mL / OUT: 460 mL / NET: 2129.2 mL    10-06 @ 07:  -  10-07 @ 07:00  --------------------------------------------------------  IN: 3294 mL / OUT: 720 mL / NET: 2574 mL    10-07 @ 07:01  -  10-07 @ 09:03  --------------------------------------------------------  IN: 95 mL / OUT: 40 mL / NET: 55 mL          PHYSICAL EXAM:  Constitutional: NAD  HEENT: anicteric sclera, oropharynx clear, MMM  Neck: No JVD  Respiratory: CTAB, no wheezes, rales or rhonchi  Cardiovascular: S1, S2, RRR  Gastrointestinal: BS+, soft, NT/ND  Extremities: No cyanosis or clubbing. No peripheral edema  :  No berry.   Skin: No rashes    LABS:  10-07    129<L>  |  98  |  99<HH>  ----------------------------<  192<H>  5.6<H>   |  17  |  1.6<H>    Ca    7.4<L>      07 Oct 2021 04:20  Mg     2.6     10-07    TPro  5.1<L>  /  Alb  1.7<L>  /  TBili  2.0<H>  /  DBili      /  AST  200<H>  /  ALT  375<H>  /  AlkPhos  282<H>  10-07                          9.3    27.20 )-----------( 319      ( 07 Oct 2021 04:20 )             27.8       Urine Studies:  Urinalysis Basic - ( 04 Oct 2021 04:38 )    Color: Yellow / Appearance: Clear / S.025 / pH:   Gluc:  / Ketone: Negative  / Bili: Negative / Urobili: <2 mg/dL   Blood:  / Protein: Negative / Nitrite: Negative   Leuk Esterase: Negative / RBC:  / WBC    Sq Epi:  / Non Sq Epi:  / Bacteria:       Osmolality, Random Urine: 606 mos/kg (10-04 @ 11:17)  Sodium, Random Urine: 20.0 mmoL/L (10-04 @ 11:17)  Creatinine, Random Urine: <4 mg/dL (10-04 @ 11:17)    RADIOLOGY & ADDITIONAL STUDIES:   Nephrology progress note  Patient is seen and examined, events over the last 24 h noted .  No new events     Allergies:  No Known Allergies    Hospital Medications:   MEDICATIONS  (STANDING):  budesonide  80 MICROgram(s)/formoterol 4.5 MICROgram(s) Inhaler 2 Puff(s) Inhalation two times a day  caspofungin IVPB      cefepime   IVPB 1000 milliGRAM(s) IV Intermittent every 12 hours  dexMEDEtomidine Infusion 0.2 MICROgram(s)/kG/Hr (3.79 mL/Hr) IV Continuous <Continuous>  fentaNYL   Infusion. 0.5 MICROgram(s)/kG/Hr (3.79 mL/Hr) IV Continuous <Continuous>  glucagon  Injectable 1 milliGRAM(s) IntraMuscular once  insulin glargine Injectable (LANTUS) 15 Unit(s) SubCutaneous at bedtime  insulin lispro (ADMELOG) corrective regimen sliding scale   SubCutaneous three times a day before meals  insulin lispro Injectable (ADMELOG) 5 Unit(s) SubCutaneous three times a day before meals  lactulose Retention Enema 200 Gram(s) Rectal three times a day     metroNIDAZOLE  IVPB 500 milliGRAM(s) IV Intermittent every 8 hours  norepinephrine Infusion 0.05 MICROgram(s)/kG/Min (7.01 mL/Hr) IV Continuous <Continuous>  octreotide  Infusion 50 MICROgram(s)/Hr (10 mL/Hr) IV Continuous <Continuous>  pantoprazole Infusion 8 mG/Hr (10 mL/Hr) IV Continuous <Continuous>  phytonadione   Solution 10 milliGRAM(s) Oral daily  sodium polystyrene sulfonate Enema 15 Gram(s) Rectal two times a day        VITALS:  T(F): 100.8 (10-07-21 @ 08:00), Max: 100.9 (10-07-21 @ 07:00)  HR: 88 (10-07-21 @ 08:18)  BP: 96/60 (10-07-21 @ 08:00)  RR: 17 (10-07-21 @ 08:00)  SpO2: 99% (10-07-21 @ 08:18)      10-05 @ 07:01  -  10-06 @ 07:00  --------------------------------------------------------  IN: 2589.2 mL / OUT: 460 mL / NET: 2129.2 mL    10-06 @ 07:01  -  10-07 @ 07:00  --------------------------------------------------------  IN: 3294 mL / OUT: 720 mL / NET: 2574 mL    10-07 @ 07:  -  10-07 @ 09:03  --------------------------------------------------------  IN: 95 mL / OUT: 40 mL / NET: 55 mL          PHYSICAL EXAM:  Constitutional: intubated on MV   Neck: No JVD  Respiratory: CTAB,   Cardiovascular: S1, S2, RRR  Gastrointestinal: BS+, soft, NT/ND  Extremities: No cyanosis or clubbing. No peripheral edema  :  No berry.   Skin: No rashes    LABS:  10-07    129<L>  |  98  |  99<HH>  ----------------------------<  192<H>  5.6<H>   |  17  |  1.6<H>  Potassium Trend: 5.6<--, 6.1<--, 6.0<--, 6.4<--, 5.8<--  SODIUM TREND:  Sodium 129 [10-07 @ 04:20]  Sodium 130 [10-06 @ 20:00]  Sodium 129 [10-06 @ 14:27]  Sodium 129 [10-06 @ 04:50]  Sodium 128 [10-05 @ 06:21]  Sodium 125 [10-04 @ 14:42]  Sodium 120 [10-04 @ 10:34]  Sodium 120 [10-04 @ 03:39]  Sodium 119 [10-04 @ 02:05]  Sodium 120 [10-04 @ 01:00]    Ca    7.4<L>      07 Oct 2021 04:20  Mg     2.6     10-07    TPro  5.1<L>  /  Alb  1.7<L>  /  TBili  2.0<H>  /  DBili      /  AST  200<H>  /  ALT  375<H>  /  AlkPhos  282<H>  10-07                          9.3    27.20 )-----------( 319      ( 07 Oct 2021 04:20 )             27.8       Urine Studies:  Urinalysis Basic - ( 04 Oct 2021 04:38 )    Color: Yellow / Appearance: Clear / S.025 / pH:   Gluc:  / Ketone: Negative  / Bili: Negative / Urobili: <2 mg/dL   Blood:  / Protein: Negative / Nitrite: Negative   Leuk Esterase: Negative / RBC:  / WBC    Sq Epi:  / Non Sq Epi:  / Bacteria:       Osmolality, Random Urine: 606 mos/kg (10-04 @ 11:17)  Sodium, Random Urine: 20.0 mmoL/L (10-04 @ 11:17)  Creatinine, Random Urine: <4 mg/dL (10-04 @ 11:17)    RADIOLOGY & ADDITIONAL STUDIES:

## 2021-10-07 NOTE — PROGRESS NOTE ADULT - ASSESSMENT
65 yo male, with h/o HTN, drug abuse, Hepatitis C s/p INF, Liver cirrhosis s/p [place] shunt, COPD, portal vein thrombosis on Eliquis, HCC since January 2021 on chemotherapy, presented for weakness  Patient reports having weakness, decreased po intake since few weeks. He also has not been sleeping. He reports constipation and hematemesis for 1 day, minimal amount.    -Upper Gi bleed secondary to large esophageal s/p EGD 10/4 s/p intubation  -Decompensated liver cirrhosis sec to hepatitis C MELD Na score 26 at the time of admission  -HCC on chemotherapy   -Left main and right portal vein thrombus on eliquis last dose 10/3 AM  -Transaminitis Mixed pattern predominantly hepatocellular likely due to ischemia  -Severe hyponatremia/Hyperkalemia-improving   -Septic shock on Abx    -Currently intubated on levophed, precedex  -5 episodes of melena last night (on lactulose) no drop in Hb  -s/p ascitic fluid analysis no evidence of SBP, Albumin and total protein pending  -MELD na 27 today  -worsening creatinine       Recs:   Active type and screen  2 18 gauge  c/w IV PPI day 3  c/w octreotide day 3  c/w cefepime add flagyl  Trend LFT   Hold anticoagulation  c/w lactulose to titrate to 2-3 soft BM  Vit K PO 10 mg for 3 days  small ascites s/p [place] catheter   aldosterone was stopped due to hyperkalemia  please obtain [facility] 65 yo male, with h/o HTN, drug abuse, Hepatitis C s/p INF, Liver cirrhosis s/p [place] shunt, COPD, portal vein thrombosis on Eliquis, HCC since January 2021 on chemotherapy, presented for weakness  Patient reports having weakness, decreased po intake since few weeks. He also has not been sleeping. He reports constipation and hematemesis for 1 day, minimal amount.    -Upper Gi bleed secondary to large esophageal s/p EGD 10/4 s/p intubation  -Decompensated liver cirrhosis sec to hepatitis C MELD Na score 26 at the time of admission  -HCC on chemotherapy   -Left main and right portal vein thrombus on eliquis last dose 10/3 AM  -Transaminitis Mixed pattern predominantly hepatocellular likely due to ischemia-improving  -Severe hyponatremia/Hyperkalemia-improving   -Septic shock on Abx    -Currently intubated on levophed, precedex (failed SBT today)  -6 episodes of dark brown stools  -s/p ascitic fluid analysis no evidence of SBP, SAAG 1.6 and TP<1  -MELD na 28 today  -Slightly worsened creatinine      Recs:   c/w IV PPI day 4  c/w octreotide day 4  c/w cefepime add flagyl  given TP<1(c/w abx for SBP prophylaxis)  c/w lactulose to titrate to 2-3 soft BM  Vit K PO 10 mg for 3 days (day 2)  Trend LFT   Active type and screen  2 18 gauge  Hold anticoagulation, can start DVT ppx  please call IR for removal of [place] catheter

## 2021-10-08 LAB
ALBUMIN SERPL ELPH-MCNC: 1.5 G/DL — LOW (ref 3.5–5.2)
ALBUMIN SERPL ELPH-MCNC: 1.6 G/DL — LOW (ref 3.5–5.2)
ALP SERPL-CCNC: 239 U/L — HIGH (ref 30–115)
ALP SERPL-CCNC: 245 U/L — HIGH (ref 30–115)
ALT FLD-CCNC: 216 U/L — HIGH (ref 0–41)
ALT FLD-CCNC: 279 U/L — HIGH (ref 0–41)
ANION GAP SERPL CALC-SCNC: 13 MMOL/L — SIGNIFICANT CHANGE UP (ref 7–14)
ANION GAP SERPL CALC-SCNC: 15 MMOL/L — HIGH (ref 7–14)
APTT BLD: 30.7 SEC — SIGNIFICANT CHANGE UP (ref 27–39.2)
AST SERPL-CCNC: 119 U/L — HIGH (ref 0–41)
AST SERPL-CCNC: 74 U/L — HIGH (ref 0–41)
BASE EXCESS BLDA CALC-SCNC: -6.9 MMOL/L — LOW (ref -2–3)
BILIRUB SERPL-MCNC: 1.3 MG/DL — HIGH (ref 0.2–1.2)
BILIRUB SERPL-MCNC: 2.2 MG/DL — HIGH (ref 0.2–1.2)
BUN SERPL-MCNC: 102 MG/DL — CRITICAL HIGH (ref 10–20)
CALCIUM SERPL-MCNC: 7.4 MG/DL — LOW (ref 8.5–10.1)
CALCIUM SERPL-MCNC: 7.4 MG/DL — LOW (ref 8.5–10.1)
CHLORIDE SERPL-SCNC: 102 MMOL/L — SIGNIFICANT CHANGE UP (ref 98–110)
CHLORIDE SERPL-SCNC: 106 MMOL/L — SIGNIFICANT CHANGE UP (ref 98–110)
CO2 SERPL-SCNC: 17 MMOL/L — SIGNIFICANT CHANGE UP (ref 17–32)
CO2 SERPL-SCNC: 18 MMOL/L — SIGNIFICANT CHANGE UP (ref 17–32)
CREAT SERPL-MCNC: 1.5 MG/DL — SIGNIFICANT CHANGE UP (ref 0.7–1.5)
CREAT SERPL-MCNC: 1.7 MG/DL — HIGH (ref 0.7–1.5)
FUNGITELL: 46 PG/ML — SIGNIFICANT CHANGE UP
FUNGITELL: <31 PG/ML — SIGNIFICANT CHANGE UP
GAS PNL BLDA: SIGNIFICANT CHANGE UP
GLUCOSE BLDC GLUCOMTR-MCNC: 169 MG/DL — HIGH (ref 70–99)
GLUCOSE BLDC GLUCOMTR-MCNC: 176 MG/DL — HIGH (ref 70–99)
GLUCOSE BLDC GLUCOMTR-MCNC: 219 MG/DL — HIGH (ref 70–99)
GLUCOSE BLDC GLUCOMTR-MCNC: 269 MG/DL — HIGH (ref 70–99)
GLUCOSE SERPL-MCNC: 193 MG/DL — HIGH (ref 70–99)
GLUCOSE SERPL-MCNC: 273 MG/DL — HIGH (ref 70–99)
HCO3 BLDA-SCNC: 18 MMOL/L — LOW (ref 21–28)
HCT VFR BLD CALC: 26.7 % — LOW (ref 42–52)
HCT VFR BLD CALC: 26.9 % — LOW (ref 42–52)
HCV RNA FLD QL NAA+PROBE: SIGNIFICANT CHANGE UP
HGB BLD-MCNC: 8.8 G/DL — LOW (ref 14–18)
HGB BLD-MCNC: 9 G/DL — LOW (ref 14–18)
HOROWITZ INDEX BLDA+IHG-RTO: 30 — SIGNIFICANT CHANGE UP
INR BLD: 2.02 RATIO — HIGH (ref 0.65–1.3)
LACTATE SERPL-SCNC: 1.5 MMOL/L — SIGNIFICANT CHANGE UP (ref 0.7–2)
MAGNESIUM SERPL-MCNC: 2.4 MG/DL — SIGNIFICANT CHANGE UP (ref 1.8–2.4)
MCHC RBC-ENTMCNC: 29 PG — SIGNIFICANT CHANGE UP (ref 27–31)
MCHC RBC-ENTMCNC: 29.5 PG — SIGNIFICANT CHANGE UP (ref 27–31)
MCHC RBC-ENTMCNC: 33 G/DL — SIGNIFICANT CHANGE UP (ref 32–37)
MCHC RBC-ENTMCNC: 33.5 G/DL — SIGNIFICANT CHANGE UP (ref 32–37)
MCV RBC AUTO: 88.1 FL — SIGNIFICANT CHANGE UP (ref 80–94)
MCV RBC AUTO: 88.2 FL — SIGNIFICANT CHANGE UP (ref 80–94)
METHANOL BLD-MCNC: <.01 G/DL — SIGNIFICANT CHANGE UP (ref 0–0.01)
NRBC # BLD: 0 /100 WBCS — SIGNIFICANT CHANGE UP (ref 0–0)
NRBC # BLD: 0 /100 WBCS — SIGNIFICANT CHANGE UP (ref 0–0)
PCO2 BLDA: 36 MMHG — SIGNIFICANT CHANGE UP (ref 35–48)
PH BLDA: 7.32 — LOW (ref 7.35–7.45)
PHOSPHATE SERPL-MCNC: 5.8 MG/DL — HIGH (ref 2.1–4.9)
PLATELET # BLD AUTO: 165 K/UL — SIGNIFICANT CHANGE UP (ref 130–400)
PLATELET # BLD AUTO: 213 K/UL — SIGNIFICANT CHANGE UP (ref 130–400)
PO2 BLDA: 113 MMHG — HIGH (ref 83–108)
POTASSIUM SERPL-MCNC: 3.7 MMOL/L — SIGNIFICANT CHANGE UP (ref 3.5–5)
POTASSIUM SERPL-MCNC: 4.7 MMOL/L — SIGNIFICANT CHANGE UP (ref 3.5–5)
POTASSIUM SERPL-SCNC: 3.7 MMOL/L — SIGNIFICANT CHANGE UP (ref 3.5–5)
POTASSIUM SERPL-SCNC: 4.7 MMOL/L — SIGNIFICANT CHANGE UP (ref 3.5–5)
PROT SERPL-MCNC: 5 G/DL — LOW (ref 6–8)
PROT SERPL-MCNC: 5.1 G/DL — LOW (ref 6–8)
PROTHROM AB SERPL-ACNC: 23.1 SEC — HIGH (ref 9.95–12.87)
RBC # BLD: 3.03 M/UL — LOW (ref 4.7–6.1)
RBC # BLD: 3.05 M/UL — LOW (ref 4.7–6.1)
RBC # FLD: 18.6 % — HIGH (ref 11.5–14.5)
RBC # FLD: 18.7 % — HIGH (ref 11.5–14.5)
SAO2 % BLDA: 99.5 % — HIGH (ref 94–98)
SODIUM SERPL-SCNC: 132 MMOL/L — LOW (ref 135–146)
SODIUM SERPL-SCNC: 139 MMOL/L — SIGNIFICANT CHANGE UP (ref 135–146)
TRIGL SERPL-MCNC: 133 MG/DL — SIGNIFICANT CHANGE UP
WBC # BLD: 15.9 K/UL — HIGH (ref 4.8–10.8)
WBC # BLD: 21.24 K/UL — HIGH (ref 4.8–10.8)
WBC # FLD AUTO: 15.9 K/UL — HIGH (ref 4.8–10.8)
WBC # FLD AUTO: 21.24 K/UL — HIGH (ref 4.8–10.8)

## 2021-10-08 PROCEDURE — 99233 SBSQ HOSP IP/OBS HIGH 50: CPT

## 2021-10-08 PROCEDURE — 71045 X-RAY EXAM CHEST 1 VIEW: CPT | Mod: 26

## 2021-10-08 RX ORDER — INSULIN GLARGINE 100 [IU]/ML
20 INJECTION, SOLUTION SUBCUTANEOUS AT BEDTIME
Refills: 0 | Status: DISCONTINUED | OUTPATIENT
Start: 2021-10-08 | End: 2021-11-02

## 2021-10-08 RX ORDER — ELECTROLYTE SOLUTION,INJ
1 VIAL (ML) INTRAVENOUS
Refills: 0 | Status: DISCONTINUED | OUTPATIENT
Start: 2021-10-08 | End: 2021-10-08

## 2021-10-08 RX ORDER — I.V. FAT EMULSION 20 G/100ML
1.32 EMULSION INTRAVENOUS
Qty: 100.15 | Refills: 0 | Status: DISCONTINUED | OUTPATIENT
Start: 2021-10-08 | End: 2021-10-08

## 2021-10-08 RX ORDER — HEPARIN SODIUM 5000 [USP'U]/ML
5000 INJECTION INTRAVENOUS; SUBCUTANEOUS EVERY 8 HOURS
Refills: 0 | Status: DISCONTINUED | OUTPATIENT
Start: 2021-10-08 | End: 2021-10-19

## 2021-10-08 RX ADMIN — LACTULOSE 200 GRAM(S): 10 SOLUTION ORAL at 14:41

## 2021-10-08 RX ADMIN — Medication 1: at 17:36

## 2021-10-08 RX ADMIN — Medication 102 MILLIGRAM(S): at 13:39

## 2021-10-08 RX ADMIN — SODIUM POLYSTYRENE SULFONATE 15 GRAM(S): 4.1 POWDER, FOR SUSPENSION ORAL at 05:02

## 2021-10-08 RX ADMIN — Medication 100 MILLIGRAM(S): at 13:30

## 2021-10-08 RX ADMIN — PANTOPRAZOLE SODIUM 10 MG/HR: 20 TABLET, DELAYED RELEASE ORAL at 18:14

## 2021-10-08 RX ADMIN — Medication 7.01 MICROGRAM(S)/KG/MIN: at 18:14

## 2021-10-08 RX ADMIN — I.V. FAT EMULSION 31.3 GM/KG/DAY: 20 EMULSION INTRAVENOUS at 21:04

## 2021-10-08 RX ADMIN — HEPARIN SODIUM 5000 UNIT(S): 5000 INJECTION INTRAVENOUS; SUBCUTANEOUS at 21:03

## 2021-10-08 RX ADMIN — INSULIN GLARGINE 20 UNIT(S): 100 INJECTION, SOLUTION SUBCUTANEOUS at 21:03

## 2021-10-08 RX ADMIN — Medication 5 UNIT(S): at 17:36

## 2021-10-08 RX ADMIN — CHLORHEXIDINE GLUCONATE 1 APPLICATION(S): 213 SOLUTION TOPICAL at 13:31

## 2021-10-08 RX ADMIN — CHLORHEXIDINE GLUCONATE 15 MILLILITER(S): 213 SOLUTION TOPICAL at 05:02

## 2021-10-08 RX ADMIN — CHLORHEXIDINE GLUCONATE 15 MILLILITER(S): 213 SOLUTION TOPICAL at 17:37

## 2021-10-08 RX ADMIN — CEFEPIME 100 MILLIGRAM(S): 1 INJECTION, POWDER, FOR SOLUTION INTRAMUSCULAR; INTRAVENOUS at 05:01

## 2021-10-08 RX ADMIN — OCTREOTIDE ACETATE 10 MICROGRAM(S)/HR: 200 INJECTION, SOLUTION INTRAVENOUS; SUBCUTANEOUS at 18:14

## 2021-10-08 RX ADMIN — SODIUM CHLORIDE 75 MILLILITER(S): 9 INJECTION, SOLUTION INTRAVENOUS at 18:15

## 2021-10-08 RX ADMIN — DEXMEDETOMIDINE HYDROCHLORIDE IN 0.9% SODIUM CHLORIDE 3.79 MICROGRAM(S)/KG/HR: 4 INJECTION INTRAVENOUS at 18:14

## 2021-10-08 RX ADMIN — Medication 100 MILLIGRAM(S): at 21:04

## 2021-10-08 RX ADMIN — CASPOFUNGIN ACETATE 260 MILLIGRAM(S): 7 INJECTION, POWDER, LYOPHILIZED, FOR SOLUTION INTRAVENOUS at 09:06

## 2021-10-08 RX ADMIN — Medication 5 UNIT(S): at 13:28

## 2021-10-08 RX ADMIN — HEPARIN SODIUM 5000 UNIT(S): 5000 INJECTION INTRAVENOUS; SUBCUTANEOUS at 13:29

## 2021-10-08 RX ADMIN — Medication 5 UNIT(S): at 09:01

## 2021-10-08 RX ADMIN — LACTULOSE 200 GRAM(S): 10 SOLUTION ORAL at 05:03

## 2021-10-08 RX ADMIN — Medication 3: at 09:02

## 2021-10-08 RX ADMIN — CEFEPIME 100 MILLIGRAM(S): 1 INJECTION, POWDER, FOR SOLUTION INTRAMUSCULAR; INTRAVENOUS at 17:40

## 2021-10-08 RX ADMIN — Medication 100 MILLIGRAM(S): at 05:01

## 2021-10-08 RX ADMIN — Medication 2: at 13:29

## 2021-10-08 RX ADMIN — SODIUM POLYSTYRENE SULFONATE 15 GRAM(S): 4.1 POWDER, FOR SUSPENSION ORAL at 17:37

## 2021-10-08 RX ADMIN — Medication 1 EACH: at 21:04

## 2021-10-08 NOTE — CHART NOTE - NSCHARTNOTEFT_GEN_A_CORE
T(F): 96.6 (10-08-21 @ 04:00), Max: 99.7 (10-07-21 @ 10:00)  HR: 73 (10-08-21 @ 07:00) (70 - 92)  BP: 105/71 (10-08-21 @ 07:00) (98/62 - 135/83)  RR: 18 (10-08-21 @ 07:00) (17 - 22)  SpO2: 98% (10-08-21 @ 07:00) (96% - 99%)  Mode: AC/ CMV (Assist Control/ Continuous Mandatory Ventilation)  RR (machine): 18  TV (machine): 420  FiO2: 30  PEEP: 5  ITime: 1  MAP: 9  PIP: 20    I&O's Detail    07 Oct 2021 07:01  -  08 Oct 2021 07:00  --------------------------------------------------------  IN:    Dexmedetomidine: 106 mL    FentaNYL: 142 mL    IV PiggyBack: 400 mL    Lactated Ringers: 600 mL    Norepinephrine: 1159 mL    Octreotide: 240 mL    Pantoprazole: 240 mL    TPN (Total Parenteral Nutrition): 770 mL  Total IN: 3657 mL    OUT:    Indwelling Catheter - Urethral (mL): 735 mL  Total OUT: 735 mL    Total NET: 2922 mL    10-08    132<L>  |  102  |  102<HH>  ----------------------------<  273<H>  4.7   |  17  |  1.7<H>    Ca    7.4<L>      08 Oct 2021 04:30  Phos  5.8     10-08  Mg     2.4     10-08  Triglycerides, Serum: 133 mg/dL (10.08.21 @ 04:30)    TPro  5.0<L>  /  Alb  1.6<L>  /  TBili  2.2<H>  /  DBili  x   /  AST  119<H>  /  ALT  279<H>  /  AlkPhos  239<H>  10-08                        8.8    21.24 )-----------( 213      ( 08 Oct 2021 04:30 )             26.7     CAPILLARY BLOOD GLUCOSE  POCT Blood Glucose.: 269 mg/dL (08 Oct 2021 08:24)  POCT Blood Glucose.: 151 mg/dL (07 Oct 2021 21:14)  POCT Blood Glucose.: 133 mg/dL (07 Oct 2021 16:27)  POCT Blood Glucose.: 122 mg/dL (07 Oct 2021 11:49)     Daily Weight in k.8 (08 Oct 2021 06:00)    Diet, NPO (10-04-21 @ 20:18)     ASSESSMENT  67 yo male, with h/o HTN, drug abuse, Hepatitis C s/p INF, Liver cirrhosis s/p [place] shunt, COPD, DVT? on Eliquis, HCC since 2021 on chemotherapy, presented for weakness    - advanced hepatocellular carcinoma with transaminitis on chemo  - decompensated cirrhosis, ascites with [place] cath  - hematemesis 2nd esophageal, gastric varices  - severe anemia secondary to variceal bleed  - EBER  - hyperkalemia with sustained VT s/p shock  - hep C  - fevers, leukocytosis  - COPD  - sepsis without clear source    PLAN  - cont TPN tonight  - daily bmp, in phos, mg while on parenteral nutrition  - will follow  - discover when port accessed and dressed - if not known or if done > 6 days ago, should flush and heparinized port and remove Thompson needle now Vented, sedated. Opens eyes  Tm 99.7  NPO, TPN started last night via port  +levophed, octreotide, protonix drips  [place] cath in place, not draining (awaiting connector). +ascites    T(F): 96.6 (10-08-21 @ 04:00), Max: 99.7 (10-07-21 @ 10:00)  HR: 73 (10-08-21 @ 07:00) (70 - 92)  BP: 105/71 (10-08-21 @ 07:00) (98/62 - 135/83)  RR: 18 (10-08-21 @ 07:00) (17 - 22)  SpO2: 98% (10-08-21 @ 07:00) (96% - 99%)  Mode: AC/ CMV (Assist Control/ Continuous Mandatory Ventilation)  RR (machine): 18  TV (machine): 420  FiO2: 30  PEEP: 5  ITime: 1  MAP: 9  PIP: 20    I&O's Detail    07 Oct 2021 07:01  -  08 Oct 2021 07:00  --------------------------------------------------------  IN:    Dexmedetomidine: 106 mL    FentaNYL: 142 mL    IV PiggyBack: 400 mL    Lactated Ringers: 600 mL    Norepinephrine: 1159 mL    Octreotide: 240 mL    Pantoprazole: 240 mL    TPN (Total Parenteral Nutrition): 770 mL  Total IN: 3657 mL    OUT:    Indwelling Catheter - Urethral (mL): 735 mL  Total OUT: 735 mL    Total NET: 2922 mL    10    132<L>  |  102  |  102<HH>  ----------------------------<  273<H>  4.7   |  17  |  1.7<H>    Ca    7.4<L>      08 Oct 2021 04:30  Phos  5.8     10  Mg     2.4     10-08  Triglycerides, Serum: 133 mg/dL (10.08.21 @ 04:30)    TPro  5.0<L>  /  Alb  1.6<L>  /  TBili  2.2<H>  /  DBili  x   /  AST  119<H>  /  ALT  279<H>  /  AlkPhos  239<H>  10-08                        8.8    21.24 )-----------( 213      ( 08 Oct 2021 04:30 )             26.7     CAPILLARY BLOOD GLUCOSE  POCT Blood Glucose.: 269 mg/dL (08 Oct 2021 08:24)  POCT Blood Glucose.: 151 mg/dL (07 Oct 2021 21:14)  POCT Blood Glucose.: 133 mg/dL (07 Oct 2021 16:27)  POCT Blood Glucose.: 122 mg/dL (07 Oct 2021 11:49)     Daily Weight in k.8 (08 Oct 2021 06:00)    Diet, NPO (10-04-21 @ 20:18)     ASSESSMENT  67 yo male, with h/o HTN, drug abuse, Hepatitis C s/p INF, Liver cirrhosis s/p [place] shunt, COPD, DVT? on Eliquis, HCC since 2021 on chemotherapy, presented for weakness    - advanced hepatocellular carcinoma with transaminitis on chemo  - decompensated cirrhosis, ascites with [place] cath  - hematemesis 2nd esophageal, gastric varices  - severe anemia secondary to variceal bleed  - EBER  - hyperkalemia with sustained VT s/p shock  - hep C  - fevers, leukocytosis  - COPD  - sepsis without clear source    PLAN  - cont TPN tonight via blue port of TLC  - daily bmp, in phos, mg while on parenteral nutrition  - will follow  - discover when port accessed and dressed - if not known or if done > 6 days ago, should flush and heparinized port and remove [place] needle now Vented, sedated. Opens eyes, moves hands  Tm 99.7  NPO, TPN started last night via port - issues d/w RN and NM  +levophed, octreotide, protonix drips  [place] cath in place, not draining (awaiting connector). +ascites    T(F): 96.6 (10-08-21 @ 04:00), Max: 99.7 (10-07-21 @ 10:00)  HR: 73 (10-08-21 @ 07:00) (70 - 92)  BP: 105/71 (10-08-21 @ 07:00) (98/62 - 135/83)  RR: 18 (10-08-21 @ 07:00) (17 - 22)  SpO2: 98% (10-08-21 @ :00) (96% - 99%)  Mode: AC/ CMV (Assist Control/ Continuous Mandatory Ventilation)  RR (machine): 18  TV (machine): 420  FiO2: 30  PEEP: 5  ITime: 1  MAP: 9  PIP: 20    I&O's Detail    07 Oct 2021 07:01  -  08 Oct 2021 07:00  --------------------------------------------------------  IN:    Dexmedetomidine: 106 mL    FentaNYL: 142 mL    IV PiggyBack: 400 mL    Lactated Ringers: 600 mL    Norepinephrine: 1159 mL    Octreotide: 240 mL    Pantoprazole: 240 mL    TPN (Total Parenteral Nutrition): 770 mL  Total IN: 3657 mL    OUT:    Indwelling Catheter - Urethral (mL): 735 mL  Total OUT: 735 mL    Total NET: 2922 mL    10-08    132<L>  |  102  |  102<HH>  ----------------------------<  273<H>  4.7   |  17  |  1.7<H>    Ca    7.4<L>      08 Oct 2021 04:30  Phos  5.8     10-08  Mg     2.4     10-08  Triglycerides, Serum: 133 mg/dL (10.08.21 @ 04:30)    TPro  5.0<L>  /  Alb  1.6<L>  /  TBili  2.2<H>  /  DBili  x   /  AST  119<H>  /  ALT  279<H>  /  AlkPhos  239<H>  10-08                        8.8    21.24 )-----------( 213      ( 08 Oct 2021 04:30 )             26.7     CAPILLARY BLOOD GLUCOSE  POCT Blood Glucose.: 269 mg/dL (08 Oct 2021 08:24)  POCT Blood Glucose.: 151 mg/dL (07 Oct 2021 21:14)  POCT Blood Glucose.: 133 mg/dL (07 Oct 2021 16:27)  POCT Blood Glucose.: 122 mg/dL (07 Oct 2021 11:49)     Daily Weight in k.8 (08 Oct 2021 06:00)    Diet, NPO (10-04-21 @ 20:18)     ASSESSMENT  65 yo male, with h/o HTN, drug abuse, Hepatitis C s/p INF, Liver cirrhosis s/p [place] shunt, COPD, DVT? on Eliquis, HCC since 2021 on chemotherapy, presented for weakness    - advanced hepatocellular carcinoma with transaminitis on chemo  - decompensated cirrhosis, ascites with [place] cath  - hematemesis 2nd esophageal, gastric varices  - severe anemia secondary to variceal bleed  - EBER  - hyperkalemia with sustained VT s/p shock  - hep C  - fevers, leukocytosis  - COPD  - sepsis without clear source    PLAN  - cont TPN tonight via blue port of TLC  - need to heparinize, & de-access port - then can re-access it (& dress & date it) if needed  - daily bmp, in phos, mg while on parenteral nutrition  - will follow  - discover when port accessed and dressed - if not known or if done > 6 days ago, should flush and heparinized port and remove Thompson needle now

## 2021-10-08 NOTE — PROGRESS NOTE ADULT - SUBJECTIVE AND OBJECTIVE BOX
PATIENT:  SUZANNE BAZZI  [id number]    CHIEF COMPLAINT:  Patient is a 66y old  Male who presents with a chief complaint of weakness  Hyperkalemia  VT (08 Oct 2021 06:28)    67 yo male, with h/o HTN, drug abuse, Hepatitis C s/p INF, Liver cirrhosis s/p [place] shunt, COPD, DVT? on Eliquis, HCC since 2021 on chemotherapy, presented for weakness  Patient reports having weakness, decreased po intake since few weeks. He also has not been sleeping. He reports constipation and hematemesis for 1 day, minimal amount.  Denies fever, chills, chest pain, cough, sputum , SOB, diarrhea, dysuria  He has a [place] shunt  that was drained one day prior to admission    ED course : mental status alteration, sustained VT  bpm with BP 82/49 mmHg s/p shock + calcium gluconate  Patient has Hyperkalemia 6.8 CO2 14 s/p Bicarb drip, renal called, sp berry with good UO   (04 Oct 2021 06:15)    CCU course   10/05/2021: Pt had another episode of hematemesis overnight. Hgb was 5.5 from 7.9 this AM so Pt was given 2 units of blood. Hgb now stable at 9.8. BP was also low at 88/46 so Pt was given 1L NS bolus. Pt currently intubated.   10/06/2021 Patient had 5 episodes of melena overnight, patient remains hypotensive, patient is easily arousable. Patient was febrile over night.   - SAT and SBT was tried. Patient was able to stay off sedation through out the day but was lethargic Failed SBT   10/7/2021: Patient is now having brown stool. Patient became agitated overnight and needed to be sedated overnight. Will be retry SAT then SBT today       INTERVAL HISTORY/OVERNIGHT EVENTS: Patient has 2 brown BM. Patient failed SAT and SBT. will retry again today. Patient remained afebrile overnight.       REVIEW OF SYSTEMS:        [ ] All other systems negative  [x] Unable to assess ROS because patient is intubated       MEDICATIONS:  MEDICATIONS  (STANDING):  budesonide  80 MICROgram(s)/formoterol 4.5 MICROgram(s) Inhaler 2 Puff(s) Inhalation two times a day  caspofungin IVPB      caspofungin IVPB 50 milliGRAM(s) IV Intermittent every 24 hours  cefepime   IVPB 1000 milliGRAM(s) IV Intermittent every 12 hours  chlorhexidine 0.12% Liquid 15 milliLiter(s) Oral Mucosa two times a day  chlorhexidine 4% Liquid 1 Application(s) Topical daily  dexMEDEtomidine Infusion 0.2 MICROgram(s)/kG/Hr (3.79 mL/Hr) IV Continuous <Continuous>  dextrose 40% Gel 15 Gram(s) Oral once  dextrose 5%. 1000 milliLiter(s) (50 mL/Hr) IV Continuous <Continuous>  dextrose 5%. 1000 milliLiter(s) (100 mL/Hr) IV Continuous <Continuous>  dextrose 50% Injectable 25 Gram(s) IV Push once  dextrose 50% Injectable 12.5 Gram(s) IV Push once  dextrose 50% Injectable 25 Gram(s) IV Push once  fentaNYL   Infusion. 0.5 MICROgram(s)/kG/Hr (3.79 mL/Hr) IV Continuous <Continuous>  glucagon  Injectable 1 milliGRAM(s) IntraMuscular once  heparin   Injectable 5000 Unit(s) SubCutaneous every 8 hours  insulin glargine Injectable (LANTUS) 15 Unit(s) SubCutaneous at bedtime  insulin lispro (ADMELOG) corrective regimen sliding scale   SubCutaneous three times a day before meals  insulin lispro Injectable (ADMELOG) 5 Unit(s) SubCutaneous three times a day before meals  lactated ringers. 1000 milliLiter(s) (75 mL/Hr) IV Continuous <Continuous>  lactulose Retention Enema 200 Gram(s) Rectal three times a day  metroNIDAZOLE  IVPB      metroNIDAZOLE  IVPB 500 milliGRAM(s) IV Intermittent every 8 hours  norepinephrine Infusion 0.05 MICROgram(s)/kG/Min (7.01 mL/Hr) IV Continuous <Continuous>  octreotide  Infusion 50 MICROgram(s)/Hr (10 mL/Hr) IV Continuous <Continuous>  pantoprazole Infusion 8 mG/Hr (10 mL/Hr) IV Continuous <Continuous>  Parenteral Nutrition - Adult 1 Each (70 mL/Hr) TPN Continuous <Continuous>  phytonadione  IVPB 10 milliGRAM(s) IV Intermittent daily  sodium polystyrene sulfonate Enema 15 Gram(s) Rectal two times a day    MEDICATIONS  (PRN):      ALLERGIES:  Allergies    No Known Allergies    Intolerances        OBJECTIVE:  ICU Vital Signs Last 24 Hrs  T(C): 35.9 (08 Oct 2021 04:00), Max: 37.6 (07 Oct 2021 10:00)  T(F): 96.6 (08 Oct 2021 04:00), Max: 99.7 (07 Oct 2021 10:00)  HR: 73 (08 Oct 2021 07:00) (70 - 92)  BP: 105/71 (08 Oct 2021 07:00) (98/62 - 135/83)  BP(mean): 82 (08 Oct 2021 07:00) (73 - 100)  ABP: --  ABP(mean): --  RR: 18 (08 Oct 2021 07:00) (17 - 22)  SpO2: 98% (08 Oct 2021 07:00) (96% - 99%)    Mode: AC/ CMV (Assist Control/ Continuous Mandatory Ventilation)  RR (machine): 18  TV (machine): 420  FiO2: 30  PEEP: 5  ITime: 1  MAP: 9  PIP: 20    Adult Advanced Hemodynamics Last 24 Hrs  CVP(mm Hg): --  CVP(cm H2O): --  CO: --  CI: --  PA: --  PA(mean): --  PCWP: --  SVR: --  SVRI: --  PVR: --  PVRI: --  CAPILLARY BLOOD GLUCOSE      POCT Blood Glucose.: 151 mg/dL (07 Oct 2021 21:14)  POCT Blood Glucose.: 133 mg/dL (07 Oct 2021 16:27)  POCT Blood Glucose.: 122 mg/dL (07 Oct 2021 11:49)    CAPILLARY BLOOD GLUCOSE      POCT Blood Glucose.: 151 mg/dL (07 Oct 2021 21:14)    I&O's Summary    07 Oct 2021 07:01  -  08 Oct 2021 07:00  --------------------------------------------------------  IN: 3657 mL / OUT: 735 mL / NET: 2922 mL      Daily     Daily Weight in k.8 (08 Oct 2021 06:00)    PHYSICAL EXAMINATION:    General: WN/WD NAD  HEENT: PERRLA, EOMI, moist mucous membranes  Neurology: intubated   Respiratory: CTA B/L, normal respiratory effort, no wheezes, crackles, rales  CV: RRR, S1S2, no murmurs, rubs or gallops  Abdominal: Soft, NT, + distended, +BS, Last BM  Extremities: No edema, + peripheral pulses  Incisions:   Tubes:      LABS:  ABG - ( 08 Oct 2021 05:00 )  pH, Arterial: 7.32  pH, Blood: x     /  pCO2: 36    /  pO2: 113   / HCO3: 18    / Base Excess: -6.9  /  SaO2: 99.5                                    8.8    21.24 )-----------( 213      ( 08 Oct 2021 04:30 )             26.7     10-    132<L>  |  102  |  102<HH>  ----------------------------<  273<H>  4.7   |  17  |  1.7<H>    Ca    7.4<L>      08 Oct 2021 04:30  Phos  5.8     10-  Mg     2.4     10-    TPro  5.0<L>  /  Alb  1.6<L>  /  TBili  2.2<H>  /  DBili  x   /  AST  119<H>  /  ALT  279<H>  /  AlkPhos  239<H>  10-08    LIVER FUNCTIONS - ( 08 Oct 2021 04:30 )  Alb: 1.6 g/dL / Pro: 5.0 g/dL / ALK PHOS: 239 U/L / ALT: 279 U/L / AST: 119 U/L / GGT: x           PT/INR - ( 08 Oct 2021 04:30 )   PT: 23.10 sec;   INR: 2.02 ratio         PTT - ( 08 Oct 2021 04:30 )  PTT:30.7 sec            TELEMETRY:     EKG:     IMAGING:

## 2021-10-08 NOTE — PROGRESS NOTE ADULT - ASSESSMENT
IMPRESSION:    Hepatic encephalopathy ( ammonia 188)  Decompensated liver cirrhosis. Has [place] catheter  Sepsis   Hematemesis ( esophageal/ gastric varices)  Acute portal Vein thrombosis   Hyperkalemia with sustained VT s/p shock, creat 0.9 was on aldactone  Hyponatremia likely from volume overload  Hepatocellular carcinoma with transaminitis on chemotherapy  Hepatitis C treated with INF  COPD  HAGMA      PLAN:    CNS: SAT  keep sedate as needed if stays on vent      HEENT: Oral care    PULMONARY:  HOB @ 45 degrees  continue O2 as necessary to maintain sats > 90%<94  SBT     CARDIOVASCULAR: Keep I < O ,   cardiac ECHO,   propranolol 10 q 12 keep HR 50-60      GI: GI prophylaxis.  NPO,   octreotide/ protonix drip,   GI management   bowel regimen aim 3 BM/day ,   rifaximin q 12,   needs nutrition    RENAL:   lokelma tID,  low K diet,   Follow up lytes.    Correct as needed,      INFECTIOUS DISEASE:   Follow up cultures,   nasal MRSA  peritoneal fluid analysis and culture,   cefepime 2 g q 8 ;   HepC viral load,     HEMATOLOGICAL:    Hold home Eliquis  prophylactic Lovenox   transfuse Hgb >8  serial HH    ENDOCRINE:    Follow up FS.    Insulin protocol if needed.   keep -180.   avoid hypoglycemia    MUSCULOSKELETAL: bed rest     Poor prognosis      palliative care eval f/u

## 2021-10-08 NOTE — PROGRESS NOTE ADULT - SUBJECTIVE AND OBJECTIVE BOX
Nephrology progress note    Patient was seen and examined, events over the last 24 h noted .    Allergies:  No Known Allergies    Hospital Medications:   MEDICATIONS  (STANDING):  budesonide  80 MICROgram(s)/formoterol 4.5 MICROgram(s) Inhaler 2 Puff(s) Inhalation two times a day  caspofungin IVPB      caspofungin IVPB 50 milliGRAM(s) IV Intermittent every 24 hours  cefepime   IVPB 1000 milliGRAM(s) IV Intermittent every 12 hours  chlorhexidine 0.12% Liquid 15 milliLiter(s) Oral Mucosa two times a day  chlorhexidine 4% Liquid 1 Application(s) Topical daily  dexMEDEtomidine Infusion 0.2 MICROgram(s)/kG/Hr (3.79 mL/Hr) IV Continuous <Continuous>  dextrose 40% Gel 15 Gram(s) Oral once  dextrose 5%. 1000 milliLiter(s) (50 mL/Hr) IV Continuous <Continuous>  dextrose 5%. 1000 milliLiter(s) (100 mL/Hr) IV Continuous <Continuous>  dextrose 50% Injectable 25 Gram(s) IV Push once  dextrose 50% Injectable 12.5 Gram(s) IV Push once  dextrose 50% Injectable 25 Gram(s) IV Push once  fat emulsion (Fish Oil and Plant Based) 20% Infusion 1.323 Gm/kG/Day (31.3 mL/Hr) IV Continuous <Continuous>  fentaNYL   Infusion. 0.5 MICROgram(s)/kG/Hr (3.79 mL/Hr) IV Continuous <Continuous>  glucagon  Injectable 1 milliGRAM(s) IntraMuscular once  heparin   Injectable 5000 Unit(s) SubCutaneous every 8 hours  insulin glargine Injectable (LANTUS) 15 Unit(s) SubCutaneous at bedtime  insulin lispro (ADMELOG) corrective regimen sliding scale   SubCutaneous three times a day before meals  insulin lispro Injectable (ADMELOG) 5 Unit(s) SubCutaneous three times a day before meals  lactated ringers. 1000 milliLiter(s) (75 mL/Hr) IV Continuous <Continuous>  lactulose Retention Enema 200 Gram(s) Rectal three times a day  metroNIDAZOLE  IVPB      metroNIDAZOLE  IVPB 500 milliGRAM(s) IV Intermittent every 8 hours  norepinephrine Infusion 0.05 MICROgram(s)/kG/Min (7.01 mL/Hr) IV Continuous <Continuous>  octreotide  Infusion 50 MICROgram(s)/Hr (10 mL/Hr) IV Continuous <Continuous>  pantoprazole Infusion 8 mG/Hr (10 mL/Hr) IV Continuous <Continuous>  Parenteral Nutrition - Adult 1 Each (70 mL/Hr) TPN Continuous <Continuous>  Parenteral Nutrition - Adult 1 Each (70 mL/Hr) TPN Continuous <Continuous>  phytonadione  IVPB 10 milliGRAM(s) IV Intermittent daily  sodium polystyrene sulfonate Enema 15 Gram(s) Rectal two times a day        VITALS:  T(F): 95.7 (10-08-21 @ 08:00), Max: 98.8 (10-07-21 @ 12:00)  HR: 72 (10-08-21 @ 08:00)  BP: 103/72 (10-08-21 @ 08:00)  RR: 18 (10-08-21 @ 08:00)  SpO2: 99% (10-08-21 @ 08:00)  Wt(kg): --    10-06 @ 07:01  -  10-07 @ 07:00  --------------------------------------------------------  IN: 3294 mL / OUT: 720 mL / NET: 2574 mL    10-07 @ :  -  10-08 @ 07:00  --------------------------------------------------------  IN: 3657 mL / OUT: 735 mL / NET: 2922 mL    10-08 @ :  -  10-08 @ 10:46  --------------------------------------------------------  IN: 277 mL / OUT: 110 mL / NET: 167 mL          PHYSICAL EXAM:  Constitutional: intubated on MV   Neck: No JVD  Respiratory: CTAB,   Cardiovascular: S1, S2, RRR  Gastrointestinal: BS+, soft, NT/ND  Extremities: No cyanosis or clubbing. No peripheral edema  :  No berry.   Skin: No rashes    LABS:  10-08    132<L>  |  102  |  102<HH>  ----------------------------<  273<H>  4.7   |  17  |  1.7<H>    Ca    7.4<L>      08 Oct 2021 04:30  Phos  5.8     10-08  Mg     2.4     10-08    TPro  5.0<L>  /  Alb  1.6<L>  /  TBili  2.2<H>  /  DBili      /  AST  119<H>  /  ALT  279<H>  /  AlkPhos  239<H>  10-08                          8.8    21.24 )-----------( 213      ( 08 Oct 2021 04:30 )             26.7       Urine Studies:  Urinalysis Basic - ( 04 Oct 2021 04:38 )    Color: Yellow / Appearance: Clear / S.025 / pH:   Gluc:  / Ketone: Negative  / Bili: Negative / Urobili: <2 mg/dL   Blood:  / Protein: Negative / Nitrite: Negative   Leuk Esterase: Negative / RBC:  / WBC    Sq Epi:  / Non Sq Epi:  / Bacteria:       Osmolality, Random Urine: 606 mos/kg (10-04 @ 11:17)  Sodium, Random Urine: 20.0 mmoL/L (10-04 @ 11:17)  Creatinine, Random Urine: <4 mg/dL (10-04 @ 11:17)    RADIOLOGY & ADDITIONAL STUDIES:

## 2021-10-08 NOTE — PROGRESS NOTE ADULT - ASSESSMENT
IMPRESSION   Hepatic encephalopathy ( ammonia 188)  Decompensated liver cirrhosis. Has [place] catheter  Sepsis   Hematemesis ( esophageal/ gastric varices)  Acute portal Vein thrombosis   Hyperkalemia with sustained VT s/p shock, creat 0.9 was on aldactone  Hyponatremia likely from volume overload  Hepatocellular carcinoma with transaminitis on chemotherapy  Hepatitis C treated with INF  COPD  HAGMA      PLAN:    CNS: keep sedate as needed  - Trial of SAT, if tolerates then SBT     HEENT: Oral care    PULMONARY:    - HOB @ 45 degrees  - taper O2    CARDIOVASCULAR:   - Keep I < O ,   - cardiac ECHO done 10/06, f/u reads   - propranolol 10mg q 12 keep HR 50-60 (on hold)  - Will give 500 bolus of NS X2  - Keep bp above 100 systolic     GI: GI prophylaxis.  NPO,   - octreotide/ protonix drip,   - GI management   - EGD - grade IV lower and mid 1/3 esophageal varices with 5 bands placed successfully.   - Lactulose bowel regimen aim 3 BM/day , (Will be giving enemas for now)  - NO NGT can be placed this time   - rifaximin q 12, (on hold for now)  - Peritoneal fluid was sent and f/u gram stain and Cx : negative culture   - IR was consulted: Pt is a poor candidate for TIPS and has several contraindications for procedure.   - reached out to IR to drain the [place] catheter    - starting vit K 10mg daily today, will start after 3 doses   - Spoke to Dr. Rhodes (Patient's gastroenterologist) [phone]  - [facility] is sending patient's medical records   - Patient was seen  oncologist Dr. Gibson [phone]  - IR was called to drain peritoneal fluid through [place] cathter     RENAL:   - D/C bicarb drip  - lokelma tID,  - low K diet,   - Follow up lytes.    - Correct as needed,  - f/u nephro consult   - Hyperkalemia Kayexalate enema, insulin +dextrose +calcium gluconate if needed     INFECTIOUS DISEASE:   - Follow up cultures,   - peritoneal fluid analysis and culture, if + remove access  - cefepime decreased to 1g q 12 ; flagyl 500mg iv q8.  starting caspofungin 70 mg x 1 - followed by 35 mg daily   - HepC viral load : 75.9 and reactive   - vanco x1  - blood cx : NGTD   - Peritoneal fluid cx : NG   - UA: negative   - Procal 15.60   - f/u Fungitell   - will repeat panculture if patient becomes febrile   - ID recs are appreciated     HEMATOLOGICAL:    - Hold home Eliquis  - Per vascular - D/C AC and restart once Pt is stable. F/u OP for Duplex  - doppler LE   - transfuse Hgb >8  - serial HH  - on DVT prophylaxis with heparin sq   - S/p 2 units of PRBC 10/5     ENDOCRINE:    - check ketone B hydroxy butyrate, ETOH level, serum OSM, ASA  - Follow up FS.    - Insulin protocol if needed.   - keep -180.   - avoid hypoglycemia  - B hydroxy butyrate level neg, ETOH level <10, serum , ASA  - B-hydroxybutyrate, salicylate blood alcohol: neg   - f/u nutrition eval   - on TPN, monitor for acidosis     10/5/2021: GOC conservation was done with Patient's partner Fallon, patient did not clarify his wishes before, patient does not have a health care proxy or a family member who can be reached. Patient's partner expressed that she wants him to be full code for now and wants everything done for the patient.     MUSCULOSKELETAL: bed rest     Poor prognosis    Dispo ICU    palliative care eval

## 2021-10-08 NOTE — PROGRESS NOTE ADULT - ASSESSMENT
ASSESSMENT  67 yo male, with h/o HTN, drug abuse, Hepatitis C s/p INF, Liver cirrhosis s/p [place] shunt, COPD, DVT? on Eliquis, HCC since January 2021 on chemotherapy, presented for weakness    IMPRESSION  #Decompensated Cirrhosis   #Hep C with HCC on chemotherapy  #Variceal Bleed    #Fevers + Leukocytosis   - BLood Cx 10/4 NG  - s/p paracentesis 10/5 - negative for SBP  -   #Abx allergy: NKDA    RECOMMENDATIONS  - no clear bacterial source of infection at this time - leukocytosis and fevers possibly reactive from severe variceal bleed  - follow-up fungitell  - continue cefepime 1g q 12   - continue flagyl 500 mg TID  - continue caspofungin 35 mg daily until fungitell returns   - treat empirically for SBP in setting of bleed as high risk -- continue at least until 10/12 -- followed by SBP prophylaxis   - trend WBC    Please call or message on Microsoft Teams if with any questions.  Spectra [phone]

## 2021-10-08 NOTE — PROGRESS NOTE ADULT - ASSESSMENT
65 yo male, with h/o HTN, drug abuse, Hepatitis C s/p INF, Liver cirrhosis s/p [place] shunt, COPD, portal vein thrombosis on Eliquis, HCC since January 2021 on chemotherapy, presented for weakness  Patient reports having weakness, decreased po intake since few weeks. He also has not been sleeping. He reports constipation and hematemesis for 1 day, minimal amount.    -Upper Gi bleed secondary to large esophageal s/p EGD 10/4 s/p intubation  -Decompensated liver cirrhosis sec to hepatitis C MELD Na score 26 at the time of admission  -HCC on chemotherapy   -Left main and right portal vein thrombus on eliquis last dose 10/3 AM  -Transaminitis Mixed pattern predominantly hepatocellular likely due to ischemia-improving  -Severe hyponatremia/Hyperkalemia-improving   -Septic shock on Abx    -Currently intubated on levophed, precedex (failed SBT today)  -4 episodes of dark brown stools  -s/p ascitic fluid analysis no evidence of SBP, SAAG 1.6 and TP<1  -MELD na 26 today    Recs:   change PPI drip to PPI BID  c/w octreotide day 5  c/w cefepime add flagyl  given TP<1(c/w abx for SBP prophylaxis)  c/w lactulose to titrate to 2-3 soft BM  Vit K PO 10 mg for 3 days (day 3)  Trend LFT   Active type and screen  2 18 gauge  c/w DVT ppx  recommend removal of [place] catheter 65 yo male, with h/o HTN, drug abuse, Hepatitis C s/p INF, Liver cirrhosis s/p [place] shunt, COPD, portal vein thrombosis on Eliquis, HCC since January 2021 on chemotherapy, presented for weakness  Patient reports having weakness, decreased po intake since few weeks. He also has not been sleeping. He reports constipation and hematemesis for 1 day, minimal amount.    -Upper Gi bleed secondary to large esophageal s/p EGD 10/4 s/p intubation  -Decompensated liver cirrhosis sec to hepatitis C MELD Na score 26 at the time of admission  -HCC on chemotherapy   -Left main and right portal vein thrombus on eliquis last dose 10/3 AM- Unclear necessity for AC (If malignant then no necessity)  -Transaminitis Mixed pattern predominantly hepatocellular likely due to ischemia-improving  -Severe hyponatremia/Hyperkalemia-improving   -Septic shock on Abx    -Currently intubated on levophed, precedex (failed SBT today)  -4 episodes of dark brown stools  -s/p ascitic fluid analysis no evidence of SBP, SAAG 1.6 and TP<1  -MELD na 26 today    Recs:   change PPI drip to PPI BID  c/w octreotide day 5  c/w cefepime add flagyl  given TP<1(c/w abx for SBP prophylaxis)  c/w lactulose to titrate to 2-3 soft BM  Vit K PO 10 mg for 3 days (day 3)  Trend LFT   Active type and screen  2 18 gauge  c/w DVT ppx  recommend removal of denver catheter

## 2021-10-08 NOTE — PROGRESS NOTE ADULT - SUBJECTIVE AND OBJECTIVE BOX
Gastroenterology progress note:     Patient is a 66y old  Male who presents with a chief complaint of weakness  Hyperkalemia  VT (08 Oct 2021 10:46)       Admitted on: 10-04-21    We are following the patient for cirrhosis and GI bleed     Interval History:  Still intubated. Had 4-6 dark brown bowel movements.        PAST MEDICAL & SURGICAL HISTORY:  HTN (hypertension)    Hepatitis C  2007    Drug abuse    Cancer, hepatocellular  January 2021    COPD, mild        MEDICATIONS  (STANDING):  budesonide  80 MICROgram(s)/formoterol 4.5 MICROgram(s) Inhaler 2 Puff(s) Inhalation two times a day  caspofungin IVPB      caspofungin IVPB 50 milliGRAM(s) IV Intermittent every 24 hours  cefepime   IVPB 1000 milliGRAM(s) IV Intermittent every 12 hours  chlorhexidine 0.12% Liquid 15 milliLiter(s) Oral Mucosa two times a day  chlorhexidine 4% Liquid 1 Application(s) Topical daily  dexMEDEtomidine Infusion 0.2 MICROgram(s)/kG/Hr (3.79 mL/Hr) IV Continuous <Continuous>  dextrose 40% Gel 15 Gram(s) Oral once  dextrose 5%. 1000 milliLiter(s) (50 mL/Hr) IV Continuous <Continuous>  dextrose 5%. 1000 milliLiter(s) (100 mL/Hr) IV Continuous <Continuous>  dextrose 50% Injectable 25 Gram(s) IV Push once  dextrose 50% Injectable 12.5 Gram(s) IV Push once  dextrose 50% Injectable 25 Gram(s) IV Push once  fat emulsion (Fish Oil and Plant Based) 20% Infusion 1.323 Gm/kG/Day (31.3 mL/Hr) IV Continuous <Continuous>  fentaNYL   Infusion. 0.5 MICROgram(s)/kG/Hr (3.79 mL/Hr) IV Continuous <Continuous>  glucagon  Injectable 1 milliGRAM(s) IntraMuscular once  heparin   Injectable 5000 Unit(s) SubCutaneous every 8 hours  insulin glargine Injectable (LANTUS) 20 Unit(s) SubCutaneous at bedtime  insulin lispro (ADMELOG) corrective regimen sliding scale   SubCutaneous three times a day before meals  insulin lispro Injectable (ADMELOG) 5 Unit(s) SubCutaneous three times a day before meals  lactated ringers. 1000 milliLiter(s) (75 mL/Hr) IV Continuous <Continuous>  lactulose Retention Enema 200 Gram(s) Rectal three times a day  metroNIDAZOLE  IVPB      metroNIDAZOLE  IVPB 500 milliGRAM(s) IV Intermittent every 8 hours  norepinephrine Infusion 0.05 MICROgram(s)/kG/Min (7.01 mL/Hr) IV Continuous <Continuous>  octreotide  Infusion 50 MICROgram(s)/Hr (10 mL/Hr) IV Continuous <Continuous>  pantoprazole Infusion 8 mG/Hr (10 mL/Hr) IV Continuous <Continuous>  Parenteral Nutrition - Adult 1 Each (70 mL/Hr) TPN Continuous <Continuous>  Parenteral Nutrition - Adult 1 Each (70 mL/Hr) TPN Continuous <Continuous>  phytonadione  IVPB 10 milliGRAM(s) IV Intermittent daily  sodium polystyrene sulfonate Enema 15 Gram(s) Rectal two times a day    MEDICATIONS  (PRN):      Allergies  No Known Allergies      Review of Systems:   couldn    Physical Examination:  T(C): 35.8 (10-08-21 @ 12:00), Max: 36.6 (10-08-21 @ 00:00)  HR: 72 (10-08-21 @ 13:00) (66 - 84)  BP: 89/60 (10-08-21 @ 13:00) (89/60 - 135/83)  RR: 17 (10-08-21 @ 13:00) (17 - 18)  SpO2: 100% (10-08-21 @ 13:00) (96% - 100%)      10-07-21 @ 07:01  -  10-08-21 @ 07:00  --------------------------------------------------------  IN: 3657 mL / OUT: 735 mL / NET: 2922 mL    10-08-21 @ 07:01  -  10-08-21 @ 16:11  --------------------------------------------------------  IN: 1337 mL / OUT: 3260 mL / NET: -1923 mL      Constitutional: No acute distress.  Respiratory:  No signs of respiratory distress. Lung sounds are clear bilaterally.  Cardiovascular:  S1 S2, Regular rate and rhythm.  Abdominal: Abdomen is soft, symmetric, and non-tender without distention. There are no visible lesions or scars. Bowel sounds are present and normoactive in all four quadrants. No masses, hepatomegaly, or splenomegaly are noted.   Skin: No rashes, No Jaundice.        Data:                        8.8    21.24 )-----------( 213      ( 08 Oct 2021 04:30 )             26.7     Hgb trend:  8.8  10-08-21 @ 04:30  9.3  10-07-21 @ 21:20  9.2  10-07-21 @ 16:10  9.3  10-07-21 @ 04:20  9.6  10-06-21 @ 20:00  9.6  10-06-21 @ 14:28  9.8  10-06-21 @ 04:50  9.8  10-06-21 @ 00:30      10-05-21 @ 07:01  -  10-06-21 @ 07:00  --------------------------------------------------------  IN: 300 mL      10-08    132<L>  |  102  |  102<HH>  ----------------------------<  273<H>  4.7   |  17  |  1.7<H>    Ca    7.4<L>      08 Oct 2021 04:30  Phos  5.8     10-08  Mg     2.4     10-08    TPro  5.0<L>  /  Alb  1.6<L>  /  TBili  2.2<H>  /  DBili  x   /  AST  119<H>  /  ALT  279<H>  /  AlkPhos  239<H>  10-08    Liver panel trend:  TBili 2.2   /      /      /   AlkP 239   /   Tptn 5.0   /   Alb 1.6    /   DBili --      10-08  TBili 1.9   /      /      /   AlkP 254   /   Tptn 5.1   /   Alb 1.6    /   DBili --      10-07  TBili 1.9   /      /      /   AlkP 291   /   Tptn 5.2   /   Alb 1.8    /   DBili --      10-07  TBili 2.0   /      /      /   AlkP 282   /   Tptn 5.1   /   Alb 1.7    /   DBili --      10-07  TBili 2.2   /      /      /   AlkP 281   /   Tptn 5.4   /   Alb 1.6    /   DBili --      10-06  TBili 1.9   /      /      /   AlkP 296   /   Tptn 5.3   /   Alb 1.8    /   DBili --      10-06  TBili 1.7   /      /      /   AlkP 282   /   Tptn 5.3   /   Alb 1.7    /   DBili --      10-06  TBili 1.2   /   AST 1079   /      /   AlkP 317   /   Tptn 5.4   /   Alb 1.8    /   DBili 0.7      10-05  TBili 1.1   /      /      /   AlkP 260   /   Tptn 5.3   /   Alb 1.8    /   DBili --      10-04  TBili 1.1   /      /      /   AlkP 247   /   Tptn 4.8   /   Alb 1.8    /   DBili --      10-04  TBili 2.5   /      /      /   AlkP 290   /   Tptn 6.1   /   Alb 1.9    /   DBili --      10-04      PT/INR - ( 08 Oct 2021 04:30 )   PT: 23.10 sec;   INR: 2.02 ratio         PTT - ( 08 Oct 2021 04:30 )  PTT:30.7 sec    Culture - Fungal, Body Fluid (collected 05 Oct 2021 18:53)  Source: .Body Fluid Peritoneal Fluid  Preliminary Report (06 Oct 2021 06:52):    Testing in progress    Culture - Body Fluid with Gram Stain (collected 05 Oct 2021 18:53)  Source: .Body Fluid Peritoneal Fluid  Gram Stain (06 Oct 2021 02:48):    polymorphonuclear leukocytes seen    No organisms seen    by cytocentrifuge  Preliminary Report (06 Oct 2021 18:41):    No growth         Radiology:       Gastroenterology progress note:     Patient is a 66y old  Male who presents with a chief complaint of weakness  Hyperkalemia  VT (08 Oct 2021 10:46)       Admitted on: 10-04-21    We are following the patient for cirrhosis and GI bleed     Interval History:  Still intubated. Had 4-6 dark brown bowel movements.        PAST MEDICAL & SURGICAL HISTORY:  HTN (hypertension)    Hepatitis C  2007    Drug abuse    Cancer, hepatocellular  January 2021    COPD, mild        MEDICATIONS  (STANDING):  budesonide  80 MICROgram(s)/formoterol 4.5 MICROgram(s) Inhaler 2 Puff(s) Inhalation two times a day  caspofungin IVPB      caspofungin IVPB 50 milliGRAM(s) IV Intermittent every 24 hours  cefepime   IVPB 1000 milliGRAM(s) IV Intermittent every 12 hours  chlorhexidine 0.12% Liquid 15 milliLiter(s) Oral Mucosa two times a day  chlorhexidine 4% Liquid 1 Application(s) Topical daily  dexMEDEtomidine Infusion 0.2 MICROgram(s)/kG/Hr (3.79 mL/Hr) IV Continuous <Continuous>  dextrose 40% Gel 15 Gram(s) Oral once  dextrose 5%. 1000 milliLiter(s) (50 mL/Hr) IV Continuous <Continuous>  dextrose 5%. 1000 milliLiter(s) (100 mL/Hr) IV Continuous <Continuous>  dextrose 50% Injectable 25 Gram(s) IV Push once  dextrose 50% Injectable 12.5 Gram(s) IV Push once  dextrose 50% Injectable 25 Gram(s) IV Push once  fat emulsion (Fish Oil and Plant Based) 20% Infusion 1.323 Gm/kG/Day (31.3 mL/Hr) IV Continuous <Continuous>  fentaNYL   Infusion. 0.5 MICROgram(s)/kG/Hr (3.79 mL/Hr) IV Continuous <Continuous>  glucagon  Injectable 1 milliGRAM(s) IntraMuscular once  heparin   Injectable 5000 Unit(s) SubCutaneous every 8 hours  insulin glargine Injectable (LANTUS) 20 Unit(s) SubCutaneous at bedtime  insulin lispro (ADMELOG) corrective regimen sliding scale   SubCutaneous three times a day before meals  insulin lispro Injectable (ADMELOG) 5 Unit(s) SubCutaneous three times a day before meals  lactated ringers. 1000 milliLiter(s) (75 mL/Hr) IV Continuous <Continuous>  lactulose Retention Enema 200 Gram(s) Rectal three times a day  metroNIDAZOLE  IVPB      metroNIDAZOLE  IVPB 500 milliGRAM(s) IV Intermittent every 8 hours  norepinephrine Infusion 0.05 MICROgram(s)/kG/Min (7.01 mL/Hr) IV Continuous <Continuous>  octreotide  Infusion 50 MICROgram(s)/Hr (10 mL/Hr) IV Continuous <Continuous>  pantoprazole Infusion 8 mG/Hr (10 mL/Hr) IV Continuous <Continuous>  Parenteral Nutrition - Adult 1 Each (70 mL/Hr) TPN Continuous <Continuous>  Parenteral Nutrition - Adult 1 Each (70 mL/Hr) TPN Continuous <Continuous>  phytonadione  IVPB 10 milliGRAM(s) IV Intermittent daily  sodium polystyrene sulfonate Enema 15 Gram(s) Rectal two times a day    MEDICATIONS  (PRN):      Allergies  No Known Allergies      Review of Systems:   couldn't obtain     Physical Examination:  T(C): 35.8 (10-08-21 @ 12:00), Max: 36.6 (10-08-21 @ 00:00)  HR: 72 (10-08-21 @ 13:00) (66 - 84)  BP: 89/60 (10-08-21 @ 13:00) (89/60 - 135/83)  RR: 17 (10-08-21 @ 13:00) (17 - 18)  SpO2: 100% (10-08-21 @ 13:00) (96% - 100%)      10-07-21 @ 07:01  -  10-08-21 @ 07:00  --------------------------------------------------------  IN: 3657 mL / OUT: 735 mL / NET: 2922 mL    10-08-21 @ 07:01  -  10-08-21 @ 16:11  --------------------------------------------------------  IN: 1337 mL / OUT: 3260 mL / NET: -1923 mL      Constitutional: intubated  Respiratory:  No signs of respiratory distress. Lung sounds are clear bilaterally.  Cardiovascular:  S1 S2, Regular rate and rhythm.  Abdominal: Abdomen is soft, symmetric, and non-tender without distention.   Skin: No rashes, No Jaundice.        Data:                        8.8    21.24 )-----------( 213      ( 08 Oct 2021 04:30 )             26.7     Hgb trend:  8.8  10-08-21 @ 04:30  9.3  10-07-21 @ 21:20  9.2  10-07-21 @ 16:10  9.3  10-07-21 @ 04:20  9.6  10-06-21 @ 20:00  9.6  10-06-21 @ 14:28  9.8  10-06-21 @ 04:50  9.8  10-06-21 @ 00:30      10-05-21 @ 07:01  -  10-06-21 @ 07:00  --------------------------------------------------------  IN: 300 mL      10-08    132<L>  |  102  |  102<HH>  ----------------------------<  273<H>  4.7   |  17  |  1.7<H>    Ca    7.4<L>      08 Oct 2021 04:30  Phos  5.8     10-08  Mg     2.4     10-08    TPro  5.0<L>  /  Alb  1.6<L>  /  TBili  2.2<H>  /  DBili  x   /  AST  119<H>  /  ALT  279<H>  /  AlkPhos  239<H>  10-08    Liver panel trend:  TBili 2.2   /      /      /   AlkP 239   /   Tptn 5.0   /   Alb 1.6    /   DBili --      10-08  TBili 1.9   /      /      /   AlkP 254   /   Tptn 5.1   /   Alb 1.6    /   DBili --      10-07  TBili 1.9   /      /      /   AlkP 291   /   Tptn 5.2   /   Alb 1.8    /   DBili --      10-07  TBili 2.0   /      /      /   AlkP 282   /   Tptn 5.1   /   Alb 1.7    /   DBili --      10-07  TBili 2.2   /      /      /   AlkP 281   /   Tptn 5.4   /   Alb 1.6    /   DBili --      10-06  TBili 1.9   /      /      /   AlkP 296   /   Tptn 5.3   /   Alb 1.8    /   DBili --      10-06  TBili 1.7   /      /      /   AlkP 282   /   Tptn 5.3   /   Alb 1.7    /   DBili --      10-06  TBili 1.2   /   AST 1079   /      /   AlkP 317   /   Tptn 5.4   /   Alb 1.8    /   DBili 0.7      10-05  TBili 1.1   /      /      /   AlkP 260   /   Tptn 5.3   /   Alb 1.8    /   DBili --      10-04  TBili 1.1   /      /      /   AlkP 247   /   Tptn 4.8   /   Alb 1.8    /   DBili --      10-04  TBili 2.5   /      /      /   AlkP 290   /   Tptn 6.1   /   Alb 1.9    /   DBili --      10-04      PT/INR - ( 08 Oct 2021 04:30 )   PT: 23.10 sec;   INR: 2.02 ratio         PTT - ( 08 Oct 2021 04:30 )  PTT:30.7 sec    Culture - Fungal, Body Fluid (collected 05 Oct 2021 18:53)  Source: .Body Fluid Peritoneal Fluid  Preliminary Report (06 Oct 2021 06:52):    Testing in progress    Culture - Body Fluid with Gram Stain (collected 05 Oct 2021 18:53)  Source: .Body Fluid Peritoneal Fluid  Gram Stain (06 Oct 2021 02:48):    polymorphonuclear leukocytes seen    No organisms seen    by cytocentrifuge  Preliminary Report (06 Oct 2021 18:41):    No growth         Radiology:

## 2021-10-08 NOTE — PROGRESS NOTE ADULT - SUBJECTIVE AND OBJECTIVE BOX
HPI:  67 yo male, with h/o HTN, drug abuse, Hepatitis C s/p INF, Liver cirrhosis s/p [place] shunt, COPD, DVT? on Eliquis, HCC since January 2021 on chemotherapy, presented for weakness  Patient reports having weakness, decreased po intake since few weeks. He also has not been sleeping. He reports constipation and hematemesis for 1 day, minimal amount.  Denies fever, chills, chest pain, cough, sputum , SOB, diarrhea, dysuria  He has a [place] shunt  that was drained one day prior to admission    ED course : mental status alteration, sustained VT  bpm with BP 82/49 mmHg s/p shock + calcium gluconate  Patient has Hyperkalemia 6.8 CO2 14 s/p Bicarb drip, renal called, sp berry with good UO   (04 Oct 2021 06:15)      Pt evaluated on rounds.  I reviewed the radiology tests and hospital record.    I reviewed previous notes on this patient.    Interval Events: No overnight events.    REVIEW OF SYSTEMS:   see HPI      OBJECTIVE:  ICU Vital Signs Last 24 Hrs  T(C): 35.9 (08 Oct 2021 04:00), Max: 38.3 (07 Oct 2021 07:00)  T(F): 96.6 (08 Oct 2021 04:00), Max: 100.9 (07 Oct 2021 07:00)  HR: 73 (08 Oct 2021 07:00) (70 - 92)  BP: 110/72 (08 Oct 2021 06:00) (96/60 - 135/83)  BP(mean): 85 (08 Oct 2021 06:00) (70 - 100)  RR: 18 (08 Oct 2021 07:00) (17 - 22)  SpO2: 98% (08 Oct 2021 07:00) (96% - 99%)    Mode: AC/ CMV (Assist Control/ Continuous Mandatory Ventilation), RR (machine): 18, TV (machine): 420, FiO2: 30, PEEP: 5, ITime: 1, MAP: 9, PIP: 20    10-06 @ 07:01  -  10-07 @ 07:00  --------------------------------------------------------  IN: 3294 mL / OUT: 720 mL / NET: 2574 mL    10-07 @ 07:01  -  10-08 @ 06:30  --------------------------------------------------------  IN: 3657 mL / OUT: 735 mL / NET: 2922 mL      CAPILLARY BLOOD GLUCOSE      POCT Blood Glucose.: 151 mg/dL (07 Oct 2021 21:14)        PHYSICAL EXAM:     · CONSTITUTIONAL:   not septic appearing,   well nourished,   NAD    · ENMT:   Airway patent,   Nasal mucosa clear.  Mouth with normal mucosa.   No thrush    · EYES:   Clear bilaterally,   pupils equal,   round and reactive to light.    · CARDIAC:   Normal rate,   regular rhythm.    Heart sounds S1, S2.   No murmurs, no rubs or gallops on auscultation  no edema        CAROTID:   normal systolic impulse  no bruits    · RESPIRATORY:   rales  normal chest expansion  no retractions or use of accessory muscles  palpation of chest is normal with no fremitus  percussion of chest demonstrates no hyperresonance or dullness    · GASTROINTESTINAL:  Abdomen soft,   non-tender,   + BS  liver/spleen not palpable    · MUSCULOSKELETAL:   no clubbing, cyanosis      · SKIN:   Skin normal color for race,   warm, dry   No evidence of rash.        · HEME LYMPH:   no splenomegaly.  No cervical  lymphadenopathy.  no inguinal lymphadenopathy    HOSPITAL MEDICATIONS:  MEDICATIONS  (STANDING):  budesonide  80 MICROgram(s)/formoterol 4.5 MICROgram(s) Inhaler 2 Puff(s) Inhalation two times a day  caspofungin IVPB      caspofungin IVPB 50 milliGRAM(s) IV Intermittent every 24 hours  cefepime   IVPB 1000 milliGRAM(s) IV Intermittent every 12 hours  chlorhexidine 0.12% Liquid 15 milliLiter(s) Oral Mucosa two times a day  chlorhexidine 4% Liquid 1 Application(s) Topical daily  dexMEDEtomidine Infusion 0.2 MICROgram(s)/kG/Hr (3.79 mL/Hr) IV Continuous <Continuous>  dextrose 40% Gel 15 Gram(s) Oral once  dextrose 5%. 1000 milliLiter(s) (50 mL/Hr) IV Continuous <Continuous>  dextrose 5%. 1000 milliLiter(s) (100 mL/Hr) IV Continuous <Continuous>  dextrose 50% Injectable 25 Gram(s) IV Push once  dextrose 50% Injectable 12.5 Gram(s) IV Push once  dextrose 50% Injectable 25 Gram(s) IV Push once  enoxaparin Injectable 40 milliGRAM(s) SubCutaneous daily  fentaNYL   Infusion. 0.5 MICROgram(s)/kG/Hr (3.79 mL/Hr) IV Continuous <Continuous>  glucagon  Injectable 1 milliGRAM(s) IntraMuscular once  insulin glargine Injectable (LANTUS) 15 Unit(s) SubCutaneous at bedtime  insulin lispro (ADMELOG) corrective regimen sliding scale   SubCutaneous three times a day before meals  insulin lispro Injectable (ADMELOG) 5 Unit(s) SubCutaneous three times a day before meals  lactated ringers. 1000 milliLiter(s) (75 mL/Hr) IV Continuous <Continuous>  lactulose Retention Enema 200 Gram(s) Rectal three times a day  metroNIDAZOLE  IVPB      metroNIDAZOLE  IVPB 500 milliGRAM(s) IV Intermittent every 8 hours  norepinephrine Infusion 0.05 MICROgram(s)/kG/Min (7.01 mL/Hr) IV Continuous <Continuous>  octreotide  Infusion 50 MICROgram(s)/Hr (10 mL/Hr) IV Continuous <Continuous>  pantoprazole Infusion 8 mG/Hr (10 mL/Hr) IV Continuous <Continuous>  Parenteral Nutrition - Adult 1 Each (70 mL/Hr) TPN Continuous <Continuous>  phytonadione  IVPB 10 milliGRAM(s) IV Intermittent daily  sodium polystyrene sulfonate Enema 15 Gram(s) Rectal two times a day    MEDICATIONS  (PRN):    lactated ringers.: Solution, 1000 milliLiter(s) infuse at 75 mL/Hr  sodium chloride 0.9% Bolus:   500 milliLiter(s), IV Bolus, once, infuse over 15 Minute(s), Stop After 1 Doses  sodium chloride 0.9% Bolus:   500 milliLiter(s), IV Bolus, once, infuse over 15 Minute(s), Stop After 1 Doses  sodium chloride 0.9% Bolus:   1000 milliLiter(s), IV Bolus, once, infuse over 30 Minute(s), Stop After 1 Doses  sodium chloride 0.9% Bolus:   1000 milliLiter(s), IV Bolus, once, infuse over 30 Minute(s), Stop After 1 Doses  sodium chloride 0.9% Bolus:   3000 milliLiter(s), IV Bolus, once, infuse over 60 Minute(s), Stop After 1 Doses  Provider's Contact #: [phone]      LABS:                        8.8    21.24 )-----------( 213      ( 08 Oct 2021 04:30 )             26.7     10-08    132<L>  |  102  |  102<HH>  ----------------------------<  273<H>  4.7   |  17  |  1.7<H>    Ca    7.4<L>      08 Oct 2021 04:30  Phos  5.8     10-08  Mg     2.4     10-08    TPro  5.0<L>  /  Alb  1.6<L>  /  TBili  2.2<H>  /  DBili  x   /  AST  119<H>  /  ALT  279<H>  /  AlkPhos  239<H>  10-08    PT/INR - ( 08 Oct 2021 04:30 )   PT: 23.10 sec;   INR: 2.02 ratio         PTT - ( 08 Oct 2021 04:30 )  PTT:30.7 sec    Arterial Blood Gas:  10-08 @ 05:00  7.32/36/113/18/99.5/-6.9  ABG lactate: --  Arterial Blood Gas:  10-07 @ 04:54  7.32/37/93/19/98.3/-6.4  ABG lactate: --          COVID-19 PCR: NotDetec (04 Oct 2021 02:17)    Mode: AC/ CMV (Assist Control/ Continuous Mandatory Ventilation)  RR (machine): 18  TV (machine): 420  FiO2: 30  PEEP: 5  ITime: 1  MAP: 9  PIP: 20      ABG - ( 08 Oct 2021 05:00 )  pH, Arterial: 7.32  pH, Blood: x     /  pCO2: 36    /  pO2: 113   / HCO3: 18    / Base Excess: -6.9  /  SaO2: 99.5                  RADIOLOGY: Today I personally interpreted the latest CXR and other pertinent films.

## 2021-10-08 NOTE — PROGRESS NOTE ADULT - ASSESSMENT
Pt with Decompensated liver cirrhosis, Hep C-s/p IFN, ascites -[place] cath, HCCa with transaminitis on chemotherapy  presents with weakness, found to have K 6.8 (was on Aldactone) , sustained VT -s/p shock; met acidosis, hepatic encephalopathy - Ammonia 188.    Hyperkalemia - due to volume depletion and aldosterone blockade, GIB - now improved  - last K 5.6 from 6.4 from 5.8, pt is making urine  Hyponatremia - Urine na 20, Osm 600 c/w intravascular volume depletion  -improving with hydration continue   Met acidosis - high lactic acid, hypoperfusion, improving  - off Bicarb drip,   Sepsis - r/o SBP - cont Cefipime  -pressors requiring  Hematemesis ( esophageal/ gastric varices) on OCtrotide appreciate GI  Acute portal Vein thrombosis - cont eliquis, seen by vasc jhoana  Prognosis guarded    will follow

## 2021-10-08 NOTE — PROGRESS NOTE ADULT - SUBJECTIVE AND OBJECTIVE BOX
JLUISSUZANNE  66y, Male  Allergy: No Known Allergies      LOS  4d    CHIEF COMPLAINT: weakness  Hyperkalemia  VT (08 Oct 2021 16:11)      INTERVAL EVENTS/HPI  - No acute events overnight  - T(F): , Max: 97.9 (10-08-21 @ 00:00)  - remains intubated; H/H stable   - WBC Count: 21.24 (10-08-21 @ 04:30)  WBC Count: 28.21 (10-07-21 @ 21:20)     - Creatinine, Serum: 1.7 (10-08-21 @ 04:30)  Creatinine, Serum: 1.8 (10-07-21 @ 16:10)       ROS  unable to obtain history secondary to patient's mental status     VITALS:  T(F): 97.8, Max: 97.9 (10-08-21 @ 00:00)  HR: 70  BP: 97/65  RR: 18Vital Signs Last 24 Hrs  T(C): 36.6 (08 Oct 2021 16:00), Max: 36.6 (08 Oct 2021 00:00)  T(F): 97.8 (08 Oct 2021 16:00), Max: 97.9 (08 Oct 2021 00:00)  HR: 70 (08 Oct 2021 16:00) (66 - 84)  BP: 97/65 (08 Oct 2021 16:00) (86/57 - 135/83)  BP(mean): 75 (08 Oct 2021 16:00) (65 - 100)  RR: 18 (08 Oct 2021 16:00) (11 - 18)  SpO2: 99% (08 Oct 2021 16:00) (96% - 100%)    PHYSICAL EXAM:  Gen:intubated  HEENT: Normocephalic, atraumatic  Neck: supple, no lymphadenopathy  CV: Regular rate & regular rhythm  Lungs: decreased BS at bases, no fremitus  Abdomen: Soft, BS present  Ext: Warm, well perfused  Neuro: non focal, sedated  Skin: no rash, no erythema  Lines: no phlebitis    FH: Non-contributory  Social Hx: Non-contributory    TESTS & MEASUREMENTS:                        8.8    21.24 )-----------( 213      ( 08 Oct 2021 04:30 )             26.7     10-08    132<L>  |  102  |  102<HH>  ----------------------------<  273<H>  4.7   |  17  |  1.7<H>    Ca    7.4<L>      08 Oct 2021 04:30  Phos  5.8     10-08  Mg     2.4     10-08    TPro  5.0<L>  /  Alb  1.6<L>  /  TBili  2.2<H>  /  DBili  x   /  AST  119<H>  /  ALT  279<H>  /  AlkPhos  239<H>  10-08    eGFR if Non African American: 41 mL/min/1.73M2 (10-08-21 @ 04:30)  eGFR if African American: 48 mL/min/1.73M2 (10-08-21 @ 04:30)    LIVER FUNCTIONS - ( 08 Oct 2021 04:30 )  Alb: 1.6 g/dL / Pro: 5.0 g/dL / ALK PHOS: 239 U/L / ALT: 279 U/L / AST: 119 U/L / GGT: x               Culture - Fungal, Body Fluid (collected 10-05-21 @ 18:53)  Source: .Body Fluid Peritoneal Fluid  Preliminary Report (10-06-21 @ 06:52):    Testing in progress    Culture - Body Fluid with Gram Stain (collected 10-05-21 @ 18:53)  Source: .Body Fluid Peritoneal Fluid  Gram Stain (10-06-21 @ 02:48):    polymorphonuclear leukocytes seen    No organisms seen    by cytocentrifuge  Preliminary Report (10-06-21 @ 18:41):    No growth    Culture - Blood (collected 10-04-21 @ 02:15)  Source: .Blood Blood  Preliminary Report (10-05-21 @ 14:01):    No growth to date.    Culture - Blood (collected 10-04-21 @ 02:10)  Source: .Blood Blood  Preliminary Report (10-05-21 @ 14:01):    No growth to date.        Lactate, Blood: 1.5 mmol/L (10-08-21 @ 04:30)  Lactate, Blood: 2.7 mmol/L (10-07-21 @ 04:20)  Lactate, Blood: 2.8 mmol/L (10-06-21 @ 17:04)  Lactate, Blood: 2.9 mmol/L (10-05-21 @ 13:32)  Lactate, Blood: 4.8 mmol/L (10-05-21 @ 06:21)  Blood Gas Venous - Lactate: 2.90 mmol/L (10-04-21 @ 07:28)  Blood Gas Venous - Lactate: 4.60 mmol/L (10-04-21 @ 02:45)  Blood Gas Venous - Lactate: 5.50 mmol/L (10-04-21 @ 01:19)  Blood Gas Venous - Lactate: 5.40 mmol/L (10-04-21 @ 01:07)      INFECTIOUS DISEASES TESTING  Procalcitonin, Serum: 15.60 (10-06-21 @ 17:04)  Procalcitonin, Serum: 12.50 (10-06-21 @ 07:52)  MRSA PCR Result.: Negative (10-05-21 @ 05:14)  COVID-19 PCR: NotDetec (10-04-21 @ 02:17)      INFLAMMATORY MARKERS      RADIOLOGY & ADDITIONAL TESTS:  I have personally reviewed the last available Chest xray  CXR  Xray Chest 1 View- PORTABLE-Urgent:   EXAM:  XR CHEST PORTABLE URGENT 1V            PROCEDURE DATE:  10/07/2021            INTERPRETATION:  Clinical History / Reason for exam: Catheter placement.    Comparison : Chest radiograph earlier in the same day.    Technique/Positioning: Frontal portable. Low lung volumes.    Findings:    Support devices: Right-sided Mediport. Left-sided central catheter. Telemetry leads are seen. The patient is intubated.    Cardiac/mediastinum/hilum: Unremarkable.    Lung parenchyma/Pleura: Bilateral opacities, improved.    Skeleton/soft tissues: Unremarkable.    Impression:    Improving opacifications. Support devices as described.        --- End of Report ---              ITZ LLAMAS MD; Attending Interventional Radiologist  This document has been electronically signed. Oct  7 2021  1:15PM (10-07-21 @ 11:47)      CT      CARDIOLOGY TESTING  12 Lead ECG:   Ventricular Rate 91 BPM    Atrial Rate 91 BPM    P-R Interval 133 ms    QRS Duration 75 ms    Q-T Interval 338 ms    QTC Calculation(Bazett) 416 ms    P Axis 66 degrees    R Axis 33 degrees    T Axis 32 degrees    Diagnosis Line Normal sinus rhythm  Low voltage QRS  Cannot rule out Anterior infarct , age undetermined  Abnormal ECG    Confirmed by Erik Cervantes ([id number]) on 10/7/2021 6:59:32 AM (10-07-21 @ 01:10)  12 Lead ECG:   Systolic BP 80 mmHg    Diastolic BP 51 mmHg    Ventricular Rate 206 BPM    Atrial Rate 241 BPM    QRS Duration 72 ms    Q-T Interval 206 ms    QTC Calculation(Bazett) 381 ms    R Axis 55 degrees    T Axis 63 degrees    Diagnosis Line Supraventricular tachycardia with occasional Premature ventricular complexes  Low voltage QRS  Nonspecific T wave abnormality  Abnormal ECG    Confirmed by KRISHNA ZARCO MD ([id number]) on 10/4/2021 11:18:34 PM (10-04-21 @ 20:37)      MEDICATIONS  budesonide  80 MICROgram(s)/formoterol 4.5 MICROgram(s) Inhaler 2 Inhalation two times a day  caspofungin IVPB     caspofungin IVPB 50 IV Intermittent every 24 hours  cefepime   IVPB 1000 IV Intermittent every 12 hours  chlorhexidine 0.12% Liquid 15 Oral Mucosa two times a day  chlorhexidine 4% Liquid 1 Topical daily  dexMEDEtomidine Infusion 0.2 IV Continuous <Continuous>  dextrose 40% Gel 15 Oral once  dextrose 5%. 1000 IV Continuous <Continuous>  dextrose 5%. 1000 IV Continuous <Continuous>  dextrose 50% Injectable 25 IV Push once  dextrose 50% Injectable 12.5 IV Push once  dextrose 50% Injectable 25 IV Push once  fat emulsion (Fish Oil and Plant Based) 20% Infusion 1.323 IV Continuous <Continuous>  fentaNYL   Infusion. 0.5 IV Continuous <Continuous>  glucagon  Injectable 1 IntraMuscular once  heparin   Injectable 5000 SubCutaneous every 8 hours  insulin glargine Injectable (LANTUS) 20 SubCutaneous at bedtime  insulin lispro (ADMELOG) corrective regimen sliding scale  SubCutaneous three times a day before meals  insulin lispro Injectable (ADMELOG) 5 SubCutaneous three times a day before meals  lactated ringers. 1000 IV Continuous <Continuous>  lactulose Retention Enema 200 Rectal three times a day  metroNIDAZOLE  IVPB     metroNIDAZOLE  IVPB 500 IV Intermittent every 8 hours  norepinephrine Infusion 0.05 IV Continuous <Continuous>  octreotide  Infusion 50 IV Continuous <Continuous>  pantoprazole Infusion 8 IV Continuous <Continuous>  Parenteral Nutrition - Adult 1 TPN Continuous <Continuous>  Parenteral Nutrition - Adult 1 TPN Continuous <Continuous>  phytonadione  IVPB 10 IV Intermittent daily  sodium polystyrene sulfonate Enema 15 Rectal two times a day      WEIGHT  Weight (kg): 75.7 (10-04-21 @ 23:55)  Creatinine, Serum: 1.7 mg/dL (10-08-21 @ 04:30)      ANTIBIOTICS:  caspofungin IVPB      caspofungin IVPB 50 milliGRAM(s) IV Intermittent every 24 hours  cefepime   IVPB 1000 milliGRAM(s) IV Intermittent every 12 hours  metroNIDAZOLE  IVPB      metroNIDAZOLE  IVPB 500 milliGRAM(s) IV Intermittent every 8 hours      All available historical records have been reviewed

## 2021-10-09 LAB
ALBUMIN SERPL ELPH-MCNC: 1.4 G/DL — LOW (ref 3.5–5.2)
ALP SERPL-CCNC: 237 U/L — HIGH (ref 30–115)
ALT FLD-CCNC: 187 U/L — HIGH (ref 0–41)
AMMONIA BLD-MCNC: 26 UMOL/L — SIGNIFICANT CHANGE UP (ref 11–55)
ANION GAP SERPL CALC-SCNC: 11 MMOL/L — SIGNIFICANT CHANGE UP (ref 7–14)
APTT BLD: 30.9 SEC — SIGNIFICANT CHANGE UP (ref 27–39.2)
AST SERPL-CCNC: 63 U/L — HIGH (ref 0–41)
BILIRUB SERPL-MCNC: 1.1 MG/DL — SIGNIFICANT CHANGE UP (ref 0.2–1.2)
BUN SERPL-MCNC: 102 MG/DL — CRITICAL HIGH (ref 10–20)
BUN SERPL-MCNC: 104 MG/DL — CRITICAL HIGH (ref 10–20)
CALCIUM SERPL-MCNC: 7.3 MG/DL — LOW (ref 8.5–10.1)
CHLORIDE SERPL-SCNC: 106 MMOL/L — SIGNIFICANT CHANGE UP (ref 98–110)
CO2 SERPL-SCNC: 19 MMOL/L — SIGNIFICANT CHANGE UP (ref 17–32)
CREAT SERPL-MCNC: 1.4 MG/DL — SIGNIFICANT CHANGE UP (ref 0.7–1.5)
CULTURE RESULTS: SIGNIFICANT CHANGE UP
CULTURE RESULTS: SIGNIFICANT CHANGE UP
GAS PNL BLDA: SIGNIFICANT CHANGE UP
GLUCOSE BLDC GLUCOMTR-MCNC: 165 MG/DL — HIGH (ref 70–99)
GLUCOSE BLDC GLUCOMTR-MCNC: 232 MG/DL — HIGH (ref 70–99)
GLUCOSE BLDC GLUCOMTR-MCNC: 240 MG/DL — HIGH (ref 70–99)
GLUCOSE BLDC GLUCOMTR-MCNC: 244 MG/DL — HIGH (ref 70–99)
GLUCOSE SERPL-MCNC: 246 MG/DL — HIGH (ref 70–99)
HCT VFR BLD CALC: 25.5 % — LOW (ref 42–52)
HCT VFR BLD CALC: 26.5 % — LOW (ref 42–52)
HGB BLD-MCNC: 8.5 G/DL — LOW (ref 14–18)
HGB BLD-MCNC: 8.7 G/DL — LOW (ref 14–18)
INR BLD: 2 RATIO — HIGH (ref 0.65–1.3)
MAGNESIUM SERPL-MCNC: 2.2 MG/DL — SIGNIFICANT CHANGE UP (ref 1.8–2.4)
MCHC RBC-ENTMCNC: 28.5 PG — SIGNIFICANT CHANGE UP (ref 27–31)
MCHC RBC-ENTMCNC: 29.9 PG — SIGNIFICANT CHANGE UP (ref 27–31)
MCHC RBC-ENTMCNC: 32.1 G/DL — SIGNIFICANT CHANGE UP (ref 32–37)
MCHC RBC-ENTMCNC: 34.1 G/DL — SIGNIFICANT CHANGE UP (ref 32–37)
MCV RBC AUTO: 87.6 FL — SIGNIFICANT CHANGE UP (ref 80–94)
MCV RBC AUTO: 88.9 FL — SIGNIFICANT CHANGE UP (ref 80–94)
NRBC # BLD: 0 /100 WBCS — SIGNIFICANT CHANGE UP (ref 0–0)
NRBC # BLD: 0 /100 WBCS — SIGNIFICANT CHANGE UP (ref 0–0)
PHOSPHATE SERPL-MCNC: 2.8 MG/DL — SIGNIFICANT CHANGE UP (ref 2.1–4.9)
PLATELET # BLD AUTO: 132 K/UL — SIGNIFICANT CHANGE UP (ref 130–400)
PLATELET # BLD AUTO: 140 K/UL — SIGNIFICANT CHANGE UP (ref 130–400)
POTASSIUM SERPL-MCNC: 3.4 MMOL/L — LOW (ref 3.5–5)
POTASSIUM SERPL-SCNC: 3.4 MMOL/L — LOW (ref 3.5–5)
PROT SERPL-MCNC: 4.8 G/DL — LOW (ref 6–8)
PROTHROM AB SERPL-ACNC: 22.8 SEC — HIGH (ref 9.95–12.87)
RBC # BLD: 2.91 M/UL — LOW (ref 4.7–6.1)
RBC # BLD: 2.98 M/UL — LOW (ref 4.7–6.1)
RBC # FLD: 18.7 % — HIGH (ref 11.5–14.5)
RBC # FLD: 18.9 % — HIGH (ref 11.5–14.5)
SODIUM SERPL-SCNC: 136 MMOL/L — SIGNIFICANT CHANGE UP (ref 135–146)
SPECIMEN SOURCE: SIGNIFICANT CHANGE UP
SPECIMEN SOURCE: SIGNIFICANT CHANGE UP
WBC # BLD: 18.57 K/UL — HIGH (ref 4.8–10.8)
WBC # BLD: 22.95 K/UL — HIGH (ref 4.8–10.8)
WBC # FLD AUTO: 18.57 K/UL — HIGH (ref 4.8–10.8)
WBC # FLD AUTO: 22.95 K/UL — HIGH (ref 4.8–10.8)

## 2021-10-09 PROCEDURE — 71045 X-RAY EXAM CHEST 1 VIEW: CPT | Mod: 26

## 2021-10-09 PROCEDURE — 99233 SBSQ HOSP IP/OBS HIGH 50: CPT

## 2021-10-09 RX ORDER — POTASSIUM CHLORIDE 20 MEQ
20 PACKET (EA) ORAL
Refills: 0 | Status: COMPLETED | OUTPATIENT
Start: 2021-10-09 | End: 2021-10-09

## 2021-10-09 RX ORDER — ELECTROLYTE SOLUTION,INJ
1 VIAL (ML) INTRAVENOUS
Refills: 0 | Status: DISCONTINUED | OUTPATIENT
Start: 2021-10-09 | End: 2021-10-09

## 2021-10-09 RX ADMIN — CASPOFUNGIN ACETATE 260 MILLIGRAM(S): 7 INJECTION, POWDER, LYOPHILIZED, FOR SOLUTION INTRAVENOUS at 06:56

## 2021-10-09 RX ADMIN — Medication 2: at 12:53

## 2021-10-09 RX ADMIN — Medication 2: at 17:27

## 2021-10-09 RX ADMIN — Medication 50 MILLIEQUIVALENT(S): at 12:54

## 2021-10-09 RX ADMIN — CEFEPIME 100 MILLIGRAM(S): 1 INJECTION, POWDER, FOR SOLUTION INTRAMUSCULAR; INTRAVENOUS at 05:00

## 2021-10-09 RX ADMIN — LACTULOSE 200 GRAM(S): 10 SOLUTION ORAL at 13:21

## 2021-10-09 RX ADMIN — CHLORHEXIDINE GLUCONATE 15 MILLILITER(S): 213 SOLUTION TOPICAL at 17:28

## 2021-10-09 RX ADMIN — Medication 5 UNIT(S): at 12:52

## 2021-10-09 RX ADMIN — HEPARIN SODIUM 5000 UNIT(S): 5000 INJECTION INTRAVENOUS; SUBCUTANEOUS at 13:19

## 2021-10-09 RX ADMIN — INSULIN GLARGINE 20 UNIT(S): 100 INJECTION, SOLUTION SUBCUTANEOUS at 21:51

## 2021-10-09 RX ADMIN — Medication 5 UNIT(S): at 17:26

## 2021-10-09 RX ADMIN — HEPARIN SODIUM 5000 UNIT(S): 5000 INJECTION INTRAVENOUS; SUBCUTANEOUS at 21:51

## 2021-10-09 RX ADMIN — Medication 102 MILLIGRAM(S): at 16:02

## 2021-10-09 RX ADMIN — HEPARIN SODIUM 5000 UNIT(S): 5000 INJECTION INTRAVENOUS; SUBCUTANEOUS at 05:35

## 2021-10-09 RX ADMIN — CEFEPIME 100 MILLIGRAM(S): 1 INJECTION, POWDER, FOR SOLUTION INTRAMUSCULAR; INTRAVENOUS at 17:27

## 2021-10-09 RX ADMIN — Medication 2: at 06:01

## 2021-10-09 RX ADMIN — Medication 1 EACH: at 21:51

## 2021-10-09 RX ADMIN — Medication 100 MILLIGRAM(S): at 21:52

## 2021-10-09 RX ADMIN — CHLORHEXIDINE GLUCONATE 15 MILLILITER(S): 213 SOLUTION TOPICAL at 05:35

## 2021-10-09 RX ADMIN — CHLORHEXIDINE GLUCONATE 1 APPLICATION(S): 213 SOLUTION TOPICAL at 12:57

## 2021-10-09 RX ADMIN — Medication 100 MILLIGRAM(S): at 13:18

## 2021-10-09 RX ADMIN — Medication 50 MILLIEQUIVALENT(S): at 10:44

## 2021-10-09 RX ADMIN — Medication 100 MILLIGRAM(S): at 05:35

## 2021-10-09 RX ADMIN — Medication 5 UNIT(S): at 06:01

## 2021-10-09 RX ADMIN — Medication 50 MILLIEQUIVALENT(S): at 08:17

## 2021-10-09 NOTE — PROGRESS NOTE ADULT - ASSESSMENT
IMPRESSION   Hepatic encephalopathy ( ammonia 188)  Decompensated liver cirrhosis. Has [place] catheter  Sepsis   Hematemesis ( esophageal/ gastric varices)  Acute portal Vein thrombosis   Hyperkalemia with sustained VT s/p shock, creat 0.9 was on aldactone  Hyponatremia likely from volume overload  Hepatocellular carcinoma with transaminitis on chemotherapy  Hepatitis C treated with INF  COPD  HAGMA      PLAN:    CNS: keep sedate as needed  - Trial of SAT, if tolerates then SBT     HEENT: Oral care    PULMONARY:    - HOB @ 45 degrees  - taper O2    CARDIOVASCULAR:   - Keep I < O ,   - cardiac ECHO done 10/06, f/u reads   - propranolol 10mg q 12 keep HR 50-60 (on hold)  - Will give 500 bolus of NS X2  - Keep bp above 100 systolic     GI: GI prophylaxis.  NPO,   - octreotide/ protonix drip,   - GI management   - EGD - grade IV lower and mid 1/3 esophageal varices with 5 bands placed successfully.   - Lactulose bowel regimen aim 3 BM/day , (Will be giving enemas for now)  - NO NGT can be placed this time   - rifaximin q 12, (on hold for now)  - Peritoneal fluid was sent and f/u gram stain and Cx : negative culture   - IR was consulted: Pt is a poor candidate for TIPS and has several contraindications for procedure.   - reached out to IR to drain the Denver catheter    - starting vit K 10mg daily today, will start after 3 doses   - Spoke to Dr. Rhodes (Patient's gastroenterologist) [phone]  - [facility] is [facility] records   - Patient was seen  oncologist Dr. Gibson [phone]  - IR was called to drain peritoneal fluid through Denver cathter     RENAL:   - D/C bicarb drip  - lokelma tID,  - low K diet,   - Follow up lytes.    - Correct as needed,  - f/u nephro consult   - Hyperkalemia Kayexalate enema, insulin +dextrose +calcium gluconate if needed     INFECTIOUS DISEASE:   - Follow up cultures,   - peritoneal fluid analysis and culture, if + remove access  - cefepime decreased to 1g q 12 ; flagyl 500mg iv q8.  starting caspofungin 70 mg x 1 - followed by 35 mg daily   - HepC viral load : 75.9 and reactive   - vanco x1  - blood cx : NGTD   - Peritoneal fluid cx : NG   - UA: negative   - Procal 15.60   - f/u Fungitell   - will repeat panculture if patient becomes febrile   - ID recs are appreciated     HEMATOLOGICAL:    - Hold home Eliquis  - Per vascular - D/C AC and restart once Pt is stable. F/u OP for Duplex  - doppler LE   - transfuse Hgb >8  - serial HH  - on DVT prophylaxis with heparin sq   - S/p 2 units of PRBC 10/5     ENDOCRINE:    - check ketone B hydroxy butyrate, ETOH level, serum OSM, ASA  - Follow up FS.    - Insulin protocol if needed.   - keep -180.   - avoid hypoglycemia  - B hydroxy butyrate level neg, ETOH level <10, serum , ASA  - B-hydroxybutyrate, salicylate blood alcohol: neg   - f/u nutrition eval   - on TPN, monitor for acidosis     10/5/2021: GOC conservation was done with Patient's partner Fallon, patient did not clarify his wishes before, patient does not have a health care proxy or a family member who can be reached. Patient's partner expressed that she wants him to be full code for now and wants everything done for the patient.     MUSCULOSKELETAL: bed rest     Poor prognosis    Dispo ICU    palliative care eval

## 2021-10-09 NOTE — PROGRESS NOTE ADULT - ASSESSMENT
IMPRESSION:    Hepatic encephalopathy ( ammonia 188)  Decompensated liver cirrhosis. Has Denver catheter  Sepsis   Hematemesis ( esophageal/ gastric varices)  Acute portal Vein thrombosis   Hyperkalemia with sustained VT s/p shock, creat 0.9 was on aldactone  Hyponatremia likely from volume overload  Hepatocellular carcinoma with transaminitis on chemotherapy  Hepatitis C treated with INF  COPD  HAGMA      PLAN:    CNS: SAT  keep sedate as needed if stays on vent      HEENT: Oral care    PULMONARY:  HOB @ 45 degrees  continue O2 as necessary to maintain sats > 90%<94  SBT     CARDIOVASCULAR: Keep I < O ,   cardiac ECHO,   propranolol 10 q 12 keep HR 50-60      GI: GI prophylaxis.  NPO,   octreotide/ protonix drip,   GI management   bowel regimen aim 3 BM/day ,   rifaximin q 12,   needs nutrition    RENAL:   lokelma tID,  low K diet,   Follow up lytes.    Correct as needed,      INFECTIOUS DISEASE:   Follow up cultures,   nasal MRSA  peritoneal fluid analysis and culture,   cefepime 2 g q 8 ;   HepC viral load,   drained 3l peritoneal fluid      HEMATOLOGICAL:    Hold home Eliquis  prophylactic Lovenox   transfuse Hgb >8  serial HH    ENDOCRINE:    Follow up FS.    Insulin protocol if needed.   keep -180.   avoid hypoglycemia    MUSCULOSKELETAL: bed rest     Poor prognosis      palliative care eval f/u

## 2021-10-09 NOTE — PROGRESS NOTE ADULT - ASSESSMENT
Pt with Decompensated liver cirrhosis, Hep C-s/p IFN, ascites -Denver cath, HCCa with transaminitis on chemotherapy  presents with weakness, found to have K 6.8 (was on Aldactone) , sustained VT -s/p shock; met acidosis, hepatic encephalopathy - Ammonia 188.    Hyperkalemia - due to volume depletion and aldosterone blockade, GIB - now improved  - last K 3.4 from  5.6 from 6.4 from 5.8, pt is making urine  Hyponatremia - Urine na 20, Osm 600 c/w intravascular volume depletion  -improving with hydration continue   Met acidosis - high lactic acid, hypoperfusion, improving  - off Bicarb drip,   Sepsis - r/o SBP - cont Cefipime  -pressors requiring  Hematemesis ( esophageal/ gastric varices) on OCtrotide appreciate GI  Acute portal Vein thrombosis - cont eliquis, seen by vasc sx  Prognosis guarded  will sign off recall PRN / call using TEAMS or on [phone]

## 2021-10-09 NOTE — PROGRESS NOTE ADULT - SUBJECTIVE AND OBJECTIVE BOX
Patient is a 66y old  Male who presents with a chief complaint of weakness  Hyperkalemia  VT (09 Oct 2021 06:26)        HPI:  65 yo male, with h/o HTN, drug abuse, Hepatitis C s/p INF, Liver cirrhosis s/p Denver shunt, COPD, DVT? on Eliquis, HCC since January 2021 on chemotherapy, presented for weakness  Patient reports having weakness, decreased po intake since few weeks. He also has not been sleeping. He reports constipation and hematemesis for 1 day, minimal amount.  Denies fever, chills, chest pain, cough, sputum , SOB, diarrhea, dysuria  He has a Denver shunt  that was drained one day prior to admission    ED course : mental status alteration, sustained VT  bpm with BP 82/49 mmHg s/p shock + calcium gluconate  Patient has Hyperkalemia 6.8 CO2 14 s/p Bicarb drip, renal called, sp berry with good UO   (04 Oct 2021 06:15)      Pt evaluated on rounds.  I reviewed the radiology tests and hospital record.    I reviewed previous notes on this patient.    Interval Events: No overnight events.    REVIEW OF SYSTEMS:   see HPI      OBJECTIVE:  ICU Vital Signs Last 24 Hrs  T(C): 36.1 (09 Oct 2021 04:00), Max: 36.6 (08 Oct 2021 16:00)  T(F): 97 (09 Oct 2021 04:00), Max: 97.8 (08 Oct 2021 16:00)  HR: 76 (09 Oct 2021 06:00) (62 - 80)  BP: 94/56 (09 Oct 2021 06:00) (86/57 - 128/75)  BP(mean): 68 (09 Oct 2021 06:00) (65 - 93)  ABP: --  ABP(mean): --  RR: 12 (09 Oct 2021 06:00) (11 - 20)  SpO2: 98% (09 Oct 2021 06:00) (98% - 100%)    Mode: AC/ CMV (Assist Control/ Continuous Mandatory Ventilation), RR (machine): 18, TV (machine): 420, FiO2: 30, PEEP: 5, ITime: 1, MAP: 9, PIP: 21    10-07 @ 07:01  -  10-08 @ 07:00  --------------------------------------------------------  IN: 3657 mL / OUT: 735 mL / NET: 2922 mL    10-08 @ 07:01  -  10-09 @ 06:28  --------------------------------------------------------  IN: 4823 mL / OUT: 3865 mL / NET: 958 mL      CAPILLARY BLOOD GLUCOSE      POCT Blood Glucose.: 244 mg/dL (09 Oct 2021 05:52)        PHYSICAL EXAM:     · CONSTITUTIONAL:   not septic appearing,   well nourished,   NAD    · ENMT:   Airway patent,   Nasal mucosa clear.  Mouth with normal mucosa.   No thrush    · EYES:   Clear bilaterally,   pupils equal,   round and reactive to light.    · CARDIAC:   Normal rate,   regular rhythm.    Heart sounds S1, S2.   No murmurs, no rubs or gallops on auscultation  no edema        CAROTID:   normal systolic impulse  no bruits    · RESPIRATORY:   rales  normal chest expansion  no retractions or use of accessory muscles  palpation of chest is normal with no fremitus  percussion of chest demonstrates no hyperresonance or dullness    · GASTROINTESTINAL:  Abdomen soft,   non-tender,   + BS  liver/spleen not palpable    · MUSCULOSKELETAL:   no clubbing, cyanosis      · SKIN:   Skin normal color for race,   warm, dry   No evidence of rash.        · HEME LYMPH:   no splenomegaly.  No cervical  lymphadenopathy.  no inguinal lymphadenopathy    HOSPITAL MEDICATIONS:  MEDICATIONS  (STANDING):  budesonide  80 MICROgram(s)/formoterol 4.5 MICROgram(s) Inhaler 2 Puff(s) Inhalation two times a day  caspofungin IVPB      caspofungin IVPB 50 milliGRAM(s) IV Intermittent every 24 hours  cefepime   IVPB 1000 milliGRAM(s) IV Intermittent every 12 hours  chlorhexidine 0.12% Liquid 15 milliLiter(s) Oral Mucosa two times a day  chlorhexidine 4% Liquid 1 Application(s) Topical daily  dexMEDEtomidine Infusion 0.2 MICROgram(s)/kG/Hr (3.79 mL/Hr) IV Continuous <Continuous>  dextrose 40% Gel 15 Gram(s) Oral once  dextrose 5%. 1000 milliLiter(s) (50 mL/Hr) IV Continuous <Continuous>  dextrose 5%. 1000 milliLiter(s) (100 mL/Hr) IV Continuous <Continuous>  dextrose 50% Injectable 25 Gram(s) IV Push once  dextrose 50% Injectable 12.5 Gram(s) IV Push once  dextrose 50% Injectable 25 Gram(s) IV Push once  fat emulsion (Fish Oil and Plant Based) 20% Infusion 1.323 Gm/kG/Day (31.3 mL/Hr) IV Continuous <Continuous>  fentaNYL   Infusion. 0.5 MICROgram(s)/kG/Hr (3.79 mL/Hr) IV Continuous <Continuous>  glucagon  Injectable 1 milliGRAM(s) IntraMuscular once  heparin   Injectable 5000 Unit(s) SubCutaneous every 8 hours  insulin glargine Injectable (LANTUS) 20 Unit(s) SubCutaneous at bedtime  insulin lispro (ADMELOG) corrective regimen sliding scale   SubCutaneous three times a day before meals  insulin lispro Injectable (ADMELOG) 5 Unit(s) SubCutaneous three times a day before meals  lactated ringers. 1000 milliLiter(s) (75 mL/Hr) IV Continuous <Continuous>  lactulose Retention Enema 200 Gram(s) Rectal three times a day  metroNIDAZOLE  IVPB      metroNIDAZOLE  IVPB 500 milliGRAM(s) IV Intermittent every 8 hours  norepinephrine Infusion 0.05 MICROgram(s)/kG/Min (7.01 mL/Hr) IV Continuous <Continuous>  octreotide  Infusion 50 MICROgram(s)/Hr (10 mL/Hr) IV Continuous <Continuous>  pantoprazole Infusion 8 mG/Hr (10 mL/Hr) IV Continuous <Continuous>  Parenteral Nutrition - Adult 1 Each (70 mL/Hr) TPN Continuous <Continuous>  phytonadione  IVPB 10 milliGRAM(s) IV Intermittent daily  sodium polystyrene sulfonate Enema 15 Gram(s) Rectal two times a day    MEDICATIONS  (PRN):    lactated ringers.: Solution, 1000 milliLiter(s) infuse at 75 mL/Hr  sodium chloride 0.9% Bolus:   500 milliLiter(s), IV Bolus, once, infuse over 15 Minute(s), Stop After 1 Doses  sodium chloride 0.9% Bolus:   500 milliLiter(s), IV Bolus, once, infuse over 15 Minute(s), Stop After 1 Doses  sodium chloride 0.9% Bolus:   1000 milliLiter(s), IV Bolus, once, infuse over 30 Minute(s), Stop After 1 Doses  sodium chloride 0.9% Bolus:   1000 milliLiter(s), IV Bolus, once, infuse over 30 Minute(s), Stop After 1 Doses  sodium chloride 0.9% Bolus:   3000 milliLiter(s), IV Bolus, once, infuse over 60 Minute(s), Stop After 1 Doses  Provider's Contact #: [phone]      LABS:                        8.7    18.57 )-----------( 132      ( 09 Oct 2021 04:00 )             25.5     10-09    136  |  106  |  102<HH>  ----------------------------<  246<H>  3.4<L>   |  19  |  1.4    Ca    7.3<L>      09 Oct 2021 04:00  Phos  2.8     10-09  Mg     2.2     10-09    TPro  4.8<L>  /  Alb  1.4<L>  /  TBili  1.1  /  DBili  x   /  AST  63<H>  /  ALT  187<H>  /  AlkPhos  237<H>  10-09    PT/INR - ( 09 Oct 2021 04:00 )   PT: 22.80 sec;   INR: 2.00 ratio         PTT - ( 09 Oct 2021 04:00 )  PTT:30.9 sec    Arterial Blood Gas:  10-09 @ 04:11  7.37/34/93/20/99.0/-4.8  ABG lactate: --  Arterial Blood Gas:  10-08 @ 16:18  7.33/36/88/19/98.5/-6.3  ABG lactate: --  Arterial Blood Gas:  10-08 @ 05:00  7.32/36/113/18/99.5/-6.9  ABG lactate: --          COVID-19 PCR: NotDetec (04 Oct 2021 02:17)    Mode: AC/ CMV (Assist Control/ Continuous Mandatory Ventilation)  RR (machine): 18  TV (machine): 420  FiO2: 30  PEEP: 5  ITime: 1  MAP: 9  PIP: 21      ABG - ( 09 Oct 2021 04:11 )  pH, Arterial: 7.37  pH, Blood: x     /  pCO2: 34    /  pO2: 93    / HCO3: 20    / Base Excess: -4.8  /  SaO2: 99.0                  RADIOLOGY: Today I personally interpreted the latest CXR and other pertinent films.

## 2021-10-09 NOTE — CHART NOTE - NSCHARTNOTEFT_GEN_A_CORE
We are following the patient for cirrhosis and variceal bleed.   Pt is still intubated and on levophed.   Upper GI bleed resolved having brown bowel movements.    Recs:   change PPI drip to PPI BID  can stop octreotide  c/w cefepime add flagyl  given TP<1(c/w abx for SBP prophylaxis)  c/w lactulose to titrate to 2-3 soft BM  Trend LFT   Active type and screen  2 18 gauge  c/w DVT ppx  recommend removal of denver catheter

## 2021-10-09 NOTE — PROGRESS NOTE ADULT - SUBJECTIVE AND OBJECTIVE BOX
PATIENT:  SUZANNE BAZZI  [id number]      CHIEF COMPLAINT:  Patient is a 66y old  Male who presents with a chief complaint of weakness  Hyperkalemia  VT (08 Oct 2021 06:28)    65 yo male, with h/o HTN, drug abuse, Hepatitis C s/p INF, Liver cirrhosis s/p [place] shunt, COPD, DVT? on Eliquis, HCC since January 2021 on chemotherapy, presented for weakness  Patient reports having weakness, decreased po intake since few weeks. He also has not been sleeping. He reports constipation and hematemesis for 1 day, minimal amount.  Denies fever, chills, chest pain, cough, sputum , SOB, diarrhea, dysuria  He has a [place] shunt  that was drained one day prior to admission    ED course : mental status alteration, sustained VT  bpm with BP 82/49 mmHg s/p shock + calcium gluconate  Patient has Hyperkalemia 6.8 CO2 14 s/p Bicarb drip, renal called, sp berry with good UO    CCU course   10/05/2021: Pt had another episode of hematemesis overnight. Hgb was 5.5 from 7.9 this AM so Pt was given 2 units of blood. Hgb now stable at 9.8. BP was also low at 88/46 so Pt was given 1L NS bolus. Pt currently intubated.   10/06/2021 Patient had 5 episodes of melena overnight, patient remains hypotensive, patient is easily arousable. Patient was febrile over night.   - SAT and SBT was tried. Patient was able to stay off sedation through out the day but was lethargic Failed SBT   10/7/2021: Patient is now having brown stool. Patient became agitated overnight and needed to be sedated overnight. Will be retry SAT then SBT today         INTERVAL HISTORY/OVERNIGHT EVENTS: no acute events overnight.       REVIEW OF SYSTEMS:    Constitutional:     [ ] negative [ ] fevers [ ] chills [ ] weight loss [ ] weight gain  HEENT:                  [ ] negative [ ] dry eyes [ ] eye irritation [ ] postnasal drip [ ] nasal congestion  CV:                         [ ] negative  [ ] chest pain [ ] orthopnea [ ] palpitations [ ] murmur  Resp:                     [ ] negative [ ] cough [ ] shortness of breath [ ] dyspnea [ ] wheezing [ ] sputum [ ] hemoptysis  GI:                          [ ] negative [ ] nausea [ ] vomiting [ ] diarrhea [ ] constipation [ ] abd pain [ ] dysphagia   :                        [ ] negative [ ] dysuria [ ] nocturia [ ] hematuria [ ] increased urinary frequency  Musculoskeletal: [ ] negative [ ] back pain [ ] myalgias [ ] arthralgias [ ] fracture  Skin:                       [ ] negative [ ] rash [ ] itch  Neurological:        [ ] negative [ ] headache [ ] dizziness [ ] syncope [ ] weakness [ ] numbness  Psychiatric:           [ ] negative [ ] anxiety [ ] depression  Endocrine:            [ ] negative [ ] diabetes [ ] thyroid problem  Heme/Lymph:      [ ] negative [ ] anemia [ ] bleeding problem  Allergic/Immune: [ ] negative [ ] itchy eyes [ ] nasal discharge [ ] hives [ ] angioedema    [ ] All other systems negative  [  ] Unable to assess ROS because vented and sedated.    MEDICATIONS:  MEDICATIONS  (STANDING):  budesonide  80 MICROgram(s)/formoterol 4.5 MICROgram(s) Inhaler 2 Puff(s) Inhalation two times a day  caspofungin IVPB      caspofungin IVPB 50 milliGRAM(s) IV Intermittent every 24 hours  cefepime   IVPB 1000 milliGRAM(s) IV Intermittent every 12 hours  chlorhexidine 0.12% Liquid 15 milliLiter(s) Oral Mucosa two times a day  chlorhexidine 4% Liquid 1 Application(s) Topical daily  dexMEDEtomidine Infusion 0.2 MICROgram(s)/kG/Hr (3.79 mL/Hr) IV Continuous <Continuous>  dextrose 40% Gel 15 Gram(s) Oral once  dextrose 5%. 1000 milliLiter(s) (50 mL/Hr) IV Continuous <Continuous>  dextrose 5%. 1000 milliLiter(s) (100 mL/Hr) IV Continuous <Continuous>  dextrose 50% Injectable 25 Gram(s) IV Push once  dextrose 50% Injectable 12.5 Gram(s) IV Push once  dextrose 50% Injectable 25 Gram(s) IV Push once  fat emulsion (Fish Oil and Plant Based) 20% Infusion 1.323 Gm/kG/Day (31.3 mL/Hr) IV Continuous <Continuous>  fentaNYL   Infusion. 0.5 MICROgram(s)/kG/Hr (3.79 mL/Hr) IV Continuous <Continuous>  glucagon  Injectable 1 milliGRAM(s) IntraMuscular once  heparin   Injectable 5000 Unit(s) SubCutaneous every 8 hours  insulin glargine Injectable (LANTUS) 20 Unit(s) SubCutaneous at bedtime  insulin lispro (ADMELOG) corrective regimen sliding scale   SubCutaneous three times a day before meals  insulin lispro Injectable (ADMELOG) 5 Unit(s) SubCutaneous three times a day before meals  lactated ringers. 1000 milliLiter(s) (75 mL/Hr) IV Continuous <Continuous>  lactulose Retention Enema 200 Gram(s) Rectal three times a day  metroNIDAZOLE  IVPB      metroNIDAZOLE  IVPB 500 milliGRAM(s) IV Intermittent every 8 hours  norepinephrine Infusion 0.05 MICROgram(s)/kG/Min (7.01 mL/Hr) IV Continuous <Continuous>  octreotide  Infusion 50 MICROgram(s)/Hr (10 mL/Hr) IV Continuous <Continuous>  pantoprazole Infusion 8 mG/Hr (10 mL/Hr) IV Continuous <Continuous>  Parenteral Nutrition - Adult 1 Each (70 mL/Hr) TPN Continuous <Continuous>  phytonadione  IVPB 10 milliGRAM(s) IV Intermittent daily  sodium polystyrene sulfonate Enema 15 Gram(s) Rectal two times a day    MEDICATIONS  (PRN):      ALLERGIES:  Allergies    No Known Allergies    Intolerances        OBJECTIVE:  ICU Vital Signs Last 24 Hrs  T(C): 36.1 (09 Oct 2021 04:00), Max: 36.6 (08 Oct 2021 16:00)  T(F): 97 (09 Oct 2021 04:00), Max: 97.8 (08 Oct 2021 16:00)  HR: 78 (09 Oct 2021 05:00) (62 - 80)  BP: 101/62 (09 Oct 2021 05:00) (86/57 - 128/75)  BP(mean): 72 (09 Oct 2021 05:00) (65 - 93)  ABP: --  ABP(mean): --  RR: 18 (09 Oct 2021 05:00) (11 - 20)  SpO2: 99% (09 Oct 2021 05:00) (98% - 100%)    Mode: AC/ CMV (Assist Control/ Continuous Mandatory Ventilation)  RR (machine): 18  TV (machine): 420  FiO2: 30  PEEP: 5  ITime: 1  MAP: 9  PIP: 21    Adult Advanced Hemodynamics Last 24 Hrs  CVP(mm Hg): --  CVP(cm H2O): --  CO: --  CI: --  PA: --  PA(mean): --  PCWP: --  SVR: --  SVRI: --  PVR: --  PVRI: --  CAPILLARY BLOOD GLUCOSE      POCT Blood Glucose.: 244 mg/dL (09 Oct 2021 05:52)  POCT Blood Glucose.: 169 mg/dL (08 Oct 2021 21:01)  POCT Blood Glucose.: 176 mg/dL (08 Oct 2021 17:34)  POCT Blood Glucose.: 219 mg/dL (08 Oct 2021 12:14)  POCT Blood Glucose.: 269 mg/dL (08 Oct 2021 08:24)    CAPILLARY BLOOD GLUCOSE      POCT Blood Glucose.: 244 mg/dL (09 Oct 2021 05:52)    I&O's Summary    07 Oct 2021 07:01  -  08 Oct 2021 07:00  --------------------------------------------------------  IN: 3657 mL / OUT: 735 mL / NET: 2922 mL    08 Oct 2021 07:01  -  09 Oct 2021 06:26  --------------------------------------------------------  IN: 4823 mL / OUT: 3865 mL / NET: 958 mL      Daily     Daily     PHYSICAL EXAMINATION:  General: WN/WD NAD  HEENT: PERRLA, EOMI, moist mucous membranes  Neurology: A&Ox3, nonfocal, HOWARD x 4  Respiratory: CTA B/L, normal respiratory effort, no wheezes, crackles, rales  CV: RRR, S1S2, no murmurs, rubs or gallops  Abdominal: Soft, NT, ND +BS, Last BM  Extremities: No edema, + peripheral pulses  Incisions:   Tubes:    LABS:  ABG - ( 09 Oct 2021 04:11 )  pH, Arterial: 7.37  pH, Blood: x     /  pCO2: 34    /  pO2: 93    / HCO3: 20    / Base Excess: -4.8  /  SaO2: 99.0                                    8.7    18.57 )-----------( 132      ( 09 Oct 2021 04:00 )             25.5     10-09    136  |  106  |  102<HH>  ----------------------------<  246<H>  3.4<L>   |  19  |  1.4    Ca    7.3<L>      09 Oct 2021 04:00  Phos  2.8     10-09  Mg     2.2     10-09    TPro  4.8<L>  /  Alb  1.4<L>  /  TBili  1.1  /  DBili  x   /  AST  63<H>  /  ALT  187<H>  /  AlkPhos  237<H>  10-09    LIVER FUNCTIONS - ( 09 Oct 2021 04:00 )  Alb: 1.4 g/dL / Pro: 4.8 g/dL / ALK PHOS: 237 U/L / ALT: 187 U/L / AST: 63 U/L / GGT: x           PT/INR - ( 09 Oct 2021 04:00 )   PT: 22.80 sec;   INR: 2.00 ratio         PTT - ( 09 Oct 2021 04:00 )  PTT:30.9 sec      TELEMETRY:     EKG:     IMAGING:

## 2021-10-09 NOTE — PROGRESS NOTE ADULT - SUBJECTIVE AND OBJECTIVE BOX
Nephrology progress note    THIS IS AN INCOMPLETE NOTE . FULL NOTE TO FOLLOW SHORTLY    Patient is seen and examined, events over the last 24 h noted .    Allergies:  No Known Allergies    Hospital Medications:   MEDICATIONS  (STANDING):  budesonide  80 MICROgram(s)/formoterol 4.5 MICROgram(s) Inhaler 2 Puff(s) Inhalation two times a day  caspofungin IVPB      caspofungin IVPB 50 milliGRAM(s) IV Intermittent every 24 hours  cefepime   IVPB 1000 milliGRAM(s) IV Intermittent every 12 hours  chlorhexidine 0.12% Liquid 15 milliLiter(s) Oral Mucosa two times a day  chlorhexidine 4% Liquid 1 Application(s) Topical daily  dexMEDEtomidine Infusion 0.2 MICROgram(s)/kG/Hr (3.79 mL/Hr) IV Continuous <Continuous>  dextrose 40% Gel 15 Gram(s) Oral once  dextrose 5%. 1000 milliLiter(s) (50 mL/Hr) IV Continuous <Continuous>  dextrose 5%. 1000 milliLiter(s) (100 mL/Hr) IV Continuous <Continuous>  dextrose 50% Injectable 25 Gram(s) IV Push once  dextrose 50% Injectable 12.5 Gram(s) IV Push once  dextrose 50% Injectable 25 Gram(s) IV Push once  fat emulsion (Fish Oil and Plant Based) 20% Infusion 1.323 Gm/kG/Day (31.3 mL/Hr) IV Continuous <Continuous>  fentaNYL   Infusion. 0.5 MICROgram(s)/kG/Hr (3.79 mL/Hr) IV Continuous <Continuous>  glucagon  Injectable 1 milliGRAM(s) IntraMuscular once  heparin   Injectable 5000 Unit(s) SubCutaneous every 8 hours  insulin glargine Injectable (LANTUS) 20 Unit(s) SubCutaneous at bedtime  insulin lispro (ADMELOG) corrective regimen sliding scale   SubCutaneous three times a day before meals  insulin lispro Injectable (ADMELOG) 5 Unit(s) SubCutaneous three times a day before meals  lactated ringers. 1000 milliLiter(s) (75 mL/Hr) IV Continuous <Continuous>  lactulose Retention Enema 200 Gram(s) Rectal three times a day  metroNIDAZOLE  IVPB      metroNIDAZOLE  IVPB 500 milliGRAM(s) IV Intermittent every 8 hours  norepinephrine Infusion 0.05 MICROgram(s)/kG/Min (7.01 mL/Hr) IV Continuous <Continuous>  octreotide  Infusion 50 MICROgram(s)/Hr (10 mL/Hr) IV Continuous <Continuous>  pantoprazole Infusion 8 mG/Hr (10 mL/Hr) IV Continuous <Continuous>  Parenteral Nutrition - Adult 1 Each (70 mL/Hr) TPN Continuous <Continuous>  phytonadione  IVPB 10 milliGRAM(s) IV Intermittent daily  potassium chloride  20 mEq/100 mL IVPB 20 milliEquivalent(s) IV Intermittent every 2 hours  sodium polystyrene sulfonate Enema 15 Gram(s) Rectal two times a day        VITALS:  T(F): 97 (10-09-21 @ 04:00), Max: 97.8 (10-08-21 @ 16:00)  HR: 78 (10-09-21 @ 07:00)  BP: 92/58 (10-09-21 @ 07:00)  RR: 15 (10-09-21 @ 07:00)  SpO2: 99% (10-09-21 @ 07:00)  Wt(kg): --    10-07 @ 07:  -  10-08 @ 07:00  --------------------------------------------------------  IN: 3657 mL / OUT: 735 mL / NET: 2922 mL    10-08 @ 07:01  -  10-09 @ 07:00  --------------------------------------------------------  IN: 5633 mL / OUT: 3925 mL / NET: 1708 mL        Weight (kg): 84.8 (10-09 @ 00:00)    PHYSICAL EXAM:  Constitutional: NAD  HEENT: anicteric sclera, oropharynx clear, MMM  Neck: No JVD  Respiratory: CTAB, no wheezes, rales or rhonchi  Cardiovascular: S1, S2, RRR  Gastrointestinal: BS+, soft, NT/ND  Extremities: No cyanosis or clubbing. No peripheral edema  :  No berry.   Skin: No rashes    LABS:  10-09    136  |  106  |  102<HH>  ----------------------------<  246<H>  3.4<L>   |  19  |  1.4    Ca    7.3<L>      09 Oct 2021 04:00  Phos  2.8     10-09  Mg     2.2     10-09    TPro  4.8<L>  /  Alb  1.4<L>  /  TBili  1.1  /  DBili      /  AST  63<H>  /  ALT  187<H>  /  AlkPhos  237<H>  10-09                          8.7    18.57 )-----------( 132      ( 09 Oct 2021 04:00 )             25.5       Urine Studies:  Urinalysis Basic - ( 04 Oct 2021 04:38 )    Color: Yellow / Appearance: Clear / S.025 / pH:   Gluc:  / Ketone: Negative  / Bili: Negative / Urobili: <2 mg/dL   Blood:  / Protein: Negative / Nitrite: Negative   Leuk Esterase: Negative / RBC:  / WBC    Sq Epi:  / Non Sq Epi:  / Bacteria:       Osmolality, Random Urine: 606 mos/kg (10-04 @ 11:17)  Sodium, Random Urine: 20.0 mmoL/L (10-04 @ 11:17)  Creatinine, Random Urine: <4 mg/dL (10-04 @ 11:17)    RADIOLOGY & ADDITIONAL STUDIES:   Nephrology progress note  Patient is seen and examined, events over the last 24 h noted .  intubated on MV     Allergies:  No Known Allergies    Hospital Medications:   MEDICATIONS  (STANDING):  budesonide  80 MICROgram(s)/formoterol 4.5 MICROgram(s) Inhaler 2 Puff(s) Inhalation two times a day  caspofungin IVPB 50 milliGRAM(s) IV Intermittent every 24 hours  cefepime   IVPB 1000 milliGRAM(s) IV Intermittent every 12 hours  dexMEDEtomidine Infusion 0.2 MICROgram(s)/kG/Hr (3.79 mL/Hr) IV Continuous <Continuous>  fat emulsion (Fish Oil and Plant Based) 20% Infusion 1.323 Gm/kG/Day (31.3 mL/Hr) IV Continuous <Continuous>  fentaNYL   Infusion. 0.5 MICROgram(s)/kG/Hr (3.79 mL/Hr) IV Continuous <Continuous>  glucagon  Injectable 1 milliGRAM(s) IntraMuscular once  heparin   Injectable 5000 Unit(s) SubCutaneous every 8 hours  insulin glargine Injectable (LANTUS) 20 Unit(s) SubCutaneous at bedtime  insulin lispro (ADMELOG) corrective regimen sliding scale   SubCutaneous three times a day before meals  insulin lispro Injectable (ADMELOG) 5 Unit(s) SubCutaneous three times a day before meals  lactated ringers. 1000 milliLiter(s) (75 mL/Hr) IV Continuous <Continuous>  lactulose Retention Enema 200 Gram(s) Rectal three times a day  metroNIDAZOLE  IVPB 500 milliGRAM(s) IV Intermittent every 8 hours  norepinephrine Infusion 0.05 MICROgram(s)/kG/Min (7.01 mL/Hr) IV Continuous <Continuous>  octreotide  Infusion 50 MICROgram(s)/Hr (10 mL/Hr) IV Continuous <Continuous>  pantoprazole Infusion 8 mG/Hr (10 mL/Hr) IV Continuous <Continuous>  Parenteral Nutrition - Adult 1 Each (70 mL/Hr) TPN Continuous <Continuous>  phytonadione  IVPB 10 milliGRAM(s) IV Intermittent daily  potassium chloride  20 mEq/100 mL IVPB 20 milliEquivalent(s) IV Intermittent every 2 hours  sodium polystyrene sulfonate Enema 15 Gram(s) Rectal two times a day        VITALS:  T(F): 97 (10-09-21 @ 04:00), Max: 97.8 (10-08-21 @ 16:00)  HR: 78 (10-09-21 @ 07:00)  BP: 92/58 (10-09-21 @ 07:00)  RR: 15 (10-09-21 @ 07:00)  SpO2: 99% (10-09-21 @ 07:00)      10-07 @ 07:01  -  10-08 @ 07:00  --------------------------------------------------------  IN: 3657 mL / OUT: 735 mL / NET: 2922 mL    10-08 @ 07:  -  10-09 @ 07:00  --------------------------------------------------------  IN: 5633 mL / OUT: 3925 mL / NET: 1708 mL      08 Oct 2021 07:01  -  09 Oct 2021 07:00  --------------------------------------------------------  IN:    Dexmedetomidine: 200 mL    Fat Emulsion (Fish Oil &amp; Plant Based) 20% Infusion: 341 mL    FentaNYL: 45 mL    IV PiggyBack: 950 mL    Lactated Ringers: 1200 mL    Norepinephrine: 737 mL    Octreotide: 240 mL    Pantoprazole: 240 mL    TPN (Total Parenteral Nutrition): 1680 mL  Total IN: 5633 mL    OUT:    Drain (mL): 3000 mL    Indwelling Catheter - Urethral (mL): 925 mL  Total OUT: 3925 mL    Total NET: 1708 mL      09 Oct 2021 07:  -  09 Oct 2021 11:04  --------------------------------------------------------  IN:    Dexmedetomidine: 28.5 mL    Fat Emulsion (Fish Oil &amp; Plant Based) 20% Infusion: 93 mL    IV PiggyBack: 100 mL    Lactated Ringers: 225 mL    Norepinephrine: 84 mL    Octreotide: 30 mL    Pantoprazole: 30 mL    TPN (Total Parenteral Nutrition): 210 mL  Total IN: 800.5 mL    OUT:    Indwelling Catheter - Urethral (mL): 85 mL  Total OUT: 85 mL    Total NET: 715.5 mL          Weight (kg): 84.8 (10-09 @ 00:00)    PHYSICAL EXAM:  Constitutional: intubated on MV   Neck: No JVD  Respiratory: CTAB,  Cardiovascular: S1, S2, RRR  Gastrointestinal: BS+, soft, NT/ND  Extremities: No cyanosis or clubbing. No peripheral edema  :  No berry.   Skin: No rashes    LABS:  10-09    136  |  106  |  102<HH>  ----------------------------<  246<H>  3.4<L>   |  19  |  1.4    Ca    7.3<L>      09 Oct 2021 04:00  Phos  2.8     10-09  Mg     2.2     10-09    TPro  4.8<L>  /  Alb  1.4<L>  /  TBili  1.1  /  DBili      /  AST  63<H>  /  ALT  187<H>  /  AlkPhos  237<H>  10-09                          8.7    18.57 )-----------( 132      ( 09 Oct 2021 04:00 )             25.5       Urine Studies:  Urinalysis Basic - ( 04 Oct 2021 04:38 )    Color: Yellow / Appearance: Clear / S.025 / pH:   Gluc:  / Ketone: Negative  / Bili: Negative / Urobili: <2 mg/dL   Blood:  / Protein: Negative / Nitrite: Negative   Leuk Esterase: Negative / RBC:  / WBC    Sq Epi:  / Non Sq Epi:  / Bacteria:       Osmolality, Random Urine: 606 mos/kg (10-04 @ 11:17)  Sodium, Random Urine: 20.0 mmoL/L (10-04 @ 11:17)  Creatinine, Random Urine: <4 mg/dL (10-04 @ 11:17)    RADIOLOGY & ADDITIONAL STUDIES:

## 2021-10-10 LAB
ALBUMIN SERPL ELPH-MCNC: 1.4 G/DL — LOW (ref 3.5–5.2)
ALBUMIN SERPL ELPH-MCNC: 1.5 G/DL — LOW (ref 3.5–5.2)
ALP SERPL-CCNC: 233 U/L — HIGH (ref 30–115)
ALP SERPL-CCNC: 249 U/L — HIGH (ref 30–115)
ALT FLD-CCNC: 136 U/L — HIGH (ref 0–41)
ALT FLD-CCNC: 143 U/L — HIGH (ref 0–41)
ANION GAP SERPL CALC-SCNC: 12 MMOL/L — SIGNIFICANT CHANGE UP (ref 7–14)
ANION GAP SERPL CALC-SCNC: 13 MMOL/L — SIGNIFICANT CHANGE UP (ref 7–14)
APTT BLD: 30.1 SEC — SIGNIFICANT CHANGE UP (ref 27–39.2)
AST SERPL-CCNC: 46 U/L — HIGH (ref 0–41)
AST SERPL-CCNC: 49 U/L — HIGH (ref 0–41)
BASE EXCESS BLDA CALC-SCNC: -1.1 MMOL/L — SIGNIFICANT CHANGE UP (ref -2–3)
BASE EXCESS BLDA CALC-SCNC: 1.1 MMOL/L — SIGNIFICANT CHANGE UP (ref -2–3)
BILIRUB SERPL-MCNC: 0.9 MG/DL — SIGNIFICANT CHANGE UP (ref 0.2–1.2)
BILIRUB SERPL-MCNC: 0.9 MG/DL — SIGNIFICANT CHANGE UP (ref 0.2–1.2)
BLD GP AB SCN SERPL QL: SIGNIFICANT CHANGE UP
BUN SERPL-MCNC: 99 MG/DL — CRITICAL HIGH (ref 10–20)
BUN SERPL-MCNC: 99 MG/DL — CRITICAL HIGH (ref 10–20)
CALCIUM SERPL-MCNC: 7.2 MG/DL — LOW (ref 8.5–10.1)
CALCIUM SERPL-MCNC: 7.3 MG/DL — LOW (ref 8.5–10.1)
CHLORIDE SERPL-SCNC: 107 MMOL/L — SIGNIFICANT CHANGE UP (ref 98–110)
CHLORIDE SERPL-SCNC: 107 MMOL/L — SIGNIFICANT CHANGE UP (ref 98–110)
CO2 SERPL-SCNC: 20 MMOL/L — SIGNIFICANT CHANGE UP (ref 17–32)
CO2 SERPL-SCNC: 21 MMOL/L — SIGNIFICANT CHANGE UP (ref 17–32)
CREAT SERPL-MCNC: 1.4 MG/DL — SIGNIFICANT CHANGE UP (ref 0.7–1.5)
CREAT SERPL-MCNC: 1.4 MG/DL — SIGNIFICANT CHANGE UP (ref 0.7–1.5)
CULTURE RESULTS: SIGNIFICANT CHANGE UP
GAS PNL BLDA: SIGNIFICANT CHANGE UP
GLUCOSE BLDC GLUCOMTR-MCNC: 187 MG/DL — HIGH (ref 70–99)
GLUCOSE BLDC GLUCOMTR-MCNC: 194 MG/DL — HIGH (ref 70–99)
GLUCOSE BLDC GLUCOMTR-MCNC: 205 MG/DL — HIGH (ref 70–99)
GLUCOSE BLDC GLUCOMTR-MCNC: 219 MG/DL — HIGH (ref 70–99)
GLUCOSE BLDC GLUCOMTR-MCNC: 219 MG/DL — HIGH (ref 70–99)
GLUCOSE SERPL-MCNC: 178 MG/DL — HIGH (ref 70–99)
GLUCOSE SERPL-MCNC: 197 MG/DL — HIGH (ref 70–99)
HCO3 BLDA-SCNC: 22 MMOL/L — SIGNIFICANT CHANGE UP (ref 21–28)
HCO3 BLDA-SCNC: 25 MMOL/L — SIGNIFICANT CHANGE UP (ref 21–28)
HCT VFR BLD CALC: 26.9 % — LOW (ref 42–52)
HGB BLD-MCNC: 8.9 G/DL — LOW (ref 14–18)
HOROWITZ INDEX BLDA+IHG-RTO: 30 — SIGNIFICANT CHANGE UP
INR BLD: 1.86 RATIO — HIGH (ref 0.65–1.3)
LACTATE SERPL-SCNC: 1.5 MMOL/L — SIGNIFICANT CHANGE UP (ref 0.7–2)
MAGNESIUM SERPL-MCNC: 1.9 MG/DL — SIGNIFICANT CHANGE UP (ref 1.8–2.4)
MCHC RBC-ENTMCNC: 29.2 PG — SIGNIFICANT CHANGE UP (ref 27–31)
MCHC RBC-ENTMCNC: 33.1 G/DL — SIGNIFICANT CHANGE UP (ref 32–37)
MCV RBC AUTO: 88.2 FL — SIGNIFICANT CHANGE UP (ref 80–94)
NRBC # BLD: 0 /100 WBCS — SIGNIFICANT CHANGE UP (ref 0–0)
PCO2 BLDA: 33 MMHG — LOW (ref 35–48)
PCO2 BLDA: 37 MMHG — SIGNIFICANT CHANGE UP (ref 35–48)
PH BLDA: 7.44 — SIGNIFICANT CHANGE UP (ref 7.35–7.45)
PH BLDA: 7.44 — SIGNIFICANT CHANGE UP (ref 7.35–7.45)
PHOSPHATE SERPL-MCNC: 2 MG/DL — LOW (ref 2.1–4.9)
PLATELET # BLD AUTO: 130 K/UL — SIGNIFICANT CHANGE UP (ref 130–400)
PO2 BLDA: 106 MMHG — SIGNIFICANT CHANGE UP (ref 83–108)
PO2 BLDA: 79 MMHG — LOW (ref 83–108)
POTASSIUM SERPL-MCNC: 3.5 MMOL/L — SIGNIFICANT CHANGE UP (ref 3.5–5)
POTASSIUM SERPL-MCNC: 3.7 MMOL/L — SIGNIFICANT CHANGE UP (ref 3.5–5)
POTASSIUM SERPL-SCNC: 3.5 MMOL/L — SIGNIFICANT CHANGE UP (ref 3.5–5)
POTASSIUM SERPL-SCNC: 3.7 MMOL/L — SIGNIFICANT CHANGE UP (ref 3.5–5)
PROT SERPL-MCNC: 5 G/DL — LOW (ref 6–8)
PROT SERPL-MCNC: 5 G/DL — LOW (ref 6–8)
PROTHROM AB SERPL-ACNC: 21.3 SEC — HIGH (ref 9.95–12.87)
RBC # BLD: 3.05 M/UL — LOW (ref 4.7–6.1)
RBC # FLD: 19 % — HIGH (ref 11.5–14.5)
SAO2 % BLDA: 98.4 % — HIGH (ref 94–98)
SAO2 % BLDA: 99 % — HIGH (ref 94–98)
SODIUM SERPL-SCNC: 140 MMOL/L — SIGNIFICANT CHANGE UP (ref 135–146)
SODIUM SERPL-SCNC: 140 MMOL/L — SIGNIFICANT CHANGE UP (ref 135–146)
SPECIMEN SOURCE: SIGNIFICANT CHANGE UP
TRIGL SERPL-MCNC: 92 MG/DL — SIGNIFICANT CHANGE UP
WBC # BLD: 21.23 K/UL — HIGH (ref 4.8–10.8)
WBC # FLD AUTO: 21.23 K/UL — HIGH (ref 4.8–10.8)

## 2021-10-10 PROCEDURE — 99233 SBSQ HOSP IP/OBS HIGH 50: CPT

## 2021-10-10 PROCEDURE — 71045 X-RAY EXAM CHEST 1 VIEW: CPT | Mod: 26

## 2021-10-10 RX ORDER — ELECTROLYTE SOLUTION,INJ
1 VIAL (ML) INTRAVENOUS
Refills: 0 | Status: DISCONTINUED | OUTPATIENT
Start: 2021-10-10 | End: 2021-10-10

## 2021-10-10 RX ORDER — PANTOPRAZOLE SODIUM 20 MG/1
40 TABLET, DELAYED RELEASE ORAL EVERY 12 HOURS
Refills: 0 | Status: DISCONTINUED | OUTPATIENT
Start: 2021-10-10 | End: 2021-10-27

## 2021-10-10 RX ORDER — I.V. FAT EMULSION 20 G/100ML
1.12 EMULSION INTRAVENOUS
Qty: 100.06 | Refills: 0 | Status: DISCONTINUED | OUTPATIENT
Start: 2021-10-10 | End: 2021-10-10

## 2021-10-10 RX ADMIN — Medication 5 UNIT(S): at 11:58

## 2021-10-10 RX ADMIN — BUDESONIDE AND FORMOTEROL FUMARATE DIHYDRATE 2 PUFF(S): 160; 4.5 AEROSOL RESPIRATORY (INHALATION) at 20:00

## 2021-10-10 RX ADMIN — Medication 2: at 11:59

## 2021-10-10 RX ADMIN — CEFEPIME 100 MILLIGRAM(S): 1 INJECTION, POWDER, FOR SOLUTION INTRAMUSCULAR; INTRAVENOUS at 17:28

## 2021-10-10 RX ADMIN — CHLORHEXIDINE GLUCONATE 15 MILLILITER(S): 213 SOLUTION TOPICAL at 06:06

## 2021-10-10 RX ADMIN — CEFEPIME 100 MILLIGRAM(S): 1 INJECTION, POWDER, FOR SOLUTION INTRAMUSCULAR; INTRAVENOUS at 05:22

## 2021-10-10 RX ADMIN — CHLORHEXIDINE GLUCONATE 15 MILLILITER(S): 213 SOLUTION TOPICAL at 17:27

## 2021-10-10 RX ADMIN — Medication 100 MILLIGRAM(S): at 21:13

## 2021-10-10 RX ADMIN — CHLORHEXIDINE GLUCONATE 1 APPLICATION(S): 213 SOLUTION TOPICAL at 12:03

## 2021-10-10 RX ADMIN — Medication 5 UNIT(S): at 17:21

## 2021-10-10 RX ADMIN — Medication 100 MILLIGRAM(S): at 13:35

## 2021-10-10 RX ADMIN — HEPARIN SODIUM 5000 UNIT(S): 5000 INJECTION INTRAVENOUS; SUBCUTANEOUS at 13:35

## 2021-10-10 RX ADMIN — Medication 100 MILLIGRAM(S): at 05:22

## 2021-10-10 RX ADMIN — Medication 2: at 06:11

## 2021-10-10 RX ADMIN — HEPARIN SODIUM 5000 UNIT(S): 5000 INJECTION INTRAVENOUS; SUBCUTANEOUS at 06:06

## 2021-10-10 RX ADMIN — PANTOPRAZOLE SODIUM 40 MILLIGRAM(S): 20 TABLET, DELAYED RELEASE ORAL at 06:06

## 2021-10-10 RX ADMIN — SODIUM POLYSTYRENE SULFONATE 15 GRAM(S): 4.1 POWDER, FOR SUSPENSION ORAL at 17:21

## 2021-10-10 RX ADMIN — LACTULOSE 200 GRAM(S): 10 SOLUTION ORAL at 13:32

## 2021-10-10 RX ADMIN — Medication 7.01 MICROGRAM(S)/KG/MIN: at 21:13

## 2021-10-10 RX ADMIN — Medication 7.01 MICROGRAM(S)/KG/MIN: at 05:22

## 2021-10-10 RX ADMIN — PANTOPRAZOLE SODIUM 40 MILLIGRAM(S): 20 TABLET, DELAYED RELEASE ORAL at 17:27

## 2021-10-10 RX ADMIN — CASPOFUNGIN ACETATE 260 MILLIGRAM(S): 7 INJECTION, POWDER, LYOPHILIZED, FOR SOLUTION INTRAVENOUS at 11:58

## 2021-10-10 RX ADMIN — DEXMEDETOMIDINE HYDROCHLORIDE IN 0.9% SODIUM CHLORIDE 3.79 MICROGRAM(S)/KG/HR: 4 INJECTION INTRAVENOUS at 05:21

## 2021-10-10 RX ADMIN — Medication 1 EACH: at 20:44

## 2021-10-10 RX ADMIN — Medication 102 MILLIGRAM(S): at 15:48

## 2021-10-10 RX ADMIN — Medication 5 UNIT(S): at 06:11

## 2021-10-10 RX ADMIN — HEPARIN SODIUM 5000 UNIT(S): 5000 INJECTION INTRAVENOUS; SUBCUTANEOUS at 21:15

## 2021-10-10 RX ADMIN — I.V. FAT EMULSION 31.3 GM/KG/DAY: 20 EMULSION INTRAVENOUS at 20:44

## 2021-10-10 RX ADMIN — Medication 1: at 17:21

## 2021-10-10 RX ADMIN — INSULIN GLARGINE 20 UNIT(S): 100 INJECTION, SOLUTION SUBCUTANEOUS at 21:14

## 2021-10-10 NOTE — PROGRESS NOTE ADULT - SUBJECTIVE AND OBJECTIVE BOX
PATIENT:  SUZANNE BAZZI  [id number]    CHIEF COMPLAINT:  Patient is a 66y old  Male who presents with a chief complaint of weakness  Hyperkalemia  VT (09 Oct 2021 08:10)    67 yo male, with h/o HTN, drug abuse, Hepatitis C s/p INF, Liver cirrhosis s/p [place] shunt, COPD, DVT? on Eliquis, HCC since January 2021 on chemotherapy, presented for weakness  Patient reports having weakness, decreased po intake since few weeks. He also has not been sleeping. He reports constipation and hematemesis for 1 day, minimal amount.  Denies fever, chills, chest pain, cough, sputum , SOB, diarrhea, dysuria  He has a [place] shunt  that was drained one day prior to admission    ED course : mental status alteration, sustained VT  bpm with BP 82/49 mmHg s/p shock + calcium gluconate  Patient has Hyperkalemia 6.8 CO2 14 s/p Bicarb drip, renal called, sp berry with good UO    CCU course   10/05/2021: Pt had another episode of hematemesis overnight. Hgb was 5.5 from 7.9 this AM so Pt was given 2 units of blood. Hgb now stable at 9.8. BP was also low at 88/46 so Pt was given 1L NS bolus. Pt currently intubated.   10/06/2021 Patient had 5 episodes of melena overnight, patient remains hypotensive, patient is easily arousable. Patient was febrile over night.   - SAT and SBT was tried. Patient was able to stay off sedation through out the day but was lethargic Failed SBT   10/7/2021: Patient is now having brown stool. Patient became agitated overnight and needed to be sedated overnight. Will be retry SAT then SBT today     INTERVAL HISTORY/OVERNIGHT EVENTS: No over events noted, Patient is more awake, will continue SAT       REVIEW OF SYSTEMS:      [ ] All other systems negative  [ x ] Unable to assess ROS because vented and sedated.    MEDICATIONS:  MEDICATIONS  (STANDING):  budesonide  80 MICROgram(s)/formoterol 4.5 MICROgram(s) Inhaler 2 Puff(s) Inhalation two times a day  caspofungin IVPB      caspofungin IVPB 50 milliGRAM(s) IV Intermittent every 24 hours  cefepime   IVPB 1000 milliGRAM(s) IV Intermittent every 12 hours  chlorhexidine 0.12% Liquid 15 milliLiter(s) Oral Mucosa two times a day  chlorhexidine 4% Liquid 1 Application(s) Topical daily  dexMEDEtomidine Infusion 0.2 MICROgram(s)/kG/Hr (3.79 mL/Hr) IV Continuous <Continuous>  dextrose 40% Gel 15 Gram(s) Oral once  dextrose 5%. 1000 milliLiter(s) (50 mL/Hr) IV Continuous <Continuous>  dextrose 5%. 1000 milliLiter(s) (100 mL/Hr) IV Continuous <Continuous>  dextrose 50% Injectable 25 Gram(s) IV Push once  dextrose 50% Injectable 12.5 Gram(s) IV Push once  dextrose 50% Injectable 25 Gram(s) IV Push once  fentaNYL   Infusion. 0.5 MICROgram(s)/kG/Hr (3.79 mL/Hr) IV Continuous <Continuous>  glucagon  Injectable 1 milliGRAM(s) IntraMuscular once  heparin   Injectable 5000 Unit(s) SubCutaneous every 8 hours  insulin glargine Injectable (LANTUS) 20 Unit(s) SubCutaneous at bedtime  insulin lispro (ADMELOG) corrective regimen sliding scale   SubCutaneous three times a day before meals  insulin lispro Injectable (ADMELOG) 5 Unit(s) SubCutaneous three times a day before meals  lactated ringers. 1000 milliLiter(s) (75 mL/Hr) IV Continuous <Continuous>  lactulose Retention Enema 200 Gram(s) Rectal three times a day  metroNIDAZOLE  IVPB      metroNIDAZOLE  IVPB 500 milliGRAM(s) IV Intermittent every 8 hours  norepinephrine Infusion 0.05 MICROgram(s)/kG/Min (7.01 mL/Hr) IV Continuous <Continuous>  octreotide  Infusion 50 MICROgram(s)/Hr (10 mL/Hr) IV Continuous <Continuous>  pantoprazole Infusion 8 mG/Hr (10 mL/Hr) IV Continuous <Continuous>  Parenteral Nutrition - Adult 1 Each (70 mL/Hr) TPN Continuous <Continuous>  Parenteral Nutrition - Adult 1 Each (70 mL/Hr) TPN Continuous <Continuous>  phytonadione  IVPB 10 milliGRAM(s) IV Intermittent daily  sodium polystyrene sulfonate Enema 15 Gram(s) Rectal two times a day    MEDICATIONS  (PRN):      ALLERGIES:  Allergies    No Known Allergies    Intolerances        OBJECTIVE:  ICU Vital Signs Last 24 Hrs  T(C): 36.8 (10 Oct 2021 00:00), Max: 37.3 (09 Oct 2021 16:00)  T(F): 98.3 (10 Oct 2021 00:00), Max: 99.2 (09 Oct 2021 18:00)  HR: 72 (10 Oct 2021 01:00) (70 - 106)  BP: 112/74 (10 Oct 2021 01:00) (87/57 - 115/72)  BP(mean): 88 (10 Oct 2021 01:00) (64 - 89)  ABP: --  ABP(mean): --  RR: 19 (10 Oct 2021 01:00) (11 - 25)  SpO2: 100% (10 Oct 2021 01:00) (97% - 100%)    Mode: AC/ CMV (Assist Control/ Continuous Mandatory Ventilation)  RR (machine): 18  TV (machine): 420  FiO2: 30  PEEP: 5  ITime: 1  MAP: 10  PIP: 16    Adult Advanced Hemodynamics Last 24 Hrs  CVP(mm Hg): --  CVP(cm H2O): --  CO: --  CI: --  PA: --  PA(mean): --  PCWP: --  SVR: --  SVRI: --  PVR: --  PVRI: --  CAPILLARY BLOOD GLUCOSE      POCT Blood Glucose.: 165 mg/dL (09 Oct 2021 21:55)  POCT Blood Glucose.: 240 mg/dL (09 Oct 2021 17:07)  POCT Blood Glucose.: 232 mg/dL (09 Oct 2021 12:51)  POCT Blood Glucose.: 244 mg/dL (09 Oct 2021 05:52)    CAPILLARY BLOOD GLUCOSE      POCT Blood Glucose.: 165 mg/dL (09 Oct 2021 21:55)    I&O's Summary    08 Oct 2021 07:01  -  09 Oct 2021 07:00  --------------------------------------------------------  IN: 5633 mL / OUT: 3925 mL / NET: 1708 mL    09 Oct 2021 07:01  -  10 Oct 2021 01:31  --------------------------------------------------------  IN: 4601.5 mL / OUT: 905 mL / NET: 3696.5 mL      Daily     Daily     PHYSICAL EXAMINATION:  General: WN/WD NAD  HEENT: PERRLA, EOMI, moist mucous membranes  Neurology: A&Ox3, nonfocal, HOWARD x 4  Respiratory: CTA B/L, normal respiratory effort, no wheezes, crackles, rales  CV: RRR, S1S2, no murmurs, rubs or gallops  Abdominal: Soft, NT, ND +BS, Last BM  Extremities: No edema, + peripheral pulses  Incisions:   Tubes:    LABS:  ABG - ( 09 Oct 2021 04:11 )  pH, Arterial: 7.37  pH, Blood: x     /  pCO2: 34    /  pO2: 93    / HCO3: 20    / Base Excess: -4.8  /  SaO2: 99.0                                    8.5    22.95 )-----------( 140      ( 09 Oct 2021 23:00 )             26.5     10-09    140  |  107  |  99<HH>  ----------------------------<  178<H>  3.5   |  21  |  1.4    Ca    7.3<L>      09 Oct 2021 23:00  Phos  2.8     10-09  Mg     2.2     10-09    TPro  5.0<L>  /  Alb  1.4<L>  /  TBili  0.9  /  DBili  x   /  AST  49<H>  /  ALT  143<H>  /  AlkPhos  249<H>  10-09    LIVER FUNCTIONS - ( 09 Oct 2021 23:00 )  Alb: 1.4 g/dL / Pro: 5.0 g/dL / ALK PHOS: 249 U/L / ALT: 143 U/L / AST: 49 U/L / GGT: x           PT/INR - ( 09 Oct 2021 04:00 )   PT: 22.80 sec;   INR: 2.00 ratio         PTT - ( 09 Oct 2021 04:00 )  PTT:30.9 sec            TELEMETRY:     EKG:     IMAGING:

## 2021-10-10 NOTE — PROGRESS NOTE ADULT - ASSESSMENT
IMPRESSION   Hepatic encephalopathy ( ammonia 188)  Decompensated liver cirrhosis. Has Denver catheter  Sepsis   Hematemesis ( esophageal/ gastric varices)  Acute portal Vein thrombosis   Hyperkalemia with sustained VT s/p shock, creat 0.9 was on aldactone  Hyponatremia likely from volume overload  Hepatocellular carcinoma with transaminitis on chemotherapy  Hepatitis C treated with INF  COPD  HAGMA      PLAN:    CNS: keep sedate as needed  - Trial of SAT, if tolerates then SBT     HEENT: Oral care    PULMONARY:    - HOB @ 45 degrees  - taper O2    CARDIOVASCULAR:   - Keep I < O ,   - cardiac ECHO done 10/06, f/u reads   - propranolol 10mg q 12 keep HR 50-60 (on hold)  - Will give 500 bolus of NS X2  - Keep bp above 100 systolic     GI: GI prophylaxis.  NPO,   - Discontinued octreotide/ protonix drip  - C/w Now PPI BID   - GI management   - EGD - grade IV lower and mid 1/3 esophageal varices with 5 bands placed successfully.   - Lactulose bowel regimen aim 2-3 BM/day , (Will be giving enemas for now)  - NO NGT can be placed this time   - rifaximin q 12, (on hold for now)  - Peritoneal fluid was sent and f/u gram stain and Cx : negative culture   - IR was consulted: Pt is a poor candidate for TIPS and has several contraindications for procedure.   - reached out to IR to drain the Denver catheter    - starting vit K 10mg daily today, will start after 3 doses   - Spoke to Dr. Rhodes (Patient's gastroenterologist) [phone]  - [facility] is sending patient's medical records   - Patient was seen  oncologist Dr. Gibson [phone]  - 3L of peritoneal fluid was drained through Denver cathter on 10/08    RENAL:   - D/C bicarb drip  - lokelma tID,  - low K diet,   - Follow up lytes.    - Correct as needed,  - f/u nephro consult   - Hyperkalemia Kayexalate enema, insulin +dextrose +calcium gluconate if needed     INFECTIOUS DISEASE:   - Follow up cultures,   - peritoneal fluid analysis and culture, if + remove access  - cefepime decreased to 1g q 12 ; flagyl 500mg iv q8.  starting caspofungin 70 mg x 1 - followed by 35 mg daily   - HepC viral load : 75.9 and reactive   - vanco x1  - blood cx : NGTD   - Peritoneal fluid cx : NG   - UA: negative   - Procal 15.60   - Fungitell : Negative   - will repeat panculture if patient becomes febrile   - ID recs are appreciated     HEMATOLOGICAL:    - Hold home Eliquis  - Per vascular - D/C AC and restart once Pt is stable. F/u OP for Duplex  - doppler LE   - transfuse Hgb >8  - serial HH  - on DVT prophylaxis with heparin sq   - S/p 2 units of PRBC 10/5     ENDOCRINE:    - check ketone B hydroxy butyrate, ETOH level, serum OSM, ASA  - Follow up FS.    - Insulin protocol if needed.   - keep -180.   - avoid hypoglycemia  - B hydroxy butyrate level neg, ETOH level <10, serum , ASA  - B-hydroxybutyrate, salicylate blood alcohol: neg   - f/u nutrition eval   - on TPN, monitor for acidosis     10/5/2021: GOC conservation was done with Patient's partner Fallon, patient did not clarify his wishes before, patient does not have a health care proxy or a family member who can be reached. Patient's partner expressed that she wants him to be full code for now and wants everything done for the patient.     MUSCULOSKELETAL: bed rest     Poor prognosis    Dispo ICU    palliative care eval   IMPRESSION   Hepatic encephalopathy ( ammonia 188)  Decompensated liver cirrhosis. Has Denver catheter  Sepsis   Hematemesis ( esophageal/ gastric varices)  Acute portal Vein thrombosis   Hyperkalemia with sustained VT s/p shock, creat 0.9 was on aldactone  Hyponatremia likely from volume overload  Hepatocellular carcinoma with transaminitis on chemotherapy  Hepatitis C treated with INF  COPD  HAGMA      PLAN:    CNS: keep sedate as needed  - Trial of SAT, if tolerates then SBT     HEENT: Oral care    PULMONARY:    - HOB @ 45 degrees  - taper O2    CARDIOVASCULAR:   - Keep I < O ,   - cardiac ECHO done 10/06, f/u reads   - propranolol 10mg q 12 keep HR 50-60 (on hold)  - Will give 500 bolus of NS X2  - Keep bp above 100 systolic     GI: GI prophylaxis.  NPO,   - Discontinued octreotide/ protonix drip  - C/w Now PPI BID   - GI management   - EGD - grade IV lower and mid 1/3 esophageal varices with 5 bands placed successfully.   - Lactulose bowel regimen aim 2-3 BM/day , (Will be giving enemas for now)  - NO NGT can be placed this time   - rifaximin q 12, (on hold for now)  - Peritoneal fluid was sent and f/u gram stain and Cx : negative culture   - IR was consulted: Pt is a poor candidate for TIPS and has several contraindications for procedure.   - reached out to IR to drain the Denver catheter    - starting vit K 10mg daily today, will start after 3 doses   - Spoke to Dr. Rhodes (Patient's gastroenterologist) [phone]  - [facility] is sending patient's medical records   - Patient was seen  oncologist Dr. Gibson [phone]  - 3L of peritoneal fluid was drained through Denver cathter on 10/08  - f/u with GI for NG tube   - F/u with GI for AC     RENAL:   - D/C bicarb drip  - lokelma tID,  - low K diet,   - Follow up lytes.    - Correct as needed,  - f/u nephro consult   - Hyperkalemia Kayexalate enema, insulin +dextrose +calcium gluconate if needed     INFECTIOUS DISEASE:   - Follow up cultures,   - peritoneal fluid analysis and culture, if + remove access  - cefepime decreased to 1g q 12 ; flagyl 500mg iv q8.  starting caspofungin 70 mg x 1 - followed by 35 mg daily   - HepC viral load : 75.9 and reactive   - vanco x1  - blood cx : NGTD   - Peritoneal fluid cx : NG   - UA: negative   - Procal 15.60   - Fungitell : Negative   - will repeat panculture if patient becomes febrile   - ID recs are appreciated     HEMATOLOGICAL:    - Hold home Eliquis  - Per vascular - D/C AC and restart once Pt is stable. F/u OP for Duplex  - doppler LE   - transfuse Hgb >8  - serial HH  - on DVT prophylaxis with heparin sq   - S/p 2 units of PRBC 10/5     ENDOCRINE:    - check ketone B hydroxy butyrate, ETOH level, serum OSM, ASA  - Follow up FS.    - Insulin protocol if needed.   - keep -180.   - avoid hypoglycemia  - B hydroxy butyrate level neg, ETOH level <10, serum , ASA  - B-hydroxybutyrate, salicylate blood alcohol: neg   - f/u nutrition eval   - on TPN, monitor for acidosis     10/5/2021: GOC conservation was done with Patient's partner Fallon, patient did not clarify his wishes before, patient does not have a health care proxy or a family member who can be reached. Patient's partner expressed that she wants him to be full code for now and wants everything done for the patient.     MUSCULOSKELETAL: bed rest     Poor prognosis    Dispo ICU    palliative care eval   IMPRESSION   Hepatic encephalopathy ( ammonia 188)  Decompensated liver cirrhosis. Has Denver catheter  Sepsis   Hematemesis ( esophageal/ gastric varices)  Acute portal Vein thrombosis   Hyperkalemia with sustained VT s/p shock, creat 0.9 was on aldactone  Hyponatremia likely from volume overload  Hepatocellular carcinoma with transaminitis on chemotherapy  Hepatitis C treated with INF  COPD  HAGMA      PLAN:    CNS: keep sedate as needed  - Trial of SAT, if tolerates then SBT     HEENT: Oral care    PULMONARY:    - HOB @ 45 degrees  - taper O2    CARDIOVASCULAR:   - Keep I < O ,   - cardiac ECHO done 10/06, f/u reads   - propranolol 10mg q 12 keep HR 50-60 (on hold)  - Will give 500 bolus of NS X2  - Keep bp above 100 systolic     GI: GI prophylaxis.  NPO,   - Discontinued octreotide/ protonix drip  - C/w Now PPI BID   - GI management   - EGD - grade IV lower and mid 1/3 esophageal varices with 5 bands placed successfully.   - Lactulose bowel regimen aim 2-3 BM/day , (Will be giving enemas for now)  - NO NGT can be placed this time   - rifaximin q 12, (on hold for now)  - Peritoneal fluid was sent and f/u gram stain and Cx : negative culture   - IR was consulted: Pt is a poor candidate for TIPS and has several contraindications for procedure.   - reached out to IR to drain the Denver catheter    - starting vit K 10mg daily today, will start after 3 doses   - Spoke to Dr. Rhodes (Patient's gastroenterologist) [phone]  - [facility] is sending patient's medical records   - Patient was seen  oncologist Dr. Gibson [phone]  - 3L of peritoneal fluid was drained through Denver cathter on 10/08  - f/u with GI for NG tube   - F/u with GI for AC   - discussed with GI: NG tube can be placed but hold if increase resistance     RENAL:   - D/C bicarb drip  - lokelma tID,  - low K diet,   - Follow up lytes.    - Correct as needed,  - f/u nephro consult   - Hyperkalemia Kayexalate enema, insulin +dextrose +calcium gluconate if needed     INFECTIOUS DISEASE:   - Follow up cultures,   - peritoneal fluid analysis and culture, if + remove access  - cefepime decreased to 1g q 12 ; flagyl 500mg iv q8.  starting caspofungin 70 mg x 1 - followed by 35 mg daily   - HepC viral load : 75.9 and reactive   - vanco x1  - blood cx : NGTD   - Peritoneal fluid cx : NG   - UA: negative   - Procal 15.60   - Fungitell : Negative   - will repeat panculture if patient becomes febrile   - ID recs are appreciated     HEMATOLOGICAL:    - Hold home Eliquis  - Per vascular - D/C AC and restart once Pt is stable. F/u OP for Duplex  - doppler LE   - transfuse Hgb >8  - serial HH  - on DVT prophylaxis with heparin sq   - S/p 2 units of PRBC 10/5     ENDOCRINE:    - check ketone B hydroxy butyrate, ETOH level, serum OSM, ASA  - Follow up FS.    - Insulin protocol if needed.   - keep -180.   - avoid hypoglycemia  - B hydroxy butyrate level neg, ETOH level <10, serum , ASA  - B-hydroxybutyrate, salicylate blood alcohol: neg   - f/u nutrition eval   - on TPN, monitor for acidosis     10/5/2021: GOC conservation was done with Patient's partner Fallon, patient did not clarify his wishes before, patient does not have a health care proxy or a family member who can be reached. Patient's partner expressed that she wants him to be full code for now and wants everything done for the patient.     MUSCULOSKELETAL: bed rest     Poor prognosis    Dispo ICU    palliative care eval

## 2021-10-10 NOTE — PROGRESS NOTE ADULT - ASSESSMENT
IMPRESSION:    Hepatic encephalopathy ( ammonia 188)  Decompensated liver cirrhosis. Has Denver catheter  Sepsis   Hematemesis ( esophageal/ gastric varices)  Acute portal Vein thrombosis   Hyperkalemia with sustained VT s/p shock, creat 0.9 was on aldactone  Hyponatremia likely from volume overload  Hepatocellular carcinoma with transaminitis on chemotherapy  Hepatitis C treated with INF  COPD  HAGMA      PLAN:    CNS: SAT  keep sedate as needed if stays on vent      HEENT: Oral care    PULMONARY:  HOB @ 45 degrees  continue O2 as necessary to maintain sats > 90%<94  SBT     CARDIOVASCULAR: Keep I < O ,   cardiac ECHO,   propranolol 10 q 12 keep HR 50-60  taper pressors      GI: GI prophylaxis.  NPO,   octreotide/ protonix drip,   GI management   bowel regimen aim 3 BM/day ,   rifaximin q 12,   needs nutrition    RENAL:   lokelma as needed,  low K diet,   Follow up lytes.    Correct as needed,      INFECTIOUS DISEASE:   Follow up cultures,   nasal MRSA  peritoneal fluid analysis and culture,   cefepime 2 g q 8 ;   peritoneal fluid drainage PRN      HEMATOLOGICAL:    consider restart full dose Lovenox for portal vein thrombosis next week   transfuse Hgb >8  serial HH    ENDOCRINE:    Follow up FS.    Insulin protocol if needed.   keep -180.   avoid hypoglycemia    MUSCULOSKELETAL: bed rest     prognosis grave      palliative care eval f/u     IMPRESSION:    Hepatic encephalopathy ( ammonia 188)  Decompensated liver cirrhosis. Has Denver catheter  Sepsis   Hematemesis ( esophageal/ gastric varices)  Acute portal Vein thrombosis   Hyperkalemia with sustained VT s/p shock, creat 0.9 was on aldactone  Hyponatremia likely from volume overload  Hepatocellular carcinoma with transaminitis on chemotherapy  Hepatitis C treated with INF  COPD  HAGMA      PLAN:    CNS: SAT  keep sedate as needed if stays on vent      HEENT: Oral care    PULMONARY:  HOB @ 45 degrees  continue O2 as necessary to maintain sats > 90%<94  SBT     CARDIOVASCULAR: Keep I < O ,   cardiac ECHO,   propranolol 10 q 12 keep HR 50-60  taper pressors      GI: GI prophylaxis.  NPO,   octreotide/ protonix drip,   GI management   bowel regimen aim 3 BM/day ,   rifaximin q 12,   needs nutrition    RENAL:   lokelma as needed,  low K diet,   Follow up lytes.    Correct as needed,      INFECTIOUS DISEASE:   Follow up cultures,   nasal MRSA  peritoneal fluid analysis and culture,   cefepime 2 g q 8 ;   peritoneal fluid drainage PRN      HEMATOLOGICAL:    consider restart full dose heparinfor portal vein thrombosis next week   transfuse Hgb >8  serial HH    ENDOCRINE:    Follow up FS.    Insulin protocol if needed.   keep -180.   avoid hypoglycemia    MUSCULOSKELETAL: bed rest     prognosis grave      palliative care eval f/u

## 2021-10-10 NOTE — PROGRESS NOTE ADULT - SUBJECTIVE AND OBJECTIVE BOX
Patient is a 66y old  Male who presents with a chief complaint of weakness  Hyperkalemia  VT (10 Oct 2021 01:30)        HPI:  65 yo male, with h/o HTN, drug abuse, Hepatitis C s/p INF, Liver cirrhosis s/p Denver shunt, COPD, DVT? on Eliquis, HCC since January 2021 on chemotherapy, presented for weakness  Patient reports having weakness, decreased po intake since few weeks. He also has not been sleeping. He reports constipation and hematemesis for 1 day, minimal amount.  Denies fever, chills, chest pain, cough, sputum , SOB, diarrhea, dysuria  He has a Denver shunt  that was drained one day prior to admission    ED course : mental status alteration, sustained VT  bpm with BP 82/49 mmHg s/p shock + calcium gluconate  Patient has Hyperkalemia 6.8 CO2 14 s/p Bicarb drip, renal called, sp berry with good UO   (04 Oct 2021 06:15)      Pt evaluated on rounds.  I reviewed the radiology tests and hospital record.    I reviewed previous notes on this patient.    Interval Events: No overnight events.    REVIEW OF SYSTEMS:   see HPI      OBJECTIVE:  ICU Vital Signs Last 24 Hrs  T(C): 35.9 (10 Oct 2021 04:00), Max: 37.3 (09 Oct 2021 16:00)  T(F): 96.7 (10 Oct 2021 04:00), Max: 99.2 (09 Oct 2021 18:00)  HR: 70 (10 Oct 2021 06:00) (70 - 106)  BP: 116/77 (10 Oct 2021 06:00) (87/57 - 116/77)  BP(mean): 90 (10 Oct 2021 06:00) (64 - 93)    RR: 18 (10 Oct 2021 06:00) (11 - 25)  SpO2: 100% (10 Oct 2021 06:00) (97% - 100%)    Mode: AC/ CMV (Assist Control/ Continuous Mandatory Ventilation), RR (machine): 18, TV (machine): 420, FiO2: 30, PEEP: 5, ITime: 1, MAP: 10, PIP: 16    10-08 @ 07:01  -  10-09 @ 07:00  --------------------------------------------------------  IN: 5633 mL / OUT: 3925 mL / NET: 1708 mL    10-09 @ 07:01  -  10-10 @ 06:44  --------------------------------------------------------  IN: 5505.5 mL / OUT: 1030 mL / NET: 4475.5 mL      CAPILLARY BLOOD GLUCOSE      POCT Blood Glucose.: 219 mg/dL (10 Oct 2021 06:09)        PHYSICAL EXAM:     · CONSTITUTIONAL:   not septic appearing,   well nourished,   NAD    · ENMT:   Airway patent,   Nasal mucosa clear.  Mouth with normal mucosa.   No thrush    · EYES:   Clear bilaterally,   pupils equal,   round and reactive to light.    · CARDIAC:   Normal rate,   regular rhythm.    Heart sounds S1, S2.   No murmurs, no rubs or gallops on auscultation  no edema        CAROTID:   normal systolic impulse  no bruits    · RESPIRATORY:   rales  normal chest expansion  no retractions or use of accessory muscles  palpation of chest is normal with no fremitus  percussion of chest demonstrates no hyperresonance or dullness    · GASTROINTESTINAL:  Abdomen soft,   non-tender,   + BS  liver/spleen not palpable    · MUSCULOSKELETAL:   no clubbing, cyanosis      · SKIN:   Skin normal color for race,   warm, dry   No evidence of rash.        · HEME LYMPH:   no splenomegaly.  No cervical  lymphadenopathy.  no inguinal lymphadenopathy    HOSPITAL MEDICATIONS:  MEDICATIONS  (STANDING):  budesonide  80 MICROgram(s)/formoterol 4.5 MICROgram(s) Inhaler 2 Puff(s) Inhalation two times a day  caspofungin IVPB      caspofungin IVPB 50 milliGRAM(s) IV Intermittent every 24 hours  cefepime   IVPB 1000 milliGRAM(s) IV Intermittent every 12 hours  chlorhexidine 0.12% Liquid 15 milliLiter(s) Oral Mucosa two times a day  chlorhexidine 4% Liquid 1 Application(s) Topical daily  dexMEDEtomidine Infusion 0.2 MICROgram(s)/kG/Hr (3.79 mL/Hr) IV Continuous <Continuous>  dextrose 40% Gel 15 Gram(s) Oral once  dextrose 5%. 1000 milliLiter(s) (50 mL/Hr) IV Continuous <Continuous>  dextrose 5%. 1000 milliLiter(s) (100 mL/Hr) IV Continuous <Continuous>  dextrose 50% Injectable 25 Gram(s) IV Push once  dextrose 50% Injectable 12.5 Gram(s) IV Push once  dextrose 50% Injectable 25 Gram(s) IV Push once  fentaNYL   Infusion. 0.5 MICROgram(s)/kG/Hr (3.79 mL/Hr) IV Continuous <Continuous>  glucagon  Injectable 1 milliGRAM(s) IntraMuscular once  heparin   Injectable 5000 Unit(s) SubCutaneous every 8 hours  insulin glargine Injectable (LANTUS) 20 Unit(s) SubCutaneous at bedtime  insulin lispro (ADMELOG) corrective regimen sliding scale   SubCutaneous three times a day before meals  insulin lispro Injectable (ADMELOG) 5 Unit(s) SubCutaneous three times a day before meals  lactated ringers. 1000 milliLiter(s) (75 mL/Hr) IV Continuous <Continuous>  lactulose Retention Enema 200 Gram(s) Rectal three times a day  metroNIDAZOLE  IVPB      metroNIDAZOLE  IVPB 500 milliGRAM(s) IV Intermittent every 8 hours  norepinephrine Infusion 0.05 MICROgram(s)/kG/Min (7.01 mL/Hr) IV Continuous <Continuous>  pantoprazole  Injectable 40 milliGRAM(s) IV Push every 12 hours  Parenteral Nutrition - Adult 1 Each (70 mL/Hr) TPN Continuous <Continuous>  phytonadione  IVPB 10 milliGRAM(s) IV Intermittent daily  sodium polystyrene sulfonate Enema 15 Gram(s) Rectal two times a day    MEDICATIONS  (PRN):    lactated ringers.: Solution, 1000 milliLiter(s) infuse at 75 mL/Hr  sodium chloride 0.9% Bolus:   500 milliLiter(s), IV Bolus, once, infuse over 15 Minute(s), Stop After 1 Doses  sodium chloride 0.9% Bolus:   500 milliLiter(s), IV Bolus, once, infuse over 15 Minute(s), Stop After 1 Doses  sodium chloride 0.9% Bolus:   1000 milliLiter(s), IV Bolus, once, infuse over 30 Minute(s), Stop After 1 Doses  sodium chloride 0.9% Bolus:   1000 milliLiter(s), IV Bolus, once, infuse over 30 Minute(s), Stop After 1 Doses  sodium chloride 0.9% Bolus:   3000 milliLiter(s), IV Bolus, once, infuse over 60 Minute(s), Stop After 1 Doses  Provider's Contact #: [phone]      LABS:                        8.9    21.23 )-----------( 130      ( 10 Oct 2021 04:30 )             26.9     10-10    140  |  107  |  99<HH>  ----------------------------<  197<H>  3.7   |  20  |  1.4    Ca    7.2<L>      10 Oct 2021 04:30  Phos  2.0     10-10  Mg     1.9     10-10    TPro  5.0<L>  /  Alb  1.5<L>  /  TBili  0.9  /  DBili  x   /  AST  46<H>  /  ALT  136<H>  /  AlkPhos  233<H>  10-10    PT/INR - ( 10 Oct 2021 04:30 )   PT: 21.30 sec;   INR: 1.86 ratio         PTT - ( 10 Oct 2021 04:30 )  PTT:30.1 sec    Arterial Blood Gas:  10-10 @ 03:19  7.44/33/106/22/99.0/-1.1  ABG lactate: --  Arterial Blood Gas:  10-09 @ 04:11  7.37/34/93/20/99.0/-4.8  ABG lactate: --  Arterial Blood Gas:  10-08 @ 16:18  7.33/36/88/19/98.5/-6.3  ABG lactate: --          COVID-19 PCR: NotDetec (04 Oct 2021 02:17)    Mode: AC/ CMV (Assist Control/ Continuous Mandatory Ventilation)  RR (machine): 18  TV (machine): 420  FiO2: 30  PEEP: 5  ITime: 1  MAP: 10  PIP: 16      ABG - ( 10 Oct 2021 03:19 )  pH, Arterial: 7.44  pH, Blood: x     /  pCO2: 33    /  pO2: 106   / HCO3: 22    / Base Excess: -1.1  /  SaO2: 99.0                  RADIOLOGY: Today I personally interpreted the latest CXR and other pertinent films.

## 2021-10-11 LAB
ACANTHOCYTES BLD QL SMEAR: SIGNIFICANT CHANGE UP
ALBUMIN SERPL ELPH-MCNC: 1.4 G/DL — LOW (ref 3.5–5.2)
ALP SERPL-CCNC: 212 U/L — HIGH (ref 30–115)
ALT FLD-CCNC: 93 U/L — HIGH (ref 0–41)
ANION GAP SERPL CALC-SCNC: 13 MMOL/L — SIGNIFICANT CHANGE UP (ref 7–14)
ANISOCYTOSIS BLD QL: SIGNIFICANT CHANGE UP
APTT BLD: 30.9 SEC — SIGNIFICANT CHANGE UP (ref 27–39.2)
AST SERPL-CCNC: 43 U/L — HIGH (ref 0–41)
BASOPHILS # BLD AUTO: 0 K/UL — SIGNIFICANT CHANGE UP (ref 0–0.2)
BASOPHILS NFR BLD AUTO: 0 % — SIGNIFICANT CHANGE UP (ref 0–1)
BILIRUB SERPL-MCNC: 0.8 MG/DL — SIGNIFICANT CHANGE UP (ref 0.2–1.2)
BUN SERPL-MCNC: 102 MG/DL — CRITICAL HIGH (ref 10–20)
CALCIUM SERPL-MCNC: 7.2 MG/DL — LOW (ref 8.5–10.1)
CHLORIDE SERPL-SCNC: 104 MMOL/L — SIGNIFICANT CHANGE UP (ref 98–110)
CO2 SERPL-SCNC: 22 MMOL/L — SIGNIFICANT CHANGE UP (ref 17–32)
CREAT SERPL-MCNC: 1.5 MG/DL — SIGNIFICANT CHANGE UP (ref 0.7–1.5)
CRP SERPL-MCNC: 89 MG/L — HIGH
EOSINOPHIL # BLD AUTO: 0 K/UL — SIGNIFICANT CHANGE UP (ref 0–0.7)
EOSINOPHIL NFR BLD AUTO: 0 % — SIGNIFICANT CHANGE UP (ref 0–8)
GAS PNL BLDA: SIGNIFICANT CHANGE UP
GLUCOSE BLDC GLUCOMTR-MCNC: 112 MG/DL — HIGH (ref 70–99)
GLUCOSE BLDC GLUCOMTR-MCNC: 164 MG/DL — HIGH (ref 70–99)
GLUCOSE BLDC GLUCOMTR-MCNC: 174 MG/DL — HIGH (ref 70–99)
GLUCOSE BLDC GLUCOMTR-MCNC: 197 MG/DL — HIGH (ref 70–99)
GLUCOSE SERPL-MCNC: 190 MG/DL — HIGH (ref 70–99)
HCT VFR BLD CALC: 25.9 % — LOW (ref 42–52)
HGB BLD-MCNC: 8.4 G/DL — LOW (ref 14–18)
INR BLD: 2 RATIO — HIGH (ref 0.65–1.3)
LACTATE SERPL-SCNC: 2 MMOL/L — SIGNIFICANT CHANGE UP (ref 0.7–2)
LYMPHOCYTES # BLD AUTO: 0.5 K/UL — LOW (ref 1.2–3.4)
LYMPHOCYTES # BLD AUTO: 2.7 % — LOW (ref 20.5–51.1)
MACROCYTES BLD QL: SIGNIFICANT CHANGE UP
MAGNESIUM SERPL-MCNC: 1.9 MG/DL — SIGNIFICANT CHANGE UP (ref 1.8–2.4)
MANUAL SMEAR VERIFICATION: SIGNIFICANT CHANGE UP
MCHC RBC-ENTMCNC: 29.1 PG — SIGNIFICANT CHANGE UP (ref 27–31)
MCHC RBC-ENTMCNC: 32.4 G/DL — SIGNIFICANT CHANGE UP (ref 32–37)
MCV RBC AUTO: 89.6 FL — SIGNIFICANT CHANGE UP (ref 80–94)
METAMYELOCYTES # FLD: 0.9 % — HIGH (ref 0–0)
MONOCYTES # BLD AUTO: 0.99 K/UL — HIGH (ref 0.1–0.6)
MONOCYTES NFR BLD AUTO: 5.3 % — SIGNIFICANT CHANGE UP (ref 1.7–9.3)
NEUTROPHILS # BLD AUTO: 17 K/UL — HIGH (ref 1.4–6.5)
NEUTROPHILS NFR BLD AUTO: 91.1 % — HIGH (ref 42.2–75.2)
NEUTS HYPERSEG # BLD: PRESENT — SIGNIFICANT CHANGE UP
NRBC # BLD: 2 /100 — HIGH (ref 0–0)
NRBC # BLD: SIGNIFICANT CHANGE UP /100 WBCS (ref 0–0)
PHOSPHATE SERPL-MCNC: 1.9 MG/DL — LOW (ref 2.1–4.9)
PLAT MORPH BLD: NORMAL — SIGNIFICANT CHANGE UP
PLATELET # BLD AUTO: 142 K/UL — SIGNIFICANT CHANGE UP (ref 130–400)
POIKILOCYTOSIS BLD QL AUTO: SIGNIFICANT CHANGE UP
POLYCHROMASIA BLD QL SMEAR: SIGNIFICANT CHANGE UP
POTASSIUM SERPL-MCNC: 3.6 MMOL/L — SIGNIFICANT CHANGE UP (ref 3.5–5)
POTASSIUM SERPL-SCNC: 3.6 MMOL/L — SIGNIFICANT CHANGE UP (ref 3.5–5)
PREALB SERPL-MCNC: 3 MG/DL — LOW (ref 20–40)
PROT SERPL-MCNC: 4.7 G/DL — LOW (ref 6–8)
PROTHROM AB SERPL-ACNC: 22.8 SEC — HIGH (ref 9.95–12.87)
RBC # BLD: 2.89 M/UL — LOW (ref 4.7–6.1)
RBC # FLD: 19.6 % — HIGH (ref 11.5–14.5)
RBC BLD AUTO: SIGNIFICANT CHANGE UP
ROULEAUX BLD QL SMEAR: PRESENT — SIGNIFICANT CHANGE UP
SMUDGE CELLS # BLD: PRESENT — SIGNIFICANT CHANGE UP
SODIUM SERPL-SCNC: 139 MMOL/L — SIGNIFICANT CHANGE UP (ref 135–146)
TARGETS BLD QL SMEAR: SLIGHT — SIGNIFICANT CHANGE UP
WBC # BLD: 18.66 K/UL — HIGH (ref 4.8–10.8)
WBC # FLD AUTO: 18.66 K/UL — HIGH (ref 4.8–10.8)

## 2021-10-11 PROCEDURE — 99232 SBSQ HOSP IP/OBS MODERATE 35: CPT

## 2021-10-11 PROCEDURE — 71045 X-RAY EXAM CHEST 1 VIEW: CPT | Mod: 26

## 2021-10-11 PROCEDURE — 99233 SBSQ HOSP IP/OBS HIGH 50: CPT

## 2021-10-11 RX ORDER — MAGNESIUM SULFATE 500 MG/ML
2 VIAL (ML) INJECTION ONCE
Refills: 0 | Status: COMPLETED | OUTPATIENT
Start: 2021-10-11 | End: 2021-10-11

## 2021-10-11 RX ORDER — POTASSIUM PHOSPHATE, MONOBASIC POTASSIUM PHOSPHATE, DIBASIC 236; 224 MG/ML; MG/ML
15 INJECTION, SOLUTION INTRAVENOUS ONCE
Refills: 0 | Status: COMPLETED | OUTPATIENT
Start: 2021-10-11 | End: 2021-10-11

## 2021-10-11 RX ORDER — ELECTROLYTE SOLUTION,INJ
1 VIAL (ML) INTRAVENOUS
Refills: 0 | Status: DISCONTINUED | OUTPATIENT
Start: 2021-10-11 | End: 2021-10-12

## 2021-10-11 RX ADMIN — Medication 100 MILLIGRAM(S): at 21:32

## 2021-10-11 RX ADMIN — Medication 5 UNIT(S): at 06:07

## 2021-10-11 RX ADMIN — Medication 5 UNIT(S): at 11:18

## 2021-10-11 RX ADMIN — CHLORHEXIDINE GLUCONATE 15 MILLILITER(S): 213 SOLUTION TOPICAL at 17:21

## 2021-10-11 RX ADMIN — CEFEPIME 100 MILLIGRAM(S): 1 INJECTION, POWDER, FOR SOLUTION INTRAMUSCULAR; INTRAVENOUS at 17:22

## 2021-10-11 RX ADMIN — Medication 100 MILLIGRAM(S): at 14:26

## 2021-10-11 RX ADMIN — CHLORHEXIDINE GLUCONATE 15 MILLILITER(S): 213 SOLUTION TOPICAL at 06:06

## 2021-10-11 RX ADMIN — DEXMEDETOMIDINE HYDROCHLORIDE IN 0.9% SODIUM CHLORIDE 3.79 MICROGRAM(S)/KG/HR: 4 INJECTION INTRAVENOUS at 06:06

## 2021-10-11 RX ADMIN — Medication 1: at 11:17

## 2021-10-11 RX ADMIN — Medication 100 MILLIGRAM(S): at 06:06

## 2021-10-11 RX ADMIN — HEPARIN SODIUM 5000 UNIT(S): 5000 INJECTION INTRAVENOUS; SUBCUTANEOUS at 14:27

## 2021-10-11 RX ADMIN — Medication 25 GRAM(S): at 21:50

## 2021-10-11 RX ADMIN — CEFEPIME 100 MILLIGRAM(S): 1 INJECTION, POWDER, FOR SOLUTION INTRAMUSCULAR; INTRAVENOUS at 06:06

## 2021-10-11 RX ADMIN — HEPARIN SODIUM 5000 UNIT(S): 5000 INJECTION INTRAVENOUS; SUBCUTANEOUS at 21:33

## 2021-10-11 RX ADMIN — INSULIN GLARGINE 20 UNIT(S): 100 INJECTION, SOLUTION SUBCUTANEOUS at 21:33

## 2021-10-11 RX ADMIN — Medication 1: at 06:07

## 2021-10-11 RX ADMIN — BUDESONIDE AND FORMOTEROL FUMARATE DIHYDRATE 2 PUFF(S): 160; 4.5 AEROSOL RESPIRATORY (INHALATION) at 15:09

## 2021-10-11 RX ADMIN — Medication 5 UNIT(S): at 17:21

## 2021-10-11 RX ADMIN — Medication 1: at 17:21

## 2021-10-11 RX ADMIN — HEPARIN SODIUM 5000 UNIT(S): 5000 INJECTION INTRAVENOUS; SUBCUTANEOUS at 06:07

## 2021-10-11 RX ADMIN — PANTOPRAZOLE SODIUM 40 MILLIGRAM(S): 20 TABLET, DELAYED RELEASE ORAL at 06:06

## 2021-10-11 RX ADMIN — BUDESONIDE AND FORMOTEROL FUMARATE DIHYDRATE 2 PUFF(S): 160; 4.5 AEROSOL RESPIRATORY (INHALATION) at 20:00

## 2021-10-11 RX ADMIN — CHLORHEXIDINE GLUCONATE 1 APPLICATION(S): 213 SOLUTION TOPICAL at 11:17

## 2021-10-11 RX ADMIN — PANTOPRAZOLE SODIUM 40 MILLIGRAM(S): 20 TABLET, DELAYED RELEASE ORAL at 17:22

## 2021-10-11 RX ADMIN — POTASSIUM PHOSPHATE, MONOBASIC POTASSIUM PHOSPHATE, DIBASIC 62.5 MILLIMOLE(S): 236; 224 INJECTION, SOLUTION INTRAVENOUS at 14:27

## 2021-10-11 NOTE — PROGRESS NOTE ADULT - ASSESSMENT
65 yo male, with h/o HTN, hx of drug abuse, Hepatitis C s/p INF, Liver cirrhosis, COPD, portal vein thrombosis on Eliquis, HCC since January 2021 on chemotherapy, presented for weakness He reports constipation and hematemesis for 1 day, minimal amount.    *Upper Gi bleed secondary to large esophageal varices  - s/p EGD 10/4 s/p banding   -Hb stable  -no active bleeding    Plan  -can place NG tube if patient not to remain intubated  -trend CBC  -PPI IV BID  -keep active type and screen  -if NG placed can start feeding    *Decompensated liver cirrhosis sec to hepatitis C   -MELD Na score 26 at the time of admission, today 18. Child yañez score C  -HCC on chemotherapy   -Left main and right portal vein thrombus on eliquis last dose 10/3 AM  -ascites with denver catheter   -Currently intubated on levophed, precedex    -s/p ascitic fluid analysis no evidence of SBP, SAAG 1.6 and TP<1    Plan  c/w antibiotics per ID, after stopping antibiotics we need ppx given total protein <1 and CPS score of C  place NG tube and start lactulose to titrate to 2-3 soft BM  can continue vitamin K  Trend LFT, INR, BMNP daily   therapeutic   THIS NOTE IS INCOMPLETE  -for questions text me on Mc teams, call [phone] on weekdays before 5pm or call GI service at [phone]  after 5 pm and on weekends  65 yo male, with h/o HTN, hx of drug abuse, Hepatitis C s/p INF, Liver cirrhosis, COPD, portal vein thrombosis on Eliquis, HCC since January 2021 on chemotherapy, presented for weakness He reports constipation and hematemesis for 1 day, minimal amount.    *Upper Gi bleed secondary to large esophageal varices  - s/p EGD 10/4 s/p banding   -Hb stable  -no active bleeding    Plan  -can place NG tube if patient not to remain intubated  -trend CBC  -PPI IV BID  -keep active type and screen  -if NG placed can start feeding    *Decompensated liver cirrhosis sec to hepatitis C   -MELD Na score 26 at the time of admission, today 18. Child yañez score C  -HCC s/p biopsy 2/9/21 on tecentriq and avasin   -not a candidate for surgery because of locally advanced disease  -Left main and right portal vein thrombus was on eliquis  -ascites with denver catheter   -Currently intubated on levophed, precedex    -s/p ascitic fluid analysis no evidence of SBP, SAAG 1.6 and TP<1     Plan  c/w antibiotics per ID, after stopping antibiotics we need ppx given total protein <1 and CPS score of C  place NG tube and start lactulose to titrate to 2-3 soft BM  can continue vitamin K  Trend LFT, INR, BMNP daily   therapeutic   THIS NOTE IS INCOMPLETE  -for questions text me on Mc teams, call [phone] on weekdays before 5pm or call GI service at [phone]  after 5 pm and on weekends  65 yo male, with h/o HTN, hx of drug abuse, Hepatitis C s/p INF, Liver cirrhosis, COPD, portal vein thrombosis on Eliquis, HCC since January 2021 on chemotherapy, presented for weakness He reports constipation and hematemesis for 1 day, minimal amount.    *Upper Gi bleed secondary to large esophageal varices  - s/p EGD 10/4 s/p banding   -Hb stable  -no active bleeding    Plan  -can place NG tube if patient to be extubated   -trend CBC  -PPI IV BID  -keep active type and screen  -if NG placed can start feeding    *Decompensated liver cirrhosis sec to hepatitis C   -MELD Na score 26 at the time of admission, today 18. Child yañez score C  -HCC s/p biopsy 2/9/21 on tecentriq and avasin. dr Arthur Gibson  -not a candidate for surgery because of locally advanced disease  -Left main and right portal vein thrombus was on eliquis, tumor thrombus??  -ascites with denver catheter   -s/p ascitic fluid analysis no evidence of SBP, SAAG 1.6 and TP<1   -Currently intubated on levophed, precedex     Plan  c/w antibiotics per ID, after stopping antibiotics we need ppx given total protein <1 and CPS score of C  place NG tube and start lactulose to titrate to 2-3 soft BM  can continue vitamin K  Trend LFT, INR, BMNP daily   therapeutic paracentesis before extubation, give albumin accordingly       -for questions text me on  teams, call [phone] on weekdays before 5pm or call GI service at [phone]  after 5 pm and on weekends  65 yo male, with h/o HTN, hx of drug abuse, Hepatitis C s/p INF, Liver cirrhosis, COPD, portal vein thrombosis on Eliquis, HCC since January 2021 on chemotherapy, presented for weakness He reports constipation and hematemesis for 1 day, minimal amount.    *Upper Gi bleed secondary to large esophageal varices  - s/p EGD 10/4 s/p banding   -Hb stable  -no active bleeding    Plan  -can place NG tube if patient to be extubated   -trend CBC  -PPI IV BID  -keep active type and screen  -if NG placed can start feeding    *Decompensated liver cirrhosis sec to hepatitis C   -MELD Na score 26 at the time of admission, today 18. Child yañez score C  -HCC s/p biopsy 2/9/21 on tecentriq and avasin. dr Arthur Gibson  -not a candidate for surgery because of locally advanced disease  -Left main and right portal vein thrombus was on eliquis, tumor thrombus??  -discussed with his oncologist, no need to resume eliquis   -ascites with denver catheter   -s/p ascitic fluid analysis no evidence of SBP, SAAG 1.6 and TP<1   -Currently intubated on levophed, precedex     Plan  c/w antibiotics per ID, after stopping antibiotics we need ppx given total protein <1 and CPS score of C  place NG tube and start lactulose to titrate to 2-3 soft BM  can continue vitamin K  Trend LFT, INR, BMP daily   therapeutic paracentesis before extubation, give albumin accordingly  nutrition consult evaluation     -for questions text me on  teams, call [phone] on weekdays before 5pm or call GI service at [phone]  after 5 pm and on weekends

## 2021-10-11 NOTE — PROGRESS NOTE ADULT - ASSESSMENT
ASSESSMENT  67 yo male, with h/o HTN, drug abuse, Hepatitis C s/p INF, Liver cirrhosis s/p [place] shunt, COPD, DVT? on Eliquis, HCC since January 2021 on chemotherapy, presented for weakness    - advanced hepatocellular carcinoma with transaminitis on chemo  - decompensated cirrhosis, ascites with [place] cath  - hematemesis 2nd esophageal, gastric varices  - severe anemia secondary to variceal bleed  - EBER  - hyperkalemia with sustained VT s/p shock  - hep C  - fevers, leukocytosis  - COPD  - sepsis without clear source    PLAN  - treat hypophosphatemia now - d/w resident - need to correct this before attempting extubation  - would d/c the extra LR  - cont TPN tonight via blue port of TLC - volume and content decreased in anticipation of starting some enteral feeds tonight  - need to heparinize, & de-access port - then can re-access it (& dress & date it) if needed  - daily bmp, in phos, mg while on parenteral nutrition  - will follow  - per GI, ok to place NG feeding tube (NOT A SAL[place]) - discussed with medical resident     - would then start Peptamen AF at 25 ml/h - and gradually increase as tolerated to 50 ml/h --> 91 gm whey protein & 1440 kcal with lower % carb (until glc controlled r/t enteral intake). Once feeds tolerated will taper off TPN

## 2021-10-11 NOTE — PROGRESS NOTE ADULT - SUBJECTIVE AND OBJECTIVE BOX
Gastroenterology progress note:     Patient is a 66y old  Male who presents with a chief complaint of weakness  Hyperkalemia  VT (11 Oct 2021 08:35)       Admitted on: 10-04-21    We are following the patient for liver cirrhosis      Interval History: patient remains intubated on levophed and precedex, opens eyes.     PAST MEDICAL & SURGICAL HISTORY:  HTN (hypertension)   Hepatitis   Drug abuse  Cancer, hepatocellularJanuary 2021  COPD, mild    MEDICATIONS  (STANDING):  budesonide  80 MICROgram(s)/formoterol 4.5 MICROgram(s) Inhaler 2 Puff(s) Inhalation two times a day  caspofungin IVPB      caspofungin IVPB 50 milliGRAM(s) IV Intermittent every 24 hours  cefepime   IVPB 1000 milliGRAM(s) IV Intermittent every 12 hours  chlorhexidine 0.12% Liquid 15 milliLiter(s) Oral Mucosa two times a day  chlorhexidine 4% Liquid 1 Application(s) Topical daily  dexMEDEtomidine Infusion 0.2 MICROgram(s)/kG/Hr (3.79 mL/Hr) IV Continuous <Continuous>  dextrose 40% Gel 15 Gram(s) Oral once  dextrose 5%. 1000 milliLiter(s) (50 mL/Hr) IV Continuous <Continuous>  dextrose 5%. 1000 milliLiter(s) (100 mL/Hr) IV Continuous <Continuous>  dextrose 50% Injectable 25 Gram(s) IV Push once  dextrose 50% Injectable 25 Gram(s) IV Push once  dextrose 50% Injectable 12.5 Gram(s) IV Push once  fat emulsion (Fish Oil and Plant Based) 20% Infusion 1.123 Gm/kG/Day (31.3 mL/Hr) IV Continuous <Continuous>  fentaNYL   Infusion. 0.5 MICROgram(s)/kG/Hr (3.79 mL/Hr) IV Continuous <Continuous>  glucagon  Injectable 1 milliGRAM(s) IntraMuscular once  heparin   Injectable 5000 Unit(s) SubCutaneous every 8 hours  insulin glargine Injectable (LANTUS) 20 Unit(s) SubCutaneous at bedtime  insulin lispro (ADMELOG) corrective regimen sliding scale   SubCutaneous three times a day before meals  insulin lispro Injectable (ADMELOG) 5 Unit(s) SubCutaneous three times a day before meals  lactated ringers. 1000 milliLiter(s) (75 mL/Hr) IV Continuous <Continuous>  metroNIDAZOLE  IVPB      metroNIDAZOLE  IVPB 500 milliGRAM(s) IV Intermittent every 8 hours  norepinephrine Infusion 0.05 MICROgram(s)/kG/Min (7.01 mL/Hr) IV Continuous <Continuous>  pantoprazole  Injectable 40 milliGRAM(s) IV Push every 12 hours  Parenteral Nutrition - Adult 1 Each (70 mL/Hr) TPN Continuous <Continuous>    MEDICATIONS  (PRN):      Allergies  No Known Allergies      Review of Systems:   General: no fever  limited as intubated     Physical Examination:  T(C): 36.8 (10-11-21 @ 04:00), Max: 36.8 (10-11-21 @ 04:00)  HR: 82 (10-11-21 @ 07:44) (62 - 102)  BP: 88/60 (10-11-21 @ 06:00) (86/53 - 119/76)  RR: 19 (10-11-21 @ 06:00) (12 - 25)  SpO2: 99% (10-11-21 @ 07:44) (98% - 100%)     HEENT: intubated   Respiratory: on mechanical ventilation   Cardiovascular:  S1 S2, Regular rate and rhythm.  Abdominal: Abdomen is soft, symmetric, distended, dull to percussion   Extremities: no pitting edema  Skin: No Jaundice.        Data: (reviewed by attending)                        8.4    18.66 )-----------( 142      ( 11 Oct 2021 05:30 )             25.9     Hgb trend:  8.4  10-11-21 @ 05:30  8.9  10-10-21 @ 04:30  8.5  10-09-21 @ 23:00  8.7  10-09-21 @ 04:00  9.0  10-08-21 @ 23:18        10-11    139  |  104  |  102<HH>  ----------------------------<  190<H>  3.6   |  22  |  1.5    Ca    7.2<L>      11 Oct 2021 05:30  Phos  1.9     10-11  Mg     1.9     10-11    TPro  4.7<L>  /  Alb  1.4<L>  /  TBili  0.8  /  DBili  x   /  AST  43<H>  /  ALT  93<H>  /  AlkPhos  212<H>  10-11    Liver panel trend:  TBili 0.8   /   AST 43   /   ALT 93   /   AlkP 212   /   Tptn 4.7   /   Alb 1.4    /   DBili --      10-11  TBili 0.9   /   AST 46   /      /   AlkP 233   /   Tptn 5.0   /   Alb 1.5    /   DBili --      10-10  TBili 0.9   /   AST 49   /      /   AlkP 249   /   Tptn 5.0   /   Alb 1.4    /   DBili --      10-09  TBili 1.1   /   AST 63   /      /   AlkP 237   /   Tptn 4.8   /   Alb 1.4    /   DBili --      10-09  TBili 1.3   /   AST 74   /      /   AlkP 245   /   Tptn 5.1   /   Alb 1.5    /   DBili --      10-08  TBili 2.2   /      /      /   AlkP 239   /   Tptn 5.0   /   Alb 1.6    /   DBili --      10-08  TBili 1.9   /      /      /   AlkP 254   /   Tptn 5.1   /   Alb 1.6    /   DBili --      10-07  TBili 1.9   /      /      /   AlkP 291   /   Tptn 5.2   /   Alb 1.8    /   DBili --      10-07  TBili 2.0   /      /      /   AlkP 282   /   Tptn 5.1   /   Alb 1.7    /   DBili --      10-07  TBili 2.2   /      /      /   AlkP 281   /   Tptn 5.4   /   Alb 1.6    /   DBili --      10-06  TBili 1.9   /      /      /   AlkP 296   /   Tptn 5.3   /   Alb 1.8    /   DBili --      10-06  TBili 1.7   /      /      /   AlkP 282   /   Tptn 5.3   /   Alb 1.7    /   DBili --      10-06  TBili 1.2   /   AST 1079   /      /   AlkP 317   /   Tptn 5.4   /   Alb 1.8    /   DBili 0.7      10-05  TBili 1.1   /      /      /   AlkP 260   /   Tptn 5.3   /   Alb 1.8    /   DBili --      10-04  TBili 1.1   /      /      /   AlkP 247   /   Tptn 4.8   /   Alb 1.8    /   DBili --      10-04  TBili 2.5   /      /      /   AlkP 290   /   Tptn 6.1   /   Alb 1.9    /   DBili --      10-04      PT/INR - ( 11 Oct 2021 05:30 )   PT: 22.80 sec;   INR: 2.00 ratio         PTT - ( 11 Oct 2021 05:30 )  PTT:30.9 sec       Radiology: (reviewed by attending)  < from: Xray Chest 1 View- PORTABLE-Routine (Xray Chest 1 View- PORTABLE-Routine in AM.) (10.11.21 @ 06:30) >  Impression:    Stable basilar predominant opacities. No pneumothorax. Low lung volumes.    < end of copied text >

## 2021-10-11 NOTE — PROGRESS NOTE ADULT - SUBJECTIVE AND OBJECTIVE BOX
pt remains vented, min sedation, awake and responsive  left TLC c/d/i  port still accessed and with undated dressing, but unused; also not flushed  abd distended, plan to have IR drain ascites today  LR infusing at 75 ml/h - d/w RN and resident  Vital Signs Last 24 Hrs  T(C): 37.4 (11 Oct 2021 12:00), Max: 37.4 (11 Oct 2021 12:00)  T(F): 99.3 (11 Oct 2021 12:00), Max: 99.3 (11 Oct 2021 12:00)  HR: 90 (11 Oct 2021 12:00) (62 - 100)  BP: 87/60 (11 Oct 2021 12:00) (81/59 - 119/76)  BP(mean): 67 (11 Oct 2021 12:00) (64 - 87)  RR: 21 (11 Oct 2021 12:00) (12 - 25)  SpO2: 98% (11 Oct 2021 12:00) (95% - 100%)    MEDICATIONS  (STANDING):  budesonide  80 MICROgram(s)/formoterol 4.5 MICROgram(s) Inhaler 2 Puff(s) Inhalation two times a day  cefepime   IVPB 1000 milliGRAM(s) IV Intermittent every 12 hours  chlorhexidine 0.12% Liquid 15 milliLiter(s) Oral Mucosa two times a day  chlorhexidine 4% Liquid 1 Application(s) Topical daily  dexMEDEtomidine Infusion 0.2 MICROgram(s)/kG/Hr (3.79 mL/Hr) IV Continuous <Continuous>  fat emulsion (Fish Oil and Plant Based) 20% Infusion 1.123 Gm/kG/Day (31.3 mL/Hr) IV Continuous <Continuous>  fentaNYL   Infusion. 0.5 MICROgram(s)/kG/Hr (3.79 mL/Hr) IV Continuous <Continuous>  heparin   Injectable 5000 Unit(s) SubCutaneous every 8 hours  insulin glargine Injectable (LANTUS) 20 Unit(s) SubCutaneous at bedtime  insulin lispro (ADMELOG) corrective regimen sliding scale   SubCutaneous three times a day before meals  insulin lispro Injectable (ADMELOG) 5 Unit(s) SubCutaneous three times a day before meals  lactated ringers. 1000 milliLiter(s) (75 mL/Hr) IV Continuous <Continuous>   metroNIDAZOLE  IVPB 500 milliGRAM(s) IV Intermittent every 8 hours  norepinephrine Infusion 0.05 MICROgram(s)/kG/Min (7.01 mL/Hr) IV Continuous <Continuous>  pantoprazole  Injectable 40 milliGRAM(s) IV Push every 12 hours  Parenteral Nutrition - Adult 1 Each (70 mL/Hr) TPN Continuous <Continuous>  Parenteral Nutrition - Adult 1 Each (50 mL/Hr) TPN Continuous <Continuous>                        8.4    18.66 )-----------( 142      ( 11 Oct 2021 05:30 )             25.9   10-11    139  |  104  |  102<HH>  ----------------------------<  190<H>  3.6   |  22  |  1.5    Ca    7.2<L>      11 Oct 2021 05:30  Phos  1.9     10-11  Mg     1.9     10-11    TPro  4.7<L>  /  Alb  1.4<L>  /  TBili  0.8  /  DBili  x   /  AST  43<H>  /  ALT  93<H>  /  AlkPhos  212<H>  10-11  Mode: AC/ CMV (Assist Control/ Continuous Mandatory Ventilation)  RR (machine): 18  TV (machine): 420  FiO2: 30  PEEP: 5  ITime: 1  MAP: 11  PIP: 21  ABG - ( 11 Oct 2021 03:54 )  pH, Arterial: 7.46  pH, Blood: x     /  pCO2: 34    /  pO2: 74    / HCO3: 24    / Base Excess: 0.7   /  SaO2: 97.4      POCT Blood Glucose.: 164 mg/dL (11 Oct 2021 11:12)  POCT Blood Glucose.: 197 mg/dL (11 Oct 2021 05:59)  POCT Blood Glucose.: 205 mg/dL (10 Oct 2021 21:03)  POCT Blood Glucose.: 187 mg/dL (10 Oct 2021 17:18)  POCT Blood Glucose.: 194 mg/dL (10 Oct 2021 15:45)

## 2021-10-11 NOTE — PROGRESS NOTE ADULT - SUBJECTIVE AND OBJECTIVE BOX
Patient is a 66y old  Male who presents with a chief complaint of weakness  Hyperkalemia  VT (11 Oct 2021 10:13)    HPI:  67 yo male, with h/o HTN, drug abuse, Hepatitis C s/p INF, Liver cirrhosis s/p [place] shunt, COPD, DVT on Eliquis, HCC since 2021 on chemotherapy, presented for weakness.  Patient reports having weakness, decreased po intake since few weeks. He also has not been sleeping. He reports constipation and hematemesis for 1 day, minimal amount.  Denies fever, chills, chest pain, cough, sputum , SOB, diarrhea, dysuria. He has a [place] shunt that was drained one day prior to admission.     ED course : mental status alteration, sustained VT  bpm with BP 82/49 mmHg s/p shock + calcium gluconate  Patient has Hyperkalemia 6.8 CO2 14 s/p Bicarb drip, renal called, sp berry with good UO   (04 Oct 2021 06:15)    CCU course   10/05/2021: Pt had another episode of hematemesis overnight. Hgb was 5.5 from 7.9 this AM so Pt was given 2 units of blood. Hgb now stable at 9.8. BP was also low at 88/46 so Pt was given 1L NS bolus. Pt currently intubated.   10/06/2021 Patient had 5 episodes of melena overnight, patient remains hypotensive, patient is easily arousable. Patient was febrile over night. SAT and SBT was tried. Patient was able to stay off sedation through out the day but was lethargic Failed SBT   10/7/2021: Patient is now having brown stool. Patient became agitated overnight and needed to be sedated overnight. Will be retry SAT then SBT today.  10/8/2021: 3L drained from [place] cath.       INTERVAL HPI/OVERNIGHT EVENTS:   No overnight events   Afebrile, hemodynamically stable. Pt denies any pain. Pt awake and following commands.     Subjective:    ICU Vital Signs Last 24 Hrs  T(C): 37.1 (11 Oct 2021 07:00), Max: 37.1 (11 Oct 2021 07:00)  T(F): 98.8 (11 Oct 2021 07:00), Max: 98.8 (11 Oct 2021 07:00)  HR: 86 (11 Oct 2021 10:00) (62 - 86)  BP: 90/61 (11 Oct 2021 10:00) (81/59 - 119/76)  BP(mean): 69 (11 Oct 2021 10:00) (64 - 87)  ABP: --  ABP(mean): --  RR: 20 (11 Oct 2021 10:00) (12 - 25)  SpO2: 98% (11 Oct 2021 10:00) (98% - 100%)    I&O's Summary    10 Oct 2021 07:01  -  11 Oct 2021 07:00  --------------------------------------------------------  IN: 4638.8 mL / OUT: 590 mL / NET: 4048.8 mL    11 Oct 2021 07:01  -  11 Oct 2021 10:39  --------------------------------------------------------  IN: 793.2 mL / OUT: 90 mL / NET: 703.2 mL      Mode: AC/ CMV (Assist Control/ Continuous Mandatory Ventilation)  RR (machine): 18  TV (machine): 420  FiO2: 30  PEEP: 5  ITime: 1  MAP: 11  PIP: 21      Daily     Daily Weight in k.7 (11 Oct 2021 06:00)    Adult Advanced Hemodynamics Last 24 Hrs  CVP(mm Hg): --  CVP(cm H2O): --  CO: --  CI: --  PA: --  PA(mean): --  PCWP: --  SVR: --  SVRI: --  PVR: --  PVRI: --    EKG/Telemetry Events:    MEDICATIONS  (STANDING):  budesonide  80 MICROgram(s)/formoterol 4.5 MICROgram(s) Inhaler 2 Puff(s) Inhalation two times a day  cefepime   IVPB 1000 milliGRAM(s) IV Intermittent every 12 hours  chlorhexidine 0.12% Liquid 15 milliLiter(s) Oral Mucosa two times a day  chlorhexidine 4% Liquid 1 Application(s) Topical daily  dexMEDEtomidine Infusion 0.2 MICROgram(s)/kG/Hr (3.79 mL/Hr) IV Continuous <Continuous>  dextrose 40% Gel 15 Gram(s) Oral once  dextrose 5%. 1000 milliLiter(s) (50 mL/Hr) IV Continuous <Continuous>  dextrose 5%. 1000 milliLiter(s) (100 mL/Hr) IV Continuous <Continuous>  dextrose 50% Injectable 25 Gram(s) IV Push once  dextrose 50% Injectable 12.5 Gram(s) IV Push once  dextrose 50% Injectable 25 Gram(s) IV Push once  fat emulsion (Fish Oil and Plant Based) 20% Infusion 1.123 Gm/kG/Day (31.3 mL/Hr) IV Continuous <Continuous>  fentaNYL   Infusion. 0.5 MICROgram(s)/kG/Hr (3.79 mL/Hr) IV Continuous <Continuous>  glucagon  Injectable 1 milliGRAM(s) IntraMuscular once  heparin   Injectable 5000 Unit(s) SubCutaneous every 8 hours  insulin glargine Injectable (LANTUS) 20 Unit(s) SubCutaneous at bedtime  insulin lispro (ADMELOG) corrective regimen sliding scale   SubCutaneous three times a day before meals  insulin lispro Injectable (ADMELOG) 5 Unit(s) SubCutaneous three times a day before meals  lactated ringers. 1000 milliLiter(s) (75 mL/Hr) IV Continuous <Continuous>  metroNIDAZOLE  IVPB      metroNIDAZOLE  IVPB 500 milliGRAM(s) IV Intermittent every 8 hours  norepinephrine Infusion 0.05 MICROgram(s)/kG/Min (7.01 mL/Hr) IV Continuous <Continuous>  pantoprazole  Injectable 40 milliGRAM(s) IV Push every 12 hours  Parenteral Nutrition - Adult 1 Each (70 mL/Hr) TPN Continuous <Continuous>    MEDICATIONS  (PRN):      PHYSICAL EXAM:  GENERAL:   HEAD:  Atraumatic, Normocephalic  EYES: EOMI, PERRLA, conjunctiva and sclera clear  NECK: Supple, No JVD, Normal thyroid, no enlarged nodes  NERVOUS SYSTEM:  Alert & Awake.   CHEST/LUNG: B/L good air entry; No rales, rhonchi, or wheezing  HEART: S1S2 normal, no S3, Regular rate and rhythm; No murmurs  ABDOMEN: Soft, Nontender, Nondistended; Bowel sounds present  EXTREMITIES:  2+ Peripheral Pulses, No clubbing, cyanosis, or edema  LYMPH: No lymphadenopathy noted  SKIN: No rashes or lesions    LABS:                        8.4    18.66 )-----------( 142      ( 11 Oct 2021 05:30 )             25.9     10-11    139  |  104  |  102<HH>  ----------------------------<  190<H>  3.6   |  22  |  1.5    Ca    7.2<L>      11 Oct 2021 05:30  Phos  1.9     10-11  Mg     1.9     10-11    TPro  4.7<L>  /  Alb  1.4<L>  /  TBili  0.8  /  DBili  x   /  AST  43<H>  /  ALT  93<H>  /  AlkPhos  212<H>  10-11    LIVER FUNCTIONS - ( 11 Oct 2021 05:30 )  Alb: 1.4 g/dL / Pro: 4.7 g/dL / ALK PHOS: 212 U/L / ALT: 93 U/L / AST: 43 U/L / GGT: x           PT/INR - ( 11 Oct 2021 05:30 )   PT: 22.80 sec;   INR: 2.00 ratio         PTT - ( 11 Oct 2021 05:30 )  PTT:30.9 sec  CAPILLARY BLOOD GLUCOSE      POCT Blood Glucose.: 197 mg/dL (11 Oct 2021 05:59)  POCT Blood Glucose.: 205 mg/dL (10 Oct 2021 21:03)  POCT Blood Glucose.: 187 mg/dL (10 Oct 2021 17:18)  POCT Blood Glucose.: 194 mg/dL (10 Oct 2021 15:45)  POCT Blood Glucose.: 219 mg/dL (10 Oct 2021 11:55)    ABG - ( 11 Oct 2021 03:54 )  pH, Arterial: 7.46  pH, Blood: x     /  pCO2: 34    /  pO2: 74    / HCO3: 24    / Base Excess: 0.7   /  SaO2: 97.4                          RADIOLOGY & ADDITIONAL TESTS:  CXR:        Care Discussed with Consultants/Other Providers [ x] YES  [ ] NO           Patient is a 66y old  Male who presents with a chief complaint of weakness  Hyperkalemia  VT (11 Oct 2021 10:13)    HPI:  67 yo male, with h/o HTN, drug abuse, Hepatitis C s/p INF, Liver cirrhosis s/p [place] shunt, COPD, DVT on Eliquis, HCC since 2021 on chemotherapy, presented for weakness.  Patient reports having weakness, decreased po intake since few weeks. He also has not been sleeping. He reports constipation and hematemesis for 1 day, minimal amount.  Denies fever, chills, chest pain, cough, sputum , SOB, diarrhea, dysuria. He has a [place] shunt that was drained one day prior to admission.     ED course : mental status alteration, sustained VT  bpm with BP 82/49 mmHg s/p shock + calcium gluconate  Patient has Hyperkalemia 6.8 CO2 14 s/p Bicarb drip, renal called, sp berry with good UO   (04 Oct 2021 06:15)    CCU course   10/05/2021: Pt had another episode of hematemesis overnight. Hgb was 5.5 from 7.9 this AM so Pt was given 2 units of blood. Hgb now stable at 9.8. BP was also low at 88/46 so Pt was given 1L NS bolus. Pt currently intubated.   10/06/2021 Patient had 5 episodes of melena overnight, patient remains hypotensive, patient is easily arousable. Patient was febrile over night. SAT and SBT was tried. Patient was able to stay off sedation through out the day but was lethargic Failed SBT   10/7/2021: Patient is now having brown stool. Patient became agitated overnight and needed to be sedated overnight. Will be retry SAT then SBT today.  10/8/2021: 3L drained from [place] cath.  10/11/2021: 2 L drained from [place] cath, pt mental status improving, no NG tube for HE at this time, will reassess       INTERVAL HPI/OVERNIGHT EVENTS:   No overnight events   Afebrile, hemodynamically stable. Pt denies any pain. Pt awake and following commands.     Subjective:    ICU Vital Signs Last 24 Hrs  T(C): 37.1 (11 Oct 2021 07:00), Max: 37.1 (11 Oct 2021 07:00)  T(F): 98.8 (11 Oct 2021 07:00), Max: 98.8 (11 Oct 2021 07:00)  HR: 86 (11 Oct 2021 10:00) (62 - 86)  BP: 90/61 (11 Oct 2021 10:00) (81/59 - 119/76)  BP(mean): 69 (11 Oct 2021 10:00) (64 - 87)  ABP: --  ABP(mean): --  RR: 20 (11 Oct 2021 10:00) (12 - 25)  SpO2: 98% (11 Oct 2021 10:00) (98% - 100%)    I&O's Summary    10 Oct 2021 07:01  -  11 Oct 2021 07:00  --------------------------------------------------------  IN: 4638.8 mL / OUT: 590 mL / NET: 4048.8 mL    11 Oct 2021 07:01  -  11 Oct 2021 10:39  --------------------------------------------------------  IN: 793.2 mL / OUT: 90 mL / NET: 703.2 mL      Mode: AC/ CMV (Assist Control/ Continuous Mandatory Ventilation)  RR (machine): 18  TV (machine): 420  FiO2: 30  PEEP: 5  ITime: 1  MAP: 11  PIP: 21      Daily     Daily Weight in k.7 (11 Oct 2021 06:00)    Adult Advanced Hemodynamics Last 24 Hrs  CVP(mm Hg): --  CVP(cm H2O): --  CO: --  CI: --  PA: --  PA(mean): --  PCWP: --  SVR: --  SVRI: --  PVR: --  PVRI: --    EKG/Telemetry Events:    MEDICATIONS  (STANDING):  budesonide  80 MICROgram(s)/formoterol 4.5 MICROgram(s) Inhaler 2 Puff(s) Inhalation two times a day  cefepime   IVPB 1000 milliGRAM(s) IV Intermittent every 12 hours  chlorhexidine 0.12% Liquid 15 milliLiter(s) Oral Mucosa two times a day  chlorhexidine 4% Liquid 1 Application(s) Topical daily  dexMEDEtomidine Infusion 0.2 MICROgram(s)/kG/Hr (3.79 mL/Hr) IV Continuous <Continuous>  dextrose 40% Gel 15 Gram(s) Oral once  dextrose 5%. 1000 milliLiter(s) (50 mL/Hr) IV Continuous <Continuous>  dextrose 5%. 1000 milliLiter(s) (100 mL/Hr) IV Continuous <Continuous>  dextrose 50% Injectable 25 Gram(s) IV Push once  dextrose 50% Injectable 12.5 Gram(s) IV Push once  dextrose 50% Injectable 25 Gram(s) IV Push once  fat emulsion (Fish Oil and Plant Based) 20% Infusion 1.123 Gm/kG/Day (31.3 mL/Hr) IV Continuous <Continuous>  fentaNYL   Infusion. 0.5 MICROgram(s)/kG/Hr (3.79 mL/Hr) IV Continuous <Continuous>  glucagon  Injectable 1 milliGRAM(s) IntraMuscular once  heparin   Injectable 5000 Unit(s) SubCutaneous every 8 hours  insulin glargine Injectable (LANTUS) 20 Unit(s) SubCutaneous at bedtime  insulin lispro (ADMELOG) corrective regimen sliding scale   SubCutaneous three times a day before meals  insulin lispro Injectable (ADMELOG) 5 Unit(s) SubCutaneous three times a day before meals  lactated ringers. 1000 milliLiter(s) (75 mL/Hr) IV Continuous <Continuous>  metroNIDAZOLE  IVPB      metroNIDAZOLE  IVPB 500 milliGRAM(s) IV Intermittent every 8 hours  norepinephrine Infusion 0.05 MICROgram(s)/kG/Min (7.01 mL/Hr) IV Continuous <Continuous>  pantoprazole  Injectable 40 milliGRAM(s) IV Push every 12 hours  Parenteral Nutrition - Adult 1 Each (70 mL/Hr) TPN Continuous <Continuous>    MEDICATIONS  (PRN):      PHYSICAL EXAM:  GENERAL:   HEAD:  Atraumatic, Normocephalic  EYES: EOMI, PERRLA, conjunctiva and sclera clear  NECK: Supple, No JVD, Normal thyroid, no enlarged nodes  NERVOUS SYSTEM:  Alert & Awake.   CHEST/LUNG: B/L good air entry; No rales, rhonchi, or wheezing  HEART: S1S2 normal, no S3, Regular rate and rhythm; No murmurs  ABDOMEN: Soft, Nontender, Nondistended; Bowel sounds present  EXTREMITIES:  2+ Peripheral Pulses, No clubbing, cyanosis, or edema  LYMPH: No lymphadenopathy noted  SKIN: No rashes or lesions    LABS:                        8.4    18.66 )-----------( 142      ( 11 Oct 2021 05:30 )             25.9     10-11    139  |  104  |  102<HH>  ----------------------------<  190<H>  3.6   |  22  |  1.5    Ca    7.2<L>      11 Oct 2021 05:30  Phos  1.9     10-11  Mg     1.9     10-11    TPro  4.7<L>  /  Alb  1.4<L>  /  TBili  0.8  /  DBili  x   /  AST  43<H>  /  ALT  93<H>  /  AlkPhos  212<H>  10-11    LIVER FUNCTIONS - ( 11 Oct 2021 05:30 )  Alb: 1.4 g/dL / Pro: 4.7 g/dL / ALK PHOS: 212 U/L / ALT: 93 U/L / AST: 43 U/L / GGT: x           PT/INR - ( 11 Oct 2021 05:30 )   PT: 22.80 sec;   INR: 2.00 ratio         PTT - ( 11 Oct 2021 05:30 )  PTT:30.9 sec  CAPILLARY BLOOD GLUCOSE      POCT Blood Glucose.: 197 mg/dL (11 Oct 2021 05:59)  POCT Blood Glucose.: 205 mg/dL (10 Oct 2021 21:03)  POCT Blood Glucose.: 187 mg/dL (10 Oct 2021 17:18)  POCT Blood Glucose.: 194 mg/dL (10 Oct 2021 15:45)  POCT Blood Glucose.: 219 mg/dL (10 Oct 2021 11:55)    ABG - ( 11 Oct 2021 03:54 )  pH, Arterial: 7.46  pH, Blood: x     /  pCO2: 34    /  pO2: 74    / HCO3: 24    / Base Excess: 0.7   /  SaO2: 97.4                          RADIOLOGY & ADDITIONAL TESTS:  CXR:        Care Discussed with Consultants/Other Providers [ x] YES  [ ] NO

## 2021-10-11 NOTE — PROGRESS NOTE ADULT - ASSESSMENT
IMPRESSION   Hepatic encephalopathy ( ammonia 188)  Decompensated liver cirrhosis. Has [place] catheter  Sepsis   Hematemesis ( esophageal/ gastric varices)  Acute portal Vein thrombosis   Hyperkalemia with sustained VT s/p shock, creat 0.9 was on aldactone  Hyponatremia likely from volume overload  Hepatocellular carcinoma with transaminitis on chemotherapy  Hepatitis C treated with INF  COPD  HAGMA      PLAN:    CNS: keep sedate as needed  - c/w fentanyl   - Pt awake. Retry SBT today.      HEENT: Oral care    PULMONARY:    - HOB @ 45 degrees  - taper O2    CARDIOVASCULAR:   - Keep I < O ,   - cardiac ECHO done 10/06, f/u reads   - propranolol 10mg q 12 keep HR 50-60 (on hold)  - c/w levophed  - Keep bp above 100 systolic     GI: GI prophylaxis.  NPO,   - protonix 40mg BID  - GI management   - EGD - grade IV lower and mid 1/3 esophageal varices with 5 bands placed successfully.   - Lactulose bowel regimen aim 3 BM/day , (Will be giving enemas for now)  - Per Gi: place NG tube and start lactulose to titrate to 2-3 soft BM  - rifaximin q 12, (on hold for now)  - IR was consulted: Pt is a poor candidate for TIPS and has several contraindications for procedure.   - reached out to IR to drain the Denver catheter    - starting vit K 10mg daily today, will start after 3 doses   - Spoke to Dr. Rhodes (Patient's gastroenterologist) [phone]  - [facility] is sending patient's medical records   - Patient was seen  oncologist Dr. Gibson [phone]  - Denver cath drained 10/8 (3L). Drain again today and give albumin accordingly.     RENAL:   - D/C bicarb drip  - lokelma tID,  - low K diet,   - Follow up lytes.    - Correct as needed,  - f/u nephro consult   - Hyperkalemia (resolved) Kayexalate enema (PRN), insulin +dextrose +calcium gluconate if needed     INFECTIOUS DISEASE:   - Follow up cultures,   - peritoneal fluid analysis and culture, if + remove access  - cefepime decreased to 1g q 12 ; flagyl 500mg iv q8.   - D/C caspofungin (Fungitell neg)   - HepC viral load : 75.9 and reactive   - vanco x1  - blood cx : NGTD   - Peritoneal fluid cx : NG   - UA: negative   - Procal 15.60   - will repeat panculture if patient becomes febrile   - ID recs are appreciated     HEMATOLOGICAL:    - Hold home Eliquis  - Per vascular - D/C AC and restart once Pt is stable. F/u OP for Duplex  - doppler LE   - transfuse Hgb >8  - serial HH  - on DVT prophylaxis with heparin sq   - S/p 2 units of PRBC 10/5     ENDOCRINE:    - check ketone B hydroxy butyrate, ETOH level, serum OSM, ASA  - Follow up FS.    - Insulin protocol if needed.   - keep -180.   - avoid hypoglycemia  - B hydroxy butyrate level neg, ETOH level <10, serum , ASA  - B-hydroxybutyrate, salicylate blood alcohol: neg   - f/u nutrition eval   - on TPN, monitor for acidosis     10/5/2021: GOC conservation was done with Patient's partner Fallon, patient did not clarify his wishes before, patient does not have a health care proxy or a family member who can be reached. Patient's partner expressed that she wants him to be full code for now and wants everything done for the patient.     MUSCULOSKELETAL: bed rest     Poor prognosis    Dispo ICU    palliative care eval   IMPRESSION   Hepatic encephalopathy ( ammonia 188)  Decompensated liver cirrhosis. Has [place] catheter  Sepsis   Hematemesis ( esophageal/ gastric varices)  Acute portal Vein thrombosis   Hyperkalemia with sustained VT s/p shock, creat 0.9 was on aldactone  Hyponatremia likely from volume overload  Hepatocellular carcinoma with transaminitis on chemotherapy  Hepatitis C treated with INF  COPD  HAGMA      PLAN:    CNS: keep sedate as needed  - c/w fentanyl   -mental status improving  - Pt awake. Retry SBT today.      HEENT: Oral care    PULMONARY:    - HOB @ 45 degrees  - taper O2    CARDIOVASCULAR:   - Keep I < O ,   - cardiac ECHO done 10/06, f/u reads   - propranolol 10mg q 12 keep HR 50-60 (on hold)  - c/w levophed  - Keep bp above 100 systolic     GI: GI prophylaxis.  NPO,   - protonix 40mg BID  - GI management   - EGD - grade IV lower and mid 1/3 esophageal varices with 5 bands placed successfully.   - Lactulose bowel regimen aim 3 BM/day , (Will be giving enemas for now)  - Per Gi: place NG tube and start lactulose to titrate to 2-3 soft BM  >>holding for now pt mental status improving  - rifaximin q 12, (on hold for now)  - IR was consulted: Pt is a poor candidate for TIPS and has several contraindications for procedure.   - starting vit K 10mg daily today, will start after 3 doses   - Spoke to Dr. Rhodes (Patient's gastroenterologist) [phone]  - [facility] is sending patient's medical records   - Patient was seen  oncologist Dr. Gibson [phone]  - [place] cath drained 10/8 (3L).   >>Drained 10/11 2L    RENAL:   - D/C bicarb drip  - lokelma TID,  - low K diet,   - Follow up lytes.    - Correct as needed,  - f/u nephro consult   - Hyperkalemia (resolved) Kayexalate enema (PRN), insulin +dextrose +calcium gluconate if needed   -phos low  >>replenished kphos 15 mmol    INFECTIOUS DISEASE:   - Follow up cultures,   - peritoneal fluid analysis and culture, if + remove access  - cefepime decreased to 1g q 12 ; flagyl 500mg iv q8.   - D/C caspofungin (Fungitell neg)   - HepC viral load : 75.9 and reactive   - vanco x1  - blood cx : NGTD   - Peritoneal fluid cx : NG   - UA: negative   - Procal 15.60   - will repeat panculture if patient becomes febrile       HEMATOLOGICAL:    - Hold home Eliquis  - Per vascular - D/C AC and restart once Pt is stable. F/u OP for Duplex  - doppler LE   - transfuse Hgb >8  - serial HH  - on DVT prophylaxis with heparin sq   - S/p 2 units of PRBC 10/5     ENDOCRINE:    - check ketone (neg),  B hydroxy butyrate .2, ETOH level <10, serum , ASA <.3  - Follow up FS  - Insulin protocol if needed.   - keep -180.   - nutrition following  - on TPN, monitor for acidosis     MUSCULOSKELETAL: bed rest     Diet: TPN  GI:   DVT  Activity  Dispo: MICU IMPRESSION   Hepatic encephalopathy ( ammonia 188)  Decompensated liver cirrhosis. Has [place] catheter  Sepsis   Hematemesis ( esophageal/ gastric varices)  Acute portal Vein thrombosis   Hyperkalemia with sustained VT s/p shock, creat 0.9 was on aldactone  Hyponatremia likely from volume overload  Hepatocellular carcinoma with transaminitis on chemotherapy  Hepatitis C treated with INF  COPD  HAGMA      PLAN:    CNS: keep sedate as needed  - c/w fentanyl   -mental status improving  - Pt awake. Retry SBT today.      HEENT: Oral care    PULMONARY:    - HOB @ 45 degrees  - taper O2    CARDIOVASCULAR:   - Keep I < O ,   - cardiac ECHO done 10/06, f/u reads   - propranolol 10mg q 12 keep HR 50-60 (on hold)  - c/w levophed  - Keep bp above 100 systolic     GI: GI prophylaxis.  NPO,   - protonix 40mg BID  - GI management   - EGD - grade IV lower and mid 1/3 esophageal varices with 5 bands placed successfully.   - Lactulose bowel regimen aim 3 BM/day , (Will be giving enemas for now)  - Per Gi: place NG tube and start lactulose to titrate to 2-3 soft BM  >>holding for now pt mental status improving  - rifaximin q 12, (on hold for now)  - IR was consulted: Pt is a poor candidate for TIPS and has several contraindications for procedure.   - starting vit K 10mg daily today, will start after 3 doses   - Spoke to Dr. Rhodes (Patient's gastroenterologist) [phone]  - [facility] is sending patient's medical records   - Patient was seen  oncologist Dr. Gibson [phone]  - [place] cath drained 10/8 (3L).   >>Drained 10/11 2L    RENAL:   - D/C bicarb drip  - lokelma TID,  - low K diet,   - Follow up lytes.    - Correct as needed,  - f/u nephro consult   - Hyperkalemia (resolved) Kayexalate enema (PRN), insulin +dextrose +calcium gluconate if needed   -phos low  >>replenished kphos 15 mmol    INFECTIOUS DISEASE:   - Follow up cultures,   - peritoneal fluid analysis and culture, if + remove access  - cefepime decreased to 1g q 12 ; flagyl 500mg iv q8.   - D/C caspofungin (Fungitell neg)   - HepC viral load : 75.9 and reactive   - vanco x1  - blood cx : NGTD   - Peritoneal fluid cx : NG   - UA: negative   - Procal 15.60   - will repeat panculture if patient becomes febrile     HEMATOLOGICAL:    - Hold home Eliquis  - Per vascular - D/C AC and restart once Pt is stable. F/u OP for Duplex  - doppler LE   - transfuse Hgb >8  - serial HH  - on DVT prophylaxis with heparin sq   - S/p 2 units of PRBC 10/5     ENDOCRINE:    - check ketone (neg),  B hydroxy butyrate .2, ETOH level <10, serum , ASA <.3  - Follow up FS  - Insulin protocol if needed.   - keep -180.   - nutrition following  - on TPN, monitor for acidosis     MUSCULOSKELETAL: bed rest     Diet: TPN  GI: ppi  DVT: heparin subq  Activity: bedrest  Dispo: MICU IMPRESSION   Hepatic encephalopathy ( ammonia 188)  Decompensated liver cirrhosis. Has [place] catheter  Sepsis   Hematemesis ( esophageal/ gastric varices)  Acute portal Vein thrombosis   Hyperkalemia with sustained VT s/p shock, creat 0.9 was on aldactone  Hyponatremia likely from volume overload  Hepatocellular carcinoma with transaminitis on chemotherapy  Hepatitis C treated with INF  COPD  HAGMA      PLAN:    CNS: keep sedate as needed  - c/w fentanyl   -mental status improving  - Pt awake. Retry SBT today.      HEENT: Oral care    PULMONARY:    - HOB @ 45 degrees  - taper O2    CARDIOVASCULAR:   - Keep I < O ,   - cardiac ECHO done 10/06, f/u reads   - propranolol 10mg q 12 keep HR 50-60 (on hold)  - c/w levophed  - Keep bp above 100 systolic     GI:   - protonix 40mg BID  -octreotide infusion d/c per GI  - EGD - grade IV lower and mid 1/3 esophageal varices with 5 bands placed successfully.   - Lactulose bowel regimen aim 3 BM/day , (Will be giving enemas for now)  - Per Gi: place NG tube and start lactulose to titrate to 2-3 soft BM  >>holding for now pt mental status improving  - rifaximin q 12, (on hold for now)  - IR was consulted: Pt is a poor candidate for TIPS and has several contraindications for procedure.   - starting vit K 10mg daily today, will start after 3 doses   - Spoke to Dr. Rhodes (Patient's gastroenterologist) [phone]  - [facility] is sending patient's medical records   - Patient was seen  oncologist Dr. Gibson [phone]  - [place] cath drained 10/8 (3L).   >>Drained 10/11 2L    RENAL:   - D/C bicarb drip  - lokelma TID,  - low K diet,   - Follow up lytes.    - Correct as needed,  - f/u nephro consult   - Hyperkalemia (resolved) Kayexalate enema (PRN), insulin +dextrose +calcium gluconate if needed   -phos low  >>replenished kphos 15 mmol    INFECTIOUS DISEASE:   - Follow up cultures,   - peritoneal fluid analysis and culture, if + remove access  - cefepime decreased to 1g q 12 ; flagyl 500mg iv q8.   - D/C caspofungin (Fungitell neg)   - HepC viral load : 75.9 and reactive   - vanco x1  - blood cx : NGTD   - Peritoneal fluid cx : NG   - UA: negative   - Procal 15.60   - will repeat panculture if patient becomes febrile     HEMATOLOGICAL:    - Hold home Eliquis  - Per vascular - D/C AC and restart once Pt is stable. F/u OP for Duplex  - doppler LE   - transfuse Hgb >8  - serial HH  - on DVT prophylaxis with heparin sq   - S/p 2 units of PRBC 10/5     ENDOCRINE:    - check ketone (neg),  B hydroxy butyrate .2, ETOH level <10, serum , ASA <.3  - Follow up FS  - Insulin protocol if needed.   - keep -180.   - nutrition following  - on TPN, monitor for acidosis     MUSCULOSKELETAL: bed rest     Diet: TPN  GI: ppi  DVT: heparin subq  Activity: bedrest  Dispo: MICU

## 2021-10-11 NOTE — PROGRESS NOTE ADULT - SUBJECTIVE AND OBJECTIVE BOX
Over Night Events: events noted, still intubated, ventilated, afebrile, ID/ GI reviviewed    PHYSICAL EXAM    ICU Vital Signs Last 24 Hrs  T(C): 37.1 (11 Oct 2021 07:00), Max: 37.1 (11 Oct 2021 07:00)  T(F): 98.8 (11 Oct 2021 07:00), Max: 98.8 (11 Oct 2021 07:00)  HR: 86 (11 Oct 2021 10:00) (62 - 86)  BP: 90/61 (11 Oct 2021 10:00) (81/59 - 119/76)  BP(mean): 69 (11 Oct 2021 10:00) (64 - 87)  RR: 20 (11 Oct 2021 10:00) (12 - 25)  SpO2: 98% (11 Oct 2021 10:00) (98% - 100%)      General: ill looking  HEENT: ETT  Lungs: Bilateral BS  Cardiovascular: ruben 2/6  Abdomen: Soft, Positive BS  Extremities: No clubbing   Neurological: Non focal       10-10-21 @ 07:01  -  10-11-21 @ 07:00  --------------------------------------------------------  IN:    Dexmedetomidine: 269.2 mL    Fat Emulsion (Fish Oil &amp; Plant Based) 20% Infusion: 313 mL    IV PiggyBack: 250 mL    Lactated Ringers: 1800 mL    Norepinephrine: 326.6 mL    TPN (Total Parenteral Nutrition): 1680 mL  Total IN: 4638.8 mL    OUT:    FentaNYL: 0 mL    Indwelling Catheter - Urethral (mL): 590 mL  Total OUT: 590 mL    Total NET: 4048.8 mL      10-11-21 @ 07:01  -  10-11-21 @ 10:13  --------------------------------------------------------  IN:    Dexmedetomidine: 48 mL    Fat Emulsion (Fish Oil &amp; Plant Based) 20% Infusion: 125.2 mL    Lactated Ringers: 300 mL    Norepinephrine: 40 mL    TPN (Total Parenteral Nutrition): 280 mL  Total IN: 793.2 mL    OUT:    Indwelling Catheter - Urethral (mL): 90 mL  Total OUT: 90 mL    Total NET: 703.2 mL          LABS:                          8.4    18.66 )-----------( 142      ( 11 Oct 2021 05:30 )             25.9                                               10-11    139  |  104  |  102<HH>  ----------------------------<  190<H>  3.6   |  22  |  1.5    Ca    7.2<L>      11 Oct 2021 05:30  Phos  1.9     10-11  Mg     1.9     10-11    TPro  4.7<L>  /  Alb  1.4<L>  /  TBili  0.8  /  DBili  x   /  AST  43<H>  /  ALT  93<H>  /  AlkPhos  212<H>  10-11      PT/INR - ( 11 Oct 2021 05:30 )   PT: 22.80 sec;   INR: 2.00 ratio         PTT - ( 11 Oct 2021 05:30 )  PTT:30.9 sec                                                                                     LIVER FUNCTIONS - ( 11 Oct 2021 05:30 )  Alb: 1.4 g/dL / Pro: 4.7 g/dL / ALK PHOS: 212 U/L / ALT: 93 U/L / AST: 43 U/L / GGT: x                                                                                               Mode: AC/ CMV (Assist Control/ Continuous Mandatory Ventilation)  RR (machine): 18  TV (machine): 420  FiO2: 30  PEEP: 5  ITime: 1  MAP: 11  PIP: 21                                      ABG - ( 11 Oct 2021 03:54 )  pH, Arterial: 7.46  pH, Blood: x     /  pCO2: 34    /  pO2: 74    / HCO3: 24    / Base Excess: 0.7   /  SaO2: 97.4                MEDICATIONS  (STANDING):  budesonide  80 MICROgram(s)/formoterol 4.5 MICROgram(s) Inhaler 2 Puff(s) Inhalation two times a day  cefepime   IVPB 1000 milliGRAM(s) IV Intermittent every 12 hours  chlorhexidine 0.12% Liquid 15 milliLiter(s) Oral Mucosa two times a day  chlorhexidine 4% Liquid 1 Application(s) Topical daily  dexMEDEtomidine Infusion 0.2 MICROgram(s)/kG/Hr (3.79 mL/Hr) IV Continuous <Continuous>  dextrose 40% Gel 15 Gram(s) Oral once  dextrose 5%. 1000 milliLiter(s) (50 mL/Hr) IV Continuous <Continuous>  dextrose 5%. 1000 milliLiter(s) (100 mL/Hr) IV Continuous <Continuous>  dextrose 50% Injectable 25 Gram(s) IV Push once  dextrose 50% Injectable 25 Gram(s) IV Push once  dextrose 50% Injectable 12.5 Gram(s) IV Push once  fat emulsion (Fish Oil and Plant Based) 20% Infusion 1.123 Gm/kG/Day (31.3 mL/Hr) IV Continuous <Continuous>  fentaNYL   Infusion. 0.5 MICROgram(s)/kG/Hr (3.79 mL/Hr) IV Continuous <Continuous>  glucagon  Injectable 1 milliGRAM(s) IntraMuscular once  heparin   Injectable 5000 Unit(s) SubCutaneous every 8 hours  insulin glargine Injectable (LANTUS) 20 Unit(s) SubCutaneous at bedtime  insulin lispro (ADMELOG) corrective regimen sliding scale   SubCutaneous three times a day before meals  insulin lispro Injectable (ADMELOG) 5 Unit(s) SubCutaneous three times a day before meals  lactated ringers. 1000 milliLiter(s) (75 mL/Hr) IV Continuous <Continuous>  metroNIDAZOLE  IVPB      metroNIDAZOLE  IVPB 500 milliGRAM(s) IV Intermittent every 8 hours  norepinephrine Infusion 0.05 MICROgram(s)/kG/Min (7.01 mL/Hr) IV Continuous <Continuous>  pantoprazole  Injectable 40 milliGRAM(s) IV Push every 12 hours  Parenteral Nutrition - Adult 1 Each (70 mL/Hr) TPN Continuous <Continuous>    CXR reivewed

## 2021-10-11 NOTE — PROGRESS NOTE ADULT - ASSESSMENT
IMPRESSION:    Acute resp failure sp intubatin  Hepatic encephalopathy  Decompensated liver cirrhosis. Has [place] catheter  Sepsis   Hematemesis ( esophageal/ gastric varices)  Acute portal Vein thrombosis   Hyperkalemia with sustained VT s/p shock  Hyponatremia likely from volume overload  Hepatocellular carcinoma with transaminitis on chemotherapy  Hepatitis C treated with INF  COPD  HAGMA  EBER worsening      PLAN:    CNS: SAT, if needed restart sedation      HEENT: Oral care    PULMONARY:  HOB @ 45 degrees  continue O2 as necessary to maintain sats 92 to 96%      CARDIOVASCULAR: Keep I < O ,   propranolol 10 q 12 keep HR 50-60  dc IVF    GI: GI prophylaxis.  NPO,   GI management / NGT  bowel regimen aim 3 BM/day ,   rifaximin q 12,   DRAIN FLUID TODAY    RENAL:   low K diet,   Follow up lytes.    Correct as needed,      INFECTIOUS DISEASE:   ABX PER ID      HEMATOLOGICAL:  dvt prophylaxis    ENDOCRINE:    Follow up FS.    Insulin protocol if needed.   keep -180.   avoid hypoglycemia    MUSCULOSKELETAL: bed rest     prognosis grave      palliative care eval f/u

## 2021-10-11 NOTE — PROGRESS NOTE ADULT - SUBJECTIVE AND OBJECTIVE BOX
JLUISSUZANNE  66y, Male  Allergy: No Known Allergies      LOS  7d    CHIEF COMPLAINT: weakness  Hyperkalemia  VT (10 Oct 2021 06:43)      INTERVAL EVENTS/HPI  - No acute events overnight  - T(F): , Max: 98.3 (10-11-21 @ 04:00)  - intubated, remains on low dose levophed   - WBC Count: 18.66 (10-11-21 @ 05:30)  WBC Count: 21.23 (10-10-21 @ 04:30)     - Creatinine, Serum: 1.5 (10-11-21 @ 05:30)  Creatinine, Serum: 1.4 (10-10-21 @ 04:30)       ROS  General: Denies rigors, nightsweats  HEENT: Denies headache, rhinorrhea, sore throat, eye pain  CV: Denies CP, palpitations  PULM: Denies wheezing, hemoptysis  GI: Denies hematemesis, hematochezia, melena  : Denies discharge, hematuria  MSK: Denies arthralgias, myalgias  SKIN: Denies rash, lesions  NEURO: Denies paresthesias, weakness  PSYCH: Denies depression, anxiety    VITALS:  T(F): 98.3, Max: 98.3 (10-11-21 @ 04:00)  HR: 82  BP: 88/60  RR: 19Vital Signs Last 24 Hrs  T(C): 36.8 (11 Oct 2021 04:00), Max: 36.8 (11 Oct 2021 04:00)  T(F): 98.3 (11 Oct 2021 04:00), Max: 98.3 (11 Oct 2021 04:00)  HR: 82 (11 Oct 2021 07:44) (62 - 102)  BP: 88/60 (11 Oct 2021 06:00) (86/53 - 119/76)  BP(mean): 68 (11 Oct 2021 06:00) (64 - 87)  RR: 19 (11 Oct 2021 06:00) (12 - 25)  SpO2: 99% (11 Oct 2021 07:44) (98% - 100%)    PHYSICAL EXAM:  Gen: jaundiced  HEENT: Normocephalic, atraumatic  Neck: supple, no lymphadenopathy  CV: Regular rate & regular rhythm  Lungs: decreased BS at bases, no fremitus  Abdomen:distedned, non-tender   Ext: Warm, well perfused  Neuro: non focal, awake  Skin: no rash, no erythema  Lines: no phlebitis    FH: Non-contributory  Social Hx: Non-contributory    TESTS & MEASUREMENTS:                        8.4    18.66 )-----------( 142      ( 11 Oct 2021 05:30 )             25.9     10-11    139  |  104  |  102<HH>  ----------------------------<  190<H>  3.6   |  22  |  1.5    Ca    7.2<L>      11 Oct 2021 05:30  Phos  1.9     10-11  Mg     1.9     10-11    TPro  4.7<L>  /  Alb  1.4<L>  /  TBili  0.8  /  DBili  x   /  AST  43<H>  /  ALT  93<H>  /  AlkPhos  212<H>  10-11    eGFR if Non African American: 48 mL/min/1.73M2 (10-11-21 @ 05:30)  eGFR if African American: 55 mL/min/1.73M2 (10-11-21 @ 05:30)    LIVER FUNCTIONS - ( 11 Oct 2021 05:30 )  Alb: 1.4 g/dL / Pro: 4.7 g/dL / ALK PHOS: 212 U/L / ALT: 93 U/L / AST: 43 U/L / GGT: x               Culture - Fungal, Body Fluid (collected 10-05-21 @ 18:53)  Source: .Body Fluid Peritoneal Fluid  Preliminary Report (10-06-21 @ 06:52):    Testing in progress    Culture - Body Fluid with Gram Stain (collected 10-05-21 @ 18:53)  Source: .Body Fluid Peritoneal Fluid  Gram Stain (10-06-21 @ 02:48):    polymorphonuclear leukocytes seen    No organisms seen    by cytocentrifuge  Final Report (10-10-21 @ 17:05):    No growth at 5 days    Culture - Blood (collected 10-04-21 @ 02:15)  Source: .Blood Blood  Final Report (10-09-21 @ 14:01):    No Growth Final    Culture - Blood (collected 10-04-21 @ 02:10)  Source: .Blood Blood  Final Report (10-09-21 @ 14:01):    No Growth Final        Lactate, Blood: 2.0 mmol/L (10-11-21 @ 05:50)  Lactate, Blood: 1.5 mmol/L (10-10-21 @ 04:30)  Lactate, Blood: 1.5 mmol/L (10-08-21 @ 04:30)  Lactate, Blood: 2.7 mmol/L (10-07-21 @ 04:20)  Lactate, Blood: 2.8 mmol/L (10-06-21 @ 17:04)      INFECTIOUS DISEASES TESTING  Procalcitonin, Serum: 15.60 (10-06-21 @ 17:04)  Fungitell: <31 (10-06-21 @ 17:04)  Procalcitonin, Serum: 12.50 (10-06-21 @ 07:52)  Fungitell: 46 (10-06-21 @ 07:52)  MRSA PCR Result.: Negative (10-05-21 @ 05:14)  COVID-19 PCR: NotDetec (10-04-21 @ 02:17)      INFLAMMATORY MARKERS      RADIOLOGY & ADDITIONAL TESTS:  I have personally reviewed the last available Chest xray  CXR      CT      CARDIOLOGY TESTING  12 Lead ECG:   Ventricular Rate 91 BPM    Atrial Rate 91 BPM    P-R Interval 133 ms    QRS Duration 75 ms    Q-T Interval 338 ms    QTC Calculation(Bazett) 416 ms    P Axis 66 degrees    R Axis 33 degrees    T Axis 32 degrees    Diagnosis Line Normal sinus rhythm  Low voltage QRS  Cannot rule out Anterior infarct , age undetermined  Abnormal ECG    Confirmed by Erik Cervantes ([id number]) on 10/7/2021 6:59:32 AM (10-07-21 @ 01:10)  12 Lead ECG:   Systolic BP 80 mmHg    Diastolic BP 51 mmHg    Ventricular Rate 206 BPM    Atrial Rate 241 BPM    QRS Duration 72 ms    Q-T Interval 206 ms    QTC Calculation(Bazett) 381 ms    R Axis 55 degrees    T Axis 63 degrees    Diagnosis Line Supraventricular tachycardia with occasional Premature ventricular complexes  Low voltage QRS  Nonspecific T wave abnormality  Abnormal ECG    Confirmed by KRISHNA ZARCO MD ([id number]) on 10/4/2021 11:18:34 PM (10-04-21 @ 20:37)      MEDICATIONS  budesonide  80 MICROgram(s)/formoterol 4.5 MICROgram(s) Inhaler 2 Inhalation two times a day  caspofungin IVPB     caspofungin IVPB 50 IV Intermittent every 24 hours  cefepime   IVPB 1000 IV Intermittent every 12 hours  chlorhexidine 0.12% Liquid 15 Oral Mucosa two times a day  chlorhexidine 4% Liquid 1 Topical daily  dexMEDEtomidine Infusion 0.2 IV Continuous <Continuous>  dextrose 40% Gel 15 Oral once  dextrose 5%. 1000 IV Continuous <Continuous>  dextrose 5%. 1000 IV Continuous <Continuous>  dextrose 50% Injectable 25 IV Push once  dextrose 50% Injectable 25 IV Push once  dextrose 50% Injectable 12.5 IV Push once  fat emulsion (Fish Oil and Plant Based) 20% Infusion 1.123 IV Continuous <Continuous>  fentaNYL   Infusion. 0.5 IV Continuous <Continuous>  glucagon  Injectable 1 IntraMuscular once  heparin   Injectable 5000 SubCutaneous every 8 hours  insulin glargine Injectable (LANTUS) 20 SubCutaneous at bedtime  insulin lispro (ADMELOG) corrective regimen sliding scale  SubCutaneous three times a day before meals  insulin lispro Injectable (ADMELOG) 5 SubCutaneous three times a day before meals  lactated ringers. 1000 IV Continuous <Continuous>  metroNIDAZOLE  IVPB     metroNIDAZOLE  IVPB 500 IV Intermittent every 8 hours  norepinephrine Infusion 0.05 IV Continuous <Continuous>  pantoprazole  Injectable 40 IV Push every 12 hours  Parenteral Nutrition - Adult 1 TPN Continuous <Continuous>      WEIGHT  Weight (kg): 89.1 (10-10-21 @ 00:00)  Creatinine, Serum: 1.5 mg/dL (10-11-21 @ 05:30)      ANTIBIOTICS:  caspofungin IVPB      caspofungin IVPB 50 milliGRAM(s) IV Intermittent every 24 hours  cefepime   IVPB 1000 milliGRAM(s) IV Intermittent every 12 hours  metroNIDAZOLE  IVPB      metroNIDAZOLE  IVPB 500 milliGRAM(s) IV Intermittent every 8 hours      All available historical records have been reviewed

## 2021-10-11 NOTE — PROGRESS NOTE ADULT - ASSESSMENT
ASSESSMENT  65 yo male, with h/o HTN, drug abuse, Hepatitis C s/p INF, Liver cirrhosis s/p Denver shunt, COPD, DVT? on Eliquis, HCC since January 2021 on chemotherapy, presented for weakness    IMPRESSION  #Decompensated Cirrhosis   #Hep C with HCC on chemotherapy  #Variceal Bleed    #Fevers + Leukocytosis -- possibly reactive from Variceal bleed  - BLood Cx 10/4 NG  - s/p paracentesis 10/5 - negative for SBP  - Fungitell: <31 (10.06.21 @ 17:04)      #Abx allergy: NKDA    RECOMMENDATIONS  - stop caspofungin as fungitell negative  - continue cefepime 1g q 12 and flagyl 500 mg TID until 10/12  - afterwards, stop flagyl and switch to ceftriaxone 1g daily while in house for SBP prophylaxix until discharge   - continue flagyl 500 mg TID    Please call or message on [employer] Teams if with any questions.  Spectra [phone]

## 2021-10-12 LAB
ALBUMIN SERPL ELPH-MCNC: 1.4 G/DL — LOW (ref 3.5–5.2)
ALP SERPL-CCNC: 232 U/L — HIGH (ref 30–115)
ALT FLD-CCNC: 71 U/L — HIGH (ref 0–41)
ANION GAP SERPL CALC-SCNC: 10 MMOL/L — SIGNIFICANT CHANGE UP (ref 7–14)
AST SERPL-CCNC: 42 U/L — HIGH (ref 0–41)
BASE EXCESS BLDA CALC-SCNC: 4.9 MMOL/L — HIGH (ref -2–3)
BILIRUB DIRECT SERPL-MCNC: 0.5 MG/DL — HIGH (ref 0–0.2)
BILIRUB INDIRECT FLD-MCNC: 0.4 MG/DL — SIGNIFICANT CHANGE UP (ref 0.2–1.2)
BILIRUB SERPL-MCNC: 0.9 MG/DL — SIGNIFICANT CHANGE UP (ref 0.2–1.2)
BUN SERPL-MCNC: 102 MG/DL — CRITICAL HIGH (ref 10–20)
CALCIUM SERPL-MCNC: 7.4 MG/DL — LOW (ref 8.5–10.1)
CHLORIDE SERPL-SCNC: 107 MMOL/L — SIGNIFICANT CHANGE UP (ref 98–110)
CO2 SERPL-SCNC: 25 MMOL/L — SIGNIFICANT CHANGE UP (ref 17–32)
CREAT SERPL-MCNC: 1.4 MG/DL — SIGNIFICANT CHANGE UP (ref 0.7–1.5)
GAS PNL BLDA: SIGNIFICANT CHANGE UP
GLUCOSE BLDC GLUCOMTR-MCNC: 115 MG/DL — HIGH (ref 70–99)
GLUCOSE BLDC GLUCOMTR-MCNC: 118 MG/DL — HIGH (ref 70–99)
GLUCOSE BLDC GLUCOMTR-MCNC: 132 MG/DL — HIGH (ref 70–99)
GLUCOSE BLDC GLUCOMTR-MCNC: 142 MG/DL — HIGH (ref 70–99)
GLUCOSE SERPL-MCNC: 137 MG/DL — HIGH (ref 70–99)
HCO3 BLDA-SCNC: 28 MMOL/L — SIGNIFICANT CHANGE UP (ref 21–28)
HCT VFR BLD CALC: 27.3 % — LOW (ref 42–52)
HGB BLD-MCNC: 8.9 G/DL — LOW (ref 14–18)
HOROWITZ INDEX BLDA+IHG-RTO: 30 — SIGNIFICANT CHANGE UP
INR BLD: 1.85 RATIO — HIGH (ref 0.65–1.3)
MAGNESIUM SERPL-MCNC: 2.5 MG/DL — HIGH (ref 1.8–2.4)
MCHC RBC-ENTMCNC: 29.5 PG — SIGNIFICANT CHANGE UP (ref 27–31)
MCHC RBC-ENTMCNC: 32.6 G/DL — SIGNIFICANT CHANGE UP (ref 32–37)
MCV RBC AUTO: 90.4 FL — SIGNIFICANT CHANGE UP (ref 80–94)
NRBC # BLD: 0 /100 WBCS — SIGNIFICANT CHANGE UP (ref 0–0)
PCO2 BLDA: 36 MMHG — SIGNIFICANT CHANGE UP (ref 35–48)
PH BLDA: 7.5 — HIGH (ref 7.35–7.45)
PHOSPHATE SERPL-MCNC: 3.2 MG/DL — SIGNIFICANT CHANGE UP (ref 2.1–4.9)
PLATELET # BLD AUTO: 160 K/UL — SIGNIFICANT CHANGE UP (ref 130–400)
PO2 BLDA: 83 MMHG — SIGNIFICANT CHANGE UP (ref 83–108)
POTASSIUM SERPL-MCNC: 4.1 MMOL/L — SIGNIFICANT CHANGE UP (ref 3.5–5)
POTASSIUM SERPL-SCNC: 4.1 MMOL/L — SIGNIFICANT CHANGE UP (ref 3.5–5)
PROT SERPL-MCNC: 4.9 G/DL — LOW (ref 6–8)
PROTHROM AB SERPL-ACNC: 21.2 SEC — HIGH (ref 9.95–12.87)
RBC # BLD: 3.02 M/UL — LOW (ref 4.7–6.1)
RBC # FLD: 21.4 % — HIGH (ref 11.5–14.5)
SAO2 % BLDA: 98.7 % — HIGH (ref 94–98)
SODIUM SERPL-SCNC: 142 MMOL/L — SIGNIFICANT CHANGE UP (ref 135–146)
WBC # BLD: 19.27 K/UL — HIGH (ref 4.8–10.8)
WBC # FLD AUTO: 19.27 K/UL — HIGH (ref 4.8–10.8)

## 2021-10-12 PROCEDURE — 99232 SBSQ HOSP IP/OBS MODERATE 35: CPT

## 2021-10-12 PROCEDURE — 99291 CRITICAL CARE FIRST HOUR: CPT

## 2021-10-12 PROCEDURE — 71045 X-RAY EXAM CHEST 1 VIEW: CPT | Mod: 26

## 2021-10-12 RX ORDER — ELECTROLYTE SOLUTION,INJ
1 VIAL (ML) INTRAVENOUS
Refills: 0 | Status: DISCONTINUED | OUTPATIENT
Start: 2021-10-12 | End: 2021-10-12

## 2021-10-12 RX ORDER — I.V. FAT EMULSION 20 G/100ML
1.12 EMULSION INTRAVENOUS
Qty: 100.06 | Refills: 0 | Status: DISCONTINUED | OUTPATIENT
Start: 2021-10-12 | End: 2021-10-12

## 2021-10-12 RX ORDER — LACTULOSE 10 G/15ML
15 SOLUTION ORAL THREE TIMES A DAY
Refills: 0 | Status: DISCONTINUED | OUTPATIENT
Start: 2021-10-12 | End: 2021-11-02

## 2021-10-12 RX ORDER — LACTULOSE 10 G/15ML
200 SOLUTION ORAL THREE TIMES A DAY
Refills: 0 | Status: DISCONTINUED | OUTPATIENT
Start: 2021-10-12 | End: 2021-10-13

## 2021-10-12 RX ADMIN — Medication 100 MILLIGRAM(S): at 14:09

## 2021-10-12 RX ADMIN — HEPARIN SODIUM 5000 UNIT(S): 5000 INJECTION INTRAVENOUS; SUBCUTANEOUS at 05:44

## 2021-10-12 RX ADMIN — INSULIN GLARGINE 20 UNIT(S): 100 INJECTION, SOLUTION SUBCUTANEOUS at 21:45

## 2021-10-12 RX ADMIN — BUDESONIDE AND FORMOTEROL FUMARATE DIHYDRATE 2 PUFF(S): 160; 4.5 AEROSOL RESPIRATORY (INHALATION) at 20:14

## 2021-10-12 RX ADMIN — LACTULOSE 200 GRAM(S): 10 SOLUTION ORAL at 08:39

## 2021-10-12 RX ADMIN — CHLORHEXIDINE GLUCONATE 15 MILLILITER(S): 213 SOLUTION TOPICAL at 05:44

## 2021-10-12 RX ADMIN — BUDESONIDE AND FORMOTEROL FUMARATE DIHYDRATE 2 PUFF(S): 160; 4.5 AEROSOL RESPIRATORY (INHALATION) at 08:37

## 2021-10-12 RX ADMIN — CEFEPIME 100 MILLIGRAM(S): 1 INJECTION, POWDER, FOR SOLUTION INTRAMUSCULAR; INTRAVENOUS at 18:23

## 2021-10-12 RX ADMIN — CHLORHEXIDINE GLUCONATE 1 APPLICATION(S): 213 SOLUTION TOPICAL at 11:33

## 2021-10-12 RX ADMIN — Medication 5 UNIT(S): at 08:37

## 2021-10-12 RX ADMIN — Medication 100 MILLIGRAM(S): at 05:44

## 2021-10-12 RX ADMIN — Medication 5 UNIT(S): at 11:33

## 2021-10-12 RX ADMIN — PANTOPRAZOLE SODIUM 40 MILLIGRAM(S): 20 TABLET, DELAYED RELEASE ORAL at 05:44

## 2021-10-12 RX ADMIN — HEPARIN SODIUM 5000 UNIT(S): 5000 INJECTION INTRAVENOUS; SUBCUTANEOUS at 21:46

## 2021-10-12 RX ADMIN — CHLORHEXIDINE GLUCONATE 15 MILLILITER(S): 213 SOLUTION TOPICAL at 18:24

## 2021-10-12 RX ADMIN — I.V. FAT EMULSION 31.3 GM/KG/DAY: 20 EMULSION INTRAVENOUS at 20:14

## 2021-10-12 RX ADMIN — HEPARIN SODIUM 5000 UNIT(S): 5000 INJECTION INTRAVENOUS; SUBCUTANEOUS at 14:09

## 2021-10-12 RX ADMIN — PANTOPRAZOLE SODIUM 40 MILLIGRAM(S): 20 TABLET, DELAYED RELEASE ORAL at 18:24

## 2021-10-12 RX ADMIN — Medication 100 MILLIGRAM(S): at 21:45

## 2021-10-12 RX ADMIN — CEFEPIME 100 MILLIGRAM(S): 1 INJECTION, POWDER, FOR SOLUTION INTRAMUSCULAR; INTRAVENOUS at 05:44

## 2021-10-12 RX ADMIN — Medication 1 EACH: at 20:14

## 2021-10-12 RX ADMIN — LACTULOSE 200 GRAM(S): 10 SOLUTION ORAL at 14:10

## 2021-10-12 NOTE — ADVANCED PRACTICE NURSE CONSULT - ASSESSMENT
65 yo male, with h/o HTN, drug abuse, Hepatitis C s/p INF, Liver cirrhosis s/p Denver shunt, COPD, DVT? on Eliquis, HCC since January 2021 on chemotherapy, presented (10/4) for weakness; Patient reports having weakness, decreased po intake since few weeks. He also has not been sleeping. He reports constipation and hematemesis for 1 day, minimal amount. Denies fever, chills, chest pain, cough, sputum , SOB, diarrhea, dysuria. He has a Denver shunt  that was drained one day prior to admission  ED course : mental status alteration, sustained VT  bpm with BP 82/49 mmHg s/p shock + calcium gluconate Patient has Hyperkalemia 6.8 CO2 14 s/p Bicarb drip, renal called, sp berry with good UO.     Currently admitted to CCU, today is hospital day-8d; in CCU, being managed for Hepatic encephalopathy ( ammonia 188); Decompensated liver cirrhosis. Has Denver catheter; Sepsis; Hematemesis ( esophageal/ gastric varices); Acute portal Vein thrombosis   Hyperkalemia with sustained VT s/p shock, creat 0.9 was on aldactone; Hyponatremia likely from volume overload; Hepatocellular carcinoma with transaminitis on chemotherapy; Hepatitis C treated with INF; COPD; HAGMA.    Received patient in CCU, laying supine in bed, turned to right side (pillow under left side, offloading boots to BLEs in place), HOB elevated 30 degrees. Pt intubated, awake, alert/attentive, attempting to communicate w/ staff w/ hand gestures, following commands, pressor infusing, primary RN Scooter at bedside, both made aware of purpose of WOCN visit, agreeable to consult. With assistance from RN, turned patient completely to right side for skin assessment.     Type of wound: Deep tissue pressure injury/DTPI; pressure component w/ likely hypoperfusion & hypoperfusion r/t Acute resp failure-s/p intubation, pressor usage, sepsis, current medical condition/immunocompromised-Decompensated liver cirrhosis, Hep C,  Hepatocellular carcinoma with transaminitis on chemotherapy; nutritional status; incontinence; immobility   Location: coccyx extending to b/l buttock area    Wound measurements: 7cm x 7cm x 0.1cm; true depth indeterminable at this time   Tunneling/Undermining: none  Wound bed: opening maroon colored macerated epidermal layer w/ dark pink tissue at wound base    Wound edges: attached, flush, irregular    Periwound: intact  Wound exudate: none  Wound odor: none  Induration, erythema, warmth: none   Wound pain: no s/s pain present on assessment     Patient bedbound, very limited mobility in bed, + berry, incontinent of loose stool, Rn reports patient has been receiving lactulose enemas. Receiving TPN.

## 2021-10-12 NOTE — PROGRESS NOTE ADULT - SUBJECTIVE AND OBJECTIVE BOX
Gastroenterology progress note:     Patient is a 66y old  Male who presents with a chief complaint of weakness  Hyperkalemia  VT (12 Oct 2021 09:04)       Admitted on: 10-04-21    We are following the patient for liver cirrhosis      Interval History: remains intubated, on low dose levophed and on precedex. s/p lactulose enemas no BM. awake and follows commands. girlfriend at bedside. s/p denver catheter drain 2L      PAST MEDICAL & SURGICAL HISTORY:  HTN (hypertension)   Hepatitis   Drug abuse  Cancer, hepatocellularJanuary 2021  COPD, mild    MEDICATIONS  (STANDING):  budesonide  80 MICROgram(s)/formoterol 4.5 MICROgram(s) Inhaler 2 Puff(s) Inhalation two times a day  cefepime   IVPB 1000 milliGRAM(s) IV Intermittent every 12 hours  chlorhexidine 0.12% Liquid 15 milliLiter(s) Oral Mucosa two times a day  chlorhexidine 4% Liquid 1 Application(s) Topical daily  dexMEDEtomidine Infusion 0.2 MICROgram(s)/kG/Hr (3.79 mL/Hr) IV Continuous <Continuous>  dextrose 40% Gel 15 Gram(s) Oral once  dextrose 5%. 1000 milliLiter(s) (50 mL/Hr) IV Continuous <Continuous>  dextrose 5%. 1000 milliLiter(s) (100 mL/Hr) IV Continuous <Continuous>  dextrose 50% Injectable 25 Gram(s) IV Push once  dextrose 50% Injectable 12.5 Gram(s) IV Push once  dextrose 50% Injectable 25 Gram(s) IV Push once  fat emulsion (Fish Oil and Plant Based) 20% Infusion 1.123 Gm/kG/Day (31.3 mL/Hr) IV Continuous <Continuous>  fentaNYL   Infusion. 0.5 MICROgram(s)/kG/Hr (3.79 mL/Hr) IV Continuous <Continuous>  glucagon  Injectable 1 milliGRAM(s) IntraMuscular once  heparin   Injectable 5000 Unit(s) SubCutaneous every 8 hours  insulin glargine Injectable (LANTUS) 20 Unit(s) SubCutaneous at bedtime  insulin lispro (ADMELOG) corrective regimen sliding scale   SubCutaneous three times a day before meals  insulin lispro Injectable (ADMELOG) 5 Unit(s) SubCutaneous three times a day before meals  lactulose Retention Enema 200 Gram(s) Rectal three times a day  metroNIDAZOLE  IVPB 500 milliGRAM(s) IV Intermittent every 8 hours  metroNIDAZOLE  IVPB      norepinephrine Infusion 0.05 MICROgram(s)/kG/Min (7.01 mL/Hr) IV Continuous <Continuous>  pantoprazole  Injectable 40 milliGRAM(s) IV Push every 12 hours  Parenteral Nutrition - Adult 1 Each (50 mL/Hr) TPN Continuous <Continuous>  Parenteral Nutrition - Adult 1 Each (50 mL/Hr) TPN Continuous <Continuous>    MEDICATIONS  (PRN):      Allergies  No Known Allergies      Review of Systems:   General: no fever  limited as intubated     Physical Examination:  T(C): 37.2 (10-12-21 @ 13:00), Max: 37.6 (10-12-21 @ 04:00)  HR: 78 (10-12-21 @ 13:36) (70 - 86)  BP: 103/70 (10-12-21 @ 13:36) (86/58 - 109/94)  RR: 112 (10-12-21 @ 13:36) (12 - 112)  SpO2: 98% (10-12-21 @ 13:36) (97% - 99%)    Constitutional: intubated   HEENT: ET tube   Respiratory:  mechanically ventilated  Cardiovascular:  S1 S2, Regular rate and rhythm.  Abdominal: Abdomen is soft, symmetric, and non-tender less distended compared to yesterday    Extremities: +pitting edema  Skin: No rashs         Data: (reviewed by attending)                        8.9    19.27 )-----------( 160      ( 12 Oct 2021 04:35 )             27.3     Hgb trend:  8.9  10-12-21 @ 04:35  8.4  10-11-21 @ 05:30  8.9  10-10-21 @ 04:30  8.5  10-09-21 @ 23:00        10-12    142  |  107  |  102<HH>  ----------------------------<  137<H>  4.1   |  25  |  1.4    Ca    7.4<L>      12 Oct 2021 04:35  Phos  3.2     10-12  Mg     2.5     10-12    TPro  4.9<L>  /  Alb  1.4<L>  /  TBili  0.9  /  DBili  0.5<H>  /  AST  42<H>  /  ALT  71<H>  /  AlkPhos  232<H>  10-12    Liver panel trend:  TBili 0.9   /   AST 42   /   ALT 71   /   AlkP 232   /   Tptn 4.9   /   Alb 1.4    /   DBili 0.5      10-12  TBili 0.8   /   AST 43   /   ALT 93   /   AlkP 212   /   Tptn 4.7   /   Alb 1.4    /   DBili --      10-11  TBili 0.9   /   AST 46   /      /   AlkP 233   /   Tptn 5.0   /   Alb 1.5    /   DBili --      10-10  TBili 0.9   /   AST 49   /      /   AlkP 249   /   Tptn 5.0   /   Alb 1.4    /   DBili --      10-09  TBili 1.1   /   AST 63   /      /   AlkP 237   /   Tptn 4.8   /   Alb 1.4    /   DBili --      10-09  TBili 1.3   /   AST 74   /      /   AlkP 245   /   Tptn 5.1   /   Alb 1.5    /   DBili --      10-08  TBili 2.2   /      /      /   AlkP 239   /   Tptn 5.0   /   Alb 1.6    /   DBili --      10-08  TBili 1.9   /      /      /   AlkP 254   /   Tptn 5.1   /   Alb 1.6    /   DBili --      10-07  TBili 1.9   /      /      /   AlkP 291   /   Tptn 5.2   /   Alb 1.8    /   DBili --      10-07  TBili 2.0   /      /      /   AlkP 282   /   Tptn 5.1   /   Alb 1.7    /   DBili --      10-07  TBili 2.2   /      /      /   AlkP 281   /   Tptn 5.4   /   Alb 1.6    /   DBili --      10-06  TBili 1.9   /      /      /   AlkP 296   /   Tptn 5.3   /   Alb 1.8    /   DBili --      10-06  TBili 1.7   /      /      /   AlkP 282   /   Tptn 5.3   /   Alb 1.7    /   DBili --      10-06  TBili 1.2   /   AST 1079   /      /   AlkP 317   /   Tptn 5.4   /   Alb 1.8    /   DBili 0.7      10-05  TBili 1.1   /      /      /   AlkP 260   /   Tptn 5.3   /   Alb 1.8    /   DBili --      10-04  TBili 1.1   /      /      /   AlkP 247   /   Tptn 4.8   /   Alb 1.8    /   DBili --      10-04  TBili 2.5   /      /      /   AlkP 290   /   Tptn 6.1   /   Alb 1.9    /   DBili --      10-04      PT/INR - ( 12 Oct 2021 04:35 )   PT: 21.20 sec;   INR: 1.85 ratio         PTT - ( 11 Oct 2021 05:30 )  PTT:30.9 sec       Radiology: (reviewed by attending)  < from: Xray Chest 1 View- PORTABLE-Urgent (Xray Chest 1 View- PORTABLE-Urgent .) (10.12.21 @ 08:08) >  EXAM:  XR CHEST PORTABLE URGENT 1V            PROCEDURE DATE:  10/12/2021            INTERPRETATION:  Clinical History / Reason for exam: Intubated    Comparison : Chest radiograph 10/11/2021.    Technique/Positioning: Frontal, portable.    Findings:    Support devices: Endotracheal tube is in good position. Bilateral central lines are stable. Telemetry leads and tubing overlie patient.    Cardiac/mediastinum/hilum: Stable    Lung parenchyma/Pleura: Stable bilateral lung opacities    Skeleton/soft tissues: Stable    Impression:    Stable bilateral lung opacities      --- End of Report ---    < end of copied text >

## 2021-10-12 NOTE — PROGRESS NOTE ADULT - ASSESSMENT
65 yo male, with h/o HTN, hx of drug abuse, Hepatitis C s/p INF, Liver cirrhosis, COPD, portal vein thrombosis on Eliquis, HCC since January 2021 on chemotherapy, presented for weakness He reports constipation and hematemesis for 1 day, minimal amount.    *Upper Gi bleed secondary to large esophageal varices  -s/p EGD 10/4 s/p banding   -Hb stable  -no active bleeding    Plan  -patient to be extubated today, can start diet if cleared by S&S  -trend CBC  -PPI IV BID  -keep active type and screen    *Decompensated liver cirrhosis sec to hepatitis C   -MELD Na score 26 at the time of admission, today 17. Child yañez score C  -HCC s/p biopsy 2/9/21 on tecentriq and avasin. dr Arthur Gibson  -not a candidate for surgery because of locally advanced disease  -Left main and right portal vein thrombus was on eliquis, tumor thrombus??  -discussed with his oncologist, no need to resume eliquis   -ascites with denver catheter   -s/p ascitic fluid analysis no evidence of SBP, SAAG 1.6 and TP<1     Plan  c/w antibiotics per ID, after stopping antibiotics we need prophylactic antibiotics given total protein <1 and CPS score of C  after S&S start lactulose to target 3-4 BM and rifaximin   can continue vitamin K  Trend LFT, INR, BMP daily   therapeutic paracentesis   low sodium diet   nutrition consult evaluation     -for questions text me on  teams, call [phone] on weekdays before 5pm or call GI service at [phone]  after 5 pm and on weekends

## 2021-10-12 NOTE — SWALLOW BEDSIDE ASSESSMENT ADULT - SWALLOW EVAL: DIAGNOSIS
suspected pharyngeal dysphagia for thins. + toleration observed without overt symptoms of penetration/aspiration for puree and nectar thick liquids

## 2021-10-12 NOTE — CHART NOTE - NSCHARTNOTEFT_GEN_A_CORE
Patient was alert and able to make his needs known.  T/C to Fallon Whaley, SO:  Ms. Whaley discussed that since patient's condition has improved, she will continue to keep him as FC.  Writer informed Ms. Whaley that Palliative Team will sign off.  Ms. Whaley was encouraged to contact writer if needed.

## 2021-10-12 NOTE — PROGRESS NOTE ADULT - ASSESSMENT
IMPRESSION   Hepatic encephalopathy ( ammonia 188)  Decompensated liver cirrhosis. Has Denver catheter  Sepsis   Hematemesis ( esophageal/ gastric varices)  Acute portal Vein thrombosis   Hyperkalemia with sustained VT s/p shock, creat 0.9 was on aldactone  Hyponatremia likely from volume overload  Hepatocellular carcinoma with transaminitis on chemotherapy  Hepatitis C treated with INF  COPD  HAGMA      PLAN:    CNS: keep sedate as needed  - c/w Precedex  -mental status improving  - Pt awake. Retry SBT today.      HEENT: Oral care    PULMONARY:    - HOB @ 45 degrees  - taper O2    CARDIOVASCULAR:   - Keep I < O ,   - cardiac ECHO done 10/06, f/u reads   - propranolol 10mg q 12 keep HR 50-60 (on hold)  - c/w levophed  - Keep bp above 100 systolic     GI:   - protonix 40mg BID  -octreotide infusion d/c per GI  - EGD - grade IV lower and mid 1/3 esophageal varices with 5 bands placed successfully.   - Lactulose bowel regimen aim 3 BM/day , (Will be giving enemas for now)  - Per Gi: place NG tube and start lactulose to titrate to 2-3 soft BM  >>holding for now pt mental status improving  - rifaximin q 12, (on hold for now)  - IR was consulted: Pt is a poor candidate for TIPS and has several contraindications for procedure.   - starting vit K 10mg daily today, will start after 3 doses   - Spoke to Dr. Rhodes (Patient's gastroenterologist) [phone]  - [facility] is sending [facility]'s medical records   - Patient was seen  oncologist Dr. Gibson [phone]  - Denver cath drained 10/8 (3L).   >>Drained 10/11 2L    RENAL:   - D/C bicarb drip  - lokelma TID,  - low K diet,   - Follow up lytes.    - Correct as needed,  - f/u nephro consult   - Hyperkalemia (resolved) Kayexalate enema (PRN), insulin +dextrose +calcium gluconate if needed   -phos low  >>replenished kphos 15 mmol    INFECTIOUS DISEASE:   - Follow up cultures,   - peritoneal fluid analysis and culture, if + remove access  - cefepime decreased to 1g q 12 ; flagyl 500mg iv q8.   - D/C caspofungin (Fungitell neg)   - HepC viral load : 75.9 and reactive   - vanco x1  - blood cx : NGTD   - Peritoneal fluid cx : NG   - UA: negative   - Procal 15.60   - will repeat panculture if patient becomes febrile     HEMATOLOGICAL:    - Hold home Eliquis  - Per vascular - D/C AC and restart once Pt is stable. F/u OP for Duplex  - doppler LE   - transfuse Hgb >8  - serial HH  - on DVT prophylaxis with heparin sq   - S/p 2 units of PRBC 10/5     ENDOCRINE:    - check ketone (neg),  B hydroxy butyrate .2, ETOH level <10, serum , ASA <.3  - Follow up FS  - Insulin protocol if needed.   - keep -180.   - nutrition following  - on TPN, monitor for acidosis     MUSCULOSKELETAL: bed rest     Diet: TPN  GI: ppi  DVT: heparin subq  Activity: bedrest  Dispo: MICU IMPRESSION   Hepatic encephalopathy ( ammonia 188)  Decompensated liver cirrhosis. Has Denver catheter  Sepsis   Hematemesis ( esophageal/ gastric varices)  Acute portal Vein thrombosis   Hyperkalemia with sustained VT s/p shock, creat 0.9 was on aldactone  Hyponatremia likely from volume overload  Hepatocellular carcinoma with transaminitis on chemotherapy  Hepatitis C treated with INF  COPD  HAGMA      PLAN:    CNS: keep sedate as needed  - c/w Precedex  -mental status improving  - Pt awake. Retry SBT today.      HEENT: Oral care    PULMONARY:    - HOB @ 45 degrees  - taper O2  - F/u deep tracheal aspiration     CARDIOVASCULAR:   - Keep I < O ,   - cardiac ECHO done 10/06, f/u reads   - propranolol 10mg q 12 keep HR 50-60 (on hold)  - c/w levophed  - Keep bp above 100 systolic     GI:   - protonix 40mg BID  -octreotide infusion d/c per GI  - EGD - grade IV lower and mid 1/3 esophageal varices with 5 bands placed successfully.   - Lactulose bowel regimen aim 3 BM/day , (Will be giving enemas for now)  - Per Gi: place NG tube and start lactulose to titrate to 2-3 soft BM  >>holding for now pt mental status improving  - resume lactulose enema.   - rifaximin q 12, (on hold for now)  - IR was consulted: Pt is a poor candidate for TIPS and has several contraindications for procedure.   - starting vit K 10mg daily today, will start after 3 doses   - Spoke to Dr. Rhodes (Patient's gastroenterologist) [phone]  - [facility] is sending patient's medical records   - Patient was seen  oncologist Dr. Gibson [phone]  - Denver cath drained 10/8 (3L).   >>Drained 10/11 2L  >>Will drain 2L more today (10/12).     RENAL:   - D/C bicarb drip  - low K diet,   - Follow up lytes.    - Correct as needed,  - f/u nephro consult   - Hyperkalemia (resolved) Kayexalate enema (PRN), insulin +dextrose +calcium gluconate if needed   -phos low >>replenished kphos 15 mmol now 3.2 (10/12)    INFECTIOUS DISEASE:   - Follow up cultures,   - peritoneal fluid analysis and culture, if + remove access  - cefepime decreased to 1g q 12 ; flagyl 500mg iv q8.   - D/C caspofungin (Fungitell neg)   - HepC viral load : 75.9 and reactive   - vanco x1  - blood cx : NGTD   - Peritoneal fluid cx : NG   - UA: negative   - Procal 15.60   - will repeat panculture if patient becomes febrile     HEMATOLOGICAL:    - Hold home Eliquis  - Per vascular - D/C AC and restart once Pt is stable. F/u OP for Duplex  - doppler LE   - transfuse Hgb >8  - serial HH  - on DVT prophylaxis with heparin sq   - S/p 2 units of PRBC 10/5     ENDOCRINE:    - check ketone (neg),  B hydroxy butyrate .2, ETOH level <10, serum , ASA <.3  - Follow up FS  - Insulin protocol if needed.   - keep -180.   - nutrition following  - on TPN, monitor for acidosis     MUSCULOSKELETAL: bed rest     Diet: TPN  GI: ppi  DVT: heparin subq  Activity: bedrest  Dispo: MICU IMPRESSION   Hepatic encephalopathy ( ammonia 188)  Decompensated liver cirrhosis. Has Denver catheter  Sepsis   Hematemesis ( esophageal/ gastric varices)  Acute portal Vein thrombosis   Hyperkalemia with sustained VT s/p shock, creat 0.9 was on aldactone  Hyponatremia likely from volume overload  Hepatocellular carcinoma with transaminitis on chemotherapy  Hepatitis C treated with INF  COPD  HAGMA      PLAN:    CNS: keep sedate as needed  - c/w Precedex  -mental status improving  - Pt awake. Pt passed SBT yesterday however extubation held due to increased secretions.   - Retry SBT today.     HEENT: Oral care    PULMONARY:    - HOB @ 45 degrees  - taper O2  - F/u deep tracheal aspiration     CARDIOVASCULAR:   - Keep I < O ,   - cardiac ECHO done 10/06, f/u reads   - propranolol 10mg q 12 keep HR 50-60 (on hold)  - c/w levophed  - Keep bp above 100 systolic     GI:   - protonix 40mg BID  -octreotide infusion d/c per GI  - EGD - grade IV lower and mid 1/3 esophageal varices with 5 bands placed successfully.   - Lactulose bowel regimen aim 3 BM/day , (Will be giving enemas for now)  - Per Gi: place NG tube and start lactulose to titrate to 2-3 soft BM  >>holding for now pt mental status improving  - resume lactulose enema.   - rifaximin q 12, (on hold for now)  - IR was consulted: Pt is a poor candidate for TIPS and has several contraindications for procedure.   - starting vit K 10mg daily today, will start after 3 doses   - Spoke to Dr. Rhodes (Patient's gastroenterologist) [phone]  - [facility] is sending patient's medical records   - Patient was seen  oncologist Dr. Gibson [phone]  - Denver cath drained 10/8 (3L).   >>Drained 10/11 2L  >>Will drain 2L more today (10/12).     RENAL:   - D/C bicarb drip  - low K diet,   - Follow up lytes.    - Correct as needed,  - f/u nephro consult   - Hyperkalemia (resolved) Kayexalate enema (PRN), insulin +dextrose +calcium gluconate if needed   -phos low >>replenished kphos 15 mmol now 3.2 (10/12)    INFECTIOUS DISEASE:   - Follow up cultures,   - peritoneal fluid analysis and culture, if + remove access  - cefepime decreased to 1g q 12 ; flagyl 500mg iv q8.   - D/C caspofungin (Fungitell neg)   - HepC viral load : 75.9 and reactive   - vanco x1  - blood cx : NGTD   - Peritoneal fluid cx : NG   - UA: negative   - Procal 15.60   - will repeat panculture if patient becomes febrile     HEMATOLOGICAL:    - Hold home Eliquis  - Per vascular - D/C AC and restart once Pt is stable. F/u OP for Duplex  - doppler LE   - transfuse Hgb >8  - serial HH  - on DVT prophylaxis with heparin sq   - S/p 2 units of PRBC 10/5     ENDOCRINE:    - check ketone (neg),  B hydroxy butyrate .2, ETOH level <10, serum , ASA <.3  - Follow up FS  - Insulin protocol if needed.   - keep -180.   - nutrition following  - on TPN, monitor for acidosis     MUSCULOSKELETAL: bed rest     Diet: TPN  GI: ppi  DVT: heparin subq  Activity: bedrest  Dispo: MICU IMPRESSION   Hepatic encephalopathy ( ammonia 188)  Decompensated liver cirrhosis. Has Denver catheter  Sepsis   Hematemesis ( esophageal/ gastric varices)  Acute portal Vein thrombosis   Hyperkalemia with sustained VT s/p shock, creat 0.9 was on aldactone  Hyponatremia likely from volume overload  Hepatocellular carcinoma with transaminitis on chemotherapy  Hepatitis C treated with INF  COPD  HAGMA      PLAN:    CNS: keep sedate as needed  - c/w Precedex  -mental status improving  - Pt awake. Pt passed SBT yesterday however extubation held due to increased secretions.   - Retry SBT today.     HEENT: Oral care    PULMONARY:    - HOB @ 45 degrees  - taper O2  - F/u deep tracheal aspiration     CARDIOVASCULAR:   - Keep I < O ,   - cardiac ECHO done 10/06, f/u reads   - propranolol 10mg q 12 keep HR 50-60 (on hold)  - c/w levophed  - Keep bp above 100 systolic     GI:   - protonix 40mg BID  -octreotide infusion d/c per GI  - EGD - grade IV lower and mid 1/3 esophageal varices with 5 bands placed successfully.   - Lactulose bowel regimen aim 3 BM/day , (Will be giving enemas for now)  - Per Gi: place NG tube and start lactulose to titrate to 2-3 soft BM  >>holding for now pt mental status improving  - resume lactulose enema.   - rifaximin q 12, (on hold for now)  - IR was consulted: Pt is a poor candidate for TIPS and has several contraindications for procedure.   - starting vit K 10mg daily today, will start after 3 doses   - Spoke to Dr. Rhodes (Patient's gastroenterologist) [phone]  - [facility] is sending patient's medical records   - Patient was seen  oncologist Dr. Gibson [phone]  - [place] cath drained 10/8 (3L).   >>Drained 10/11 2L  >>Will drain 2L more today (10/12).     RENAL:   - D/C bicarb drip  - low K diet,   - Follow up lytes.    - Correct as needed,  - f/u nephro consult   - Hyperkalemia (resolved) Kayexalate enema (PRN), insulin +dextrose +calcium gluconate if needed   -phos low >>replenished kphos 15 mmol now 3.2 (10/12)    INFECTIOUS DISEASE:   - Follow up cultures,   - peritoneal fluid analysis and culture, if + remove access  - cefepime decreased to 1g q 12 ; flagyl 500mg iv q8.   - D/C caspofungin (Fungitell neg)   - HepC viral load : 75.9 and reactive   - vanco x1  - blood cx : NGTD   - Peritoneal fluid cx : NG   - UA: negative   - Procal 15.60   - will repeat panculture if patient becomes febrile     HEMATOLOGICAL:    - Hold home Eliquis  - Per vascular - D/C AC and restart once Pt is stable. F/u OP for Duplex  - doppler LE   - transfuse Hgb >8  - serial HH  - on DVT prophylaxis with heparin sq   - S/p 2 units of PRBC 10/5     ENDOCRINE:    - check ketone (neg),  B hydroxy butyrate .2, ETOH level <10, serum , ASA <.3  - Follow up FS  - Insulin protocol if needed.   - keep -180.   - nutrition following  - on TPN, monitor for acidosis     MUSCULOSKELETAL: bed rest     Diet: TPN; pending s/s  GI: ppi  DVT: heparin subq  Activity: bedrest  Dispo: MICU

## 2021-10-12 NOTE — PROGRESS NOTE ADULT - ASSESSMENT
IMPRESSION:    Acute resp failure sp intubatin  Hepatic encephalopathy  Decompensated liver cirrhosis. Has [place] catheter  Sepsis   Hematemesis ( esophageal/ gastric varices)  Acute portal Vein thrombosis   Hyperkalemia with sustained VT s/p shock  Hyponatremia likely from volume overload  Hepatocellular carcinoma with transaminitis on chemotherapy  Hepatitis C treated with INF  COPD  HAGMA  EBER worsening      PLAN:    CNS: SAT, if needed restart sedation      HEENT: Oral care    PULMONARY:  HOB @ 45 degrees  continue O2 as necessary to maintain sats 92 to 96%  cxr now if stable will proceed with weaning trial lower rate to 15     CARDIOVASCULAR: Keep I < O ,   propranolol 10 q 12 keep HR 50-60      GI: GI prophylaxis.  NPO, follow GI regarding feed and bowel regimen   lactulose enema   GI management / NGT  bowel regimen aim 3 BM/day ,   rifaximin q 12,       RENAL:   low K diet,   Follow up lytes.    Correct as needed,      INFECTIOUS DISEASE:   ABX PER ID      HEMATOLOGICAL:  dvt prophylaxis    ENDOCRINE:    Follow up FS.    Insulin protocol if needed.   keep -180.   avoid hypoglycemia    MUSCULOSKELETAL: bed rest     prognosis grave      palliative care eval f/u

## 2021-10-12 NOTE — PROGRESS NOTE ADULT - SUBJECTIVE AND OBJECTIVE BOX
Patient is a 66y old  Male who presents with a chief complaint of weakness  Hyperkalemia  VT (12 Oct 2021 07:42)    HPI:  67 yo male, with h/o HTN, drug abuse, Hepatitis C s/p INF, Liver cirrhosis s/p [place] shunt, COPD, DVT? on Eliquis, HCC since 2021 on chemotherapy, presented for weakness  Patient reports having weakness, decreased po intake since few weeks. He also has not been sleeping. He reports constipation and hematemesis for 1 day, minimal amount.  Denies fever, chills, chest pain, cough, sputum , SOB, diarrhea, dysuria  He has a [place] shunt  that was drained one day prior to admission    ED course : mental status alteration, sustained VT  bpm with BP 82/49 mmHg s/p shock + calcium gluconate  Patient has Hyperkalemia 6.8 CO2 14 s/p Bicarb drip, renal called, sp berry with good UO   (04 Oct 2021 06:15)     CCU course   10/05/2021: Pt had another episode of hematemesis overnight. Hgb was 5.5 from 7.9 this AM so Pt was given 2 units of blood. Hgb now stable at 9.8. BP was also low at 88/46 so Pt was given 1L NS bolus. Pt currently intubated.   10/06/2021 Patient had 5 episodes of melena overnight, patient remains hypotensive, patient is easily arousable. Patient was febrile over night. SAT and SBT was tried. Patient was able to stay off sedation through out the day but was lethargic Failed SBT   10/7/2021: Patient is now having brown stool. Patient became agitated overnight and needed to be sedated overnight. Will be retry SAT then SBT today.  10/8/2021: 3L drained from [place] cath.  10/11/2021: 2 L drained from abdominal cath, pt mental status improving, no NG tube for HE at this time, will reassess      INTERVAL HPI/OVERNIGHT EVENTS:   No overnight events. Pt denies any pain. Pt alert and following commands. Pt currently intubated.   Afebrile, hemodynamically stable     Subjective:    ICU Vital Signs Last 24 Hrs  T(C): 37.3 (12 Oct 2021 08:00), Max: 37.6 (12 Oct 2021 04:00)  T(F): 99.1 (12 Oct 2021 08:00), Max: 99.6 (12 Oct 2021 04:00)  HR: 74 (12 Oct 2021 08:00) (70 - 100)  BP: 90/64 (12 Oct 2021 08:00) (80/53 - 100/66)  BP(mean): 71 (12 Oct 2021 08:00) (60 - 77)  ABP: --  ABP(mean): --  RR: 21 (12 Oct 2021 08:00) (8 - 29)  SpO2: 99% (12 Oct 2021 08:00) (95% - 99%)    I&O's Summary    11 Oct 2021 07:01  -  12 Oct 2021 07:00  --------------------------------------------------------  IN: 3426.1 mL / OUT: 3110 mL / NET: 316.1 mL    12 Oct 2021 07:01  -  12 Oct 2021 09:04  --------------------------------------------------------  IN: 186 mL / OUT: 40 mL / NET: 146 mL      Mode: AC/ CMV (Assist Control/ Continuous Mandatory Ventilation)  RR (machine): 14  TV (machine): 420  FiO2: 30  PEEP: 5  ITime: 1  MAP: 11  PIP: 22      Daily     Daily Weight in k.6 (12 Oct 2021 06:00)    Adult Advanced Hemodynamics Last 24 Hrs  CVP(mm Hg): --  CVP(cm H2O): --  CO: --  CI: --  PA: --  PA(mean): --  PCWP: --  SVR: --  SVRI: --  PVR: --  PVRI: --    EKG/Telemetry Events:    MEDICATIONS  (STANDING):  budesonide  80 MICROgram(s)/formoterol 4.5 MICROgram(s) Inhaler 2 Puff(s) Inhalation two times a day  cefepime   IVPB 1000 milliGRAM(s) IV Intermittent every 12 hours  chlorhexidine 0.12% Liquid 15 milliLiter(s) Oral Mucosa two times a day  chlorhexidine 4% Liquid 1 Application(s) Topical daily  dexMEDEtomidine Infusion 0.2 MICROgram(s)/kG/Hr (3.79 mL/Hr) IV Continuous <Continuous>  dextrose 40% Gel 15 Gram(s) Oral once  dextrose 5%. 1000 milliLiter(s) (50 mL/Hr) IV Continuous <Continuous>  dextrose 5%. 1000 milliLiter(s) (100 mL/Hr) IV Continuous <Continuous>  dextrose 50% Injectable 25 Gram(s) IV Push once  dextrose 50% Injectable 12.5 Gram(s) IV Push once  dextrose 50% Injectable 25 Gram(s) IV Push once  fentaNYL   Infusion. 0.5 MICROgram(s)/kG/Hr (3.79 mL/Hr) IV Continuous <Continuous>  glucagon  Injectable 1 milliGRAM(s) IntraMuscular once  heparin   Injectable 5000 Unit(s) SubCutaneous every 8 hours  insulin glargine Injectable (LANTUS) 20 Unit(s) SubCutaneous at bedtime  insulin lispro (ADMELOG) corrective regimen sliding scale   SubCutaneous three times a day before meals  insulin lispro Injectable (ADMELOG) 5 Unit(s) SubCutaneous three times a day before meals  lactulose Retention Enema 200 Gram(s) Rectal three times a day  metroNIDAZOLE  IVPB 500 milliGRAM(s) IV Intermittent every 8 hours  metroNIDAZOLE  IVPB      norepinephrine Infusion 0.05 MICROgram(s)/kG/Min (7.01 mL/Hr) IV Continuous <Continuous>  pantoprazole  Injectable 40 milliGRAM(s) IV Push every 12 hours  Parenteral Nutrition - Adult 1 Each (50 mL/Hr) TPN Continuous <Continuous>    MEDICATIONS  (PRN):      PHYSICAL EXAM:  GENERAL:   HEAD:  Atraumatic, Normocephalic  EYES: EOMI, PERRLA, conjunctiva and sclera clear  NECK: Supple, No JVD, Normal thyroid, no enlarged nodes  NERVOUS SYSTEM:  Alert & Awake.   CHEST/LUNG: B/L good air entry; No rales, rhonchi, or wheezing  HEART: S1S2 normal, no S3, Regular rate and rhythm; No murmurs  ABDOMEN: Soft, Nontender, distended; Bowel sounds present  EXTREMITIES:  2+ Peripheral Pulses, No clubbing, cyanosis, or edema  LYMPH: No lymphadenopathy noted  SKIN: No rashes or lesions    LABS:                        8.9    19.27 )-----------( 160      ( 12 Oct 2021 04:35 )             27.3     10-12    142  |  107  |  102<HH>  ----------------------------<  137<H>  4.1   |  25  |  1.4    Ca    7.4<L>      12 Oct 2021 04:35  Phos  3.2     10-12  Mg     2.5     10-12    TPro  4.9<L>  /  Alb  1.4<L>  /  TBili  0.9  /  DBili  0.5<H>  /  AST  42<H>  /  ALT  71<H>  /  AlkPhos  232<H>  1012    LIVER FUNCTIONS - ( 12 Oct 2021 04:35 )  Alb: 1.4 g/dL / Pro: 4.9 g/dL / ALK PHOS: 232 U/L / ALT: 71 U/L / AST: 42 U/L / GGT: x           PT/INR - ( 12 Oct 2021 04:35 )   PT: 21.20 sec;   INR: 1.85 ratio         PTT - ( 11 Oct 2021 05:30 )  PTT:30.9 sec  CAPILLARY BLOOD GLUCOSE      POCT Blood Glucose.: 142 mg/dL (12 Oct 2021 05:36)  POCT Blood Glucose.: 112 mg/dL (11 Oct 2021 21:22)  POCT Blood Glucose.: 174 mg/dL (11 Oct 2021 17:13)  POCT Blood Glucose.: 164 mg/dL (11 Oct 2021 11:12)    ABG - ( 12 Oct 2021 03:51 )  pH, Arterial: 7.50  pH, Blood: x     /  pCO2: 36    /  pO2: 83    / HCO3: 28    / Base Excess: 4.9   /  SaO2: 98.7                          RADIOLOGY & ADDITIONAL TESTS:  CXR:        Care Discussed with Consultants/Other Providers [ x] YES  [ ] NO           Patient is a 66y old  Male who presents with a chief complaint of weakness  Hyperkalemia  VT (12 Oct 2021 07:42)    HPI:  65 yo male, with h/o HTN, drug abuse, Hepatitis C s/p INF, Liver cirrhosis s/p [place] shunt, COPD, DVT? on Eliquis, HCC since 2021 on chemotherapy, presented for weakness  Patient reports having weakness, decreased po intake since few weeks. He also has not been sleeping. He reports constipation and hematemesis for 1 day, minimal amount.  Denies fever, chills, chest pain, cough, sputum , SOB, diarrhea, dysuria  He has a [place] shunt  that was drained one day prior to admission    ED course : mental status alteration, sustained VT  bpm with BP 82/49 mmHg s/p shock + calcium gluconate  Patient has Hyperkalemia 6.8 CO2 14 s/p Bicarb drip, renal called, sp berry with good UO   (04 Oct 2021 06:15)     CCU course   10/05/2021: Pt had another episode of hematemesis overnight. Hgb was 5.5 from 7.9 this AM so Pt was given 2 units of blood. Hgb now stable at 9.8. BP was also low at 88/46 so Pt was given 1L NS bolus. Pt currently intubated.   10/06/2021 Patient had 5 episodes of melena overnight, patient remains hypotensive, patient is easily arousable. Patient was febrile over night. SAT and SBT was tried. Patient was able to stay off sedation through out the day but was lethargic Failed SBT   10/7/2021: Patient is now having brown stool. Patient became agitated overnight and needed to be sedated overnight. Will be retry SAT then SBT today.  10/8/2021: 3L drained from [place] cath.  10/11/2021: 2 L drained from abdominal cath, pt mental status improving, no NG tube for HE at this time, will reassess. Pt passed SBT but had excess secretions  10/12/2021: 2 L drained from [place] cath, pt mental status improved, pending speech and swallow eval for starting PO diet, lactulose enema for now until s/s recs, pt also has DTPI to tip of coccyx, wound care recs ordered      INTERVAL HPI/OVERNIGHT EVENTS:   No overnight events. Pt denies any pain. Pt alert and following commands. Pt currently intubated.   Afebrile, hemodynamically stable     Subjective:    ICU Vital Signs Last 24 Hrs  T(C): 37.3 (12 Oct 2021 08:00), Max: 37.6 (12 Oct 2021 04:00)  T(F): 99.1 (12 Oct 2021 08:00), Max: 99.6 (12 Oct 2021 04:00)  HR: 74 (12 Oct 2021 08:00) (70 - 100)  BP: 90/64 (12 Oct 2021 08:00) (80/53 - 100/66)  BP(mean): 71 (12 Oct 2021 08:00) (60 - 77)  ABP: --  ABP(mean): --  RR: 21 (12 Oct 2021 08:00) (8 - 29)  SpO2: 99% (12 Oct 2021 08:00) (95% - 99%)    I&O's Summary    11 Oct 2021 07:01  -  12 Oct 2021 07:00  --------------------------------------------------------  IN: 3426.1 mL / OUT: 3110 mL / NET: 316.1 mL    12 Oct 2021 07:01  -  12 Oct 2021 09:04  --------------------------------------------------------  IN: 186 mL / OUT: 40 mL / NET: 146 mL      Mode: AC/ CMV (Assist Control/ Continuous Mandatory Ventilation)  RR (machine): 14  TV (machine): 420  FiO2: 30  PEEP: 5  ITime: 1  MAP: 11  PIP: 22      Daily     Daily Weight in k.6 (12 Oct 2021 06:00)    Adult Advanced Hemodynamics Last 24 Hrs  CVP(mm Hg): --  CVP(cm H2O): --  CO: --  CI: --  PA: --  PA(mean): --  PCWP: --  SVR: --  SVRI: --  PVR: --  PVRI: --    EKG/Telemetry Events:    MEDICATIONS  (STANDING):  budesonide  80 MICROgram(s)/formoterol 4.5 MICROgram(s) Inhaler 2 Puff(s) Inhalation two times a day  cefepime   IVPB 1000 milliGRAM(s) IV Intermittent every 12 hours  chlorhexidine 0.12% Liquid 15 milliLiter(s) Oral Mucosa two times a day  chlorhexidine 4% Liquid 1 Application(s) Topical daily  dexMEDEtomidine Infusion 0.2 MICROgram(s)/kG/Hr (3.79 mL/Hr) IV Continuous <Continuous>  dextrose 40% Gel 15 Gram(s) Oral once  dextrose 5%. 1000 milliLiter(s) (50 mL/Hr) IV Continuous <Continuous>  dextrose 5%. 1000 milliLiter(s) (100 mL/Hr) IV Continuous <Continuous>  dextrose 50% Injectable 25 Gram(s) IV Push once  dextrose 50% Injectable 12.5 Gram(s) IV Push once  dextrose 50% Injectable 25 Gram(s) IV Push once  fentaNYL   Infusion. 0.5 MICROgram(s)/kG/Hr (3.79 mL/Hr) IV Continuous <Continuous>  glucagon  Injectable 1 milliGRAM(s) IntraMuscular once  heparin   Injectable 5000 Unit(s) SubCutaneous every 8 hours  insulin glargine Injectable (LANTUS) 20 Unit(s) SubCutaneous at bedtime  insulin lispro (ADMELOG) corrective regimen sliding scale   SubCutaneous three times a day before meals  insulin lispro Injectable (ADMELOG) 5 Unit(s) SubCutaneous three times a day before meals  lactulose Retention Enema 200 Gram(s) Rectal three times a day  metroNIDAZOLE  IVPB 500 milliGRAM(s) IV Intermittent every 8 hours  metroNIDAZOLE  IVPB      norepinephrine Infusion 0.05 MICROgram(s)/kG/Min (7.01 mL/Hr) IV Continuous <Continuous>  pantoprazole  Injectable 40 milliGRAM(s) IV Push every 12 hours  Parenteral Nutrition - Adult 1 Each (50 mL/Hr) TPN Continuous <Continuous>    MEDICATIONS  (PRN):      PHYSICAL EXAM:  GENERAL:   HEAD:  Atraumatic, Normocephalic  EYES: EOMI, PERRLA, conjunctiva and sclera clear  NECK: Supple, No JVD, Normal thyroid, no enlarged nodes  NERVOUS SYSTEM:  Alert & Awake.   CHEST/LUNG: B/L good air entry; No rales, rhonchi, or wheezing  HEART: S1S2 normal, no S3, Regular rate and rhythm; No murmurs  ABDOMEN: Soft, Nontender, distended; Bowel sounds present  EXTREMITIES:  2+ Peripheral Pulses, No clubbing, cyanosis, or edema  LYMPH: No lymphadenopathy noted  SKIN: No rashes or lesions    LABS:                        8.9    19.27 )-----------( 160      ( 12 Oct 2021 04:35 )             27.3     10-    142  |  107  |  102<HH>  ----------------------------<  137<H>  4.1   |  25  |  1.4    Ca    7.4<L>      12 Oct 2021 04:35  Phos  3.2     10-12  Mg     2.5     10-12    TPro  4.9<L>  /  Alb  1.4<L>  /  TBili  0.9  /  DBili  0.5<H>  /  AST  42<H>  /  ALT  71<H>  /  AlkPhos  232<H>  1012    LIVER FUNCTIONS - ( 12 Oct 2021 04:35 )  Alb: 1.4 g/dL / Pro: 4.9 g/dL / ALK PHOS: 232 U/L / ALT: 71 U/L / AST: 42 U/L / GGT: x           PT/INR - ( 12 Oct 2021 04:35 )   PT: 21.20 sec;   INR: 1.85 ratio         PTT - ( 11 Oct 2021 05:30 )  PTT:30.9 sec  CAPILLARY BLOOD GLUCOSE      POCT Blood Glucose.: 142 mg/dL (12 Oct 2021 05:36)  POCT Blood Glucose.: 112 mg/dL (11 Oct 2021 21:22)  POCT Blood Glucose.: 174 mg/dL (11 Oct 2021 17:13)  POCT Blood Glucose.: 164 mg/dL (11 Oct 2021 11:12)    ABG - ( 12 Oct 2021 03:51 )  pH, Arterial: 7.50  pH, Blood: x     /  pCO2: 36    /  pO2: 83    / HCO3: 28    / Base Excess: 4.9   /  SaO2: 98.7                          RADIOLOGY & ADDITIONAL TESTS:  CXR:        Care Discussed with Consultants/Other Providers [ x] YES  [ ] NO

## 2021-10-12 NOTE — PROGRESS NOTE ADULT - SUBJECTIVE AND OBJECTIVE BOX
Patient is a 66y old  Male who presents with a chief complaint of weakness  Hyperkalemia  VT (11 Oct 2021 12:41)      Over Night Events:  Patient seen and examined. still vented   s/p cardioversion upon admission   s/p EGD before   h/h stable   still vented   ob [id number]  ROS:  See HPI    PHYSICAL EXAM    ICU Vital Signs Last 24 Hrs  T(C): 37.6 (12 Oct 2021 04:00), Max: 37.6 (12 Oct 2021 04:00)  T(F): 99.6 (12 Oct 2021 04:00), Max: 99.6 (12 Oct 2021 04:00)  HR: 81 (12 Oct 2021 07:26) (70 - 100)  BP: 94/58 (12 Oct 2021 07:00) (80/53 - 100/66)  BP(mean): 67 (12 Oct 2021 07:00) (60 - 77)  ABP: --  ABP(mean): --  RR: 20 (12 Oct 2021 07:00) (8 - 29)  SpO2: 99% (12 Oct 2021 07:26) (95% - 99%)      General: Awake   HEENT:  et tub e            Lymph Nodes: NO cervical LN   Lungs: Bilateral BS  Cardiovascular: Regular   Abdomen: Soft, Positive BS  Extremities: No clubbing   Skin: warm   Neurological: follow command  Musculoskeletal: move all ext     I&O's Detail    11 Oct 2021 07:01  -  12 Oct 2021 07:00  --------------------------------------------------------  IN:    Dexmedetomidine: 328 mL    Fat Emulsion (Fish Oil &amp; Plant Based) 20% Infusion: 219.1 mL    IV PiggyBack: 550 mL    Lactated Ringers: 375 mL    Norepinephrine: 274 mL    TPN (Total Parenteral Nutrition): 1680 mL  Total IN: 3426.1 mL    OUT:    Drain (mL): 2000 mL    FentaNYL: 0 mL    Indwelling Catheter - Urethral (mL): 1075 mL  Total OUT: 3075 mL    Total NET: 351.1 mL          LABS:                          8.9    19.27 )-----------( 160      ( 12 Oct 2021 04:35 )             27.3         12 Oct 2021 04:35    142    |  107    |  102    ----------------------------<  137    4.1     |  25     |  1.4      Ca    7.4        12 Oct 2021 04:35  Phos  3.2       12 Oct 2021 04:35  Mg     2.5       12 Oct 2021 04:35    TPro  4.9    /  Alb  1.4    /  TBili  0.9    /  DBili  0.5    /  AST  42     /  ALT  71     /  AlkPhos  232    12 Oct 2021 04:35  Amylase x     lipase x                                                 PT/INR - ( 12 Oct 2021 04:35 )   PT: 21.20 sec;   INR: 1.85 ratio         PTT - ( 11 Oct 2021 05:30 )  PTT:30.9 sec                                           Lactate, Blood: 2.0 mmol/L (10-11-21 @ 05:50)  Lactate, Blood: 1.5 mmol/L (10-10-21 @ 04:30)                                                                                                       Mode: AC/ CMV (Assist Control/ Continuous Mandatory Ventilation)  RR (machine): 18  TV (machine): 420  FiO2: 30  PEEP: 5  ITime: 1  MAP: 11  PIP: 22                                      ABG - ( 12 Oct 2021 03:51 )  pH, Arterial: 7.50  pH, Blood: x     /  pCO2: 36    /  pO2: 83    / HCO3: 28    / Base Excess: 4.9   /  SaO2: 98.7                MEDICATIONS  (STANDING):  budesonide  80 MICROgram(s)/formoterol 4.5 MICROgram(s) Inhaler 2 Puff(s) Inhalation two times a day  cefepime   IVPB 1000 milliGRAM(s) IV Intermittent every 12 hours  chlorhexidine 0.12% Liquid 15 milliLiter(s) Oral Mucosa two times a day  chlorhexidine 4% Liquid 1 Application(s) Topical daily  dexMEDEtomidine Infusion 0.2 MICROgram(s)/kG/Hr (3.79 mL/Hr) IV Continuous <Continuous>  dextrose 40% Gel 15 Gram(s) Oral once  dextrose 5%. 1000 milliLiter(s) (50 mL/Hr) IV Continuous <Continuous>  dextrose 5%. 1000 milliLiter(s) (100 mL/Hr) IV Continuous <Continuous>  dextrose 50% Injectable 25 Gram(s) IV Push once  dextrose 50% Injectable 12.5 Gram(s) IV Push once  dextrose 50% Injectable 25 Gram(s) IV Push once  fentaNYL   Infusion. 0.5 MICROgram(s)/kG/Hr (3.79 mL/Hr) IV Continuous <Continuous>  glucagon  Injectable 1 milliGRAM(s) IntraMuscular once  heparin   Injectable 5000 Unit(s) SubCutaneous every 8 hours  insulin glargine Injectable (LANTUS) 20 Unit(s) SubCutaneous at bedtime  insulin lispro (ADMELOG) corrective regimen sliding scale   SubCutaneous three times a day before meals  insulin lispro Injectable (ADMELOG) 5 Unit(s) SubCutaneous three times a day before meals  metroNIDAZOLE  IVPB      metroNIDAZOLE  IVPB 500 milliGRAM(s) IV Intermittent every 8 hours  norepinephrine Infusion 0.05 MICROgram(s)/kG/Min (7.01 mL/Hr) IV Continuous <Continuous>  pantoprazole  Injectable 40 milliGRAM(s) IV Push every 12 hours  Parenteral Nutrition - Adult 1 Each (50 mL/Hr) TPN Continuous <Continuous>    MEDICATIONS  (PRN):          Xrays:  TLC:  OG:  ET tube:                                                                                    pending    ECHO:  CAM ICU:

## 2021-10-12 NOTE — CHART NOTE - NSCHARTNOTEFT_GEN_A_CORE
Palliative care sign off note    As goals are clear at this time, palliative care will sign off.    Call back [phone] PRN

## 2021-10-12 NOTE — SWALLOW BEDSIDE ASSESSMENT ADULT - SLP GENERAL OBSERVATIONS
pt awake generally alert. No c/o pain. + baseline productive cough. thick yellowish secretions suctioned via yankauer. Dysphonic vocal quality

## 2021-10-13 LAB
ALBUMIN SERPL ELPH-MCNC: 1.5 G/DL — LOW (ref 3.5–5.2)
ALP SERPL-CCNC: 233 U/L — HIGH (ref 30–115)
ALT FLD-CCNC: 60 U/L — HIGH (ref 0–41)
ANION GAP SERPL CALC-SCNC: 17 MMOL/L — HIGH (ref 7–14)
AST SERPL-CCNC: 50 U/L — HIGH (ref 0–41)
BILIRUB DIRECT SERPL-MCNC: 0.7 MG/DL — HIGH (ref 0–0.2)
BILIRUB INDIRECT FLD-MCNC: 0.3 MG/DL — SIGNIFICANT CHANGE UP (ref 0.2–1.2)
BILIRUB SERPL-MCNC: 1 MG/DL — SIGNIFICANT CHANGE UP (ref 0.2–1.2)
BUN SERPL-MCNC: 108 MG/DL — CRITICAL HIGH (ref 10–20)
CALCIUM SERPL-MCNC: 7.4 MG/DL — LOW (ref 8.5–10.1)
CHLORIDE SERPL-SCNC: 107 MMOL/L — SIGNIFICANT CHANGE UP (ref 98–110)
CO2 SERPL-SCNC: 18 MMOL/L — SIGNIFICANT CHANGE UP (ref 17–32)
CREAT SERPL-MCNC: 1.6 MG/DL — HIGH (ref 0.7–1.5)
GLUCOSE BLDC GLUCOMTR-MCNC: 139 MG/DL — HIGH (ref 70–99)
GLUCOSE BLDC GLUCOMTR-MCNC: 181 MG/DL — HIGH (ref 70–99)
GLUCOSE BLDC GLUCOMTR-MCNC: 186 MG/DL — HIGH (ref 70–99)
GLUCOSE BLDC GLUCOMTR-MCNC: 205 MG/DL — HIGH (ref 70–99)
GLUCOSE SERPL-MCNC: 195 MG/DL — HIGH (ref 70–99)
GRAM STN FLD: SIGNIFICANT CHANGE UP
HCT VFR BLD CALC: 30.1 % — LOW (ref 42–52)
HGB BLD-MCNC: 9.6 G/DL — LOW (ref 14–18)
INR BLD: 2.04 RATIO — HIGH (ref 0.65–1.3)
MAGNESIUM SERPL-MCNC: 2.5 MG/DL — HIGH (ref 1.8–2.4)
MCHC RBC-ENTMCNC: 29.4 PG — SIGNIFICANT CHANGE UP (ref 27–31)
MCHC RBC-ENTMCNC: 31.9 G/DL — LOW (ref 32–37)
MCV RBC AUTO: 92.3 FL — SIGNIFICANT CHANGE UP (ref 80–94)
NRBC # BLD: 0 /100 WBCS — SIGNIFICANT CHANGE UP (ref 0–0)
PHOSPHATE SERPL-MCNC: 4.3 MG/DL — SIGNIFICANT CHANGE UP (ref 2.1–4.9)
PLATELET # BLD AUTO: 150 K/UL — SIGNIFICANT CHANGE UP (ref 130–400)
POTASSIUM SERPL-MCNC: 4.5 MMOL/L — SIGNIFICANT CHANGE UP (ref 3.5–5)
POTASSIUM SERPL-SCNC: 4.5 MMOL/L — SIGNIFICANT CHANGE UP (ref 3.5–5)
PROT SERPL-MCNC: 5.3 G/DL — LOW (ref 6–8)
PROTHROM AB SERPL-ACNC: 23.3 SEC — HIGH (ref 9.95–12.87)
RBC # BLD: 3.26 M/UL — LOW (ref 4.7–6.1)
RBC # FLD: 23.3 % — HIGH (ref 11.5–14.5)
SODIUM SERPL-SCNC: 142 MMOL/L — SIGNIFICANT CHANGE UP (ref 135–146)
SPECIMEN SOURCE: SIGNIFICANT CHANGE UP
WBC # BLD: 22.34 K/UL — HIGH (ref 4.8–10.8)
WBC # FLD AUTO: 22.34 K/UL — HIGH (ref 4.8–10.8)

## 2021-10-13 PROCEDURE — 71045 X-RAY EXAM CHEST 1 VIEW: CPT | Mod: 26

## 2021-10-13 PROCEDURE — 99232 SBSQ HOSP IP/OBS MODERATE 35: CPT

## 2021-10-13 PROCEDURE — 99233 SBSQ HOSP IP/OBS HIGH 50: CPT

## 2021-10-13 RX ORDER — ELECTROLYTE SOLUTION,INJ
1 VIAL (ML) INTRAVENOUS
Refills: 0 | Status: DISCONTINUED | OUTPATIENT
Start: 2021-10-13 | End: 2021-10-13

## 2021-10-13 RX ORDER — CEFTRIAXONE 500 MG/1
1000 INJECTION, POWDER, FOR SOLUTION INTRAMUSCULAR; INTRAVENOUS EVERY 24 HOURS
Refills: 0 | Status: COMPLETED | OUTPATIENT
Start: 2021-10-13 | End: 2021-10-19

## 2021-10-13 RX ADMIN — BUDESONIDE AND FORMOTEROL FUMARATE DIHYDRATE 2 PUFF(S): 160; 4.5 AEROSOL RESPIRATORY (INHALATION) at 08:00

## 2021-10-13 RX ADMIN — Medication 1: at 07:59

## 2021-10-13 RX ADMIN — Medication 1: at 12:27

## 2021-10-13 RX ADMIN — CHLORHEXIDINE GLUCONATE 15 MILLILITER(S): 213 SOLUTION TOPICAL at 05:25

## 2021-10-13 RX ADMIN — PANTOPRAZOLE SODIUM 40 MILLIGRAM(S): 20 TABLET, DELAYED RELEASE ORAL at 17:28

## 2021-10-13 RX ADMIN — HEPARIN SODIUM 5000 UNIT(S): 5000 INJECTION INTRAVENOUS; SUBCUTANEOUS at 05:27

## 2021-10-13 RX ADMIN — CEFTRIAXONE 100 MILLIGRAM(S): 500 INJECTION, POWDER, FOR SOLUTION INTRAMUSCULAR; INTRAVENOUS at 12:28

## 2021-10-13 RX ADMIN — Medication 5 UNIT(S): at 17:26

## 2021-10-13 RX ADMIN — LACTULOSE 200 GRAM(S): 10 SOLUTION ORAL at 05:26

## 2021-10-13 RX ADMIN — LACTULOSE 15 GRAM(S): 10 SOLUTION ORAL at 21:28

## 2021-10-13 RX ADMIN — Medication 1 EACH: at 20:01

## 2021-10-13 RX ADMIN — HEPARIN SODIUM 5000 UNIT(S): 5000 INJECTION INTRAVENOUS; SUBCUTANEOUS at 21:29

## 2021-10-13 RX ADMIN — LACTULOSE 15 GRAM(S): 10 SOLUTION ORAL at 14:21

## 2021-10-13 RX ADMIN — CEFEPIME 100 MILLIGRAM(S): 1 INJECTION, POWDER, FOR SOLUTION INTRAMUSCULAR; INTRAVENOUS at 05:22

## 2021-10-13 RX ADMIN — BUDESONIDE AND FORMOTEROL FUMARATE DIHYDRATE 2 PUFF(S): 160; 4.5 AEROSOL RESPIRATORY (INHALATION) at 21:29

## 2021-10-13 RX ADMIN — INSULIN GLARGINE 20 UNIT(S): 100 INJECTION, SOLUTION SUBCUTANEOUS at 21:30

## 2021-10-13 RX ADMIN — Medication 5 UNIT(S): at 07:59

## 2021-10-13 RX ADMIN — Medication 5 UNIT(S): at 12:26

## 2021-10-13 RX ADMIN — Medication 2: at 17:27

## 2021-10-13 RX ADMIN — PANTOPRAZOLE SODIUM 40 MILLIGRAM(S): 20 TABLET, DELAYED RELEASE ORAL at 05:26

## 2021-10-13 RX ADMIN — Medication 100 MILLIGRAM(S): at 05:22

## 2021-10-13 RX ADMIN — HEPARIN SODIUM 5000 UNIT(S): 5000 INJECTION INTRAVENOUS; SUBCUTANEOUS at 14:20

## 2021-10-13 RX ADMIN — CHLORHEXIDINE GLUCONATE 1 APPLICATION(S): 213 SOLUTION TOPICAL at 21:29

## 2021-10-13 RX ADMIN — Medication 100 MILLIGRAM(S): at 14:21

## 2021-10-13 NOTE — PROGRESS NOTE ADULT - ASSESSMENT
IMPRESSION   Hepatic encephalopathy ( ammonia 188)  Decompensated liver cirrhosis. Has [place] catheter  Sepsis   Hematemesis ( esophageal/ gastric varices)  Acute portal Vein thrombosis   Hyperkalemia with sustained VT s/p shock, creat 0.9 was on aldactone  Hyponatremia likely from volume overload  Hepatocellular carcinoma with transaminitis on chemotherapy  Hepatitis C treated with INF  COPD  HAGMA      PLAN:    CNS: keep sedate as needed  - c/w Precedex  -mental status improving  -pt extubated    HEENT: Oral care    PULMONARY:    - HOB @ 45 degrees  - taper O2  - F/u deep tracheal aspiration     CARDIOVASCULAR:   - Keep I < O ,   - cardiac ECHO done 10/06, f/u reads   - propranolol 10mg q 12 keep HR 50-60 (on hold)  - c/w levophed  - Keep bp above 100 systolic   -consistently tachycardic (likely secondary to decompensated liver cirrhosis)    GI:   - protonix 40mg BID  -octreotide infusion d/c per GI  - EGD - grade IV lower and mid 1/3 esophageal varices with 5 bands placed successfully.   - Lactulose bowel regimen aim 3 BM/day  - rifaximin 550 q 12,  - IR was consulted: Pt is a poor candidate for TIPS and has several contraindications for procedure.   - starting vit K 10mg daily today, will start after 3 doses   - Spoke to Dr. Rhodes (Patient's gastroenterologist) [phone]  - [facility] patient's medical records   - Patient was seen bu oncologist Dr. Gibson [phone]  >>reached out to Dr Gibson several times today, called service, waiting on call back will continue to reach out  >>>need goc conversation preferably through oncologist; pt endorsing stopping all treatment  - [place] cath drained 10/8 (3L).   >>Drained 10/11 2L  >>Will drain 2L more today (10/12).     RENAL:   - D/C bicarb drip  - low K diet,   - Follow up lytes.    - Correct as needed,  - f/u nephro consult   - Hyperkalemia (resolved) Kayexalate enema (PRN), insulin +dextrose +calcium gluconate if needed   -phos low >>replenished kphos 15 mmol now 3.2 (10/12)    INFECTIOUS DISEASE:   - Follow up cultures,   - peritoneal fluid analysis and culture, if + remove access  - cefepime decreased to 1g q 12 ; flagyl 500mg iv q8.   - D/C caspofungin (Fungitell neg)   - HepC viral load : 75.9 and reactive   - vanco x1  - blood cx : NGTD   - Peritoneal fluid cx : NG   - UA: negative   - Procal 15.60   - will repeat panculture if patient becomes febrile     HEMATOLOGICAL:    - Hold home Eliquis  - Per vascular - D/C AC and restart once Pt is stable. F/u OP for Duplex  - doppler LE   - transfuse Hgb >8  - serial HH  - on DVT prophylaxis with heparin sq   - S/p 2 units of PRBC 10/5     ENDOCRINE:    - check ketone (neg),  B hydroxy butyrate .2, ETOH level <10, serum , ASA <.3  - Follow up FS  - Insulin protocol if needed.   - keep -180.   - nutrition following  - on TPN, monitor for acidosis     MUSCULOSKELETAL: bed rest     Diet: TPN; pending s/s  GI: ppi  DVT: heparin subq  Activity: bedrest  Dispo: MICU IMPRESSION   Hepatic encephalopathy ( ammonia 188)  Decompensated liver cirrhosis. Has [place] catheter  Sepsis   Hematemesis ( esophageal/ gastric varices)  Acute portal Vein thrombosis   Hyperkalemia with sustained VT s/p shock, creat 0.9 was on aldactone  Hyponatremia likely from volume overload  Hepatocellular carcinoma with transaminitis on chemotherapy  Hepatitis C treated with INF  COPD  HAGMA      PLAN:    CNS:   -pt extubated    HEENT: Oral care    PULMONARY:    - HOB @ 45 degrees  - taper O2  - F/u deep tracheal aspiration   >>moderate gram negative rods    CARDIOVASCULAR:   - Keep I < O ,   - cardiac ECHO done 10/06, f/u reads   - propranolol 10mg q 12 keep HR 50-60 holding parameters  - c/w levophed  - Keep bp above 100 systolic   -consistently tachycardic (likely secondary to decompensated liver cirrhosis)    GI:   - protonix 40mg BID  -octreotide infusion d/c per GI  - EGD - grade IV lower and mid 1/3 esophageal varices with 5 bands placed successfully.   - Lactulose bowel regimen aim 3 BM/day  - rifaximin 550 q 12,  - IR was consulted: Pt is a poor candidate for TIPS and has several contraindications for procedure.   - starting vit K 10mg daily today, will start after 3 doses   - Spoke to Dr. Rhodes (Patient's gastroenterologist) [phone]  - [facility] is sending patient's medical records   - Patient was seen by oncologist Dr. Gibson [phone]  >>reached out to Dr Gibson several times today, called service, waiting on call back will continue to reach out  >>>need goc conversation preferably through oncologist; pt endorsing stopping all treatment  - [place] cath drained 7L total last on 10/12    RENAL:   - D/C bicarb drip  - low K diet,   - Follow up lytes.    - Correct as needed,  - f/u nephro consult   - Hyperkalemia (resolved) Kayexalate enema (PRN), insulin +dextrose +calcium gluconate if needed   -phos low >>replenished kphos 15 mmol now 3.2 (10/12)    INFECTIOUS DISEASE:   - Follow up cultures,   - peritoneal fluid analysis and culture, if + remove access  - cefepime decreased to 1g q 12 ; flagyl 500mg iv q8.   - D/C caspofungin (Fungitell neg)   - HepC viral load : 75.9 and reactive   - vanco x1  - blood cx : NGTD   - Peritoneal fluid cx : NG   >>resend studies with next tap  - UA: negative   - Procal 15.60   - WBC trending up; f/u CE, bcx, ucx    HEMATOLOGICAL:    - Hold home Eliquis  - Per vascular - D/C AC and restart once Pt is stable. F/u OP for Duplex  - doppler LE   - transfuse Hgb >8  - serial HH  - on DVT prophylaxis with heparin sq   - S/p 2 units of PRBC 10/5     ENDOCRINE:    - check ketone (neg),  B hydroxy butyrate .2, ETOH level <10, serum , ASA <.3  - Follow up FS  - Insulin protocol if needed.   - keep -180.   - nutrition following  - on TPN, monitor for acidosis     MUSCULOSKELETAL: oobtc    Diet: dysphagia 2 with nectar thick; tpn for now taper tomorrow  GI: ppi  DVT: heparin subq  Activity: bedrest  Dispo: MICU    PENDING: goc conversation with pt and family along with oncologist; obtain prognosis

## 2021-10-13 NOTE — PROGRESS NOTE ADULT - ASSESSMENT
67 yo male, with h/o HTN, hx of drug abuse, Hepatitis C s/p INF, Liver cirrhosis, COPD, portal vein thrombosis on Eliquis, HCC since January 2021 on chemotherapy, presented for weakness He reports constipation and hematemesis for 1 day, minimal amount.    *Upper Gi bleed secondary to large esophageal varices  -s/p EGD 10/4 s/p banding   -Hb stable  -no active bleeding    Plan  -diet as tolerated   -trend CBC  -PPI IV BID, can switch to po  -keep active type and screen    *Decompensated liver cirrhosis sec to hepatitis C   -MELD Na score 26 at the time of admission, today 19  -Child yañez score C  -HCC s/p biopsy 2/9/21 on tecentriq and avasin. dr Arthur Gibson  -not a candidate for surgery because of locally advanced disease  -Left main and right portal vein thrombus was on eliquis, tumor thrombus??  -discussed with his oncologist, no need to resume eliquis   -ascites with denver catheter   -s/p ascitic fluid analysis no evidence of SBP, SAAG 1.6 and TP<1     Plan  c/w antibiotics per ID, after stopping antibiotics we need prophylactic antibiotics given total protein <1 and CPS score of C  continue lactulose to have 3-4 BM  rifaximin 550 mg BID  discontinue lactulose enemas  stop lactulose enemas  can continue vitamin K  Trend LFT, INR, BMP daily   therapeutic paracentesis with albumin replacement  low sodium diet < 2g per, please change diet     -for questions text me on  teams, call [phone] on weekdays before 5pm or call GI service at [phone]  after 5 pm and on weekends

## 2021-10-13 NOTE — PROGRESS NOTE ADULT - SUBJECTIVE AND OBJECTIVE BOX
CHIEF COMPLAINT:  Patient is a 66y old  Male who presents with a chief complaint of weakness  Hyperkalemia  VT (13 Oct 2021 09:30)      INTERVAL HISTORY/OVERNIGHT EVENTS:    10/13  no events overnight  pt states he no longer wants treatment, wants to continue to end of life (no active suicidal ideation)    ======================  MEDICATIONS:  budesonide  80 MICROgram(s)/formoterol 4.5 MICROgram(s) Inhaler 2 Puff(s) Inhalation two times a day  cefTRIAXone   IVPB 1000 milliGRAM(s) IV Intermittent every 24 hours  chlorhexidine 4% Liquid 1 Application(s) Topical daily  dextrose 40% Gel 15 Gram(s) Oral once  dextrose 50% Injectable 25 Gram(s) IV Push once  dextrose 50% Injectable 12.5 Gram(s) IV Push once  dextrose 50% Injectable 25 Gram(s) IV Push once  glucagon  Injectable 1 milliGRAM(s) IntraMuscular once  heparin   Injectable 5000 Unit(s) SubCutaneous every 8 hours  insulin glargine Injectable (LANTUS) 20 Unit(s) SubCutaneous at bedtime  insulin lispro (ADMELOG) corrective regimen sliding scale   SubCutaneous three times a day before meals  insulin lispro Injectable (ADMELOG) 5 Unit(s) SubCutaneous three times a day before meals  lactulose Syrup 15 Gram(s) Oral three times a day  pantoprazole  Injectable 40 milliGRAM(s) IV Push every 12 hours  propranolol 5 milliGRAM(s) Oral daily  rifAXIMin 550 milliGRAM(s) Oral two times a day    DRIPS:  dextrose 5%. 1000 milliLiter(s) (50 mL/Hr) IV Continuous <Continuous>  dextrose 5%. 1000 milliLiter(s) (100 mL/Hr) IV Continuous <Continuous>  fat emulsion (Fish Oil and Plant Based) 20% Infusion 1.123 Gm/kG/Day (31.3 mL/Hr) IV Continuous <Continuous>  norepinephrine Infusion 0.05 MICROgram(s)/kG/Min (7.01 mL/Hr) IV Continuous <Continuous>  Parenteral Nutrition - Adult 1 Each (50 mL/Hr) TPN Continuous <Continuous>  Parenteral Nutrition - Adult 1 Each (50 mL/Hr) TPN Continuous <Continuous>    PRN:       ======================  PHYSICAL EXAMINATION:  GEN:  nad.   HEENT:  . ncat  PULM:  b/l bs.  clear.  no wheezing. no crackles or rales.   CARD: regular. s1, s2.  no murmurs.   ABD: +bs. distended, tense  EXT:  no new rashes.  edema bilateral lower extremities  NEURO:  no new focal deficits.   ======================  OBJECTIVE:        VS:  T(F): 98.7 (10-13 @ 12:00), Max: 98.7 (10-13 @ 12:00)  HR: 126 (10-13 @ 15:00) (108 - 134)  BP: 132/81 (10-13 @ 15:00) (82/60 - 132/81)  RR: 31 (10-13 @ 15:00) (11 - 38)  SpO2: 93% (10-13 @ 15:00) (93% - 99%)  CVP(mm Hg): --  CO: --  CI: --  PA: --  PCWP: --    I/O:      10-10 @ 07:01  -  10-11 @ 07:00  --------------------------------------------------------  IN: 4638.8 mL / OUT: 590 mL / NET: 4048.8 mL    10-11 @ 07:01  -  10-12 @ 07:00  --------------------------------------------------------  IN: 3426.1 mL / OUT: 3110 mL / NET: 316.1 mL    10-12 @ 07:01  -  10-13 @ 07:00  --------------------------------------------------------  IN: 1906.3 mL / OUT: 2760 mL / NET: -853.7 mL    10-13 @ 07:01  -  10-13 @ 16:24  --------------------------------------------------------  IN: 957.8 mL / OUT: 100 mL / NET: 857.8 mL        Weight trend:  Weight (kg): 89.1 (10-10)    ======================    LABS:  ABG - ( 12 Oct 2021 13:40 )  pH, Arterial: 7.49  pH, Blood: x     /  pCO2: 38    /  pO2: 80    / HCO3: 29    / Base Excess: 5.3   /  SaO2: 99.0                                    9.6    22.34 )-----------( 150      ( 13 Oct 2021 04:54 )             30.1     10-13    142  |  107  |  108<HH>  ----------------------------<  195<H>  4.5   |  18  |  1.6<H>    Ca    7.4<L>      13 Oct 2021 04:54  Phos  4.3     10-13  Mg     2.5     10-13    TPro  5.3<L>  /  Alb  1.5<L>  /  TBili  1.0  /  DBili  0.7<H>  /  AST  50<H>  /  ALT  60<H>  /  AlkPhos  233<H>  10-13    LIVER FUNCTIONS - ( 13 Oct 2021 04:54 )  Alb: 1.5 g/dL / Pro: 5.3 g/dL / ALK PHOS: 233 U/L / ALT: 60 U/L / AST: 50 U/L / GGT: x           PT/INR - ( 13 Oct 2021 04:54 )   PT: 23.30 sec;   INR: 2.04 ratio                       Cultures:    Culture - Sputum (collected 10-12)  Source: .Sputum Sputum  Gram Stain:    Numerous polymorphonuclear leukocytes per low power field    Rare Squamous epithelial cells per low power field    Moderate Gram Negative Rods seen per oil power field           CHIEF COMPLAINT:  Patient is a 66y old  Male who presents with a chief complaint of weakness  Hyperkalemia  VT (13 Oct 2021 09:30)      INTERVAL HISTORY/OVERNIGHT EVENTS:    10/13  no events overnight  pt states he no longer wants treatment is tired and wants things to end, (no active suicidal ideation, intent or plan)    ======================  MEDICATIONS:  budesonide  80 MICROgram(s)/formoterol 4.5 MICROgram(s) Inhaler 2 Puff(s) Inhalation two times a day  cefTRIAXone   IVPB 1000 milliGRAM(s) IV Intermittent every 24 hours  chlorhexidine 4% Liquid 1 Application(s) Topical daily  dextrose 40% Gel 15 Gram(s) Oral once  dextrose 50% Injectable 25 Gram(s) IV Push once  dextrose 50% Injectable 12.5 Gram(s) IV Push once  dextrose 50% Injectable 25 Gram(s) IV Push once  glucagon  Injectable 1 milliGRAM(s) IntraMuscular once  heparin   Injectable 5000 Unit(s) SubCutaneous every 8 hours  insulin glargine Injectable (LANTUS) 20 Unit(s) SubCutaneous at bedtime  insulin lispro (ADMELOG) corrective regimen sliding scale   SubCutaneous three times a day before meals  insulin lispro Injectable (ADMELOG) 5 Unit(s) SubCutaneous three times a day before meals  lactulose Syrup 15 Gram(s) Oral three times a day  pantoprazole  Injectable 40 milliGRAM(s) IV Push every 12 hours  propranolol 5 milliGRAM(s) Oral daily  rifAXIMin 550 milliGRAM(s) Oral two times a day    DRIPS:  dextrose 5%. 1000 milliLiter(s) (50 mL/Hr) IV Continuous <Continuous>  dextrose 5%. 1000 milliLiter(s) (100 mL/Hr) IV Continuous <Continuous>  fat emulsion (Fish Oil and Plant Based) 20% Infusion 1.123 Gm/kG/Day (31.3 mL/Hr) IV Continuous <Continuous>  norepinephrine Infusion 0.05 MICROgram(s)/kG/Min (7.01 mL/Hr) IV Continuous <Continuous>  Parenteral Nutrition - Adult 1 Each (50 mL/Hr) TPN Continuous <Continuous>  Parenteral Nutrition - Adult 1 Each (50 mL/Hr) TPN Continuous <Continuous>    PRN:       ======================  PHYSICAL EXAMINATION:  GEN:  nad.   HEENT:  . ncat  PULM:  b/l bs.  clear.  no wheezing. no crackles or rales.   CARD: regular. s1, s2.  no murmurs.   ABD: +bs. distended, tense  EXT:  no new rashes.  edema bilateral lower extremities  NEURO:  no new focal deficits.   ======================  OBJECTIVE:        VS:  T(F): 98.7 (10-13 @ 12:00), Max: 98.7 (10-13 @ 12:00)  HR: 126 (10-13 @ 15:00) (108 - 134)  BP: 132/81 (10-13 @ 15:00) (82/60 - 132/81)  RR: 31 (10-13 @ 15:00) (11 - 38)  SpO2: 93% (10-13 @ 15:00) (93% - 99%)  CVP(mm Hg): --  CO: --  CI: --  PA: --  PCWP: --    I/O:      10-10 @ 07:01  -  10-11 @ 07:00  --------------------------------------------------------  IN: 4638.8 mL / OUT: 590 mL / NET: 4048.8 mL    10-11 @ 07:01  -  10-12 @ 07:00  --------------------------------------------------------  IN: 3426.1 mL / OUT: 3110 mL / NET: 316.1 mL    10-12 @ 07:01  -  10-13 @ 07:00  --------------------------------------------------------  IN: 1906.3 mL / OUT: 2760 mL / NET: -853.7 mL    10-13 @ 07:01  -  10-13 @ 16:24  --------------------------------------------------------  IN: 957.8 mL / OUT: 100 mL / NET: 857.8 mL        Weight trend:  Weight (kg): 89.1 (10-10)    ======================    LABS:  ABG - ( 12 Oct 2021 13:40 )  pH, Arterial: 7.49  pH, Blood: x     /  pCO2: 38    /  pO2: 80    / HCO3: 29    / Base Excess: 5.3   /  SaO2: 99.0                                    9.6    22.34 )-----------( 150      ( 13 Oct 2021 04:54 )             30.1     10-13    142  |  107  |  108<HH>  ----------------------------<  195<H>  4.5   |  18  |  1.6<H>    Ca    7.4<L>      13 Oct 2021 04:54  Phos  4.3     10-13  Mg     2.5     10-13    TPro  5.3<L>  /  Alb  1.5<L>  /  TBili  1.0  /  DBili  0.7<H>  /  AST  50<H>  /  ALT  60<H>  /  AlkPhos  233<H>  10-13    LIVER FUNCTIONS - ( 13 Oct 2021 04:54 )  Alb: 1.5 g/dL / Pro: 5.3 g/dL / ALK PHOS: 233 U/L / ALT: 60 U/L / AST: 50 U/L / GGT: x           PT/INR - ( 13 Oct 2021 04:54 )   PT: 23.30 sec;   INR: 2.04 ratio                       Cultures:    Culture - Sputum (collected 10-12)  Source: .Sputum Sputum  Gram Stain:    Numerous polymorphonuclear leukocytes per low power field    Rare Squamous epithelial cells per low power field    Moderate Gram Negative Rods seen per oil power field

## 2021-10-13 NOTE — PROGRESS NOTE ADULT - SUBJECTIVE AND OBJECTIVE BOX
JLUISSUZANNE  66y, Male  Allergy: No Known Allergies      LOS  9d    CHIEF COMPLAINT: weakness  Hyperkalemia  VT (13 Oct 2021 16:24)      INTERVAL EVENTS/HPI  - No acute events overnight  - T(F): , Max: 98.7 (10-13-21 @ 12:00)  - extubated yesterday - denies any abdominal shira   - WBC Count: 22.34 (10-13-21 @ 04:54)  WBC Count: 19.27 (10-12-21 @ 04:35)     - Creatinine, Serum: 1.6 (10-13-21 @ 04:54)  Creatinine, Serum: 1.4 (10-12-21 @ 04:35)       ROS  General: Denies rigors, nightsweats  HEENT: Denies headache, rhinorrhea, sore throat, eye pain  CV: Denies CP, palpitations  PULM: Denies wheezing, hemoptysis  GI: Denies hematemesis, hematochezia, melena  : Denies discharge, hematuria  MSK: Denies arthralgias, myalgias  SKIN: Denies rash, lesions  NEURO: Denies paresthesias, weakness  PSYCH: Denies depression, anxiety    VITALS:  T(F): 98.7, Max: 98.7 (10-13-21 @ 12:00)  HR: 126  BP: 132/81  RR: 31Vital Signs Last 24 Hrs  T(C): 37.1 (13 Oct 2021 12:00), Max: 37.1 (13 Oct 2021 12:00)  T(F): 98.7 (13 Oct 2021 12:00), Max: 98.7 (13 Oct 2021 12:00)  HR: 126 (13 Oct 2021 15:00) (108 - 134)  BP: 132/81 (13 Oct 2021 15:00) (82/60 - 132/81)  BP(mean): 106 (13 Oct 2021 15:00) (64 - 106)  RR: 31 (13 Oct 2021 15:00) (11 - 38)  SpO2: 93% (13 Oct 2021 15:00) (93% - 99%)    PHYSICAL EXAM:  Gen: NAD, resting in bed  HEENT: Normocephalic, atraumatic  Neck: supple, no lymphadenopathy  CV: Regular rate & regular rhythm  Lungs: decreased BS at bases, no fremitus  Abdomen: Soft, BS present  Ext: Warm, well perfused  Neuro: non focal, awake  Skin: no rash, no erythema  Lines: no phlebitis    FH: Non-contributory  Social Hx: Non-contributory    TESTS & MEASUREMENTS:                        9.6    22.34 )-----------( 150      ( 13 Oct 2021 04:54 )             30.1     10-13    142  |  107  |  108<HH>  ----------------------------<  195<H>  4.5   |  18  |  1.6<H>    Ca    7.4<L>      13 Oct 2021 04:54  Phos  4.3     10-13  Mg     2.5     10-13    TPro  5.3<L>  /  Alb  1.5<L>  /  TBili  1.0  /  DBili  0.7<H>  /  AST  50<H>  /  ALT  60<H>  /  AlkPhos  233<H>  10-13    eGFR if Non African American: 44 mL/min/1.73M2 (10-13-21 @ 04:54)  eGFR if African American: 51 mL/min/1.73M2 (10-13-21 @ 04:54)    LIVER FUNCTIONS - ( 13 Oct 2021 04:54 )  Alb: 1.5 g/dL / Pro: 5.3 g/dL / ALK PHOS: 233 U/L / ALT: 60 U/L / AST: 50 U/L / GGT: x               Culture - Sputum (collected 10-12-21 @ 09:34)  Source: .Sputum Sputum  Gram Stain (10-13-21 @ 07:22):    Numerous polymorphonuclear leukocytes per low power field    Rare Squamous epithelial cells per low power field    Moderate Gram Negative Rods seen per oil power field    Culture - Fungal, Body Fluid (collected 10-05-21 @ 18:53)  Source: .Body Fluid Peritoneal Fluid  Preliminary Report (10-13-21 @ 15:02):    No growth    Culture - Body Fluid with Gram Stain (collected 10-05-21 @ 18:53)  Source: .Body Fluid Peritoneal Fluid  Gram Stain (10-06-21 @ 02:48):    polymorphonuclear leukocytes seen    No organisms seen    by cytocentrifuge  Final Report (10-10-21 @ 17:05):    No growth at 5 days    Culture - Blood (collected 10-04-21 @ 02:15)  Source: .Blood Blood  Final Report (10-09-21 @ 14:01):    No Growth Final    Culture - Blood (collected 10-04-21 @ 02:10)  Source: .Blood Blood  Final Report (10-09-21 @ 14:01):    No Growth Final        Lactate, Blood: 2.0 mmol/L (10-11-21 @ 05:50)  Lactate, Blood: 1.5 mmol/L (10-10-21 @ 04:30)      INFECTIOUS DISEASES TESTING  Procalcitonin, Serum: 15.60 (10-06-21 @ 17:04)  Fungitell: <31 (10-06-21 @ 17:04)  Procalcitonin, Serum: 12.50 (10-06-21 @ 07:52)  Fungitell: 46 (10-06-21 @ 07:52)  MRSA PCR Result.: Negative (10-05-21 @ 05:14)  COVID-19 PCR: NotDetec (10-04-21 @ 02:17)      INFLAMMATORY MARKERS  C-Reactive Protein, Serum: 89 mg/L (10-10-21 @ 04:30)      RADIOLOGY & ADDITIONAL TESTS:  I have personally reviewed the last available Chest xray  CXR  Xray Chest 1 View- PORTABLE-Urgent:   EXAM:  XR CHEST PORTABLE URGENT 1V            PROCEDURE DATE:  10/13/2021            INTERPRETATION:  Clinical History / Reason for exam: Dyspnea    Comparison : Chest radiograph 10/12/2021.    Technique/Positioning: Frontal.    Findings:    Support devices: Port-A-Cath superior vena cava    Cardiac/mediastinum/hilum: Indeterminate    Lung parenchyma/Pleura: Left lower lobe opacity/pleural effusion. No air leak.    Skeleton/soft tissues: Unremarkable.    Impression:    Left lower lobe opacity/pleural effusion. No air leak.        --- End of Report ---              SWEETIE CAMPBELL MD; Attending Radiologist  This document has been electronically signed. Oct 13 2021  2:29PM (10-13-21 @ 12:16)      CT      CARDIOLOGY TESTING  12 Lead ECG:   Ventricular Rate 91 BPM    Atrial Rate 91 BPM    P-R Interval 133 ms    QRS Duration 75 ms    Q-T Interval 338 ms    QTC Calculation(Bazett) 416 ms    P Axis 66 degrees    R Axis 33 degrees    T Axis 32 degrees    Diagnosis Line Normal sinus rhythm  Low voltage QRS  Cannot rule out Anterior infarct , age undetermined  Abnormal ECG    Confirmed by Erik Cervantes ([id number]) on 10/7/2021 6:59:32 AM (10-07-21 @ 01:10)  12 Lead ECG:   Systolic BP 80 mmHg    Diastolic BP 51 mmHg    Ventricular Rate 206 BPM    Atrial Rate 241 BPM    QRS Duration 72 ms    Q-T Interval 206 ms    QTC Calculation(Bazett) 381 ms    R Axis 55 degrees    T Axis 63 degrees    Diagnosis Line Supraventricular tachycardia with occasional Premature ventricular complexes  Low voltage QRS  Nonspecific T wave abnormality  Abnormal ECG    Confirmed by KRISHNA ZARCO MD ([id number]) on 10/4/2021 11:18:34 PM (10-04-21 @ 20:37)      MEDICATIONS  budesonide  80 MICROgram(s)/formoterol 4.5 MICROgram(s) Inhaler 2 Inhalation two times a day  cefTRIAXone   IVPB 1000 IV Intermittent every 24 hours  chlorhexidine 4% Liquid 1 Topical daily  dextrose 40% Gel 15 Oral once  dextrose 5%. 1000 IV Continuous <Continuous>  dextrose 5%. 1000 IV Continuous <Continuous>  dextrose 50% Injectable 25 IV Push once  dextrose 50% Injectable 12.5 IV Push once  dextrose 50% Injectable 25 IV Push once  fat emulsion (Fish Oil and Plant Based) 20% Infusion 1.123 IV Continuous <Continuous>  glucagon  Injectable 1 IntraMuscular once  heparin   Injectable 5000 SubCutaneous every 8 hours  insulin glargine Injectable (LANTUS) 20 SubCutaneous at bedtime  insulin lispro (ADMELOG) corrective regimen sliding scale  SubCutaneous three times a day before meals  insulin lispro Injectable (ADMELOG) 5 SubCutaneous three times a day before meals  lactulose Syrup 15 Oral three times a day  norepinephrine Infusion 0.05 IV Continuous <Continuous>  pantoprazole  Injectable 40 IV Push every 12 hours  Parenteral Nutrition - Adult 1 TPN Continuous <Continuous>  Parenteral Nutrition - Adult 1 TPN Continuous <Continuous>  propranolol 5 Oral daily  rifAXIMin 550 Oral two times a day      WEIGHT  Weight (kg): 89.1 (10-10-21 @ 00:00)  Creatinine, Serum: 1.6 mg/dL (10-13-21 @ 04:54)      ANTIBIOTICS:  cefTRIAXone   IVPB 1000 milliGRAM(s) IV Intermittent every 24 hours  rifAXIMin 550 milliGRAM(s) Oral two times a day      All available historical records have been reviewed

## 2021-10-13 NOTE — PROGRESS NOTE ADULT - ASSESSMENT
ASSESSMENT  65 yo male, with h/o HTN, drug abuse, Hepatitis C s/p INF, Liver cirrhosis s/p [place] shunt, COPD, DVT? on Eliquis, HCC since January 2021 on chemotherapy, presented for weakness    IMPRESSION  #Decompensated Cirrhosis   #Hep C with HCC on chemotherapy  #Variceal Bleed    #Fevers + Leukocytosis -- possibly reactive from Variceal bleed  - BLood Cx 10/4 NG  - s/p paracentesis 10/5 - negative for SBP  - Fungitell: <31 (10.06.21 @ 17:04)    #Abx allergy: NKDA    RECOMMENDATIONS  - trend WBC for now  - follow-up sputum Cx from 10/12   - continue ceftriaxone for SBP prophylaxis  - if WBC worsening, may need repeat tap although clinically no SBP     Please call or message on [employer] Teams if with any questions.  Spectra [phone]

## 2021-10-13 NOTE — PROGRESS NOTE ADULT - ASSESSMENT
IMPRESSION:    Acute resp failure sp intubatin  Hepatic encephalopathy  Decompensated liver cirrhosis. Has [place] catheter  Sepsis   Hematemesis ( esophageal/ gastric varices)  Acute portal Vein thrombosis   Hyperkalemia with sustained VT s/p shock  Hyponatremia likely from volume overload  Hepatocellular carcinoma with transaminitis on chemotherapy  Hepatitis C treated with INF  COPD  HAGMA  EBER worsening      PLAN:    CNS: no sedation       HEENT: Oral care    PULMONARY:  HOB @ 45 degrees  continue O2 as necessary to maintain sats 92 to 96%  cxr now if stable will proceed with weaning trial lower rate to 15     CARDIOVASCULAR: Keep I < O ,   propranolol 5q 12 keep HR 50-60      GI: GI prophylaxis. diet as per speech and swallow    lactulose PO  GI management / NGT  bowel regimen aim 3 BM/day ,   follow GI   follow nutrition to taper tpn     RENAL:  follow renal   low K diet,   Follow up lytes.    Correct as needed,      INFECTIOUS DISEASE:   ABX PER ID recall id   follow cx       HEMATOLOGICAL:  dvt prophylaxis    ENDOCRINE:    Follow up FS.    Insulin protocol if needed.   keep -180.   avoid hypoglycemia    MUSCULOSKELETAL: bed rest     prognosis grave    oob to chair   d/c berry   palliative care eval f/u

## 2021-10-13 NOTE — PROGRESS NOTE ADULT - SUBJECTIVE AND OBJECTIVE BOX
Patient is a 66y old  Male who presents with a chief complaint of weakness  Hyperkalemia  VT (12 Oct 2021 15:09)      Over Night Events:  Patient seen and examined.   s/p  drain  2 liters removed   s/p extubation   passed speech and swallow   ROS:  See HPI    PHYSICAL EXAM    ICU Vital Signs Last 24 Hrs  T(C): 37 (13 Oct 2021 04:00), Max: 37.3 (12 Oct 2021 08:00)  T(F): 98.6 (13 Oct 2021 04:00), Max: 99.1 (12 Oct 2021 08:00)  HR: 116 (13 Oct 2021 07:00) (70 - 134)  BP: 82/60 (13 Oct 2021 07:00) (82/60 - 109/94)  BP(mean): 66 (13 Oct 2021 07:00) (64 - 99)  ABP: --  ABP(mean): --  RR: 17 (13 Oct 2021 07:00) (11 - 112)  SpO2: 96% (13 Oct 2021 07:00) (94% - 99%)      General: Aox3  HEENT:   cristiane             Lymph Nodes: NO cervical LN   Lungs: Bilateral BS  Cardiovascular: Regular   Abdomen: Soft, Positive BS  Extremities: No clubbing   Skin: warm   Neurological: no focal   Musculoskeletal: move all ext     I&O's Detail    12 Oct 2021 07:01  -  13 Oct 2021 07:00  --------------------------------------------------------  IN:    Dexmedetomidine: 52 mL    Fat Emulsion (Fish Oil &amp; Plant Based) 20% Infusion: 344.3 mL    IV PiggyBack: 400 mL    Norepinephrine: 80 mL    Oral Fluid: 60 mL    TPN (Total Parenteral Nutrition): 970 mL  Total IN: 1906.3 mL    OUT:    Drain (mL): 2000 mL    FentaNYL: 0 mL    Indwelling Catheter - Urethral (mL): 760 mL  Total OUT: 2760 mL    Total NET: -853.7 mL          LABS:                          9.6    22.34 )-----------( 150      ( 13 Oct 2021 04:54 )             30.1         13 Oct 2021 04:54    142    |  107    |  108    ----------------------------<  195    4.5     |  18     |  1.6      Ca    7.4        13 Oct 2021 04:54  Phos  4.3       13 Oct 2021 04:54  Mg     2.5       13 Oct 2021 04:54    TPro  5.3    /  Alb  1.5    /  TBili  1.0    /  DBili  0.7    /  AST  50     /  ALT  60     /  AlkPhos  233    13 Oct 2021 04:54  Amylase x     lipase x                                                 PT/INR - ( 13 Oct 2021 04:54 )   PT: 23.30 sec;   INR: 2.04 ratio                                                    Lactate, Blood: 2.0 mmol/L (10-11-21 @ 05:50)                                                          Culture - Sputum (collected 12 Oct 2021 09:34)  Source: .Sputum Sputum  Gram Stain (13 Oct 2021 07:22):    Numerous polymorphonuclear leukocytes per low power field    Rare Squamous epithelial cells per low power field    Moderate Gram Negative Rods seen per oil power field                                                   Mode: AC/ CMV (Assist Control/ Continuous Mandatory Ventilation)  RR (machine): 18  TV (machine): 420  FiO2: 30  PEEP: 5                                      ABG - ( 12 Oct 2021 13:40 )  pH, Arterial: 7.49  pH, Blood: x     /  pCO2: 38    /  pO2: 80    / HCO3: 29    / Base Excess: 5.3   /  SaO2: 99.0                MEDICATIONS  (STANDING):  budesonide  80 MICROgram(s)/formoterol 4.5 MICROgram(s) Inhaler 2 Puff(s) Inhalation two times a day  cefepime   IVPB 1000 milliGRAM(s) IV Intermittent every 12 hours  chlorhexidine 0.12% Liquid 15 milliLiter(s) Oral Mucosa two times a day  chlorhexidine 4% Liquid 1 Application(s) Topical daily  dexMEDEtomidine Infusion 0.2 MICROgram(s)/kG/Hr (3.79 mL/Hr) IV Continuous <Continuous>  dextrose 40% Gel 15 Gram(s) Oral once  dextrose 5%. 1000 milliLiter(s) (50 mL/Hr) IV Continuous <Continuous>  dextrose 5%. 1000 milliLiter(s) (100 mL/Hr) IV Continuous <Continuous>  dextrose 50% Injectable 25 Gram(s) IV Push once  dextrose 50% Injectable 12.5 Gram(s) IV Push once  dextrose 50% Injectable 25 Gram(s) IV Push once  fat emulsion (Fish Oil and Plant Based) 20% Infusion 1.123 Gm/kG/Day (31.3 mL/Hr) IV Continuous <Continuous>  fentaNYL   Infusion. 0.5 MICROgram(s)/kG/Hr (3.79 mL/Hr) IV Continuous <Continuous>  glucagon  Injectable 1 milliGRAM(s) IntraMuscular once  heparin   Injectable 5000 Unit(s) SubCutaneous every 8 hours  insulin glargine Injectable (LANTUS) 20 Unit(s) SubCutaneous at bedtime  insulin lispro (ADMELOG) corrective regimen sliding scale   SubCutaneous three times a day before meals  insulin lispro Injectable (ADMELOG) 5 Unit(s) SubCutaneous three times a day before meals  lactulose Retention Enema 200 Gram(s) Rectal three times a day  lactulose Syrup 15 Gram(s) Oral three times a day  metroNIDAZOLE  IVPB 500 milliGRAM(s) IV Intermittent every 8 hours  metroNIDAZOLE  IVPB      norepinephrine Infusion 0.05 MICROgram(s)/kG/Min (7.01 mL/Hr) IV Continuous <Continuous>  pantoprazole  Injectable 40 milliGRAM(s) IV Push every 12 hours  Parenteral Nutrition - Adult 1 Each (50 mL/Hr) TPN Continuous <Continuous>    MEDICATIONS  (PRN):          Xrays:  TLC:  OG:  ET tube:                                                                                       ECHO:  CAM ICU:

## 2021-10-13 NOTE — CHART NOTE - NSCHARTNOTEFT_GEN_A_CORE
Patient was pleasant when approached and easily engaged.  Patient's SO was present during visit.  Writer explored patient's mood state, as patient had expressed suicidal thoughts.  Patient denied suicidal ideations, plan or intent.  Patient discussed that he was feeling frustrated by his illness.  Patient/SO discussed that they want to meet with medical team to discuss prognosis.  Writer informed Resident.  Resident will speak with patient today.  Writer will follow-up.

## 2021-10-13 NOTE — PROGRESS NOTE ADULT - SUBJECTIVE AND OBJECTIVE BOX
Gastroenterology progress note:     Patient is a 66y old  Male who presents with a chief complaint of weakness  Hyperkalemia  VT (13 Oct 2021 07:46)       Admitted on: 10-04-21    We are following the patient for liver cirrhosis      Interval History: patient extubated, weak. awake and alert. complains of diffuse pain     PAST MEDICAL & SURGICAL HISTORY:  HTN (hypertension)   Hepatitis C  2007  Drug abuse  Cancer, hepatocellularJanuary 2021  COPD, mild    MEDICATIONS  (STANDING):  budesonide  80 MICROgram(s)/formoterol 4.5 MICROgram(s) Inhaler 2 Puff(s) Inhalation two times a day  cefepime   IVPB 1000 milliGRAM(s) IV Intermittent every 12 hours  chlorhexidine 4% Liquid 1 Application(s) Topical daily  dextrose 40% Gel 15 Gram(s) Oral once  dextrose 5%. 1000 milliLiter(s) (50 mL/Hr) IV Continuous <Continuous>  dextrose 5%. 1000 milliLiter(s) (100 mL/Hr) IV Continuous <Continuous>  dextrose 50% Injectable 25 Gram(s) IV Push once  dextrose 50% Injectable 12.5 Gram(s) IV Push once  dextrose 50% Injectable 25 Gram(s) IV Push once  fat emulsion (Fish Oil and Plant Based) 20% Infusion 1.123 Gm/kG/Day (31.3 mL/Hr) IV Continuous <Continuous>  glucagon  Injectable 1 milliGRAM(s) IntraMuscular once  heparin   Injectable 5000 Unit(s) SubCutaneous every 8 hours  insulin glargine Injectable (LANTUS) 20 Unit(s) SubCutaneous at bedtime  insulin lispro (ADMELOG) corrective regimen sliding scale   SubCutaneous three times a day before meals  insulin lispro Injectable (ADMELOG) 5 Unit(s) SubCutaneous three times a day before meals  lactulose Retention Enema 200 Gram(s) Rectal three times a day  lactulose Syrup 15 Gram(s) Oral three times a day  metroNIDAZOLE  IVPB 500 milliGRAM(s) IV Intermittent every 8 hours  metroNIDAZOLE  IVPB      norepinephrine Infusion 0.05 MICROgram(s)/kG/Min (7.01 mL/Hr) IV Continuous <Continuous>  pantoprazole  Injectable 40 milliGRAM(s) IV Push every 12 hours  Parenteral Nutrition - Adult 1 Each (50 mL/Hr) TPN Continuous <Continuous>  propranolol 5 milliGRAM(s) Oral daily    MEDICATIONS  (PRN):      Allergies  No Known Allergies      Review of Systems:   General: no fever  HEENT: no hemoptysis  Cardiovascular:  No Chest Pain, +Palpitations  Respiratory:  No Cough, No Dyspnea  Gastrointestinal:  As described in HPI  Hematology: no bruising or hematoma   Skin: no new rash    Physical Examination:  T(C): 36.9 (10-13-21 @ 08:00), Max: 37.3 (10-12-21 @ 12:00)  HR: 122 (10-13-21 @ 08:00) (70 - 134)  BP: 100/77 (10-13-21 @ 08:00) (82/60 - 106/77)  RR: 26 (10-13-21 @ 08:00) (11 - 112)  SpO2: 97% (10-13-21 @ 08:00) (94% - 99%)    Constitutional: No acute distress.  Head: normocephalic  Neck: no palpable thyroid  Eyes: EOMI  Respiratory: tachypneic on O2 via NC  Cardiovascular:  S1 S2, Regular rate and rhythm. tachycardic   Abdominal: Abdomen is soft, symmetric, and non-tender distended, dull   Extremities: +pitting edema  Skin: No rashes, No Jaundice.        Data: (reviewed by attending)                        9.6    22.34 )-----------( 150      ( 13 Oct 2021 04:54 )             30.1     Hgb trend:  9.6  10-13-21 @ 04:54  8.9  10-12-21 @ 04:35  8.4  10-11-21 @ 05:30        10-13    142  |  107  |  108<HH>  ----------------------------<  195<H>  4.5   |  18  |  1.6<H>    Ca    7.4<L>      13 Oct 2021 04:54  Phos  4.3     10-13  Mg     2.5     10-13    TPro  5.3<L>  /  Alb  1.5<L>  /  TBili  1.0  /  DBili  0.7<H>  /  AST  50<H>  /  ALT  60<H>  /  AlkPhos  233<H>  10-13    Liver panel trend:  TBili 1.0   /   AST 50   /   ALT 60   /   AlkP 233   /   Tptn 5.3   /   Alb 1.5    /   DBili 0.7      10-13  TBili 0.9   /   AST 42   /   ALT 71   /   AlkP 232   /   Tptn 4.9   /   Alb 1.4    /   DBili 0.5      10-12  TBili 0.8   /   AST 43   /   ALT 93   /   AlkP 212   /   Tptn 4.7   /   Alb 1.4    /   DBili --      10-11  TBili 0.9   /   AST 46   /      /   AlkP 233   /   Tptn 5.0   /   Alb 1.5    /   DBili --      10-10  TBili 0.9   /   AST 49   /      /   AlkP 249   /   Tptn 5.0   /   Alb 1.4    /   DBili --      10-09  TBili 1.1   /   AST 63   /      /   AlkP 237   /   Tptn 4.8   /   Alb 1.4    /   DBili --      10-09  TBili 1.3   /   AST 74   /      /   AlkP 245   /   Tptn 5.1   /   Alb 1.5    /   DBili --      10-08  TBili 2.2   /      /      /   AlkP 239   /   Tptn 5.0   /   Alb 1.6    /   DBili --      10-08  TBili 1.9   /      /      /   AlkP 254   /   Tptn 5.1   /   Alb 1.6    /   DBili --      10-07  TBili 1.9   /      /      /   AlkP 291   /   Tptn 5.2   /   Alb 1.8    /   DBili --      10-07  TBili 2.0   /      /      /   AlkP 282   /   Tptn 5.1   /   Alb 1.7    /   DBili --      10-07  TBili 2.2   /      /      /   AlkP 281   /   Tptn 5.4   /   Alb 1.6    /   DBili --      10-06  TBili 1.9   /      /      /   AlkP 296   /   Tptn 5.3   /   Alb 1.8    /   DBili --      10-06  TBili 1.7   /      /      /   AlkP 282   /   Tptn 5.3   /   Alb 1.7    /   DBili --      10-06  TBili 1.2   /   AST 1079   /      /   AlkP 317   /   Tptn 5.4   /   Alb 1.8    /   DBili 0.7      10-05  TBili 1.1   /      /      /   AlkP 260   /   Tptn 5.3   /   Alb 1.8    /   DBili --      10-04  TBili 1.1   /      /      /   AlkP 247   /   Tptn 4.8   /   Alb 1.8    /   DBili --      10-04  TBili 2.5   /      /      /   AlkP 290   /   Tptn 6.1   /   Alb 1.9    /   DBili --      10-04      PT/INR - ( 13 Oct 2021 04:54 )   PT: 23.30 sec;   INR: 2.04 ratio             Culture - Sputum (collected 12 Oct 2021 09:34)  Source: .Sputum Sputum  Gram Stain (13 Oct 2021 07:22):    Numerous polymorphonuclear leukocytes per low power field    Rare Squamous epithelial cells per low power field    Moderate Gram Negative Rods seen per oil power field

## 2021-10-14 LAB
-  CEFTAZIDIME: SIGNIFICANT CHANGE UP
-  LEVOFLOXACIN: SIGNIFICANT CHANGE UP
-  TRIMETHOPRIM/SULFAMETHOXAZOLE: SIGNIFICANT CHANGE UP
ALBUMIN SERPL ELPH-MCNC: 1.7 G/DL — LOW (ref 3.5–5.2)
ALP SERPL-CCNC: 254 U/L — HIGH (ref 30–115)
ALT FLD-CCNC: 54 U/L — HIGH (ref 0–41)
ANION GAP SERPL CALC-SCNC: 15 MMOL/L — HIGH (ref 7–14)
AST SERPL-CCNC: 52 U/L — HIGH (ref 0–41)
BILIRUB DIRECT SERPL-MCNC: 0.5 MG/DL — HIGH (ref 0–0.2)
BILIRUB INDIRECT FLD-MCNC: 0.4 MG/DL — SIGNIFICANT CHANGE UP (ref 0.2–1.2)
BILIRUB SERPL-MCNC: 0.9 MG/DL — SIGNIFICANT CHANGE UP (ref 0.2–1.2)
BUN SERPL-MCNC: 124 MG/DL — CRITICAL HIGH (ref 10–20)
CALCIUM SERPL-MCNC: 7.8 MG/DL — LOW (ref 8.5–10.1)
CHLORIDE SERPL-SCNC: 107 MMOL/L — SIGNIFICANT CHANGE UP (ref 98–110)
CO2 SERPL-SCNC: 20 MMOL/L — SIGNIFICANT CHANGE UP (ref 17–32)
CREAT SERPL-MCNC: 1.7 MG/DL — HIGH (ref 0.7–1.5)
CULTURE RESULTS: SIGNIFICANT CHANGE UP
CULTURE RESULTS: SIGNIFICANT CHANGE UP
GLUCOSE BLDC GLUCOMTR-MCNC: 101 MG/DL — HIGH (ref 70–99)
GLUCOSE BLDC GLUCOMTR-MCNC: 153 MG/DL — HIGH (ref 70–99)
GLUCOSE BLDC GLUCOMTR-MCNC: 196 MG/DL — HIGH (ref 70–99)
GLUCOSE BLDC GLUCOMTR-MCNC: 219 MG/DL — HIGH (ref 70–99)
GLUCOSE BLDC GLUCOMTR-MCNC: 239 MG/DL — HIGH (ref 70–99)
GLUCOSE SERPL-MCNC: 162 MG/DL — HIGH (ref 70–99)
HCT VFR BLD CALC: 32.1 % — LOW (ref 42–52)
HGB BLD-MCNC: 10 G/DL — LOW (ref 14–18)
INR BLD: 1.87 RATIO — HIGH (ref 0.65–1.3)
MAGNESIUM SERPL-MCNC: 2.6 MG/DL — HIGH (ref 1.8–2.4)
MCHC RBC-ENTMCNC: 28.9 PG — SIGNIFICANT CHANGE UP (ref 27–31)
MCHC RBC-ENTMCNC: 31.2 G/DL — LOW (ref 32–37)
MCV RBC AUTO: 92.8 FL — SIGNIFICANT CHANGE UP (ref 80–94)
METHOD TYPE: SIGNIFICANT CHANGE UP
NRBC # BLD: 0 /100 WBCS — SIGNIFICANT CHANGE UP (ref 0–0)
ORGANISM # SPEC MICROSCOPIC CNT: SIGNIFICANT CHANGE UP
ORGANISM # SPEC MICROSCOPIC CNT: SIGNIFICANT CHANGE UP
PHOSPHATE SERPL-MCNC: 5.3 MG/DL — HIGH (ref 2.1–4.9)
PLATELET # BLD AUTO: 219 K/UL — SIGNIFICANT CHANGE UP (ref 130–400)
POTASSIUM SERPL-MCNC: 4.5 MMOL/L — SIGNIFICANT CHANGE UP (ref 3.5–5)
POTASSIUM SERPL-SCNC: 4.5 MMOL/L — SIGNIFICANT CHANGE UP (ref 3.5–5)
PROT SERPL-MCNC: 5.7 G/DL — LOW (ref 6–8)
PROTHROM AB SERPL-ACNC: 21.4 SEC — HIGH (ref 9.95–12.87)
RBC # BLD: 3.46 M/UL — LOW (ref 4.7–6.1)
RBC # FLD: 23.5 % — HIGH (ref 11.5–14.5)
SODIUM SERPL-SCNC: 142 MMOL/L — SIGNIFICANT CHANGE UP (ref 135–146)
SPECIMEN SOURCE: SIGNIFICANT CHANGE UP
SPECIMEN SOURCE: SIGNIFICANT CHANGE UP
WBC # BLD: 18.59 K/UL — HIGH (ref 4.8–10.8)
WBC # FLD AUTO: 18.59 K/UL — HIGH (ref 4.8–10.8)

## 2021-10-14 PROCEDURE — 99233 SBSQ HOSP IP/OBS HIGH 50: CPT

## 2021-10-14 RX ADMIN — INSULIN GLARGINE 20 UNIT(S): 100 INJECTION, SOLUTION SUBCUTANEOUS at 21:17

## 2021-10-14 RX ADMIN — Medication 5 UNIT(S): at 12:00

## 2021-10-14 RX ADMIN — LACTULOSE 15 GRAM(S): 10 SOLUTION ORAL at 15:06

## 2021-10-14 RX ADMIN — Medication 1: at 12:00

## 2021-10-14 RX ADMIN — HEPARIN SODIUM 5000 UNIT(S): 5000 INJECTION INTRAVENOUS; SUBCUTANEOUS at 05:17

## 2021-10-14 RX ADMIN — HEPARIN SODIUM 5000 UNIT(S): 5000 INJECTION INTRAVENOUS; SUBCUTANEOUS at 21:10

## 2021-10-14 RX ADMIN — LACTULOSE 15 GRAM(S): 10 SOLUTION ORAL at 05:18

## 2021-10-14 RX ADMIN — PANTOPRAZOLE SODIUM 40 MILLIGRAM(S): 20 TABLET, DELAYED RELEASE ORAL at 05:18

## 2021-10-14 RX ADMIN — HEPARIN SODIUM 5000 UNIT(S): 5000 INJECTION INTRAVENOUS; SUBCUTANEOUS at 15:04

## 2021-10-14 RX ADMIN — CHLORHEXIDINE GLUCONATE 1 APPLICATION(S): 213 SOLUTION TOPICAL at 11:44

## 2021-10-14 RX ADMIN — PANTOPRAZOLE SODIUM 40 MILLIGRAM(S): 20 TABLET, DELAYED RELEASE ORAL at 17:43

## 2021-10-14 RX ADMIN — BUDESONIDE AND FORMOTEROL FUMARATE DIHYDRATE 2 PUFF(S): 160; 4.5 AEROSOL RESPIRATORY (INHALATION) at 07:23

## 2021-10-14 RX ADMIN — CEFTRIAXONE 100 MILLIGRAM(S): 500 INJECTION, POWDER, FOR SOLUTION INTRAMUSCULAR; INTRAVENOUS at 11:30

## 2021-10-14 RX ADMIN — LACTULOSE 15 GRAM(S): 10 SOLUTION ORAL at 21:17

## 2021-10-14 NOTE — CHART NOTE - NSCHARTNOTEFT_GEN_A_CORE
Significant Other, Fallon Whaley, was present during visit.  Patient discussed that he is not sure he want to continue with aggressive care.  Patient discussed that he "is tired".  Support rendered.  Writer will follow-up and continue to offer support.

## 2021-10-14 NOTE — CHART NOTE - NSCHARTNOTEFT_GEN_A_CORE
Awake, alert. No new complaints  On PO diet, tolerating PO but intake minimal  Requesting Ensure and milk  Early satiety expected with degree of ascites. [place] cath in place, ? drainage tomorrow  TPN taper and d/c today and maximize PO intake    T(F): 97 (10-14-21 @ 11:46), Max: 97.7 (10-14-21 @ 07:29)  HR: 110 (10-14-21 @ 11:46) (102 - 126)  BP: 120/85 (10-14-21 @ 11:46) (91/71 - 135/89)  RR: 23 (10-14-21 @ 11:46) (17 - 53)  SpO2: 98% (10-14-21 @ 11:46) (91% - 98%)    I&O's Detail    13 Oct 2021 07:  -  14 Oct 2021 07:00  --------------------------------------------------------  IN:    Fat Emulsion (Fish Oil &amp; Plant Based) 20% Infusion: 187.8 mL    Norepinephrine: 200 mL    Oral Fluid: 170 mL    TPN (Total Parenteral Nutrition): 1200 mL  Total IN: 1757.8 mL    OUT:    Incontinent per Collection Bag (mL): 300 mL    Indwelling Catheter - Urethral (mL): 100 mL    Voided (mL): 225 mL  Total OUT: 625 mL    Total NET: 1132.8 mL    14 Oct 2021 07:01  -  14 Oct 2021 13:32  --------------------------------------------------------  IN:    TPN (Total Parenteral Nutrition): 300 mL  Total IN: 300 mL    OUT:  Total OUT: 0 mL    Total NET: 300 mL    10-14    142  |  107  |  124<HH>  ----------------------------<  162<H>  4.5   |  20  |  1.7<H>    Ca    7.8<L>      14 Oct 2021 05:07  Phos  5.3     10-  Mg     2.6     10-14    TPro  5.7<L>  /  Alb  1.7<L>  /  TBili  0.9  /  DBili  0.5<H>  /  AST  52<H>  /  ALT  54<H>  /  AlkPhos  254<H>  10-14                        10.0   18.59 )-----------( 219      ( 14 Oct 2021 05:07 )             32.1     CAPILLARY BLOOD GLUCOSE  POCT Blood Glucose.: 196 mg/dL (14 Oct 2021 11:48)  POCT Blood Glucose.: 153 mg/dL (14 Oct 2021 07:28)  POCT Blood Glucose.: 139 mg/dL (13 Oct 2021 21:24)  POCT Blood Glucose.: 205 mg/dL (13 Oct 2021 17:11)     Daily Weight in k.7 (14 Oct 2021 06:00)    Diet, Dysphagia 1 Pureed-Thin Liquids:   Prosource Gelatein 20 Sugar Free     Qty per Day:  1  Supplement Feeding Modality:  Oral  Ensure Enlive Servings Per Day:  1       Frequency:  Daily  Glucerna Shake Cans or Servings Per Day:  1       Frequency:  Daily  Ensure Compact Cans or Servings Per Day:  1       Frequency:  Daily (10-14-21 @ 12:03)    ASSESSMENT  67 yo male, with h/o HTN, drug abuse, Hepatitis C s/p INF, Liver cirrhosis s/p [place] shunt, COPD, DVT? on Eliquis, HCC since 2021 on chemotherapy, presented for weakness    - advanced hepatocellular carcinoma with transaminitis on chemo  - decompensated cirrhosis, ascites with [place] cath  - hematemesis 2nd esophageal, gastric varices  - severe anemia secondary to variceal bleed  - EBER  - hyperkalemia with sustained VT s/p shock  - hep C  - fevers, leukocytosis  - COPD  - sepsis without clear source    PLAN  - TPN tapered and d/c'd today  - PO diet as tolerated  - add Ensure enlive, ensure compact, glucerna shake and SF Prosource gelatein to assess acceptance Awake, alert. No new complaints  On PO diet, tolerating PO but intake minimal  Requesting Ensure and milk  Early satiety expected with degree of ascites. [place] cath in place, ? drainage tomorrow  TPN taper and d/c today and maximize PO intake    T(F): 97 (10-14-21 @ 11:46), Max: 97.7 (10-14-21 @ 07:29)  HR: 110 (10-14-21 @ 11:46) (102 - 126)  BP: 120/85 (10-14-21 @ 11:46) (91/71 - 135/89)  RR: 23 (10-14-21 @ 11:46) (17 - 53)  SpO2: 98% (10-14-21 @ 11:46) (91% - 98%)    I&O's Detail    13 Oct 2021 07:  -  14 Oct 2021 07:00  --------------------------------------------------------  IN:    Fat Emulsion (Fish Oil &amp; Plant Based) 20% Infusion: 187.8 mL    Norepinephrine: 200 mL    Oral Fluid: 170 mL    TPN (Total Parenteral Nutrition): 1200 mL  Total IN: 1757.8 mL    OUT:    Incontinent per Collection Bag (mL): 300 mL    Indwelling Catheter - Urethral (mL): 100 mL    Voided (mL): 225 mL  Total OUT: 625 mL    Total NET: 1132.8 mL    14 Oct 2021 07:  -  14 Oct 2021 13:32  --------------------------------------------------------  IN:    TPN (Total Parenteral Nutrition): 300 mL  Total IN: 300 mL    OUT:  Total OUT: 0 mL - - - ??    Total NET: 300 mL    10-14    142  |  107  |  124<HH>  ----------------------------<  162<H>  4.5   |  20  |  1.7<H>    Ca    7.8<L>      14 Oct 2021 05:07  Phos  5.3     10-14  Mg     2.6     10-14    TPro  5.7<L>  /  Alb  1.7<L>  /  TBili  0.9  /  DBili  0.5<H>  /  AST  52<H>  /  ALT  54<H>  /  AlkPhos  254<H>  10-14                        10.0   18.59 )-----------( 219      ( 14 Oct 2021 05:07 )             32.1     CAPILLARY BLOOD GLUCOSE  POCT Blood Glucose.: 196 mg/dL (14 Oct 2021 11:48)  POCT Blood Glucose.: 153 mg/dL (14 Oct 2021 07:28)  POCT Blood Glucose.: 139 mg/dL (13 Oct 2021 21:24)  POCT Blood Glucose.: 205 mg/dL (13 Oct 2021 17:11)     Daily Weight in k.7 (14 Oct 2021 06:00)    Diet, Dysphagia 1 Pureed-Thin Liquids:   Prosource Gelatein 20 Sugar Free     Qty per Day:  1  Supplement Feeding Modality:  Oral  Ensure Enlive Servings Per Day:  1       Frequency:  Daily  Glucerna Shake Cans or Servings Per Day:  1       Frequency:  Daily  Ensure Compact Cans or Servings Per Day:  1       Frequency:  Daily (10-14-21 @ 12:03)    ASSESSMENT  67 yo male, with h/o HTN, drug abuse, Hepatitis C s/p INF, Liver cirrhosis s/p [place] shunt, COPD, DVT? on Eliquis, HCC since 2021 on chemotherapy, presented for weakness    - advanced hepatocellular carcinoma with transaminitis on chemo  - decompensated cirrhosis, ascites with [place] cath  - hematemesis 2nd esophageal, gastric varices  - severe anemia secondary to variceal bleed  - EBER  - hyperkalemia with sustained VT s/p shock  - hep C  - fevers, leukocytosis  - COPD  - sepsis without clear source    PLAN  - TPN tapered and d/c'd today  - PO diet as tolerated  - add Ensure Enlive, ensure compact, low sugar puddings, glucerna shake and SF Prosource gelatein to assess acceptance  - attn adequate protein intake, and encourage whey and egg protein sources (rather than meats) for now

## 2021-10-14 NOTE — PROGRESS NOTE ADULT - ASSESSMENT
Hepatocellular carcinoma advanced.   S/P variceal bleeding while on Eliquis  Hepatitis C  Cirrhosis  Portal hypertension.  Splenic vein thrombosis.    At present only palliative care.  If his performance status improves with nutrition and physical therapy, we will reevaluate for cancer therapy.  Please let the  see the patient for help with his health insurance issues.

## 2021-10-14 NOTE — PROGRESS NOTE ADULT - SUBJECTIVE AND OBJECTIVE BOX
Patient is a 66y old  Male who presents with a chief complaint of weakness  Hyperkalemia  VT (13 Oct 2021 16:47)      Over Night Events:  Patient seen and examined.   awake     ROS:  See HPI    PHYSICAL EXAM    ICU Vital Signs Last 24 Hrs  T(C): 36.1 (14 Oct 2021 04:00), Max: 37.1 (13 Oct 2021 12:00)  T(F): 97 (14 Oct 2021 04:00), Max: 98.7 (13 Oct 2021 12:00)  HR: 114 (14 Oct 2021 06:00) (112 - 126)  BP: 110/80 (14 Oct 2021 06:00) (82/64 - 132/81)  BP(mean): 89 (14 Oct 2021 06:00) (69 - 106)  ABP: --  ABP(mean): --  RR: 17 (14 Oct 2021 06:00) (17 - 53)  SpO2: 98% (14 Oct 2021 06:00) (91% - 98%)      General: awake   HEENT:     pale          Lymph Nodes: NO cervical LN   Lungs: Bilateral BS  Cardiovascular: Regular   Abdomen: Soft, Positive BS ascitis   Extremities: No clubbing   Skin: warm   Neurological: no focal   Musculoskeletal: move all ext     I&O's Detail    13 Oct 2021 07:01  -  14 Oct 2021 07:00  --------------------------------------------------------  IN:    Fat Emulsion (Fish Oil &amp; Plant Based) 20% Infusion: 187.8 mL    Norepinephrine: 200 mL    Oral Fluid: 170 mL    TPN (Total Parenteral Nutrition): 1200 mL  Total IN: 1757.8 mL    OUT:    Incontinent per Collection Bag (mL): 300 mL    Indwelling Catheter - Urethral (mL): 100 mL    Voided (mL): 225 mL  Total OUT: 625 mL    Total NET: 1132.8 mL          LABS:                          10.0   18.59 )-----------( x        ( 14 Oct 2021 05:07 )             32.1         13 Oct 2021 04:54    142    |  107    |  108    ----------------------------<  195    4.5     |  18     |  1.6      Ca    7.4        13 Oct 2021 04:54  Phos  4.3       13 Oct 2021 04:54  Mg     2.5       13 Oct 2021 04:54                                               PT/INR - ( 14 Oct 2021 05:07 )   PT: 21.40 sec;   INR: 1.87 ratio                                                                                                            Culture - Sputum (collected 12 Oct 2021 09:34)  Source: .Sputum Sputum  Gram Stain (13 Oct 2021 07:22):    Numerous polymorphonuclear leukocytes per low power field    Rare Squamous epithelial cells per low power field    Moderate Gram Negative Rods seen per oil power field  Preliminary Report (13 Oct 2021 23:03):    Numerous Stenotrophomonas maltophilia                                                                                       ABG - ( 12 Oct 2021 13:40 )  pH, Arterial: 7.49  pH, Blood: x     /  pCO2: 38    /  pO2: 80    / HCO3: 29    / Base Excess: 5.3   /  SaO2: 99.0                MEDICATIONS  (STANDING):  budesonide  80 MICROgram(s)/formoterol 4.5 MICROgram(s) Inhaler 2 Puff(s) Inhalation two times a day  cefTRIAXone   IVPB 1000 milliGRAM(s) IV Intermittent every 24 hours  chlorhexidine 4% Liquid 1 Application(s) Topical daily  dextrose 40% Gel 15 Gram(s) Oral once  dextrose 5%. 1000 milliLiter(s) (50 mL/Hr) IV Continuous <Continuous>  dextrose 5%. 1000 milliLiter(s) (100 mL/Hr) IV Continuous <Continuous>  dextrose 50% Injectable 25 Gram(s) IV Push once  dextrose 50% Injectable 12.5 Gram(s) IV Push once  dextrose 50% Injectable 25 Gram(s) IV Push once  glucagon  Injectable 1 milliGRAM(s) IntraMuscular once  heparin   Injectable 5000 Unit(s) SubCutaneous every 8 hours  insulin glargine Injectable (LANTUS) 20 Unit(s) SubCutaneous at bedtime  insulin lispro (ADMELOG) corrective regimen sliding scale   SubCutaneous three times a day before meals  insulin lispro Injectable (ADMELOG) 5 Unit(s) SubCutaneous three times a day before meals  lactulose Syrup 15 Gram(s) Oral three times a day  norepinephrine Infusion 0.05 MICROgram(s)/kG/Min (7.01 mL/Hr) IV Continuous <Continuous>  pantoprazole  Injectable 40 milliGRAM(s) IV Push every 12 hours  Parenteral Nutrition - Adult 1 Each (50 mL/Hr) TPN Continuous <Continuous>  propranolol 5 milliGRAM(s) Oral daily  rifAXIMin 550 milliGRAM(s) Oral two times a day    MEDICATIONS  (PRN):          Xrays:  TLC:  OG:  ET tube:                                                                                       ECHO:  CAM ICU:

## 2021-10-14 NOTE — PROGRESS NOTE ADULT - ASSESSMENT
IMPRESSION   Hepatic encephalopathy ( ammonia 188)  Decompensated liver cirrhosis. Has Denver catheter  Sepsis   Hematemesis ( esophageal/ gastric varices)  Acute portal Vein thrombosis   Hyperkalemia with sustained VT s/p shock, creat 0.9 was on aldactone  Hyponatremia likely from volume overload  Hepatocellular carcinoma with transaminitis on chemotherapy  Hepatitis C treated with INF  COPD  HAGMA      PLAN:    CNS:   -pt extubated  -No sedation    HEENT: Oral care    PULMONARY:    - HOB @ 45 degrees  - taper O2  - F/u deep tracheal aspiration   >>moderate gram negative rods, numerous PMNs, rare squamous epithelial cells,   - begin Levaquin     CARDIOVASCULAR:   - Keep I < O ,   - cardiac ECHO done 10/06, f/u reads   - propranolol 10mg q 12 keep HR 50-60 holding parameters  - c/w levophed  - Keep bp above 100 systolic   -consistently tachycardic (likely secondary to decompensated liver cirrhosis)    GI:   - protonix 40mg BID  -octreotide infusion d/c per GI  - EGD - grade IV lower and mid 1/3 esophageal varices with 5 bands placed successfully.   - Lactulose bowel regimen aim 3 BM/day  - rifaximin 550 q 12,  - IR was consulted: Pt is a poor candidate for TIPS and has several contraindications for procedure.   - starting vit K 10mg daily today, will start after 3 doses   - Spoke to Dr. Rhodes (Patient's gastroenterologist) [phone]  - [facility] is sending patient's medical records   - Patient was seen by oncologist Dr. Gibson [phone]  >>reached out to Dr Gibson several times today, called service, waiting on call back will continue to reach out  >>>need goc conversation preferably through oncologist; pt endorsing stopping all treatment  - Denver cath drained 7L total last on 10/12  - D/c lactulose enemas and c/w PO lactulose syrup.    RENAL:   - D/C bicarb drip  - low K diet,   - Follow up lytes.    - Correct as needed,  - f/u nephro consult   - Hyperkalemia (resolved) Kayexalate enema (PRN), insulin +dextrose +calcium gluconate if needed   -phos low >>replenished kphos 15 mmol now 3.2 (10/12)    INFECTIOUS DISEASE:   - Follow up cultures,   - peritoneal fluid analysis and culture, if + remove access  - cefepime decreased to 1g q 12 ; flagyl 500mg iv q8.   - D/C caspofungin (Fungitell neg)   - HepC viral load : 75.9 and reactive   - vanco x1  - blood cx : NGTD   - Peritoneal fluid cx : NG   >>resend studies with next tap  - UA: negative   - Procal 15.60     HEMATOLOGICAL:    - Hold home Eliquis  - Per vascular - D/C AC and restart once Pt is stable. F/u OP for Duplex  - doppler LE   - transfuse Hgb >8  - serial HH  - on DVT prophylaxis with heparin sq   - S/p 2 units of PRBC 10/5     ENDOCRINE:    - check ketone (neg),  B hydroxy butyrate .2, ETOH level <10, serum , ASA <.3  - Follow up FS  - Insulin protocol if needed.   - keep -180.   - Per nutrition - taper TPN and begin on milk and Ensure drinks.     MUSCULOSKELETAL: oobtc    Diet: dysphagia 2 with nectar thick; tpn for now taper tomorrow  GI: ppi  DVT: heparin subq  Activity: bedrest  Dispo: MICU    PENDING: goc conversation with pt and family along with oncologist; obtain prognosis   IMPRESSION   Hepatic encephalopathy ( ammonia 188)  Decompensated liver cirrhosis. Has Denver catheter  Sepsis   Hematemesis ( esophageal/ gastric varices)  Acute portal Vein thrombosis   Hyperkalemia with sustained VT s/p shock, creat 0.9 was on aldactone  Hyponatremia likely from volume overload  Hepatocellular carcinoma with transaminitis on chemotherapy  Hepatitis C treated with INF  COPD  HAGMA      PLAN:    CNS:   -pt extubated  -No sedation    HEENT: Oral care    PULMONARY:    - HOB @ 45 degrees  - taper O2  - F/u deep tracheal aspiration   >>moderate gram negative rods, numerous PMNs, rare squamous epithelial cells,   - begin Levaquin     CARDIOVASCULAR:   - Keep I < O ,   - cardiac ECHO done 10/06, f/u reads   - propranolol 10mg q 12 keep HR 50-60 holding parameters  - c/w levophed  - Keep bp above 100 systolic   -consistently tachycardic (likely secondary to decompensated liver cirrhosis)    GI:   - protonix 40mg BID  -octreotide infusion d/c per GI  - EGD - grade IV lower and mid 1/3 esophageal varices with 5 bands placed successfully.   - Lactulose bowel regimen aim 3 BM/day  - rifaximin 550 q 12,  - IR was consulted: Pt is a poor candidate for TIPS and has several contraindications for procedure.   - starting vit K 10mg daily today, will start after 3 doses   - Spoke to Dr. Rhodes (Patient's gastroenterologist) [phone]  - [facility] is sending patient's medical records   - Patient was seen by oncologist Dr. Gibson [phone]  >>Teri coming today for GOC  - Denver cath drained 7L total last on 10/12  - D/c lactulose enemas and c/w PO lactulose syrup.    RENAL:   - D/C bicarb drip  - low K diet,   - Follow up lytes.    - Correct as needed,  - f/u nephro consult   - Hyperkalemia (resolved) Kayexalate enema (PRN), insulin +dextrose +calcium gluconate if needed   -phos low >>replenished kphos 15 mmol now 3.2 (10/12)    INFECTIOUS DISEASE:   - Follow up cultures,   - peritoneal fluid analysis and culture, if + remove access  -cefepime switched to rocephin for sbp prophylaxis,flagyl discontinued  - D/C caspofungin (Fungitell neg)   - HepC viral load : 75.9 and reactive   - vanco x1  - blood cx : NGTD   - Peritoneal fluid cx : NG   >>resend studies with next tap  - UA: negative   - Procal 15.60   -sputum positive for gram neg rods: started levaquin 750, f/u ID recs    HEMATOLOGICAL:    - Hold home Eliquis  - Per vascular - D/C AC and restart once Pt is stable. F/u OP for Duplex  - doppler LE   - transfuse Hgb >8  - serial HH  - on DVT prophylaxis with heparin sq   - S/p 2 units of PRBC 10/5     ENDOCRINE:    - check ketone (neg),  B hydroxy butyrate .2, ETOH level <10, serum , ASA <.3  - Follow up FS  - Insulin protocol if needed.   - keep -180.   - Per nutrition - taper TPN and begin on milk (pt loves milk) and Ensure drinks.     MUSCULOSKELETAL: oobtc    Diet: dysphagia 1  GI: ppi  DVT: heparin subq  Activity: bedrest  Dispo: MICU    PENDING: goc conversation with pt and family along with oncologist; obtain prognosis

## 2021-10-14 NOTE — PHYSICAL THERAPY INITIAL EVALUATION ADULT - PERTINENT HX OF CURRENT PROBLEM, REHAB EVAL
65 yo male, with h/o HTN, drug abuse, Hepatitis C s/p INF, Liver cirrhosis s/p Denver shunt, COPD, DVT? on Eliquis, HCC since January 2021 on chemotherapy, presented for weakness

## 2021-10-14 NOTE — GOALS OF CARE CONVERSATION - ADVANCED CARE PLANNING - NSGCTIMESPENT_GEN_ALL_CORE
30 [Initial Consultation] : an initial consultation for [Noncardiac Disease] : cardiovascular evaluation  [ADHD] : in the setting of ADHD [Patient] : patient [Mother] : mother [FreeTextEntry3] : and cardiac clearance to start stimulant medication

## 2021-10-14 NOTE — PROGRESS NOTE ADULT - ASSESSMENT
ASSESSMENT  67 yo male, with h/o HTN, drug abuse, Hepatitis C s/p INF, Liver cirrhosis s/p Denver shunt, COPD, DVT? on Eliquis, HCC since January 2021 on chemotherapy, presented for weakness    IMPRESSION  #Decompensated Cirrhosis   #Hep C with HCC on chemotherapy  #Variceal Bleed    #Fevers + Leukocytosis -- possibly reactive from Variceal bleed  - BLood Cx 10/4 NG  - s/p paracentesis 10/5 - negative for SBP  - Fungitell: <31 (10.06.21 @ 17:04)    #Abx allergy: NKDA    RECOMMENDATIONS  - trend WBC for now  - Levofloxacin 750 mg daily for steno  - continue ceftriaxone for SBP prophylaxis    Please call or message on Microsoft Teams if with any questions.  Spectra [phone]

## 2021-10-14 NOTE — GOALS OF CARE CONVERSATION - ADVANCED CARE PLANNING - CONVERSATION DETAILS
Discussed pt disease course and prognosis with and without chemotherapy treatment  (2-3 months without treatment, possibility of years with treatment as he had good response (50% response))    Pt states he is in a "bad place" mentally after episodes of hematemesis and subsequent intubation/extubation experience.   Pt lost 1199 status when he lost his job, benefits covering his chemotherapy were revoked  Pt expressed difficulty in obtaining medicaid/medicare/social security coverage, needs help with securing coverage for future chemotherapy treatment  Pt agreeable to chemotherapy treatment if he can obtain coverage

## 2021-10-14 NOTE — SWALLOW BEDSIDE ASSESSMENT ADULT - SWALLOW EVAL: DIAGNOSIS
No s/s aspiration/penetration for thin liquids, puree. SLP attempted to put pts dentures in however pt states they are uncomfortable and would like to remove them therefore soft not trialed

## 2021-10-14 NOTE — PROGRESS NOTE ADULT - SUBJECTIVE AND OBJECTIVE BOX
JLUISSUZANNE  66y, Male  Allergy: No Known Allergies      LOS  10d    CHIEF COMPLAINT: weakness  Hyperkalemia  VT (14 Oct 2021 16:17)      INTERVAL EVENTS/HPI  - No acute events overnight  - T(F): , Max: 97.7 (10-14-21 @ 07:29)  - continued productive cough   - WBC Count: 18.59 (10-14-21 @ 05:07)  WBC Count: 22.34 (10-13-21 @ 04:54)     - Creatinine, Serum: 1.7 (10-14-21 @ 05:07)  Creatinine, Serum: 1.6 (10-13-21 @ 04:54)       ROS  General: Denies rigors, nightsweats  HEENT: Denies headache, rhinorrhea, sore throat, eye pain  CV: Denies CP, palpitations  PULM: Denies wheezing, hemoptysis  GI: Denies hematemesis, hematochezia, melena  : Denies discharge, hematuria  MSK: Denies arthralgias, myalgias  SKIN: Denies rash, lesions  NEURO: Denies paresthesias, weakness  PSYCH: Denies depression, anxiety    VITALS:  T(F): 96.7, Max: 97.7 (10-14-21 @ 07:29)  HR: 120  BP: 131/88  RR: 30Vital Signs Last 24 Hrs  T(C): 35.9 (14 Oct 2021 16:55), Max: 36.5 (14 Oct 2021 07:29)  T(F): 96.7 (14 Oct 2021 16:55), Max: 97.7 (14 Oct 2021 07:29)  HR: 120 (14 Oct 2021 16:55) (102 - 120)  BP: 131/88 (14 Oct 2021 16:55) (91/78 - 147/87)  BP(mean): 111 (14 Oct 2021 16:55) (79 - 111)  RR: 30 (14 Oct 2021 16:55) (17 - 53)  SpO2: 100% (14 Oct 2021 16:55) (91% - 100%)    PHYSICAL EXAM:  Gen: NAD, resting in bed  HEENT: Normocephalic, atraumatic  Neck: supple, no lymphadenopathy  CV: Regular rate & regular rhythm  Lungs: decreased BS at bases, no fremitus  Abdomen: Soft, BS present  Ext: Warm, well perfused  Neuro: non focal, awake  Skin: no rash, no erythema  Lines: no phlebitis    FH: Non-contributory  Social Hx: Non-contributory    TESTS & MEASUREMENTS:                        10.0   18.59 )-----------( 219      ( 14 Oct 2021 05:07 )             32.1     10-14    142  |  107  |  124<HH>  ----------------------------<  162<H>  4.5   |  20  |  1.7<H>    Ca    7.8<L>      14 Oct 2021 05:07  Phos  5.3     10-14  Mg     2.6     10-14    TPro  5.7<L>  /  Alb  1.7<L>  /  TBili  0.9  /  DBili  0.5<H>  /  AST  52<H>  /  ALT  54<H>  /  AlkPhos  254<H>  10-14    eGFR if Non African American: 41 mL/min/1.73M2 (10-14-21 @ 05:07)  eGFR if : 48 mL/min/1.73M2 (10-14-21 @ 05:07)    LIVER FUNCTIONS - ( 14 Oct 2021 05:07 )  Alb: 1.7 g/dL / Pro: 5.7 g/dL / ALK PHOS: 254 U/L / ALT: 54 U/L / AST: 52 U/L / GGT: x               Culture - Sputum (collected 10-12-21 @ 09:34)  Source: .Sputum Sputum  Gram Stain (10-13-21 @ 07:22):    Numerous polymorphonuclear leukocytes per low power field    Rare Squamous epithelial cells per low power field    Moderate Gram Negative Rods seen per oil power field  Preliminary Report (10-13-21 @ 23:03):    Numerous Stenotrophomonas maltophilia    Culture - Fungal, Body Fluid (collected 10-05-21 @ 18:53)  Source: .Body Fluid Peritoneal Fluid  Preliminary Report (10-13-21 @ 15:02):    No growth    Culture - Body Fluid with Gram Stain (collected 10-05-21 @ 18:53)  Source: .Body Fluid Peritoneal Fluid  Gram Stain (10-06-21 @ 02:48):    polymorphonuclear leukocytes seen    No organisms seen    by cytocentrifuge  Final Report (10-10-21 @ 17:05):    No growth at 5 days    Culture - Blood (collected 10-04-21 @ 02:15)  Source: .Blood Blood  Final Report (10-09-21 @ 14:01):    No Growth Final    Culture - Blood (collected 10-04-21 @ 02:10)  Source: .Blood Blood  Final Report (10-09-21 @ 14:01):    No Growth Final        Lactate, Blood: 2.0 mmol/L (10-11-21 @ 05:50)  Lactate, Blood: 1.5 mmol/L (10-10-21 @ 04:30)      INFECTIOUS DISEASES TESTING  Procalcitonin, Serum: 15.60 (10-06-21 @ 17:04)  Fungitell: <31 (10-06-21 @ 17:04)  Procalcitonin, Serum: 12.50 (10-06-21 @ 07:52)  Fungitell: 46 (10-06-21 @ 07:52)  MRSA PCR Result.: Negative (10-05-21 @ 05:14)  COVID-19 PCR: NotDetec (10-04-21 @ 02:17)      INFLAMMATORY MARKERS  C-Reactive Protein, Serum: 89 mg/L (10-10-21 @ 04:30)      RADIOLOGY & ADDITIONAL TESTS:  I have personally reviewed the last available Chest xray  CXR  Xray Chest 1 View- PORTABLE-Urgent:   EXAM:  XR CHEST PORTABLE URGENT 1V            PROCEDURE DATE:  10/13/2021            INTERPRETATION:  Clinical History / Reason for exam: Dyspnea    Comparison : Chest radiograph 10/12/2021.    Technique/Positioning: Frontal.    Findings:    Support devices: Port-A-Cath superior vena cava    Cardiac/mediastinum/hilum: Indeterminate    Lung parenchyma/Pleura: Left lower lobe opacity/pleural effusion. No air leak.    Skeleton/soft tissues: Unremarkable.    Impression:    Left lower lobe opacity/pleural effusion. No air leak.        --- End of Report ---              SWEETIE CAMPBELL MD; Attending Radiologist  This document has been electronically signed. Oct 13 2021  2:29PM (10-13-21 @ 12:16)      CT      CARDIOLOGY TESTING  12 Lead ECG:   Ventricular Rate 91 BPM    Atrial Rate 91 BPM    P-R Interval 133 ms    QRS Duration 75 ms    Q-T Interval 338 ms    QTC Calculation(Bazett) 416 ms    P Axis 66 degrees    R Axis 33 degrees    T Axis 32 degrees    Diagnosis Line Normal sinus rhythm  Low voltage QRS  Cannot rule out Anterior infarct , age undetermined  Abnormal ECG    Confirmed by Erik Cervantes ([id number]) on 10/7/2021 6:59:32 AM (10-07-21 @ 01:10)  12 Lead ECG:   Systolic BP 80 mmHg    Diastolic BP 51 mmHg    Ventricular Rate 206 BPM    Atrial Rate 241 BPM    QRS Duration 72 ms    Q-T Interval 206 ms    QTC Calculation(Bazett) 381 ms    R Axis 55 degrees    T Axis 63 degrees    Diagnosis Line Supraventricular tachycardia with occasional Premature ventricular complexes  Low voltage QRS  Nonspecific T wave abnormality  Abnormal ECG    Confirmed by KRISHNA ZARCO MD ([id number]) on 10/4/2021 11:18:34 PM (10-04-21 @ 20:37)      MEDICATIONS  budesonide  80 MICROgram(s)/formoterol 4.5 MICROgram(s) Inhaler 2 Inhalation two times a day  cefTRIAXone   IVPB 1000 IV Intermittent every 24 hours  chlorhexidine 4% Liquid 1 Topical daily  dextrose 40% Gel 15 Oral once  dextrose 5%. 1000 IV Continuous <Continuous>  dextrose 5%. 1000 IV Continuous <Continuous>  dextrose 50% Injectable 25 IV Push once  dextrose 50% Injectable 12.5 IV Push once  dextrose 50% Injectable 25 IV Push once  glucagon  Injectable 1 IntraMuscular once  heparin   Injectable 5000 SubCutaneous every 8 hours  insulin glargine Injectable (LANTUS) 20 SubCutaneous at bedtime  insulin lispro (ADMELOG) corrective regimen sliding scale  SubCutaneous three times a day before meals  insulin lispro Injectable (ADMELOG) 5 SubCutaneous three times a day before meals  lactulose Syrup 15 Oral three times a day  levoFLOXacin IVPB 750 IV Intermittent every 24 hours  norepinephrine Infusion 0.05 IV Continuous <Continuous>  pantoprazole  Injectable 40 IV Push every 12 hours  Parenteral Nutrition - Adult 1 TPN Continuous <Continuous>  propranolol 5 Oral daily  rifAXIMin 550 Oral two times a day      WEIGHT  Weight (kg): 89.1 (10-10-21 @ 00:00)  Creatinine, Serum: 1.7 mg/dL (10-14-21 @ 05:07)      ANTIBIOTICS:  cefTRIAXone   IVPB 1000 milliGRAM(s) IV Intermittent every 24 hours  levoFLOXacin IVPB 750 milliGRAM(s) IV Intermittent every 24 hours  rifAXIMin 550 milliGRAM(s) Oral two times a day      All available historical records have been reviewed

## 2021-10-14 NOTE — PHYSICAL THERAPY INITIAL EVALUATION ADULT - GENERAL OBSERVATIONS, REHAB EVAL
pt was seen for PT IE 1:30 - 2:10 pt was pleasant and agreeable to PT IE.  +IV, +O2 @3 L via NC, +telemonitoring , +BP and pulse ox,  +Primafit, pt is A/O x 4, girlfriend at bed side.  Multiple blisters L ankle, RN made aware

## 2021-10-14 NOTE — PROGRESS NOTE ADULT - SUBJECTIVE AND OBJECTIVE BOX
INTERVAL HPI/OVERNIGHT EVENTS:  Patient S&E at bedside.  Patient seen in CCU today.  He is extubated, alert and comfortable.  Awaiting transfer to regular floor.  His appetite is poor.  At present he wants some time to think about his future treatments for cancer. He has no insurance coverage. He is awaiting help by the  about his health insurance.  Denies any pain.  No more bleeding.       VITAL SIGNS:  T(F): 97 (10-14-21 @ 11:46)  HR: 114 (10-14-21 @ 14:00)  BP: 147/87 (10-14-21 @ 14:00)  RR: 33 (10-14-21 @ 14:00)  SpO2: 98% (10-14-21 @ 14:00)  Wt(kg): --    PHYSICAL EXAM:    Constitutional: Looks weak.  Eyes: EOMI, sclera non-icteric  Neck: supple, no masses, no JVD  Respiratory: CTA b/l, good air entry b/l  Cardiovascular: RRR, no M/R/G  Gastrointestinal: Abdomen ascites  Extremities: no c/c/e  Neurological: AAOx3      MEDICATIONS  (STANDING):  budesonide  80 MICROgram(s)/formoterol 4.5 MICROgram(s) Inhaler 2 Puff(s) Inhalation two times a day  cefTRIAXone   IVPB 1000 milliGRAM(s) IV Intermittent every 24 hours  chlorhexidine 4% Liquid 1 Application(s) Topical daily  dextrose 40% Gel 15 Gram(s) Oral once  dextrose 5%. 1000 milliLiter(s) (50 mL/Hr) IV Continuous <Continuous>  dextrose 5%. 1000 milliLiter(s) (100 mL/Hr) IV Continuous <Continuous>  dextrose 50% Injectable 25 Gram(s) IV Push once  dextrose 50% Injectable 12.5 Gram(s) IV Push once  dextrose 50% Injectable 25 Gram(s) IV Push once  glucagon  Injectable 1 milliGRAM(s) IntraMuscular once  heparin   Injectable 5000 Unit(s) SubCutaneous every 8 hours  insulin glargine Injectable (LANTUS) 20 Unit(s) SubCutaneous at bedtime  insulin lispro (ADMELOG) corrective regimen sliding scale   SubCutaneous three times a day before meals  insulin lispro Injectable (ADMELOG) 5 Unit(s) SubCutaneous three times a day before meals  lactulose Syrup 15 Gram(s) Oral three times a day  levoFLOXacin IVPB 750 milliGRAM(s) IV Intermittent every 24 hours  norepinephrine Infusion 0.05 MICROgram(s)/kG/Min (7.01 mL/Hr) IV Continuous <Continuous>  pantoprazole  Injectable 40 milliGRAM(s) IV Push every 12 hours  Parenteral Nutrition - Adult 1 Each (50 mL/Hr) TPN Continuous <Continuous>  propranolol 5 milliGRAM(s) Oral daily  rifAXIMin 550 milliGRAM(s) Oral two times a day    MEDICATIONS  (PRN):      Allergies    No Known Allergies    Intolerances        LABS:                        10.0   18.59 )-----------( 219      ( 14 Oct 2021 05:07 )             32.1     10-14    142  |  107  |  124<HH>  ----------------------------<  162<H>  4.5   |  20  |  1.7<H>    Ca    7.8<L>      14 Oct 2021 05:07  Phos  5.3     10-14  Mg     2.6     10-14    TPro  5.7<L>  /  Alb  1.7<L>  /  TBili  0.9  /  DBili  0.5<H>  /  AST  52<H>  /  ALT  54<H>  /  AlkPhos  254<H>  10-14    PT/INR - ( 14 Oct 2021 05:07 )   PT: 21.40 sec;   INR: 1.87 ratio               RADIOLOGY & ADDITIONAL TESTS:  Studies reviewed.

## 2021-10-14 NOTE — CHART NOTE - NSCHARTNOTEFT_GEN_A_CORE
Transfer Note    Transfer from:  MICU/CCU    Transfer to: (  ) Medicine    (  ) Telemetry     (  ) RCU       ( x ) CEU    (  ) VENT                        (  ) Palliative    (  ) Stroke Unit    (  ) MICU    (  ) CCU    Signout given to: CEU intern    ----------------------------------------------------------------------------------------------------------  HPI / ICU COURSE:    HPI:  65 yo male, with h/o HTN, drug abuse, Hepatitis C s/p INF, Liver cirrhosis s/p [place] shunt, COPD, DVT? on Eliquis, hepatocellular carcinoma since January 2021 on chemotherapy, presented for weakness  Patient reports having weakness, decreased po intake since few weeks. He also has not been sleeping. He reports constipation and hematemesis for 1 day, minimal amount.  Denies fever, chills, chest pain, cough, sputum , SOB, diarrhea, dysuria  He has a [place] shunt  that was drained one day prior to admission    ED course : mental status alteration, sustained VT  bpm with BP 82/49 mmHg s/p shock + calcium gluconate  Patient has Hyperkalemia 6.8 CO2 14 s/p Bicarb drip, renal called, sp berry with good UO   (04 Oct 2021 06:15)    MICU:  s/p cardioversion, egd and intubation to protect airway. Pt being followed by GI, hemeonc, nephrology and ID along with critical care. GI recommended medical management of hepatic encephalopathy with rifaximin and lactulose (ammonia 185) and octreotide for esophageal varices. Emergent egd findings were suggestive of portal hypertensive gastropathy. Pt was put on cefepime, flagyl and caspofungin empirically. Cultures were all negative. Pt was switched to rocephin for spontaneous bacterial peritonitis prophylaxis (continue to end of hospital course). DTA showed gram neg rods and pt started on levaquin 10/14. Pt mental status improved and was extubated 10/12. Pt has chronic tachycardia likely secondary to decompensated liver cirrhosis. WBC count was recently trending up and topped at 22, 18 today. Pan cultures were sent. Pt had expressed wishes to stop all treatment. Palliative consulted, recommend GOC conversation with pt and oncologist. Pt wishes are clashing with his significant other who believes he is "giving up"  Please follow up with ID for sputum culture results and abx recs (levaquin started). Follow up with nutrition recs (add ensure and milk to diet). F/u with GI for further recommendations for management of decompensated cirrhosis. Pt currently stable enough to be downgraded to CEU.         --------------------------------------------------------------------------------------------------------  PMH/PSH:  PAST MEDICAL & SURGICAL HISTORY:  HTN (hypertension)    Hepatitis C  2007    Drug abuse    Cancer, hepatocellular  January 2021    COPD, mild        -------------------------------------------------------------------------------------------------------  PHYSICAL EXAM:  General: NAD  HEENT: Atraumatic  Respiratory: CTAB  Cardiac: RRR  Abdomen: soft, non-tender, non-distended  Extremities: warm and well-perfused  Neuro: A+Ox4. No FND    Vital Signs Last 24 Hrs  T(C): 36.1 (14 Oct 2021 11:46), Max: 36.5 (14 Oct 2021 07:29)  T(F): 97 (14 Oct 2021 11:46), Max: 97.7 (14 Oct 2021 07:29)  HR: 110 (14 Oct 2021 11:46) (102 - 126)  BP: 120/85 (14 Oct 2021 11:46) (91/71 - 135/89)  BP(mean): 98 (14 Oct 2021 11:46) (78 - 106)  RR: 23 (14 Oct 2021 11:46) (17 - 53)  SpO2: 98% (14 Oct 2021 11:46) (91% - 98%)    I&O's Summary    13 Oct 2021 07:01  -  14 Oct 2021 07:00  --------------------------------------------------------  IN: 1757.8 mL / OUT: 625 mL / NET: 1132.8 mL    14 Oct 2021 07:01  -  14 Oct 2021 13:37  --------------------------------------------------------  IN: 300 mL / OUT: 0 mL / NET: 300 mL        --------------------------------------------------------------------------------------------------------  LABS:                               10.0   18.59 )-----------( 219      ( 14 Oct 2021 05:07 )             32.1       10-14    142  |  107  |  124<HH>  ----------------------------<  162<H>  4.5   |  20  |  1.7<H>    Ca    7.8<L>      14 Oct 2021 05:07  Phos  5.3     10-14  Mg     2.6     10-14    TPro  5.7<L>  /  Alb  1.7<L>  /  TBili  0.9  /  DBili  0.5<H>  /  AST  52<H>  /  ALT  54<H>  /  AlkPhos  254<H>  10-14      PT/INR - ( 14 Oct 2021 05:07 )   PT: 21.40 sec;   INR: 1.87 ratio             ABG - ( 12 Oct 2021 13:40 )  pH, Arterial: 7.49  pH, Blood: x     /  pCO2: 38    /  pO2: 80    / HCO3: 29    / Base Excess: 5.3   /  SaO2: 99.0                CULTURE RESULTS:                10-12-21 @ 09:34  Specimen Source: --  Method Type: --  Gram Stain - RRL: --  Gram Stain - Wound: --  Bacteria: --  Culture Results:   Numerous Stenotrophomonas maltophilia      Specimen Source:   Method Type:   Gram Stain:   Culture Results: Culture Results:   Numerous Stenotrophomonas maltophilia (10-12-21 @ 09:34)    Bacteria:       -------------------------------------------------------------------------------------------------  RADIOLOGY:  < from: CT Abdomen and Pelvis w/ IV Cont (10.04.21 @ 04:30) >    HEPATOBILIARY: The liver demonstrates morphologic changes of cirrhosis. There are secondary signs of portal hypertension such as paraesophageal varices. There is diffuse heterogeneity of the liver with innumerable hypodensities, inherently limited in evaluation on this single phase of CT contrast. A more focal 8 cm region of hypoattenuation in the posterior right hepatic lobe may correspond to the provided history of HCC, again limited. There is contrast filling of the splenic vein and SMV with hypodense main portal vein, left main and right portal veins compatible with thrombus. Cholelithiasis    < end of copied text >          ---------------------------------------------------------------------------------------------------  ASSESSMENT & PLAN:   · Assessment	  IMPRESSION:    Acute resp failure sp intubatin  Hepatic encephalopathy  Decompensated liver cirrhosis. Has [place] catheter  Sepsis   Hematemesis ( esophageal/ gastric varices)  Acute portal Vein thrombosis   Hyperkalemia with sustained VT s/p shock  Hyponatremia likely from volume overload  Hepatocellular carcinoma with transaminitis on chemotherapy  Hepatitis C treated with INF  COPD  HAGMA  EBER worsening      PLAN:    CNS: no sedation       HEENT: Oral care    PULMONARY:  HOB @ 45 degrees  continue O2 as necessary to maintain sats 92 to 96%  sputum cx steno so start levaquine if Qt normal on EKG   >>started; f/u ID     CARDIOVASCULAR: Keep I < O ,   propranolol 5q 12 keep HR 50-60    GI: GI prophylaxis. diet as per speech and swallow - dysphagia 1  lactulose PO  GI management / NGT  bowel regimen aim 3 BM/day ,   follow GI   follow nutrition to taper tpn   possible drain [place] deanna     RENAL:  follow renal   low K diet,   Follow up lytes.    Correct as needed,  follwo bun, cr     INFECTIOUS DISEASE:   ABX PER ID  add levaquin if AQTC with normal   do EKG now - qt 450  follow cx       HEMATOLOGICAL:  dvt prophylaxis - heparin    ENDOCRINE:    Follow up FS.    Insulin protocol if needed.   keep -180.   avoid hypoglycemia    MUSCULOSKELETAL: bed rest     oob to chair   palliative care eval f/u    FOR FOLLOW UP:  [ ] heme onc f/u, goc conversation (supposed to be happening today)  [ ] f/u ID recs, f/u GI recs  [ ] taper tpn starting today Transfer Note    Transfer from:  MICU/CCU    Transfer to: (  ) Medicine    (  ) Telemetry     (  ) RCU       ( x ) CEU    (  ) VENT                        (  ) Palliative    (  ) Stroke Unit    (  ) MICU    (  ) CCU    Signout given to: CEU intern    ----------------------------------------------------------------------------------------------------------  HPI / ICU COURSE:    HPI:  67 yo male, with h/o HTN, drug abuse, Hepatitis C s/p INF, Liver cirrhosis s/p [place] shunt, COPD, DVT? on Eliquis, hepatocellular carcinoma since January 2021 on chemotherapy, presented for weakness  Patient reports having weakness, decreased po intake since few weeks. He also has not been sleeping. He reports constipation and hematemesis for 1 day, minimal amount.  Denies fever, chills, chest pain, cough, sputum , SOB, diarrhea, dysuria  He has a [place] shunt  that was drained one day prior to admission    ED course : mental status alteration, sustained VT  bpm with BP 82/49 mmHg s/p shock + calcium gluconate  Patient has Hyperkalemia 6.8 CO2 14 s/p Bicarb drip, renal called, sp berry with good UO   (04 Oct 2021 06:15)    MICU:  s/p cardioversion, egd and intubation to protect airway. Pt being followed by GI, hemeonc, nephrology and ID along with critical care. GI recommended medical management of hepatic encephalopathy with rifaximin and lactulose (ammonia 185) and octreotide for esophageal varices. Emergent egd findings were suggestive of portal hypertensive gastropathy. Pt was put on cefepime, flagyl and caspofungin empirically. Cultures were all negative. Pt was switched to rocephin for spontaneous bacterial peritonitis prophylaxis (continue to end of hospital course). DTA showed gram neg rods and pt started on levaquin 10/14. Pt mental status improved and was extubated 10/12. Pt has chronic tachycardia likely secondary to decompensated liver cirrhosis. WBC count was recently trending up and topped at 22, 18 today. Pan cultures were sent. Pt has a [place] catheter and has been therapuetically tapped for massive ascites several times. 7 liters have been drained. Pt had expressed wishes to stop all treatment. Palliative consulted, recommend GOC conversation with pt and oncologist. Pt wishes are clashing with his significant other who believes he is "giving up"  Please follow up with ID for sputum culture results and abx recs (levaquin started). Follow up with nutrition recs (add ensure and milk to diet). F/u with GI for further recommendations for management of decompensated cirrhosis. Pt currently stable enough to be downgraded to CEU.         --------------------------------------------------------------------------------------------------------  PMH/PSH:  PAST MEDICAL & SURGICAL HISTORY:  HTN (hypertension)    Hepatitis C  2007    Drug abuse    Cancer, hepatocellular  January 2021    COPD, mild        -------------------------------------------------------------------------------------------------------  PHYSICAL EXAM:  General: NAD  HEENT: Atraumatic  Respiratory: CTAB  Cardiac: RRR  Abdomen: soft, non-tender, non-distended  Extremities: warm and well-perfused  Neuro: A+Ox4. No FND    Vital Signs Last 24 Hrs  T(C): 36.1 (14 Oct 2021 11:46), Max: 36.5 (14 Oct 2021 07:29)  T(F): 97 (14 Oct 2021 11:46), Max: 97.7 (14 Oct 2021 07:29)  HR: 110 (14 Oct 2021 11:46) (102 - 126)  BP: 120/85 (14 Oct 2021 11:46) (91/71 - 135/89)  BP(mean): 98 (14 Oct 2021 11:46) (78 - 106)  RR: 23 (14 Oct 2021 11:46) (17 - 53)  SpO2: 98% (14 Oct 2021 11:46) (91% - 98%)    I&O's Summary    13 Oct 2021 07:01  -  14 Oct 2021 07:00  --------------------------------------------------------  IN: 1757.8 mL / OUT: 625 mL / NET: 1132.8 mL    14 Oct 2021 07:01  -  14 Oct 2021 13:37  --------------------------------------------------------  IN: 300 mL / OUT: 0 mL / NET: 300 mL        --------------------------------------------------------------------------------------------------------  LABS:                               10.0   18.59 )-----------( 219      ( 14 Oct 2021 05:07 )             32.1       10-14    142  |  107  |  124<HH>  ----------------------------<  162<H>  4.5   |  20  |  1.7<H>    Ca    7.8<L>      14 Oct 2021 05:07  Phos  5.3     10-14  Mg     2.6     10-14    TPro  5.7<L>  /  Alb  1.7<L>  /  TBili  0.9  /  DBili  0.5<H>  /  AST  52<H>  /  ALT  54<H>  /  AlkPhos  254<H>  10-14      PT/INR - ( 14 Oct 2021 05:07 )   PT: 21.40 sec;   INR: 1.87 ratio             ABG - ( 12 Oct 2021 13:40 )  pH, Arterial: 7.49  pH, Blood: x     /  pCO2: 38    /  pO2: 80    / HCO3: 29    / Base Excess: 5.3   /  SaO2: 99.0                CULTURE RESULTS:                10-12-21 @ 09:34  Specimen Source: --  Method Type: --  Gram Stain - RRL: --  Gram Stain - Wound: --  Bacteria: --  Culture Results:   Numerous Stenotrophomonas maltophilia      Specimen Source:   Method Type:   Gram Stain:   Culture Results: Culture Results:   Numerous Stenotrophomonas maltophilia (10-12-21 @ 09:34)    Bacteria:       -------------------------------------------------------------------------------------------------  RADIOLOGY:  < from: CT Abdomen and Pelvis w/ IV Cont (10.04.21 @ 04:30) >    HEPATOBILIARY: The liver demonstrates morphologic changes of cirrhosis. There are secondary signs of portal hypertension such as paraesophageal varices. There is diffuse heterogeneity of the liver with innumerable hypodensities, inherently limited in evaluation on this single phase of CT contrast. A more focal 8 cm region of hypoattenuation in the posterior right hepatic lobe may correspond to the provided history of HCC, again limited. There is contrast filling of the splenic vein and SMV with hypodense main portal vein, left main and right portal veins compatible with thrombus. Cholelithiasis    < end of copied text >          ---------------------------------------------------------------------------------------------------  ASSESSMENT & PLAN:   · Assessment	  IMPRESSION:    Acute resp failure sp intubatin  Hepatic encephalopathy  Decompensated liver cirrhosis. Has [place] catheter  Sepsis   Hematemesis ( esophageal/ gastric varices)  Acute portal Vein thrombosis   Hyperkalemia with sustained VT s/p shock  Hyponatremia likely from volume overload  Hepatocellular carcinoma with transaminitis on chemotherapy  Hepatitis C treated with INF  COPD  HAGMA  EBER worsening      PLAN:    CNS: no sedation       HEENT: Oral care    PULMONARY:  HOB @ 45 degrees  continue O2 as necessary to maintain sats 92 to 96%  sputum cx steno so start levaquine if Qt normal on EKG   >>started; f/u ID     CARDIOVASCULAR: Keep I < O ,   propranolol 5q 12 keep HR 50-60    GI: GI prophylaxis. diet as per speech and swallow - dysphagia 1  lactulose PO  GI management / NGT  bowel regimen aim 3 BM/day ,   follow GI   follow nutrition to taper tpn   possible drain [place] deanna     RENAL:  follow renal   low K diet,   Follow up lytes.    Correct as needed,  follwo bun, cr     INFECTIOUS DISEASE:   ABX PER ID  add levaquin if AQTC with normal   do EKG now - qt 450  follow cx       HEMATOLOGICAL:  dvt prophylaxis - heparin    ENDOCRINE:    Follow up FS.    Insulin protocol if needed.   keep -180.   avoid hypoglycemia    MUSCULOSKELETAL: bed rest     oob to chair   palliative care eval f/u    FOR FOLLOW UP:  [ ] heme onc f/u, goc conversation (supposed to be happening today)  [ ] f/u ID recs, f/u GI recs  [ ] taper tpn starting today Transfer Note    Transfer from:  MICU/CCU    Transfer to: (  ) Medicine    (  ) Telemetry     (  ) RCU       ( x ) CEU    (  ) VENT                        (  ) Palliative    (  ) Stroke Unit    (  ) MICU    (  ) CCU    Signout given to: CEU intern    ----------------------------------------------------------------------------------------------------------  HPI / ICU COURSE:    HPI:  65 yo male, with h/o HTN, drug abuse, Hepatitis C s/p INF, Liver cirrhosis s/p [place] shunt, COPD, DVT? on Eliquis, hepatocellular carcinoma since January 2021 on chemotherapy, presented for weakness  Patient reports having weakness, decreased po intake since few weeks. He also has not been sleeping. He reports constipation and hematemesis for 1 day, minimal amount.  Denies fever, chills, chest pain, cough, sputum , SOB, diarrhea, dysuria  He has a [place] shunt  that was drained one day prior to admission    ED course : mental status alteration, sustained VT  bpm with BP 82/49 mmHg s/p shock + calcium gluconate  Patient has Hyperkalemia 6.8 CO2 14 s/p Bicarb drip, renal called, sp berry with good UO   (04 Oct 2021 06:15)    MICU:  s/p cardioversion, egd and intubation to protect airway. Pt being followed by GI, hemeonc, nephrology and ID along with critical care. GI recommended medical management of hepatic encephalopathy with rifaximin and lactulose (ammonia 185) and octreotide for esophageal varices. Emergent egd findings were suggestive of portal hypertensive gastropathy. varices were banded. Pt was put on cefepime, flagyl and caspofungin empirically. Cultures were all negative. Pt was switched to rocephin for spontaneous bacterial peritonitis prophylaxis (continue to end of hospital course). DTA showed gram neg rods and pt started on levaquin 10/14. Pt mental status improved and was extubated 10/12. Pt has chronic tachycardia likely secondary to decompensated liver cirrhosis. WBC count was recently trending up and topped at 22, 18 today. Pan cultures were sent. Pt has a [place] catheter and has been therapeutically tapped for massive ascites several times. 7 liters have been drained. Pt had expressed wishes to stop all treatment. Palliative consulted, recommend GOC conversation with pt and oncologist. Pt wishes are clashing with his significant other who believes he is "giving up." GOC was discussed today with pt and oncologist. Pt willing to continue with hospital treatment at this time, focusing on improving his functional status, with nutrition recs, and Physical therapy. He would also like help obtaining medicaid for future treatment and hospital care.    Please follow up with ID for sputum culture results and abx recs (levaquin started). Follow up with nutrition recs (add ensure and milk to diet). F/u with GI for further recommendations for management of decompensated cirrhosis. Pt currently stable enough to be downgraded to CEU.         --------------------------------------------------------------------------------------------------------  PMH/PSH:  PAST MEDICAL & SURGICAL HISTORY:  HTN (hypertension)    Hepatitis C  2007    Drug abuse    Cancer, hepatocellular  January 2021    COPD, mild        -------------------------------------------------------------------------------------------------------  PHYSICAL EXAM:  General: NAD  HEENT: Atraumatic  Respiratory: CTAB  Cardiac: RRR  Abdomen: soft, non-tender, non-distended  Extremities: warm and well-perfused  Neuro: A+Ox4. No FND    Vital Signs Last 24 Hrs  T(C): 36.1 (14 Oct 2021 11:46), Max: 36.5 (14 Oct 2021 07:29)  T(F): 97 (14 Oct 2021 11:46), Max: 97.7 (14 Oct 2021 07:29)  HR: 110 (14 Oct 2021 11:46) (102 - 126)  BP: 120/85 (14 Oct 2021 11:46) (91/71 - 135/89)  BP(mean): 98 (14 Oct 2021 11:46) (78 - 106)  RR: 23 (14 Oct 2021 11:46) (17 - 53)  SpO2: 98% (14 Oct 2021 11:46) (91% - 98%)    I&O's Summary    13 Oct 2021 07:01  -  14 Oct 2021 07:00  --------------------------------------------------------  IN: 1757.8 mL / OUT: 625 mL / NET: 1132.8 mL    14 Oct 2021 07:01  -  14 Oct 2021 13:37  --------------------------------------------------------  IN: 300 mL / OUT: 0 mL / NET: 300 mL        --------------------------------------------------------------------------------------------------------  LABS:                               10.0   18.59 )-----------( 219      ( 14 Oct 2021 05:07 )             32.1       10-14    142  |  107  |  124<HH>  ----------------------------<  162<H>  4.5   |  20  |  1.7<H>    Ca    7.8<L>      14 Oct 2021 05:07  Phos  5.3     10-14  Mg     2.6     10-14    TPro  5.7<L>  /  Alb  1.7<L>  /  TBili  0.9  /  DBili  0.5<H>  /  AST  52<H>  /  ALT  54<H>  /  AlkPhos  254<H>  10-14      PT/INR - ( 14 Oct 2021 05:07 )   PT: 21.40 sec;   INR: 1.87 ratio             ABG - ( 12 Oct 2021 13:40 )  pH, Arterial: 7.49  pH, Blood: x     /  pCO2: 38    /  pO2: 80    / HCO3: 29    / Base Excess: 5.3   /  SaO2: 99.0                CULTURE RESULTS:                10-12-21 @ 09:34  Specimen Source: --  Method Type: --  Gram Stain - RRL: --  Gram Stain - Wound: --  Bacteria: --  Culture Results:   Numerous Stenotrophomonas maltophilia      Specimen Source:   Method Type:   Gram Stain:   Culture Results: Culture Results:   Numerous Stenotrophomonas maltophilia (10-12-21 @ 09:34)    Bacteria:       -------------------------------------------------------------------------------------------------  RADIOLOGY:  < from: CT Abdomen and Pelvis w/ IV Cont (10.04.21 @ 04:30) >    HEPATOBILIARY: The liver demonstrates morphologic changes of cirrhosis. There are secondary signs of portal hypertension such as paraesophageal varices. There is diffuse heterogeneity of the liver with innumerable hypodensities, inherently limited in evaluation on this single phase of CT contrast. A more focal 8 cm region of hypoattenuation in the posterior right hepatic lobe may correspond to the provided history of HCC, again limited. There is contrast filling of the splenic vein and SMV with hypodense main portal vein, left main and right portal veins compatible with thrombus. Cholelithiasis    < end of copied text >          ---------------------------------------------------------------------------------------------------  ASSESSMENT & PLAN:   · Assessment	  IMPRESSION:    Acute resp failure sp intubatin  Hepatic encephalopathy  Decompensated liver cirrhosis. Has [place] catheter  Sepsis   Hematemesis ( esophageal/ gastric varices)  Acute portal Vein thrombosis   Hyperkalemia with sustained VT s/p shock  Hyponatremia likely from volume overload  Hepatocellular carcinoma with transaminitis on chemotherapy  Hepatitis C treated with INF  COPD  HAGMA  EBER worsening      PLAN:    CNS: no sedation       HEENT: Oral care    PULMONARY:  HOB @ 45 degrees  continue O2 as necessary to maintain sats 92 to 96%  sputum cx steno so start levaquine if Qt normal on EKG   >>started; f/u ID     CARDIOVASCULAR: Keep I < O ,   propranolol 5q 12 keep HR 50-60    GI: GI prophylaxis. diet as per speech and swallow - dysphagia 1  lactulose PO  GI management / NGT  bowel regimen aim 3 BM/day ,   follow GI   follow nutrition to taper tpn   possible drain den[place] deanna     RENAL:  follow renal   low K diet,   Follow up lytes.    Correct as needed,  follwo bun, cr     INFECTIOUS DISEASE:   ABX PER ID  add levaquin if AQTC with normal   do EKG now - qt 450  follow cx       HEMATOLOGICAL:  dvt prophylaxis - heparin    ENDOCRINE:    Follow up FS.    Insulin protocol if needed.   keep -180.   avoid hypoglycemia    MUSCULOSKELETAL: bed rest     oob to chair   palliative care eval f/u    FOR FOLLOW UP:  [ ] decompensated cirrhosis/varices/HE: f/u GI recs/ rifaxamin/lactulose  [ ] HCC f/u heme onc/ will need to be stabilized medically before restarting chemo  [ ] social determinants: reach out to social work to help pt obtain medicaid for covering future chemotherapy tx  [ ] ascites: therapeutic taps/ frequency per pulm attending  [ ] gram neg rods sputum: levaquin renally dosed  [ ] sbp prophylaxis: rocephin while hospitalized    VERY IMPORTANT HAVE SOCIAL WORK LOOK INTO PT MEDICAID STATUS AND HELP ON OBTAINING COVERAGE. WITHOUT COVERAGE HE WILL NOT BE ABLE TO OBTAIN CHEMO TREATMENT (WHERE HE RESPONDED WELL) WITHOUT TREATMENT HE IS LOOKING AT 2-3 MONTH LIFE EXPECTANCY Transfer Note    Transfer from:  MICU/CCU    Transfer to: (  ) Medicine    (  ) Telemetry     (  ) RCU       ( x ) CEU    (  ) VENT                        (  ) Palliative    (  ) Stroke Unit    (  ) MICU    (  ) CCU    Signout given to: CEU intern    ----------------------------------------------------------------------------------------------------------  HPI / ICU COURSE:    HPI:  65 yo male, with h/o HTN, drug abuse, Hepatitis C s/p INF, Liver cirrhosis s/p [place] shunt, COPD, DVT? on Eliquis, hepatocellular carcinoma since January 2021 on chemotherapy, presented for weakness  Patient reports having weakness, decreased po intake since few weeks. He also has not been sleeping. He reports constipation and hematemesis for 1 day, minimal amount.  Denies fever, chills, chest pain, cough, sputum , SOB, diarrhea, dysuria  He has a [place] shunt  that was drained one day prior to admission    ED course : mental status alteration, sustained VT  bpm with BP 82/49 mmHg s/p shock + calcium gluconate  Patient has Hyperkalemia 6.8 CO2 14 s/p Bicarb drip, renal called, sp berry with good UO   (04 Oct 2021 06:15)    MICU:  s/p cardioversion, egd and intubation to protect airway. Pt being followed by GI, hemeonc, nephrology and ID along with critical care. GI recommended medical management of hepatic encephalopathy with rifaximin and lactulose (ammonia 185) and octreotide for esophageal varices. Emergent egd findings were suggestive of portal hypertensive gastropathy. varices were banded. Pt was put on cefepime, flagyl and caspofungin empirically. Cultures were all negative. Pt was switched to rocephin for spontaneous bacterial peritonitis prophylaxis (continue to end of hospital course). DTA showed gram neg rods and pt started on levaquin 10/14. Pt mental status improved and was extubated 10/12. Pt has chronic tachycardia likely secondary to decompensated liver cirrhosis. WBC count was recently trending up and topped at 22, 18 today. Pan cultures were sent. Pt has a [place] catheter and has been therapeutically tapped for massive ascites several times. 7 liters have been drained. Pt had expressed wishes to stop all treatment. Palliative consulted, recommend GOC conversation with pt and oncologist. Pt wishes are clashing with his significant other who believes he is "giving up." GOC was discussed today with pt and oncologist. Pt willing to continue with hospital treatment at this time, focusing on improving his functional status, with nutrition recs, and Physical therapy. He would also like help obtaining medicaid for future treatment and hospital care.    Please follow up with ID for sputum culture results and abx recs (levaquin started). Follow up with nutrition recs (add ensure and milk to diet). F/u with GI for further recommendations for management of decompensated cirrhosis. Pt currently stable enough to be downgraded to CEU.         --------------------------------------------------------------------------------------------------------  PMH/PSH:  PAST MEDICAL & SURGICAL HISTORY:  HTN (hypertension)    Hepatitis C  2007    Drug abuse    Cancer, hepatocellular  January 2021    COPD, mild        -------------------------------------------------------------------------------------------------------  PHYSICAL EXAM:  General: NAD  HEENT: Atraumatic  Respiratory: CTAB  Cardiac: RRR  Abdomen: soft, non-tender, non-distended  Extremities: warm and well-perfused  Neuro: A+Ox4. No FND    Vital Signs Last 24 Hrs  T(C): 36.1 (14 Oct 2021 11:46), Max: 36.5 (14 Oct 2021 07:29)  T(F): 97 (14 Oct 2021 11:46), Max: 97.7 (14 Oct 2021 07:29)  HR: 110 (14 Oct 2021 11:46) (102 - 126)  BP: 120/85 (14 Oct 2021 11:46) (91/71 - 135/89)  BP(mean): 98 (14 Oct 2021 11:46) (78 - 106)  RR: 23 (14 Oct 2021 11:46) (17 - 53)  SpO2: 98% (14 Oct 2021 11:46) (91% - 98%)    I&O's Summary    13 Oct 2021 07:01  -  14 Oct 2021 07:00  --------------------------------------------------------  IN: 1757.8 mL / OUT: 625 mL / NET: 1132.8 mL    14 Oct 2021 07:01  -  14 Oct 2021 13:37  --------------------------------------------------------  IN: 300 mL / OUT: 0 mL / NET: 300 mL        --------------------------------------------------------------------------------------------------------  LABS:                               10.0   18.59 )-----------( 219      ( 14 Oct 2021 05:07 )             32.1       10-14    142  |  107  |  124<HH>  ----------------------------<  162<H>  4.5   |  20  |  1.7<H>    Ca    7.8<L>      14 Oct 2021 05:07  Phos  5.3     10-14  Mg     2.6     10-14    TPro  5.7<L>  /  Alb  1.7<L>  /  TBili  0.9  /  DBili  0.5<H>  /  AST  52<H>  /  ALT  54<H>  /  AlkPhos  254<H>  10-14      PT/INR - ( 14 Oct 2021 05:07 )   PT: 21.40 sec;   INR: 1.87 ratio             ABG - ( 12 Oct 2021 13:40 )  pH, Arterial: 7.49  pH, Blood: x     /  pCO2: 38    /  pO2: 80    / HCO3: 29    / Base Excess: 5.3   /  SaO2: 99.0                CULTURE RESULTS:                10-12-21 @ 09:34  Specimen Source: --  Method Type: --  Gram Stain - RRL: --  Gram Stain - Wound: --  Bacteria: --  Culture Results:   Numerous Stenotrophomonas maltophilia      Specimen Source:   Method Type:   Gram Stain:   Culture Results: Culture Results:   Numerous Stenotrophomonas maltophilia (10-12-21 @ 09:34)    Bacteria:       -------------------------------------------------------------------------------------------------  RADIOLOGY:  < from: CT Abdomen and Pelvis w/ IV Cont (10.04.21 @ 04:30) >    HEPATOBILIARY: The liver demonstrates morphologic changes of cirrhosis. There are secondary signs of portal hypertension such as paraesophageal varices. There is diffuse heterogeneity of the liver with innumerable hypodensities, inherently limited in evaluation on this single phase of CT contrast. A more focal 8 cm region of hypoattenuation in the posterior right hepatic lobe may correspond to the provided history of HCC, again limited. There is contrast filling of the splenic vein and SMV with hypodense main portal vein, left main and right portal veins compatible with thrombus. Cholelithiasis    < end of copied text >          ---------------------------------------------------------------------------------------------------  ASSESSMENT & PLAN:   · Assessment	  IMPRESSION:    Acute resp failure sp intubatin  Hepatic encephalopathy  Decompensated liver cirrhosis. Has Denver catheter  Sepsis   Hematemesis ( esophageal/ gastric varices)  Acute portal Vein thrombosis   Hyperkalemia with sustained VT s/p shock  Hyponatremia likely from volume overload  Hepatocellular carcinoma with transaminitis on chemotherapy  Hepatitis C treated with INF  COPD  HAGMA  EBER worsening      PLAN:    CNS: no sedation       HEENT: Oral care    PULMONARY:  HOB @ 45 degrees  continue O2 as necessary to maintain sats 92 to 96%  sputum cx steno so start levaquine if Qt normal on EKG   >>started; f/u ID     CARDIOVASCULAR: Keep I < O ,   propranolol 5q 12 keep HR 50-60    GI: GI prophylaxis. diet as per speech and swallow - dysphagia 1  lactulose PO  GI management / NGT  bowel regimen aim 3 BM/day ,   follow GI   follow nutrition to taper tpn   possible drain denver deanna     RENAL:  follow renal   low K diet,   Follow up lytes.    Correct as needed,  follwo bun, cr     INFECTIOUS DISEASE:   ABX PER ID  add levaquin if AQTC with normal   do EKG now - qt 450  follow cx       HEMATOLOGICAL:  dvt prophylaxis - heparin    ENDOCRINE:    Follow up FS.    Insulin protocol if needed.   keep -180.   avoid hypoglycemia    MUSCULOSKELETAL: bed rest     oob to chair   palliative care eval f/u    FOR FOLLOW UP:  [ ] decompensated cirrhosis/varices/HE: f/u GI recs/ rifaxamin/lactulose  [ ] HCC f/u heme onc/ will need to be stabilized medically before restarting chemo  [ ] social determinants: reach out to social work to help pt obtain medicaid for covering future chemotherapy tx  [ ] taper tpn per [place], remove central line after  [ ] ascites: therapeutic taps/ frequency per pulm attending  [ ] gram neg rods sputum: levaquin renally dosed  [ ] sbp prophylaxis: rocephin while hospitalized    VERY IMPORTANT HAVE SOCIAL WORK LOOK INTO PT MEDICAID STATUS AND HELP ON OBTAINING COVERAGE. WITHOUT COVERAGE HE WILL NOT BE ABLE TO OBTAIN CHEMO TREATMENT (WHERE HE RESPONDED WELL) WITHOUT TREATMENT HE IS LOOKING AT 2-3 MONTH LIFE EXPECTANCY

## 2021-10-14 NOTE — PROGRESS NOTE ADULT - SUBJECTIVE AND OBJECTIVE BOX
Patient is a 66y old  Male who presents with a chief complaint of weakness  Hyperkalemia  VT (14 Oct 2021 07:15)    HPI:  65 yo male, with h/o HTN, drug abuse, Hepatitis C s/p INF, Liver cirrhosis s/p [place] shunt, COPD, DVT? on Eliquis, HCC since 2021 on chemotherapy, presented for weakness  Patient reports having weakness, decreased po intake since few weeks. He also has not been sleeping. He reports constipation and hematemesis for 1 day, minimal amount.  Denies fever, chills, chest pain, cough, sputum , SOB, diarrhea, dysuria  He has a [place] shunt  that was drained one day prior to admission    ED course : mental status alteration, sustained VT  bpm with BP 82/49 mmHg s/p shock + calcium gluconate  Patient has Hyperkalemia 6.8 CO2 14 s/p Bicarb drip, renal called, sp berry with good UO   (04 Oct 2021 06:15)      CCU course   10/05/2021: Pt had another episode of hematemesis overnight. Hgb was 5.5 from 7.9 this AM so Pt was given 2 units of blood. Hgb now stable at 9.8. BP was also low at 88/46 so Pt was given 1L NS bolus. Pt currently intubated.   10/06/2021 Patient had 5 episodes of melena overnight, patient remains hypotensive, patient is easily arousable. Patient was febrile over night. SAT and SBT was tried. Patient was able to stay off sedation through out the day but was lethargic Failed SBT   10/7/2021: Patient is now having brown stool. Patient became agitated overnight and needed to be sedated overnight. Will be retry SAT then SBT today.  10/8/2021: 3L drained from Denver cath.  10/11/2021: 2 L drained from abdominal cath, pt mental status improving, no NG tube for HE at this time, will reassess. Pt passed SBT but had excess secretions  10/12/2021: 2 L drained from denver cath, pt mental status improved, pending speech and swallow eval for starting PO diet, lactulose enema for now until s/s recs, pt also has DTPI to tip of coccyx, wound care recs ordered  10/13/2021- states he no longer wants treatment is tired and wants things to end, (no active suicidal ideation, intent or plan)       INTERVAL HPI/OVERNIGHT EVENTS:   No overnight events   Afebrile, hemodynamically stable. pt extubated and on NC.     Subjective:    ICU Vital Signs Last 24 Hrs  T(C): 36.5 (14 Oct 2021 07:29), Max: 37.1 (13 Oct 2021 12:00)  T(F): 97.7 (14 Oct 2021 07:29), Max: 98.7 (13 Oct 2021 12:00)  HR: 108 (14 Oct 2021 10:05) (102 - 126)  BP: 135/89 (14 Oct 2021 10:05) (82/64 - 135/89)  BP(mean): 103 (14 Oct 2021 10:05) (69 - 106)  ABP: --  ABP(mean): --  RR: 21 (14 Oct 2021 10:05) (17 - 53)  SpO2: 96% (14 Oct 2021 10:05) (91% - 98%)    I&O's Summary    13 Oct 2021 07:01  -  14 Oct 2021 07:00  --------------------------------------------------------  IN: 1757.8 mL / OUT: 625 mL / NET: 1132.8 mL    14 Oct 2021 07:01  -  14 Oct 2021 11:16  --------------------------------------------------------  IN: 200 mL / OUT: 0 mL / NET: 200 mL          Daily     Daily Weight in k.7 (14 Oct 2021 06:00)    Adult Advanced Hemodynamics Last 24 Hrs  CVP(mm Hg): --  CVP(cm H2O): --  CO: --  CI: --  PA: --  PA(mean): --  PCWP: --  SVR: --  SVRI: --  PVR: --  PVRI: --    EKG/Telemetry Events:    MEDICATIONS  (STANDING):  budesonide  80 MICROgram(s)/formoterol 4.5 MICROgram(s) Inhaler 2 Puff(s) Inhalation two times a day  cefTRIAXone   IVPB 1000 milliGRAM(s) IV Intermittent every 24 hours  chlorhexidine 4% Liquid 1 Application(s) Topical daily  dextrose 40% Gel 15 Gram(s) Oral once  dextrose 5%. 1000 milliLiter(s) (50 mL/Hr) IV Continuous <Continuous>  dextrose 5%. 1000 milliLiter(s) (100 mL/Hr) IV Continuous <Continuous>  dextrose 50% Injectable 25 Gram(s) IV Push once  dextrose 50% Injectable 12.5 Gram(s) IV Push once  dextrose 50% Injectable 25 Gram(s) IV Push once  glucagon  Injectable 1 milliGRAM(s) IntraMuscular once  heparin   Injectable 5000 Unit(s) SubCutaneous every 8 hours  insulin glargine Injectable (LANTUS) 20 Unit(s) SubCutaneous at bedtime  insulin lispro (ADMELOG) corrective regimen sliding scale   SubCutaneous three times a day before meals  insulin lispro Injectable (ADMELOG) 5 Unit(s) SubCutaneous three times a day before meals  lactulose Syrup 15 Gram(s) Oral three times a day  norepinephrine Infusion 0.05 MICROgram(s)/kG/Min (7.01 mL/Hr) IV Continuous <Continuous>  pantoprazole  Injectable 40 milliGRAM(s) IV Push every 12 hours  Parenteral Nutrition - Adult 1 Each (50 mL/Hr) TPN Continuous <Continuous>  propranolol 5 milliGRAM(s) Oral daily  rifAXIMin 550 milliGRAM(s) Oral two times a day    MEDICATIONS  (PRN):      PHYSICAL EXAM:  GENERAL:   HEAD:  Atraumatic, Normocephalic  EYES: EOMI, PERRLA, conjunctiva and sclera clear  NECK: Supple, No JVD, Normal thyroid, no enlarged nodes  NERVOUS SYSTEM:  Alert & Awake.   CHEST/LUNG: B/L good air entry; No rales, rhonchi, or wheezing  HEART: S1S2 normal, no S3, Regular rate and rhythm; No murmurs  ABDOMEN: Soft, Nontender, Nondistended; Bowel sounds present  EXTREMITIES:  2+ Peripheral Pulses, No clubbing, cyanosis, or edema  LYMPH: No lymphadenopathy noted  SKIN: No rashes or lesions    LABS:                        10.0   18.59 )-----------( 219      ( 14 Oct 2021 05:07 )             32.1     10-    142  |  107  |  124<HH>  ----------------------------<  162<H>  4.5   |  20  |  1.7<H>    Ca    7.8<L>      14 Oct 2021 05:07  Phos  5.3     10-14  Mg     2.6     10-14    TPro  5.7<L>  /  Alb  1.7<L>  /  TBili  0.9  /  DBili  0.5<H>  /  AST  52<H>  /  ALT  54<H>  /  AlkPhos  254<H>  10-14    LIVER FUNCTIONS - ( 14 Oct 2021 05:07 )  Alb: 1.7 g/dL / Pro: 5.7 g/dL / ALK PHOS: 254 U/L / ALT: 54 U/L / AST: 52 U/L / GGT: x           PT/INR - ( 14 Oct 2021 05:07 )   PT: 21.40 sec;   INR: 1.87 ratio           CAPILLARY BLOOD GLUCOSE      POCT Blood Glucose.: 153 mg/dL (14 Oct 2021 07:28)  POCT Blood Glucose.: 139 mg/dL (13 Oct 2021 21:24)  POCT Blood Glucose.: 205 mg/dL (13 Oct 2021 17:11)  POCT Blood Glucose.: 186 mg/dL (13 Oct 2021 12:24)    ABG - ( 12 Oct 2021 13:40 )  pH, Arterial: 7.49  pH, Blood: x     /  pCO2: 38    /  pO2: 80    / HCO3: 29    / Base Excess: 5.3   /  SaO2: 99.0                          RADIOLOGY & ADDITIONAL TESTS:  CXR:        Care Discussed with Consultants/Other Providers [ x] YES  [ ] NO

## 2021-10-14 NOTE — PROGRESS NOTE ADULT - ASSESSMENT
IMPRESSION:    Acute resp failure sp intubatin  Hepatic encephalopathy  Decompensated liver cirrhosis. Has [place] catheter  Sepsis   Hematemesis ( esophageal/ gastric varices)  Acute portal Vein thrombosis   Hyperkalemia with sustained VT s/p shock  Hyponatremia likely from volume overload  Hepatocellular carcinoma with transaminitis on chemotherapy  Hepatitis C treated with INF  COPD  HAGMA  EBER worsening      PLAN:    CNS: no sedation       HEENT: Oral care    PULMONARY:  HOB @ 45 degrees  continue O2 as necessary to maintain sats 92 to 96%  sputum cx steno so start levaquine if Qt normal on EKG      CARDIOVASCULAR: Keep I < O ,   propranolol 5q 12 keep HR 50-60      GI: GI prophylaxis. diet as per speech and swallow    lactulose PO  GI management / NGT  bowel regimen aim 3 BM/day ,   follow GI   follow nutrition to taper tpn   possible drain denver deanna     RENAL:  follow renal   low K diet,   Follow up lytes.    Correct as needed,  follwo bun, cr     INFECTIOUS DISEASE:   ABX PER ID  add levaquin if AQTC with normal   do EKG now   follow cx       HEMATOLOGICAL:  dvt prophylaxis    ENDOCRINE:    Follow up FS.    Insulin protocol if needed.   keep -180.   avoid hypoglycemia    MUSCULOSKELETAL: bed rest     prognosis grave    oob to chair   palliative care eval f/u

## 2021-10-15 LAB
ALBUMIN SERPL ELPH-MCNC: 1.9 G/DL — LOW (ref 3.5–5.2)
ALP SERPL-CCNC: 280 U/L — HIGH (ref 30–115)
ALT FLD-CCNC: 53 U/L — HIGH (ref 0–41)
ANION GAP SERPL CALC-SCNC: 16 MMOL/L — HIGH (ref 7–14)
AST SERPL-CCNC: 61 U/L — HIGH (ref 0–41)
BILIRUB DIRECT SERPL-MCNC: 0.7 MG/DL — HIGH (ref 0–0.2)
BILIRUB INDIRECT FLD-MCNC: 0.7 MG/DL — SIGNIFICANT CHANGE UP (ref 0.2–1.2)
BILIRUB SERPL-MCNC: 1.4 MG/DL — HIGH (ref 0.2–1.2)
BUN SERPL-MCNC: 133 MG/DL — CRITICAL HIGH (ref 10–20)
CALCIUM SERPL-MCNC: 8.2 MG/DL — LOW (ref 8.5–10.1)
CHLORIDE SERPL-SCNC: 105 MMOL/L — SIGNIFICANT CHANGE UP (ref 98–110)
CO2 SERPL-SCNC: 21 MMOL/L — SIGNIFICANT CHANGE UP (ref 17–32)
CREAT SERPL-MCNC: 2.1 MG/DL — HIGH (ref 0.7–1.5)
GLUCOSE BLDC GLUCOMTR-MCNC: 100 MG/DL — HIGH (ref 70–99)
GLUCOSE BLDC GLUCOMTR-MCNC: 134 MG/DL — HIGH (ref 70–99)
GLUCOSE BLDC GLUCOMTR-MCNC: 144 MG/DL — HIGH (ref 70–99)
GLUCOSE BLDC GLUCOMTR-MCNC: 178 MG/DL — HIGH (ref 70–99)
GLUCOSE SERPL-MCNC: 140 MG/DL — HIGH (ref 70–99)
HCT VFR BLD CALC: 35.2 % — LOW (ref 42–52)
HGB BLD-MCNC: 11 G/DL — LOW (ref 14–18)
INR BLD: 1.87 RATIO — HIGH (ref 0.65–1.3)
MAGNESIUM SERPL-MCNC: 2.8 MG/DL — HIGH (ref 1.8–2.4)
MCHC RBC-ENTMCNC: 29.5 PG — SIGNIFICANT CHANGE UP (ref 27–31)
MCHC RBC-ENTMCNC: 31.3 G/DL — LOW (ref 32–37)
MCV RBC AUTO: 94.4 FL — HIGH (ref 80–94)
NRBC # BLD: 0 /100 WBCS — SIGNIFICANT CHANGE UP (ref 0–0)
PHOSPHATE SERPL-MCNC: 6.1 MG/DL — HIGH (ref 2.1–4.9)
PLATELET # BLD AUTO: 288 K/UL — SIGNIFICANT CHANGE UP (ref 130–400)
POTASSIUM SERPL-MCNC: 5 MMOL/L — SIGNIFICANT CHANGE UP (ref 3.5–5)
POTASSIUM SERPL-SCNC: 5 MMOL/L — SIGNIFICANT CHANGE UP (ref 3.5–5)
PROT SERPL-MCNC: 6.3 G/DL — SIGNIFICANT CHANGE UP (ref 6–8)
PROTHROM AB SERPL-ACNC: 21.4 SEC — HIGH (ref 9.95–12.87)
RBC # BLD: 3.73 M/UL — LOW (ref 4.7–6.1)
RBC # FLD: 24.2 % — HIGH (ref 11.5–14.5)
SODIUM SERPL-SCNC: 142 MMOL/L — SIGNIFICANT CHANGE UP (ref 135–146)
WBC # BLD: 18.04 K/UL — HIGH (ref 4.8–10.8)
WBC # FLD AUTO: 18.04 K/UL — HIGH (ref 4.8–10.8)

## 2021-10-15 PROCEDURE — 99233 SBSQ HOSP IP/OBS HIGH 50: CPT

## 2021-10-15 RX ORDER — ALBUMIN HUMAN 25 %
25 VIAL (ML) INTRAVENOUS
Refills: 0 | Status: COMPLETED | OUTPATIENT
Start: 2021-10-15 | End: 2021-10-16

## 2021-10-15 RX ORDER — ALBUMIN HUMAN 25 %
30 VIAL (ML) INTRAVENOUS
Refills: 0 | Status: DISCONTINUED | OUTPATIENT
Start: 2021-10-15 | End: 2021-10-15

## 2021-10-15 RX ORDER — MIDODRINE HYDROCHLORIDE 2.5 MG/1
10 TABLET ORAL EVERY 8 HOURS
Refills: 0 | Status: DISCONTINUED | OUTPATIENT
Start: 2021-10-15 | End: 2021-11-02

## 2021-10-15 RX ADMIN — MIDODRINE HYDROCHLORIDE 10 MILLIGRAM(S): 2.5 TABLET ORAL at 21:53

## 2021-10-15 RX ADMIN — HEPARIN SODIUM 5000 UNIT(S): 5000 INJECTION INTRAVENOUS; SUBCUTANEOUS at 21:51

## 2021-10-15 RX ADMIN — Medication 250 GRAM(S): at 23:44

## 2021-10-15 RX ADMIN — Medication 5 UNIT(S): at 16:56

## 2021-10-15 RX ADMIN — LACTULOSE 15 GRAM(S): 10 SOLUTION ORAL at 05:45

## 2021-10-15 RX ADMIN — INSULIN GLARGINE 20 UNIT(S): 100 INJECTION, SOLUTION SUBCUTANEOUS at 22:00

## 2021-10-15 RX ADMIN — CEFTRIAXONE 100 MILLIGRAM(S): 500 INJECTION, POWDER, FOR SOLUTION INTRAMUSCULAR; INTRAVENOUS at 11:56

## 2021-10-15 RX ADMIN — PANTOPRAZOLE SODIUM 40 MILLIGRAM(S): 20 TABLET, DELAYED RELEASE ORAL at 05:45

## 2021-10-15 RX ADMIN — HEPARIN SODIUM 5000 UNIT(S): 5000 INJECTION INTRAVENOUS; SUBCUTANEOUS at 16:58

## 2021-10-15 RX ADMIN — PANTOPRAZOLE SODIUM 40 MILLIGRAM(S): 20 TABLET, DELAYED RELEASE ORAL at 16:59

## 2021-10-15 RX ADMIN — HEPARIN SODIUM 5000 UNIT(S): 5000 INJECTION INTRAVENOUS; SUBCUTANEOUS at 05:46

## 2021-10-15 RX ADMIN — Medication 1: at 16:55

## 2021-10-15 RX ADMIN — LACTULOSE 15 GRAM(S): 10 SOLUTION ORAL at 21:53

## 2021-10-15 NOTE — CHART NOTE - NSCHARTNOTEFT_GEN_A_CORE
Awake, alert. No new complaints  Afebrile  Toleraing PO diet but intake appears inadequate  Dislikes pureed food, is drinking Ensure and asking for milk  TPN off    T(F): 95.6 (10-15-21 @ 04:00), Max: 97 (10-14-21 @ 11:46)  HR: 107 (10-15-21 @ 04:00) (107 - 120)  BP: 106/72 (10-15-21 @ 04:00) (95/70 - 147/87)  RR: 20 (10-15-21 @ 04:00) (19 - 33)  SpO2: 99% (10-15-21 @ 04:00) (98% - 100%)    I&O's Detail    14 Oct 2021 07:01  -  15 Oct 2021 07:00  --------------------------------------------------------  IN:    IV PiggyBack: 100 mL    TPN (Total Parenteral Nutrition): 350 mL  Total IN: 450 mL    OUT:    Voided (mL): 275 mL  Total OUT: 275 mL    Total NET: 175 mL    10-15    142  |  105  |  133<HH>  ----------------------------<  140<H>  5.0   |  21  |  2.1<H>    Ca    8.2<L>      15 Oct 2021 04:30  Phos  6.1     10-15  Mg     2.8     10-15    TPro  6.3  /  Alb  1.9<L>  /  TBili  1.4<H>  /  DBili  0.7<H>  /  AST  61<H>  /  ALT  53<H>  /  AlkPhos  280<H>  10-15                          11.0   18.04 )-----------( 288      ( 15 Oct 2021 04:30 )             35.2     CAPILLARY BLOOD GLUCOSE  POCT Blood Glucose.: 134 mg/dL (15 Oct 2021 07:46)  POCT Blood Glucose.: 101 mg/dL (14 Oct 2021 21:14)  POCT Blood Glucose.: 239 mg/dL (14 Oct 2021 20:11)  POCT Blood Glucose.: 219 mg/dL (14 Oct 2021 15:02)  POCT Blood Glucose.: 196 mg/dL (14 Oct 2021 11:48)    Diet, Dysphagia 1 Pureed-Thin Liquids:   Prosource Gelatein 20 Sugar Free     Qty per Day:  1  Supplement Feeding Modality:  Oral  Ensure Enlive Servings Per Day:  1       Frequency:  Daily  Glucerna Shake Cans or Servings Per Day:  1       Frequency:  Daily  Ensure Compact Cans or Servings Per Day:  1       Frequency:  Daily (10-14-21 @ 12:03)    ASSESSMENT  67 yo male, with h/o HTN, drug abuse, Hepatitis C s/p INF, Liver cirrhosis s/p [place] shunt, COPD, DVT? on Eliquis, HCC since January 2021 on chemotherapy, presented for weakness    - advanced hepatocellular carcinoma with transaminitis on chemo  - decompensated cirrhosis, ascites with [place] cath  - hematemesis 2nd esophageal, gastric varices  - severe anemia secondary to variceal bleed  - EBER  - hyperkalemia with sustained VT s/p shock  - hep C  - fevers, leukocytosis  - COPD  - sepsis without clear source    PLAN  - TPN off  - PO diet as tolerated, advance to [facility] soft per speech if able  - cont Ensure Enlive, ensure compact, low sugar puddings, glucerna shake and SF Prosource gelatein to assess acceptance  - attn adequate protein intake, and encourage whey and egg protein sources (rather than meats) for now.

## 2021-10-15 NOTE — SWALLOW BEDSIDE ASSESSMENT ADULT - PHARYNGEAL PHASE
Delayed pharyngeal swallow/Wet vocal quality post oral intake/Cough post oral intake +delayed cough x1 w/ nectar-thick trials./Within functional limits

## 2021-10-15 NOTE — SWALLOW BEDSIDE ASSESSMENT ADULT - SWALLOW EVAL: DIAGNOSIS
+mild oral dysphagia for puree, mechanical soft, and nectar-thick liquids w/o immediate overt s/s aspiration/penetration; moderate oral dysphagia for thin liquids w/ overt s/s aspiration/penetration

## 2021-10-15 NOTE — PROGRESS NOTE ADULT - ASSESSMENT
IMPRESSION:    Acute resp failure sp extubation  Hepatic encephalopathy  Decompensated liver cirrhosis. Has [place] catheter ( being drained every 4 days 2 l)  Sepsis   Hematemesis ( esophageal/ gastric varices)  Acute portal Vein thrombosis   Hyperkalemia with sustained VT s/p shock  Hyponatremia likely from volume overload  Hepatocellular carcinoma with transaminitis on chemotherapy  Hepatitis C treated with INF  COPD  HAGMA  EBER worsening  stenotrophmonas      PLAN:    CNS: no sedation       HEENT: Oral care    PULMONARY:  HOB @ 45 degrees  continue O2 as necessary to maintain sats 88 to 94%     CARDIOVASCULAR: Keep I < O ,   propranolol      GI: GI prophylaxis. diet as per speech and swallow    lactulose PO  GI management  bowel regimen aim 3 BM/day ,   follow GI       RENAL:  follow renal   low K diet,   Follow up lytes.    Correct as needed,  follwo bun, cr     INFECTIOUS DISEASE:   ABX PER ID      follow cx       HEMATOLOGICAL:  dvt prophylaxis    ENDOCRINE:    Follow up FS.    Insulin protocol if needed.   keep -180.   avoid hypoglycemia    MUSCULOSKELETAL: bed rest     prognosis grave    oob to chair   palliative care eval f/u

## 2021-10-15 NOTE — SWALLOW BEDSIDE ASSESSMENT ADULT - SLP GENERAL OBSERVATIONS
pt received in bed awake confused +generalized weakness w/o c/o pain. +room air +girlfriend at bedside

## 2021-10-15 NOTE — PROGRESS NOTE ADULT - SUBJECTIVE AND OBJECTIVE BOX
SUZANNE BAZZI 66y Male  MRN#: [id number]   CODE STATUS: Full code    Hospital Day: 11d    Pt is currently admitted with the primary diagnosis of hepatic encephalopathy    SUBJECTIVE  Hospital Course  Patient presented with altered mental status, constipation, and hematemasis  Overnight events   none  Subjective complaints   Patient seems slow, awake   Present Today:   - Lorenz:  No [ x ], Yes [   ] : Indication:     - Type of IV Access:       .. CVC/Piccline:  No [x  ], Yes [   ] : Indication:       .. Midline: No [x  ], Yes [   ] : Indication:                                             ----------------------------------------------------------  OBJECTIVE  PAST MEDICAL & SURGICAL HISTORY  HTN (hypertension)    Hepatitis C  2007    Drug abuse    Cancer, hepatocellular  January 2021    COPD, mild                                              -----------------------------------------------------------  ALLERGIES:  No Known Allergies                                            ------------------------------------------------------------    HOME MEDICATIONS  Home Medications:  Eliquis 5 mg oral tablet: 1 tab(s) orally 2 times a day (04 Oct 2021 06:13)  fluticasone-salmeterol 55 mcg-14 mcg/inh inhalation powder: 1 puff(s) inhaled 2 times a day (04 Oct 2021 06:14)  Protonix 40 mg oral delayed release tablet: 1 tab(s) orally once a day (04 Oct 2021 06:13)  spironolactone 50 mg oral tablet: 1 tab(s) orally once a day (04 Oct 2021 06:13)                           MEDICATIONS:  STANDING MEDICATIONS  budesonide  80 MICROgram(s)/formoterol 4.5 MICROgram(s) Inhaler 2 Puff(s) Inhalation two times a day  cefTRIAXone   IVPB 1000 milliGRAM(s) IV Intermittent every 24 hours  chlorhexidine 4% Liquid 1 Application(s) Topical daily  dextrose 40% Gel 15 Gram(s) Oral once  dextrose 5%. 1000 milliLiter(s) IV Continuous <Continuous>  dextrose 5%. 1000 milliLiter(s) IV Continuous <Continuous>  dextrose 50% Injectable 25 Gram(s) IV Push once  dextrose 50% Injectable 12.5 Gram(s) IV Push once  dextrose 50% Injectable 25 Gram(s) IV Push once  glucagon  Injectable 1 milliGRAM(s) IntraMuscular once  heparin   Injectable 5000 Unit(s) SubCutaneous every 8 hours  insulin glargine Injectable (LANTUS) 20 Unit(s) SubCutaneous at bedtime  insulin lispro (ADMELOG) corrective regimen sliding scale   SubCutaneous three times a day before meals  insulin lispro Injectable (ADMELOG) 5 Unit(s) SubCutaneous three times a day before meals  lactulose Syrup 15 Gram(s) Oral three times a day  levoFLOXacin IVPB 750 milliGRAM(s) IV Intermittent every 48 hours  norepinephrine Infusion 0.05 MICROgram(s)/kG/Min IV Continuous <Continuous>  pantoprazole  Injectable 40 milliGRAM(s) IV Push every 12 hours  propranolol 5 milliGRAM(s) Oral daily  rifAXIMin 550 milliGRAM(s) Oral two times a day    PRN MEDICATIONS                                            ------------------------------------------------------------  VITAL SIGNS: Last 24 Hours  T(C): 35.3 (15 Oct 2021 04:00), Max: 36.1 (14 Oct 2021 11:46)  T(F): 95.6 (15 Oct 2021 04:00), Max: 97 (14 Oct 2021 11:46)  HR: 107 (15 Oct 2021 04:00) (107 - 120)  BP: 106/72 (15 Oct 2021 04:00) (95/70 - 147/87)  BP(mean): 111 (14 Oct 2021 16:55) (98 - 111)  RR: 20 (15 Oct 2021 04:00) (19 - 33)  SpO2: 99% (15 Oct 2021 04:00) (98% - 100%)      10-14-21 @ 07:01  -  10-15-21 @ 07:00  --------------------------------------------------------  IN: 450 mL / OUT: 275 mL / NET: 175 mL                                             --------------------------------------------------------------  LABS:                        11.0   18.04 )-----------( 288      ( 15 Oct 2021 04:30 )             35.2     10-15    142  |  105  |  133<HH>  ----------------------------<  140<H>  5.0   |  21  |  2.1<H>    Ca    8.2<L>      15 Oct 2021 04:30  Phos  6.1     10-15  Mg     2.8     10-15    TPro  6.3  /  Alb  1.9<L>  /  TBili  1.4<H>  /  DBili  0.7<H>  /  AST  61<H>  /  ALT  53<H>  /  AlkPhos  280<H>  10-15    PT/INR - ( 15 Oct 2021 04:30 )   PT: 21.40 sec;   INR: 1.87 ratio                     Culture - Urine (collected 13 Oct 2021 16:00)  Source: Clean Catch Clean Catch (Midstream)  Final Report (14 Oct 2021 22:31):    <10,000 CFU/mL Normal Urogenital Chayo    Culture - Blood (collected 13 Oct 2021 12:09)  Source: .Blood None  Preliminary Report (14 Oct 2021 23:01):    No growth to date.                                                    -------------------------------------------------------------  RADIOLOGY:                                            --------------------------------------------------------------    PHYSICAL EXAM:  General: alert oriented x2  HEENT: non remarkable  LUNGS: clear air sounds  HEART: RRR  ABDOMEN: distended, bulging flanks. positive shifting dullness, wall body edema  EXT: +3 edema                                             --------------------------------------------------------------    ASSESSMENT & PLAN    65 yo male, with h/o HTN, drug abuse, Hepatitis C s/p INF, Liver cirrhosis s/p [place] shunt, COPD, DVT? on Eliquis, hepatocellular carcinoma since January 2021 on chemotherapy, presented for weakness and decreased oral intake along with constipation and hematemasis with ammonia (188) on presentation, Impression: Hepatic Encephalopathy    1- Hepatic Encephalopathy:  - Ammonia 188 on presentation  - Patient on rifaximin and lactulose to aim for 3 BM/day  - Mental status improved  - Patient had ascites that was drained through a [place] catheter, removed > 7 liters of fluid, patient expected to build up again in light of his liver decompensation (cirrhosis)  - No SBP based on fluid studies and culture, need only for prophylaxis  - IR consulted for TIPS: recommended against due to multiple contraindications and MELD score >20    2- Variceal Bleed (portal HTN)  - Patient was intubated for airway protection, now extubated  - Hematemesis that was followed by an emergent EGD that showed variceal bleeds that were ligated (in esophagus)  - Patient started on abx prophylaxis after bleed and prophylaxis for SBP: ceftriaxone  - Propranolol 5 mg daily  - Protonix 40 mg IV BID  - Follow up Hb closely    3- HCC:  - Diagnosed in Jan 2021  - Was on chemotherapy to which he developed transaminitis  - Heme/Onc following the patient and recommended:  At present only palliative care.  If his performance status improves with nutrition and physical therapy, we will reevaluate for cancer therapy.  Please let the  see the patient for help with his health insurance issues  - Will follow up with Heme/onc    4- Septic shock:  - Patient had septic shock to which he was started on levophed on 10/4 and was stopped on the 10th of Oct  - Blood cultures are negative so far  - DTA culture showed Stenotrophomonas that is sensitive to levofloxacin: Levofloxacin started on 10/14  - Now resolved    5- Portal Vein Thrombosis:  - Patient currently on prophylactic dose of Heparin  - No bleeding since hematemesis, will consider possibility of full anticoagualtion    6- COPD:  - patient on budesonide/formoterol nebulization    7- Hep C:  - treated with INF                                                                                      ----------------------------------------------------  # DVT prophylaxis : Heparin 5000 q 8 hrs    # GI prophylaxis: protonix 40 mg IV BID     # Diet: Dysphagia 1 Pureed Thin Liquids    # Activity Score (AM-PAC)    # Code status : full code    # Disposition : acute                                                                             --------------------------------------------------------         SUZANNE BAZZI 66y Male  MRN#: [id number]   CODE STATUS: Full code    Hospital Day: 11d    Pt is currently admitted with the primary diagnosis of hepatic encephalopathy    SUBJECTIVE  Hospital Course  Patient presented with altered mental status, constipation, and hematemasis  Overnight events   none  Subjective complaints   Patient seems slow, awake   Present Today:   - Lorenz:  No [ x ], Yes [   ] : Indication:     - Type of IV Access:       .. CVC/Piccline:  No [x  ], Yes [   ] : Indication:       .. Midline: No [x  ], Yes [   ] : Indication:                                             ----------------------------------------------------------  OBJECTIVE  PAST MEDICAL & SURGICAL HISTORY  HTN (hypertension)    Hepatitis C  2007    Drug abuse    Cancer, hepatocellular  January 2021    COPD, mild                                              -----------------------------------------------------------  ALLERGIES:  No Known Allergies                                            ------------------------------------------------------------    HOME MEDICATIONS  Home Medications:  Eliquis 5 mg oral tablet: 1 tab(s) orally 2 times a day (04 Oct 2021 06:13)  fluticasone-salmeterol 55 mcg-14 mcg/inh inhalation powder: 1 puff(s) inhaled 2 times a day (04 Oct 2021 06:14)  Protonix 40 mg oral delayed release tablet: 1 tab(s) orally once a day (04 Oct 2021 06:13)  spironolactone 50 mg oral tablet: 1 tab(s) orally once a day (04 Oct 2021 06:13)                           MEDICATIONS:  STANDING MEDICATIONS  budesonide  80 MICROgram(s)/formoterol 4.5 MICROgram(s) Inhaler 2 Puff(s) Inhalation two times a day  cefTRIAXone   IVPB 1000 milliGRAM(s) IV Intermittent every 24 hours  chlorhexidine 4% Liquid 1 Application(s) Topical daily  dextrose 40% Gel 15 Gram(s) Oral once  dextrose 5%. 1000 milliLiter(s) IV Continuous <Continuous>  dextrose 5%. 1000 milliLiter(s) IV Continuous <Continuous>  dextrose 50% Injectable 25 Gram(s) IV Push once  dextrose 50% Injectable 12.5 Gram(s) IV Push once  dextrose 50% Injectable 25 Gram(s) IV Push once  glucagon  Injectable 1 milliGRAM(s) IntraMuscular once  heparin   Injectable 5000 Unit(s) SubCutaneous every 8 hours  insulin glargine Injectable (LANTUS) 20 Unit(s) SubCutaneous at bedtime  insulin lispro (ADMELOG) corrective regimen sliding scale   SubCutaneous three times a day before meals  insulin lispro Injectable (ADMELOG) 5 Unit(s) SubCutaneous three times a day before meals  lactulose Syrup 15 Gram(s) Oral three times a day  levoFLOXacin IVPB 750 milliGRAM(s) IV Intermittent every 48 hours  norepinephrine Infusion 0.05 MICROgram(s)/kG/Min IV Continuous <Continuous>  pantoprazole  Injectable 40 milliGRAM(s) IV Push every 12 hours  propranolol 5 milliGRAM(s) Oral daily  rifAXIMin 550 milliGRAM(s) Oral two times a day    PRN MEDICATIONS                                            ------------------------------------------------------------  VITAL SIGNS: Last 24 Hours  T(C): 35.3 (15 Oct 2021 04:00), Max: 36.1 (14 Oct 2021 11:46)  T(F): 95.6 (15 Oct 2021 04:00), Max: 97 (14 Oct 2021 11:46)  HR: 107 (15 Oct 2021 04:00) (107 - 120)  BP: 106/72 (15 Oct 2021 04:00) (95/70 - 147/87)  BP(mean): 111 (14 Oct 2021 16:55) (98 - 111)  RR: 20 (15 Oct 2021 04:00) (19 - 33)  SpO2: 99% (15 Oct 2021 04:00) (98% - 100%)      10-14-21 @ 07:01  -  10-15-21 @ 07:00  --------------------------------------------------------  IN: 450 mL / OUT: 275 mL / NET: 175 mL                                             --------------------------------------------------------------  LABS:                        11.0   18.04 )-----------( 288      ( 15 Oct 2021 04:30 )             35.2     10-15    142  |  105  |  133<HH>  ----------------------------<  140<H>  5.0   |  21  |  2.1<H>    Ca    8.2<L>      15 Oct 2021 04:30  Phos  6.1     10-15  Mg     2.8     10-15    TPro  6.3  /  Alb  1.9<L>  /  TBili  1.4<H>  /  DBili  0.7<H>  /  AST  61<H>  /  ALT  53<H>  /  AlkPhos  280<H>  10-15    PT/INR - ( 15 Oct 2021 04:30 )   PT: 21.40 sec;   INR: 1.87 ratio                     Culture - Urine (collected 13 Oct 2021 16:00)  Source: Clean Catch Clean Catch (Midstream)  Final Report (14 Oct 2021 22:31):    <10,000 CFU/mL Normal Urogenital Chayo    Culture - Blood (collected 13 Oct 2021 12:09)  Source: .Blood None  Preliminary Report (14 Oct 2021 23:01):    No growth to date.                                                    -------------------------------------------------------------  RADIOLOGY:                                            --------------------------------------------------------------    PHYSICAL EXAM:  General: alert oriented x2  HEENT: non remarkable  LUNGS: clear air sounds  HEART: RRR  ABDOMEN: distended, bulging flanks. positive shifting dullness, wall body edema  EXT: +3 edema                                             --------------------------------------------------------------    ASSESSMENT & PLAN    67 yo male, with h/o HTN, drug abuse, Hepatitis C s/p INF, Liver cirrhosis s/p [place] shunt, COPD, DVT? on Eliquis, hepatocellular carcinoma since January 2021 on chemotherapy, presented for weakness and decreased oral intake along with constipation and hematemasis with ammonia (188) on presentation, Impression: Hepatic Encephalopathy    1- Hepatic Encephalopathy:  - Ammonia 188 on presentation  - Patient on rifaximin and lactulose to aim for 3 BM/day  - Mental status improved  - Patient had ascites that was drained through a [place] catheter, removed > 7 liters of fluid, patient expected to build up again in light of his liver decompensation (cirrhosis)  - No SBP based on fluid studies and culture, need only for prophylaxis  - IR consulted for TIPS: recommended against due to multiple contraindications and MELD score >20    2- Variceal Bleed (portal HTN)  - Patient was intubated for airway protection, now extubated  - Hematemesis that was followed by an emergent EGD that showed variceal bleeds that were ligated (in esophagus)  - Patient started on abx prophylaxis after bleed and prophylaxis for SBP: ceftriaxone  - Propranolol 5 mg daily  - Protonix 40 mg IV BID  - Follow up Hb closely    3- HCC:  - Diagnosed in Jan 2021  - Was on chemotherapy to which he developed transaminitis  - Heme/Onc following the patient and recommended:  At present only palliative care.  If his performance status improves with nutrition and physical therapy, we will reevaluate for cancer therapy.  Please let the  see the patient for help with his health insurance issues  - Will follow up with Heme/onc    4- Septic shock:  - Patient had septic shock to which he was started on levophed on 10/4 and was stopped on the 10th of Oct  - Blood cultures are negative so far  - DTA culture showed Stenotrophomonas that is sensitive to levofloxacin: Levofloxacin started on 10/14  - Now resolved    5- Portal Vein Thrombosis:  - Patient currently on prophylactic dose of Heparin  - No bleeding since hematemesis, will consider possibility of full anticoagualtion    6- COPD:  - patient on budesonide/formoterol nebulization    7- Hep C:  - treated with INF    8- EBER:  - Patient with Creatinine increase to 2.1 today  - Will take urine studies   - Possibly pre-renal vs ATN vs Hepato-renal                                                                                       ----------------------------------------------------  # DVT prophylaxis : Heparin 5000 q 8 hrs    # GI prophylaxis: protonix 40 mg IV BID     # Diet: Dysphagia 1 Pureed Thin Liquids    # Activity Score (AM-PAC)    # Code status : full code    # Disposition : acute                                                                             --------------------------------------------------------         SUZANNE BAZZI 66y Male  MRN#: [id number]   CODE STATUS: Full code    Hospital Day: 11d    Pt is currently admitted with the primary diagnosis of hepatic encephalopathy    SUBJECTIVE  Hospital Course  Patient presented with altered mental status, constipation, and hematemasis  Overnight events   none  Subjective complaints   Patient seems slow, awake   Present Today:   - Lorenz:  No [ x ], Yes [   ] : Indication:     - Type of IV Access:       .. CVC/Piccline:  No [x  ], Yes [   ] : Indication:       .. Midline: No [x  ], Yes [   ] : Indication:                                             ----------------------------------------------------------  OBJECTIVE  PAST MEDICAL & SURGICAL HISTORY  HTN (hypertension)    Hepatitis C  2007    Drug abuse    Cancer, hepatocellular  January 2021    COPD, mild                                              -----------------------------------------------------------  ALLERGIES:  No Known Allergies                                            ------------------------------------------------------------    HOME MEDICATIONS  Home Medications:  Eliquis 5 mg oral tablet: 1 tab(s) orally 2 times a day (04 Oct 2021 06:13)  fluticasone-salmeterol 55 mcg-14 mcg/inh inhalation powder: 1 puff(s) inhaled 2 times a day (04 Oct 2021 06:14)  Protonix 40 mg oral delayed release tablet: 1 tab(s) orally once a day (04 Oct 2021 06:13)  spironolactone 50 mg oral tablet: 1 tab(s) orally once a day (04 Oct 2021 06:13)                           MEDICATIONS:  STANDING MEDICATIONS  budesonide  80 MICROgram(s)/formoterol 4.5 MICROgram(s) Inhaler 2 Puff(s) Inhalation two times a day  cefTRIAXone   IVPB 1000 milliGRAM(s) IV Intermittent every 24 hours  chlorhexidine 4% Liquid 1 Application(s) Topical daily  dextrose 40% Gel 15 Gram(s) Oral once  dextrose 5%. 1000 milliLiter(s) IV Continuous <Continuous>  dextrose 5%. 1000 milliLiter(s) IV Continuous <Continuous>  dextrose 50% Injectable 25 Gram(s) IV Push once  dextrose 50% Injectable 12.5 Gram(s) IV Push once  dextrose 50% Injectable 25 Gram(s) IV Push once  glucagon  Injectable 1 milliGRAM(s) IntraMuscular once  heparin   Injectable 5000 Unit(s) SubCutaneous every 8 hours  insulin glargine Injectable (LANTUS) 20 Unit(s) SubCutaneous at bedtime  insulin lispro (ADMELOG) corrective regimen sliding scale   SubCutaneous three times a day before meals  insulin lispro Injectable (ADMELOG) 5 Unit(s) SubCutaneous three times a day before meals  lactulose Syrup 15 Gram(s) Oral three times a day  levoFLOXacin IVPB 750 milliGRAM(s) IV Intermittent every 48 hours  norepinephrine Infusion 0.05 MICROgram(s)/kG/Min IV Continuous <Continuous>  pantoprazole  Injectable 40 milliGRAM(s) IV Push every 12 hours  propranolol 5 milliGRAM(s) Oral daily  rifAXIMin 550 milliGRAM(s) Oral two times a day    PRN MEDICATIONS                                            ------------------------------------------------------------  VITAL SIGNS: Last 24 Hours  T(C): 35.3 (15 Oct 2021 04:00), Max: 36.1 (14 Oct 2021 11:46)  T(F): 95.6 (15 Oct 2021 04:00), Max: 97 (14 Oct 2021 11:46)  HR: 107 (15 Oct 2021 04:00) (107 - 120)  BP: 106/72 (15 Oct 2021 04:00) (95/70 - 147/87)  BP(mean): 111 (14 Oct 2021 16:55) (98 - 111)  RR: 20 (15 Oct 2021 04:00) (19 - 33)  SpO2: 99% (15 Oct 2021 04:00) (98% - 100%)      10-14-21 @ 07:01  -  10-15-21 @ 07:00  --------------------------------------------------------  IN: 450 mL / OUT: 275 mL / NET: 175 mL                                             --------------------------------------------------------------  LABS:                        11.0   18.04 )-----------( 288      ( 15 Oct 2021 04:30 )             35.2     10-15    142  |  105  |  133<HH>  ----------------------------<  140<H>  5.0   |  21  |  2.1<H>    Ca    8.2<L>      15 Oct 2021 04:30  Phos  6.1     10-15  Mg     2.8     10-15    TPro  6.3  /  Alb  1.9<L>  /  TBili  1.4<H>  /  DBili  0.7<H>  /  AST  61<H>  /  ALT  53<H>  /  AlkPhos  280<H>  10-15    PT/INR - ( 15 Oct 2021 04:30 )   PT: 21.40 sec;   INR: 1.87 ratio                     Culture - Urine (collected 13 Oct 2021 16:00)  Source: Clean Catch Clean Catch (Midstream)  Final Report (14 Oct 2021 22:31):    <10,000 CFU/mL Normal Urogenital Chayo    Culture - Blood (collected 13 Oct 2021 12:09)  Source: .Blood None  Preliminary Report (14 Oct 2021 23:01):    No growth to date.                                                    -------------------------------------------------------------  RADIOLOGY:                                            --------------------------------------------------------------    PHYSICAL EXAM:  General: alert oriented x2  HEENT: non remarkable  LUNGS: clear air sounds  HEART: RRR  ABDOMEN: distended, bulging flanks. positive shifting dullness, wall body edema  EXT: +3 edema                                             --------------------------------------------------------------    ASSESSMENT & PLAN    67 yo male, with h/o HTN, drug abuse, Hepatitis C s/p INF, Liver cirrhosis s/p [place] shunt, COPD, DVT? on Eliquis, hepatocellular carcinoma since January 2021 on chemotherapy, presented for weakness and decreased oral intake along with constipation and hematemasis with ammonia (188) on presentation, Impression: Hepatic Encephalopathy    1- Hepatic Encephalopathy:  - Ammonia 188 on presentation  - Patient on rifaximin and lactulose to aim for 3 BM/day  - Mental status improved  - Patient had ascites that was drained through a [place] catheter, removed > 7 liters of fluid, patient expected to build up again in light of his liver decompensation (cirrhosis)  - No SBP based on fluid studies and culture, need only for prophylaxis  - IR consulted for TIPS: recommended against due to multiple contraindications and MELD score >20    2- Variceal Bleed (portal HTN)  - Patient was intubated for airway protection, now extubated  - Hematemesis that was followed by an emergent EGD that showed variceal bleeds that were ligated (in esophagus)  - Patient started on abx prophylaxis after bleed and prophylaxis for SBP: ceftriaxone  - Propranolol 5 mg daily  - Protonix 40 mg IV BID  - Follow up Hb closely    3- HCC:  - Diagnosed in Jan 2021  - Was on chemotherapy to which he developed transaminitis  - Heme/Onc following the patient and recommended:  At present only palliative care.  If his performance status improves with nutrition and physical therapy, we will reevaluate for cancer therapy.  Please let the  see the patient for help with his health insurance issues  - Will follow up with Heme/onc    4- Septic shock:  - Patient had septic shock to which he was started on levophed on 10/4 and was stopped on the 10th of Oct  - Blood cultures are negative so far  - DTA culture showed Stenotrophomonas that is sensitive to levofloxacin: Levofloxacin started on 10/14  - Now resolved    5- Portal Vein Thrombosis:  - Patient currently on prophylactic dose of Heparin  - No bleeding since hematemesis, will consider possibility of full anticoagualtion    6- COPD:  - patient on budesonide/formoterol nebulization    7- Hep C:  - treated with INF    8- EBER:  - Patient with Creatinine increase to 2.1 today  - Will take urine studies   - Possibly pre-renal vs ATN vs Hepato-renal     9- VT:  - patient had VT on presentation to which he was shocked   - Hyperkalemia at that point was treated and resolved                                                                                      ----------------------------------------------------  # DVT prophylaxis : Heparin 5000 q 8 hrs    # GI prophylaxis: protonix 40 mg IV BID     # Diet: Dysphagia 1 Pureed Thin Liquids    # Activity Score (AM-PAC)    # Code status : full code    # Disposition : acute                                                                             --------------------------------------------------------         SUZANNE BAZZI 66y Male  MRN#: [id number]   CODE STATUS: Full code    Hospital Day: 11d    Pt is currently admitted with the primary diagnosis of hepatic encephalopathy    SUBJECTIVE  Hospital Course  Patient presented with altered mental status, constipation, and hematemasis  Overnight events   none  Subjective complaints   Patient seems slow, awake   Present Today:   - Lorenz:  No [ x ], Yes [   ] : Indication:     - Type of IV Access:       .. CVC/Piccline:  No [x  ], Yes [   ] : Indication:       .. Midline: No [x  ], Yes [   ] : Indication:                                             ----------------------------------------------------------  OBJECTIVE  PAST MEDICAL & SURGICAL HISTORY  HTN (hypertension)    Hepatitis C  2007    Drug abuse    Cancer, hepatocellular  January 2021    COPD, mild                                              -----------------------------------------------------------  ALLERGIES:  No Known Allergies                                            ------------------------------------------------------------    HOME MEDICATIONS  Home Medications:  Eliquis 5 mg oral tablet: 1 tab(s) orally 2 times a day (04 Oct 2021 06:13)  fluticasone-salmeterol 55 mcg-14 mcg/inh inhalation powder: 1 puff(s) inhaled 2 times a day (04 Oct 2021 06:14)  Protonix 40 mg oral delayed release tablet: 1 tab(s) orally once a day (04 Oct 2021 06:13)  spironolactone 50 mg oral tablet: 1 tab(s) orally once a day (04 Oct 2021 06:13)                           MEDICATIONS:  STANDING MEDICATIONS  budesonide  80 MICROgram(s)/formoterol 4.5 MICROgram(s) Inhaler 2 Puff(s) Inhalation two times a day  cefTRIAXone   IVPB 1000 milliGRAM(s) IV Intermittent every 24 hours  chlorhexidine 4% Liquid 1 Application(s) Topical daily  dextrose 40% Gel 15 Gram(s) Oral once  dextrose 5%. 1000 milliLiter(s) IV Continuous <Continuous>  dextrose 5%. 1000 milliLiter(s) IV Continuous <Continuous>  dextrose 50% Injectable 25 Gram(s) IV Push once  dextrose 50% Injectable 12.5 Gram(s) IV Push once  dextrose 50% Injectable 25 Gram(s) IV Push once  glucagon  Injectable 1 milliGRAM(s) IntraMuscular once  heparin   Injectable 5000 Unit(s) SubCutaneous every 8 hours  insulin glargine Injectable (LANTUS) 20 Unit(s) SubCutaneous at bedtime  insulin lispro (ADMELOG) corrective regimen sliding scale   SubCutaneous three times a day before meals  insulin lispro Injectable (ADMELOG) 5 Unit(s) SubCutaneous three times a day before meals  lactulose Syrup 15 Gram(s) Oral three times a day  levoFLOXacin IVPB 750 milliGRAM(s) IV Intermittent every 48 hours  norepinephrine Infusion 0.05 MICROgram(s)/kG/Min IV Continuous <Continuous>  pantoprazole  Injectable 40 milliGRAM(s) IV Push every 12 hours  propranolol 5 milliGRAM(s) Oral daily  rifAXIMin 550 milliGRAM(s) Oral two times a day    PRN MEDICATIONS                                            ------------------------------------------------------------  VITAL SIGNS: Last 24 Hours  T(C): 35.3 (15 Oct 2021 04:00), Max: 36.1 (14 Oct 2021 11:46)  T(F): 95.6 (15 Oct 2021 04:00), Max: 97 (14 Oct 2021 11:46)  HR: 107 (15 Oct 2021 04:00) (107 - 120)  BP: 106/72 (15 Oct 2021 04:00) (95/70 - 147/87)  BP(mean): 111 (14 Oct 2021 16:55) (98 - 111)  RR: 20 (15 Oct 2021 04:00) (19 - 33)  SpO2: 99% (15 Oct 2021 04:00) (98% - 100%)      10-14-21 @ 07:01  -  10-15-21 @ 07:00  --------------------------------------------------------  IN: 450 mL / OUT: 275 mL / NET: 175 mL                                             --------------------------------------------------------------  LABS:                        11.0   18.04 )-----------( 288      ( 15 Oct 2021 04:30 )             35.2     10-15    142  |  105  |  133<HH>  ----------------------------<  140<H>  5.0   |  21  |  2.1<H>    Ca    8.2<L>      15 Oct 2021 04:30  Phos  6.1     10-15  Mg     2.8     10-15    TPro  6.3  /  Alb  1.9<L>  /  TBili  1.4<H>  /  DBili  0.7<H>  /  AST  61<H>  /  ALT  53<H>  /  AlkPhos  280<H>  10-15    PT/INR - ( 15 Oct 2021 04:30 )   PT: 21.40 sec;   INR: 1.87 ratio                     Culture - Urine (collected 13 Oct 2021 16:00)  Source: Clean Catch Clean Catch (Midstream)  Final Report (14 Oct 2021 22:31):    <10,000 CFU/mL Normal Urogenital Chayo    Culture - Blood (collected 13 Oct 2021 12:09)  Source: .Blood None  Preliminary Report (14 Oct 2021 23:01):    No growth to date.                                                    -------------------------------------------------------------  RADIOLOGY:                                            --------------------------------------------------------------    PHYSICAL EXAM:  General: alert oriented x2  HEENT: non remarkable  LUNGS: clear air sounds  HEART: RRR  ABDOMEN: distended, bulging flanks. positive shifting dullness, wall body edema  EXT: +3 edema                                             --------------------------------------------------------------    ASSESSMENT & PLAN    67 yo male, with h/o HTN, drug abuse, Hepatitis C s/p INF, Liver cirrhosis s/p [place] shunt, COPD, DVT? on Eliquis, hepatocellular carcinoma since January 2021 on chemotherapy, presented for weakness and decreased oral intake along with constipation and hematemasis with ammonia (188) on presentation, Impression: Hepatic Encephalopathy    1- Hepatic Encephalopathy:  - Ammonia 188 on presentation  - Patient on rifaximin and lactulose to aim for 3 BM/day  - Mental status improved  - Patient had ascites that was drained through a [place] catheter, removed > 7 liters of fluid, patient expected to build up again in light of his liver decompensation (cirrhosis)  - No SBP based on fluid studies and culture, need only for prophylaxis  - IR consulted for TIPS: recommended against due to multiple contraindications and MELD score >20    2- Variceal Bleed (portal HTN)  - Patient was intubated for airway protection, now extubated  - Hematemesis that was followed by an emergent EGD that showed variceal bleeds that were ligated (in esophagus)  - Patient started on abx prophylaxis after bleed and prophylaxis for SBP: ceftriaxone  - Propranolol 5 mg daily  - Protonix 40 mg IV BID  - Follow up Hb closely    3- HCC:  - Diagnosed in Jan 2021  - Was on chemotherapy to which he developed transaminitis  - Heme/Onc following the patient and recommended:  At present only palliative care.  If his performance status improves with nutrition and physical therapy, we will reevaluate for cancer therapy.  Please let the  see the patient for help with his health insurance issues  - Will follow up with Heme/onc    4- Septic shock:  - Patient had septic shock to which he was started on levophed on 10/4 and was stopped on the 10th of Oct  - Blood cultures are negative so far  - DTA culture showed Stenotrophomonas that is sensitive to levofloxacin: Levofloxacin started on 10/14  - Now resolved    5- Portal Vein Thrombosis:  - Patient currently on prophylactic dose of Heparin  - No bleeding since hematemesis, called the GI service and was told that Portal Vein is invaded by the thrombus itself and not a clot and therefore there is no need for full anticoagulation    6- COPD:  - patient on budesonide/formoterol nebulization    7- Hep C:  - treated with INF    8- EBER:  - Patient with Creatinine increase to 2.1 today  - Will take urine studies   - Possibly pre-renal vs ATN vs Hepato-renal     9- VT:  - patient had VT on presentation to which he was shocked   - Hyperkalemia at that point was treated and resolved                                                                                      ----------------------------------------------------  # DVT prophylaxis : Heparin 5000 q 8 hrs    # GI prophylaxis: protonix 40 mg IV BID     # Diet: Dysphagia 1 Pureed Thin Liquids    # Activity Score (AM-PAC)    # Code status : full code    # Disposition : acute                                                                             --------------------------------------------------------

## 2021-10-15 NOTE — PROGRESS NOTE ADULT - ASSESSMENT
67 yo male, with h/o HTN, drug abuse, Hepatitis C s/p INF, Liver cirrhosis s/p [place] shunt, COPD, DVT? on Eliquis, hepatocellular carcinoma since January 2021 on chemotherapy, presented for weakness and decreased oral intake along with constipation and hematemesis with ammonia (188) on presentation.    #Acute gastrointestinal hemorrhage due to variceal bleed  - s/p intubation for airway protection; now extubated  - s/p EGD - esophageal varices ligiated   - continue propranolol, protonix  - monitor cbc daily  #SBP prophylaxis -on ceftriaxone  #hepatic encephalopathy - mental status improved - continues on rifaximin and lactulose   #Liver cirrhosis s/p [place] shunt  #Hepatocellular CA - on chemo since being diagnosed in 2021  - oncology eval appreciated - not a candidate given his current state   - had over 7 liters removed; suspect it is reaccumulating    - IR consulted for TIPS: recommended against due to multiple contraindications and MELD score >20    # Septic shock - resolved   - previously required levo - d/c'ed on 10/10  - DTA culture showed Stenotrophomonas that is sensitive to levofloxacin: Levofloxacin started on 10/14    # COPD - no in exacerbation  - continue budesonide, formoterol    # EBER - r/o hepatorenal syndrome  -check urine lytes  -nephrology eval   -avoid nephrotoxics     overall poor prognosis    Progress Note Handoff  Pending Consults: palliative follow up  Pending Tests: labs  Pending Results: labs  Family Discussion: discussed poor prognosis, all medication and treatment plan with pt and medical staff in detail - all questions answered   Disposition: Home_____/SNF______/Other_____/Unknown at this time__x___    Please call me with any questions at extension [phone]

## 2021-10-15 NOTE — PROGRESS NOTE ADULT - SUBJECTIVE AND OBJECTIVE BOX
Over Night Events: events noted, transferred from CCU, on NC      PHYSICAL EXAM    ICU Vital Signs Last 24 Hrs  T(C): 35.3 (15 Oct 2021 04:00), Max: 36.5 (14 Oct 2021 07:29)  T(F): 95.6 (15 Oct 2021 04:00), Max: 97.7 (14 Oct 2021 07:29)  HR: 107 (15 Oct 2021 04:00) (102 - 120)  BP: 106/72 (15 Oct 2021 04:00) (95/70 - 147/87)  BP(mean): 111 (14 Oct 2021 16:55) (96 - 111)  RR: 20 (15 Oct 2021 04:00) (19 - 33)  SpO2: 99% (15 Oct 2021 04:00) (96% - 100%)       General: chronically ill looking  HEENT: ELENITA            Lungs: Bilateral BS  Cardiovascular: Regular   Abdomen: Soft, distended  Extremities: No clubbing   Neurological: Non focal       10-13-21 @ 07:01  -  10-14-21 @ 07:00  --------------------------------------------------------  IN:    Fat Emulsion (Fish Oil &amp; Plant Based) 20% Infusion: 187.8 mL    Norepinephrine: 200 mL    Oral Fluid: 170 mL    TPN (Total Parenteral Nutrition): 1200 mL  Total IN: 1757.8 mL    OUT:    Incontinent per Collection Bag (mL): 300 mL    Indwelling Catheter - Urethral (mL): 100 mL    Voided (mL): 225 mL  Total OUT: 625 mL    Total NET: 1132.8 mL      10-14-21 @ 07:01  -  10-15-21 @ 06:06  --------------------------------------------------------  IN:    IV PiggyBack: 100 mL    TPN (Total Parenteral Nutrition): 350 mL  Total IN: 450 mL    OUT:    Voided (mL): 275 mL  Total OUT: 275 mL    Total NET: 175 mL          LABS:                          10.0   18.59 )-----------( 219      ( 14 Oct 2021 05:07 )             32.1                                               10-14    142  |  107  |  124<HH>  ----------------------------<  162<H>  4.5   |  20  |  1.7<H>    Ca    7.8<L>      14 Oct 2021 05:07  Phos  5.3     10-14  Mg     2.6     10-14    TPro  5.7<L>  /  Alb  1.7<L>  /  TBili  0.9  /  DBili  0.5<H>  /  AST  52<H>  /  ALT  54<H>  /  AlkPhos  254<H>  10-14      PT/INR - ( 14 Oct 2021 05:07 )   PT: 21.40 sec;   INR: 1.87 ratio                                                                                              LIVER FUNCTIONS - ( 14 Oct 2021 05:07 )  Alb: 1.7 g/dL / Pro: 5.7 g/dL / ALK PHOS: 254 U/L / ALT: 54 U/L / AST: 52 U/L / GGT: x                                                  Culture - Urine (collected 13 Oct 2021 16:00)  Source: Clean Catch Clean Catch (Midstream)  Final Report (14 Oct 2021 22:31):    <10,000 CFU/mL Normal Urogenital Chayo    Culture - Blood (collected 13 Oct 2021 12:09)  Source: .Blood None  Preliminary Report (14 Oct 2021 23:01):    No growth to date.    Culture - Sputum (collected 12 Oct 2021 09:34)  Source: .Sputum Sputum  Gram Stain (13 Oct 2021 07:22):    Numerous polymorphonuclear leukocytes per low power field    Rare Squamous epithelial cells per low power field    Moderate Gram Negative Rods seen per oil power field  Final Report (14 Oct 2021 20:47):    Numerous Stenotrophomonas maltophilia  Organism: Stenotrophomonas maltophilia (14 Oct 2021 20:47)  Organism: Stenotrophomonas maltophilia (14 Oct 2021 20:47)                                                                                           MEDICATIONS  (STANDING):  budesonide  80 MICROgram(s)/formoterol 4.5 MICROgram(s) Inhaler 2 Puff(s) Inhalation two times a day  cefTRIAXone   IVPB 1000 milliGRAM(s) IV Intermittent every 24 hours  chlorhexidine 4% Liquid 1 Application(s) Topical daily  dextrose 40% Gel 15 Gram(s) Oral once  dextrose 5%. 1000 milliLiter(s) (50 mL/Hr) IV Continuous <Continuous>  dextrose 5%. 1000 milliLiter(s) (100 mL/Hr) IV Continuous <Continuous>  dextrose 50% Injectable 25 Gram(s) IV Push once  dextrose 50% Injectable 12.5 Gram(s) IV Push once  dextrose 50% Injectable 25 Gram(s) IV Push once  glucagon  Injectable 1 milliGRAM(s) IntraMuscular once  heparin   Injectable 5000 Unit(s) SubCutaneous every 8 hours  insulin glargine Injectable (LANTUS) 20 Unit(s) SubCutaneous at bedtime  insulin lispro (ADMELOG) corrective regimen sliding scale   SubCutaneous three times a day before meals  insulin lispro Injectable (ADMELOG) 5 Unit(s) SubCutaneous three times a day before meals  lactulose Syrup 15 Gram(s) Oral three times a day  levoFLOXacin IVPB 750 milliGRAM(s) IV Intermittent every 48 hours  norepinephrine Infusion 0.05 MICROgram(s)/kG/Min (7.01 mL/Hr) IV Continuous <Continuous>  pantoprazole  Injectable 40 milliGRAM(s) IV Push every 12 hours  propranolol 5 milliGRAM(s) Oral daily  rifAXIMin 550 milliGRAM(s) Oral two times a day    MEDICATIONS  (PRN):      Xrays:                                                                                     ECHO

## 2021-10-15 NOTE — PROGRESS NOTE ADULT - SUBJECTIVE AND OBJECTIVE BOX
JLUIS SUZANNE  66y  Male      Patient is a 66y old  Male who presents with a chief complaint of weakness  Hyperkalemia  VT (15 Oct 2021 10:28)      INTERVAL HPI/OVERNIGHT EVENTS:  Patient seen and examined earlier this morning      REVIEW OF SYSTEMS:  CONSTITUTIONAL: No fever, weight loss, or fatigue  EYES: No eye pain, visual disturbances, or discharge  ENMT:  No difficulty hearing, tinnitus, vertigo; No sinus or throat pain  NECK: No pain or stiffness  RESPIRATORY: No cough, wheezing, chills or hemoptysis; No shortness of breath  CARDIOVASCULAR: No chest pain, palpitations, dizziness, or leg swelling  GASTROINTESTINAL: No abdominal or epigastric pain. No nausea, vomiting, or hematemesis; No diarrhea or constipation. No melena or hematochezia.  GENITOURINARY: No dysuria, frequency, hematuria, or incontinence  NEUROLOGICAL: No headaches, memory loss, loss of strength, numbness, or tremors  SKIN: No itching, burning, rashes, or lesions   LYMPH NODES: No enlarged glands  ENDOCRINE: No heat or cold intolerance; No hair loss  MUSCULOSKELETAL: No joint pain or swelling; No muscle, back, or extremity pain  PSYCHIATRIC: No depression, anxiety, mood swings, or difficulty sleeping  HEME/LYMPH: No easy bruising, or bleeding gums  ALLERY AND IMMUNOLOGIC: No hives or eczema    T(C): 36.2 (10-15-21 @ 08:00), Max: 36.2 (10-15-21 @ 08:00)  HR: 88 (10-15-21 @ 08:00) (88 - 120)  BP: 104/82 (10-15-21 @ 08:00) (95/70 - 131/88)  RR: 18 (10-15-21 @ 08:00) (18 - 30)  SpO2: 98% (10-15-21 @ 08:00) (98% - 100%)    PHYSICAL EXAM:  GENERAL: NAD, well-groomed, well-developed  HEAD:  Atraumatic, Normocephalic  EYES: EOMI, PERRLA, conjunctiva and sclera clear  ENMT: No tonsillar erythema, exudates, or enlargement; Moist mucous membranes, Good dentition, No lesions  NECK: Supple, No JVD, Normal thyroid  NERVOUS SYSTEM:  Alert & Oriented X3, Good concentration; Motor Strength 5/5 B/L upper and lower extremities; DTRs 2+ intact and symmetric  CHEST/LUNG: Clear to percussion bilaterally; No rales, rhonchi, wheezing, or rubs  HEART: Regular rate and rhythm; No murmurs, rubs, or gallops  ABDOMEN: Soft, Nontender, Nondistended; Bowel sounds present  EXTREMITIES:  2+ Peripheral Pulses, No clubbing, cyanosis, or edema  LYMPH: No lymphadenopathy noted  SKIN: No rashes or lesions    Consultant(s) Notes Reviewed:  [x ] YES  [ ] NO  Care Discussed with Consultants/Other Providers [ x] YES  [ ] NO    LAB:                        11.0   18.04 )-----------( 288      ( 15 Oct 2021 04:30 )             35.2     10-15    142  |  105  |  133<HH>  ----------------------------<  140<H>  5.0   |  21  |  2.1<H>    Ca    8.2<L>      15 Oct 2021 04:30  Phos  6.1     10-15  Mg     2.8     10-15    TPro  6.3  /  Alb  1.9<L>  /  TBili  1.4<H>  /  DBili  0.7<H>  /  AST  61<H>  /  ALT  53<H>  /  AlkPhos  280<H>  10-15    LIVER FUNCTIONS - ( 15 Oct 2021 04:30 )  Alb: 1.9 g/dL / Pro: 6.3 g/dL / ALK PHOS: 280 U/L / ALT: 53 U/L / AST: 61 U/L / GGT: x                       Drug Dosing Weight  Height (cm): 175.3 (04 Oct 2021 23:55)  Weight (kg): 89.1 (10 Oct 2021 00:00)  BMI (kg/m2): 29 (10 Oct 2021 00:00)  BSA (m2): 2.05 (10 Oct 2021 00:00)    CAPILLARY BLOOD GLUCOSE      POCT Blood Glucose.: 144 mg/dL (15 Oct 2021 11:36)  POCT Blood Glucose.: 134 mg/dL (15 Oct 2021 07:46)  POCT Blood Glucose.: 101 mg/dL (14 Oct 2021 21:14)  POCT Blood Glucose.: 239 mg/dL (14 Oct 2021 20:11)    I&O's Summary    14 Oct 2021 07:01  -  15 Oct 2021 07:00  --------------------------------------------------------  IN: 450 mL / OUT: 275 mL / NET: 175 mL          RADIOLOGY & ADDITIONAL TESTS:  Imaging Personally Reviewed:  [x] YES  [ ] NO    HEALTH ISSUES - PROBLEM Dx:  Palliative care by specialist    Altered mental status    Cirrhosis            MEDS:  budesonide  80 MICROgram(s)/formoterol 4.5 MICROgram(s) Inhaler 2 Puff(s) Inhalation two times a day  cefTRIAXone   IVPB 1000 milliGRAM(s) IV Intermittent every 24 hours  chlorhexidine 4% Liquid 1 Application(s) Topical daily  dextrose 40% Gel 15 Gram(s) Oral once  dextrose 5%. 1000 milliLiter(s) IV Continuous <Continuous>  dextrose 5%. 1000 milliLiter(s) IV Continuous <Continuous>  dextrose 50% Injectable 25 Gram(s) IV Push once  dextrose 50% Injectable 12.5 Gram(s) IV Push once  dextrose 50% Injectable 25 Gram(s) IV Push once  glucagon  Injectable 1 milliGRAM(s) IntraMuscular once  heparin   Injectable 5000 Unit(s) SubCutaneous every 8 hours  insulin glargine Injectable (LANTUS) 20 Unit(s) SubCutaneous at bedtime  insulin lispro (ADMELOG) corrective regimen sliding scale   SubCutaneous three times a day before meals  insulin lispro Injectable (ADMELOG) 5 Unit(s) SubCutaneous three times a day before meals  lactulose Syrup 15 Gram(s) Oral three times a day  levoFLOXacin IVPB 750 milliGRAM(s) IV Intermittent every 48 hours  norepinephrine Infusion 0.05 MICROgram(s)/kG/Min IV Continuous <Continuous>  pantoprazole  Injectable 40 milliGRAM(s) IV Push every 12 hours  propranolol 5 milliGRAM(s) Oral daily  rifAXIMin 550 milliGRAM(s) Oral two times a day       - - -

## 2021-10-15 NOTE — PROGRESS NOTE ADULT - SUBJECTIVE AND OBJECTIVE BOX
Nephrology progress note    Patient is seen and examined, events over the last 24 h noted .  Renal recalled for worsening kidney function  Last tap 10/8 -7L drained so far  Allergies:  No Known Allergies    Hospital Medications:   MEDICATIONS  (STANDING):  budesonide  80 MICROgram(s)/formoterol 4.5 MICROgram(s) Inhaler 2 Puff(s) Inhalation two times a day  cefTRIAXone   IVPB 1000 milliGRAM(s) IV Intermittent every 24 hours  chlorhexidine 4% Liquid 1 Application(s) Topical daily  dextrose 40% Gel 15 Gram(s) Oral once  dextrose 5%. 1000 milliLiter(s) (50 mL/Hr) IV Continuous <Continuous>  dextrose 5%. 1000 milliLiter(s) (100 mL/Hr) IV Continuous <Continuous>  dextrose 50% Injectable 25 Gram(s) IV Push once  dextrose 50% Injectable 12.5 Gram(s) IV Push once  dextrose 50% Injectable 25 Gram(s) IV Push once  glucagon  Injectable 1 milliGRAM(s) IntraMuscular once  heparin   Injectable 5000 Unit(s) SubCutaneous every 8 hours  insulin glargine Injectable (LANTUS) 20 Unit(s) SubCutaneous at bedtime  insulin lispro (ADMELOG) corrective regimen sliding scale   SubCutaneous three times a day before meals  insulin lispro Injectable (ADMELOG) 5 Unit(s) SubCutaneous three times a day before meals  lactulose Syrup 15 Gram(s) Oral three times a day  levoFLOXacin IVPB 750 milliGRAM(s) IV Intermittent every 48 hours  norepinephrine Infusion 0.05 MICROgram(s)/kG/Min (7.01 mL/Hr) IV Continuous <Continuous>  pantoprazole  Injectable 40 milliGRAM(s) IV Push every 12 hours  propranolol 5 milliGRAM(s) Oral daily  rifAXIMin 550 milliGRAM(s) Oral two times a day        VITALS:  T(F): 97.1 (10-15-21 @ 08:00), Max: 97.1 (10-15-21 @ 08:00)  HR: 88 (10-15-21 @ 08:00)  BP: 104/82 (10-15-21 @ 08:00)  RR: 18 (10-15-21 @ 08:00)  SpO2: 98% (10-15-21 @ 08:00)  Wt(kg): --    10-13 @ 07:01  -  10-14 @ 07:00  --------------------------------------------------------  IN: 1757.8 mL / OUT: 625 mL / NET: 1132.8 mL    10-14 @ 07:01  -  10-15 @ 07:00  --------------------------------------------------------  IN: 450 mL / OUT: 275 mL / NET: 175 mL          PHYSICAL EXAM:  Constitutional: NAD  HEENT: anicteric sclera  Neck: No JVD  Respiratory: CTA  Cardiovascular: S1, S2, RRR  Gastrointestinal: BS+, severe ascites, [place] cath in right abdomen  Extremities: 2+ peripheral edema  Neurological: A/wake  : has kristi.   Skin: No rashes  Vascular Access:    LABS:  10-15    142  |  105  |  133<HH>  ----------------------------<  140<H>  5.0   |  21  |  2.1<H>    Ca    8.2<L>      15 Oct 2021 04:30  Phos  6.1     10-15  Mg     2.8     10-15    TPro  6.3  /  Alb  1.9<L>  /  TBili  1.4<H>  /  DBili  0.7<H>  /  AST  61<H>  /  ALT  53<H>  /  AlkPhos  280<H>  10-15                          11.0   18.04 )-----------( 288      ( 15 Oct 2021 04:30 )             35.2       Urine Studies:      RADIOLOGY & ADDITIONAL STUDIES:  < from: Xray Chest 1 View- PORTABLE-Urgent (Xray Chest 1 View- PORTABLE-Urgent .) (10.13.21 @ 12:16) >    Left lower lobe opacity/pleural effusion. No air leak.    < end of copied text >

## 2021-10-15 NOTE — SWALLOW BEDSIDE ASSESSMENT ADULT - COMMENTS
pt seen by SLP service multiple times this admission w/ most recent recs for dysphagia 1 w/ thin liquids

## 2021-10-16 LAB
ALBUMIN SERPL ELPH-MCNC: 2.9 G/DL — LOW (ref 3.5–5.2)
ALP SERPL-CCNC: 239 U/L — HIGH (ref 30–115)
ALT FLD-CCNC: 44 U/L — HIGH (ref 0–41)
ANION GAP SERPL CALC-SCNC: 20 MMOL/L — HIGH (ref 7–14)
AST SERPL-CCNC: 65 U/L — HIGH (ref 0–41)
BILIRUB DIRECT SERPL-MCNC: 0.7 MG/DL — HIGH (ref 0–0.2)
BILIRUB INDIRECT FLD-MCNC: 0.9 MG/DL — SIGNIFICANT CHANGE UP (ref 0.2–1.2)
BILIRUB SERPL-MCNC: 1.6 MG/DL — HIGH (ref 0.2–1.2)
BUN SERPL-MCNC: 141 MG/DL — CRITICAL HIGH (ref 10–20)
CALCIUM SERPL-MCNC: 8.2 MG/DL — LOW (ref 8.5–10.1)
CHLORIDE SERPL-SCNC: 107 MMOL/L — SIGNIFICANT CHANGE UP (ref 98–110)
CO2 SERPL-SCNC: 16 MMOL/L — LOW (ref 17–32)
CREAT ?TM UR-MCNC: 70 MG/DL — SIGNIFICANT CHANGE UP
CREAT SERPL-MCNC: 2.2 MG/DL — HIGH (ref 0.7–1.5)
GLUCOSE BLDC GLUCOMTR-MCNC: 105 MG/DL — HIGH (ref 70–99)
GLUCOSE BLDC GLUCOMTR-MCNC: 195 MG/DL — HIGH (ref 70–99)
GLUCOSE BLDC GLUCOMTR-MCNC: 73 MG/DL — SIGNIFICANT CHANGE UP (ref 70–99)
GLUCOSE BLDC GLUCOMTR-MCNC: 78 MG/DL — SIGNIFICANT CHANGE UP (ref 70–99)
GLUCOSE BLDC GLUCOMTR-MCNC: 99 MG/DL — SIGNIFICANT CHANGE UP (ref 70–99)
GLUCOSE SERPL-MCNC: 90 MG/DL — SIGNIFICANT CHANGE UP (ref 70–99)
HCT VFR BLD CALC: 34.6 % — LOW (ref 42–52)
HGB BLD-MCNC: 10.6 G/DL — LOW (ref 14–18)
INR BLD: 1.8 RATIO — HIGH (ref 0.65–1.3)
MAGNESIUM SERPL-MCNC: 2.8 MG/DL — HIGH (ref 1.8–2.4)
MCHC RBC-ENTMCNC: 29.4 PG — SIGNIFICANT CHANGE UP (ref 27–31)
MCHC RBC-ENTMCNC: 30.6 G/DL — LOW (ref 32–37)
MCV RBC AUTO: 95.8 FL — HIGH (ref 80–94)
NRBC # BLD: 0 /100 WBCS — SIGNIFICANT CHANGE UP (ref 0–0)
PHOSPHATE SERPL-MCNC: 5.9 MG/DL — HIGH (ref 2.1–4.9)
PLATELET # BLD AUTO: 185 K/UL — SIGNIFICANT CHANGE UP (ref 130–400)
POTASSIUM SERPL-MCNC: 5.1 MMOL/L — HIGH (ref 3.5–5)
POTASSIUM SERPL-SCNC: 5.1 MMOL/L — HIGH (ref 3.5–5)
PROT SERPL-MCNC: 6.7 G/DL — SIGNIFICANT CHANGE UP (ref 6–8)
PROTHROM AB SERPL-ACNC: 20.6 SEC — HIGH (ref 9.95–12.87)
RBC # BLD: 3.61 M/UL — LOW (ref 4.7–6.1)
RBC # FLD: 24.7 % — HIGH (ref 11.5–14.5)
SODIUM SERPL-SCNC: 143 MMOL/L — SIGNIFICANT CHANGE UP (ref 135–146)
SODIUM UR-SCNC: <20 MMOL/L — SIGNIFICANT CHANGE UP
WBC # BLD: 16.49 K/UL — HIGH (ref 4.8–10.8)
WBC # FLD AUTO: 16.49 K/UL — HIGH (ref 4.8–10.8)

## 2021-10-16 PROCEDURE — 99233 SBSQ HOSP IP/OBS HIGH 50: CPT

## 2021-10-16 RX ORDER — ALBUMIN HUMAN 25 %
12.5 VIAL (ML) INTRAVENOUS ONCE
Refills: 0 | Status: COMPLETED | OUTPATIENT
Start: 2021-10-16 | End: 2021-10-16

## 2021-10-16 RX ORDER — CALCIUM ACETATE 667 MG
667 TABLET ORAL
Refills: 0 | Status: DISCONTINUED | OUTPATIENT
Start: 2021-10-16 | End: 2021-11-02

## 2021-10-16 RX ORDER — SODIUM BICARBONATE 1 MEQ/ML
650 SYRINGE (ML) INTRAVENOUS EVERY 8 HOURS
Refills: 0 | Status: DISCONTINUED | OUTPATIENT
Start: 2021-10-16 | End: 2021-10-20

## 2021-10-16 RX ORDER — DEXTROSE 50 % IN WATER 50 %
25 SYRINGE (ML) INTRAVENOUS ONCE
Refills: 0 | Status: COMPLETED | OUTPATIENT
Start: 2021-10-16 | End: 2021-10-16

## 2021-10-16 RX ADMIN — MIDODRINE HYDROCHLORIDE 10 MILLIGRAM(S): 2.5 TABLET ORAL at 20:50

## 2021-10-16 RX ADMIN — Medication 250 GRAM(S): at 02:49

## 2021-10-16 RX ADMIN — PANTOPRAZOLE SODIUM 40 MILLIGRAM(S): 20 TABLET, DELAYED RELEASE ORAL at 17:07

## 2021-10-16 RX ADMIN — HEPARIN SODIUM 5000 UNIT(S): 5000 INJECTION INTRAVENOUS; SUBCUTANEOUS at 15:44

## 2021-10-16 RX ADMIN — LACTULOSE 15 GRAM(S): 10 SOLUTION ORAL at 15:39

## 2021-10-16 RX ADMIN — PANTOPRAZOLE SODIUM 40 MILLIGRAM(S): 20 TABLET, DELAYED RELEASE ORAL at 05:39

## 2021-10-16 RX ADMIN — LACTULOSE 15 GRAM(S): 10 SOLUTION ORAL at 05:38

## 2021-10-16 RX ADMIN — Medication 650 MILLIGRAM(S): at 20:50

## 2021-10-16 RX ADMIN — LACTULOSE 15 GRAM(S): 10 SOLUTION ORAL at 20:50

## 2021-10-16 RX ADMIN — Medication 25 MILLILITER(S): at 21:32

## 2021-10-16 RX ADMIN — Medication 83.33 GRAM(S): at 10:35

## 2021-10-16 RX ADMIN — CEFTRIAXONE 100 MILLIGRAM(S): 500 INJECTION, POWDER, FOR SOLUTION INTRAMUSCULAR; INTRAVENOUS at 10:36

## 2021-10-16 RX ADMIN — Medication 250 GRAM(S): at 06:00

## 2021-10-16 RX ADMIN — HEPARIN SODIUM 5000 UNIT(S): 5000 INJECTION INTRAVENOUS; SUBCUTANEOUS at 20:50

## 2021-10-16 RX ADMIN — HEPARIN SODIUM 5000 UNIT(S): 5000 INJECTION INTRAVENOUS; SUBCUTANEOUS at 05:37

## 2021-10-16 RX ADMIN — MIDODRINE HYDROCHLORIDE 10 MILLIGRAM(S): 2.5 TABLET ORAL at 05:46

## 2021-10-16 RX ADMIN — MIDODRINE HYDROCHLORIDE 10 MILLIGRAM(S): 2.5 TABLET ORAL at 15:44

## 2021-10-16 NOTE — PROGRESS NOTE ADULT - ASSESSMENT
IMPRESSION:    Acute resp failure resolved   Hepatic encephalopathy improving   Decompensated liver cirrhosis. Has [place] catheter ( being drained every 4 days 2 l)  Sepsis   Hematemesis ( esophageal/ gastric varices)  Acute portal Vein thrombosis   Hepatocellular carcinoma with transaminitis on chemotherapy  Hepatitis C treated with INF  HO COPD  EBER worsening  Stenotrophomonas       PLAN:    CNS: no sedation       HEENT: Oral care    PULMONARY:  HOB @ 45 degrees.  Continue O2 as necessary to maintain sats 92 to 94%     CARDIOVASCULAR: Avoid volume overload.  Propranolol      GI: GI prophylaxis. diet as per speech and swallow    lactulose PO  GI management  bowel regimen aim 2-3 BM/day ,   follow GI       RENAL:  follow renal   low K diet,   Follow up lytes     INFECTIOUS DISEASE:   ABX PER ID      follow cx       HEMATOLOGICAL:  dvt prophylaxis.  FU CBC and coags     ENDOCRINE:    Follow up FS.    Insulin protocol if needed.   keep -180.   avoid hypoglycemia    MUSCULOSKELETAL: bed rest     prognosis grave    oob to chair   palliative care eval f/u

## 2021-10-16 NOTE — PROGRESS NOTE ADULT - SUBJECTIVE AND OBJECTIVE BOX
seen and examined  24 h events noted         PAST HISTORY  --------------------------------------------------------------------------------  No significant changes to PMH, PSH, FHx, SHx, unless otherwise noted    ALLERGIES & MEDICATIONS  --------------------------------------------------------------------------------  Allergies    No Known Allergies    Intolerances      Standing Inpatient Medications  budesonide  80 MICROgram(s)/formoterol 4.5 MICROgram(s) Inhaler 2 Puff(s) Inhalation two times a day  cefTRIAXone   IVPB 1000 milliGRAM(s) IV Intermittent every 24 hours  chlorhexidine 4% Liquid 1 Application(s) Topical daily  dextrose 40% Gel 15 Gram(s) Oral once  dextrose 5%. 1000 milliLiter(s) IV Continuous <Continuous>  dextrose 5%. 1000 milliLiter(s) IV Continuous <Continuous>  dextrose 50% Injectable 25 Gram(s) IV Push once  dextrose 50% Injectable 12.5 Gram(s) IV Push once  dextrose 50% Injectable 25 Gram(s) IV Push once  glucagon  Injectable 1 milliGRAM(s) IntraMuscular once  heparin   Injectable 5000 Unit(s) SubCutaneous every 8 hours  insulin glargine Injectable (LANTUS) 20 Unit(s) SubCutaneous at bedtime  insulin lispro (ADMELOG) corrective regimen sliding scale   SubCutaneous three times a day before meals  insulin lispro Injectable (ADMELOG) 5 Unit(s) SubCutaneous three times a day before meals  lactulose Syrup 15 Gram(s) Oral three times a day  levoFLOXacin IVPB 750 milliGRAM(s) IV Intermittent every 48 hours  midodrine 10 milliGRAM(s) Oral every 8 hours  pantoprazole  Injectable 40 milliGRAM(s) IV Push every 12 hours  propranolol 5 milliGRAM(s) Oral daily  rifAXIMin 550 milliGRAM(s) Oral two times a day    PRN Inpatient Medications        VITALS/PHYSICAL EXAM  --------------------------------------------------------------------------------  T(C): 34.2 (10-16-21 @ 12:47), Max: 36.2 (10-15-21 @ 20:00)  HR: 63 (10-16-21 @ 12:47) (63 - 93)  BP: 113/55 (10-16-21 @ 12:47) (113/55 - 139/96)  RR: 18 (10-16-21 @ 12:47) (18 - 22)  SpO2: 94% (10-16-21 @ 12:47) (94% - 95%)  Wt(kg): --        10-15-21 @ 07:01  -  10-16-21 @ 07:00  --------------------------------------------------------  IN: 0 mL / OUT: 200 mL / NET: -200 mL    10-16-21 @ 07:01  -  10-16-21 @ 14:31  --------------------------------------------------------  IN: 0 mL / OUT: 185 mL / NET: -185 mL      Physical Exam:  	Gen: NAD,  	Pulm: decrease BS  B/L  	CV: S1S2; no rub  	Abd: +distended  	    LABS/STUDIES  --------------------------------------------------------------------------------              10.6   16.49 >-----------<  185      [10-16-21 @ 04:30]              34.6     143  |  107  |  141  ----------------------------<  90      [10-16-21 @ 04:30]  5.1   |  16  |  2.2        Ca     8.2     [10-16-21 @ 04:30]      Mg     2.8     [10-16-21 @ 04:30]      Phos  5.9     [10-16-21 @ 04:30]    TPro  6.7  /  Alb  2.9  /  TBili  1.6  /  DBili  0.7  /  AST  65  /  ALT  44  /  AlkPhos  239  [10-16-21 @ 04:30]    PT/INR: PT 20.60, INR 1.80       [10-16-21 @ 04:30]      Creatinine Trend:  SCr 2.2 [10-16 @ 04:30]  SCr 2.1 [10-15 @ 04:30]  SCr 1.7 [10-14 @ 05:07]  SCr 1.6 [10-13 @ 04:54]  SCr 1.4 [10-12 @ 04:35]    Urinalysis - [10-04-21 @ 04:38]      Color Yellow / Appearance Clear / SG 1.025 / pH 6.0      Gluc 100 mg/dL / Ketone Negative  / Bili Negative / Urobili <2 mg/dL       Blood Negative / Protein Negative / Leuk Est Negative / Nitrite Negative      RBC  / WBC  / Hyaline  / Gran  / Sq Epi  / Non Sq Epi  / Bacteria       Lipid: chol --, TG 92, HDL --, LDL --      [10-10-21 @ 04:30]

## 2021-10-16 NOTE — CHART NOTE - NSCHARTNOTEFT_GEN_A_CORE
Patient's [place] cath noted to not be draining with large distended abdomen. Spoke to radiology who advised IR will assess on Monday for a possible change out. Given patient respiratory status stable on room air will withhold on a paracentesis today.     Patient's friend Fallon Dawson called and introduced goals of care. Fallon advised that she is very hopeful since Trey has been able to come off the ventilator. I advised the overall poor clinical prognosis that exists and asked about reintubation as well as the possibility of a DNR. Fallon advised that she will try and talk to Trey and follow up with the primary care team. All questions from Fallon were answered and labs from today were reviewed with her.

## 2021-10-16 NOTE — PROGRESS NOTE ADULT - SUBJECTIVE AND OBJECTIVE BOX
JLUISSUZANNE  66y, Male  Allergy: No Known Allergies      LOS  12d    CHIEF COMPLAINT: weakness  Hyperkalemia  VT (16 Oct 2021 07:23)      INTERVAL EVENTS/HPI  - No acute events overnight  - T(F): , Max: 97.2 (10-15-21 @ 20:00)  - cough is improving - denies abdominal pain, nausea, vomiting   - WBC Count: 16.49 (10-16-21 @ 04:30)  WBC Count: 18.04 (10-15-21 @ 04:30)     - Creatinine, Serum: 2.2 (10-16-21 @ 04:30)  Creatinine, Serum: 2.1 (10-15-21 @ 04:30)       ROS  General: Denies rigors, nightsweats  HEENT: Denies headache, rhinorrhea, sore throat, eye pain  CV: Denies CP, palpitations  PULM: Denies wheezing, hemoptysis  GI: Denies hematemesis, hematochezia, melena  : Denies discharge, hematuria  MSK: Denies arthralgias, myalgias  SKIN: Denies rash, lesions  NEURO: Denies paresthesias, weakness  PSYCH: Denies depression, anxiety    VITALS:  T(F): 97, Max: 97.2 (10-15-21 @ 20:00)  HR: 72  BP: 130/84  RR: 20Vital Signs Last 24 Hrs  T(C): 36.1 (16 Oct 2021 05:00), Max: 36.2 (15 Oct 2021 20:00)  T(F): 97 (16 Oct 2021 05:00), Max: 97.2 (15 Oct 2021 20:00)  HR: 72 (16 Oct 2021 05:00) (72 - 93)  BP: 130/84 (16 Oct 2021 05:00) (110/50 - 130/84)  BP(mean): --  RR: 20 (16 Oct 2021 05:00) (20 - 22)  SpO2: 95% (16 Oct 2021 05:00) (95% - 95%)    PHYSICAL EXAM:  Gen: NAD, resting in bed  HEENT: Normocephalic, atraumatic  Neck: supple, no lymphadenopathy  CV: Regular rate & regular rhythm  Lungs: decreased BS at bases, no fremitus  Abdomen: distended, non-tender   Ext: Warm, well perfused  Neuro: non focal, awake  Skin: no rash, no erythema  Lines: no phlebitis    FH: Non-contributory  Social Hx: Non-contributory    TESTS & MEASUREMENTS:                        10.6   16.49 )-----------( 185      ( 16 Oct 2021 04:30 )             34.6     10-16    143  |  107  |  141<HH>  ----------------------------<  90  5.1<H>   |  16<L>  |  2.2<H>    Ca    8.2<L>      16 Oct 2021 04:30  Phos  5.9     10-16  Mg     2.8     10-16    TPro  6.7  /  Alb  2.9<L>  /  TBili  1.6<H>  /  DBili  0.7<H>  /  AST  65<H>  /  ALT  44<H>  /  AlkPhos  239<H>  10-16    eGFR if Non African American: 30 mL/min/1.73M2 (10-16-21 @ 04:30)  eGFR if African American: 35 mL/min/1.73M2 (10-16-21 @ 04:30)    LIVER FUNCTIONS - ( 16 Oct 2021 04:30 )  Alb: 2.9 g/dL / Pro: 6.7 g/dL / ALK PHOS: 239 U/L / ALT: 44 U/L / AST: 65 U/L / GGT: x               Culture - Urine (collected 10-13-21 @ 16:00)  Source: Clean Catch Clean Catch (Midstream)  Final Report (10-14-21 @ 22:31):    <10,000 CFU/mL Normal Urogenital Chayo    Culture - Blood (collected 10-13-21 @ 12:09)  Source: .Blood None  Preliminary Report (10-14-21 @ 23:01):    No growth to date.    Culture - Sputum (collected 10-12-21 @ 09:34)  Source: .Sputum Sputum  Gram Stain (10-13-21 @ 07:22):    Numerous polymorphonuclear leukocytes per low power field    Rare Squamous epithelial cells per low power field    Moderate Gram Negative Rods seen per oil power field  Final Report (10-14-21 @ 20:47):    Numerous Stenotrophomonas maltophilia  Organism: Stenotrophomonas maltophilia (10-14-21 @ 20:47)  Organism: Stenotrophomonas maltophilia (10-14-21 @ 20:47)      -  Ceftazidime: R >16      -  Levofloxacin: S <=0.5      -  Trimethoprim/Sulfamethoxazole: S <=0.5/9.5      Method Type: YESSI    Culture - Fungal, Body Fluid (collected 10-05-21 @ 18:53)  Source: .Body Fluid Peritoneal Fluid  Preliminary Report (10-13-21 @ 15:02):    No growth    Culture - Body Fluid with Gram Stain (collected 10-05-21 @ 18:53)  Source: .Body Fluid Peritoneal Fluid  Gram Stain (10-06-21 @ 02:48):    polymorphonuclear leukocytes seen    No organisms seen    by cytocentrifuge  Final Report (10-10-21 @ 17:05):    No growth at 5 days    Culture - Blood (collected 10-04-21 @ 02:15)  Source: .Blood Blood  Final Report (10-09-21 @ 14:01):    No Growth Final    Culture - Blood (collected 10-04-21 @ 02:10)  Source: .Blood Blood  Final Report (10-09-21 @ 14:01):    No Growth Final            INFECTIOUS DISEASES TESTING  Procalcitonin, Serum: 15.60 (10-06-21 @ 17:04)  Fungitell: <31 (10-06-21 @ 17:04)  Procalcitonin, Serum: 12.50 (10-06-21 @ 07:52)  Fungitell: 46 (10-06-21 @ 07:52)  MRSA PCR Result.: Negative (10-05-21 @ 05:14)  COVID-19 PCR: NotDetec (10-04-21 @ 02:17)      INFLAMMATORY MARKERS  C-Reactive Protein, Serum: 89 mg/L (10-10-21 @ 04:30)      RADIOLOGY & ADDITIONAL TESTS:  I have personally reviewed the last available Chest xray  CXR  Xray Chest 1 View- PORTABLE-Urgent:   EXAM:  XR CHEST PORTABLE URGENT 1V            PROCEDURE DATE:  10/13/2021            INTERPRETATION:  Clinical History / Reason for exam: Dyspnea    Comparison : Chest radiograph 10/12/2021.    Technique/Positioning: Frontal.    Findings:    Support devices: Port-A-Cath superior vena cava    Cardiac/mediastinum/hilum: Indeterminate    Lung parenchyma/Pleura: Left lower lobe opacity/pleural effusion. No air leak.    Skeleton/soft tissues: Unremarkable.    Impression:    Left lower lobe opacity/pleural effusion. No air leak.        --- End of Report ---              SWEETEI CAMPBELL MD; Attending Radiologist  This document has been electronically signed. Oct 13 2021  2:29PM (10-13-21 @ 12:16)      CT      CARDIOLOGY TESTING  12 Lead ECG:   Ventricular Rate 91 BPM    Atrial Rate 91 BPM    P-R Interval 133 ms    QRS Duration 75 ms    Q-T Interval 338 ms    QTC Calculation(Bazett) 416 ms    P Axis 66 degrees    R Axis 33 degrees    T Axis 32 degrees    Diagnosis Line Normal sinus rhythm  Low voltage QRS  Cannot rule out Anterior infarct , age undetermined  Abnormal ECG    Confirmed by Erik Cervantes ([id number]) on 10/7/2021 6:59:32 AM (10-07-21 @ 01:10)  12 Lead ECG:   Systolic BP 80 mmHg    Diastolic BP 51 mmHg    Ventricular Rate 206 BPM    Atrial Rate 241 BPM    QRS Duration 72 ms    Q-T Interval 206 ms    QTC Calculation(Bazett) 381 ms    R Axis 55 degrees    T Axis 63 degrees    Diagnosis Line Supraventricular tachycardia with occasional Premature ventricular complexes  Low voltage QRS  Nonspecific T wave abnormality  Abnormal ECG    Confirmed by KRISHNA ZARCO MD ([id number]) on 10/4/2021 11:18:34 PM (10-04-21 @ 20:37)      MEDICATIONS  albumin human  5% IVPB 12.5 IV Intermittent once  budesonide  80 MICROgram(s)/formoterol 4.5 MICROgram(s) Inhaler 2 Inhalation two times a day  cefTRIAXone   IVPB 1000 IV Intermittent every 24 hours  chlorhexidine 4% Liquid 1 Topical daily  dextrose 40% Gel 15 Oral once  dextrose 5%. 1000 IV Continuous <Continuous>  dextrose 5%. 1000 IV Continuous <Continuous>  dextrose 50% Injectable 25 IV Push once  dextrose 50% Injectable 12.5 IV Push once  dextrose 50% Injectable 25 IV Push once  glucagon  Injectable 1 IntraMuscular once  heparin   Injectable 5000 SubCutaneous every 8 hours  insulin glargine Injectable (LANTUS) 20 SubCutaneous at bedtime  insulin lispro (ADMELOG) corrective regimen sliding scale  SubCutaneous three times a day before meals  insulin lispro Injectable (ADMELOG) 5 SubCutaneous three times a day before meals  lactulose Syrup 15 Oral three times a day  levoFLOXacin IVPB 750 IV Intermittent every 48 hours  midodrine 10 Oral every 8 hours  norepinephrine Infusion 0.05 IV Continuous <Continuous>  pantoprazole  Injectable 40 IV Push every 12 hours  propranolol 5 Oral daily  rifAXIMin 550 Oral two times a day      WEIGHT  Weight (kg): 89.1 (10-10-21 @ 00:00)  Creatinine, Serum: 2.2 mg/dL (10-16-21 @ 04:30)      ANTIBIOTICS:  cefTRIAXone   IVPB 1000 milliGRAM(s) IV Intermittent every 24 hours  levoFLOXacin IVPB 750 milliGRAM(s) IV Intermittent every 48 hours  rifAXIMin 550 milliGRAM(s) Oral two times a day      All available historical records have been reviewed

## 2021-10-16 NOTE — PROGRESS NOTE ADULT - ASSESSMENT
67 yo male, with h/o HTN, drug abuse, Hepatitis C s/p INF, Liver cirrhosis s/p [place] shunt, COPD, DVT? on Eliquis, hepatocellular carcinoma since January 2021 on chemotherapy, presented for weakness and decreased oral intake along with constipation and hematemesis with ammonia (188) on presentation.    #Acute gastrointestinal hemorrhage due to variceal bleed  - s/p intubation for airway protection; now extubated  - s/p EGD - esophageal varices ligiated   - continue propranolol, protonix  - monitor cbc daily  #SBP prophylaxis -on ceftriaxone  #hepatic encephalopathy - mental status improved - continues on rifaximin and lactulose   #Liver cirrhosis s/p [place] shunt which is not working properly and will be assessed by IR on Monday   #Hepatocellular CA - on chemo since being diagnosed in 2021  - oncology eval appreciated - not a candidate given his current state   - had over 7 liters removed; suspect it is reaccumulating    - IR consulted for TIPS: recommended against due to multiple contraindications and MELD score >20    # Septic shock - resolved   - previously required levo - d/c'ed on 10/10  - DTA culture showed Stenotrophomonas that is sensitive to levofloxacin: Levofloxacin started on 10/14  - monitor BP closely and resume pressors if indicated     # COPD - not in exacerbation  - continue budesonide, formoterol    # EBER - r/o hepatorenal syndrome  #metabolic acidosis  -check urine lytes  -nephrology eval appreciated   -avoid nephrotoxics   -start oral bicarb and phoslo as per renal   -repeat labs in am    overall poor prognosis- discussed with pt in detail - he wishes to remain a full code as he feels he is getting better     Progress Note Handoff  Pending Consults: palliative follow up  Pending Tests: labs  Pending Results: labs  Family Discussion: discussed poor prognosis, all medication and treatment plan with pt and medical staff in detail - all questions answered   Disposition: Home_____/SNF______/Other_____/Unknown at this time__x___    Please call me with any questions at extension [phone]  spent over 38 min on medical management

## 2021-10-16 NOTE — PROGRESS NOTE ADULT - ASSESSMENT
ASSESSMENT  65 yo male, with h/o HTN, drug abuse, Hepatitis C s/p INF, Liver cirrhosis s/p [place] shunt, COPD, DVT? on Eliquis, HCC since January 2021 on chemotherapy, presented for weakness    IMPRESSION  #Decompensated Cirrhosis   #Hep C with HCC on chemotherapy  #Variceal Bleed    #Fevers + Leukocytosis -- possibly reactive from Variceal bleed  - BLood Cx 10/4 NG  - s/p paracentesis 10/5 - negative for SBP  - Fungitell: <31 (10.06.21 @ 17:04)    #VAP  - Tracheal Cx 10/12 Stenotrophomonas     #Abx allergy: NKDA    RECOMMENDATIONS  - trend WBC   - continue Levofloxacin 750 mg - adjust to q 48 hours due to reduced CrCl (10/14-10/20)  - trend Cr  - continue ceftriaxone for SBP prophylaxis - continue until discharge  - recall PRN     Please call or message on Microsoft Teams if with any questions.  Spectra [phone]

## 2021-10-16 NOTE — PROGRESS NOTE ADULT - SUBJECTIVE AND OBJECTIVE BOX
seen an examined  no distress       PAST HISTORY  --------------------------------------------------------------------------------  No significant changes to PMH, PSH, FHx, SHx, unless otherwise noted    ALLERGIES & MEDICATIONS  --------------------------------------------------------------------------------  Allergies    No Known Allergies    Intolerances      Standing Inpatient Medications  albumin human  5% IVPB 25 Gram(s) IV Intermittent every 3 hours  albumin human  5% IVPB 12.5 Gram(s) IV Intermittent once  budesonide  80 MICROgram(s)/formoterol 4.5 MICROgram(s) Inhaler 2 Puff(s) Inhalation two times a day  cefTRIAXone   IVPB 1000 milliGRAM(s) IV Intermittent every 24 hours  chlorhexidine 4% Liquid 1 Application(s) Topical daily  dextrose 40% Gel 15 Gram(s) Oral once  dextrose 5%. 1000 milliLiter(s) IV Continuous <Continuous>  dextrose 5%. 1000 milliLiter(s) IV Continuous <Continuous>  dextrose 50% Injectable 25 Gram(s) IV Push once  dextrose 50% Injectable 12.5 Gram(s) IV Push once  dextrose 50% Injectable 25 Gram(s) IV Push once  glucagon  Injectable 1 milliGRAM(s) IntraMuscular once  heparin   Injectable 5000 Unit(s) SubCutaneous every 8 hours  insulin glargine Injectable (LANTUS) 20 Unit(s) SubCutaneous at bedtime  insulin lispro (ADMELOG) corrective regimen sliding scale   SubCutaneous three times a day before meals  insulin lispro Injectable (ADMELOG) 5 Unit(s) SubCutaneous three times a day before meals  lactulose Syrup 15 Gram(s) Oral three times a day  levoFLOXacin IVPB 750 milliGRAM(s) IV Intermittent every 48 hours  midodrine 10 milliGRAM(s) Oral every 8 hours  norepinephrine Infusion 0.05 MICROgram(s)/kG/Min IV Continuous <Continuous>  pantoprazole  Injectable 40 milliGRAM(s) IV Push every 12 hours  propranolol 5 milliGRAM(s) Oral daily  rifAXIMin 550 milliGRAM(s) Oral two times a day        VITALS/PHYSICAL EXAM  --------------------------------------------------------------------------------  T(C): 36.1 (10-16-21 @ 05:00), Max: 36.2 (10-15-21 @ 08:00)  HR: 72 (10-16-21 @ 05:00) (72 - 93)  BP: 130/84 (10-16-21 @ 05:00) (104/82 - 130/84)  RR: 20 (10-16-21 @ 05:00) (18 - 22)  SpO2: 95% (10-16-21 @ 05:00) (95% - 98%)  Wt(kg): --        10-15-21 @ 07:01  -  10-16-21 @ 07:00  --------------------------------------------------------  IN: 0 mL / OUT: 200 mL / NET: -200 mL      Physical Exam:  	Gen: NAD,  	Pulm: decrease BS  B/L  	CV:  S1S2; no rub  	Abd: +distended      LABS/STUDIES  --------------------------------------------------------------------------------              11.0   18.04 >-----------<  288      [10-15-21 @ 04:30]              35.2     142  |  105  |  133  ----------------------------<  140      [10-15-21 @ 04:30]  5.0   |  21  |  2.1        Ca     8.2     [10-15-21 @ 04:30]      Mg     2.8     [10-15-21 @ 04:30]      Phos  6.1     [10-15-21 @ 04:30]    TPro  6.3  /  Alb  1.9  /  TBili  1.4  /  DBili  0.7  /  AST  61  /  ALT  53  /  AlkPhos  280  [10-15-21 @ 04:30]    PT/INR: PT 21.40, INR 1.87       [10-15-21 @ 04:30]      Creatinine Trend:  SCr 2.1 [10-15 @ 04:30]  SCr 1.7 [10-14 @ 05:07]  SCr 1.6 [10-13 @ 04:54]  SCr 1.4 [10-12 @ 04:35]  SCr 1.5 [10-11 @ 05:30]    Urinalysis - [10-04-21 @ 04:38]      Color Yellow / Appearance Clear / SG 1.025 / pH 6.0      Gluc 100 mg/dL / Ketone Negative  / Bili Negative / Urobili <2 mg/dL       Blood Negative / Protein Negative / Leuk Est Negative / Nitrite Negative      RBC  / WBC  / Hyaline  / Gran  / Sq Epi  / Non Sq Epi  / Bacteria       Lipid: chol --, TG 92, HDL --, LDL --      [10-10-21 @ 04:30]

## 2021-10-16 NOTE — PROGRESS NOTE ADULT - SUBJECTIVE AND OBJECTIVE BOX
SUZANNE BAZZI  66y  Male      Patient is a 66y old  Male who presents with a chief complaint of weakness  Hyperkalemia  VT (15 Oct 2021 10:28)      INTERVAL HPI/OVERNIGHT EVENTS:  Patient seen and examined earlier this morning.  Lying comfortably in bed  Denies any complaints and states he feels well      REVIEW OF SYSTEMS:  CONSTITUTIONAL: No fever, weight loss, or fatigue  EYES: No eye pain, visual disturbances, or discharge  ENMT:  No difficulty hearing, tinnitus, vertigo; No sinus or throat pain  NECK: No pain or stiffness  RESPIRATORY: No cough, wheezing, chills or hemoptysis; No shortness of breath  CARDIOVASCULAR: No chest pain, palpitations, dizziness, or leg swelling  GASTROINTESTINAL: No abdominal or epigastric pain. No nausea, vomiting, or hematemesis; No diarrhea or constipation. No melena or hematochezia.  GENITOURINARY: No dysuria, frequency, hematuria, or incontinence  NEUROLOGICAL: No headaches, memory loss, loss of strength, numbness, or tremors  SKIN: No itching, burning, rashes, or lesions   LYMPH NODES: No enlarged glands  ENDOCRINE: No heat or cold intolerance; No hair loss  MUSCULOSKELETAL: No joint pain or swelling; No muscle, back, or extremity pain  PSYCHIATRIC: No depression, anxiety, mood swings, or difficulty sleeping  HEME/LYMPH: No easy bruising, or bleeding gums  ALLERY AND IMMUNOLOGIC: No hives or eczema    Vital Signs Last 24 Hrs  T(C): 34.2 (16 Oct 2021 12:47), Max: 36.2 (15 Oct 2021 20:00)  T(F): 93.5 (16 Oct 2021 12:47), Max: 97.2 (15 Oct 2021 20:00)  HR: 63 (16 Oct 2021 12:47) (63 - 93)  BP: 113/55 (16 Oct 2021 12:47) (113/55 - 139/96)  BP(mean): 70 (16 Oct 2021 12:47) (70 - 70)  RR: 18 (16 Oct 2021 12:47) (18 - 22)  SpO2: 94% (16 Oct 2021 12:47) (94% - 95%)    PHYSICAL EXAM:  GENERAL: NAD, well-groomed, well-developed  HEAD:  Atraumatic, Normocephalic  EYES: EOMI, PERRLA, conjunctiva and sclera clear  ENMT: poor dentition   NECK: Supple, No JVD, Normal thyroid  NERVOUS SYSTEM:  Alert & Oriented X3, Good concentration; Moves all extremities   CHEST/LUNG: decreased breath sounds b/l   HEART: Regular rate and rhythm; No murmurs, rubs, or gallops  ABDOMEN: Soft, Nontender, distended; Bowel sounds present, den[place] catheter +  EXTREMITIES:  3+ Peripheral edema with blisters  LYMPH: No lymphadenopathy noted  SKIN: No rashes or lesions    Consultant(s) Notes Reviewed:  [x ] YES  [ ] NO  Care Discussed with Consultants/Other Providers [ x] YES  [ ] NO    LAB:                        10.6   16.49 )-----------( 185      ( 16 Oct 2021 04:30 )             34.6     10-16    143  |  107  |  141<HH>  ----------------------------<  90  5.1<H>   |  16<L>  |  2.2<H>    Ca    8.2<L>      16 Oct 2021 04:30  Phos  5.9     10-16  Mg     2.8     10-16    TPro  6.7  /  Alb  2.9<L>  /  TBili  1.6<H>  /  DBili  0.7<H>  /  AST  65<H>  /  ALT  44<H>  /  AlkPhos  239<H>  10-16        Drug Dosing Weight  Height (cm): 175.3 (04 Oct 2021 23:55)  Weight (kg): 89.1 (10 Oct 2021 00:00)  BMI (kg/m2): 29 (10 Oct 2021 00:00)  BSA (m2): 2.05 (10 Oct 2021 00:00)    CAPILLARY BLOOD GLUCOSE  POCT Blood Glucose.: 105 mg/dL (16 Oct 2021 11:25)  POCT Blood Glucose.: 99 mg/dL (16 Oct 2021 08:13)  POCT Blood Glucose.: 100 mg/dL (15 Oct 2021 21:38)  POCT Blood Glucose.: 178 mg/dL (15 Oct 2021 16:11)      I&O's Summary  15 Oct 2021 07:01  -  16 Oct 2021 07:00  --------------------------------------------------------  IN: 0 mL / OUT: 200 mL / NET: -200 mL    16 Oct 2021 07:01  -  16 Oct 2021 14:46  --------------------------------------------------------  IN: 0 mL / OUT: 185 mL / NET: -185 mL        RADIOLOGY & ADDITIONAL TESTS:  Imaging Personally Reviewed:  [x] YES  [ ] NO  < from: Xray Chest 1 View- PORTABLE-Urgent (Xray Chest 1 View- PORTABLE-Urgent .) (10.13.21 @ 12:16) >  Impression:    Left lower lobe opacity/pleural effusion. No air leak.    < end of copied text >      HEALTH ISSUES - PROBLEM Dx:  Palliative care by specialist    Altered mental status    Cirrhosis        MEDICATIONS  (STANDING):  budesonide  80 MICROgram(s)/formoterol 4.5 MICROgram(s) Inhaler 2 Puff(s) Inhalation two times a day  cefTRIAXone   IVPB 1000 milliGRAM(s) IV Intermittent every 24 hours  chlorhexidine 4% Liquid 1 Application(s) Topical daily  dextrose 40% Gel 15 Gram(s) Oral once  dextrose 5%. 1000 milliLiter(s) (50 mL/Hr) IV Continuous <Continuous>  dextrose 5%. 1000 milliLiter(s) (100 mL/Hr) IV Continuous <Continuous>  dextrose 50% Injectable 25 Gram(s) IV Push once  dextrose 50% Injectable 12.5 Gram(s) IV Push once  dextrose 50% Injectable 25 Gram(s) IV Push once  glucagon  Injectable 1 milliGRAM(s) IntraMuscular once  heparin   Injectable 5000 Unit(s) SubCutaneous every 8 hours  insulin glargine Injectable (LANTUS) 20 Unit(s) SubCutaneous at bedtime  insulin lispro (ADMELOG) corrective regimen sliding scale   SubCutaneous three times a day before meals  insulin lispro Injectable (ADMELOG) 5 Unit(s) SubCutaneous three times a day before meals  lactulose Syrup 15 Gram(s) Oral three times a day  levoFLOXacin IVPB 750 milliGRAM(s) IV Intermittent every 48 hours  midodrine 10 milliGRAM(s) Oral every 8 hours  pantoprazole  Injectable 40 milliGRAM(s) IV Push every 12 hours  propranolol 5 milliGRAM(s) Oral daily  rifAXIMin 550 milliGRAM(s) Oral two times a day    MEDICATIONS  (PRN):

## 2021-10-16 NOTE — PROGRESS NOTE ADULT - ASSESSMENT
Pt with Decompensated liver cirrhosis, Hep C-s/p IFN, ascites -[place] cath, HCCa with transaminitis on chemotherapy  presents with weakness, found to have K 6.8 (was on Aldactone) , sustained VT -s/p shock; met acidosis, hepatic encephalopathy - Ammonia 188.    Efe improved with creat to 1.4   Efe likely due to abd compartment syndrome or HRS  - continue Midodrine 10 mg q8h, use pressors as needed  - continue albumin   - creatinine was trending up, check repeat today   -strict Is and os  Hyperkalemia - improved, cont low K diet, if repeated > 5.5 start lokelma 10 q 12   High phos - start Phoslo 667 tid with meals  - off Bicarb drip,   Sepsis - r/o SBP - on Levaquin  Prognosis poor

## 2021-10-16 NOTE — PROGRESS NOTE ADULT - SUBJECTIVE AND OBJECTIVE BOX
Patient is a 66y old  Male who presents with a chief complaint of weakness  Hyperkalemia  VT (16 Oct 2021 14:43)        Over Night Events:  Resting in bed.  Off pressors.  on RA.          ROS:     All ROS are negative except HPI         PHYSICAL EXAM    ICU Vital Signs Last 24 Hrs  T(C): 35 (16 Oct 2021 15:01), Max: 36.2 (15 Oct 2021 20:00)  T(F): 95 (16 Oct 2021 15:01), Max: 97.2 (15 Oct 2021 20:00)  HR: 68 (16 Oct 2021 15:01) (63 - 84)  BP: 104/65 (16 Oct 2021 15:01) (104/65 - 139/96)  BP(mean): 77 (16 Oct 2021 15:01) (70 - 77)  ABP: --  ABP(mean): --  RR: 16 (16 Oct 2021 15:01) (16 - 22)  SpO2: 93% (16 Oct 2021 15:01) (93% - 95%)      CONSTITUTIONAL:  Well nourished.  Ill appearing In NAD    ENT:   Airway patent,   Mouth with normal mucosa.   No thrush    EYES:   Pupils equal,   Round and reactive to light.    CARDIAC:   Normal rate,   Regular rhythm.     edema      Vascular:  Normal systolic impulse  No Carotid bruits    RESPIRATORY:   No wheezing  Bilateral BS  Normal chest expansion  Not tachypneic,  No use of accessory muscles    GASTROINTESTINAL:  Abdomen soft, Distended  Tender,   No guarding,   + BS    MUSCULOSKELETAL:   Range of motion is not limited,  No clubbing, cyanosis    NEUROLOGICAL:   Alert   No motor  deficits.    SKIN:   Warm and dry   No evidence of rash.    PSYCHIATRIC:   Normal mood and affect.   No apparent risk to self or others.    HEMATOLOGICAL:  No cervical  lymphadenopathy.  no inguinal lymphadenopathy      10-15-21 @ 07:01  -  10-16-21 @ 07:00  --------------------------------------------------------  IN:  Total IN: 0 mL    OUT:    Indwelling Catheter - Urethral (mL): 200 mL  Total OUT: 200 mL    Total NET: -200 mL      10-16-21 @ 07:01  -  10-16-21 @ 19:41  --------------------------------------------------------  IN:  Total IN: 0 mL    OUT:    Drain (mL): 35 mL    Indwelling Catheter - Urethral (mL): 350 mL  Total OUT: 385 mL    Total NET: -385 mL          LABS:                            10.6   16.49 )-----------( 185      ( 16 Oct 2021 04:30 )             34.6                                               10-16    143  |  107  |  141<HH>  ----------------------------<  90  5.1<H>   |  16<L>  |  2.2<H>    Creatinine Trend  , Cr 2.2, (10-16-21 @ 04:30)  Creatinine Trend  , Cr 2.1, (10-15-21 @ 04:30)  Creatinine Trend  , Cr 1.7, (10-14-21 @ 05:07)  Creatinine Trend  , Cr 1.6, (10-13-21 @ 04:54)  Creatinine Trend  , Cr 1.4, (10-12-21 @ 04:35)      Ca    8.2<L>      16 Oct 2021 04:30  Phos  5.9     10-16  Mg     2.8     10-16    TPro  6.7  /  Alb  2.9<L>  /  TBili  1.6<H>  /  DBili  0.7<H>  /  AST  65<H>  /  ALT  44<H>  /  AlkPhos  239<H>  10-16      PT/INR - ( 16 Oct 2021 04:30 )   PT: 20.60 sec;   INR: 1.80 ratio                                                                                              LIVER FUNCTIONS - ( 16 Oct 2021 04:30 )  Alb: 2.9 g/dL / Pro: 6.7 g/dL / ALK PHOS: 239 U/L / ALT: 44 U/L / AST: 65 U/L / GGT: x                                                                                                                                       MEDICATIONS  (STANDING):  budesonide  80 MICROgram(s)/formoterol 4.5 MICROgram(s) Inhaler 2 Puff(s) Inhalation two times a day  calcium acetate 667 milliGRAM(s) Oral three times a day with meals  cefTRIAXone   IVPB 1000 milliGRAM(s) IV Intermittent every 24 hours  chlorhexidine 4% Liquid 1 Application(s) Topical daily  dextrose 40% Gel 15 Gram(s) Oral once  dextrose 5%. 1000 milliLiter(s) (50 mL/Hr) IV Continuous <Continuous>  dextrose 5%. 1000 milliLiter(s) (100 mL/Hr) IV Continuous <Continuous>  dextrose 50% Injectable 25 Gram(s) IV Push once  dextrose 50% Injectable 12.5 Gram(s) IV Push once  dextrose 50% Injectable 25 Gram(s) IV Push once  glucagon  Injectable 1 milliGRAM(s) IntraMuscular once  heparin   Injectable 5000 Unit(s) SubCutaneous every 8 hours  insulin glargine Injectable (LANTUS) 20 Unit(s) SubCutaneous at bedtime  insulin lispro (ADMELOG) corrective regimen sliding scale   SubCutaneous three times a day before meals  insulin lispro Injectable (ADMELOG) 5 Unit(s) SubCutaneous three times a day before meals  lactulose Syrup 15 Gram(s) Oral three times a day  levoFLOXacin IVPB 750 milliGRAM(s) IV Intermittent every 48 hours  midodrine 10 milliGRAM(s) Oral every 8 hours  pantoprazole  Injectable 40 milliGRAM(s) IV Push every 12 hours  propranolol 5 milliGRAM(s) Oral daily  rifAXIMin 550 milliGRAM(s) Oral two times a day  sodium bicarbonate 650 milliGRAM(s) Oral every 8 hours    MEDICATIONS  (PRN):      New X-rays reviewed:                                                                                  ECHO    CXR interpreted by me:

## 2021-10-16 NOTE — PROGRESS NOTE ADULT - ASSESSMENT
Pt with Decompensated liver cirrhosis, Hep C-s/p IFN, ascites -[place] cath, HCCa with transaminitis on chemotherapy  presents with weakness, found to have K 6.8 (was on Aldactone) , sustained VT -s/p shock; met acidosis, hepatic encephalopathy - Ammonia 188.  Efe likely due to abd compartment syndrome or HRS  - continue Midodrine 10 mg q8h, use pressors as needed  - continue albumin   - creatinine was trending up, check repeat today   -strict Is and os  Hyperkalemia - improved, cont low K diet, if repeated > 5.5 start lokelma 10 q 12   High phos - start Phoslo 667 tid with meals  - off Bicarb drip, start sodium bicarbonate 650 q 8   Sepsis - r/o SBP - on Levaquin/ rocephine   Prognosis poor

## 2021-10-17 LAB
ALBUMIN SERPL ELPH-MCNC: 3 G/DL — LOW (ref 3.5–5.2)
ALP SERPL-CCNC: 224 U/L — HIGH (ref 30–115)
ALT FLD-CCNC: 38 U/L — SIGNIFICANT CHANGE UP (ref 0–41)
ANION GAP SERPL CALC-SCNC: 17 MMOL/L — HIGH (ref 7–14)
AST SERPL-CCNC: 60 U/L — HIGH (ref 0–41)
BILIRUB SERPL-MCNC: 1.1 MG/DL — SIGNIFICANT CHANGE UP (ref 0.2–1.2)
BUN SERPL-MCNC: 141 MG/DL — CRITICAL HIGH (ref 10–20)
CALCIUM SERPL-MCNC: 8.2 MG/DL — LOW (ref 8.5–10.1)
CHLORIDE SERPL-SCNC: 105 MMOL/L — SIGNIFICANT CHANGE UP (ref 98–110)
CO2 SERPL-SCNC: 19 MMOL/L — SIGNIFICANT CHANGE UP (ref 17–32)
CREAT SERPL-MCNC: 2.3 MG/DL — HIGH (ref 0.7–1.5)
GLUCOSE BLDC GLUCOMTR-MCNC: 118 MG/DL — HIGH (ref 70–99)
GLUCOSE BLDC GLUCOMTR-MCNC: 135 MG/DL — HIGH (ref 70–99)
GLUCOSE BLDC GLUCOMTR-MCNC: 144 MG/DL — HIGH (ref 70–99)
GLUCOSE BLDC GLUCOMTR-MCNC: 193 MG/DL — HIGH (ref 70–99)
GLUCOSE BLDC GLUCOMTR-MCNC: 83 MG/DL — SIGNIFICANT CHANGE UP (ref 70–99)
GLUCOSE SERPL-MCNC: 121 MG/DL — HIGH (ref 70–99)
HCT VFR BLD CALC: 33.6 % — LOW (ref 42–52)
HGB BLD-MCNC: 10.6 G/DL — LOW (ref 14–18)
MCHC RBC-ENTMCNC: 29.7 PG — SIGNIFICANT CHANGE UP (ref 27–31)
MCHC RBC-ENTMCNC: 31.5 G/DL — LOW (ref 32–37)
MCV RBC AUTO: 94.1 FL — HIGH (ref 80–94)
NRBC # BLD: 0 /100 WBCS — SIGNIFICANT CHANGE UP (ref 0–0)
PLATELET # BLD AUTO: 273 K/UL — SIGNIFICANT CHANGE UP (ref 130–400)
POTASSIUM SERPL-MCNC: 3.8 MMOL/L — SIGNIFICANT CHANGE UP (ref 3.5–5)
POTASSIUM SERPL-SCNC: 3.8 MMOL/L — SIGNIFICANT CHANGE UP (ref 3.5–5)
PROT SERPL-MCNC: 6.4 G/DL — SIGNIFICANT CHANGE UP (ref 6–8)
RBC # BLD: 3.57 M/UL — LOW (ref 4.7–6.1)
RBC # FLD: 25.1 % — HIGH (ref 11.5–14.5)
SODIUM SERPL-SCNC: 141 MMOL/L — SIGNIFICANT CHANGE UP (ref 135–146)
WBC # BLD: 17.03 K/UL — HIGH (ref 4.8–10.8)
WBC # FLD AUTO: 17.03 K/UL — HIGH (ref 4.8–10.8)

## 2021-10-17 PROCEDURE — 99233 SBSQ HOSP IP/OBS HIGH 50: CPT

## 2021-10-17 RX ADMIN — PANTOPRAZOLE SODIUM 40 MILLIGRAM(S): 20 TABLET, DELAYED RELEASE ORAL at 17:11

## 2021-10-17 RX ADMIN — Medication 650 MILLIGRAM(S): at 22:46

## 2021-10-17 RX ADMIN — LACTULOSE 15 GRAM(S): 10 SOLUTION ORAL at 22:46

## 2021-10-17 RX ADMIN — Medication 667 MILLIGRAM(S): at 09:40

## 2021-10-17 RX ADMIN — HEPARIN SODIUM 5000 UNIT(S): 5000 INJECTION INTRAVENOUS; SUBCUTANEOUS at 13:01

## 2021-10-17 RX ADMIN — Medication 667 MILLIGRAM(S): at 17:11

## 2021-10-17 RX ADMIN — Medication 5 UNIT(S): at 08:15

## 2021-10-17 RX ADMIN — LACTULOSE 15 GRAM(S): 10 SOLUTION ORAL at 05:14

## 2021-10-17 RX ADMIN — Medication 650 MILLIGRAM(S): at 05:14

## 2021-10-17 RX ADMIN — MIDODRINE HYDROCHLORIDE 10 MILLIGRAM(S): 2.5 TABLET ORAL at 22:45

## 2021-10-17 RX ADMIN — LACTULOSE 15 GRAM(S): 10 SOLUTION ORAL at 13:02

## 2021-10-17 RX ADMIN — PANTOPRAZOLE SODIUM 40 MILLIGRAM(S): 20 TABLET, DELAYED RELEASE ORAL at 05:15

## 2021-10-17 RX ADMIN — Medication 667 MILLIGRAM(S): at 12:37

## 2021-10-17 RX ADMIN — INSULIN GLARGINE 20 UNIT(S): 100 INJECTION, SOLUTION SUBCUTANEOUS at 21:30

## 2021-10-17 RX ADMIN — HEPARIN SODIUM 5000 UNIT(S): 5000 INJECTION INTRAVENOUS; SUBCUTANEOUS at 22:47

## 2021-10-17 RX ADMIN — BUDESONIDE AND FORMOTEROL FUMARATE DIHYDRATE 2 PUFF(S): 160; 4.5 AEROSOL RESPIRATORY (INHALATION) at 21:30

## 2021-10-17 RX ADMIN — CHLORHEXIDINE GLUCONATE 1 APPLICATION(S): 213 SOLUTION TOPICAL at 12:37

## 2021-10-17 RX ADMIN — Medication 5 UNIT(S): at 17:11

## 2021-10-17 RX ADMIN — Medication 650 MILLIGRAM(S): at 13:01

## 2021-10-17 RX ADMIN — Medication 1: at 12:35

## 2021-10-17 RX ADMIN — HEPARIN SODIUM 5000 UNIT(S): 5000 INJECTION INTRAVENOUS; SUBCUTANEOUS at 05:15

## 2021-10-17 RX ADMIN — MIDODRINE HYDROCHLORIDE 10 MILLIGRAM(S): 2.5 TABLET ORAL at 05:14

## 2021-10-17 RX ADMIN — CEFTRIAXONE 100 MILLIGRAM(S): 500 INJECTION, POWDER, FOR SOLUTION INTRAMUSCULAR; INTRAVENOUS at 12:40

## 2021-10-17 RX ADMIN — MIDODRINE HYDROCHLORIDE 10 MILLIGRAM(S): 2.5 TABLET ORAL at 13:00

## 2021-10-17 RX ADMIN — Medication 5 UNIT(S): at 12:36

## 2021-10-17 NOTE — PROGRESS NOTE ADULT - ASSESSMENT
IMPRESSION:    Acute resp failure resolved   Hepatic encephalopathy improving   Decompensated liver cirrhosis. Has [place] catheter ( being drained every 4 days 2 l)  Sepsis   Hematemesis ( esophageal/ gastric varices)  Acute portal Vein thrombosis   Hepatocellular carcinoma with transaminitis on chemotherapy  Hepatitis C treated with INF  HO COPD  EBER worsening  Stenotrophomonas in sputum       PLAN:    CNS: no sedation     HEENT: Oral care    PULMONARY:  HOB @ 45 degrees.  Continue O2 as necessary to maintain sats 92 to 94%,  Repeat CXR      CARDIOVASCULAR: Avoid volume overload.  Continue Propranolol    GI: GI prophylaxis. Feeding as per speech and swallow.  Lactulose PO.  FU with GI     RENAL:  Follow up with renal. Low K diet. Follow up lytes     INFECTIOUS DISEASE: ABX PER ID      HEMATOLOGICAL:  DVT prophylaxis.  FU CBC and coags     ENDOCRINE:  Follow up FS.  Avoid hypoglycemia    MUSCULOSKELETAL: bed rest     prognosis grave    OOB to chair     PT OT

## 2021-10-17 NOTE — PROGRESS NOTE ADULT - SUBJECTIVE AND OBJECTIVE BOX
Patient is a 66y old  Male who presents with a chief complaint of weakness  Hyperkalemia  VT (16 Oct 2021 19:41)        Over Night Events: Looks and feels better.          ROS:     All ROS are negative except HPI         PHYSICAL EXAM    ICU Vital Signs Last 24 Hrs  T(C): 35.4 (17 Oct 2021 09:01), Max: 35.4 (16 Oct 2021 23:46)  T(F): 95.7 (17 Oct 2021 09:01), Max: 95.7 (16 Oct 2021 23:46)  HR: 90 (17 Oct 2021 09:01) (63 - 91)  BP: 94/56 (17 Oct 2021 09:01) (94/56 - 115/75)  BP(mean): 92 (17 Oct 2021 04:14) (70 - 92)  ABP: --  ABP(mean): --  RR: 18 (17 Oct 2021 04:14) (16 - 18)  SpO2: 95% (17 Oct 2021 09:01) (93% - 96%)      CONSTITUTIONAL:  Well nourished.  In NAD    ENT:   Airway patent,   Mouth with normal mucosa.   No thrush    EYES:   Pupils equal,   Round and reactive to light.    CARDIAC:   Normal rate,   Regular rhythm.    edema      Vascular:  Normal systolic impulse  No Carotid bruits    RESPIRATORY:   No wheezing  Bilateral BS  Normal chest expansion  Not tachypneic,  No use of accessory muscles    GASTROINTESTINAL:  Abdomen soft,   Non-tender, Distended   No guarding,   + BS    MUSCULOSKELETAL:   Range of motion is not limited,  No clubbing, cyanosis    NEUROLOGICAL:   Alert follows simple commands   No motor  deficits.    SKIN:   Skin normal color for race,   Warm and dry  No evidence of rash.    PSYCHIATRIC:   Normal mood and affect.   No apparent risk to self or others.    HEMATOLOGICAL:  No cervical  lymphadenopathy.  no inguinal lymphadenopathy      10-16-21 @ 07:01  -  10-17-21 @ 07:00  --------------------------------------------------------  IN:  Total IN: 0 mL    OUT:    Drain (mL): 85 mL    Indwelling Catheter - Urethral (mL): 950 mL  Total OUT: 1035 mL    Total NET: -1035 mL          LABS:                            10.6   16.49 )-----------( 185      ( 16 Oct 2021 04:30 )             34.6                                               10-16    143  |  107  |  141<HH>  ----------------------------<  90  5.1<H>   |  16<L>  |  2.2<H>    Ca    8.2<L>      16 Oct 2021 04:30  Phos  5.9     10-16  Mg     2.8     10-16    TPro  6.7  /  Alb  2.9<L>  /  TBili  1.6<H>  /  DBili  0.7<H>  /  AST  65<H>  /  ALT  44<H>  /  AlkPhos  239<H>  10-16      PT/INR - ( 16 Oct 2021 04:30 )   PT: 20.60 sec;   INR: 1.80 ratio                                                                                              LIVER FUNCTIONS - ( 16 Oct 2021 04:30 )  Alb: 2.9 g/dL / Pro: 6.7 g/dL / ALK PHOS: 239 U/L / ALT: 44 U/L / AST: 65 U/L / GGT: x                                                                                                                                       MEDICATIONS  (STANDING):  budesonide  80 MICROgram(s)/formoterol 4.5 MICROgram(s) Inhaler 2 Puff(s) Inhalation two times a day  calcium acetate 667 milliGRAM(s) Oral three times a day with meals  cefTRIAXone   IVPB 1000 milliGRAM(s) IV Intermittent every 24 hours  chlorhexidine 4% Liquid 1 Application(s) Topical daily  dextrose 40% Gel 15 Gram(s) Oral once  dextrose 5%. 1000 milliLiter(s) (50 mL/Hr) IV Continuous <Continuous>  dextrose 5%. 1000 milliLiter(s) (100 mL/Hr) IV Continuous <Continuous>  dextrose 50% Injectable 25 Gram(s) IV Push once  dextrose 50% Injectable 12.5 Gram(s) IV Push once  dextrose 50% Injectable 25 Gram(s) IV Push once  glucagon  Injectable 1 milliGRAM(s) IntraMuscular once  heparin   Injectable 5000 Unit(s) SubCutaneous every 8 hours  insulin glargine Injectable (LANTUS) 20 Unit(s) SubCutaneous at bedtime  insulin lispro (ADMELOG) corrective regimen sliding scale   SubCutaneous three times a day before meals  insulin lispro Injectable (ADMELOG) 5 Unit(s) SubCutaneous three times a day before meals  lactulose Syrup 15 Gram(s) Oral three times a day  levoFLOXacin IVPB 750 milliGRAM(s) IV Intermittent every 48 hours  midodrine 10 milliGRAM(s) Oral every 8 hours  pantoprazole  Injectable 40 milliGRAM(s) IV Push every 12 hours  propranolol 5 milliGRAM(s) Oral daily  rifAXIMin 550 milliGRAM(s) Oral two times a day  sodium bicarbonate 650 milliGRAM(s) Oral every 8 hours    MEDICATIONS  (PRN):      New X-rays reviewed:                                                                                  ECHO    CXR interpreted by me:

## 2021-10-17 NOTE — PROGRESS NOTE ADULT - ASSESSMENT
65 yo male, with h/o HTN, drug abuse, Hepatitis C s/p INF, Liver cirrhosis s/p [place] shunt, COPD, DVT? on Eliquis, hepatocellular carcinoma since January 2021 on chemotherapy, presented for weakness and decreased oral intake along with constipation and hematemesis with ammonia (188) on presentation.    #Acute gastrointestinal hemorrhage due to variceal bleed  - s/p intubation for airway protection; now extubated  - s/p EGD - esophageal varices ligiated   - continue propranolol, protonix  - monitor cbc daily  #SBP prophylaxis -on ceftriaxone and levaquin   #hepatic encephalopathy - mental status improved - continues on rifaximin and lactulose   #Liver cirrhosis s/p [place] shunt which is not working properly and will be assessed by IR on Monday   #Hepatocellular CA - on chemo since being diagnosed in 2021  - oncology eval appreciated - not a candidate given his current state   - had over 7 liters removed; suspect it is reaccumulating    - IR consulted for TIPS: recommended against due to multiple contraindications and MELD score >20    # Septic shock - resolved   - previously required levo - d/c'ed on 10/10  - DTA culture showed Stenotrophomonas that is sensitive to levofloxacin: Levofloxacin started on 10/14  - monitor BP closely and resume pressors if indicated     # COPD - not in exacerbation  - continue budesonide, formoterol    # EBER - r/o hepatorenal syndrome  #metabolic acidosis  -check urine lytes  -nephrology eval appreciated   -avoid nephrotoxics   -start oral bicarb and phoslo as per renal   -repeat labs today at 4pm and in am    overall poor prognosis- discussed with pt in detail - he wishes to remain a full code as he feels he is getting better     Progress Note Handoff  Pending Consults: palliative follow up, IR  Pending Tests: labs  Pending Results: labs  Family Discussion: discussed poor prognosis, all medication and treatment plan with pt and medical staff in detail - all questions answered   Disposition: Home_____/SNF______/Other_____/Unknown at this time__x___    Please call me with any questions at extension [phone]  spent over 37 min on medical management

## 2021-10-17 NOTE — PROGRESS NOTE ADULT - SUBJECTIVE AND OBJECTIVE BOX
JLUIS SUZANNE  66y  Male      Patient is a 66y old  Male who presents with a chief complaint of weakness  Hyperkalemia  VT (15 Oct 2021 10:28)      INTERVAL HPI/OVERNIGHT EVENTS:  Patient seen and examined earlier this morning.  Lying comfortably in bed  Denies any complaints and states he feels well  [place] catheter not functioning since yesterday       REVIEW OF SYSTEMS:  CONSTITUTIONAL: No fever, weight loss, or fatigue  EYES: No eye pain, visual disturbances, or discharge  ENMT:  No difficulty hearing, tinnitus, vertigo; No sinus or throat pain  NECK: No pain or stiffness  RESPIRATORY: No cough, wheezing, chills or hemoptysis; No shortness of breath  CARDIOVASCULAR: No chest pain, palpitations, dizziness, or leg swelling  GASTROINTESTINAL: No abdominal or epigastric pain. No nausea, vomiting, or hematemesis; No diarrhea or constipation. No melena or hematochezia.  GENITOURINARY: No dysuria, frequency, hematuria, or incontinence  NEUROLOGICAL: No headaches, memory loss, loss of strength, numbness, or tremors  SKIN: No itching, burning, rashes, or lesions   LYMPH NODES: No enlarged glands  ENDOCRINE: No heat or cold intolerance; No hair loss  MUSCULOSKELETAL: No joint pain or swelling; No muscle, back, or extremity pain  PSYCHIATRIC: No depression, anxiety, mood swings, or difficulty sleeping  HEME/LYMPH: No easy bruising, or bleeding gums  ALLERY AND IMMUNOLOGIC: No hives or eczema      Vital Signs Last 24 Hrs  T(C): 35.4 (17 Oct 2021 09:01), Max: 35.4 (16 Oct 2021 23:46)  T(F): 95.7 (17 Oct 2021 09:01), Max: 95.7 (16 Oct 2021 23:46)  HR: 90 (17 Oct 2021 09:01) (68 - 91)  BP: 94/56 (17 Oct 2021 09:01) (94/56 - 115/75)  BP(mean): 92 (17 Oct 2021 04:14) (77 - 92)  RR: 18 (17 Oct 2021 04:14) (16 - 18)  SpO2: 95% (17 Oct 2021 09:01) (93% - 96%) on ra      PHYSICAL EXAM:  GENERAL: NAD, well-groomed, well-developed  HEAD:  Atraumatic, Normocephalic  EYES: EOMI, PERRLA, conjunctiva and sclera clear  ENMT: poor dentition   NECK: Supple, No JVD, Normal thyroid  NERVOUS SYSTEM:  Alert & Oriented X3, Good concentration; Moves all extremities   CHEST/LUNG: decreased breath sounds b/l   HEART: Regular rate and rhythm; No murmurs, rubs, or gallops  ABDOMEN: Soft, Nontender, distended; Bowel sounds present, denver catheter +  EXTREMITIES:  3+ Peripheral edema with blisters  LYMPH: No lymphadenopathy noted  SKIN: No rashes or lesions    Consultant(s) Notes Reviewed:  [x ] YES  [ ] NO  Care Discussed with Consultants/Other Providers [ x] YES  [ ] NO    LAB: Pending for 4pm today                         10.6   16.49 )-----------( 185      ( 16 Oct 2021 04:30 )             34.6     10-16    143  |  107  |  141<HH>  ----------------------------<  90  5.1<H>   |  16<L>  |  2.2<H>    Ca    8.2<L>      16 Oct 2021 04:30  Phos  5.9     10-16  Mg     2.8     10-16    TPro  6.7  /  Alb  2.9<L>  /  TBili  1.6<H>  /  DBili  0.7<H>  /  AST  65<H>  /  ALT  44<H>  /  AlkPhos  239<H>  10-16          Drug Dosing Weight  Height (cm): 175.3 (04 Oct 2021 23:55)  Weight (kg): 89.1 (10 Oct 2021 00:00)  BMI (kg/m2): 29 (10 Oct 2021 00:00)  BSA (m2): 2.05 (10 Oct 2021 00:00)    CAPILLARY BLOOD GLUCOSE  POCT Blood Glucose.: 193 mg/dL (17 Oct 2021 11:13)  POCT Blood Glucose.: 144 mg/dL (17 Oct 2021 07:24)  POCT Blood Glucose.: 83 mg/dL (17 Oct 2021 00:23)  POCT Blood Glucose.: 195 mg/dL (16 Oct 2021 22:16)  POCT Blood Glucose.: 73 mg/dL (16 Oct 2021 20:53)  POCT Blood Glucose.: 78 mg/dL (16 Oct 2021 15:51)      I&O's Summary    16 Oct 2021 07:01  -  17 Oct 2021 07:00  --------------------------------------------------------  IN: 0 mL / OUT: 1035 mL / NET: -1035 mL        RADIOLOGY & ADDITIONAL TESTS:  Imaging Personally Reviewed:  [x] YES  [ ] NO  < from: Xray Chest 1 View- PORTABLE-Urgent (Xray Chest 1 View- PORTABLE-Urgent .) (10.13.21 @ 12:16) >  Impression:    Left lower lobe opacity/pleural effusion. No air leak.    < end of copied text >      HEALTH ISSUES - PROBLEM Dx:  Palliative care by specialist    Altered mental status    Cirrhosis        MEDICATIONS  (STANDING):  budesonide  80 MICROgram(s)/formoterol 4.5 MICROgram(s) Inhaler 2 Puff(s) Inhalation two times a day  calcium acetate 667 milliGRAM(s) Oral three times a day with meals  cefTRIAXone   IVPB 1000 milliGRAM(s) IV Intermittent every 24 hours  chlorhexidine 4% Liquid 1 Application(s) Topical daily  dextrose 40% Gel 15 Gram(s) Oral once  dextrose 5%. 1000 milliLiter(s) (50 mL/Hr) IV Continuous <Continuous>  dextrose 5%. 1000 milliLiter(s) (100 mL/Hr) IV Continuous <Continuous>  dextrose 50% Injectable 25 Gram(s) IV Push once  dextrose 50% Injectable 12.5 Gram(s) IV Push once  dextrose 50% Injectable 25 Gram(s) IV Push once  glucagon  Injectable 1 milliGRAM(s) IntraMuscular once  heparin   Injectable 5000 Unit(s) SubCutaneous every 8 hours  insulin glargine Injectable (LANTUS) 20 Unit(s) SubCutaneous at bedtime  insulin lispro (ADMELOG) corrective regimen sliding scale   SubCutaneous three times a day before meals  insulin lispro Injectable (ADMELOG) 5 Unit(s) SubCutaneous three times a day before meals  lactulose Syrup 15 Gram(s) Oral three times a day  levoFLOXacin IVPB 750 milliGRAM(s) IV Intermittent every 48 hours  midodrine 10 milliGRAM(s) Oral every 8 hours  pantoprazole  Injectable 40 milliGRAM(s) IV Push every 12 hours  propranolol 5 milliGRAM(s) Oral daily  rifAXIMin 550 milliGRAM(s) Oral two times a day  sodium bicarbonate 650 milliGRAM(s) Oral every 8 hours    MEDICATIONS  (PRN):

## 2021-10-17 NOTE — PROGRESS NOTE ADULT - SUBJECTIVE AND OBJECTIVE BOX
SUZANNE BAZZI 66y Male  MRN#: [id number]   CODE STATUS: Full code  Hospital Day: 13d    Pt is currently admitted with the primary diagnosis of hepatic encephalopathy    SUBJECTIVE  Hospital Course  Patient downgraded to CEU  Overnight events   none  Subjective complaints   eating and drinking, hypothermic  Present Today:   - Gerda:  No [  ], Yes [ x  ] : Indication:     - Type of IV Access:       .. CVC/Piccline:  No [ x ], Yes [   ] : Indication:       .. Midline: No [ x ], Yes [   ] : Indication:                                             ----------------------------------------------------------  OBJECTIVE  PAST MEDICAL & SURGICAL HISTORY  HTN (hypertension)    Hepatitis C  2007    Drug abuse    Cancer, hepatocellular  January 2021    COPD, mild                                              -----------------------------------------------------------  ALLERGIES:  No Known Allergies                                            ------------------------------------------------------------    HOME MEDICATIONS  Home Medications:  Eliquis 5 mg oral tablet: 1 tab(s) orally 2 times a day (04 Oct 2021 06:13)  fluticasone-salmeterol 55 mcg-14 mcg/inh inhalation powder: 1 puff(s) inhaled 2 times a day (04 Oct 2021 06:14)  Protonix 40 mg oral delayed release tablet: 1 tab(s) orally once a day (04 Oct 2021 06:13)  spironolactone 50 mg oral tablet: 1 tab(s) orally once a day (04 Oct 2021 06:13)                           MEDICATIONS:  STANDING MEDICATIONS  budesonide  80 MICROgram(s)/formoterol 4.5 MICROgram(s) Inhaler 2 Puff(s) Inhalation two times a day  calcium acetate 667 milliGRAM(s) Oral three times a day with meals  cefTRIAXone   IVPB 1000 milliGRAM(s) IV Intermittent every 24 hours  chlorhexidine 4% Liquid 1 Application(s) Topical daily  dextrose 40% Gel 15 Gram(s) Oral once  dextrose 5%. 1000 milliLiter(s) IV Continuous <Continuous>  dextrose 5%. 1000 milliLiter(s) IV Continuous <Continuous>  dextrose 50% Injectable 25 Gram(s) IV Push once  dextrose 50% Injectable 12.5 Gram(s) IV Push once  dextrose 50% Injectable 25 Gram(s) IV Push once  glucagon  Injectable 1 milliGRAM(s) IntraMuscular once  heparin   Injectable 5000 Unit(s) SubCutaneous every 8 hours  insulin glargine Injectable (LANTUS) 20 Unit(s) SubCutaneous at bedtime  insulin lispro (ADMELOG) corrective regimen sliding scale   SubCutaneous three times a day before meals  insulin lispro Injectable (ADMELOG) 5 Unit(s) SubCutaneous three times a day before meals  lactulose Syrup 15 Gram(s) Oral three times a day  levoFLOXacin IVPB 750 milliGRAM(s) IV Intermittent every 48 hours  midodrine 10 milliGRAM(s) Oral every 8 hours  pantoprazole  Injectable 40 milliGRAM(s) IV Push every 12 hours  propranolol 5 milliGRAM(s) Oral daily  rifAXIMin 550 milliGRAM(s) Oral two times a day  sodium bicarbonate 650 milliGRAM(s) Oral every 8 hours    PRN MEDICATIONS                                            ------------------------------------------------------------  VITAL SIGNS: Last 24 Hours  T(C): 35.4 (17 Oct 2021 09:01), Max: 35.4 (16 Oct 2021 23:46)  T(F): 95.7 (17 Oct 2021 09:01), Max: 95.7 (16 Oct 2021 23:46)  HR: 90 (17 Oct 2021 09:01) (63 - 91)  BP: 94/56 (17 Oct 2021 09:01) (94/56 - 115/75)  BP(mean): 92 (17 Oct 2021 04:14) (70 - 92)  RR: 18 (17 Oct 2021 04:14) (16 - 18)  SpO2: 95% (17 Oct 2021 09:01) (93% - 96%)      10-16-21 @ 07:01  -  10-17-21 @ 07:00  --------------------------------------------------------  IN: 0 mL / OUT: 1035 mL / NET: -1035 mL                                             --------------------------------------------------------------  LABS:                        10.6   16.49 )-----------( 185      ( 16 Oct 2021 04:30 )             34.6     10-16    143  |  107  |  141<HH>  ----------------------------<  90  5.1<H>   |  16<L>  |  2.2<H>    Ca    8.2<L>      16 Oct 2021 04:30  Phos  5.9     10-16  Mg     2.8     10-16    TPro  6.7  /  Alb  2.9<L>  /  TBili  1.6<H>  /  DBili  0.7<H>  /  AST  65<H>  /  ALT  44<H>  /  AlkPhos  239<H>  10-16    PT/INR - ( 16 Oct 2021 04:30 )   PT: 20.60 sec;   INR: 1.80 ratio                                                                   -------------------------------------------------------------  RADIOLOGY:                                            --------------------------------------------------------------    PHYSICAL EXAM:  General: alert oriented  HEENT: non remarkable  LUNGS: Clear air sounds  HEART: RRR  ABDOMEN: soft non tender  EXT: no edema                                             --------------------------------------------------------------    ASSESSMENT & PLAN    67 yo male, with h/o HTN, drug abuse, Hepatitis C s/p INF, Liver cirrhosis s/p [place] shunt, COPD, DVT? on Eliquis, hepatocellular carcinoma since January 2021 on chemotherapy, presented for weakness and decreased oral intake along with constipation and hematemasis with ammonia (188) on presentation, Impression: Hepatic Encephalopathy    1- Hepatic Encephalopathy:  - Ammonia 188 on presentation  - Patient on rifaximin and lactulose to aim for 3 BM/day  - Mental status improved  - Patient had ascites that was drained through a [place] catheter, removed > 7 liters of fluid, patient expected to build up again in light of his liver decompensation (cirrhosis)  - Tried to connect to drain again with no success, will be seen by IR on monday  - No SBP based on fluid studies and culture, need only for prophylaxis  - IR consulted for TIPS: recommended against due to multiple contraindications and MELD score >20    2- Variceal Bleed (portal HTN)  - Patient was intubated for airway protection, now extubated  - Hematemesis that was followed by an emergent EGD that showed variceal bleeds that were ligated (in esophagus)  - Patient started on abx prophylaxis after bleed and prophylaxis for SBP: ceftriaxone  - Propranolol 5 mg daily  - Protonix 40 mg IV BID  - Follow up Hb closely    3- HCC:  - Diagnosed in Jan 2021  - Was on chemotherapy to which he developed transaminitis  - Heme/Onc following the patient and recommended:  At present only palliative care.  If his performance status improves with nutrition and physical therapy, we will reevaluate for cancer therapy.  Please let the  see the patient for help with his health insurance issues  - Will follow up with Heme/onc    4- Septic shock:  - Patient had septic shock to which he was started on levophed on 10/4 and was stopped on the 10th of Oct  - Blood cultures are negative so far  - DTA culture showed Stenotrophomonas that is sensitive to levofloxacin: Levofloxacin started on 10/14  - Now resolved    5- Portal Vein Thrombosis:  - Patient currently on prophylactic dose of Heparin  - No bleeding since hematemesis, called the GI service and was told that Portal Vein is invaded by the thrombus itself and not a clot and therefore there is no need for full anticoagulation    6- COPD:  - patient on budesonide/formoterol nebulization    7- Hep C:  - treated with INF    8- EBER:  - Patient with Creatinine increase to 2.2 today  - Possibly abdominal comp vs Hepato-renal   - given albumin and started on midodrine    9- VT:  - patient had VT on presentation to which he was shocked   - Hyperkalemia at that point was treated and resolved                                                                                      ----------------------------------------------------  # DVT prophylaxis : Heparin 5000 q 8 hrs    # GI prophylaxis: protonix 40 mg IV BID     # Diet: Dysphagia 1 Pureed Thin Liquids    # Activity Score (AM-PAC)    # Code status : full code    # Disposition : acute                                                                             ----

## 2021-10-17 NOTE — PROGRESS NOTE ADULT - SUBJECTIVE AND OBJECTIVE BOX
Nephrology progress note    Patient was seen and examined, events over the last 24 h noted .  Cr stable    Allergies:  No Known Allergies    Hospital Medications:   MEDICATIONS  (STANDING):  budesonide  80 MICROgram(s)/formoterol 4.5 MICROgram(s) Inhaler 2 Puff(s) Inhalation two times a day  calcium acetate 667 milliGRAM(s) Oral three times a day with meals  cefTRIAXone   IVPB 1000 milliGRAM(s) IV Intermittent every 24 hours  chlorhexidine 4% Liquid 1 Application(s) Topical daily  dextrose 40% Gel 15 Gram(s) Oral once  dextrose 5%. 1000 milliLiter(s) (50 mL/Hr) IV Continuous <Continuous>  dextrose 5%. 1000 milliLiter(s) (100 mL/Hr) IV Continuous <Continuous>  dextrose 50% Injectable 25 Gram(s) IV Push once  dextrose 50% Injectable 12.5 Gram(s) IV Push once  dextrose 50% Injectable 25 Gram(s) IV Push once  glucagon  Injectable 1 milliGRAM(s) IntraMuscular once  heparin   Injectable 5000 Unit(s) SubCutaneous every 8 hours  insulin glargine Injectable (LANTUS) 20 Unit(s) SubCutaneous at bedtime  insulin lispro (ADMELOG) corrective regimen sliding scale   SubCutaneous three times a day before meals  insulin lispro Injectable (ADMELOG) 5 Unit(s) SubCutaneous three times a day before meals  lactulose Syrup 15 Gram(s) Oral three times a day  levoFLOXacin IVPB 750 milliGRAM(s) IV Intermittent every 48 hours  midodrine 10 milliGRAM(s) Oral every 8 hours  pantoprazole  Injectable 40 milliGRAM(s) IV Push every 12 hours  propranolol 5 milliGRAM(s) Oral daily  rifAXIMin 550 milliGRAM(s) Oral two times a day  sodium bicarbonate 650 milliGRAM(s) Oral every 8 hours        VITALS:  T(F): 95.7 (10-17-21 @ 09:01), Max: 95.7 (10-16-21 @ 23:46)  HR: 90 (10-17-21 @ 09:01)  BP: 94/56 (10-17-21 @ 09:01)  RR: 18 (10-17-21 @ 04:14)  SpO2: 95% (10-17-21 @ 09:01)  Wt(kg): --    10-15 @ 07:01  -  10-16 @ 07:00  --------------------------------------------------------  IN: 0 mL / OUT: 200 mL / NET: -200 mL    10-16 @ 07:01  -  10-17 @ 07:00  --------------------------------------------------------  IN: 0 mL / OUT: 1035 mL / NET: -1035 mL    10-17 @ 07:01  -  10-17 @ 15:43  --------------------------------------------------------  IN: 0 mL / OUT: 500 mL / NET: -500 mL          PHYSICAL EXAM:  	Gen: NAD,  	Pulm: decrease BS  B/L  	CV: S1S2; no rub  	Abd: +distended    LABS:  10-16    143  |  107  |  141<HH>  ----------------------------<  90  5.1<H>   |  16<L>  |  2.2<H>    Ca    8.2<L>      16 Oct 2021 04:30  Phos  5.9     10-16  Mg     2.8     10-16    TPro  6.7  /  Alb  2.9<L>  /  TBili  1.6<H>  /  DBili  0.7<H>  /  AST  65<H>  /  ALT  44<H>  /  AlkPhos  239<H>  10-16                          10.6   16.49 )-----------( 185      ( 16 Oct 2021 04:30 )             34.6       Urine Studies:    Sodium, Random Urine: <20.0 mmoL/L (10-15 @ 11:05)  Creatinine, Random Urine: 70 mg/dL (10-15 @ 11:05)    RADIOLOGY & ADDITIONAL STUDIES:

## 2021-10-18 LAB
ALBUMIN SERPL ELPH-MCNC: 3 G/DL — LOW (ref 3.5–5.2)
ALP SERPL-CCNC: 225 U/L — HIGH (ref 30–115)
ALT FLD-CCNC: 35 U/L — SIGNIFICANT CHANGE UP (ref 0–41)
ANION GAP SERPL CALC-SCNC: 17 MMOL/L — HIGH (ref 7–14)
AST SERPL-CCNC: 63 U/L — HIGH (ref 0–41)
BILIRUB DIRECT SERPL-MCNC: 0.5 MG/DL — HIGH (ref 0–0.2)
BILIRUB INDIRECT FLD-MCNC: 0.7 MG/DL — SIGNIFICANT CHANGE UP (ref 0.2–1.2)
BILIRUB SERPL-MCNC: 1.2 MG/DL — SIGNIFICANT CHANGE UP (ref 0.2–1.2)
BUN SERPL-MCNC: 138 MG/DL — CRITICAL HIGH (ref 10–20)
CALCIUM SERPL-MCNC: 8 MG/DL — LOW (ref 8.5–10.1)
CHLORIDE SERPL-SCNC: 106 MMOL/L — SIGNIFICANT CHANGE UP (ref 98–110)
CO2 SERPL-SCNC: 19 MMOL/L — SIGNIFICANT CHANGE UP (ref 17–32)
CREAT SERPL-MCNC: 2.4 MG/DL — HIGH (ref 0.7–1.5)
CULTURE RESULTS: SIGNIFICANT CHANGE UP
GLUCOSE BLDC GLUCOMTR-MCNC: 115 MG/DL — HIGH (ref 70–99)
GLUCOSE BLDC GLUCOMTR-MCNC: 124 MG/DL — HIGH (ref 70–99)
GLUCOSE BLDC GLUCOMTR-MCNC: 162 MG/DL — HIGH (ref 70–99)
GLUCOSE BLDC GLUCOMTR-MCNC: 80 MG/DL — SIGNIFICANT CHANGE UP (ref 70–99)
GLUCOSE SERPL-MCNC: 104 MG/DL — HIGH (ref 70–99)
HCT VFR BLD CALC: 36.4 % — LOW (ref 42–52)
HGB BLD-MCNC: 11.3 G/DL — LOW (ref 14–18)
INR BLD: 1.45 RATIO — HIGH (ref 0.65–1.3)
MAGNESIUM SERPL-MCNC: 2.8 MG/DL — HIGH (ref 1.8–2.4)
MCHC RBC-ENTMCNC: 30 PG — SIGNIFICANT CHANGE UP (ref 27–31)
MCHC RBC-ENTMCNC: 31 G/DL — LOW (ref 32–37)
MCV RBC AUTO: 96.6 FL — HIGH (ref 80–94)
NRBC # BLD: 0 /100 WBCS — SIGNIFICANT CHANGE UP (ref 0–0)
PHOSPHATE SERPL-MCNC: 5.6 MG/DL — HIGH (ref 2.1–4.9)
PLATELET # BLD AUTO: 300 K/UL — SIGNIFICANT CHANGE UP (ref 130–400)
POTASSIUM SERPL-MCNC: 3.9 MMOL/L — SIGNIFICANT CHANGE UP (ref 3.5–5)
POTASSIUM SERPL-SCNC: 3.9 MMOL/L — SIGNIFICANT CHANGE UP (ref 3.5–5)
PROT SERPL-MCNC: 6.4 G/DL — SIGNIFICANT CHANGE UP (ref 6–8)
PROTHROM AB SERPL-ACNC: 16.6 SEC — HIGH (ref 9.95–12.87)
RBC # BLD: 3.77 M/UL — LOW (ref 4.7–6.1)
RBC # FLD: 25.6 % — HIGH (ref 11.5–14.5)
SODIUM SERPL-SCNC: 142 MMOL/L — SIGNIFICANT CHANGE UP (ref 135–146)
SPECIMEN SOURCE: SIGNIFICANT CHANGE UP
WBC # BLD: 14.65 K/UL — HIGH (ref 4.8–10.8)
WBC # FLD AUTO: 14.65 K/UL — HIGH (ref 4.8–10.8)

## 2021-10-18 PROCEDURE — 99232 SBSQ HOSP IP/OBS MODERATE 35: CPT

## 2021-10-18 PROCEDURE — 71045 X-RAY EXAM CHEST 1 VIEW: CPT | Mod: 26

## 2021-10-18 PROCEDURE — 99233 SBSQ HOSP IP/OBS HIGH 50: CPT

## 2021-10-18 RX ORDER — ALBUMIN HUMAN 25 %
25 VIAL (ML) INTRAVENOUS
Refills: 0 | Status: COMPLETED | OUTPATIENT
Start: 2021-10-18 | End: 2021-10-18

## 2021-10-18 RX ADMIN — MIDODRINE HYDROCHLORIDE 10 MILLIGRAM(S): 2.5 TABLET ORAL at 06:32

## 2021-10-18 RX ADMIN — HEPARIN SODIUM 5000 UNIT(S): 5000 INJECTION INTRAVENOUS; SUBCUTANEOUS at 06:31

## 2021-10-18 RX ADMIN — HEPARIN SODIUM 5000 UNIT(S): 5000 INJECTION INTRAVENOUS; SUBCUTANEOUS at 15:35

## 2021-10-18 RX ADMIN — Medication 667 MILLIGRAM(S): at 12:01

## 2021-10-18 RX ADMIN — Medication 250 GRAM(S): at 13:33

## 2021-10-18 RX ADMIN — Medication 650 MILLIGRAM(S): at 21:31

## 2021-10-18 RX ADMIN — Medication 667 MILLIGRAM(S): at 19:23

## 2021-10-18 RX ADMIN — CHLORHEXIDINE GLUCONATE 1 APPLICATION(S): 213 SOLUTION TOPICAL at 13:40

## 2021-10-18 RX ADMIN — Medication 250 GRAM(S): at 12:17

## 2021-10-18 RX ADMIN — CEFTRIAXONE 100 MILLIGRAM(S): 500 INJECTION, POWDER, FOR SOLUTION INTRAMUSCULAR; INTRAVENOUS at 15:32

## 2021-10-18 RX ADMIN — MIDODRINE HYDROCHLORIDE 10 MILLIGRAM(S): 2.5 TABLET ORAL at 21:31

## 2021-10-18 RX ADMIN — PANTOPRAZOLE SODIUM 40 MILLIGRAM(S): 20 TABLET, DELAYED RELEASE ORAL at 06:42

## 2021-10-18 RX ADMIN — Medication 5 UNIT(S): at 12:02

## 2021-10-18 RX ADMIN — Medication 250 GRAM(S): at 21:29

## 2021-10-18 RX ADMIN — BUDESONIDE AND FORMOTEROL FUMARATE DIHYDRATE 2 PUFF(S): 160; 4.5 AEROSOL RESPIRATORY (INHALATION) at 11:54

## 2021-10-18 RX ADMIN — Medication 1: at 12:02

## 2021-10-18 RX ADMIN — Medication 650 MILLIGRAM(S): at 15:36

## 2021-10-18 RX ADMIN — Medication 667 MILLIGRAM(S): at 12:22

## 2021-10-18 RX ADMIN — MIDODRINE HYDROCHLORIDE 10 MILLIGRAM(S): 2.5 TABLET ORAL at 15:36

## 2021-10-18 RX ADMIN — HEPARIN SODIUM 5000 UNIT(S): 5000 INJECTION INTRAVENOUS; SUBCUTANEOUS at 21:31

## 2021-10-18 RX ADMIN — Medication 650 MILLIGRAM(S): at 06:32

## 2021-10-18 RX ADMIN — PANTOPRAZOLE SODIUM 40 MILLIGRAM(S): 20 TABLET, DELAYED RELEASE ORAL at 19:23

## 2021-10-18 RX ADMIN — LACTULOSE 15 GRAM(S): 10 SOLUTION ORAL at 15:36

## 2021-10-18 RX ADMIN — INSULIN GLARGINE 20 UNIT(S): 100 INJECTION, SOLUTION SUBCUTANEOUS at 21:30

## 2021-10-18 NOTE — PROGRESS NOTE ADULT - ASSESSMENT
67 yo male, with h/o HTN, hx of drug abuse, Hepatitis C s/p INF, Liver cirrhosis, COPD, portal vein thrombosis on Eliquis, HCC since January 2021 on chemotherapy, presented for weakness He reports constipation and hematemesis for 1 day, minimal amount.    *Upper Gi bleed secondary to large esophageal varices  -s/p EGD 10/4 s/p banding   -Hb stable  -no active bleeding    Plan  -diet as tolerated   -trend CBC  -PPI IV BID, can switch to po  -keep active type and screen  -can continue propranolol   -will need EGD as OP for more banding    *Decompensated liver cirrhosis sec to hepatitis C   -MELD Na score 26 at the time of admission, today 20  -Child yañez score C  -HCC s/p biopsy 2/9/21 on tecentriq and avasin. dr Arthur Gibson  -not a candidate for surgery because of locally advanced disease  -Left main and right portal vein thrombus was on eliquis, tumor thrombus, discussed with his oncologist, no need to resume eliquis   -ascites with [place] catheter   -s/p ascitic fluid analysis no evidence of SBP, SAAG 1.6 and TP<1   -EBER, urine Na < 20    Plan  -consider stopping antibiotics and give prophylactic antibiotics during hospital stay given total protein <1 and CPS score of C  -continue lactulose to have 3-4 BM  -rifaximin 550 mg BID  -albumin 1 g per kg daily for 2 days   -continue midodrine  -repeat UA  -Trend LFT, INR, BMP daily   -therapeutic paracentesis with albumin replacement, do not remove more than 5 L at a time  -low sodium diet < 2g per, consider stopping sodium bicarb   -high protein diet, consider early breakfast  -Limit acetaminophen to 2g per day  -avoid hepatoxic medications     -for questions text me on  teams, call [phone] on weekdays before 5pm or call GI service at [phone]  after 5 pm and on weekends

## 2021-10-18 NOTE — PROGRESS NOTE ADULT - SUBJECTIVE AND OBJECTIVE BOX
SUZANNE BAZZI 66y Male  MRN#: [id number]   CODE STATUS: Full code    Hospital Day: 14d    Pt is currently admitted with the primary diagnosis of hepatic encephalopathy    SUBJECTIVE    Overnight events   none  Subjective complaints   patient complaining from financial issues, otherwise same status  Present Today:   - Gerda:  No [  ], Yes [  x ] : Indication:     - Type of IV Access:       .. CVC/Piccline:  No [ x ], Yes [   ] : Indication:       .. Midline: No [x  ], Yes [   ] : Indication:                                             ----------------------------------------------------------  OBJECTIVE  PAST MEDICAL & SURGICAL HISTORY  HTN (hypertension)    Hepatitis C  2007    Drug abuse    Cancer, hepatocellular  January 2021    COPD, mild                                              -----------------------------------------------------------  ALLERGIES:  No Known Allergies                                            ------------------------------------------------------------    HOME MEDICATIONS  Home Medications:  Eliquis 5 mg oral tablet: 1 tab(s) orally 2 times a day (04 Oct 2021 06:13)  fluticasone-salmeterol 55 mcg-14 mcg/inh inhalation powder: 1 puff(s) inhaled 2 times a day (04 Oct 2021 06:14)  Protonix 40 mg oral delayed release tablet: 1 tab(s) orally once a day (04 Oct 2021 06:13)  spironolactone 50 mg oral tablet: 1 tab(s) orally once a day (04 Oct 2021 06:13)                           MEDICATIONS:  STANDING MEDICATIONS  albumin human  5% IVPB 25 Gram(s) IV Intermittent every 3 hours  budesonide  80 MICROgram(s)/formoterol 4.5 MICROgram(s) Inhaler 2 Puff(s) Inhalation two times a day  calcium acetate 667 milliGRAM(s) Oral three times a day with meals  cefTRIAXone   IVPB 1000 milliGRAM(s) IV Intermittent every 24 hours  chlorhexidine 4% Liquid 1 Application(s) Topical daily  dextrose 40% Gel 15 Gram(s) Oral once  dextrose 5%. 1000 milliLiter(s) IV Continuous <Continuous>  dextrose 5%. 1000 milliLiter(s) IV Continuous <Continuous>  dextrose 50% Injectable 25 Gram(s) IV Push once  dextrose 50% Injectable 12.5 Gram(s) IV Push once  dextrose 50% Injectable 25 Gram(s) IV Push once  glucagon  Injectable 1 milliGRAM(s) IntraMuscular once  heparin   Injectable 5000 Unit(s) SubCutaneous every 8 hours  insulin glargine Injectable (LANTUS) 20 Unit(s) SubCutaneous at bedtime  insulin lispro (ADMELOG) corrective regimen sliding scale   SubCutaneous three times a day before meals  insulin lispro Injectable (ADMELOG) 5 Unit(s) SubCutaneous three times a day before meals  lactulose Syrup 15 Gram(s) Oral three times a day  levoFLOXacin IVPB 750 milliGRAM(s) IV Intermittent every 48 hours  midodrine 10 milliGRAM(s) Oral every 8 hours  pantoprazole  Injectable 40 milliGRAM(s) IV Push every 12 hours  propranolol 5 milliGRAM(s) Oral daily  rifAXIMin 550 milliGRAM(s) Oral two times a day  sodium bicarbonate 650 milliGRAM(s) Oral every 8 hours    PRN MEDICATIONS                                            ------------------------------------------------------------  VITAL SIGNS: Last 24 Hours  T(C): 35 (18 Oct 2021 07:26), Max: 36.5 (17 Oct 2021 22:45)  T(F): 95 (18 Oct 2021 07:26), Max: 97.7 (17 Oct 2021 22:45)  HR: 74 (18 Oct 2021 07:26) (74 - 93)  BP: 123/77 (18 Oct 2021 07:26) (95/66 - 123/77)  BP(mean): 93 (18 Oct 2021 07:26) (77 - 93)  RR: --  SpO2: 94% (18 Oct 2021 04:15) (93% - 94%)      10-17-21 @ 07:01  -  10-18-21 @ 07:00  --------------------------------------------------------  IN: 0 mL / OUT: 950 mL / NET: -950 mL                                             --------------------------------------------------------------  LABS:                        11.3   14.65 )-----------( 300      ( 18 Oct 2021 04:30 )             36.4     10-18    142  |  106  |  138<HH>  ----------------------------<  104<H>  3.9   |  19  |  2.4<H>    Ca    8.0<L>      18 Oct 2021 04:30  Phos  5.6     10-18  Mg     2.8     10-18    TPro  6.4  /  Alb  3.0<L>  /  TBili  1.2  /  DBili  0.5<H>  /  AST  63<H>  /  ALT  35  /  AlkPhos  225<H>  10-18    PT/INR - ( 18 Oct 2021 04:30 )   PT: 16.60 sec;   INR: 1.45 ratio                                                                   -------------------------------------------------------------  RADIOLOGY:                                            --------------------------------------------------------------    PHYSICAL EXAM:  General: alert oriented  HEENT: non remarkable  LUNGS: decreased air sounds  HEART: RRR  ABDOMEN: distended, shifting wave dullness  EXT: edema +3                                             --------------------------------------------------------------    ASSESSMENT & PLAN    67 yo male, with h/o HTN, drug abuse, Hepatitis C s/p INF, Liver cirrhosis s/p [place] shunt, COPD, DVT? on Eliquis, hepatocellular carcinoma since January 2021 on chemotherapy, presented for weakness and decreased oral intake along with constipation and hematemasis with ammonia (188) on presentation, Impression: Hepatic Encephalopathy    1- Hepatic Encephalopathy:  - Ammonia 188 on presentation  - Patient on rifaximin and lactulose to aim for 3 BM/day  - Mental status improved  - Patient had ascites that was drained through a [place] catheter, removed > 7 liters of fluid, patient expected to build up again in light of his liver decompensation (cirrhosis)  - IR will fix drain today  - No SBP based on fluid studies and culture, need only for prophylaxis  - IR consulted for TIPS: recommended against due to multiple contraindications and MELD score >20    2- Variceal Bleed (portal HTN)  - Patient was intubated for airway protection, now extubated  - Hematemesis that was followed by an emergent EGD that showed variceal bleeds that were ligated (in esophagus)  - Patient started on abx prophylaxis after bleed and prophylaxis for SBP: ceftriaxone  - Propranolol 5 mg daily  - Protonix 40 mg IV BID  - Follow up Hb closely    3- HCC:  - Diagnosed in Jan 2021  - Was on chemotherapy to which he developed transaminitis  - Heme/Onc following the patient and recommended:  At present only palliative care.  If his performance status improves with nutrition and physical therapy, we will reevaluate for cancer therapy.  Please let the  see the patient for help with his health insurance issues  - Will follow up with Heme/onc    4- Septic shock:  - Patient had septic shock to which he was started on levophed on 10/4 and was stopped on the 10th of Oct  - Blood cultures are negative so far  - DTA culture showed Stenotrophomonas that is sensitive to levofloxacin: Levofloxacin started on 10/14  - Now resolved    5- Portal Vein Thrombosis:  - Patient currently on prophylactic dose of Heparin  - No bleeding since hematemesis, called the GI service and was told that Portal Vein is invaded by the thrombus itself and not a clot and therefore there is no need for full anticoagulation    6- COPD:  - patient on budesonide/formoterol nebulization    7- Hep C:  - treated with INF    8- EBER:  - Patient with Creatinine increase to 2.2 today  - Possibly abdominal comp vs Hepato-renal   - midodrine 10mg q 8 hr (started on 10/15)  - Albumin given on friday (10/15) and monday (10/18)    9- VT:  - patient had VT on presentation to which he was shocked   - Hyperkalemia at that point was treated and resolved                                                                                      ----------------------------------------------------  # DVT prophylaxis : Heparin 5000 q 8 hrs    # GI prophylaxis: protonix 40 mg IV BID     # Diet: Dysphagia 1 Pureed Thin Liquids    # Activity Score (AM-PAC)    # Code status : full code    # Disposition : acute                                                                                            ----------------------------------------------------

## 2021-10-18 NOTE — PROGRESS NOTE ADULT - SUBJECTIVE AND OBJECTIVE BOX
SUZANNE BAZZI  66y  Male      Patient is a 66y old male who presents with a chief complaint of weakness  Hyperkalemia  VT (15 Oct 2021 10:28)      INTERVAL HPI/OVERNIGHT EVENTS:  Patient seen and examined earlier this morning.  Lying comfortably in bed  Denies any complaints and states he wants to go home  Denver catheter not functioning since yesterday - IR consulted  pt is not improving; discussed in detail with pt's girlfriend bedside   placed on oxygen today        REVIEW OF SYSTEMS:  CONSTITUTIONAL: No fever, weight loss, or fatigue  EYES: No eye pain, visual disturbances, or discharge  ENMT:  No difficulty hearing, tinnitus, vertigo; No sinus or throat pain  NECK: No pain or stiffness  RESPIRATORY: No cough, wheezing, chills or hemoptysis; No shortness of breath  CARDIOVASCULAR: No chest pain, palpitations, dizziness, or leg swelling  GASTROINTESTINAL: No abdominal or epigastric pain. No nausea, vomiting, or hematemesis; No diarrhea or constipation. No melena or hematochezia.  GENITOURINARY: No dysuria, frequency, hematuria, or incontinence  NEUROLOGICAL: No headaches, memory loss, loss of strength, numbness, or tremors  SKIN: No itching, burning, rashes, or lesions   LYMPH NODES: No enlarged glands  ENDOCRINE: No heat or cold intolerance; No hair loss  MUSCULOSKELETAL: No joint pain or swelling; No muscle, back, or extremity pain  PSYCHIATRIC: No depression, anxiety, mood swings, or difficulty sleeping  HEME/LYMPH: No easy bruising, or bleeding gums  ALLERY AND IMMUNOLOGIC: No hives or eczema      Vital Signs Last 24 Hrs  T(C): 35.6 (18 Oct 2021 16:00), Max: 36.5 (17 Oct 2021 22:45)  T(F): 96.1 (18 Oct 2021 16:00), Max: 97.7 (17 Oct 2021 22:45)  HR: 73 (18 Oct 2021 16:00) (73 - 93)  BP: 153/83 (18 Oct 2021 16:00) (95/66 - 153/83)  BP(mean): 108 (18 Oct 2021 16:00) (77 - 108)  RR: 18 (18 Oct 2021 16:00) (18 - 18)  SpO2: 88% (18 Oct 2021 16:00) (88% - 94%) on ra      PHYSICAL EXAM:  GENERAL: NAD, well-groomed, well-developed  HEAD:  Atraumatic, Normocephalic  EYES: EOMI, PERRLA, conjunctiva and sclera clear  ENMT: poor dentition   NECK: Supple, No JVD, Normal thyroid  NERVOUS SYSTEM:  Alert & Oriented X3, Good concentration; Moves all extremities   CHEST/LUNG: decreased breath sounds b/l bases  HEART: Regular rate and rhythm; No murmurs, rubs, or gallops  ABDOMEN: Soft, Nontender, distended; Bowel sounds present, denver catheter +  EXTREMITIES:  3+ Peripheral edema with blisters  LYMPH: No lymphadenopathy noted  SKIN: No rashes or lesions    Consultant(s) Notes Reviewed:  [x ] YES  [ ] NO  Care Discussed with Consultants/Other Providers [ x] YES  [ ] NO    LAB:                           11.3   14.65 )-----------( 300      ( 18 Oct 2021 04:30 )             36.4     10-18    142  |  106  |  138<HH>  ----------------------------<  104<H>  3.9   |  19  |  2.4<H>    Ca    8.0<L>      18 Oct 2021 04:30  Phos  5.6     10-18  Mg     2.8     10-18    TPro  6.4  /  Alb  3.0<L>  /  TBili  1.2  /  DBili  0.5<H>  /  AST  63<H>  /  ALT  35  /  AlkPhos  225<H>  10-18        Drug Dosing Weight  Height (cm): 175.3 (04 Oct 2021 23:55)  Weight (kg): 89.1 (10 Oct 2021 00:00)  BMI (kg/m2): 29 (10 Oct 2021 00:00)  BSA (m2): 2.05 (10 Oct 2021 00:00)    CAPILLARY BLOOD GLUCOSE  POCT Blood Glucose.: 80 mg/dL (18 Oct 2021 16:28)  POCT Blood Glucose.: 162 mg/dL (18 Oct 2021 11:48)  POCT Blood Glucose.: 115 mg/dL (18 Oct 2021 07:15)  POCT Blood Glucose.: 118 mg/dL (17 Oct 2021 20:41)      I&O's Summary    17 Oct 2021 07:01  -  18 Oct 2021 07:00  --------------------------------------------------------  IN: 0 mL / OUT: 950 mL / NET: -950 mL    18 Oct 2021 07:01  -  18 Oct 2021 16:53  --------------------------------------------------------  IN: 0 mL / OUT: 2500 mL / NET: -2500 mL            RADIOLOGY & ADDITIONAL TESTS:  Imaging Personally Reviewed:  [x] YES  [ ] NO  < from: Xray Chest 1 View- PORTABLE-Urgent (Xray Chest 1 View- PORTABLE-Urgent .) (10.13.21 @ 12:16) >  Impression:    Left lower lobe opacity/pleural effusion. No air leak.    < end of copied text >      HEALTH ISSUES - PROBLEM Dx:  Palliative care by specialist    Altered mental status    Cirrhosis        MEDICATIONS  (STANDING):  albumin human  5% IVPB 25 Gram(s) IV Intermittent every 3 hours  budesonide  80 MICROgram(s)/formoterol 4.5 MICROgram(s) Inhaler 2 Puff(s) Inhalation two times a day  calcium acetate 667 milliGRAM(s) Oral three times a day with meals  cefTRIAXone   IVPB 1000 milliGRAM(s) IV Intermittent every 24 hours  chlorhexidine 4% Liquid 1 Application(s) Topical daily  dextrose 40% Gel 15 Gram(s) Oral once  dextrose 5%. 1000 milliLiter(s) (50 mL/Hr) IV Continuous <Continuous>  dextrose 5%. 1000 milliLiter(s) (100 mL/Hr) IV Continuous <Continuous>  dextrose 50% Injectable 25 Gram(s) IV Push once  dextrose 50% Injectable 12.5 Gram(s) IV Push once  dextrose 50% Injectable 25 Gram(s) IV Push once  glucagon  Injectable 1 milliGRAM(s) IntraMuscular once  heparin   Injectable 5000 Unit(s) SubCutaneous every 8 hours  insulin glargine Injectable (LANTUS) 20 Unit(s) SubCutaneous at bedtime  insulin lispro (ADMELOG) corrective regimen sliding scale   SubCutaneous three times a day before meals  insulin lispro Injectable (ADMELOG) 5 Unit(s) SubCutaneous three times a day before meals  lactulose Syrup 15 Gram(s) Oral three times a day  levoFLOXacin IVPB 750 milliGRAM(s) IV Intermittent every 48 hours  midodrine 10 milliGRAM(s) Oral every 8 hours  pantoprazole  Injectable 40 milliGRAM(s) IV Push every 12 hours  propranolol 5 milliGRAM(s) Oral daily  rifAXIMin 550 milliGRAM(s) Oral two times a day  sodium bicarbonate 650 milliGRAM(s) Oral every 8 hours    MEDICATIONS  (PRN):

## 2021-10-18 NOTE — CHART NOTE - NSCHARTNOTEFT_GEN_A_CORE
AARONSW met with patient and his significant other Fallon at bedside. Patient engaged in conversation. Patient reported feeling down given his deconditioning. Patient verbalized frustration over being unable to do much with physical therapy. Support provided.

## 2021-10-18 NOTE — SWALLOW BEDSIDE ASSESSMENT ADULT - SWALLOW EVAL: DIAGNOSIS
+toleration of Dysphagia Diet III Mechanical soft consistency with cut up meats, thins w/no overt s/s of aspiration / penetration

## 2021-10-18 NOTE — PROGRESS NOTE ADULT - SUBJECTIVE AND OBJECTIVE BOX
Nephrology progress note    THIS IS AN INCOMPLETE NOTE . FULL NOTE TO FOLLOW SHORTLY    Patient is seen and examined, events over the last 24 h noted .    Allergies:  No Known Allergies    Hospital Medications:   MEDICATIONS  (STANDING):  albumin human  5% IVPB 25 Gram(s) IV Intermittent every 3 hours  budesonide  80 MICROgram(s)/formoterol 4.5 MICROgram(s) Inhaler 2 Puff(s) Inhalation two times a day  calcium acetate 667 milliGRAM(s) Oral three times a day with meals  cefTRIAXone   IVPB 1000 milliGRAM(s) IV Intermittent every 24 hours  chlorhexidine 4% Liquid 1 Application(s) Topical daily  dextrose 40% Gel 15 Gram(s) Oral once  dextrose 5%. 1000 milliLiter(s) (50 mL/Hr) IV Continuous <Continuous>  dextrose 5%. 1000 milliLiter(s) (100 mL/Hr) IV Continuous <Continuous>  dextrose 50% Injectable 25 Gram(s) IV Push once  dextrose 50% Injectable 12.5 Gram(s) IV Push once  dextrose 50% Injectable 25 Gram(s) IV Push once  glucagon  Injectable 1 milliGRAM(s) IntraMuscular once  heparin   Injectable 5000 Unit(s) SubCutaneous every 8 hours  insulin glargine Injectable (LANTUS) 20 Unit(s) SubCutaneous at bedtime  insulin lispro (ADMELOG) corrective regimen sliding scale   SubCutaneous three times a day before meals  insulin lispro Injectable (ADMELOG) 5 Unit(s) SubCutaneous three times a day before meals  lactulose Syrup 15 Gram(s) Oral three times a day  levoFLOXacin IVPB 750 milliGRAM(s) IV Intermittent every 48 hours  midodrine 10 milliGRAM(s) Oral every 8 hours  pantoprazole  Injectable 40 milliGRAM(s) IV Push every 12 hours  propranolol 5 milliGRAM(s) Oral daily  rifAXIMin 550 milliGRAM(s) Oral two times a day  sodium bicarbonate 650 milliGRAM(s) Oral every 8 hours        VITALS:  T(F): 95 (10-18-21 @ 07:26), Max: 97.7 (10-17-21 @ 22:45)  HR: 74 (10-18-21 @ 07:26)  BP: 123/77 (10-18-21 @ 07:26)  RR: --  SpO2: 94% (10-18-21 @ 04:15)  Wt(kg): --    10-16 @ 07:01  -  10-17 @ 07:00  --------------------------------------------------------  IN: 0 mL / OUT: 1035 mL / NET: -1035 mL    10-17 @ 07:01  -  10-18 @ 07:00  --------------------------------------------------------  IN: 0 mL / OUT: 950 mL / NET: -950 mL          PHYSICAL EXAM:  Constitutional: NAD  HEENT: anicteric sclera, oropharynx clear, MMM  Neck: No JVD  Respiratory: CTAB, no wheezes, rales or rhonchi  Cardiovascular: S1, S2, RRR  Gastrointestinal: BS+, soft, NT/ND  Extremities: No cyanosis or clubbing. No peripheral edema  :  No berry.   Skin: No rashes    LABS:  10-17    141  |  105  |  141<HH>  ----------------------------<  121<H>  3.8   |  19  |  2.3<H>    Ca    8.2<L>      17 Oct 2021 19:36    TPro  6.4  /  Alb  3.0<L>  /  TBili  1.1  /  DBili      /  AST  60<H>  /  ALT  38  /  AlkPhos  224<H>  10-17                          10.6   17.03 )-----------( 273      ( 17 Oct 2021 19:36 )             33.6       Urine Studies:    Sodium, Random Urine: <20.0 mmoL/L (10-15 @ 11:05)  Creatinine, Random Urine: 70 mg/dL (10-15 @ 11:05)    RADIOLOGY & ADDITIONAL STUDIES:   Nephrology progress note  Patient is seen and examined, events over the last 24 h noted .  lying in bed comfortable     Allergies:  No Known Allergies    Hospital Medications:   MEDICATIONS  (STANDING):    albumin human  5% IVPB 25 Gram(s) IV Intermittent every 3 hours  budesonide  80 MICROgram(s)/formoterol 4.5 MICROgram(s) Inhaler 2 Puff(s) Inhalation two times a day  calcium acetate 667 milliGRAM(s) Oral three times a day with meals  cefTRIAXone   IVPB 1000 milliGRAM(s) IV Intermittent every 24 hours  glucagon  Injectable 1 milliGRAM(s) IntraMuscular once  heparin   Injectable 5000 Unit(s) SubCutaneous every 8 hours  insulin glargine Injectable (LANTUS) 20 Unit(s) SubCutaneous at bedtime  insulin lispro (ADMELOG) corrective regimen sliding scale   SubCutaneous three times a day before meals  insulin lispro Injectable (ADMELOG) 5 Unit(s) SubCutaneous three times a day before meals  lactulose Syrup 15 Gram(s) Oral three times a day  levoFLOXacin IVPB 750 milliGRAM(s) IV Intermittent every 48 hours  midodrine 10 milliGRAM(s) Oral every 8 hours  pantoprazole  Injectable 40 milliGRAM(s) IV Push every 12 hours  propranolol 5 milliGRAM(s) Oral daily  rifAXIMin 550 milliGRAM(s) Oral two times a day  sodium bicarbonate 650 milliGRAM(s) Oral every 8 hours        VITALS:  T(F): 95 (10-18-21 @ 07:26), Max: 97.7 (10-17-21 @ 22:45)  HR: 74 (10-18-21 @ 07:26)  BP: 123/77 (10-18-21 @ 07:26)  SpO2: 94% (10-18-21 @ 04:15)  Wt(kg): --    10-16 @ 07:01  -  10-17 @ 07:00  --------------------------------------------------------  IN: 0 mL / OUT: 1035 mL / NET: -1035 mL    10-17 @ 07:01  -  10-18 @ 07:00  --------------------------------------------------------  IN: 0 mL / OUT: 950 mL / NET: -950 mL          PHYSICAL EXAM:  Constitutional: NAD  Neck: No JVD  Respiratory: CTAB, no wheezes, rales or rhonchi  Cardiovascular: S1, S2, RRR  Gastrointestinal: distended abdomen   Extremities: No cyanosis or clubbing. No peripheral edema  :  No berry.   Skin: No rashes    LABS:  10-18    142  |  106  |  138<HH>  ----------------------------<  104<H>  3.9   |  19  |  2.4<H>  Creatinine Trend: 2.4<--, 2.3<--, 2.2<--, 2.1<--, 1.7<--, 1.6<--  Ca    8.0<L>      18 Oct 2021 04:30  Phos  5.6     10-18  Mg     2.8     10-18    TPro  6.4  /  Alb  3.0<L>  /  TBili  1.2  /  DBili  0.5<H>  /  AST  63<H>  /  ALT  35  /  AlkPhos  225<H>  10-18    10-17    141  |  105  |  141<HH>  ----------------------------<  121<H>  3.8   |  19  |  2.3<H>    Ca    8.2<L>      17 Oct 2021 19:36    TPro  6.4  /  Alb  3.0<L>  /  TBili  1.1  /  DBili      /  AST  60<H>  /  ALT  38  /  AlkPhos  224<H>  10-17                          10.6   17.03 )-----------( 273      ( 17 Oct 2021 19:36 )             33.6       Urine Studies:    Sodium, Random Urine: <20.0 mmoL/L (10-15 @ 11:05)  Creatinine, Random Urine: 70 mg/dL (10-15 @ 11:05)    RADIOLOGY & ADDITIONAL STUDIES:

## 2021-10-18 NOTE — PROGRESS NOTE ADULT - SUBJECTIVE AND OBJECTIVE BOX
Gastroenterology progress note:     Patient is a 66y old  Male who presents with a chief complaint of weakness  Hyperkalemia  VT (18 Oct 2021 16:43)       Admitted on: 10-04-21    We are following the patient for liver cirrhosis      Interval History: patient feels depressed, abdomen distended s/p drain today    PAST MEDICAL & SURGICAL HISTORY:  HTN (hypertension)   Hepatitis C 2007  Drug abuse  Cancer, hepatocellular January 2021  COPD, mild    MEDICATIONS  (STANDING):  albumin human  5% IVPB 25 Gram(s) IV Intermittent every 3 hours  budesonide  80 MICROgram(s)/formoterol 4.5 MICROgram(s) Inhaler 2 Puff(s) Inhalation two times a day  calcium acetate 667 milliGRAM(s) Oral three times a day with meals  cefTRIAXone   IVPB 1000 milliGRAM(s) IV Intermittent every 24 hours  chlorhexidine 4% Liquid 1 Application(s) Topical daily  dextrose 40% Gel 15 Gram(s) Oral once  dextrose 5%. 1000 milliLiter(s) (50 mL/Hr) IV Continuous <Continuous>  dextrose 5%. 1000 milliLiter(s) (100 mL/Hr) IV Continuous <Continuous>  dextrose 50% Injectable 25 Gram(s) IV Push once  dextrose 50% Injectable 12.5 Gram(s) IV Push once  dextrose 50% Injectable 25 Gram(s) IV Push once  glucagon  Injectable 1 milliGRAM(s) IntraMuscular once  heparin   Injectable 5000 Unit(s) SubCutaneous every 8 hours  insulin glargine Injectable (LANTUS) 20 Unit(s) SubCutaneous at bedtime  insulin lispro (ADMELOG) corrective regimen sliding scale   SubCutaneous three times a day before meals  insulin lispro Injectable (ADMELOG) 5 Unit(s) SubCutaneous three times a day before meals  lactulose Syrup 15 Gram(s) Oral three times a day  levoFLOXacin IVPB 750 milliGRAM(s) IV Intermittent every 48 hours  midodrine 10 milliGRAM(s) Oral every 8 hours  pantoprazole  Injectable 40 milliGRAM(s) IV Push every 12 hours  propranolol 5 milliGRAM(s) Oral daily  rifAXIMin 550 milliGRAM(s) Oral two times a day  sodium bicarbonate 650 milliGRAM(s) Oral every 8 hours    MEDICATIONS  (PRN):      Allergies  No Known Allergies      Review of Systems:   General: no fever  HEENT: no hemoptysis  Cardiovascular:  No Chest Pain, No Palpitations  Respiratory:  No Cough, No Dyspnea  Gastrointestinal:  As described in HPI  Hematology: no bruising or hematoma      Physical Examination:  T(C): 35.6 (10-18-21 @ 16:00), Max: 36.5 (10-17-21 @ 22:45)  HR: 73 (10-18-21 @ 16:00) (73 - 93)  BP: 153/83 (10-18-21 @ 16:00) (95/66 - 153/83)  RR: 18 (10-18-21 @ 16:00) (18 - 18)  SpO2: 88% (10-18-21 @ 16:00) (88% - 94%)      Constitutional: No acute distress.  Head: normocephalic  Neck: no palpable thyroid  Respiratory:  No signs of respiratory distress. Lung sounds are clear bilaterally.  Cardiovascular:  S1 S2, Regular rate and rhythm.  Abdominal: Abdomen is soft, symmetric, and non-tender + distention dull to percussion   Extremities: +pitting edema  Skin: blisters lower extremities        Data: (reviewed by attending)                        11.3   14.65 )-----------( 300      ( 18 Oct 2021 04:30 )             36.4     Hgb trend:  11.3  10-18-21 @ 04:30  10.6  10-17-21 @ 19:36  10.6  10-16-21 @ 04:30        10-18    142  |  106  |  138<HH>  ----------------------------<  104<H>  3.9   |  19  |  2.4<H>    Ca    8.0<L>      18 Oct 2021 04:30  Phos  5.6     10-18  Mg     2.8     10-18    TPro  6.4  /  Alb  3.0<L>  /  TBili  1.2  /  DBili  0.5<H>  /  AST  63<H>  /  ALT  35  /  AlkPhos  225<H>  10-18    Liver panel trend:  TBili 1.2   /   AST 63   /   ALT 35   /   AlkP 225   /   Tptn 6.4   /   Alb 3.0    /   DBili 0.5      10-18  TBili 1.1   /   AST 60   /   ALT 38   /   AlkP 224   /   Tptn 6.4   /   Alb 3.0    /   DBili --      10-17  TBili 1.6   /   AST 65   /   ALT 44   /   AlkP 239   /   Tptn 6.7   /   Alb 2.9    /   DBili 0.7      10-16  TBili 1.4   /   AST 61   /   ALT 53   /   AlkP 280   /   Tptn 6.3   /   Alb 1.9    /   DBili 0.7      10-15  TBili 0.9   /   AST 52   /   ALT 54   /   AlkP 254   /   Tptn 5.7   /   Alb 1.7    /   DBili 0.5      10-14  TBili 1.0   /   AST 50   /   ALT 60   /   AlkP 233   /   Tptn 5.3   /   Alb 1.5    /   DBili 0.7      10-13  TBili 0.9   /   AST 42   /   ALT 71   /   AlkP 232   /   Tptn 4.9   /   Alb 1.4    /   DBili 0.5      10-12  TBili 0.8   /   AST 43   /   ALT 93   /   AlkP 212   /   Tptn 4.7   /   Alb 1.4    /   DBili --      10-11  TBili 0.9   /   AST 46   /      /   AlkP 233   /   Tptn 5.0   /   Alb 1.5    /   DBili --      10-10  TBili 0.9   /   AST 49   /      /   AlkP 249   /   Tptn 5.0   /   Alb 1.4    /   DBili --      10-09  TBili 1.1   /   AST 63   /      /   AlkP 237   /   Tptn 4.8   /   Alb 1.4    /   DBili --      10-09  TBili 1.3   /   AST 74   /      /   AlkP 245   /   Tptn 5.1   /   Alb 1.5    /   DBili --      10-08      PT/INR - ( 18 Oct 2021 04:30 )   PT: 16.60 sec;   INR: 1.45 ratio      Radiology: (reviewed by attending)  < from: Xray Chest 1 View-PORTABLE IMMEDIATE (Xray Chest 1 View-PORTABLE IMMEDIATE .) (10.18.21 @ 09:52) >  Impression:    Bibasilar subsegmental atelectasis.    < end of copied text >

## 2021-10-18 NOTE — CHART NOTE - NSCHARTNOTEFT_GEN_A_CORE
Examined patient at bedside. Connected patient's peritoneal catheter to cannister. Immediate filling of cannister to 500 cc. No need for catheter exchange or repositioning.

## 2021-10-18 NOTE — PROGRESS NOTE ADULT - ASSESSMENT
67 yo male, with h/o HTN, drug abuse, Hepatitis C s/p INF, Liver cirrhosis s/p [place] shunt, COPD, DVT? on Eliquis, hepatocellular carcinoma since January 2021 on chemotherapy, presented for weakness and decreased oral intake along with constipation and hematemesis with ammonia (188) on presentation.    #Acute gastrointestinal hemorrhage due to variceal bleed  - s/p intubation for airway protection; now extubated  - s/p EGD - esophageal varices ligated   - continue propranolol, protonix  - monitor cbc daily  #SBP prophylaxis -on ceftriaxone and levaquin   #hepatic encephalopathy - mental status improved - continues on rifaximin and lactulose   #Liver cirrhosis s/p [place] shunt which is not working properly and will be assessed by IR today  #Hepatocellular CA - on chemo since being diagnosed in 2021  - oncology eval appreciated - not a candidate given his current state   - had over 7 liters removed; suspect it is reaccumulating    - IR consulted for TIPS: recommended against due to multiple contraindications and MELD score >20    # Septic shock - resolved   - previously required levo - d/c'ed on 10/10  - DTA culture showed Stenotrophomonas that is sensitive to levofloxacin: Levofloxacin started on 10/14  - monitor BP closely and resume pressors if indicated     # COPD - not in exacerbation  - continue budesonide, formoterol    # EBER - r/o hepatorenal syndrome  # metabolic acidosis - improving   -nephrology eval appreciated   -avoid nephrotoxics   -continue oral bicarb and phoslo as per renal   -repeat labs in am    overall poor prognosis- discussed with pt and his girlfriend in detail - he wishes to remain a full code     Progress Note Handoff  Pending Consults: palliative follow up, IR, renal   Pending Tests: labs  Pending Results: labs  Family Discussion: discussed poor prognosis, all medication and treatment plan with pt, his girlfriend and medical staff in detail - all questions answered   Disposition: Home_____/SNF______/Other_____/Unknown at this time__x___    Please call me with any questions at extension [phone]  spent over 37 min on medical management

## 2021-10-18 NOTE — PROGRESS NOTE ADULT - ASSESSMENT
Pt with Decompensated liver cirrhosis, Hep C-s/p IFN, ascites -[place] cath, HCCa with transaminitis on chemotherapy  presents with weakness, found to have K 6.8 (was on Aldactone) , sustained VT -s/p shock; met acidosis, hepatic encephalopathy - Ammonia 188.  Efe likely due to abd compartment syndrome or HRS  - continue Midodrine 10 mg q8h, use pressors as needed to keep MAP > 65   - continue albumin   - creatinine stable   -strict Is and os noted non oliguric   Hyperkalemia - improved, cont low K diet, if repeated > 5.5 start lokelma 10 q 12   High phos - scont phoslo with meals   - off Bicarb drip, cont sodium bicarbonate 650 q 8   Sepsis - r/o SBP - on Levaquin/ rocephine   Prognosis poor

## 2021-10-18 NOTE — SWALLOW BEDSIDE ASSESSMENT ADULT - PHARYNGEAL PHASE
Delayed pharyngeal swallow/Wet vocal quality post oral intake/Cough post oral intake Within functional limits

## 2021-10-19 LAB
ALBUMIN SERPL ELPH-MCNC: 3.8 G/DL — SIGNIFICANT CHANGE UP (ref 3.5–5.2)
ALP SERPL-CCNC: 154 U/L — HIGH (ref 30–115)
ALT FLD-CCNC: 27 U/L — SIGNIFICANT CHANGE UP (ref 0–41)
ANION GAP SERPL CALC-SCNC: 18 MMOL/L — HIGH (ref 7–14)
AST SERPL-CCNC: 57 U/L — HIGH (ref 0–41)
BILIRUB SERPL-MCNC: 1.4 MG/DL — HIGH (ref 0.2–1.2)
BUN SERPL-MCNC: 125 MG/DL — CRITICAL HIGH (ref 10–20)
CALCIUM SERPL-MCNC: 8.2 MG/DL — LOW (ref 8.5–10.1)
CHLORIDE SERPL-SCNC: 110 MMOL/L — SIGNIFICANT CHANGE UP (ref 98–110)
CO2 SERPL-SCNC: 18 MMOL/L — SIGNIFICANT CHANGE UP (ref 17–32)
CREAT SERPL-MCNC: 2 MG/DL — HIGH (ref 0.7–1.5)
GLUCOSE BLDC GLUCOMTR-MCNC: 117 MG/DL — HIGH (ref 70–99)
GLUCOSE BLDC GLUCOMTR-MCNC: 205 MG/DL — HIGH (ref 70–99)
GLUCOSE BLDC GLUCOMTR-MCNC: 71 MG/DL — SIGNIFICANT CHANGE UP (ref 70–99)
GLUCOSE BLDC GLUCOMTR-MCNC: 89 MG/DL — SIGNIFICANT CHANGE UP (ref 70–99)
GLUCOSE SERPL-MCNC: 61 MG/DL — LOW (ref 70–99)
HCT VFR BLD CALC: 31 % — LOW (ref 42–52)
HCT VFR BLD CALC: 33.1 % — LOW (ref 42–52)
HGB BLD-MCNC: 10.1 G/DL — LOW (ref 14–18)
HGB BLD-MCNC: 9.3 G/DL — LOW (ref 14–18)
MAGNESIUM SERPL-MCNC: 2.7 MG/DL — HIGH (ref 1.8–2.4)
MCHC RBC-ENTMCNC: 29.5 PG — SIGNIFICANT CHANGE UP (ref 27–31)
MCHC RBC-ENTMCNC: 29.7 PG — SIGNIFICANT CHANGE UP (ref 27–31)
MCHC RBC-ENTMCNC: 30 G/DL — LOW (ref 32–37)
MCHC RBC-ENTMCNC: 30.5 G/DL — LOW (ref 32–37)
MCV RBC AUTO: 97.4 FL — HIGH (ref 80–94)
MCV RBC AUTO: 98.4 FL — HIGH (ref 80–94)
NRBC # BLD: 0 /100 WBCS — SIGNIFICANT CHANGE UP (ref 0–0)
NRBC # BLD: 0 /100 WBCS — SIGNIFICANT CHANGE UP (ref 0–0)
PLATELET # BLD AUTO: 115 K/UL — LOW (ref 130–400)
PLATELET # BLD AUTO: 120 K/UL — LOW (ref 130–400)
POTASSIUM SERPL-MCNC: 3.9 MMOL/L — SIGNIFICANT CHANGE UP (ref 3.5–5)
POTASSIUM SERPL-SCNC: 3.9 MMOL/L — SIGNIFICANT CHANGE UP (ref 3.5–5)
PROT SERPL-MCNC: 6.4 G/DL — SIGNIFICANT CHANGE UP (ref 6–8)
RBC # BLD: 3.15 M/UL — LOW (ref 4.7–6.1)
RBC # BLD: 3.4 M/UL — LOW (ref 4.7–6.1)
RBC # FLD: 25.5 % — HIGH (ref 11.5–14.5)
RBC # FLD: 25.6 % — HIGH (ref 11.5–14.5)
SODIUM SERPL-SCNC: 146 MMOL/L — SIGNIFICANT CHANGE UP (ref 135–146)
WBC # BLD: 11 K/UL — HIGH (ref 4.8–10.8)
WBC # BLD: 13.2 K/UL — HIGH (ref 4.8–10.8)
WBC # FLD AUTO: 11 K/UL — HIGH (ref 4.8–10.8)
WBC # FLD AUTO: 13.2 K/UL — HIGH (ref 4.8–10.8)

## 2021-10-19 PROCEDURE — 99232 SBSQ HOSP IP/OBS MODERATE 35: CPT

## 2021-10-19 PROCEDURE — 99233 SBSQ HOSP IP/OBS HIGH 50: CPT

## 2021-10-19 RX ORDER — ALBUMIN HUMAN 25 %
25 VIAL (ML) INTRAVENOUS
Refills: 0 | Status: COMPLETED | OUTPATIENT
Start: 2021-10-19 | End: 2021-10-19

## 2021-10-19 RX ORDER — FONDAPARINUX SODIUM 2.5 MG/.5ML
2.5 INJECTION, SOLUTION SUBCUTANEOUS DAILY
Refills: 0 | Status: DISCONTINUED | OUTPATIENT
Start: 2021-10-19 | End: 2021-10-21

## 2021-10-19 RX ADMIN — MIDODRINE HYDROCHLORIDE 10 MILLIGRAM(S): 2.5 TABLET ORAL at 21:31

## 2021-10-19 RX ADMIN — HEPARIN SODIUM 5000 UNIT(S): 5000 INJECTION INTRAVENOUS; SUBCUTANEOUS at 14:24

## 2021-10-19 RX ADMIN — MIDODRINE HYDROCHLORIDE 10 MILLIGRAM(S): 2.5 TABLET ORAL at 14:23

## 2021-10-19 RX ADMIN — Medication 250 GRAM(S): at 10:36

## 2021-10-19 RX ADMIN — PANTOPRAZOLE SODIUM 40 MILLIGRAM(S): 20 TABLET, DELAYED RELEASE ORAL at 17:27

## 2021-10-19 RX ADMIN — PANTOPRAZOLE SODIUM 40 MILLIGRAM(S): 20 TABLET, DELAYED RELEASE ORAL at 05:39

## 2021-10-19 RX ADMIN — CHLORHEXIDINE GLUCONATE 1 APPLICATION(S): 213 SOLUTION TOPICAL at 11:09

## 2021-10-19 RX ADMIN — Medication 5 UNIT(S): at 17:27

## 2021-10-19 RX ADMIN — Medication 250 GRAM(S): at 08:44

## 2021-10-19 RX ADMIN — HEPARIN SODIUM 5000 UNIT(S): 5000 INJECTION INTRAVENOUS; SUBCUTANEOUS at 05:39

## 2021-10-19 RX ADMIN — INSULIN GLARGINE 20 UNIT(S): 100 INJECTION, SOLUTION SUBCUTANEOUS at 21:31

## 2021-10-19 RX ADMIN — Medication 650 MILLIGRAM(S): at 05:42

## 2021-10-19 RX ADMIN — Medication 650 MILLIGRAM(S): at 14:23

## 2021-10-19 RX ADMIN — Medication 2: at 17:27

## 2021-10-19 RX ADMIN — Medication 650 MILLIGRAM(S): at 21:31

## 2021-10-19 RX ADMIN — Medication 667 MILLIGRAM(S): at 11:49

## 2021-10-19 RX ADMIN — CEFTRIAXONE 100 MILLIGRAM(S): 500 INJECTION, POWDER, FOR SOLUTION INTRAMUSCULAR; INTRAVENOUS at 10:51

## 2021-10-19 RX ADMIN — Medication 667 MILLIGRAM(S): at 17:27

## 2021-10-19 RX ADMIN — MIDODRINE HYDROCHLORIDE 10 MILLIGRAM(S): 2.5 TABLET ORAL at 05:41

## 2021-10-19 RX ADMIN — Medication 250 GRAM(S): at 13:00

## 2021-10-19 NOTE — PROGRESS NOTE ADULT - SUBJECTIVE AND OBJECTIVE BOX
Patient is a 66y old  Male who presents with a chief complaint of weakness  Hyperkalemia  VT (18 Oct 2021 17:24)        Over Night Events:  Resting in bed.  No CSOB at rest.  Off pressors.  On 4 liters O2.          ROS:     All ROS are negative except HPI         PHYSICAL EXAM    ICU Vital Signs Last 24 Hrs  T(C): 35.6 (18 Oct 2021 16:00), Max: 35.6 (18 Oct 2021 16:00)  T(F): 96.1 (18 Oct 2021 16:00), Max: 96.1 (18 Oct 2021 16:00)  HR: 64 (19 Oct 2021 04:00) (64 - 79)  BP: 123/78 (19 Oct 2021 04:00) (116/79 - 153/83)  BP(mean): 108 (18 Oct 2021 16:00) (108 - 108)  ABP: --  ABP(mean): --  RR: 18 (19 Oct 2021 04:00) (18 - 18)  SpO2: 98% (19 Oct 2021 04:00) (88% - 98%)      CONSTITUTIONAL:  Ill appearing In NAD    ENT:   Airway patent,   Mouth with normal mucosa.   No thrush    EYES:   Pupils equal,   Round and reactive to light.    CARDIAC:   Normal rate,   Regular rhythm.    edema      Vascular:  Normal systolic impulse  No Carotid bruits    RESPIRATORY:   No wheezing  Bilateral BS  Normal chest expansion  Not tachypneic,  No use of accessory muscles    GASTROINTESTINAL:  Abdomen soft, Distended   Non-tender,   No guarding,   + BS    MUSCULOSKELETAL:   Range of motion is not limited,  No clubbing, cyanosis    NEUROLOGICAL:   Alert   Follows simple commands   No motor  deficits.    SKIN:   Skin normal color for race,   Warm and dry.   No evidence of rash.    PSYCHIATRIC:   Normal mood and affect.   No apparent risk to self or others.    HEMATOLOGICAL:  No cervical  lymphadenopathy.  no inguinal lymphadenopathy      10-18-21 @ 07:01  -  10-19-21 @ 07:00  --------------------------------------------------------  IN:  Total IN: 0 mL    OUT:    Drain (mL): 3000 mL    Indwelling Catheter - Urethral (mL): 350 mL    Voided (mL): 400 mL  Total OUT: 3750 mL    Total NET: -3750 mL          LABS:                            10.1   11.00 )-----------( x        ( 19 Oct 2021 04:30 )             33.1                                               10-18    142  |  106  |  138<HH>  ----------------------------<  104<H>  3.9   |  19  |  2.4<H>    18 Oct 2021 04:30    142    |  106    |  138<HH>  ----------------------------<  104<H>  3.9     |  19     |  2.4<H>  17 Oct 2021 19:36    141    |  105    |  141<HH>  ----------------------------<  121<H>  3.8     |  19     |  2.3<H>    Creatinine Trend  , Cr 2.4, (10-18-21 @ 04:30)  Creatinine Trend  , Cr 2.3, (10-17-21 @ 19:36)  Creatinine Trend  , Cr 2.2, (10-16-21 @ 04:30)  Creatinine Trend  , Cr 2.1, (10-15-21 @ 04:30)      Ca    8.0<L>      18 Oct 2021 04:30  Ca    8.2<L>      17 Oct 2021 19:36  Phos  5.6<H>     18 Oct 2021 04:30  Mg     2.8<H>     18 Oct 2021 04:30    TPro  6.4    /  Alb  3.0<L>  /  TBili  1.2    /  DBili  0.5<H>  /  AST  63<H>  /  ALT  35     /  AlkPhos  225<H>  18 Oct 2021 04:30  TPro  6.4    /  Alb  3.0<L>  /  TBili  1.1    /  DBili  x      /  AST  60<H>  /  ALT  38     /  AlkPhos  224<H>  17 Oct 2021 19:36      Ca    8.0<L>      18 Oct 2021 04:30  Phos  5.6     10-18  Mg     2.8     10-18    TPro  6.4  /  Alb  3.0<L>  /  TBili  1.2  /  DBili  0.5<H>  /  AST  63<H>  /  ALT  35  /  AlkPhos  225<H>  10-18      PT/INR - ( 18 Oct 2021 04:30 )   PT: 16.60 sec;   INR: 1.45 ratio                                                                                              LIVER FUNCTIONS - ( 18 Oct 2021 04:30 )  Alb: 3.0 g/dL / Pro: 6.4 g/dL / ALK PHOS: 225 U/L / ALT: 35 U/L / AST: 63 U/L / GGT: x                                                                                                                                       MEDICATIONS  (STANDING):  albumin human  5% IVPB 25 Gram(s) IV Intermittent every 3 hours  budesonide  80 MICROgram(s)/formoterol 4.5 MICROgram(s) Inhaler 2 Puff(s) Inhalation two times a day  calcium acetate 667 milliGRAM(s) Oral three times a day with meals  cefTRIAXone   IVPB 1000 milliGRAM(s) IV Intermittent every 24 hours  chlorhexidine 4% Liquid 1 Application(s) Topical daily  dextrose 40% Gel 15 Gram(s) Oral once  dextrose 5%. 1000 milliLiter(s) (50 mL/Hr) IV Continuous <Continuous>  dextrose 5%. 1000 milliLiter(s) (100 mL/Hr) IV Continuous <Continuous>  dextrose 50% Injectable 25 Gram(s) IV Push once  dextrose 50% Injectable 12.5 Gram(s) IV Push once  dextrose 50% Injectable 25 Gram(s) IV Push once  glucagon  Injectable 1 milliGRAM(s) IntraMuscular once  heparin   Injectable 5000 Unit(s) SubCutaneous every 8 hours  insulin glargine Injectable (LANTUS) 20 Unit(s) SubCutaneous at bedtime  insulin lispro (ADMELOG) corrective regimen sliding scale   SubCutaneous three times a day before meals  insulin lispro Injectable (ADMELOG) 5 Unit(s) SubCutaneous three times a day before meals  lactulose Syrup 15 Gram(s) Oral three times a day  levoFLOXacin IVPB 750 milliGRAM(s) IV Intermittent every 48 hours  midodrine 10 milliGRAM(s) Oral every 8 hours  pantoprazole  Injectable 40 milliGRAM(s) IV Push every 12 hours  propranolol 5 milliGRAM(s) Oral daily  rifAXIMin 550 milliGRAM(s) Oral two times a day  sodium bicarbonate 650 milliGRAM(s) Oral every 8 hours    MEDICATIONS  (PRN):      New X-rays reviewed:                                                                                  ECHO    CXR interpreted by me:  Low lung volumes

## 2021-10-19 NOTE — CHART NOTE - NSCHARTNOTEFT_GEN_A_CORE
Significant Other was present during visit.  Provided information with regard to applying for Medicare Part B.  Patient/SO reported that patient had SS telephone interview today.  Patient was approved for SS benefit and Medicare Part A.  Patient expressed frustration with length of hospitalization and reduced physical functioning.  Support rendered.

## 2021-10-19 NOTE — PROGRESS NOTE ADULT - SUBJECTIVE AND OBJECTIVE BOX
JLUIS SUZANNE  66y  Male      Patient is a 66y old male who presents with a chief complaint of weakness  Hyperkalemia  VT (15 Oct 2021 10:28)      INTERVAL HPI/OVERNIGHT EVENTS:  Patient seen and examined earlier this morning.  Lying comfortably in bed  Denies any complaints   3L drained yesterday by medical staff  pt is not improving; discussed in detail with pt's girlfriend bedside on 10/18 and today - remains full code   pt continues on oxygen       REVIEW OF SYSTEMS:  CONSTITUTIONAL: No fever, weight loss, or fatigue  EYES: No eye pain, visual disturbances, or discharge  ENMT:  No difficulty hearing, tinnitus, vertigo; No sinus or throat pain  NECK: No pain or stiffness  RESPIRATORY: No cough, wheezing, chills or hemoptysis; No shortness of breath  CARDIOVASCULAR: No chest pain, palpitations, dizziness, or leg swelling  GASTROINTESTINAL: No abdominal or epigastric pain. No nausea, vomiting, or hematemesis; No diarrhea or constipation. No melena or hematochezia.  GENITOURINARY: No dysuria, frequency, hematuria, or incontinence  NEUROLOGICAL: No headaches, memory loss, loss of strength, numbness, or tremors  SKIN: No itching, burning, rashes, or lesions   LYMPH NODES: No enlarged glands  ENDOCRINE: No heat or cold intolerance; No hair loss  MUSCULOSKELETAL: No joint pain or swelling; No muscle, back, or extremity pain  PSYCHIATRIC: No depression, anxiety, mood swings, or difficulty sleeping  HEME/LYMPH: No easy bruising, or bleeding gums  ALLERY AND IMMUNOLOGIC: No hives or eczema      Vital Signs Last 24 Hrs  T(C): 36.2 (19 Oct 2021 08:00), Max: 36.2 (19 Oct 2021 08:00)  T(F): 97.1 (19 Oct 2021 08:00), Max: 97.1 (19 Oct 2021 08:00)  HR: 76 (19 Oct 2021 08:00) (64 - 79)  BP: 139/80 (19 Oct 2021 08:00) (116/79 - 153/83)  BP(mean): 103 (19 Oct 2021 08:00) (103 - 108)  RR: 18 (19 Oct 2021 04:00) (18 - 18)  SpO2: 98% (19 Oct 2021 04:00) (88% - 98%) on 2L nc      PHYSICAL EXAM:  GENERAL: NAD, well-groomed, well-developed  HEAD:  Atraumatic, Normocephalic  EYES: EOMI, PERRLA, conjunctiva and sclera clear  ENMT: poor dentition   NECK: Supple, No JVD, Normal thyroid  NERVOUS SYSTEM:  Alert & Oriented X3, Good concentration; Moves all extremities   CHEST/LUNG: decreased breath sounds b/l bases  HEART: Regular rate and rhythm; No murmurs, rubs, or gallops  ABDOMEN: Soft, Nontender, distended; Bowel sounds present, denver catheter +  EXTREMITIES:  3+ Peripheral edema with blisters  LYMPH: No lymphadenopathy noted  SKIN: No rashes or lesions    Consultant(s) Notes Reviewed:  [x ] YES  [ ] NO  Care Discussed with Consultants/Other Providers [ x] YES  [ ] NO    LAB:              10.1   11.00 )-----------( 120      ( 19 Oct 2021 04:30 )             33.1       10-19    146  |  110  |  125<HH>  ----------------------------<  61<L>  3.9   |  18  |  2.0<H>    Ca    8.2<L>      19 Oct 2021 04:30  Phos  5.6     10-18  Mg     2.7     10-19    TPro  6.4  /  Alb  3.8  /  TBili  1.4<H>  /  DBili  x   /  AST  57<H>  /  ALT  27  /  AlkPhos  154<H>  10-19      Drug Dosing Weight  Height (cm): 175.3 (04 Oct 2021 23:55)  Weight (kg): 89.1 (10 Oct 2021 00:00)  BMI (kg/m2): 29 (10 Oct 2021 00:00)  BSA (m2): 2.05 (10 Oct 2021 00:00)      CAPILLARY BLOOD GLUCOSE  POCT Blood Glucose.: 117 mg/dL (19 Oct 2021 11:49)  POCT Blood Glucose.: 89 mg/dL (19 Oct 2021 07:46)  POCT Blood Glucose.: 124 mg/dL (18 Oct 2021 20:54)  POCT Blood Glucose.: 80 mg/dL (18 Oct 2021 16:28)        I&O's Summary  18 Oct 2021 07:01  -  19 Oct 2021 07:00  --------------------------------------------------------  IN: 0 mL / OUT: 3750 mL / NET: -3750 mL    19 Oct 2021 07:01  -  19 Oct 2021 13:47  --------------------------------------------------------  IN: 0 mL / OUT: 3000 mL / NET: -3000 mL        RADIOLOGY & ADDITIONAL TESTS:  Imaging Personally Reviewed:  [x] YES  [ ] NO  < from: Xray Chest 1 View- PORTABLE-Urgent (Xray Chest 1 View- PORTABLE-Urgent .) (10.13.21 @ 12:16) >  Impression:    Left lower lobe opacity/pleural effusion. No air leak.    < end of copied text >      HEALTH ISSUES - PROBLEM Dx:  Palliative care by specialist    Altered mental status    Cirrhosis        MEDICATIONS  (STANDING):  budesonide  80 MICROgram(s)/formoterol 4.5 MICROgram(s) Inhaler 2 Puff(s) Inhalation two times a day  calcium acetate 667 milliGRAM(s) Oral three times a day with meals  chlorhexidine 4% Liquid 1 Application(s) Topical daily  dextrose 40% Gel 15 Gram(s) Oral once  dextrose 5%. 1000 milliLiter(s) (50 mL/Hr) IV Continuous <Continuous>  dextrose 5%. 1000 milliLiter(s) (100 mL/Hr) IV Continuous <Continuous>  dextrose 50% Injectable 25 Gram(s) IV Push once  dextrose 50% Injectable 12.5 Gram(s) IV Push once  dextrose 50% Injectable 25 Gram(s) IV Push once  glucagon  Injectable 1 milliGRAM(s) IntraMuscular once  heparin   Injectable 5000 Unit(s) SubCutaneous every 8 hours  insulin glargine Injectable (LANTUS) 20 Unit(s) SubCutaneous at bedtime  insulin lispro (ADMELOG) corrective regimen sliding scale   SubCutaneous three times a day before meals  insulin lispro Injectable (ADMELOG) 5 Unit(s) SubCutaneous three times a day before meals  lactulose Syrup 15 Gram(s) Oral three times a day  levoFLOXacin IVPB 750 milliGRAM(s) IV Intermittent every 48 hours  midodrine 10 milliGRAM(s) Oral every 8 hours  pantoprazole  Injectable 40 milliGRAM(s) IV Push every 12 hours  propranolol 5 milliGRAM(s) Oral daily  rifAXIMin 550 milliGRAM(s) Oral two times a day  sodium bicarbonate 650 milliGRAM(s) Oral every 8 hours    MEDICATIONS  (PRN):

## 2021-10-19 NOTE — PROGRESS NOTE ADULT - ASSESSMENT
65 yo male, with h/o HTN, hx of drug abuse, Hepatitis C s/p INF, Liver cirrhosis, COPD, portal vein thrombosis on Eliquis, HCC since January 2021 on chemotherapy, presented for weakness He reports constipation and hematemesis for 1 day, minimal amount, admitted to ICU for Upper Gi bleed secondary to large esophageal varices, s/p EGD 10/4 s/p banding.     *Decompensated liver cirrhosis sec to hepatitis C   -MELD Na score 26 at the time of admission, today 18  -Child yañez score C  -encephalopathy on admission resolved   -HCC s/p biopsy 2/9/21 on tecentriq and avasin. dr Arthur Gibson  -not a candidate for surgery because of locally advanced disease  -Left main and right portal vein thrombus was on eliquis, tumor thrombus, discussed with his oncologist, no need to resume eliquis   -ascites with denver catheter   -s/p ascitic fluid analysis no evidence of SBP, SAAG 1.6 and TP<1   -EBER, urine Na < 20, cr improved on albumin     Plan  -when stopping antibiotics, give prophylactic antibiotics during hospital stay given total protein <1 and CPS score of C  -continue lactulose to have 3-4 BM  -rifaximin 550 mg BID  -albumin 1 g per kg daily for another day, if creatinine does not imporve will need to treat as HRS  -continue midodrine  -Trend LFT, INR, BMP daily   -therapeutic paracentesis with albumin replacement, do not remove more than 5 L at a time  -low sodium diet < 2g per day  -high protein diet, consider early breakfast  -Limit acetaminophen to 2g per day  -avoid hepatoxic medications     -for questions text me on  teams, call [phone] on weekdays before 5pm or call GI service at [phone]  after 5 pm and on weekends

## 2021-10-19 NOTE — PROGRESS NOTE ADULT - ASSESSMENT
IMPRESSION:    Acute resp failure resolved   Hepatic encephalopathy improving   Decompensated liver cirrhosis.  Ascites SP peritoneal PleurX Catheter    Hematemesis resolved ( esophageal/ gastric varices)  Acute portal Vein thrombosis   Hepatocellular carcinoma with transaminitis on chemotherapy  HO Hepatitis C treated with INF  HO COPD  EBER   Stenotrophomonas in sputum sp ABX       PLAN:    CNS: Avoid over sedation     HEENT: Oral care    PULMONARY:  HOB @ 45 degrees.  Continue O2 as necessary to maintain sats 92 to 94%,  Repeat CXR noted      CARDIOVASCULAR: Avoid volume overload.     GI: GI prophylaxis. Feeding as per speech and swallow.  Lactulose and Rifaximin  Hepatology follow up appreciated      RENAL:  Follow up with renal. Low K diet. Follow up lytes     INFECTIOUS DISEASE: ABX PER ID      HEMATOLOGICAL:  DVT prophylaxis.  FU CBC and coags     ENDOCRINE:  Follow up FS.  Avoid hypoglycemia    MUSCULOSKELETAL: bed rest     prognosis grave    OOB to chair     PT OT

## 2021-10-19 NOTE — PROGRESS NOTE ADULT - SUBJECTIVE AND OBJECTIVE BOX
SUZANNE BAZZI 66y Male  MRN#: [id number]   CODE STATUS: Full code    Hospital Day: 15d    Pt is currently admitted with the primary diagnosis of hepatic encephalopathy    SUBJECTIVE      Overnight events   none  Subjective complaints   Patient is doing well post drainage of ascites  Present Today:   - Lorenz:  No [  ], Yes [ x  ] : Indication:     - Type of IV Access:       .. CVC/Piccline:  No [ x ], Yes [   ] : Indication:       .. Midline: No [x  ], Yes [   ] : Indication:                                             ----------------------------------------------------------  OBJECTIVE  PAST MEDICAL & SURGICAL HISTORY  HTN (hypertension)    Hepatitis C  2007    Drug abuse    Cancer, hepatocellular  January 2021    COPD, mild                                              -----------------------------------------------------------  ALLERGIES:  No Known Allergies                                            ------------------------------------------------------------    HOME MEDICATIONS  Home Medications:  Eliquis 5 mg oral tablet: 1 tab(s) orally 2 times a day (04 Oct 2021 06:13)  fluticasone-salmeterol 55 mcg-14 mcg/inh inhalation powder: 1 puff(s) inhaled 2 times a day (04 Oct 2021 06:14)  Protonix 40 mg oral delayed release tablet: 1 tab(s) orally once a day (04 Oct 2021 06:13)  spironolactone 50 mg oral tablet: 1 tab(s) orally once a day (04 Oct 2021 06:13)                           MEDICATIONS:  STANDING MEDICATIONS  budesonide  80 MICROgram(s)/formoterol 4.5 MICROgram(s) Inhaler 2 Puff(s) Inhalation two times a day  calcium acetate 667 milliGRAM(s) Oral three times a day with meals  chlorhexidine 4% Liquid 1 Application(s) Topical daily  dextrose 40% Gel 15 Gram(s) Oral once  dextrose 5%. 1000 milliLiter(s) IV Continuous <Continuous>  dextrose 5%. 1000 milliLiter(s) IV Continuous <Continuous>  dextrose 50% Injectable 25 Gram(s) IV Push once  dextrose 50% Injectable 12.5 Gram(s) IV Push once  dextrose 50% Injectable 25 Gram(s) IV Push once  glucagon  Injectable 1 milliGRAM(s) IntraMuscular once  heparin   Injectable 5000 Unit(s) SubCutaneous every 8 hours  insulin glargine Injectable (LANTUS) 20 Unit(s) SubCutaneous at bedtime  insulin lispro (ADMELOG) corrective regimen sliding scale   SubCutaneous three times a day before meals  insulin lispro Injectable (ADMELOG) 5 Unit(s) SubCutaneous three times a day before meals  lactulose Syrup 15 Gram(s) Oral three times a day  levoFLOXacin IVPB 750 milliGRAM(s) IV Intermittent every 48 hours  midodrine 10 milliGRAM(s) Oral every 8 hours  pantoprazole  Injectable 40 milliGRAM(s) IV Push every 12 hours  propranolol 5 milliGRAM(s) Oral daily  rifAXIMin 550 milliGRAM(s) Oral two times a day  sodium bicarbonate 650 milliGRAM(s) Oral every 8 hours    PRN MEDICATIONS                                            ------------------------------------------------------------  VITAL SIGNS: Last 24 Hours  T(C): 36.2 (19 Oct 2021 08:00), Max: 36.2 (19 Oct 2021 08:00)  T(F): 97.1 (19 Oct 2021 08:00), Max: 97.1 (19 Oct 2021 08:00)  HR: 76 (19 Oct 2021 08:00) (64 - 79)  BP: 139/80 (19 Oct 2021 08:00) (116/79 - 153/83)  BP(mean): 103 (19 Oct 2021 08:00) (103 - 108)  RR: 18 (19 Oct 2021 04:00) (18 - 18)  SpO2: 98% (19 Oct 2021 04:00) (88% - 98%)      10-18-21 @ 07:01  -  10-19-21 @ 07:00  --------------------------------------------------------  IN: 0 mL / OUT: 3750 mL / NET: -3750 mL    10-19-21 @ 07:01  -  10-19-21 @ 13:33  --------------------------------------------------------  IN: 0 mL / OUT: 3000 mL / NET: -3000 mL                                             --------------------------------------------------------------  LABS:                        10.1   11.00 )-----------( 120      ( 19 Oct 2021 04:30 )             33.1     10-19    146  |  110  |  125<HH>  ----------------------------<  61<L>  3.9   |  18  |  2.0<H>    Ca    8.2<L>      19 Oct 2021 04:30  Phos  5.6     10-18  Mg     2.7     10-19    TPro  6.4  /  Alb  3.8  /  TBili  1.4<H>  /  DBili  x   /  AST  57<H>  /  ALT  27  /  AlkPhos  154<H>  10-19    PT/INR - ( 18 Oct 2021 04:30 )   PT: 16.60 sec;   INR: 1.45 ratio                                                                   -------------------------------------------------------------  RADIOLOGY:                                            --------------------------------------------------------------    PHYSICAL EXAM:  General: alert oriented  HEENT: non remarkable  LUNGS: crackles on bases  HEART: RRR  ABDOMEN: distended, shifting wave positive  EXT: edema up to abdomen                                             --------------------------------------------------------------    ASSESSMENT & PLAN    65 yo male, with h/o HTN, drug abuse, Hepatitis C s/p INF, Liver cirrhosis s/p [place] shunt, COPD, DVT? on Eliquis, hepatocellular carcinoma since January 2021 on chemotherapy, presented for weakness and decreased oral intake along with constipation and hematemasis with ammonia (188) on presentation, Impression: Hepatic Encephalopathy    1- Hepatic Encephalopathy:  - Ammonia 188 on presentation  - Patient on rifaximin and lactulose to aim for 3 BM/day  - Mental status improved  - Patient had ascites that was drained through a [place] catheter, removed > 7 liters of fluid, patient expected to build up again in light of his liver decompensation (cirrhosis)  - Drained 3 Liters of ascitic fluid on 10/18 and will drain more today  - No SBP based on fluid studies and culture, need only for prophylaxis  - IR consulted for TIPS: recommended against due to multiple contraindications and MELD score >20    2- Variceal Bleed (portal HTN)  - Patient was intubated for airway protection, now extubated  - Hematemesis that was followed by an emergent EGD that showed variceal bleeds that were ligated (in esophagus)  - Patient started on abx prophylaxis after bleed and prophylaxis for SBP: ceftriaxone  - Propranolol 5 mg daily  - Protonix 40 mg IV BID  - Follow up Hb closely    3- HCC:  - Diagnosed in Jan 2021  - Was on chemotherapy to which he developed transaminitis  - Heme/Onc following the patient and recommended:  At present only palliative care.  If his performance status improves with nutrition and physical therapy, we will reevaluate for cancer therapy.  Please let the  see the patient for help with his health insurance issues  - Will follow up with Heme/onc    4- Septic shock:  - Patient had septic shock to which he was started on levophed on 10/4 and was stopped on the 10th of Oct  - Blood cultures are negative so far  - DTA culture showed Stenotrophomonas that is sensitive to levofloxacin: Levofloxacin started on 10/14  - Now resolved    5- Portal Vein Thrombosis:  - Patient currently on prophylactic dose of Heparin  - No bleeding since hematemesis, called the GI service and was told that Portal Vein is invaded by the thrombus itself and not a clot and therefore there is no need for full anticoagulation    6- COPD:  - patient on budesonide/formoterol nebulization    7- Hep C:  - treated with INF    8- EBER:  - Creatinine trending down today (Cr:2)  - Possibly abdominal comp vs Hepato-renal   - midodrine 10mg q 8 hr (started on 10/15)  - Albumin given on friday (10/15) monday (10/18) and tuesday (10/19)    9- VT:  - patient had VT on presentation to which he was shocked   - Hyperkalemia at that point was treated and resolved    10- Thrombocytopenia  - Patient with drop from 300 to 120 today  - Highly likely dilutional as all blood lineages have dropped  - Less likely HIT, will trend it for tomorrow                                                                                    ----------------------------------------------------  # DVT prophylaxis : Heparin 5000 q 8 hrs    # GI prophylaxis: protonix 40 mg IV BID     # Diet: Dysphagia 1 Pureed Thin Liquids    # Activity Score (AM-PAC)    # Code status : full code    # Disposition : acute

## 2021-10-19 NOTE — PROGRESS NOTE ADULT - SUBJECTIVE AND OBJECTIVE BOX
Nephrology progress note    Patient was seen and examined, events over the last 24 h noted .    Cr stable    Allergies:  No Known Allergies    Hospital Medications:   MEDICATIONS  (STANDING):  budesonide  80 MICROgram(s)/formoterol 4.5 MICROgram(s) Inhaler 2 Puff(s) Inhalation two times a day  calcium acetate 667 milliGRAM(s) Oral three times a day with meals  chlorhexidine 4% Liquid 1 Application(s) Topical daily  dextrose 40% Gel 15 Gram(s) Oral once  dextrose 5%. 1000 milliLiter(s) (50 mL/Hr) IV Continuous <Continuous>  dextrose 5%. 1000 milliLiter(s) (100 mL/Hr) IV Continuous <Continuous>  dextrose 50% Injectable 25 Gram(s) IV Push once  dextrose 50% Injectable 12.5 Gram(s) IV Push once  dextrose 50% Injectable 25 Gram(s) IV Push once  glucagon  Injectable 1 milliGRAM(s) IntraMuscular once  heparin   Injectable 5000 Unit(s) SubCutaneous every 8 hours  insulin glargine Injectable (LANTUS) 20 Unit(s) SubCutaneous at bedtime  insulin lispro (ADMELOG) corrective regimen sliding scale   SubCutaneous three times a day before meals  insulin lispro Injectable (ADMELOG) 5 Unit(s) SubCutaneous three times a day before meals  lactulose Syrup 15 Gram(s) Oral three times a day  levoFLOXacin IVPB 750 milliGRAM(s) IV Intermittent every 48 hours  midodrine 10 milliGRAM(s) Oral every 8 hours  pantoprazole  Injectable 40 milliGRAM(s) IV Push every 12 hours  propranolol 5 milliGRAM(s) Oral daily  rifAXIMin 550 milliGRAM(s) Oral two times a day  sodium bicarbonate 650 milliGRAM(s) Oral every 8 hours        VITALS:  T(F): 97.1 (10-19-21 @ 08:00), Max: 97.1 (10-19-21 @ 08:00)  HR: 76 (10-19-21 @ 08:00)  BP: 139/80 (10-19-21 @ 08:00)  RR: 18 (10-19-21 @ 04:00)  SpO2: 98% (10-19-21 @ 04:00)  Wt(kg): --    10-17 @ 07:01  -  10-18 @ 07:00  --------------------------------------------------------  IN: 0 mL / OUT: 950 mL / NET: -950 mL    10-18 @ 07:01  -  10-19 @ 07:00  --------------------------------------------------------  IN: 0 mL / OUT: 3750 mL / NET: -3750 mL    10-19 @ 07:01  -  10-19 @ 13:21  --------------------------------------------------------  IN: 0 mL / OUT: 3000 mL / NET: -3000 mL          PHYSICAL EXAM:  Constitutional: NAD  Neck: No JVD  Respiratory: CTAB, no wheezes, rales or rhonchi  Cardiovascular: S1, S2, RRR  Gastrointestinal: distended abdomen   Extremities: No cyanosis or clubbing. No peripheral edema  :  No berry.   Skin: No rashes    LABS:  10-19    146  |  110  |  125<HH>  ----------------------------<  61<L>  3.9   |  18  |  2.0<H>    Ca    8.2<L>      19 Oct 2021 04:30  Phos  5.6     10-18  Mg     2.7     10-19    TPro  6.4  /  Alb  3.8  /  TBili  1.4<H>  /  DBili      /  AST  57<H>  /  ALT  27  /  AlkPhos  154<H>  10-19                          10.1   11.00 )-----------( 120      ( 19 Oct 2021 04:30 )             33.1       Urine Studies:    Sodium, Random Urine: <20.0 mmoL/L (10-15 @ 11:05)  Creatinine, Random Urine: 70 mg/dL (10-15 @ 11:05)    RADIOLOGY & ADDITIONAL STUDIES:

## 2021-10-19 NOTE — PROGRESS NOTE ADULT - ASSESSMENT
67 yo male, with h/o HTN, drug abuse, Hepatitis C s/p INF, Liver cirrhosis s/p [place] shunt, COPD, DVT? on Eliquis, hepatocellular carcinoma since January 2021 on chemotherapy, presented for weakness and decreased oral intake along with constipation and hematemesis with ammonia (188) on presentation.    #Acute gastrointestinal hemorrhage due to variceal bleed  - s/p intubation for airway protection; now extubated  - s/p EGD - esophageal varices ligated   - continue propranolol, protonix  - monitor cbc daily  #SBP prophylaxis -on ceftriaxone and levaquin   #hepatic encephalopathy - mental status at baseline- continues on rifaximin and lactulose - monitor BM's  #Liver cirrhosis s/p [place] shunt   -3 liters drained by medical staff on 10/18; repeat today   #Hepatocellular CA - on chemo since being diagnosed in 2021  - oncology eval appreciated - not a candidate given his current state   - had over 7 liters removed; suspect it is reaccumulating    - IR consulted for TIPS: recommended against due to multiple contraindications and MELD score >20    #Thrombocytopenia  - lab error?  - repeat labs at 4pm today; if repeat labs abnormal - check HIT ab    # Septic shock - resolved   - previously required levo - d/c'ed on 10/10  - DTA culture showed Stenotrophomonas that is sensitive to levofloxacin: Levofloxacin started on 10/14  - monitor BP closely and resume pressors if indicated     # COPD - not in exacerbation  - continue budesonide, formoterol    # EBER - r/o hepatorenal syndrome  # metabolic acidosis - improving   -nephrology eval appreciated   -avoid nephrotoxics   -continue oral bicarb and phoslo as per renal   -repeat labs in am    overall poor prognosis- discussed with pt and his girlfriend in detail - he wishes to remain a full code     Progress Note Handoff  Pending Consults: palliative follow up, IR, renal   Pending Tests: labs  Pending Results: labs  Family Discussion: discussed poor prognosis, all medication and treatment plan with pt, his girlfriend and medical staff in detail - all questions answered   Disposition: Home_____/SNF______/Other_____/Unknown at this time__x___    Please call me with any questions at extension [phone]  spent over 37 min on medical management

## 2021-10-19 NOTE — PROGRESS NOTE ADULT - SUBJECTIVE AND OBJECTIVE BOX
Gastroenterology progress note:     Patient is a 66y old  Male who presents with a chief complaint of weakness  Hyperkalemia  VT (19 Oct 2021 13:42)       Admitted on: 10-04-21    We are following the patient for liver cirrhosis      Interval History: patient feels better, better appetite. s/p 3 L removed yesterday from denver catheter     PAST MEDICAL & SURGICAL HISTORY:  HTN (hypertension)   Hepatitis C 2007  Drug abuse  Cancer, hepatocellularJanuary 2021  COPD, mild    MEDICATIONS  (STANDING):  budesonide  80 MICROgram(s)/formoterol 4.5 MICROgram(s) Inhaler 2 Puff(s) Inhalation two times a day  calcium acetate 667 milliGRAM(s) Oral three times a day with meals  chlorhexidine 4% Liquid 1 Application(s) Topical daily  dextrose 40% Gel 15 Gram(s) Oral once  dextrose 5%. 1000 milliLiter(s) (50 mL/Hr) IV Continuous <Continuous>  dextrose 5%. 1000 milliLiter(s) (100 mL/Hr) IV Continuous <Continuous>  dextrose 50% Injectable 25 Gram(s) IV Push once  dextrose 50% Injectable 12.5 Gram(s) IV Push once  dextrose 50% Injectable 25 Gram(s) IV Push once  glucagon  Injectable 1 milliGRAM(s) IntraMuscular once  heparin   Injectable 5000 Unit(s) SubCutaneous every 8 hours  insulin glargine Injectable (LANTUS) 20 Unit(s) SubCutaneous at bedtime  insulin lispro (ADMELOG) corrective regimen sliding scale   SubCutaneous three times a day before meals  insulin lispro Injectable (ADMELOG) 5 Unit(s) SubCutaneous three times a day before meals  lactulose Syrup 15 Gram(s) Oral three times a day  levoFLOXacin IVPB 750 milliGRAM(s) IV Intermittent every 48 hours  midodrine 10 milliGRAM(s) Oral every 8 hours  pantoprazole  Injectable 40 milliGRAM(s) IV Push every 12 hours  propranolol 5 milliGRAM(s) Oral daily  rifAXIMin 550 milliGRAM(s) Oral two times a day  sodium bicarbonate 650 milliGRAM(s) Oral every 8 hours    MEDICATIONS  (PRN):      Allergies  No Known Allergies      Review of Systems:   General: no fever  HEENT: no hemoptysis  Cardiovascular:  No Chest Pain, No Palpitations  Respiratory:  No Cough, No Dyspnea  Gastrointestinal:  As described in HPI  Hematology: no bruising or hematoma   Neurology: generalized weakness     Physical Examination:  T(C): 36.1 (10-19-21 @ 14:47), Max: 36.2 (10-19-21 @ 08:00)  HR: 89 (10-19-21 @ 14:47) (64 - 89)  BP: 103/72 (10-19-21 @ 14:47) (103/72 - 153/83)  RR: 18 (10-19-21 @ 14:47) (18 - 18)  SpO2: 97% (10-19-21 @ 14:47) (88% - 98%)     Constitutional: No acute distress.  Head: normocephalic  Neck: no palpable thyroid  Respiratory:  No signs of respiratory distress. Lung sounds are clear bilaterally.  Cardiovascular:  S1 S2, Regular rate and rhythm.  Abdominal: Abdomen is soft, symmetric, and non-tender + distention.    Extremities: +pitting edema        Data:                         10.1   11.00 )-----------( 120      ( 19 Oct 2021 04:30 )             33.1     Hgb trend:  10.1  10-19-21 @ 04:30  11.3  10-18-21 @ 04:30  10.6  10-17-21 @ 19:36        10-19    146  |  110  |  125<HH>  ----------------------------<  61<L>  3.9   |  18  |  2.0<H>    Ca    8.2<L>      19 Oct 2021 04:30  Phos  5.6     10-18  Mg     2.7     10-19    TPro  6.4  /  Alb  3.8  /  TBili  1.4<H>  /  DBili  x   /  AST  57<H>  /  ALT  27  /  AlkPhos  154<H>  10-19    Liver panel trend:  TBili 1.4   /   AST 57   /   ALT 27   /   AlkP 154   /   Tptn 6.4   /   Alb 3.8    /   DBili --      10-19  TBili 1.2   /   AST 63   /   ALT 35   /   AlkP 225   /   Tptn 6.4   /   Alb 3.0    /   DBili 0.5      10-18  TBili 1.1   /   AST 60   /   ALT 38   /   AlkP 224   /   Tptn 6.4   /   Alb 3.0    /   DBili --      10-17  TBili 1.6   /   AST 65   /   ALT 44   /   AlkP 239   /   Tptn 6.7   /   Alb 2.9    /   DBili 0.7      10-16  TBili 1.4   /   AST 61   /   ALT 53   /   AlkP 280   /   Tptn 6.3   /   Alb 1.9    /   DBili 0.7      10-15  TBili 0.9   /   AST 52   /   ALT 54   /   AlkP 254   /   Tptn 5.7   /   Alb 1.7    /   DBili 0.5      10-14  TBili 1.0   /   AST 50   /   ALT 60   /   AlkP 233   /   Tptn 5.3   /   Alb 1.5    /   DBili 0.7      10-13  TBili 0.9   /   AST 42   /   ALT 71   /   AlkP 232   /   Tptn 4.9   /   Alb 1.4    /   DBili 0.5      10-12  TBili 0.8   /   AST 43   /   ALT 93   /   AlkP 212   /   Tptn 4.7   /   Alb 1.4    /   DBili --      10-11  TBili 0.9   /   AST 46   /      /   AlkP 233   /   Tptn 5.0   /   Alb 1.5    /   DBili --      10-10  TBili 0.9   /   AST 49   /      /   AlkP 249   /   Tptn 5.0   /   Alb 1.4    /   DBili --      10-09      PT/INR - ( 18 Oct 2021 04:30 )   PT: 16.60 sec;   INR: 1.45 ratio             < from: Xray Chest 1 View-PORTABLE IMMEDIATE (Xray Chest 1 View-PORTABLE IMMEDIATE .) (10.18.21 @ 09:52) >  Impression:    Bibasilar subsegmental atelectasis.    < end of copied text >

## 2021-10-20 LAB
ALBUMIN SERPL ELPH-MCNC: 4 G/DL — SIGNIFICANT CHANGE UP (ref 3.5–5.2)
ALP SERPL-CCNC: 134 U/L — HIGH (ref 30–115)
ALT FLD-CCNC: 23 U/L — SIGNIFICANT CHANGE UP (ref 0–41)
ANION GAP SERPL CALC-SCNC: 16 MMOL/L — HIGH (ref 7–14)
AST SERPL-CCNC: 49 U/L — HIGH (ref 0–41)
B PERT IGG+IGM PNL SER: CLEAR — SIGNIFICANT CHANGE UP
BILIRUB SERPL-MCNC: 1.5 MG/DL — HIGH (ref 0.2–1.2)
BUN SERPL-MCNC: 110 MG/DL — CRITICAL HIGH (ref 10–20)
CALCIUM SERPL-MCNC: 8.7 MG/DL — SIGNIFICANT CHANGE UP (ref 8.5–10.1)
CHLORIDE SERPL-SCNC: 113 MMOL/L — HIGH (ref 98–110)
CO2 SERPL-SCNC: 21 MMOL/L — SIGNIFICANT CHANGE UP (ref 17–32)
COLOR FLD: YELLOW — SIGNIFICANT CHANGE UP
CREAT SERPL-MCNC: 1.7 MG/DL — HIGH (ref 0.7–1.5)
FLUID INTAKE SUBSTANCE CLASS: SIGNIFICANT CHANGE UP
FLUID SEGMENTED GRANULOCYTES: 7 % — SIGNIFICANT CHANGE UP
FOLATE+VIT B12 SERBLD-IMP: 13 % — SIGNIFICANT CHANGE UP
GLUCOSE BLDC GLUCOMTR-MCNC: 116 MG/DL — HIGH (ref 70–99)
GLUCOSE BLDC GLUCOMTR-MCNC: 59 MG/DL — LOW (ref 70–99)
GLUCOSE BLDC GLUCOMTR-MCNC: 87 MG/DL — SIGNIFICANT CHANGE UP (ref 70–99)
GLUCOSE BLDC GLUCOMTR-MCNC: 91 MG/DL — SIGNIFICANT CHANGE UP (ref 70–99)
GLUCOSE SERPL-MCNC: 55 MG/DL — LOW (ref 70–99)
HCT VFR BLD CALC: 34.3 % — LOW (ref 42–52)
HGB BLD-MCNC: 10.4 G/DL — LOW (ref 14–18)
LYMPHOCYTES # FLD: 5 — SIGNIFICANT CHANGE UP
MAGNESIUM SERPL-MCNC: 2.7 MG/DL — HIGH (ref 1.8–2.4)
MCHC RBC-ENTMCNC: 30.1 PG — SIGNIFICANT CHANGE UP (ref 27–31)
MCHC RBC-ENTMCNC: 30.3 G/DL — LOW (ref 32–37)
MCV RBC AUTO: 99.1 FL — HIGH (ref 80–94)
MESOTHL CELL # FLD: 11 % — SIGNIFICANT CHANGE UP
MONOS+MACROS # FLD: 64 % — SIGNIFICANT CHANGE UP
NRBC # BLD: 0 /100 WBCS — SIGNIFICANT CHANGE UP (ref 0–0)
PLATELET # BLD AUTO: 130 K/UL — SIGNIFICANT CHANGE UP (ref 130–400)
POTASSIUM SERPL-MCNC: 4.3 MMOL/L — SIGNIFICANT CHANGE UP (ref 3.5–5)
POTASSIUM SERPL-SCNC: 4.3 MMOL/L — SIGNIFICANT CHANGE UP (ref 3.5–5)
PROT SERPL-MCNC: 6.4 G/DL — SIGNIFICANT CHANGE UP (ref 6–8)
RBC # BLD: 3.46 M/UL — LOW (ref 4.7–6.1)
RBC # FLD: 26 % — HIGH (ref 11.5–14.5)
RCV VOL RI: 1000 /UL — HIGH (ref 0–0)
SODIUM SERPL-SCNC: 150 MMOL/L — HIGH (ref 135–146)
TOTAL NUCLEATED CELL COUNT, BODY FLUID: 93 /UL — SIGNIFICANT CHANGE UP
TUBE TYPE: SIGNIFICANT CHANGE UP
WBC # BLD: 16.23 K/UL — HIGH (ref 4.8–10.8)
WBC # FLD AUTO: 16.23 K/UL — HIGH (ref 4.8–10.8)

## 2021-10-20 PROCEDURE — 99233 SBSQ HOSP IP/OBS HIGH 50: CPT

## 2021-10-20 PROCEDURE — 99232 SBSQ HOSP IP/OBS MODERATE 35: CPT

## 2021-10-20 PROCEDURE — 71045 X-RAY EXAM CHEST 1 VIEW: CPT | Mod: 26

## 2021-10-20 RX ORDER — SODIUM CHLORIDE 9 MG/ML
1000 INJECTION, SOLUTION INTRAVENOUS
Refills: 0 | Status: DISCONTINUED | OUTPATIENT
Start: 2021-10-20 | End: 2021-10-22

## 2021-10-20 RX ORDER — FUROSEMIDE 40 MG
20 TABLET ORAL ONCE
Refills: 0 | Status: COMPLETED | OUTPATIENT
Start: 2021-10-20 | End: 2021-10-20

## 2021-10-20 RX ORDER — ALBUMIN HUMAN 25 %
25 VIAL (ML) INTRAVENOUS
Refills: 0 | Status: COMPLETED | OUTPATIENT
Start: 2021-10-20 | End: 2021-10-20

## 2021-10-20 RX ADMIN — CHLORHEXIDINE GLUCONATE 1 APPLICATION(S): 213 SOLUTION TOPICAL at 11:10

## 2021-10-20 RX ADMIN — FONDAPARINUX SODIUM 2.5 MILLIGRAM(S): 2.5 INJECTION, SOLUTION SUBCUTANEOUS at 11:09

## 2021-10-20 RX ADMIN — Medication 667 MILLIGRAM(S): at 08:03

## 2021-10-20 RX ADMIN — INSULIN GLARGINE 20 UNIT(S): 100 INJECTION, SOLUTION SUBCUTANEOUS at 21:35

## 2021-10-20 RX ADMIN — PANTOPRAZOLE SODIUM 40 MILLIGRAM(S): 20 TABLET, DELAYED RELEASE ORAL at 17:32

## 2021-10-20 RX ADMIN — Medication 250 GRAM(S): at 23:12

## 2021-10-20 RX ADMIN — PANTOPRAZOLE SODIUM 40 MILLIGRAM(S): 20 TABLET, DELAYED RELEASE ORAL at 05:13

## 2021-10-20 RX ADMIN — Medication 20 MILLIGRAM(S): at 15:17

## 2021-10-20 RX ADMIN — MIDODRINE HYDROCHLORIDE 10 MILLIGRAM(S): 2.5 TABLET ORAL at 05:13

## 2021-10-20 RX ADMIN — Medication 250 GRAM(S): at 17:45

## 2021-10-20 RX ADMIN — Medication 250 GRAM(S): at 21:08

## 2021-10-20 RX ADMIN — Medication 650 MILLIGRAM(S): at 05:13

## 2021-10-20 NOTE — SWALLOW BEDSIDE ASSESSMENT ADULT - SWALLOW EVAL: DIAGNOSIS
+mild oral dysphagia for puree, mechanical soft, and nectar-thick liquids w/o immediate overt s/s aspiration/penetration; moderate oral dysphagia for thin liquids w/ overt s/s aspiration/penetration PO trials unsafe at this time 2' decreased respiratory status and poor secretion management.

## 2021-10-20 NOTE — PROGRESS NOTE ADULT - ASSESSMENT
67 yo male, with h/o HTN, drug abuse, Hepatitis C s/p INF, Liver cirrhosis s/p [place] shunt, COPD, DVT? on Eliquis, hepatocellular carcinoma since January 2021 on chemotherapy, presented for weakness and decreased oral intake along with constipation and hematemesis with ammonia (188) on presentation.    Acute gastrointestinal hemorrhage due to variceal bleed  HCC  Decompensated liver cirrhosis due to HCV  Hepatic encephalopathy   Thrombocytopenia  Patient initially presented with worsening hepatic encephalopathy, NH3 on admission 188, hospital course complicated by hematemesis s/p intubation and emergent EGD10/4, s/p EVBL on propranolol   successfully extubated  this AM, patient was chocking on water, resulting in increasing oxygen requirements  currently on Ceftriaxone and Levaquin   repeat Chest xray overall unchanged  seen by s/s - NPO for now, would avoid NGT given recent variceal bleed  may require alternate needs of nutrition  chemotherapy currently on hold given poor functional status, may be considered in the future if the patient improves  patient has a denver catheter due to frequent paracentesis, has had >7L removed (at baseline pt drains 2L Q2days at home)  do not remove >5L of fluid at a time, GI on board  check HiT ab given acute thrombocytopenia, on fondaparinux  Palliative team following     EBER - r/o hepatorenal syndrome v abdominal compartment symdrome   metabolic acidosis - improving   -avoid nephrotoxics, 1 more day of albumin today  -continue oral bicarb and phoslo as per renal   - monitor BMP, nephrology following     Portal vein thrombosis  Left main and right portal vein thrombus was on eliquis previously  per GI--> tumor thrombus, no need to resume eliquis     Septic shock - resolved   - previously required levophed - d/c'ed on 10/10, now on midodrine  - DTA culture showed Stenotrophomonas that is sensitive to levofloxacin: Levofloxacin started on 10/14  - monitor BP closely and resume pressors if indicated     EBER - r/o hepatorenal syndrome v abdominal compartment symdrome   metabolic acidosis - improving   -avoid nephrotoxics, 1 more day of albumin today  -continue oral bicarb and phoslo as per renal   - monitor BMP, nephrology following     COPD - no wheezing on exam    #Progress Note Handoff  Pending (specify):   clinical improvement, GI/renal f/u, improvement in Scr  Family discussion: Plan of care discussed with patient. Discussed the poor prognosis. Attempted to discuss with patient's girlfriend as well, but not available at bedside   Disposition:  from home    Prognosis is very poor    Megan Clark MD  s. [phone]

## 2021-10-20 NOTE — PROGRESS NOTE ADULT - ASSESSMENT
ASSESSMENT  65 yo male, with h/o HTN, drug abuse, Hepatitis C s/p INF, Liver cirrhosis s/p [place] shunt, COPD, DVT? on Eliquis, HCC since January 2021 on chemotherapy, presented for weakness    - advanced hepatocellular carcinoma with transaminitis on chemo  - decompensated cirrhosis, ascites with [place] cath  - hematemesis 2nd esophageal, gastric varices  - severe anemia secondary to variceal bleed  - EBER  - hyperkalemia with sustained VT s/p shock  - hep C  - fevers, leukocytosis  - COPD  - sepsis without clear source, resolved    PLAN  - pt had been receiving TPN, now off as pt had been tolerating some po intake  - NPO now per speech  - d/w medical resident - consider asking GI to place small bore naso-enteric tube (8Fr, 109 cm) under endoscopic guidance. Then give reglan to help with advancing tube into small bowel. Would then start feeds (once tube beyond pylorus) with Nepro at 25 ml/h and f/u lytes and phos.  - TPN not a good long-term option, as pt does have functional gut; we just have to access it. PN will also limit pt's placement options.  - question for Onc regarding continued treatment plans...?

## 2021-10-20 NOTE — SWALLOW BEDSIDE ASSESSMENT ADULT - SLP GENERAL OBSERVATIONS
pt received in bed awake confused +generalized weakness w/o c/o pain. +room air +girlfriend at bedside pt received in bed awake +generalized weakness w/o c/o pain. +15L NRB mask; pt w/ wet upper airway/breath sounds a/w chest congestion; girlfriend at bedside

## 2021-10-20 NOTE — SWALLOW BEDSIDE ASSESSMENT ADULT - PHARYNGEAL PHASE
+delayed cough x1 w/ nectar-thick trials./Within functional limits Delayed pharyngeal swallow/Wet vocal quality post oral intake/Cough post oral intake

## 2021-10-20 NOTE — PROGRESS NOTE ADULT - SUBJECTIVE AND OBJECTIVE BOX
Gastroenterology progress note:     Patient is a 66y old  Male who presents with a chief complaint of weakness  Hyperkalemia  VT (20 Oct 2021 15:37)       Admitted on: 10-04-21    We are following the patient for liver cirrhosis      Interval History: patient with dyspnea currently requiring non rebreather, s/p aspiration?. remains awake, abdomen soft but distended       PAST MEDICAL & SURGICAL HISTORY:  HTN (hypertension)  Hepatitis C 2007  Drug abuse  Cancer, hepatocellular  January 2021  COPD, mild    MEDICATIONS  (STANDING):  albumin human  5% IVPB 25 Gram(s) IV Intermittent every 3 hours  budesonide  80 MICROgram(s)/formoterol 4.5 MICROgram(s) Inhaler 2 Puff(s) Inhalation two times a day  calcium acetate 667 milliGRAM(s) Oral three times a day with meals  chlorhexidine 4% Liquid 1 Application(s) Topical daily  dextrose 40% Gel 15 Gram(s) Oral once  dextrose 5%. 1000 milliLiter(s) (50 mL/Hr) IV Continuous <Continuous>  dextrose 5%. 1000 milliLiter(s) (100 mL/Hr) IV Continuous <Continuous>  dextrose 5%. 1000 milliLiter(s) (50 mL/Hr) IV Continuous <Continuous>  dextrose 50% Injectable 25 Gram(s) IV Push once  dextrose 50% Injectable 12.5 Gram(s) IV Push once  dextrose 50% Injectable 25 Gram(s) IV Push once  fondaparinux Injectable 2.5 milliGRAM(s) SubCutaneous daily  glucagon  Injectable 1 milliGRAM(s) IntraMuscular once  insulin glargine Injectable (LANTUS) 20 Unit(s) SubCutaneous at bedtime  insulin lispro (ADMELOG) corrective regimen sliding scale   SubCutaneous three times a day before meals  insulin lispro Injectable (ADMELOG) 5 Unit(s) SubCutaneous three times a day before meals  lactulose Syrup 15 Gram(s) Oral three times a day  levoFLOXacin IVPB 750 milliGRAM(s) IV Intermittent every 48 hours  midodrine 10 milliGRAM(s) Oral every 8 hours  pantoprazole  Injectable 40 milliGRAM(s) IV Push every 12 hours  propranolol 5 milliGRAM(s) Oral daily  rifAXIMin 550 milliGRAM(s) Oral two times a day    MEDICATIONS  (PRN):      Allergies  No Known Allergies      Review of Systems:   General: no fever  HEENT: no hemoptysis  Cardiovascular:  No Chest Pain, No Palpitations  Respiratory:  +Cough, +Dyspnea  Gastrointestinal:  As described in HPI  Hematology: no bruising or hematoma   Neurology: no new motor deficit    Physical Examination:  T(C): 35.3 (10-20-21 @ 16:54), Max: 36.3 (10-20-21 @ 12:37)  HR: 75 (10-20-21 @ 16:54) (75 - 96)  BP: 105/68 (10-20-21 @ 16:54) (100/62 - 127/75)  RR: 18 (10-20-21 @ 12:37) (18 - 18)  SpO2: 100% (10-20-21 @ 16:54) (93% - 100%)    Constitutional: No acute distress.  Head: normocephalic  Neck: no palpable thyroid  Respiratory:  tachypneic, expiratory wheezing   Cardiovascular:  S1 S2, Regular rate and rhythm.  Abdominal: Abdomen is soft, symmetric, and non-tender + distention.   Extremities: +pitting edema     Data: (reviewed by attending)                        10.4   16.23 )-----------( 130      ( 20 Oct 2021 07:54 )             34.3     Hgb trend:  10.4  10-20-21 @ 07:54  9.3  10-19-21 @ 17:15  10.1  10-19-21 @ 04:30  11.3  10-18-21 @ 04:30  10.6  10-17-21 @ 19:36        10-20    150<H>  |  113<H>  |  110<HH>  ----------------------------<  55<L>  4.3   |  21  |  1.7<H>    Ca    8.7      20 Oct 2021 07:54  Mg     2.7     10-20    TPro  6.4  /  Alb  4.0  /  TBili  1.5<H>  /  DBili  x   /  AST  49<H>  /  ALT  23  /  AlkPhos  134<H>  10-20    Liver panel trend:  TBili 1.5   /   AST 49   /   ALT 23   /   AlkP 134   /   Tptn 6.4   /   Alb 4.0    /   DBili --      10-20  TBili 1.4   /   AST 57   /   ALT 27   /   AlkP 154   /   Tptn 6.4   /   Alb 3.8    /   DBili --      10-19  TBili 1.2   /   AST 63   /   ALT 35   /   AlkP 225   /   Tptn 6.4   /   Alb 3.0    /   DBili 0.5      10-18  TBili 1.1   /   AST 60   /   ALT 38   /   AlkP 224   /   Tptn 6.4   /   Alb 3.0    /   DBili --      10-17  TBili 1.6   /   AST 65   /   ALT 44   /   AlkP 239   /   Tptn 6.7   /   Alb 2.9    /   DBili 0.7      10-16  TBili 1.4   /   AST 61   /   ALT 53   /   AlkP 280   /   Tptn 6.3   /   Alb 1.9    /   DBili 0.7      10-15  TBili 0.9   /   AST 52   /   ALT 54   /   AlkP 254   /   Tptn 5.7   /   Alb 1.7    /   DBili 0.5      10-14  TBili 1.0   /   AST 50   /   ALT 60   /   AlkP 233   /   Tptn 5.3   /   Alb 1.5    /   DBili 0.7      10-13  TBili 0.9   /   AST 42   /   ALT 71   /   AlkP 232   /   Tptn 4.9   /   Alb 1.4    /   DBili 0.5      10-12  TBili 0.8   /   AST 43   /   ALT 93   /   AlkP 212   /   Tptn 4.7   /   Alb 1.4    /   DBili --      10-11       Radiology: (reviewed by attending)  < from: Xray Chest 1 View-PORTABLE IMMEDIATE (Xray Chest 1 View-PORTABLE IMMEDIATE .) (10.20.21 @ 11:30) >  Bilateral opacifications. Support devices as described. Without change.    < end of copied text >

## 2021-10-20 NOTE — PROGRESS NOTE ADULT - SUBJECTIVE AND OBJECTIVE BOX
SUZANNE BAZZI  66y Male    CHIEF COMPLAINT:    Patient is a 66y old  Male who presents with a chief complaint of weakness  Hyperkalemia  VT (20 Oct 2021 13:56)      INTERVAL HPI/OVERNIGHT EVENTS:    Patient seen and examined. No acute events overnight. This morning patient noted to be choking on water with increasing oxygen requirements    ROS: All other systems are negative.    Vital Signs:    T(F): 97.4 (10-20-21 @ 12:37), Max: 98.8 (10-19-21 @ 17:12)  HR: 86 (10-20-21 @ 12:37) (85 - 96)  BP: 121/65 (10-20-21 @ 12:37) (100/62 - 127/75)  RR: 18 (10-20-21 @ 12:37) (18 - 19)  SpO2: 95% (10-20-21 @ 12:37) (93% - 97%)    19 Oct 2021 07:01  -  20 Oct 2021 07:00  --------------------------------------------------------  IN: 0 mL / OUT: 6500 mL / NET: -6500 mL    20 Oct 2021 07:01  -  20 Oct 2021 15:33  --------------------------------------------------------  IN: 0 mL / OUT: 200 mL / NET: -200 mL    POCT Blood Glucose.: 116 mg/dL (20 Oct 2021 11:19)  POCT Blood Glucose.: 59 mg/dL (20 Oct 2021 07:37)  POCT Blood Glucose.: 71 mg/dL (19 Oct 2021 20:56)  POCT Blood Glucose.: 205 mg/dL (19 Oct 2021 16:13)    PHYSICAL EXAM:    GENERAL:  NAD  SKIN: No rashes or lesions  HEENT: Atraumatic. Normocephalic.   NECK: Supple, No JVD.    PULMONARY: Coarse breath sounds b/l, increased secretions. No wheezing. No rales  CVS: Normal S1, S2. Rate and Rhythm are regular.    ABDOMEN/GI: Soft, Nontender,  distended; BS present  MSK:  No edema B/L LE. No clubbing or cyanosis   NEUROLOGIC: Moves all extremities   PSYCH: Alert & oriented x 3, normal affect    Consultant(s) Notes Reviewed:  [x ] YES  [ ] NO  Care Discussed with Consultants/Other Providers [ x] YES  [ ] NO    LABS:                        10.4   16.23 )-----------( 130      ( 20 Oct 2021 07:54 )             34.3     150<H>  |  113<H>  |  110<HH>  ----------------------------<  55<L>  4.3   |  21  |  1.7<H>    Ca    8.7      20 Oct 2021 07:54  Mg     2.7     10-20    TPro  6.4  /  Alb  4.0  /  TBili  1.5<H>  /  DBili  x   /  AST  49<H>  /  ALT  23  /  AlkPhos  134<H>  10-20    RADIOLOGY & ADDITIONAL TESTS:  Imaging or report Personally Reviewed:  [x] YES  [ ] NO  EKG reviewed: [x] YES  [ ] NO    Medications:  Standing  budesonide  80 MICROgram(s)/formoterol 4.5 MICROgram(s) Inhaler 2 Puff(s) Inhalation two times a day  calcium acetate 667 milliGRAM(s) Oral three times a day with meals  chlorhexidine 4% Liquid 1 Application(s) Topical daily  dextrose 40% Gel 15 Gram(s) Oral once  dextrose 5%. 1000 milliLiter(s) IV Continuous <Continuous>  dextrose 5%. 1000 milliLiter(s) IV Continuous <Continuous>  dextrose 50% Injectable 25 Gram(s) IV Push once  dextrose 50% Injectable 12.5 Gram(s) IV Push once  dextrose 50% Injectable 25 Gram(s) IV Push once  fondaparinux Injectable 2.5 milliGRAM(s) SubCutaneous daily  glucagon  Injectable 1 milliGRAM(s) IntraMuscular once  insulin glargine Injectable (LANTUS) 20 Unit(s) SubCutaneous at bedtime  insulin lispro (ADMELOG) corrective regimen sliding scale   SubCutaneous three times a day before meals  insulin lispro Injectable (ADMELOG) 5 Unit(s) SubCutaneous three times a day before meals  lactulose Syrup 15 Gram(s) Oral three times a day  levoFLOXacin IVPB 750 milliGRAM(s) IV Intermittent every 48 hours  midodrine 10 milliGRAM(s) Oral every 8 hours  pantoprazole  Injectable 40 milliGRAM(s) IV Push every 12 hours  propranolol 5 milliGRAM(s) Oral daily  rifAXIMin 550 milliGRAM(s) Oral two times a day  sodium bicarbonate 650 milliGRAM(s) Oral every 8 hours    PRN Meds

## 2021-10-20 NOTE — CHART NOTE - NSCHARTNOTEFT_GEN_A_CORE
PALLIATIVE MEDICINE INTERDISCIPLINARY TEAM NOTE    Provider:  [   ]Social Work   [   ]          [   ] Initial visit [   ] Follow up    Family or contact name / phone #   Met with: [   ] Patient  [   ] Family  [   ] Other:    Primary Language: [   ] English [   ] Other*:                      *Interpretation provided by:    SUPPORT DIAGNOSES            (Check all that apply)  [   ] Psychosocial spiritual assessment (PSSA)  [   ] EOL issues  [   ] Cultural / spiritual concerns  [   ] Pain / suffering  [   ] Dementia / AMS  [   ] Other:  [   ] AD issues  [   ] Grief / loss / sadness  [   ] Discharge issues  [   ] Distress / coping    PSYCHOSOCIAL ASSESSMENT OF PATIENT         (Check all that apply)  [   ] Initial Assessment            [   ] Reassessment          [   ] Not Applicable this visit    Pain/suffering acuity:  [   ] None to mild (0-3)           [   ] Moderate (4-6)        [   ] High (7-10)    Mental Status:  [   ] Alert/oriented (x3)          [   ] Confused/Altered(x2/x1)         [   ] Non-resp    Functional status:  [   ] Independent w ADLs      [   ] Needs Assistance             [   ] Bedbound/Full Care    Coping:  [   ] Coping well                     [   ] Coping w/difficulty            [   ] Poor coping    Support system:  [   ] Strong                              [   ] Adequate                        [   ] Inadequate    SPIRITUAL ASSESSMENT  Religious/Spiritual practice: _____Catholic______________________    Role of organized Scientologist:  [   ] Important                     [   ] Some (fam tradition, cultural)               [  x ] None    Effects on medical care:  [   ] Yes, _____________________________________                         [  x ] None    Cultural/Quaker need:  [   ] Yes, _____________________________________                         [ x  ] None    Refer to Pastoral Care:  [   ] Yes           [   ] No, not at this time    SERVICE PROVIDED  [   ]PSSA                                                                             [   ]Discharge support / facilitation  [   ]AD / goals of care counseling                                  [   ]EOL / death / bereavement counseling  [   ]Counseling / support                                                [   ] Family meeting  [ x  ]Prayer / sacrament / ritual                                      [   ] Referral   [   ]Other                                                                       NOTE and Plan of Care (PoC): Pt was having some difficulty with his breathing but states it's tolerable. Pt's mihaela were present at time of visit. The would like to be  soon.  They are being advice as to the process.  They consider themselves not Quaker but  spiritual. I was asked to perform the marriage. I will follow up.

## 2021-10-20 NOTE — PROGRESS NOTE ADULT - ASSESSMENT
Pt with Decompensated liver cirrhosis, Hep C-s/p IFN, ascites -[place] cath, HCCa with transaminitis on chemotherapy  presents with weakness, found to have K 6.8 (was on Aldactone) , sustained VT -s/p shock; met acidosis, hepatic encephalopathy - Ammonia 188.  Efe likely due to abd compartment syndrome or HRS/ hypernatremia   - continue Midodrine 10 mg q8h, use pressors as needed to keep MAP > 65   - continue albumin   - creatinine better   -strict Is and os noted non oliguric   - Start D5w at 75 cc per hour follow BMP   Hyperkalemia - improved, cont low K diet, if repeated > 5.5 start lokelma 10 q 12   High phos - cont phoslo with meals   - off Bicarb drip, cont sodium bicarbonate 650 q 8   Sepsis - r/o SBP - on Levaquin/ rocephine   Prognosis poor   will sign off recall PRN / call using TEAMS or on [phone]

## 2021-10-20 NOTE — CHART NOTE - NSCHARTNOTEFT_GEN_A_CORE
Significant other, Fallon Whaley, was present during visit.  Patient appears to be declining.  Patient aspirated earlier today.  Patient expressed frustration with reduced physical functioning, difficulty breathing and not being able to drink.  Support rendered.   It was requested that Attending speak with patient.  Patient discussed that he would like assistance with arrange for marriage ceremony.  Writer contacted Estela Lr.

## 2021-10-20 NOTE — PROGRESS NOTE ADULT - SUBJECTIVE AND OBJECTIVE BOX
pt awake, alert, weak, NRB mask on, reportedly had been more SOB earlier  abd distended but soft, NT today  per speech today:  PO trials unsafe at this time 2' decreased respiratory status and poor secretion management.  Vital Signs Last 24 Hrs  T(C): 36.3 (20 Oct 2021 12:37), Max: 37.1 (19 Oct 2021 17:12)  T(F): 97.4 (20 Oct 2021 12:37), Max: 98.8 (19 Oct 2021 17:12)  HR: 86 (20 Oct 2021 12:37) (85 - 96)  BP: 121/65 (20 Oct 2021 12:37) (100/62 - 127/75)  BP(mean): 87 (20 Oct 2021 12:37) (87 - 96)  RR: 18 (20 Oct 2021 12:37) (18 - 19)  SpO2: 95% (20 Oct 2021 12:37) (93% - 97%)    MEDICATIONS  (STANDING):  budesonide  80 MICROgram(s)/formoterol 4.5 MICROgram(s) Inhaler 2 Puff(s) Inhalation two times a day  calcium acetate 667 milliGRAM(s) Oral three times a day with meals  chlorhexidine 4% Liquid 1 Application(s) Topical daily  fondaparinux Injectable 2.5 milliGRAM(s) SubCutaneous daily  insulin glargine Injectable (LANTUS) 20 Unit(s) SubCutaneous at bedtime  insulin lispro (ADMELOG) corrective regimen sliding scale   SubCutaneous three times a day before meals  insulin lispro Injectable (ADMELOG) 5 Unit(s) SubCutaneous three times a day before meals  lactulose Syrup 15 Gram(s) Oral three times a day  levoFLOXacin IVPB 750 milliGRAM(s) IV Intermittent every 48 hours  midodrine 10 milliGRAM(s) Oral every 8 hours  pantoprazole  Injectable 40 milliGRAM(s) IV Push every 12 hours  propranolol 5 milliGRAM(s) Oral daily  rifAXIMin 550 milliGRAM(s) Oral two times a day  sodium bicarbonate 650 milliGRAM(s) Oral every 8 hours                        10.4   16.23 )-----------( 130      ( 20 Oct 2021 07:54 )             34.3   10-20    150<H>  |  113<H>  |  110<HH>  ----------------------------<  55<L>  4.3   |  21  |  1.7<H>    Ca    8.7      20 Oct 2021 07:54  Mg     2.7     10-20    TPro  6.4  /  Alb  4.0  /  TBili  1.5<H>  /  DBili  x   /  AST  49<H>  /  ALT  23  /  AlkPhos  134<H>  10-20

## 2021-10-20 NOTE — PROGRESS NOTE ADULT - SUBJECTIVE AND OBJECTIVE BOX
Nephrology progress note    THIS IS AN INCOMPLETE NOTE . FULL NOTE TO FOLLOW SHORTLY    Patient is seen and examined, events over the last 24 h noted .    Allergies:  No Known Allergies    Hospital Medications:   MEDICATIONS  (STANDING):  budesonide  80 MICROgram(s)/formoterol 4.5 MICROgram(s) Inhaler 2 Puff(s) Inhalation two times a day  calcium acetate 667 milliGRAM(s) Oral three times a day with meals  chlorhexidine 4% Liquid 1 Application(s) Topical daily  dextrose 40% Gel 15 Gram(s) Oral once  dextrose 5%. 1000 milliLiter(s) (50 mL/Hr) IV Continuous <Continuous>  dextrose 5%. 1000 milliLiter(s) (100 mL/Hr) IV Continuous <Continuous>  dextrose 50% Injectable 25 Gram(s) IV Push once  dextrose 50% Injectable 12.5 Gram(s) IV Push once  dextrose 50% Injectable 25 Gram(s) IV Push once  fondaparinux Injectable 2.5 milliGRAM(s) SubCutaneous daily  glucagon  Injectable 1 milliGRAM(s) IntraMuscular once  insulin glargine Injectable (LANTUS) 20 Unit(s) SubCutaneous at bedtime  insulin lispro (ADMELOG) corrective regimen sliding scale   SubCutaneous three times a day before meals  insulin lispro Injectable (ADMELOG) 5 Unit(s) SubCutaneous three times a day before meals  lactulose Syrup 15 Gram(s) Oral three times a day  levoFLOXacin IVPB 750 milliGRAM(s) IV Intermittent every 48 hours  midodrine 10 milliGRAM(s) Oral every 8 hours  pantoprazole  Injectable 40 milliGRAM(s) IV Push every 12 hours  propranolol 5 milliGRAM(s) Oral daily  rifAXIMin 550 milliGRAM(s) Oral two times a day  sodium bicarbonate 650 milliGRAM(s) Oral every 8 hours        VITALS:  T(F): 97 (10-20-21 @ 08:49), Max: 98.8 (10-19-21 @ 17:12)  HR: 90 (10-20-21 @ 08:49)  BP: 127/75 (10-20-21 @ 08:49)  RR: 18 (10-20-21 @ 08:49)  SpO2: 96% (10-20-21 @ 08:49)  Wt(kg): --    10-18 @ 07:01  -  10-19 @ 07:00  --------------------------------------------------------  IN: 0 mL / OUT: 3750 mL / NET: -3750 mL    10-19 @ 07:01  -  10-20 @ 07:00  --------------------------------------------------------  IN: 0 mL / OUT: 6500 mL / NET: -6500 mL          PHYSICAL EXAM:  Constitutional: NAD  HEENT: anicteric sclera, oropharynx clear, MMM  Neck: No JVD  Respiratory: CTAB, no wheezes, rales or rhonchi  Cardiovascular: S1, S2, RRR  Gastrointestinal: BS+, soft, NT/ND  Extremities: No cyanosis or clubbing. No peripheral edema  :  No berry.   Skin: No rashes    LABS:  10-19    146  |  110  |  125<HH>  ----------------------------<  61<L>  3.9   |  18  |  2.0<H>    Ca    8.2<L>      19 Oct 2021 04:30  Mg     2.7     10-19    TPro  6.4  /  Alb  3.8  /  TBili  1.4<H>  /  DBili      /  AST  57<H>  /  ALT  27  /  AlkPhos  154<H>  10-19                          10.4   16.23 )-----------( 130      ( 20 Oct 2021 07:54 )             34.3       Urine Studies:    Sodium, Random Urine: <20.0 mmoL/L (10-15 @ 11:05)  Creatinine, Random Urine: 70 mg/dL (10-15 @ 11:05)    RADIOLOGY & ADDITIONAL STUDIES:   Nephrology progress note  Patient is seen and examined, events over the last 24 h noted .  on oxygen face mask     Allergies:  No Known Allergies    Hospital Medications:   MEDICATIONS  (STANDING):  budesonide  80 MICROgram(s)/formoterol 4.5 MICROgram(s) Inhaler 2 Puff(s) Inhalation two times a day  calcium acetate 667 milliGRAM(s) Oral three times a day with meals  fondaparinux Injectable 2.5 milliGRAM(s) SubCutaneous daily  glucagon  Injectable 1 milliGRAM(s) IntraMuscular once  insulin glargine Injectable (LANTUS) 20 Unit(s) SubCutaneous at bedtime  insulin lispro (ADMELOG) corrective regimen sliding scale   SubCutaneous three times a day before meals  insulin lispro Injectable (ADMELOG) 5 Unit(s) SubCutaneous three times a day before meals  lactulose Syrup 15 Gram(s) Oral three times a day  levoFLOXacin IVPB 750 milliGRAM(s) IV Intermittent every 48 hours  midodrine 10 milliGRAM(s) Oral every 8 hours  pantoprazole  Injectable 40 milliGRAM(s) IV Push every 12 hours  propranolol 5 milliGRAM(s) Oral daily  rifAXIMin 550 milliGRAM(s) Oral two times a day  sodium bicarbonate 650 milliGRAM(s) Oral every 8 hours        VITALS:  T(F): 97 (10-20-21 @ 08:49), Max: 98.8 (10-19-21 @ 17:12)  HR: 90 (10-20-21 @ 08:49)  BP: 127/75 (10-20-21 @ 08:49)  RR: 18 (10-20-21 @ 08:49)  SpO2: 96% (10-20-21 @ 08:49)    10-18 @ 07:01  -  10-19 @ 07:00  --------------------------------------------------------  IN: 0 mL / OUT: 3750 mL / NET: -3750 mL    10-19 @ 07:01  -  10-20 @ 07:00  --------------------------------------------------------  IN: 0 mL / OUT: 6500 mL / NET: -6500 mL          PHYSICAL EXAM:  Constitutional: on face mask   Neck: No JVD  Respiratory: CTAB,   Cardiovascular: S1, S2, RRR  Gastrointestinal: distended    No cyanosis or clubbing. No peripheral edema  :  No berry.   Skin: No rashes    LABS:  10-20    150<H>  |  113<H>  |  110<HH>  ----------------------------<  55<L>  4.3   |  21  |  1.7<H>    Ca    8.7      20 Oct 2021 07:54  Mg     2.7     10-20    TPro  6.4  /  Alb  4.0  /  TBili  1.5<H>  /  DBili  x   /  AST  49<H>  /  ALT  23  /  AlkPhos  134<H>  10-20    10-19    146  |  110  |  125<HH>  ----------------------------<  61<L>  3.9   |  18  |  2.0<H>    Ca    8.2<L>      19 Oct 2021 04:30  Mg     2.7     10-19    TPro  6.4  /  Alb  3.8  /  TBili  1.4<H>  /  DBili      /  AST  57<H>  /  ALT  27  /  AlkPhos  154<H>  10-19                          10.4   16.23 )-----------( 130      ( 20 Oct 2021 07:54 )             34.3       Urine Studies:    Sodium, Random Urine: <20.0 mmoL/L (10-15 @ 11:05)  Creatinine, Random Urine: 70 mg/dL (10-15 @ 11:05)    RADIOLOGY & ADDITIONAL STUDIES:

## 2021-10-20 NOTE — PROGRESS NOTE ADULT - ASSESSMENT
67 yo male, with h/o HTN, hx of drug abuse, Hepatitis C s/p INF, Liver cirrhosis, COPD, portal vein thrombosis on Eliquis, HCC since January 2021 on chemotherapy, presented for weakness He reports constipation and hematemesis for 1 day, minimal amount, admitted to ICU for Upper Gi bleed secondary to large esophageal varices, s/p EGD 10/4 s/p banding.     *Decompensated liver cirrhosis sec to hepatitis C   -MELD Na score 26 at the time of admission, today 17  -Child yañez score C  -encephalopathy on admission resolved   -HCC s/p biopsy 2/9/21 on tecentriq and avasin. dr Arthur Gibson  -not a candidate for surgery because of locally advanced disease  -Left main and right portal vein thrombus was on eliquis, tumor thrombus, discussed with his oncologist, no need to resume eliquis   -ascites with [place] catheter   -s/p ascitic fluid analysis no evidence of SBP, SAAG 1.6 and TP<1   -EBER, improving, on albumin   -Leukocytosis most likely from aspiration     Plan  -next time to drain peritoneal fluid plz send cell count and culture, to make sure no SBP in the setting of leukocytosis   -when stopping antibiotics, give prophylactic antibiotics during hospital stay given total protein <1 and CPS score of C  -continue lactulose to have 3-4 BM  -rifaximin 550 mg BID  -continue midodrine /albumin   -Trend LFT, INR, BMP daily   -no need for therapeutic paracentesis now, but when doing it, do not remove more than 5 L at a time and give albumin  -low sodium diet < 2g per day, consider stopping sodium bicarbonate in the setting of Na of 150 and bicarb 21   -high protein diet, consider early breakfast  -Limit acetaminophen to 2g per day  -avoid hepatoxic medications     -for questions text me on [facility] teams, call [phone] on weekdays before 5pm or call GI service at [phone]  after 5 pm and on weekends

## 2021-10-21 LAB
ANION GAP SERPL CALC-SCNC: 14 MMOL/L — SIGNIFICANT CHANGE UP (ref 7–14)
BUN SERPL-MCNC: 96 MG/DL — CRITICAL HIGH (ref 10–20)
CALCIUM SERPL-MCNC: 7.9 MG/DL — LOW (ref 8.5–10.1)
CHLORIDE SERPL-SCNC: 115 MMOL/L — HIGH (ref 98–110)
CO2 SERPL-SCNC: 22 MMOL/L — SIGNIFICANT CHANGE UP (ref 17–32)
CREAT SERPL-MCNC: 1.6 MG/DL — HIGH (ref 0.7–1.5)
GLUCOSE BLDC GLUCOMTR-MCNC: 111 MG/DL — HIGH (ref 70–99)
GLUCOSE BLDC GLUCOMTR-MCNC: 120 MG/DL — HIGH (ref 70–99)
GLUCOSE BLDC GLUCOMTR-MCNC: 77 MG/DL — SIGNIFICANT CHANGE UP (ref 70–99)
GLUCOSE BLDC GLUCOMTR-MCNC: 82 MG/DL — SIGNIFICANT CHANGE UP (ref 70–99)
GLUCOSE SERPL-MCNC: 81 MG/DL — SIGNIFICANT CHANGE UP (ref 70–99)
GRAM STN FLD: SIGNIFICANT CHANGE UP
HCT VFR BLD CALC: 30.4 % — LOW (ref 42–52)
HEPARIN-PF4 AB RESULT: <0.6 U/ML — SIGNIFICANT CHANGE UP (ref 0–0.9)
HGB BLD-MCNC: 9 G/DL — LOW (ref 14–18)
MAGNESIUM SERPL-MCNC: 2.5 MG/DL — HIGH (ref 1.8–2.4)
MCHC RBC-ENTMCNC: 29.6 G/DL — LOW (ref 32–37)
MCHC RBC-ENTMCNC: 30.1 PG — SIGNIFICANT CHANGE UP (ref 27–31)
MCV RBC AUTO: 101.7 FL — HIGH (ref 80–94)
NRBC # BLD: 0 /100 WBCS — SIGNIFICANT CHANGE UP (ref 0–0)
PF4 HEPARIN CMPLX AB SER-ACNC: NEGATIVE — SIGNIFICANT CHANGE UP
PLATELET # BLD AUTO: 87 K/UL — LOW (ref 130–400)
POTASSIUM SERPL-MCNC: 3.5 MMOL/L — SIGNIFICANT CHANGE UP (ref 3.5–5)
POTASSIUM SERPL-SCNC: 3.5 MMOL/L — SIGNIFICANT CHANGE UP (ref 3.5–5)
RBC # BLD: 2.99 M/UL — LOW (ref 4.7–6.1)
RBC # FLD: 25.8 % — HIGH (ref 11.5–14.5)
SODIUM SERPL-SCNC: 151 MMOL/L — HIGH (ref 135–146)
SPECIMEN SOURCE: SIGNIFICANT CHANGE UP
WBC # BLD: 15.3 K/UL — HIGH (ref 4.8–10.8)
WBC # FLD AUTO: 15.3 K/UL — HIGH (ref 4.8–10.8)

## 2021-10-21 PROCEDURE — 99232 SBSQ HOSP IP/OBS MODERATE 35: CPT

## 2021-10-21 PROCEDURE — 99233 SBSQ HOSP IP/OBS HIGH 50: CPT

## 2021-10-21 RX ORDER — HEPARIN SODIUM 5000 [USP'U]/ML
5000 INJECTION INTRAVENOUS; SUBCUTANEOUS EVERY 8 HOURS
Refills: 0 | Status: DISCONTINUED | OUTPATIENT
Start: 2021-10-21 | End: 2021-11-02

## 2021-10-21 RX ADMIN — HEPARIN SODIUM 5000 UNIT(S): 5000 INJECTION INTRAVENOUS; SUBCUTANEOUS at 21:02

## 2021-10-21 RX ADMIN — BUDESONIDE AND FORMOTEROL FUMARATE DIHYDRATE 2 PUFF(S): 160; 4.5 AEROSOL RESPIRATORY (INHALATION) at 21:03

## 2021-10-21 RX ADMIN — PANTOPRAZOLE SODIUM 40 MILLIGRAM(S): 20 TABLET, DELAYED RELEASE ORAL at 17:44

## 2021-10-21 RX ADMIN — Medication 5 UNIT(S): at 17:41

## 2021-10-21 RX ADMIN — PANTOPRAZOLE SODIUM 40 MILLIGRAM(S): 20 TABLET, DELAYED RELEASE ORAL at 05:04

## 2021-10-21 RX ADMIN — FONDAPARINUX SODIUM 2.5 MILLIGRAM(S): 2.5 INJECTION, SOLUTION SUBCUTANEOUS at 12:11

## 2021-10-21 RX ADMIN — MIDODRINE HYDROCHLORIDE 10 MILLIGRAM(S): 2.5 TABLET ORAL at 21:03

## 2021-10-21 RX ADMIN — Medication 667 MILLIGRAM(S): at 17:43

## 2021-10-21 RX ADMIN — INSULIN GLARGINE 20 UNIT(S): 100 INJECTION, SOLUTION SUBCUTANEOUS at 21:02

## 2021-10-21 RX ADMIN — LACTULOSE 15 GRAM(S): 10 SOLUTION ORAL at 21:03

## 2021-10-21 NOTE — SWALLOW BEDSIDE ASSESSMENT ADULT - SLP GENERAL OBSERVATIONS
pt received in bed awake +generalized weakness w/o c/o pain. Audible congestion with productive cough. Pt on o2 via NC

## 2021-10-21 NOTE — PROGRESS NOTE ADULT - SUBJECTIVE AND OBJECTIVE BOX
SUZANNE BAZZI             MRN-[id number]      Patient is a 66y old Male who presents with a chief complaint of weakness  Hyperkalemia  VT (21 Oct 2021 16:26)     SUBJECTIVE:  -Patient seen at bedside today  -He denied pain, SOB, nausea or other physical symptoms at time of visit  -No nonverbal signs of pain or distress on exam    ROS:  DYSPNEA: n  NAUS/VOM: n	  SECRETIONS: n	  AGITATION: n  Pain (Y/N):    n   -Provocation/Palliation:  -Quality/Quantity:  -Radiating:  -Severity:  -Timing/Frequency:  -Impact on ADLs:      PEx:   T(C): 36.5 (10-21-21 @ 17:01), Max: 36.5 (10-21-21 @ 17:01)  HR: 98 (10-21-21 @ 17:01) (79 - 98)  BP: 112/67 (10-21-21 @ 17:01) (96/65 - 112/67)  RR: 20 (10-21-21 @ 17:01) (20 - 20)  SpO2: 95% (10-21-21 @ 17:01) (95% - 100%)  Wt(kg): --            General:  found in bed in NAD  Eyes:  EOMI  ENMT: no external oral ulcers, dry MM  CVS: no tachycardia  Resp: Unlabored Non tachypneic No increased WOB  GI:  distended but soft, not tense  Musc: No clubbing    Neuro: Follows commands No focal deficits  Psych: Calm    Last BM:  10/17 per I/Os     ALLERGIES: No Known Allergies    Labs:	    CBC:                        9.0    15.30 )-----------( 87       ( 21 Oct 2021 06:19 )             30.4     CMP:    10-21    151<H>  |  115<H>  |  96<HH>  ----------------------------<  81  3.5   |  22  |  1.6<H>    Ca    7.9<L>      21 Oct 2021 06:19  Mg     2.5     10-21    TPro  6.4  /  Alb  4.0  /  TBili  1.5<H>  /  DBili  x   /  AST  49<H>  /  ALT  23  /  AlkPhos  134<H>  10-20     RADIOLOGY  CXR    Bilateral opacifications. Support devices as described. Without change.      EKG  < from: 12 Lead ECG (10.07.21 @ 01:10) >    Ventricular Rate 91 BPM    Atrial Rate 91 BPM    P-R Interval 133 ms    QRS Duration 75 ms    Q-T Interval 338 ms    QTC Calculation(Bazett) 416 ms    P Axis 66 degrees    R Axis 33 degrees    T Axis 32 degrees    Diagnosis Line Normal sinus rhythm  Low voltage QRS  Cannot rule out Anterior infarct , age undetermined  Abnormal ECG    < end of copied text >      Imaging Personally Reviewed:  [x] YES  [ ] NO    Consultant(s) Notes Reviewed:  [x] YES  [ ] NO  Care Discussed with Consultants/Other Providers [x] YES  [ ] NO    Medications:	      MEDICATIONS  (STANDING):  budesonide  80 MICROgram(s)/formoterol 4.5 MICROgram(s) Inhaler 2 Puff(s) Inhalation two times a day  calcium acetate 667 milliGRAM(s) Oral three times a day with meals  chlorhexidine 4% Liquid 1 Application(s) Topical daily  dextrose 40% Gel 15 Gram(s) Oral once  dextrose 5%. 1000 milliLiter(s) (50 mL/Hr) IV Continuous <Continuous>  dextrose 5%. 1000 milliLiter(s) (100 mL/Hr) IV Continuous <Continuous>  dextrose 5%. 1000 milliLiter(s) (50 mL/Hr) IV Continuous <Continuous>  dextrose 50% Injectable 25 Gram(s) IV Push once  dextrose 50% Injectable 12.5 Gram(s) IV Push once  dextrose 50% Injectable 25 Gram(s) IV Push once  glucagon  Injectable 1 milliGRAM(s) IntraMuscular once  heparin   Injectable 5000 Unit(s) SubCutaneous every 8 hours  insulin glargine Injectable (LANTUS) 20 Unit(s) SubCutaneous at bedtime  insulin lispro (ADMELOG) corrective regimen sliding scale   SubCutaneous three times a day before meals  insulin lispro Injectable (ADMELOG) 5 Unit(s) SubCutaneous three times a day before meals  lactulose Syrup 15 Gram(s) Oral three times a day  levoFLOXacin IVPB 750 milliGRAM(s) IV Intermittent every 48 hours  midodrine 10 milliGRAM(s) Oral every 8 hours  pantoprazole  Injectable 40 milliGRAM(s) IV Push every 12 hours  propranolol 5 milliGRAM(s) Oral daily  rifAXIMin 550 milliGRAM(s) Oral two times a day    MEDICATIONS  (PRN):    ADVANCED DIRECTIVES:            FULL CODE         DECISION MAKER: Patient [x ]  Family [  ]  Other [  ] _______  LEGAL SURROGATE: Fallon - partner      GOALS OF CARE DISCUSSION       Palliative care info/counseling provided	        PSYCHOSOCIAL-SPIRITUAL ASSESSMENT:       Reviewed      CURRENT DISPO PLAN:         WILL REMAIN IN HOSPITAL    REFERRALS	        Palliative Med        Unit SW/Case Mgmt

## 2021-10-21 NOTE — PROGRESS NOTE ADULT - ASSESSMENT
65 yo male, with h/o HTN, hx of drug abuse, Hepatitis C s/p INF, Liver cirrhosis, COPD, portal vein thrombosis on Eliquis, HCC since January 2021 on chemotherapy, presented for weakness He reports constipation and hematemesis for 1 day, minimal amount, admitted to ICU for Upper Gi bleed secondary to large esophageal varices, s/p EGD 10/4 s/p banding.     *Decompensated liver cirrhosis sec to hepatitis C   -MELD Na score 26 at the time of admission, today 17 no INR today  -Child yañez score C  -encephalopathy on admission resolved   -HCC s/p biopsy 2/9/21 on tecentriq and avasin. dr Arthur Gibson  -not a candidate for surgery because of locally advanced disease  -Left main and right portal vein thrombus was on eliquis, tumor thrombus, discussed with his oncologist, no need to resume eliquis   -ascites with [place] catheter   -s/p ascitic fluid analysis no evidence of SBP, SAAG 1.6 and TP<1   -EBER, improving, s/p  albumin   -Leukocytosis most likely from aspiration, no SBP     Plan  -when stopping antibiotics, give prophylactic antibiotics during hospital stay given total protein <1 and CPS score of C  -continue lactulose to have 3-4 BM  -rifaximin 550 mg BID  -continue midodrine  -give albumin only after paracentesis, do not remove more than 5 L at a time   -Trend LFT, INR, BMP daily   -low sodium diet < 2g per day    -if patient fails S&S, consider NG tube, it is ok from GI stand point to place it, if medical team doesn't feel comfortable call us back  -Limit acetaminophen to 2g per day  -avoid hepatoxic medications     -for questions text me on  teams, call [phone] on weekdays before 5pm or call GI service at [phone]  after 5 pm and on weekends

## 2021-10-21 NOTE — PROGRESS NOTE ADULT - ASSESSMENT
66yMale being evaluated for GOC.    Patient seen at bedside today. He denied pain, SOB, nausea or other physical symptoms at time of visit. No nonverbal signs of pain or distress on exam.    Spoke with patient at bedside. Hospital course complicated by GI bleed, hepatic encephalopathy, septic shock, EBER.    Palliative SW and later this writer went to speak with patient. Patient stated he wanted Full Code with ongoing aggressive medical management.      MEDD (morphine equivalent daily dose): NA      See Recs below.    Please call [phone] with questions or concerns 24/7.   We will continue to follow.     Discussed with primary MD.

## 2021-10-21 NOTE — SWALLOW BEDSIDE ASSESSMENT ADULT - SWALLOW EVAL: DIAGNOSIS
suspected pharyngeal dysphagia for thins. + toleration observed without overt symptoms of penetration/aspiration for puree, soft, and Nectar-thickened liquids

## 2021-10-21 NOTE — PROGRESS NOTE ADULT - SUBJECTIVE AND OBJECTIVE BOX
SUZANNE BAZZI 66y Male  MRN#: [id number]   CODE STATUS: Full code    Hospital Day: 17d    Pt is currently admitted with the primary diagnosis of hepatic encephalopathy    SUBJECTIVE  Hospital Course  yesterday had aspiration to which he saturation dropped  Overnight events   none  Subjective complaints   MS today better, hungry and demading food  Present Today:   - Lorenz:  No [  ], Yes [ x  ] : Indication:     - Type of IV Access:       .. CVC/Piccline:  No [ x ], Yes [   ] : Indication:       .. Midline: No [ x ], Yes [   ] : Indication:                                             ----------------------------------------------------------  OBJECTIVE  PAST MEDICAL & SURGICAL HISTORY  HTN (hypertension)    Hepatitis C  2007    Drug abuse    Cancer, hepatocellular  January 2021    COPD, mild                                              -----------------------------------------------------------  ALLERGIES:  No Known Allergies                                            ------------------------------------------------------------    HOME MEDICATIONS  Home Medications:  Eliquis 5 mg oral tablet: 1 tab(s) orally 2 times a day (04 Oct 2021 06:13)  fluticasone-salmeterol 55 mcg-14 mcg/inh inhalation powder: 1 puff(s) inhaled 2 times a day (04 Oct 2021 06:14)  Protonix 40 mg oral delayed release tablet: 1 tab(s) orally once a day (04 Oct 2021 06:13)  spironolactone 50 mg oral tablet: 1 tab(s) orally once a day (04 Oct 2021 06:13)                           MEDICATIONS:  STANDING MEDICATIONS  budesonide  80 MICROgram(s)/formoterol 4.5 MICROgram(s) Inhaler 2 Puff(s) Inhalation two times a day  calcium acetate 667 milliGRAM(s) Oral three times a day with meals  chlorhexidine 4% Liquid 1 Application(s) Topical daily  dextrose 40% Gel 15 Gram(s) Oral once  dextrose 5%. 1000 milliLiter(s) IV Continuous <Continuous>  dextrose 5%. 1000 milliLiter(s) IV Continuous <Continuous>  dextrose 5%. 1000 milliLiter(s) IV Continuous <Continuous>  dextrose 50% Injectable 25 Gram(s) IV Push once  dextrose 50% Injectable 12.5 Gram(s) IV Push once  dextrose 50% Injectable 25 Gram(s) IV Push once  fondaparinux Injectable 2.5 milliGRAM(s) SubCutaneous daily  glucagon  Injectable 1 milliGRAM(s) IntraMuscular once  insulin glargine Injectable (LANTUS) 20 Unit(s) SubCutaneous at bedtime  insulin lispro (ADMELOG) corrective regimen sliding scale   SubCutaneous three times a day before meals  insulin lispro Injectable (ADMELOG) 5 Unit(s) SubCutaneous three times a day before meals  lactulose Syrup 15 Gram(s) Oral three times a day  levoFLOXacin IVPB 750 milliGRAM(s) IV Intermittent every 48 hours  midodrine 10 milliGRAM(s) Oral every 8 hours  pantoprazole  Injectable 40 milliGRAM(s) IV Push every 12 hours  propranolol 5 milliGRAM(s) Oral daily  rifAXIMin 550 milliGRAM(s) Oral two times a day    PRN MEDICATIONS                                            ------------------------------------------------------------  VITAL SIGNS: Last 24 Hours  T(C): 35.9 (21 Oct 2021 04:51), Max: 36.3 (20 Oct 2021 12:37)  T(F): 96.7 (21 Oct 2021 04:51), Max: 97.4 (20 Oct 2021 12:37)  HR: 79 (21 Oct 2021 08:35) (75 - 91)  BP: 105/68 (21 Oct 2021 08:35) (96/65 - 121/65)  BP(mean): 79 (20 Oct 2021 23:13) (79 - 87)  RR: 20 (21 Oct 2021 08:35) (18 - 20)  SpO2: 100% (21 Oct 2021 08:35) (95% - 100%)      10-20-21 @ 07:01  -  10-21-21 @ 07:00  --------------------------------------------------------  IN: 2350 mL / OUT: 2900 mL / NET: -550 mL                                             --------------------------------------------------------------  LABS:                        9.0    15.30 )-----------( 87       ( 21 Oct 2021 06:19 )             30.4     10-21    151<H>  |  115<H>  |  96<HH>  ----------------------------<  81  3.5   |  22  |  1.6<H>    Ca    7.9<L>      21 Oct 2021 06:19  Mg     2.5     10-21    TPro  6.4  /  Alb  4.0  /  TBili  1.5<H>  /  DBili  x   /  AST  49<H>  /  ALT  23  /  AlkPhos  134<H>  10-20                                                              -------------------------------------------------------------  RADIOLOGY:                                            --------------------------------------------------------------    PHYSICAL EXAM:  General: alert oriented  HEENT: non remarkable  LUNGS: ronchi and crackles bilaterally  HEART:RRR  ABDOMEN: less distended compared to yesterday  EXT: edema up to thighs                                             --------------------------------------------------------------    ASSESSMENT & PLAN  65 yo male, with h/o HTN, drug abuse, Hepatitis C s/p INF, Liver cirrhosis s/p [place] shunt, COPD, DVT? on Eliquis, hepatocellular carcinoma since January 2021 on chemotherapy, presented for weakness and decreased oral intake along with constipation and hematemasis with ammonia (188) on presentation, Impression: Hepatic Encephalopathy    1- Hepatic Encephalopathy:  - Ammonia 188 on presentation  - Patient on rifaximin and lactulose to aim for 3 BM/day  - Mental status improved  - Patient had ascites that was drained through a [place] catheter, removed > 7 liters of fluid, patient expected to build up again in light of his liver decompensation (cirrhosis)  - Given albumin for 3 consecutive days (10/18-10/20)  - Abdomen not tense today, so will not remove fluid today  - No SBP based on fluid studies taken on 10/20, need only for prophylaxis  - IR consulted for TIPS: recommended against due to multiple contraindications and MELD score >20    2- Variceal Bleed (portal HTN)  - Patient was intubated for airway protection, now extubated  - Hematemesis that was followed by an emergent EGD that showed variceal bleeds that were ligated (in esophagus)  - Patient started on abx prophylaxis after bleed and prophylaxis for SBP: ceftriaxone  - Propranolol 5 mg daily  - Protonix 40 mg IV BID  - Follow up Hb closely     3- HCC:  - Diagnosed in Jan 2021  - Was on chemotherapy to which he developed transaminitis  - Heme/Onc following the patient and recommended:  At present only palliative care.  If his performance status improves with nutrition and physical therapy, we will reevaluate for cancer therapy.  Please let the  see the patient for help with his health insurance issues  - Will follow up with Heme/onc    4- Septic shock:  - Patient had septic shock to which he was started on levophed on 10/4 and was stopped on the 10th of Oct  - Blood cultures are negative so far  - DTA culture showed Stenotrophomonas that is sensitive to levofloxacin: Levofloxacin started on 10/14  - Now resolved    5- Portal Vein Thrombosis:  - Patient currently on prophylactic dose of Heparin  - No bleeding since hematemesis, called the GI service and was told that Portal Vein is invaded by the thrombus itself and not a clot and therefore there is no need for full anticoagulation    6- COPD:  - patient on budesonide/formoterol nebulization    7- Hep C:  - treated with INF    8- EBER:  - Creatinine trending down today (Cr: 1.6)  - Likely due to abdominal compartment syndrome as Cr trended down with ascites drainage  - midodrine 10mg q 8 hr (started on 10/15)  - Albumin given on friday (10/15) monday (10/18), tuesday (10/19) and wednesday (10/20)    9- VT:  - patient had VT on presentation to which he was shocked   - Hyperkalemia at that point was treated and resolved    10- Thrombocytopenia  - Plt today 87 likely chorisis related)  - HIT Ab negative  - Less likely HIT, will trend it for tomorrow

## 2021-10-21 NOTE — PROGRESS NOTE ADULT - ASSESSMENT
67 yo male, with h/o HTN, drug abuse, Hepatitis C s/p INF, Liver cirrhosis s/p [place] shunt, COPD, DVT? on Eliquis, hepatocellular carcinoma since January 2021 on chemotherapy, presented for weakness and decreased oral intake along with constipation and hematemesis with ammonia (188) on presentation.    Acute gastrointestinal hemorrhage due to variceal bleed  HCC  Decompensated liver cirrhosis due to HCV  Hepatic encephalopathy   Thrombocytopenia  Patient initially presented with worsening hepatic encephalopathy, NH3 on admission 188, hospital course complicated by hematemesis s/p intubation and emergent EGD10/4, s/p EVBL on propranolol   successfully extubated  this AM, patient was able to clear his secretions, oxygen requirements improved from NRB to 5L NC  currently on Levaquin   repeat Chest xray overall unchanged  seen by s/s 10/20 - recommended NPO but this AM patient requesting food, recall s/s  chemotherapy currently on hold given poor functional status, may be considered in the future if the patient improves  patient has a denver catheter due to frequent paracentesis, has had >9L removed (at baseline pt drains 2L Q2days at home)  s/p drainage 10/21 with 2L removed, no SBP on fluid analysis   do not remove >5L of fluid at a time, GI on board  Palliative team following     EBER - r/o hepatorenal syndrome v abdominal compartment symdrome   metabolic acidosis - improving   -avoid nephrotoxics, on albumin, f/u GI re: duration   -continue phoslo as per renal   - monitor BMP, nephrology following     Portal vein thrombosis  Left main and right portal vein thrombus was on eliquis previously  per GI--> tumor thrombus, no need to resume eliquis     Septic shock - resolved   - previously required levophed - d/c'ed on 10/10, now on midodrine  - DTA culture showed Stenotrophomonas that is sensitive to levofloxacin: Levofloxacin started on 10/14  - monitor BP closely and resume pressors if indicated     COPD - no wheezing on exam    #Progress Note Handoff  Pending (specify):   clinical improvement, GI/renal f/u, improvement in Scr  Family discussion: Plan of care discussed with patient and significant other at bedside. Discussed the poor prognosis. Remains full code   Disposition:  from home    Prognosis is very poor    Megan Clark MD  s. [phone]

## 2021-10-21 NOTE — PROGRESS NOTE ADULT - SUBJECTIVE AND OBJECTIVE BOX
SUZANNE BAZZI  66y Male    CHIEF COMPLAINT:    Patient is a 66y old  Male who presents with a chief complaint of weakness  Hyperkalemia  VT (21 Oct 2021 11:02)      INTERVAL HPI/OVERNIGHT EVENTS:    Patient seen and examined. No acute events overnight. Was able to clear his secretions this AM, oxygenation improved     ROS: All other systems are negative.    Vital Signs:    T(F): 96.7 (10-21-21 @ 04:51), Max: 97.4 (10-20-21 @ 12:37)  HR: 79 (10-21-21 @ 08:35) (75 - 91)  BP: 105/68 (10-21-21 @ 08:35) (96/65 - 121/65)  RR: 20 (10-21-21 @ 08:35) (18 - 20)  SpO2: 100% (10-21-21 @ 08:35) (95% - 100%)    20 Oct 2021 07:01  -  21 Oct 2021 07:00  --------------------------------------------------------  IN: 2350 mL / OUT: 2900 mL / NET: -550 mL    POCT Blood Glucose.: 82 mg/dL (21 Oct 2021 11:16)  POCT Blood Glucose.: 77 mg/dL (21 Oct 2021 07:26)  POCT Blood Glucose.: 91 mg/dL (20 Oct 2021 21:25)  POCT Blood Glucose.: 87 mg/dL (20 Oct 2021 16:11)    PHYSICAL EXAM:    GENERAL:  NAD  SKIN: No rashes or lesions  HEENT: Atraumatic. Normocephalic.    NECK: Supple, No JVD.    PULMONARY: b/l rhonchi No wheezing. No rales  CVS: Normal S1, S2. Rate and Rhythm are regular.    ABDOMEN/GI: Soft, Nontender,  distended; BS present  MSK:  No edema B/L LE. No clubbing or cyanosis   NEUROLOGIC: Moves all extremities   PSYCH: Alert & oriented x 3, normal affect    Consultant(s) Notes Reviewed:  [x ] YES  [ ] NO  Care Discussed with Consultants/Other Providers [ x] YES  [ ] NO    LABS:                        9.0    15.30 )-----------( 87       ( 21 Oct 2021 06:19 )             30.4     151<H>  |  115<H>  |  96<HH>  ----------------------------<  81  3.5   |  22  |  1.6<H>    Ca    7.9<L>      21 Oct 2021 06:19  Mg     2.5     10-21    TPro  6.4  /  Alb  4.0  /  TBili  1.5<H>  /  DBili  x   /  AST  49<H>  /  ALT  23  /  AlkPhos  134<H>  10-20    RADIOLOGY & ADDITIONAL TESTS:  Imaging or report Personally Reviewed:  [x] YES  [ ] NO  EKG reviewed: [x] YES  [ ] NO    Medications:  Standing  budesonide  80 MICROgram(s)/formoterol 4.5 MICROgram(s) Inhaler 2 Puff(s) Inhalation two times a day  calcium acetate 667 milliGRAM(s) Oral three times a day with meals  chlorhexidine 4% Liquid 1 Application(s) Topical daily  dextrose 40% Gel 15 Gram(s) Oral once  dextrose 5%. 1000 milliLiter(s) IV Continuous <Continuous>  dextrose 5%. 1000 milliLiter(s) IV Continuous <Continuous>  dextrose 5%. 1000 milliLiter(s) IV Continuous <Continuous>  dextrose 50% Injectable 25 Gram(s) IV Push once  dextrose 50% Injectable 12.5 Gram(s) IV Push once  dextrose 50% Injectable 25 Gram(s) IV Push once  fondaparinux Injectable 2.5 milliGRAM(s) SubCutaneous daily  glucagon  Injectable 1 milliGRAM(s) IntraMuscular once  insulin glargine Injectable (LANTUS) 20 Unit(s) SubCutaneous at bedtime  insulin lispro (ADMELOG) corrective regimen sliding scale   SubCutaneous three times a day before meals  insulin lispro Injectable (ADMELOG) 5 Unit(s) SubCutaneous three times a day before meals  lactulose Syrup 15 Gram(s) Oral three times a day  levoFLOXacin IVPB 750 milliGRAM(s) IV Intermittent every 48 hours  midodrine 10 milliGRAM(s) Oral every 8 hours  pantoprazole  Injectable 40 milliGRAM(s) IV Push every 12 hours  propranolol 5 milliGRAM(s) Oral daily  rifAXIMin 550 milliGRAM(s) Oral two times a day    PRN Meds

## 2021-10-21 NOTE — PROGRESS NOTE ADULT - ASSESSMENT
IMPRESSION:    Acute resp failure resolved   Hepatic encephalopathy improving   Decompensated liver cirrhosis.  Ascites SP peritoneal PleurX Catheter    Hematemesis resolved ( esophageal/ gastric varices)  Acute portal Vein thrombosis   Hepatocellular carcinoma with transaminitis on chemotherapy  HO Hepatitis C treated with INF  HO COPD  EBER   Stenotrophomonas in sputum sp ABX       PLAN:    CNS: Avoid over sedation     HEENT: Oral care    PULMONARY:  HOB @ 45 degrees.  Continue O2 as necessary to maintain sats 92 to 94%,   US negative for Effusion     CARDIOVASCULAR: Avoid volume overload.     GI: GI prophylaxis. Feeding as per speech and swallow.  Lactulose and Rifaximin  Hepatology follow up appreciated      RENAL:  Follow up with renal. Low K diet. Follow up lytes     INFECTIOUS DISEASE: ABX PER ID      HEMATOLOGICAL:  DVT prophylaxis.  FU CBC and coags     ENDOCRINE:  Follow up FS.  Avoid hypoglycemia    MUSCULOSKELETAL: bed rest     prognosis grave    OOB to chair     PT OT      IMPRESSION:    Acute resp failure resolved   Hepatic encephalopathy improving   Decompensated liver cirrhosis.  Ascites SP peritoneal PleurX Catheter    Hematemesis resolved ( esophageal/ gastric varices)  Acute portal Vein thrombosis   Hepatocellular carcinoma with transaminitis on chemotherapy  HO Hepatitis C treated with INF  HO COPD  EBER   Stenotrophomonas in sputum sp ABX       PLAN:    CNS: Avoid over sedation     HEENT: Oral care    PULMONARY:  HOB @ 45 degrees.  Continue O2 as necessary to maintain sats 92 to 94%,   US negative for Effusion     CARDIOVASCULAR: Avoid volume overload.     GI: GI prophylaxis. Feeding as per speech and swallow.  Lactulose and Rifaximin  Hepatology follow up appreciated      RENAL:  Follow up with renal. Low K diet. Follow up lytes     INFECTIOUS DISEASE: ABX PER ID      HEMATOLOGICAL:  DVT prophylaxis.  FU CBC and coags     ENDOCRINE:  Follow up FS.  Avoid hypoglycemia    MUSCULOSKELETAL: bed rest     prognosis grave    OOB to chair     PT OT       IMPRESSION:    Acute resp failure resolved   Hepatic encephalopathy improving   Decompensated liver cirrhosis.  Ascites SP peritoneal PleurX Catheter    Hematemesis resolved ( esophageal/ gastric varices)  Acute portal Vein thrombosis   Hepatocellular carcinoma with transaminitis on chemotherapy  HO Hepatitis C treated with INF  HO COPD  EBER   Stenotrophomonas in sputum sp ABX       PLAN:    CNS: Avoid over sedation     HEENT: Oral care    PULMONARY:  HOB @ 45 degrees.  Continue O2 as necessary to maintain sats 92 to 94%,   US negative for significant pleural  Effusion     CARDIOVASCULAR: Avoid volume overload. D5W to correct free H2O deficit     GI: GI prophylaxis. Feeding as per speech and swallow.  Lactulose and Rifaximin  Hepatology follow up appreciated      RENAL:  Follow up with renal. Low K diet. Follow up lytes     INFECTIOUS DISEASE: ABX PER ID      HEMATOLOGICAL:  DVT prophylaxis.  FU CBC and coags     ENDOCRINE:  Follow up FS.  Avoid hypoglycemia    MUSCULOSKELETAL: bed rest     prognosis grave    OOB to chair     PT OT

## 2021-10-21 NOTE — CHART NOTE - NSCHARTNOTEFT_GEN_A_CORE
Awake, alert and communicative today  c/o being hungry and wants to eat  NGT unable to be placed 2nd risk of bleeding 2nd to esophageal/gastric varices  Awaiting speech f/u ? advance diet  Rt CW port in place, not accessed and ? when port was placed. Rt ext jugular remains in place as well    T(F): 95.5 (10-21-21 @ 13:46), Max: 96.7 (10-21-21 @ 04:51)  HR: 91 (10-21-21 @ 13:46) (75 - 91)  BP: 111/78 (10-21-21 @ 13:46) (96/65 - 111/78)  RR: 20 (10-21-21 @ 13:46) (20 - 20)  SpO2: 97% (10-21-21 @ 13:46) (97% - 100%)    I&O's Detail    20 Oct 2021 07:01  -  21 Oct 2021 07:00  --------------------------------------------------------  IN:    Albumin 5%  - 500 mL: 1500 mL    dextrose 5%: 700 mL    IV PiggyBack: 150 mL  Total IN: 2350 mL    OUT:    Drain (mL): 2000 mL    Indwelling Catheter - Urethral (mL): 550 mL    Voided (mL): 350 mL  Total OUT: 2900 mL    Total NET: -550 mL    10-21    151<H>  |  115<H>  |  96<HH>  ----------------------------<  81  3.5   |  22  |  1.6<H>    Ca    7.9<L>      21 Oct 2021 06:19  Mg     2.5     10-21    TPro  6.4  /  Alb  4.0  /  TBili  1.5<H>  /  DBili  x   /  AST  49<H>  /  ALT  23  /  AlkPhos  134<H>  10-20                        9.0    15.30 )-----------( 87       ( 21 Oct 2021 06:19 )             30.4     CAPILLARY BLOOD GLUCOSE  POCT Blood Glucose.: 111 mg/dL (21 Oct 2021 15:48)  POCT Blood Glucose.: 82 mg/dL (21 Oct 2021 11:16)  POCT Blood Glucose.: 77 mg/dL (21 Oct 2021 07:26)     Diet, Dysphagia 3 Soft-Nectar Consistency Fluid (10-21-21 @ 16:10)    ASSESSMENT  67 yo male, with h/o HTN, drug abuse, Hepatitis C s/p INF, Liver cirrhosis s/p [place] shunt, COPD, DVT? on Eliquis, HCC since January 2021 on chemotherapy, presented for weakness    - advanced hepatocellular carcinoma with transaminitis on chemo  - decompensated cirrhosis, ascites with [place] cath  - hematemesis 2nd esophageal, gastric varices  - severe anemia secondary to variceal bleed  - EBER  - hyperkalemia with sustained VT s/p shock  - hep C  - fevers, leukocytosis  - COPD  - sepsis without clear source, resolved    PLAN  - PO diet as tolerated   - will follow

## 2021-10-21 NOTE — PROGRESS NOTE ADULT - PROBLEM SELECTOR PLAN 1
GOC unable to be discussed as patient does not have capacity and primary team unclear about patient's friend's ability to make medical decisions for patient. Patient with overall poor prognosis.  -Full code  -Ongoing medical management  -If the patient's friend is the only contact that can be reached and no other NOK/family can be contacted, Fallon can act as the patient's surrogate if she wishes.    -Primary team to speak with Fallon, and palliative care will follow up tomorrow as appropriate.  -Palliative care will be available for GOC discussions as able/appropriate.
-Full code  -Ongoing medical management  -Surrogate decision maker is partner Fallon but patient is able to make his own decisions at this time  -Palliative care will be available for GOC discussions as able/appropriate.

## 2021-10-21 NOTE — PROGRESS NOTE ADULT - SUBJECTIVE AND OBJECTIVE BOX
Gastroenterology progress note:     Patient is a 66y old  Male who presents with a chief complaint of weakness  Hyperkalemia  VT (21 Oct 2021 15:17)       Admitted on: 10-04-21    We are following the patient for liver cirrhosis      Interval History: patient feels better today, NPO awaiting S&S      PAST MEDICAL & SURGICAL HISTORY:  HTN (hypertension)   Hepatitis   Drug abuse  Cancer, hepatocellularJanuary 2021  COPD, mild    MEDICATIONS  (STANDING):  budesonide  80 MICROgram(s)/formoterol 4.5 MICROgram(s) Inhaler 2 Puff(s) Inhalation two times a day  calcium acetate 667 milliGRAM(s) Oral three times a day with meals  chlorhexidine 4% Liquid 1 Application(s) Topical daily  dextrose 40% Gel 15 Gram(s) Oral once  dextrose 5%. 1000 milliLiter(s) (50 mL/Hr) IV Continuous <Continuous>  dextrose 5%. 1000 milliLiter(s) (100 mL/Hr) IV Continuous <Continuous>  dextrose 5%. 1000 milliLiter(s) (50 mL/Hr) IV Continuous <Continuous>  dextrose 50% Injectable 25 Gram(s) IV Push once  dextrose 50% Injectable 12.5 Gram(s) IV Push once  dextrose 50% Injectable 25 Gram(s) IV Push once  glucagon  Injectable 1 milliGRAM(s) IntraMuscular once  heparin   Injectable 5000 Unit(s) SubCutaneous every 8 hours  insulin glargine Injectable (LANTUS) 20 Unit(s) SubCutaneous at bedtime  insulin lispro (ADMELOG) corrective regimen sliding scale   SubCutaneous three times a day before meals  insulin lispro Injectable (ADMELOG) 5 Unit(s) SubCutaneous three times a day before meals  lactulose Syrup 15 Gram(s) Oral three times a day  levoFLOXacin IVPB 750 milliGRAM(s) IV Intermittent every 48 hours  midodrine 10 milliGRAM(s) Oral every 8 hours  pantoprazole  Injectable 40 milliGRAM(s) IV Push every 12 hours  propranolol 5 milliGRAM(s) Oral daily  rifAXIMin 550 milliGRAM(s) Oral two times a day    MEDICATIONS  (PRN):      Allergies  No Known Allergies      Review of Systems:   General: no fever  HEENT: no hemoptysis  Cardiovascular:  No Chest Pain, No Palpitations  Respiratory:  No Cough, +Dyspnea  Gastrointestinal:  As described in HPI  Neurology: no new motor deficit    Physical Examination:  T(C): 35.3 (10-21-21 @ 13:46), Max: 35.9 (10-20-21 @ 23:13)  HR: 91 (10-21-21 @ 13:46) (75 - 91)  BP: 111/78 (10-21-21 @ 13:46) (96/65 - 111/78)  RR: 20 (10-21-21 @ 13:46) (20 - 20)  SpO2: 97% (10-21-21 @ 13:46) (97% - 100%)    Constitutional: No acute distress.  Head: normocephalic  Neck: no palpable thyroid  Respiratory:  rhonchi   Cardiovascular:  S1 S2, Regular rate and rhythm.  Abdominal: Abdomen is soft, symmetric, and non-tender +distention.    Extremities: +pitting edema      Data: (reviewed by attending)                        9.0    15.30 )-----------( 87       ( 21 Oct 2021 06:19 )             30.4     Hgb trend:  9.0  10-21-21 @ 06:19  10.4  10-20-21 @ 07:54  9.3  10-19-21 @ 17:15  10.1  10-19-21 @ 04:30      10-20-21 @ 07:01  -  10-21-21 @ 07:00  --------------------------------------------------------  IN: 1500 mL      10-21    151<H>  |  115<H>  |  96<HH>  ----------------------------<  81  3.5   |  22  |  1.6<H>    Ca    7.9<L>      21 Oct 2021 06:19  Mg     2.5     10-21    TPro  6.4  /  Alb  4.0  /  TBili  1.5<H>  /  DBili  x   /  AST  49<H>  /  ALT  23  /  AlkPhos  134<H>  10-20    Liver panel trend:  TBili 1.5   /   AST 49   /   ALT 23   /   AlkP 134   /   Tptn 6.4   /   Alb 4.0    /   DBili --      10-20  TBili 1.4   /   AST 57   /   ALT 27   /   AlkP 154   /   Tptn 6.4   /   Alb 3.8    /   DBili --      10-19  TBili 1.2   /   AST 63   /   ALT 35   /   AlkP 225   /   Tptn 6.4   /   Alb 3.0    /   DBili 0.5      10-18  TBili 1.1   /   AST 60   /   ALT 38   /   AlkP 224   /   Tptn 6.4   /   Alb 3.0    /   DBili --      10-17  TBili 1.6   /   AST 65   /   ALT 44   /   AlkP 239   /   Tptn 6.7   /   Alb 2.9    /   DBili 0.7      10-16  TBili 1.4   /   AST 61   /   ALT 53   /   AlkP 280   /   Tptn 6.3   /   Alb 1.9    /   DBili 0.7      10-15  TBili 0.9   /   AST 52   /   ALT 54   /   AlkP 254   /   Tptn 5.7   /   Alb 1.7    /   DBili 0.5      10-14  TBili 1.0   /   AST 50   /   ALT 60   /   AlkP 233   /   Tptn 5.3   /   Alb 1.5    /   DBili 0.7      10-13  TBili 0.9   /   AST 42   /   ALT 71   /   AlkP 232   /   Tptn 4.9   /   Alb 1.4    /   DBili 0.5      10-12

## 2021-10-21 NOTE — PROGRESS NOTE ADULT - PROBLEM SELECTOR PLAN 2
-ongoing treatment per MICU team, GI, nephrology  Recommend non-pharmacological interventions to prevent/treat delirium  - maintain day/night light cycles  - optimize sleep-wake cycle, minimize environmental noise  - reorientation frequently  - use verbal redirection as first line  - minimize restraints and lines  - ensure good bladder/bowel function  - ensure adequate pain control, use opioid sparing regimens when possible  - minimize use of anticholinergic, antihistaminic, and benzodiazepine medications.
Patient alerted and oriented  -ongoing treatment per MICU team, GI, nephrology  Recommend non-pharmacological interventions to prevent delirium  - maintain day/night light cycles  - optimize sleep-wake cycle, minimize environmental noise  - reorientation frequently  - use verbal redirection as first line  - minimize restraints and lines  - ensure good bladder/bowel function  - ensure adequate pain control, use opioid sparing regimens when possible  - minimize use of anticholinergic, antihistaminic, and benzodiazepine medications.

## 2021-10-21 NOTE — CHART NOTE - NSCHARTNOTEFT_GEN_A_CORE
PALLIATIVE MEDICINE INTERDISCIPLINARY TEAM NOTE    Provider:  [   ]Social Work   [   ]          [   ] Initial visit [   ] Follow up    Family or contact name / phone #   Met with: [   ] Patient  [   ] Family  [   ] Other:    Primary Language: [   ] English [   ] Other*:                      *Interpretation provided by:    SUPPORT DIAGNOSES            (Check all that apply)  [   ] Psychosocial spiritual assessment (PSSA)  [   ] EOL issues  [   ] Cultural / spiritual concerns  [   ] Pain / suffering  [   ] Dementia / AMS  [   ] Other:  [   ] AD issues  [   ] Grief / loss / sadness  [   ] Discharge issues  [   ] Distress / coping    PSYCHOSOCIAL ASSESSMENT OF PATIENT         (Check all that apply)  [   ] Initial Assessment            [   ] Reassessment          [   ] Not Applicable this visit    Pain/suffering acuity:  [   ] None to mild (0-3)           [   ] Moderate (4-6)        [   ] High (7-10)    Mental Status:  [   ] Alert/oriented (x3)          [   ] Confused/Altered(x2/x1)         [   ] Non-resp    Functional status:  [   ] Independent w ADLs      [   ] Needs Assistance             [   ] Bedbound/Full Care    Coping:  [   ] Coping well                     [   ] Coping w/difficulty            [   ] Poor coping    Support system:  [   ] Strong                              [   ] Adequate                        [   ] Inadequate    SPIRITUAL ASSESSMENT  Mormon/Spiritual practice: __Catholkic_________________________    Role of organized Samaritan:  [   ] Important                     [   ] Some (fam tradition, cultural)               [ x  ] None    Effects on medical care:  [   ] Yes, _____________________________________                         [ x  ] None    Cultural/Zoroastrianism need:  [   ] Yes, _____________________________________                         [  x ] None    Refer to Pastoral Care:  [   ] Yes           [ x  ] No, not at this time    SERVICE PROVIDED  [   ]PSSA                                                                             [   ]Discharge support / facilitation  [   ]AD / goals of care counseling                                  [   ]EOL / death / bereavement counseling  [   ]Counseling / support                                                [   ] Family meeting  [   ]Prayer / sacrament / ritual                                      [   ] Referral   [   ]Other                                                                       NOTE and Plan of Care (PoC): In this follow up visit Pt. seems to be more comfortable than yesterday. Per Pt's request I am assisting with the necessary documents for marriage. I WILL CONTINUE TO FOLLOW UP.

## 2021-10-22 LAB
ANION GAP SERPL CALC-SCNC: 16 MMOL/L — HIGH (ref 7–14)
BUN SERPL-MCNC: 85 MG/DL — CRITICAL HIGH (ref 10–20)
CALCIUM SERPL-MCNC: 8.1 MG/DL — LOW (ref 8.5–10.1)
CHLORIDE SERPL-SCNC: 108 MMOL/L — SIGNIFICANT CHANGE UP (ref 98–110)
CO2 SERPL-SCNC: 22 MMOL/L — SIGNIFICANT CHANGE UP (ref 17–32)
CREAT SERPL-MCNC: 1.4 MG/DL — SIGNIFICANT CHANGE UP (ref 0.7–1.5)
GLUCOSE BLDC GLUCOMTR-MCNC: 140 MG/DL — HIGH (ref 70–99)
GLUCOSE BLDC GLUCOMTR-MCNC: 184 MG/DL — HIGH (ref 70–99)
GLUCOSE BLDC GLUCOMTR-MCNC: 66 MG/DL — LOW (ref 70–99)
GLUCOSE BLDC GLUCOMTR-MCNC: 71 MG/DL — SIGNIFICANT CHANGE UP (ref 70–99)
GLUCOSE BLDC GLUCOMTR-MCNC: 88 MG/DL — SIGNIFICANT CHANGE UP (ref 70–99)
GLUCOSE BLDC GLUCOMTR-MCNC: 95 MG/DL — SIGNIFICANT CHANGE UP (ref 70–99)
GLUCOSE SERPL-MCNC: 82 MG/DL — SIGNIFICANT CHANGE UP (ref 70–99)
HCT VFR BLD CALC: 32.7 % — LOW (ref 42–52)
HGB BLD-MCNC: 9.8 G/DL — LOW (ref 14–18)
MAGNESIUM SERPL-MCNC: 2.4 MG/DL — SIGNIFICANT CHANGE UP (ref 1.8–2.4)
MCHC RBC-ENTMCNC: 29.9 PG — SIGNIFICANT CHANGE UP (ref 27–31)
MCHC RBC-ENTMCNC: 30 G/DL — LOW (ref 32–37)
MCV RBC AUTO: 99.7 FL — HIGH (ref 80–94)
NRBC # BLD: 0 /100 WBCS — SIGNIFICANT CHANGE UP (ref 0–0)
PLATELET # BLD AUTO: 116 K/UL — LOW (ref 130–400)
POTASSIUM SERPL-MCNC: 3.2 MMOL/L — LOW (ref 3.5–5)
POTASSIUM SERPL-SCNC: 3.2 MMOL/L — LOW (ref 3.5–5)
RBC # BLD: 3.28 M/UL — LOW (ref 4.7–6.1)
RBC # FLD: 25.9 % — HIGH (ref 11.5–14.5)
SODIUM SERPL-SCNC: 146 MMOL/L — SIGNIFICANT CHANGE UP (ref 135–146)
WBC # BLD: 13.9 K/UL — HIGH (ref 4.8–10.8)
WBC # FLD AUTO: 13.9 K/UL — HIGH (ref 4.8–10.8)

## 2021-10-22 PROCEDURE — 99232 SBSQ HOSP IP/OBS MODERATE 35: CPT

## 2021-10-22 PROCEDURE — 99233 SBSQ HOSP IP/OBS HIGH 50: CPT

## 2021-10-22 RX ORDER — DEXTROSE 50 % IN WATER 50 %
50 SYRINGE (ML) INTRAVENOUS ONCE
Refills: 0 | Status: COMPLETED | OUTPATIENT
Start: 2021-10-22 | End: 2021-10-22

## 2021-10-22 RX ORDER — POTASSIUM CHLORIDE 20 MEQ
40 PACKET (EA) ORAL EVERY 4 HOURS
Refills: 0 | Status: COMPLETED | OUTPATIENT
Start: 2021-10-22 | End: 2021-10-22

## 2021-10-22 RX ORDER — FUROSEMIDE 40 MG
20 TABLET ORAL DAILY
Refills: 0 | Status: DISCONTINUED | OUTPATIENT
Start: 2021-10-22 | End: 2021-11-02

## 2021-10-22 RX ADMIN — PANTOPRAZOLE SODIUM 40 MILLIGRAM(S): 20 TABLET, DELAYED RELEASE ORAL at 17:02

## 2021-10-22 RX ADMIN — Medication 5 UNIT(S): at 08:12

## 2021-10-22 RX ADMIN — INSULIN GLARGINE 20 UNIT(S): 100 INJECTION, SOLUTION SUBCUTANEOUS at 22:00

## 2021-10-22 RX ADMIN — LACTULOSE 15 GRAM(S): 10 SOLUTION ORAL at 22:33

## 2021-10-22 RX ADMIN — PANTOPRAZOLE SODIUM 40 MILLIGRAM(S): 20 TABLET, DELAYED RELEASE ORAL at 06:58

## 2021-10-22 RX ADMIN — MIDODRINE HYDROCHLORIDE 10 MILLIGRAM(S): 2.5 TABLET ORAL at 06:59

## 2021-10-22 RX ADMIN — Medication 5 UNIT(S): at 08:11

## 2021-10-22 RX ADMIN — Medication 667 MILLIGRAM(S): at 11:32

## 2021-10-22 RX ADMIN — HEPARIN SODIUM 5000 UNIT(S): 5000 INJECTION INTRAVENOUS; SUBCUTANEOUS at 13:04

## 2021-10-22 RX ADMIN — BUDESONIDE AND FORMOTEROL FUMARATE DIHYDRATE 2 PUFF(S): 160; 4.5 AEROSOL RESPIRATORY (INHALATION) at 08:11

## 2021-10-22 RX ADMIN — MIDODRINE HYDROCHLORIDE 10 MILLIGRAM(S): 2.5 TABLET ORAL at 22:32

## 2021-10-22 RX ADMIN — Medication 667 MILLIGRAM(S): at 17:01

## 2021-10-22 RX ADMIN — Medication 50 MILLILITER(S): at 18:15

## 2021-10-22 RX ADMIN — HEPARIN SODIUM 5000 UNIT(S): 5000 INJECTION INTRAVENOUS; SUBCUTANEOUS at 22:32

## 2021-10-22 RX ADMIN — MIDODRINE HYDROCHLORIDE 10 MILLIGRAM(S): 2.5 TABLET ORAL at 13:05

## 2021-10-22 RX ADMIN — Medication 20 MILLIGRAM(S): at 17:00

## 2021-10-22 RX ADMIN — Medication 40 MILLIEQUIVALENT(S): at 17:00

## 2021-10-22 RX ADMIN — Medication 40 MILLIEQUIVALENT(S): at 14:05

## 2021-10-22 RX ADMIN — Medication 5 UNIT(S): at 12:17

## 2021-10-22 RX ADMIN — HEPARIN SODIUM 5000 UNIT(S): 5000 INJECTION INTRAVENOUS; SUBCUTANEOUS at 06:57

## 2021-10-22 NOTE — CHART NOTE - NSCHARTNOTEFT_GEN_A_CORE
Significant Other, Fallon Whaley, was at bedside.  Patient expressed frustration with fluid retention and reduced physical functioning.  Support rendered.

## 2021-10-22 NOTE — PROGRESS NOTE ADULT - SUBJECTIVE AND OBJECTIVE BOX
Gastroenterology progress note:     Patient is a 66y old  Male who presents with a chief complaint of weakness  Hyperkalemia  VT (22 Oct 2021 07:25)       Admitted on: 10-04-21    We are following the patient for liver cirrhosis      Interval History: patient feels better, eating soft food with denture, no abdominal pain       PAST MEDICAL & SURGICAL HISTORY:  HTN (hypertension)    Hepatitis   Drug abuse  Cancer, hepatocellularJanuary 2021  COPD, mild    MEDICATIONS  (STANDING):  budesonide  80 MICROgram(s)/formoterol 4.5 MICROgram(s) Inhaler 2 Puff(s) Inhalation two times a day  calcium acetate 667 milliGRAM(s) Oral three times a day with meals  chlorhexidine 4% Liquid 1 Application(s) Topical daily  dextrose 40% Gel 15 Gram(s) Oral once  dextrose 5%. 1000 milliLiter(s) (50 mL/Hr) IV Continuous <Continuous>  dextrose 5%. 1000 milliLiter(s) (100 mL/Hr) IV Continuous <Continuous>  dextrose 5%. 1000 milliLiter(s) (50 mL/Hr) IV Continuous <Continuous>  dextrose 50% Injectable 25 Gram(s) IV Push once  dextrose 50% Injectable 12.5 Gram(s) IV Push once  dextrose 50% Injectable 25 Gram(s) IV Push once  glucagon  Injectable 1 milliGRAM(s) IntraMuscular once  heparin   Injectable 5000 Unit(s) SubCutaneous every 8 hours  insulin glargine Injectable (LANTUS) 20 Unit(s) SubCutaneous at bedtime  insulin lispro (ADMELOG) corrective regimen sliding scale   SubCutaneous three times a day before meals  insulin lispro Injectable (ADMELOG) 5 Unit(s) SubCutaneous three times a day before meals  lactulose Syrup 15 Gram(s) Oral three times a day  levoFLOXacin IVPB 750 milliGRAM(s) IV Intermittent every 48 hours  midodrine 10 milliGRAM(s) Oral every 8 hours  pantoprazole  Injectable 40 milliGRAM(s) IV Push every 12 hours  propranolol 5 milliGRAM(s) Oral daily  rifAXIMin 550 milliGRAM(s) Oral two times a day    MEDICATIONS  (PRN):      Allergies  No Known Allergies      Review of Systems:   General: no fever  HEENT: no hemoptysis  Cardiovascular:  No Chest Pain, No Palpitations  Respiratory:  No Cough, +Dyspnea  Gastrointestinal:  As described in HPI  Hematology: no bruising or hematoma   Neurology: generalized weakness   Skin: no new rash    Physical Examination:  T(C): 36.4 (10-22-21 @ 08:05), Max: 36.5 (10-21-21 @ 17:01)  HR: 81 (10-22-21 @ 08:05) (81 - 101)  BP: 115/78 (10-22-21 @ 08:05) (110/78 - 121/71)  RR: 18 (10-22-21 @ 08:05) (18 - 20)  SpO2: 95% (10-22-21 @ 04:00) (95% - 98%)     Constitutional: No acute distress.  Head: normocephalic  Neck: no palpable thyroid  Respiratory: on O2 via NC, decreased rhonchi    Cardiovascular:  S1 S2, Regular rate and rhythm.  Abdominal: Abdomen is soft, symmetric, and non-tender + distention.    Extremities: + pitting edema       Data: (reviewed by attending)                        9.8    13.90 )-----------( 116      ( 22 Oct 2021 08:47 )             32.7     Hgb trend:  9.8  10-22-21 @ 08:47  9.0  10-21-21 @ 06:19  10.4  10-20-21 @ 07:54  9.3  10-19-21 @ 17:15      10-20-21 @ 07:01  -  10-21-21 @ 07:00  --------------------------------------------------------  IN: 1500 mL      10-22    146  |  108  |  85<HH>  ----------------------------<  82  3.2<L>   |  22  |  1.4    Ca    8.1<L>      22 Oct 2021 08:47  Mg     2.4     10-22      Liver panel trend:  TBili 1.5   /   AST 49   /   ALT 23   /   AlkP 134   /   Tptn 6.4   /   Alb 4.0    /   DBili --      10-20  TBili 1.4   /   AST 57   /   ALT 27   /   AlkP 154   /   Tptn 6.4   /   Alb 3.8    /   DBili --      10-19  TBili 1.2   /   AST 63   /   ALT 35   /   AlkP 225   /   Tptn 6.4   /   Alb 3.0    /   DBili 0.5      10-18  TBili 1.1   /   AST 60   /   ALT 38   /   AlkP 224   /   Tptn 6.4   /   Alb 3.0    /   DBili --      10-17  TBili 1.6   /   AST 65   /   ALT 44   /   AlkP 239   /   Tptn 6.7   /   Alb 2.9    /   DBili 0.7      10-16  TBili 1.4   /   AST 61   /   ALT 53   /   AlkP 280   /   Tptn 6.3   /   Alb 1.9    /   DBili 0.7      10-15  TBili 0.9   /   AST 52   /   ALT 54   /   AlkP 254   /   Tptn 5.7   /   Alb 1.7    /   DBili 0.5      10-14  TBili 1.0   /   AST 50   /   ALT 60   /   AlkP 233   /   Tptn 5.3   /   Alb 1.5    /   DBili 0.7      10-13

## 2021-10-22 NOTE — PROGRESS NOTE ADULT - SUBJECTIVE AND OBJECTIVE BOX
SUZANNE BAZZI 66y Male  MRN#: [id number]   CODE STATUS: Full code    Hospital Day: 18d    Pt is currently admitted with the primary diagnosis of hepatic encephalopathy    SUBJECTIVE    Overnight events   none  Subjective complaints   Patient tolerating dysphagia 3 feeds, breathing better  Present Today:   - Gerda:  No [  ], Yes [  x ] : Indication:     - Type of IV Access:       .. CVC/Piccline:  No [ x ], Yes [   ] : Indication:       .. Midline: No [x  ], Yes [   ] : Indication:                                             ----------------------------------------------------------  OBJECTIVE  PAST MEDICAL & SURGICAL HISTORY  HTN (hypertension)    Hepatitis C  2007    Drug abuse    Cancer, hepatocellular  January 2021    COPD, mild                                              -----------------------------------------------------------  ALLERGIES:  No Known Allergies                                            ------------------------------------------------------------    HOME MEDICATIONS  Home Medications:  Eliquis 5 mg oral tablet: 1 tab(s) orally 2 times a day (04 Oct 2021 06:13)  fluticasone-salmeterol 55 mcg-14 mcg/inh inhalation powder: 1 puff(s) inhaled 2 times a day (04 Oct 2021 06:14)  Protonix 40 mg oral delayed release tablet: 1 tab(s) orally once a day (04 Oct 2021 06:13)  spironolactone 50 mg oral tablet: 1 tab(s) orally once a day (04 Oct 2021 06:13)                           MEDICATIONS:  STANDING MEDICATIONS  budesonide  80 MICROgram(s)/formoterol 4.5 MICROgram(s) Inhaler 2 Puff(s) Inhalation two times a day  calcium acetate 667 milliGRAM(s) Oral three times a day with meals  chlorhexidine 4% Liquid 1 Application(s) Topical daily  dextrose 40% Gel 15 Gram(s) Oral once  dextrose 5%. 1000 milliLiter(s) IV Continuous <Continuous>  dextrose 5%. 1000 milliLiter(s) IV Continuous <Continuous>  dextrose 5%. 1000 milliLiter(s) IV Continuous <Continuous>  dextrose 50% Injectable 25 Gram(s) IV Push once  dextrose 50% Injectable 12.5 Gram(s) IV Push once  dextrose 50% Injectable 25 Gram(s) IV Push once  glucagon  Injectable 1 milliGRAM(s) IntraMuscular once  heparin   Injectable 5000 Unit(s) SubCutaneous every 8 hours  insulin glargine Injectable (LANTUS) 20 Unit(s) SubCutaneous at bedtime  insulin lispro (ADMELOG) corrective regimen sliding scale   SubCutaneous three times a day before meals  insulin lispro Injectable (ADMELOG) 5 Unit(s) SubCutaneous three times a day before meals  lactulose Syrup 15 Gram(s) Oral three times a day  levoFLOXacin IVPB 750 milliGRAM(s) IV Intermittent every 48 hours  midodrine 10 milliGRAM(s) Oral every 8 hours  pantoprazole  Injectable 40 milliGRAM(s) IV Push every 12 hours  potassium chloride   Powder 40 milliEquivalent(s) Oral every 4 hours  propranolol 5 milliGRAM(s) Oral daily  rifAXIMin 550 milliGRAM(s) Oral two times a day    PRN MEDICATIONS                                            ------------------------------------------------------------  VITAL SIGNS: Last 24 Hours  T(C): 36.4 (22 Oct 2021 08:05), Max: 36.5 (21 Oct 2021 17:01)  T(F): 97.5 (22 Oct 2021 08:05), Max: 97.7 (21 Oct 2021 17:01)  HR: 81 (22 Oct 2021 08:05) (81 - 101)  BP: 115/78 (22 Oct 2021 08:05) (110/78 - 121/71)  BP(mean): 91 (22 Oct 2021 04:00) (87 - 91)  RR: 18 (22 Oct 2021 08:05) (18 - 20)  SpO2: 95% (22 Oct 2021 04:00) (95% - 98%)      10-21-21 @ 07:01  -  10-22-21 @ 07:00  --------------------------------------------------------  IN: 0 mL / OUT: 650 mL / NET: -650 mL                                             --------------------------------------------------------------  LABS:                        9.8    13.90 )-----------( 116      ( 22 Oct 2021 08:47 )             32.7     10-22    146  |  108  |  85<HH>  ----------------------------<  82  3.2<L>   |  22  |  1.4    Ca    8.1<L>      22 Oct 2021 08:47  Mg     2.4     10-22                                                                -------------------------------------------------------------  RADIOLOGY:                                            --------------------------------------------------------------    PHYSICAL EXAM:  General: alert, oriented  HEENT: non remarkable  LUNGS: clear air sounds  HEART: RRR  ABDOMEN: distention improved  EXT: edema up to thighs                                             --------------------------------------------------------------    ASSESSMENT & PLAN    65 yo male, with h/o HTN, drug abuse, Hepatitis C s/p INF, Liver cirrhosis s/p [place] shunt, COPD, DVT? on Eliquis, hepatocellular carcinoma since January 2021 on chemotherapy, presented for weakness and decreased oral intake along with constipation and hematemasis with ammonia (188) on presentation, Impression: Hepatic Encephalopathy    1- Hepatic Encephalopathy:  - Ammonia 188 on presentation  - Patient on rifaximin and lactulose to aim for 3 BM/day  - Mental status improved  - Patient had ascites that was drained through a [place] catheter, removed > 7 liters of fluid, patient expected to build up again in light of his liver decompensation (cirrhosis)  - Given albumin for 3 consecutive days (10/18-10/20)  - Abdomen not tense today, so will not remove fluid today  - No SBP based on fluid studies taken on 10/20, need only for prophylaxis  - IR consulted for TIPS: recommended against due to multiple contraindications and MELD score >20    2- Variceal Bleed (portal HTN)  - Patient was intubated for airway protection, now extubated  - Hematemesis that was followed by an emergent EGD that showed variceal bleeds that were ligated (in esophagus)  - Patient started on abx prophylaxis after bleed and prophylaxis for SBP: ceftriaxone  - Propranolol 5 mg daily  - Protonix 40 mg IV BID  - Follow up Hb closely     3- HCC:  - Diagnosed in Jan 2021  - Was on chemotherapy to which he developed transaminitis  - Heme/Onc following the patient and recommended:  At present only palliative care.  If his performance status improves with nutrition and physical therapy, we will reevaluate for cancer therapy.  Please let the  see the patient for help with his health insurance issues  - Will follow up with Heme/onc    4- Septic shock:  - Patient had septic shock to which he was started on levophed on 10/4 and was stopped on the 10th of Oct  - Blood cultures are negative so far  - DTA culture showed Stenotrophomonas that is sensitive to levofloxacin: Levofloxacin started on 10/14  - Now resolved    5- Portal Vein Thrombosis:  - Patient currently on prophylactic dose of Heparin  - No bleeding since hematemesis, called the GI service and was told that Portal Vein is invaded by the thrombus itself and not a clot and therefore there is no need for full anticoagulation    6- COPD:  - patient on budesonide/formoterol nebulization    7- Hep C:  - treated with INF    8- EBER:  - Creatinine trending down today (Cr: 1.6)  - Likely due to abdominal compartment syndrome as Cr trended down with ascites drainage  - midodrine 10mg q 8 hr (started on 10/15)  - Albumin given on friday (10/15) monday (10/18), tuesday (10/19) and wednesday (10/20)    9- VT:  - patient had VT on presentation to which he was shocked   - Hyperkalemia at that point was treated and resolved    10- Thrombocytopenia  - Plt today 116 likely chorisis related)  - HIT Ab negative  - Less likely HIT, will trend it for tomorrow                                                                                    ----------------------------------------------------  # DVT prophylaxis Heparin     # GI prophylaxis Protonix     # Diet: dysphagia 3 nectar thick     # Activity Score (AM-PAC)    # Code status Full code    # Disposition acute                                                                             --------------------------------------------------------

## 2021-10-22 NOTE — SWALLOW BEDSIDE ASSESSMENT ADULT - SWALLOW EVAL: DIAGNOSIS
+ toleration for soft and nectar thick liquids, mild oral dysphagia. Suspected pharyngeal dysphagia for thin liquids

## 2021-10-22 NOTE — CHART NOTE - NSCHARTNOTEFT_GEN_A_CORE
PALLIATIVE MEDICINE INTERDISCIPLINARY TEAM NOTE    Provider:  [   ]Social Work   [   ]          [   ] Initial visit [   ] Follow up    Family or contact name / phone #   Met with: [   ] Patient  [   ] Family  [   ] Other:    Primary Language: [   ] English [   ] Other*:                      *Interpretation provided by:    SUPPORT DIAGNOSES            (Check all that apply)  [   ] Psychosocial spiritual assessment (PSSA)  [   ] EOL issues  [   ] Cultural / spiritual concerns  [   ] Pain / suffering  [   ] Dementia / AMS  [   ] Other:  [   ] AD issues  [   ] Grief / loss / sadness  [   ] Discharge issues  [   ] Distress / coping    PSYCHOSOCIAL ASSESSMENT OF PATIENT         (Check all that apply)  [   ] Initial Assessment            [   ] Reassessment          [   ] Not Applicable this visit    Pain/suffering acuity:  [   ] None to mild (0-3)           [   ] Moderate (4-6)        [   ] High (7-10)    Mental Status:  [   ] Alert/oriented (x3)          [   ] Confused/Altered(x2/x1)         [   ] Non-resp    Functional status:  [   ] Independent w ADLs      [   ] Needs Assistance             [   ] Bedbound/Full Care    Coping:  [   ] Coping well                     [   ] Coping w/difficulty            [   ] Poor coping    Support system:  [   ] Strong                              [   ] Adequate                        [   ] Inadequate    SPIRITUAL ASSESSMENT  Congregational/Spiritual practice: _____Catholic______________________    Role of organized Zoroastrian:  [   ] Important                     [   ] Some (fam tradition, cultural)               [ x  ] None    Effects on medical care:  [   ] Yes, _____________________________________                         [ x  ] None    Cultural/Yazidi need:  [   ] Yes, _____________________________________                         [ x  ] None    Refer to Pastoral Care:  [   ] Yes           [  x ] No, not at this time    SERVICE PROVIDED  [   ]PSSA                                                                             [   ]Discharge support / facilitation  [   ]AD / goals of care counseling                                  [   ]EOL / death / bereavement counseling  [ x  ]Counseling / support                                                [   ] Family meeting  [ x  ]Prayer / sacrament / ritual                                      [   ] Referral   [   ]Other                                                                       NOTE and Plan of Care (PoC): Pt states he can eat today so that's a better day. He looks forward to getting . I continue to assist with the help of my team. I will follow up on Monday.

## 2021-10-22 NOTE — PROGRESS NOTE ADULT - ASSESSMENT
65 yo male, with h/o HTN, drug abuse, Hepatitis C s/p INF, Liver cirrhosis s/p [place] shunt, COPD, DVT? on Eliquis, hepatocellular carcinoma since January 2021 on chemotherapy, presented for weakness and decreased oral intake along with constipation and hematemesis with ammonia (188) on presentation.    Acute gastrointestinal hemorrhage due to variceal bleed  HCC  Decompensated liver cirrhosis due to HCV  Hepatic encephalopathy   Thrombocytopenia  Patient initially presented with worsening hepatic encephalopathy, NH3 on admission 188, hospital course complicated by hematemesis s/p intubation and emergent EGD10/4, s/p EVBL on propranolol   successfully extubated  this AM, patient is much improved, speaks in full sentences, 97% on 3L  currently on Levaquin   repeat Chest xray overall unchanged  seen by s/s, diet advanced   chemotherapy currently on hold given poor functional status, may be considered in the future if the patient improves  patient has a [place] catheter due to frequent paracentesis, has had >9L removed (at baseline pt drains 2L Q2days at home)  s/p drainage 10/21 with 2L removed, no SBP on fluid analysis   do not remove >5L of fluid at a time, give albumin with paracentesis GI on board  Palliative team following     EBER - r/o hepatorenal syndrome v abdominal compartment syndrome   metabolic acidosis - improving   Hypernatremia  -avoid nephrotoxics, s/p albumin challenge    -continue phoslo as per renal   - monitor BMP, nephrology following     Portal vein thrombosis  Left main and right portal vein thrombus was on eliquis previously  per GI--> tumor thrombus, no need to resume eliquis     Septic shock - resolved   - previously required levophed - d/c'ed on 10/10, now on midodrine  - DTA culture showed Stenotrophomonas that is sensitive to levofloxacin: Levofloxacin started on 10/14  - monitor BP closely and resume pressors if indicated     COPD - no wheezing on exam    #Progress Note Handoff  Pending (specify):   clinical improvement,  improvement in Scr, PT   Family discussion: Plan of care discussed with patient and significant other at bedside. Discussed the poor prognosis. Remains full code   Disposition:  from home    Prognosis is very poor    Megan Clark MD  s. [phone] 65 yo male, with h/o HTN, drug abuse, Hepatitis C s/p INF, Liver cirrhosis s/p [place] shunt, COPD, DVT? on Eliquis, hepatocellular carcinoma since January 2021 on chemotherapy, presented for weakness and decreased oral intake along with constipation and hematemesis with ammonia (188) on presentation.    Acute gastrointestinal hemorrhage due to variceal bleed  HCC  Decompensated liver cirrhosis due to HCV  Hepatic encephalopathy   Thrombocytopenia  Patient initially presented with worsening hepatic encephalopathy, NH3 on admission 188, hospital course complicated by hematemesis s/p intubation and emergent EGD10/4, s/p EVBL on propranolol   successfully extubated  this AM, patient is much improved, speaks in full sentences, 97% on 3L  currently on Levaquin   repeat Chest xray overall unchanged  seen by s/s, diet advanced   chemotherapy currently on hold given poor functional status, may be considered in the future if the patient improves  patient has a [place] catheter due to frequent paracentesis, has had >9L removed (at baseline pt drains 2L Q2days at home)  s/p drainage 10/21 with 2L removed, no SBP on fluid analysis   do not remove >5L of fluid at a time, give albumin with paracentesis GI on board  start lasix 20mg daily for worsening LE edema, monitor renal fxn, discussed with GI  Palliative team following     EBER - r/o hepatorenal syndrome v abdominal compartment syndrome   metabolic acidosis - improving   Hypernatremia  -avoid nephrotoxics, s/p albumin challenge    -continue phoslo as per renal   - monitor BMP, nephrology following     Portal vein thrombosis  Left main and right portal vein thrombus was on eliquis previously  per GI--> tumor thrombus, no need to resume eliquis     Septic shock - resolved   - previously required levophed - d/c'ed on 10/10, now on midodrine  - DTA culture showed Stenotrophomonas that is sensitive to levofloxacin: Levofloxacin started on 10/14  - monitor BP closely and resume pressors if indicated     COPD - no wheezing on exam    #Progress Note Handoff  Pending (specify):   clinical improvement,  improvement in Scr, PT   Family discussion: Plan of care discussed with patient and significant other at bedside. Discussed the poor prognosis. Remains full code   Disposition:  from home    Prognosis is very poor    Megan Clark MD  s. 2018

## 2021-10-22 NOTE — CHART NOTE - NSCHARTNOTEFT_GEN_A_CORE
T(F): 97.1 (10-21-21 @ 20:00), Max: 97.7 (10-21-21 @ 17:01)  HR: 81 (10-22-21 @ 04:00) (81 - 101)  BP: 121/71 (10-22-21 @ 04:00) (110/78 - 121/71)  RR: 20 (10-21-21 @ 17:01) (20 - 20)  SpO2: 95% (10-22-21 @ 04:00) (95% - 98%)    I&O's Detail    21 Oct 2021 07:01  -  22 Oct 2021 07:00  --------------------------------------------------------  IN:  Total IN: 0 mL    OUT:    Indwelling Catheter - Urethral (mL): 650 mL  Total OUT: 650 mL    Total NET: -650 mL    10-21    151<H>  |  115<H>  |  96<HH>  ----------------------------<  81  3.5   |  22  |  1.6<H>    Ca    7.9<L>      21 Oct 2021 06:19  Mg     2.5     10-21  Phosphorus Level, Serum: 5.6 mg/dL (10.18.21 @ 04:30)                        9.0    15.30 )-----------( 87       ( 21 Oct 2021 06:19 )             30.4     CAPILLARY BLOOD GLUCOSE  POCT Blood Glucose.: 95 mg/dL (22 Oct 2021 07:59)  POCT Blood Glucose.: 120 mg/dL (21 Oct 2021 20:31)  POCT Blood Glucose.: 111 mg/dL (21 Oct 2021 15:48)  POCT Blood Glucose.: 82 mg/dL (21 Oct 2021 11:16)    Diet, Dysphagia 3 Soft-Nectar Consistency Fluid (10-21-21 @ 16:10)      ASSESSMENT  67 yo male, with h/o HTN, drug abuse, Hepatitis C s/p INF, Liver cirrhosis s/p [place] shunt, COPD, DVT? on Eliquis, HCC since January 2021 on chemotherapy, presented for weakness    - advanced hepatocellular carcinoma with transaminitis on chemo  - decompensated cirrhosis, ascites with [place] cath  - hematemesis 2nd esophageal, gastric varices  - severe anemia secondary to variceal bleed  - EBER  - hyperkalemia with sustained VT s/p shock  - hep C  - fevers, leukocytosis  - COPD  - sepsis without clear source, resolved    PLAN  - PO diet as tolerated per speech  - will follow c/o bilat LE swelling today  Diet advanced per speech and pt tolerating. No N/V  Intake most likely inadequate but improving    T(F): 97.1 (10-21-21 @ 20:00), Max: 97.7 (10-21-21 @ 17:01)  HR: 81 (10-22-21 @ 04:00) (81 - 101)  BP: 121/71 (10-22-21 @ 04:00) (110/78 - 121/71)  RR: 20 (10-21-21 @ 17:01) (20 - 20)  SpO2: 95% (10-22-21 @ 04:00) (95% - 98%)    I&O's Detail    21 Oct 2021 07:01  -  22 Oct 2021 07:00  --------------------------------------------------------  IN:  Total IN: 0 mL    OUT:    Indwelling Catheter - Urethral (mL): 650 mL  Total OUT: 650 mL    Total NET: -650 mL    10-21    151<H>  |  115<H>  |  96<HH>  ----------------------------<  81  3.5   |  22  |  1.6<H>    Ca    7.9<L>      21 Oct 2021 06:19  Mg     2.5     10-21  Phosphorus Level, Serum: 5.6 mg/dL (10.18.21 @ 04:30)                        9.0    15.30 )-----------( 87       ( 21 Oct 2021 06:19 )             30.4     CAPILLARY BLOOD GLUCOSE  POCT Blood Glucose.: 95 mg/dL (22 Oct 2021 07:59)  POCT Blood Glucose.: 120 mg/dL (21 Oct 2021 20:31)  POCT Blood Glucose.: 111 mg/dL (21 Oct 2021 15:48)  POCT Blood Glucose.: 82 mg/dL (21 Oct 2021 11:16)    Diet, Dysphagia 3 Soft-Nectar Consistency Fluid (10-21-21 @ 16:10)    ASSESSMENT  65 yo male, with h/o HTN, drug abuse, Hepatitis C s/p INF, Liver cirrhosis s/p [place] shunt, COPD, DVT? on Eliquis, HCC since January 2021 on chemotherapy, presented for weakness    - advanced hepatocellular carcinoma with transaminitis on chemo  - decompensated cirrhosis, ascites with [place] cath  - hematemesis 2nd esophageal, gastric varices  - severe anemia secondary to variceal bleed  - EBER  - hyperkalemia with sustained VT s/p shock  - hep C  - fevers, leukocytosis  - COPD  - sepsis without clear source, resolved    PLAN  - PO diet as tolerated per speech, would give Low salt dietary restriction  - check caleb cts X 2d  - will follow

## 2021-10-22 NOTE — PROGRESS NOTE ADULT - ASSESSMENT
IMPRESSION:    Acute resp failure resolved   Hepatic encephalopathy improving   Decompensated liver cirrhosis.  Ascites SP peritoneal PleurX Catheter    Hematemesis resolved ( esophageal/ gastric varices)  Acute portal Vein thrombosis   Hepatocellular carcinoma with transaminitis on chemotherapy  HO Hepatitis C treated with INF  HO COPD  EBER   Stenotrophomonas in sputum sp ABX   Thrombocytopenia HIT negative / Splenic Sequestration?      PLAN:    CNS: Avoid over sedation     HEENT: Oral care    PULMONARY:  HOB @ 45 degrees.  Continue O2 as necessary to maintain sats 92 to 94%,   US negative for significant pleural  Effusion     CARDIOVASCULAR: Avoid volume overload. D5W to correct free H2O deficit     GI: GI prophylaxis. Feeding as per speech and swallow.  Lactulose and Rifaximin  Hepatology follow up appreciated      RENAL:  Follow up with renal. Low K diet. Follow up lytes     INFECTIOUS DISEASE: ABX PER ID      HEMATOLOGICAL:  DVT prophylaxis.  FU CBC and coags. Hematology f/u.     ENDOCRINE:  Follow up FS.  Avoid hypoglycemia    MUSCULOSKELETAL: bed rest     prognosis grave    OOB to chair     PT OT   Palliative f/u

## 2021-10-22 NOTE — PROGRESS NOTE ADULT - SUBJECTIVE AND OBJECTIVE BOX
Patient is a 66y old  Male who presents with a chief complaint of weakness  Hyperkalemia  VT (21 Oct 2021 16:26)        Over Night Events: Events noted. Pt off pressors and RA.        ROS:     All ROS are negative except HPI         PHYSICAL EXAM    ICU Vital Signs Last 24 Hrs  T(C): 36.2 (21 Oct 2021 20:00), Max: 36.5 (21 Oct 2021 17:01)  T(F): 97.1 (21 Oct 2021 20:00), Max: 97.7 (21 Oct 2021 17:01)  HR: 81 (22 Oct 2021 04:00) (79 - 101)  BP: 121/71 (22 Oct 2021 04:00) (105/68 - 121/71)  BP(mean): 91 (22 Oct 2021 04:00) (87 - 91)  ABP: --  ABP(mean): --  RR: 20 (21 Oct 2021 17:01) (20 - 20)  SpO2: 95% (22 Oct 2021 04:00) (95% - 100%)    CONSTITUTIONAL:  ill appearing    ENT:   Airway patent,   Mouth with normal mucosa.   No thrush    EYES:   Pupils equal,   Round and reactive to light.    CARDIAC:   Normal rate,   Regular rhythm.    No edema        RESPIRATORY:   No wheezing  Bilateral BS  Normal chest expansion  Not tachypneic,  No use of accessory muscles    GASTROINTESTINAL:  Abdomen soft,   Non-tender,   No guarding,   + BS      NEUROLOGICAL:   Alert and oriented   No motor  deficits.    SKIN:   Skin normal color for race,   Warm and dry and intact.   No evidence of rash.        10-21-21 @ 07:01  -  10-22-21 @ 07:00  --------------------------------------------------------  IN:  Total IN: 0 mL    OUT:    Indwelling Catheter - Urethral (mL): 250 mL  Total OUT: 250 mL    Total NET: -250 mL          LABS:                            9.0    15.30 )-----------( 87       ( 21 Oct 2021 06:19 )             30.4                                               10-21    151<H>  |  115<H>  |  96<HH>  ----------------------------<  81  3.5   |  22  |  1.6<H>    Ca    7.9<L>      21 Oct 2021 06:19  Mg     2.5     10-21    TPro  6.4  /  Alb  4.0  /  TBili  1.5<H>  /  DBili  x   /  AST  49<H>  /  ALT  23  /  AlkPhos  134<H>  10-20                                                                                           LIVER FUNCTIONS - ( 20 Oct 2021 07:54 )  Alb: 4.0 g/dL / Pro: 6.4 g/dL / ALK PHOS: 134 U/L / ALT: 23 U/L / AST: 49 U/L / GGT: x                                                                                                                                       MEDICATIONS  (STANDING):  budesonide  80 MICROgram(s)/formoterol 4.5 MICROgram(s) Inhaler 2 Puff(s) Inhalation two times a day  calcium acetate 667 milliGRAM(s) Oral three times a day with meals  chlorhexidine 4% Liquid 1 Application(s) Topical daily  dextrose 40% Gel 15 Gram(s) Oral once  dextrose 5%. 1000 milliLiter(s) (50 mL/Hr) IV Continuous <Continuous>  dextrose 5%. 1000 milliLiter(s) (100 mL/Hr) IV Continuous <Continuous>  dextrose 5%. 1000 milliLiter(s) (50 mL/Hr) IV Continuous <Continuous>  dextrose 50% Injectable 25 Gram(s) IV Push once  dextrose 50% Injectable 12.5 Gram(s) IV Push once  dextrose 50% Injectable 25 Gram(s) IV Push once  glucagon  Injectable 1 milliGRAM(s) IntraMuscular once  heparin   Injectable 5000 Unit(s) SubCutaneous every 8 hours  insulin glargine Injectable (LANTUS) 20 Unit(s) SubCutaneous at bedtime  insulin lispro (ADMELOG) corrective regimen sliding scale   SubCutaneous three times a day before meals  insulin lispro Injectable (ADMELOG) 5 Unit(s) SubCutaneous three times a day before meals  lactulose Syrup 15 Gram(s) Oral three times a day  levoFLOXacin IVPB 750 milliGRAM(s) IV Intermittent every 48 hours  midodrine 10 milliGRAM(s) Oral every 8 hours  pantoprazole  Injectable 40 milliGRAM(s) IV Push every 12 hours  propranolol 5 milliGRAM(s) Oral daily  rifAXIMin 550 milliGRAM(s) Oral two times a day    MEDICATIONS  (PRN):      New X-rays reviewed:                                                                                  ECHO    CXR interpreted by me:  B/L infiltrates

## 2021-10-22 NOTE — PROGRESS NOTE ADULT - SUBJECTIVE AND OBJECTIVE BOX
SUZANNE BAZZI  66y Male    CHIEF COMPLAINT:    Patient is a 66y old  Male who presents with a chief complaint of weakness  Hyperkalemia  VT (22 Oct 2021 10:57)      INTERVAL HPI/OVERNIGHT EVENTS:    Patient seen and examined. No acute events overnight. Improved today     ROS: All other systems are negative.    Vital Signs:    T(F): 97.5 (10-22-21 @ 08:05), Max: 97.7 (10-21-21 @ 17:01)  HR: 81 (10-22-21 @ 08:05) (81 - 101)  BP: 115/78 (10-22-21 @ 08:05) (110/78 - 121/71)  RR: 18 (10-22-21 @ 08:05) (18 - 20)  SpO2: 95% (10-22-21 @ 04:00) (95% - 98%)    21 Oct 2021 07:01  -  22 Oct 2021 07:00  --------------------------------------------------------  IN: 0 mL / OUT: 650 mL / NET: -650 mL    POCT Blood Glucose.: 88 mg/dL (22 Oct 2021 11:33)  POCT Blood Glucose.: 95 mg/dL (22 Oct 2021 07:59)  POCT Blood Glucose.: 120 mg/dL (21 Oct 2021 20:31)  POCT Blood Glucose.: 111 mg/dL (21 Oct 2021 15:48)    PHYSICAL EXAM:    GENERAL:  NAD  SKIN: No rashes or lesions  HEENT: Atraumatic. Normocephalic.   NECK: Supple, No JVD.   PULMONARY: Coarse breath sounds b/l No wheezing. No rales  CVS: Normal S1, S2. Rate and Rhythm are regular   ABDOMEN/GI: Soft, Nontender,  distended; BS present  MSK: b/l LE edema. No clubbing or cyanosis   NEUROLOGIC: moves all extremities  PSYCH: Alert & oriented x 3    Consultant(s) Notes Reviewed:  [x ] YES  [ ] NO  Care Discussed with Consultants/Other Providers [ x] YES  [ ] NO    LABS:                        9.8    13.90 )-----------( 116      ( 22 Oct 2021 08:47 )             32.7     146  |  108  |  85<HH>  ----------------------------<  82  3.2<L>   |  22  |  1.4    Ca    8.1<L>      22 Oct 2021 08:47  Mg     2.4     10-22    RADIOLOGY & ADDITIONAL TESTS:  Imaging or report Personally Reviewed:  [x] YES  [ ] NO  EKG reviewed: [x] YES  [ ] NO    Medications:  Standing  budesonide  80 MICROgram(s)/formoterol 4.5 MICROgram(s) Inhaler 2 Puff(s) Inhalation two times a day  calcium acetate 667 milliGRAM(s) Oral three times a day with meals  chlorhexidine 4% Liquid 1 Application(s) Topical daily  dextrose 40% Gel 15 Gram(s) Oral once  dextrose 5%. 1000 milliLiter(s) IV Continuous <Continuous>  dextrose 5%. 1000 milliLiter(s) IV Continuous <Continuous>  dextrose 5%. 1000 milliLiter(s) IV Continuous <Continuous>  dextrose 50% Injectable 25 Gram(s) IV Push once  dextrose 50% Injectable 12.5 Gram(s) IV Push once  dextrose 50% Injectable 25 Gram(s) IV Push once  glucagon  Injectable 1 milliGRAM(s) IntraMuscular once  heparin   Injectable 5000 Unit(s) SubCutaneous every 8 hours  insulin glargine Injectable (LANTUS) 20 Unit(s) SubCutaneous at bedtime  insulin lispro (ADMELOG) corrective regimen sliding scale   SubCutaneous three times a day before meals  insulin lispro Injectable (ADMELOG) 5 Unit(s) SubCutaneous three times a day before meals  lactulose Syrup 15 Gram(s) Oral three times a day  levoFLOXacin IVPB 750 milliGRAM(s) IV Intermittent every 48 hours  midodrine 10 milliGRAM(s) Oral every 8 hours  pantoprazole  Injectable 40 milliGRAM(s) IV Push every 12 hours  potassium chloride   Powder 40 milliEquivalent(s) Oral every 4 hours  propranolol 5 milliGRAM(s) Oral daily  rifAXIMin 550 milliGRAM(s) Oral two times a day    PRN Meds

## 2021-10-23 LAB
ANION GAP SERPL CALC-SCNC: 15 MMOL/L — HIGH (ref 7–14)
BUN SERPL-MCNC: 86 MG/DL — CRITICAL HIGH (ref 10–20)
CALCIUM SERPL-MCNC: 8.2 MG/DL — LOW (ref 8.5–10.1)
CHLORIDE SERPL-SCNC: 113 MMOL/L — HIGH (ref 98–110)
CO2 SERPL-SCNC: 18 MMOL/L — SIGNIFICANT CHANGE UP (ref 17–32)
CREAT SERPL-MCNC: 1.7 MG/DL — HIGH (ref 0.7–1.5)
GLUCOSE BLDC GLUCOMTR-MCNC: 163 MG/DL — HIGH (ref 70–99)
GLUCOSE BLDC GLUCOMTR-MCNC: 254 MG/DL — HIGH (ref 70–99)
GLUCOSE BLDC GLUCOMTR-MCNC: 88 MG/DL — SIGNIFICANT CHANGE UP (ref 70–99)
GLUCOSE SERPL-MCNC: 73 MG/DL — SIGNIFICANT CHANGE UP (ref 70–99)
HCT VFR BLD CALC: 33.8 % — LOW (ref 42–52)
HGB BLD-MCNC: 10.2 G/DL — LOW (ref 14–18)
MAGNESIUM SERPL-MCNC: 2.4 MG/DL — SIGNIFICANT CHANGE UP (ref 1.8–2.4)
MCHC RBC-ENTMCNC: 30.1 PG — SIGNIFICANT CHANGE UP (ref 27–31)
MCHC RBC-ENTMCNC: 30.2 G/DL — LOW (ref 32–37)
MCV RBC AUTO: 99.7 FL — HIGH (ref 80–94)
NRBC # BLD: 0 /100 WBCS — SIGNIFICANT CHANGE UP (ref 0–0)
PLATELET # BLD AUTO: 76 K/UL — LOW (ref 130–400)
POTASSIUM SERPL-MCNC: 4.8 MMOL/L — SIGNIFICANT CHANGE UP (ref 3.5–5)
POTASSIUM SERPL-SCNC: 4.8 MMOL/L — SIGNIFICANT CHANGE UP (ref 3.5–5)
RBC # BLD: 3.39 M/UL — LOW (ref 4.7–6.1)
RBC # FLD: 25.3 % — HIGH (ref 11.5–14.5)
SODIUM SERPL-SCNC: 146 MMOL/L — SIGNIFICANT CHANGE UP (ref 135–146)
WBC # BLD: 11.4 K/UL — HIGH (ref 4.8–10.8)
WBC # FLD AUTO: 11.4 K/UL — HIGH (ref 4.8–10.8)

## 2021-10-23 PROCEDURE — 99232 SBSQ HOSP IP/OBS MODERATE 35: CPT

## 2021-10-23 PROCEDURE — 99233 SBSQ HOSP IP/OBS HIGH 50: CPT

## 2021-10-23 RX ADMIN — LACTULOSE 15 GRAM(S): 10 SOLUTION ORAL at 13:40

## 2021-10-23 RX ADMIN — INSULIN GLARGINE 20 UNIT(S): 100 INJECTION, SOLUTION SUBCUTANEOUS at 21:45

## 2021-10-23 RX ADMIN — HEPARIN SODIUM 5000 UNIT(S): 5000 INJECTION INTRAVENOUS; SUBCUTANEOUS at 13:40

## 2021-10-23 RX ADMIN — Medication 667 MILLIGRAM(S): at 11:46

## 2021-10-23 RX ADMIN — Medication 667 MILLIGRAM(S): at 17:11

## 2021-10-23 RX ADMIN — Medication 667 MILLIGRAM(S): at 07:48

## 2021-10-23 RX ADMIN — MIDODRINE HYDROCHLORIDE 10 MILLIGRAM(S): 2.5 TABLET ORAL at 21:46

## 2021-10-23 RX ADMIN — CHLORHEXIDINE GLUCONATE 1 APPLICATION(S): 213 SOLUTION TOPICAL at 11:47

## 2021-10-23 RX ADMIN — Medication 3: at 11:45

## 2021-10-23 RX ADMIN — MIDODRINE HYDROCHLORIDE 10 MILLIGRAM(S): 2.5 TABLET ORAL at 06:52

## 2021-10-23 RX ADMIN — PANTOPRAZOLE SODIUM 40 MILLIGRAM(S): 20 TABLET, DELAYED RELEASE ORAL at 06:54

## 2021-10-23 RX ADMIN — PANTOPRAZOLE SODIUM 40 MILLIGRAM(S): 20 TABLET, DELAYED RELEASE ORAL at 17:13

## 2021-10-23 RX ADMIN — MIDODRINE HYDROCHLORIDE 10 MILLIGRAM(S): 2.5 TABLET ORAL at 13:40

## 2021-10-23 RX ADMIN — HEPARIN SODIUM 5000 UNIT(S): 5000 INJECTION INTRAVENOUS; SUBCUTANEOUS at 21:45

## 2021-10-23 RX ADMIN — LACTULOSE 15 GRAM(S): 10 SOLUTION ORAL at 21:45

## 2021-10-23 RX ADMIN — Medication 5 UNIT(S): at 11:45

## 2021-10-23 RX ADMIN — HEPARIN SODIUM 5000 UNIT(S): 5000 INJECTION INTRAVENOUS; SUBCUTANEOUS at 06:52

## 2021-10-23 NOTE — PROGRESS NOTE ADULT - SUBJECTIVE AND OBJECTIVE BOX
SUZANNE BAZZI  66y Male    CHIEF COMPLAINT:    Patient is a 66y old  Male who presents with a chief complaint of weakness  Hyperkalemia  VT (23 Oct 2021 06:10)      INTERVAL HPI/OVERNIGHT EVENTS:    Patient seen and examined. No acute events overnight. LE edema improving, overall feels better     ROS: All other systems are negative.    Vital Signs:    T(F): 96.2 (10-23-21 @ 08:41), Max: 97.9 (10-22-21 @ 20:45)  HR: 54 (10-23-21 @ 08:41) (54 - 106)  BP: 116/68 (10-23-21 @ 08:41) (92/64 - 116/68)  RR: 18 (10-23-21 @ 08:41) (18 - 18)  SpO2: 97% (10-23-21 @ 08:41) (96% - 97%)    22 Oct 2021 07:01  -  23 Oct 2021 07:00  --------------------------------------------------------  IN: 0 mL / OUT: 350 mL / NET: -350 mL    POCT Blood Glucose.: 254 mg/dL (23 Oct 2021 11:26)  POCT Blood Glucose.: 88 mg/dL (23 Oct 2021 07:43)  POCT Blood Glucose.: 140 mg/dL (22 Oct 2021 21:01)  POCT Blood Glucose.: 184 mg/dL (22 Oct 2021 19:05)  POCT Blood Glucose.: 66 mg/dL (22 Oct 2021 18:12)  POCT Blood Glucose.: 71 mg/dL (22 Oct 2021 17:08)    PHYSICAL EXAM:    GENERAL:  NAD  SKIN: No rashes or lesions  HEENT: Atraumatic. Normocephalic.   NECK: Supple, No JVD.  PULMONARY: poor inspiratory effort. No wheezing. No rales  CVS: Normal S1, S2. Rate and Rhythm are regular.   ABDOMEN/GI: Soft, Nontender, distended; BS present  MSK:  NEdema B/L LE. No clubbing or cyanosis   NEUROLOGIC: Moves all extremities   PSYCH: Alert & oriented x 3, normal affect    Consultant(s) Notes Reviewed:  [x ] YES  [ ] NO  Care Discussed with Consultants/Other Providers [ x] YES  [ ] NO    LABS:                        10.2   11.40 )-----------( 76       ( 23 Oct 2021 04:30 )             33.8     146  |  113<H>  |  86<HH>  ----------------------------<  73  4.8   |  18  |  1.7<H>    Ca    8.2<L>      23 Oct 2021 04:30  Mg     2.4     10-23    RADIOLOGY & ADDITIONAL TESTS:  Imaging or report Personally Reviewed:  [x] YES  [ ] NO  EKG reviewed: [x] YES  [ ] NO    Medications:  Standing  budesonide  80 MICROgram(s)/formoterol 4.5 MICROgram(s) Inhaler 2 Puff(s) Inhalation two times a day  calcium acetate 667 milliGRAM(s) Oral three times a day with meals  chlorhexidine 4% Liquid 1 Application(s) Topical daily  dextrose 40% Gel 15 Gram(s) Oral once  dextrose 5%. 1000 milliLiter(s) IV Continuous <Continuous>  dextrose 5%. 1000 milliLiter(s) IV Continuous <Continuous>  dextrose 50% Injectable 25 Gram(s) IV Push once  dextrose 50% Injectable 12.5 Gram(s) IV Push once  dextrose 50% Injectable 25 Gram(s) IV Push once  furosemide    Tablet 20 milliGRAM(s) Oral daily  glucagon  Injectable 1 milliGRAM(s) IntraMuscular once  heparin   Injectable 5000 Unit(s) SubCutaneous every 8 hours  insulin glargine Injectable (LANTUS) 20 Unit(s) SubCutaneous at bedtime  insulin lispro (ADMELOG) corrective regimen sliding scale   SubCutaneous three times a day before meals  insulin lispro Injectable (ADMELOG) 5 Unit(s) SubCutaneous three times a day before meals  lactulose Syrup 15 Gram(s) Oral three times a day  levoFLOXacin IVPB 750 milliGRAM(s) IV Intermittent every 48 hours  midodrine 10 milliGRAM(s) Oral every 8 hours  pantoprazole  Injectable 40 milliGRAM(s) IV Push every 12 hours  propranolol 5 milliGRAM(s) Oral daily  rifAXIMin 550 milliGRAM(s) Oral two times a day    PRN Meds

## 2021-10-23 NOTE — PROGRESS NOTE ADULT - ASSESSMENT
67 yo male, with h/o HTN, drug abuse, Hepatitis C s/p INF, Liver cirrhosis s/p [place] shunt, COPD, DVT? on Eliquis, hepatocellular carcinoma since January 2021 on chemotherapy, presented for weakness and decreased oral intake along with constipation and hematemesis with ammonia (188) on presentation.    Acute gastrointestinal hemorrhage due to variceal bleed  HCC  Decompensated liver cirrhosis due to HCV  Hepatic encephalopathy   Thrombocytopenia  Patient initially presented with worsening hepatic encephalopathy, NH3 on admission 188, hospital course complicated by hematemesis s/p intubation and emergent EGD10/4, s/p EVBL on propranolol   successfully extubated  this AM, patient is much improved, speaks in full sentences, 97% on 3L  currently on Levaquin   repeat Chest xray overall unchanged  seen by s/s, diet advanced   chemotherapy currently on hold given poor functional status, may be considered in the future if the patient improves  patient has a [place] catheter due to frequent paracentesis, has had >9L removed (at baseline pt drains 2L Q2days at home)  s/p drainage 10/21 with 2L removed, no SBP on fluid analysis   do not remove >5L of fluid at a time, give albumin with paracentesis  started lasix 20mg daily for worsening LE edema, monitor renal fxn, discussed with GI  renal fxn slightly up. If continues to uptrend, hold lasix  Palliative team following     EBER - r/o hepatorenal syndrome v abdominal compartment syndrome   metabolic acidosis  Hypernatremia  -avoid nephrotoxics, s/p albumin challenge    -continue phoslo as per renal   - monitor BMP, nephrology following     Portal vein thrombosis  Left main and right portal vein thrombus was on eliquis previously  per GI--> tumor thrombus, no need to resume eliquis     Septic shock - resolved   - previously required levophed - d/c'ed on 10/10, now on midodrine  - DTA culture showed Stenotrophomonas that is sensitive to levofloxacin: Levofloxacin started on 10/14  - monitor BP closely and resume pressors if indicated     COPD - no wheezing on exam    #Progress Note Handoff  Pending (specify):   clinical improvement,  improvement in Scr, PT. Patient would like to  his girlfriend while in the hospital. Palliative and  following to assist   Family discussion: Plan of care discussed with patient and significant other at bedside. Discussed the poor prognosis. Remains full code   Disposition:  from home    Prognosis is very poor    Megan Clark MD  s. [phone]

## 2021-10-23 NOTE — PROGRESS NOTE ADULT - ASSESSMENT
IMPRESSION:    Acute resp failure sp extubation on NC  Hepatic encephalopathy improving   Decompensated liver cirrhosis.  Ascites SP peritoneal PleurX Catheter    Hematemesis resolved ( esophageal/ gastric varices)  Acute portal Vein thrombosis   Hepatocellular carcinoma with transaminitis on chemotherapy  HO Hepatitis C treated with INF  HO COPD  EBER   Stenotrophomonas in sputum sp ABX         PLAN:    CNS: Avoid over sedation     HEENT: Oral care    PULMONARY:  HOB @ 45 degrees.  Continue O2 as necessary to maintain sats 92 to 94%,   US negative for significant pleural  Effusion     CARDIOVASCULAR: Avoid volume overload.     GI: GI prophylaxis. Feeding as per speech and swallow.  Lactulose and Rifaximin     RENAL:  Follow up with renal. Low K diet. Follow up lytes     INFECTIOUS DISEASE: ABX PER ID      HEMATOLOGICAL:  DVT prophylaxis.  FU CBC and coags. Hematology f/u.     ENDOCRINE:  Follow up FS.  Avoid hypoglycemia    prognosis grave    OOB to chair     PT OT   Palliative f/u

## 2021-10-23 NOTE — PROGRESS NOTE ADULT - SUBJECTIVE AND OBJECTIVE BOX
Over Night Events: events noted, on NC, afebrile    PHYSICAL EXAM    ICU Vital Signs Last 24 Hrs  T(C): 36.6 (22 Oct 2021 20:45), Max: 36.6 (22 Oct 2021 20:45)  T(F): 97.9 (22 Oct 2021 20:45), Max: 97.9 (22 Oct 2021 20:45)  HR: 81 (22 Oct 2021 20:45) (74 - 85)  BP: 100/64 (22 Oct 2021 20:45) (100/60 - 115/78)  BP(mean): 77 (22 Oct 2021 20:45) (75 - 77)  RR: 18 (22 Oct 2021 16:44) (18 - 18)  SpO2: 96% (22 Oct 2021 20:45) (96% - 97%)      General: ill looking, cachectic  HEENT: ELENITA             Lungs: dec bs both bases  Cardiovascular: PANKAJ 2.6  Abdomen: Soft, Positive BS, distended  Extremities: No clubbing   Neurological: Non focal       10-21-21 @ 07:01  -  10-22-21 @ 07:00  --------------------------------------------------------  IN:  Total IN: 0 mL    OUT:    Indwelling Catheter - Urethral (mL): 650 mL  Total OUT: 650 mL    Total NET: -650 mL          LABS:                          9.8    13.90 )-----------( 116      ( 22 Oct 2021 08:47 )             32.7                                               10-22    146  |  108  |  85<HH>  ----------------------------<  82  3.2<L>   |  22  |  1.4    Ca    8.1<L>      22 Oct 2021 08:47  Mg     2.4     10-22                                                                                                                                                                                                                           MEDICATIONS  (STANDING):  budesonide  80 MICROgram(s)/formoterol 4.5 MICROgram(s) Inhaler 2 Puff(s) Inhalation two times a day  calcium acetate 667 milliGRAM(s) Oral three times a day with meals  chlorhexidine 4% Liquid 1 Application(s) Topical daily  dextrose 40% Gel 15 Gram(s) Oral once  dextrose 5%. 1000 milliLiter(s) (50 mL/Hr) IV Continuous <Continuous>  dextrose 5%. 1000 milliLiter(s) (100 mL/Hr) IV Continuous <Continuous>  dextrose 50% Injectable 25 Gram(s) IV Push once  dextrose 50% Injectable 12.5 Gram(s) IV Push once  dextrose 50% Injectable 25 Gram(s) IV Push once  furosemide    Tablet 20 milliGRAM(s) Oral daily  glucagon  Injectable 1 milliGRAM(s) IntraMuscular once  heparin   Injectable 5000 Unit(s) SubCutaneous every 8 hours  insulin glargine Injectable (LANTUS) 20 Unit(s) SubCutaneous at bedtime  insulin lispro (ADMELOG) corrective regimen sliding scale   SubCutaneous three times a day before meals  insulin lispro Injectable (ADMELOG) 5 Unit(s) SubCutaneous three times a day before meals  lactulose Syrup 15 Gram(s) Oral three times a day  levoFLOXacin IVPB 750 milliGRAM(s) IV Intermittent every 48 hours  midodrine 10 milliGRAM(s) Oral every 8 hours  pantoprazole  Injectable 40 milliGRAM(s) IV Push every 12 hours  propranolol 5 milliGRAM(s) Oral daily  rifAXIMin 550 milliGRAM(s) Oral two times a day      CXR reviewed

## 2021-10-24 LAB
ALBUMIN SERPL ELPH-MCNC: 3.2 G/DL — LOW (ref 3.5–5.2)
ALP SERPL-CCNC: 142 U/L — HIGH (ref 30–115)
ALT FLD-CCNC: 19 U/L — SIGNIFICANT CHANGE UP (ref 0–41)
ANION GAP SERPL CALC-SCNC: 14 MMOL/L — SIGNIFICANT CHANGE UP (ref 7–14)
AST SERPL-CCNC: 40 U/L — SIGNIFICANT CHANGE UP (ref 0–41)
BASOPHILS # BLD AUTO: 0.02 K/UL — SIGNIFICANT CHANGE UP (ref 0–0.2)
BASOPHILS NFR BLD AUTO: 0.2 % — SIGNIFICANT CHANGE UP (ref 0–1)
BILIRUB SERPL-MCNC: 2 MG/DL — HIGH (ref 0.2–1.2)
BUN SERPL-MCNC: 83 MG/DL — CRITICAL HIGH (ref 10–20)
CALCIUM SERPL-MCNC: 8.3 MG/DL — LOW (ref 8.5–10.1)
CHLORIDE SERPL-SCNC: 109 MMOL/L — SIGNIFICANT CHANGE UP (ref 98–110)
CO2 SERPL-SCNC: 22 MMOL/L — SIGNIFICANT CHANGE UP (ref 17–32)
CREAT SERPL-MCNC: 1.7 MG/DL — HIGH (ref 0.7–1.5)
EOSINOPHIL # BLD AUTO: 0 K/UL — SIGNIFICANT CHANGE UP (ref 0–0.7)
EOSINOPHIL NFR BLD AUTO: 0 % — SIGNIFICANT CHANGE UP (ref 0–8)
GLUCOSE BLDC GLUCOMTR-MCNC: 103 MG/DL — HIGH (ref 70–99)
GLUCOSE BLDC GLUCOMTR-MCNC: 154 MG/DL — HIGH (ref 70–99)
GLUCOSE BLDC GLUCOMTR-MCNC: 262 MG/DL — HIGH (ref 70–99)
GLUCOSE BLDC GLUCOMTR-MCNC: 63 MG/DL — LOW (ref 70–99)
GLUCOSE BLDC GLUCOMTR-MCNC: 77 MG/DL — SIGNIFICANT CHANGE UP (ref 70–99)
GLUCOSE SERPL-MCNC: 157 MG/DL — HIGH (ref 70–99)
HCT VFR BLD CALC: 32.4 % — LOW (ref 42–52)
HGB BLD-MCNC: 9.6 G/DL — LOW (ref 14–18)
IMM GRANULOCYTES NFR BLD AUTO: 0.3 % — SIGNIFICANT CHANGE UP (ref 0.1–0.3)
LYMPHOCYTES # BLD AUTO: 1.1 K/UL — LOW (ref 1.2–3.4)
LYMPHOCYTES # BLD AUTO: 9.1 % — LOW (ref 20.5–51.1)
MAGNESIUM SERPL-MCNC: 2.3 MG/DL — SIGNIFICANT CHANGE UP (ref 1.8–2.4)
MCHC RBC-ENTMCNC: 29.6 G/DL — LOW (ref 32–37)
MCHC RBC-ENTMCNC: 30 PG — SIGNIFICANT CHANGE UP (ref 27–31)
MCV RBC AUTO: 101.3 FL — HIGH (ref 80–94)
MONOCYTES # BLD AUTO: 1.01 K/UL — HIGH (ref 0.1–0.6)
MONOCYTES NFR BLD AUTO: 8.4 % — SIGNIFICANT CHANGE UP (ref 1.7–9.3)
NEUTROPHILS # BLD AUTO: 9.9 K/UL — HIGH (ref 1.4–6.5)
NEUTROPHILS NFR BLD AUTO: 82 % — HIGH (ref 42.2–75.2)
NRBC # BLD: 0 /100 WBCS — SIGNIFICANT CHANGE UP (ref 0–0)
PLATELET # BLD AUTO: 110 K/UL — LOW (ref 130–400)
POTASSIUM SERPL-MCNC: 4.6 MMOL/L — SIGNIFICANT CHANGE UP (ref 3.5–5)
POTASSIUM SERPL-SCNC: 4.6 MMOL/L — SIGNIFICANT CHANGE UP (ref 3.5–5)
PROT SERPL-MCNC: 5.8 G/DL — LOW (ref 6–8)
RBC # BLD: 3.2 M/UL — LOW (ref 4.7–6.1)
RBC # FLD: 25.1 % — HIGH (ref 11.5–14.5)
SODIUM SERPL-SCNC: 145 MMOL/L — SIGNIFICANT CHANGE UP (ref 135–146)
WBC # BLD: 12.07 K/UL — HIGH (ref 4.8–10.8)
WBC # FLD AUTO: 12.07 K/UL — HIGH (ref 4.8–10.8)

## 2021-10-24 PROCEDURE — 99232 SBSQ HOSP IP/OBS MODERATE 35: CPT

## 2021-10-24 PROCEDURE — 99233 SBSQ HOSP IP/OBS HIGH 50: CPT

## 2021-10-24 RX ORDER — CEFTRIAXONE 500 MG/1
1000 INJECTION, POWDER, FOR SOLUTION INTRAMUSCULAR; INTRAVENOUS EVERY 24 HOURS
Refills: 0 | Status: COMPLETED | OUTPATIENT
Start: 2021-10-24 | End: 2021-10-30

## 2021-10-24 RX ADMIN — Medication 3: at 11:33

## 2021-10-24 RX ADMIN — HEPARIN SODIUM 5000 UNIT(S): 5000 INJECTION INTRAVENOUS; SUBCUTANEOUS at 21:12

## 2021-10-24 RX ADMIN — PANTOPRAZOLE SODIUM 40 MILLIGRAM(S): 20 TABLET, DELAYED RELEASE ORAL at 17:02

## 2021-10-24 RX ADMIN — PANTOPRAZOLE SODIUM 40 MILLIGRAM(S): 20 TABLET, DELAYED RELEASE ORAL at 05:03

## 2021-10-24 RX ADMIN — Medication 667 MILLIGRAM(S): at 08:12

## 2021-10-24 RX ADMIN — MIDODRINE HYDROCHLORIDE 10 MILLIGRAM(S): 2.5 TABLET ORAL at 14:47

## 2021-10-24 RX ADMIN — Medication 5 UNIT(S): at 08:14

## 2021-10-24 RX ADMIN — MIDODRINE HYDROCHLORIDE 10 MILLIGRAM(S): 2.5 TABLET ORAL at 21:11

## 2021-10-24 RX ADMIN — CHLORHEXIDINE GLUCONATE 1 APPLICATION(S): 213 SOLUTION TOPICAL at 11:34

## 2021-10-24 RX ADMIN — MIDODRINE HYDROCHLORIDE 10 MILLIGRAM(S): 2.5 TABLET ORAL at 05:03

## 2021-10-24 RX ADMIN — Medication 667 MILLIGRAM(S): at 17:01

## 2021-10-24 RX ADMIN — LACTULOSE 15 GRAM(S): 10 SOLUTION ORAL at 21:11

## 2021-10-24 RX ADMIN — Medication 5 UNIT(S): at 11:33

## 2021-10-24 RX ADMIN — Medication 667 MILLIGRAM(S): at 11:32

## 2021-10-24 RX ADMIN — Medication 20 MILLIGRAM(S): at 05:02

## 2021-10-24 RX ADMIN — HEPARIN SODIUM 5000 UNIT(S): 5000 INJECTION INTRAVENOUS; SUBCUTANEOUS at 14:46

## 2021-10-24 RX ADMIN — HEPARIN SODIUM 5000 UNIT(S): 5000 INJECTION INTRAVENOUS; SUBCUTANEOUS at 05:01

## 2021-10-24 RX ADMIN — Medication 1: at 08:14

## 2021-10-24 RX ADMIN — CEFTRIAXONE 100 MILLIGRAM(S): 500 INJECTION, POWDER, FOR SOLUTION INTRAMUSCULAR; INTRAVENOUS at 16:03

## 2021-10-24 RX ADMIN — LACTULOSE 15 GRAM(S): 10 SOLUTION ORAL at 05:03

## 2021-10-24 RX ADMIN — LACTULOSE 15 GRAM(S): 10 SOLUTION ORAL at 14:47

## 2021-10-24 RX ADMIN — INSULIN GLARGINE 20 UNIT(S): 100 INJECTION, SOLUTION SUBCUTANEOUS at 21:11

## 2021-10-24 NOTE — PROGRESS NOTE ADULT - ASSESSMENT
IMPRESSION:    Acute resp failure sp extubation on NC  Hepatic encephalopathy improving   Decompensated liver cirrhosis.  Ascites SP peritoneal PleurX Catheter    Hematemesis resolved ( esophageal/ gastric varices)  Acute portal Vein thrombosis   Hepatocellular carcinoma with transaminitis on chemotherapy  HO Hepatitis C treated with INF  HO COPD  EBER           PLAN:    CNS: Avoid over sedation     HEENT: Oral care    PULMONARY:  HOB @ 45 degrees.  Continue O2 as necessary to maintain sats 92 to 94%,   US negative for significant pleural  Effusion     CARDIOVASCULAR: Avoid volume overload.     GI: GI prophylaxis. Feeding as per speech and swallow.  Lactulose and Rifaximin    RENAL:  Follow up with renal.    INFECTIOUS DISEASE: ABX PER ID      HEMATOLOGICAL:  DVT prophylaxis.  FU CBC and coags. Hematology f/u.     ENDOCRINE:  Follow up FS.  Avoid hypoglycemia    prognosis grave    OOB to chair     PT OT   Palliative f/u   floor

## 2021-10-24 NOTE — PROGRESS NOTE ADULT - ASSESSMENT
65 yo male, with h/o HTN, drug abuse, Hepatitis C s/p INF, Liver cirrhosis s/p [place] shunt, COPD, DVT? on Eliquis, hepatocellular carcinoma since January 2021 on chemotherapy, presented for weakness and decreased oral intake along with constipation and hematemesis with ammonia (188) on presentation.    Acute gastrointestinal hemorrhage due to variceal bleed  HCC  Decompensated liver cirrhosis due to HCV  Hepatic encephalopathy   Thrombocytopenia  Patient initially presented with worsening hepatic encephalopathy, NH3 on admission 188, hospital course complicated by hematemesis s/p intubation and emergent EGD10/4, s/p EVBL on propranolol   successfully extubated  patient clinically feels well, saturating 99% on 3L, taper down as tolerated   , resume Ceftriaxone for SBP prophylaxis until discharge    repeat Chest xray overall unchanged  seen by s/s, diet advanced   chemotherapy currently on hold given poor functional status, may be considered in the future if the patient improves  patient has a denver catheter due to frequent paracentesis, has had >9L removed (at baseline pt drains 2L Q2days at home)  s/p drainage 10/21 with 2L removed, no SBP on fluid analysis   drain 2L again today   do not remove >5L of fluid at a time, give albumin with paracentesis  started lasix 20mg daily for worsening LE edema, monitor renal fxn, discussed with GI  renal fxn slightly up. If continues to uptrend, hold lasix  Palliative team following     EBER - r/o hepatorenal syndrome v abdominal compartment syndrome   metabolic acidosis  Hypernatremia  -avoid nephrotoxics, s/p albumin challenge    -continue phoslo as per renal   - monitor BMP, nephrology following     Portal vein thrombosis  Left main and right portal vein thrombus was on eliquis previously  per GI--> tumor thrombus, no need to resume eliquis     Septic shock - resolved   - previously required levophed - d/c'ed on 10/10, now on midodrine  - DTA culture showed Stenotrophomonas, s/p course of levaquin  - monitor BP closely and resume pressors if indicated     COPD - no wheezing on exam    #Progress Note Handoff  Pending (specify):   clinical improvement,  improvement in Scr, PT f/u . Drain ascitic fluid 2L Q48H via denver cath. Patient would like to  his girlfriend while in the hospital. Palliative and  following to assist   Family discussion: Plan of care discussed with patient and significant other at bedside. Discussed the poor prognosis. Remains full code   Disposition:  from home, agreeable to SNF, requesting EGER    Prognosis is very poor    Megan Clark MD  s. [phone] [Negative] : Heme/Lymph [[id number]] : +left shoulder, low back pain

## 2021-10-24 NOTE — PROGRESS NOTE ADULT - SUBJECTIVE AND OBJECTIVE BOX
Over Night Events: events noted, afebrile      PHYSICAL EXAM    ICU Vital Signs Last 24 Hrs  T(C): 36.2 (24 Oct 2021 04:41), Max: 36.3 (23 Oct 2021 20:17)  T(F): 97.2 (24 Oct 2021 04:41), Max: 97.4 (23 Oct 2021 20:17)  HR: 72 (24 Oct 2021 04:41) (54 - 91)  BP: 110/70 (24 Oct 2021 04:41) (100/69 - 116/68)  BP(mean): 85 (23 Oct 2021 23:03) (80 - 87)  RR: 18 (24 Oct 2021 04:41) (18 - 20)  SpO2: 99% (24 Oct 2021 04:41) (96% - 99%)      General: ill looking  HEENT: ELENITA             Lungs: dec bs both bases  Cardiovascular: PANKAJ 2/6  Abdomen: Soft, Positive BS, distended  Extremities: No clubbing   Neurological: Non focal       10-22-21 @ 07:01  -  10-23-21 @ 07:00  --------------------------------------------------------  IN:  Total IN: 0 mL    OUT:    Indwelling Catheter - Urethral (mL): 350 mL  Total OUT: 350 mL    Total NET: -350 mL      10-23-21 @ 07:01  -  10-24-21 @ 06:52  --------------------------------------------------------  IN:  Total IN: 0 mL    OUT:    Indwelling Catheter - Urethral (mL): 800 mL  Total OUT: 800 mL    Total NET: -800 mL          LABS:                          10.2   11.40 )-----------( 76       ( 23 Oct 2021 04:30 )             33.8                                               10-23    146  |  113<H>  |  86<HH>  ----------------------------<  73  4.8   |  18  |  1.7<H>    Ca    8.2<L>      23 Oct 2021 04:30  Mg     2.4     10-23                                                                                                                                                                                                                           MEDICATIONS  (STANDING):  budesonide  80 MICROgram(s)/formoterol 4.5 MICROgram(s) Inhaler 2 Puff(s) Inhalation two times a day  calcium acetate 667 milliGRAM(s) Oral three times a day with meals  chlorhexidine 4% Liquid 1 Application(s) Topical daily  dextrose 40% Gel 15 Gram(s) Oral once  dextrose 5%. 1000 milliLiter(s) (50 mL/Hr) IV Continuous <Continuous>  dextrose 5%. 1000 milliLiter(s) (100 mL/Hr) IV Continuous <Continuous>  dextrose 50% Injectable 25 Gram(s) IV Push once  dextrose 50% Injectable 12.5 Gram(s) IV Push once  dextrose 50% Injectable 25 Gram(s) IV Push once  furosemide    Tablet 20 milliGRAM(s) Oral daily  glucagon  Injectable 1 milliGRAM(s) IntraMuscular once  heparin   Injectable 5000 Unit(s) SubCutaneous every 8 hours  insulin glargine Injectable (LANTUS) 20 Unit(s) SubCutaneous at bedtime  insulin lispro (ADMELOG) corrective regimen sliding scale   SubCutaneous three times a day before meals  insulin lispro Injectable (ADMELOG) 5 Unit(s) SubCutaneous three times a day before meals  lactulose Syrup 15 Gram(s) Oral three times a day  levoFLOXacin IVPB 750 milliGRAM(s) IV Intermittent every 48 hours  midodrine 10 milliGRAM(s) Oral every 8 hours  pantoprazole  Injectable 40 milliGRAM(s) IV Push every 12 hours  propranolol 5 milliGRAM(s) Oral daily  rifAXIMin 550 milliGRAM(s) Oral two times a day    MEDICATIONS  (PRN):      Xrays:     reviewed

## 2021-10-24 NOTE — CHART NOTE - NSCHARTNOTEFT_GEN_A_CORE
SUZANNE BAZZI 66y Male  MRN#: [id number]   CODE STATUS: Full code    Hospital Day: 20d    Pt is currently admitted with the primary diagnosis of hepatic encephalopathy    Transfer Note:  65 yo male, with h/o HTN, drug abuse, Hepatitis C s/p INF, Liver cirrhosis s/p [place] shunt, COPD, DVT? on Eliquis, hepatocellular carcinoma since January 2021 on chemotherapy, presented for weakness and decreased oral intake along with constipation and hematemasis with ammonia (188) on presentation. Patient was found in VT and was shocked back to sinus rhythm, Patient was intubated and had urgent endoscopy done that showed variceal bleeding that was ligated. Patient was started on cefepime and fagyl. The patient was stabilized, transfused with 2 units of pRBC and remained in CCU. On 10/10, the patient was drained from his [place] cath (3L) and another 2 L were drained from his [place] cath on 10/11. Patient was successfully extubated on 10/13 and was continued on lactulose and rifaximin for hepatic encephalopathy. He as downgraded to CEU on 10/15. He developed an EBER (Cr up to 2.4) as he had build up of fluids in abdomen due to non-drainage. The patient was drained on Monday 10/18 (3 liters) after which Cr started trending down (1.4 lowest).   GOC discussed with patient and girlfriend in lieu of poor prognosis. They  were insistent to keep patient full code and wished they be  despite the poor prognosis of the patient.    Present Today:   - Lorenz:  No [  ], Yes [ x  ] : Indication: retention    - Type of IV Access:       .. CVC/Piccline:  No [  x], Yes [   ] : Indication:       .. Midline: No [x  ], Yes [   ] : Indication:                                             ----------------------------------------------------------  OBJECTIVE  PAST MEDICAL & SURGICAL HISTORY  HTN (hypertension)    Hepatitis C  2007    Drug abuse    Cancer, hepatocellular  January 2021    COPD, mild                                              -----------------------------------------------------------  ALLERGIES:  No Known Allergies                                            ------------------------------------------------------------    HOME MEDICATIONS  Home Medications:  Eliquis 5 mg oral tablet: 1 tab(s) orally 2 times a day (04 Oct 2021 06:13)  fluticasone-salmeterol 55 mcg-14 mcg/inh inhalation powder: 1 puff(s) inhaled 2 times a day (04 Oct 2021 06:14)  Protonix 40 mg oral delayed release tablet: 1 tab(s) orally once a day (04 Oct 2021 06:13)  spironolactone 50 mg oral tablet: 1 tab(s) orally once a day (04 Oct 2021 06:13)                           MEDICATIONS:  STANDING MEDICATIONS  budesonide  80 MICROgram(s)/formoterol 4.5 MICROgram(s) Inhaler 2 Puff(s) Inhalation two times a day  calcium acetate 667 milliGRAM(s) Oral three times a day with meals  chlorhexidine 4% Liquid 1 Application(s) Topical daily  dextrose 40% Gel 15 Gram(s) Oral once  dextrose 5%. 1000 milliLiter(s) IV Continuous <Continuous>  dextrose 5%. 1000 milliLiter(s) IV Continuous <Continuous>  dextrose 50% Injectable 25 Gram(s) IV Push once  dextrose 50% Injectable 12.5 Gram(s) IV Push once  dextrose 50% Injectable 25 Gram(s) IV Push once  furosemide    Tablet 20 milliGRAM(s) Oral daily  glucagon  Injectable 1 milliGRAM(s) IntraMuscular once  heparin   Injectable 5000 Unit(s) SubCutaneous every 8 hours  insulin glargine Injectable (LANTUS) 20 Unit(s) SubCutaneous at bedtime  insulin lispro (ADMELOG) corrective regimen sliding scale   SubCutaneous three times a day before meals  insulin lispro Injectable (ADMELOG) 5 Unit(s) SubCutaneous three times a day before meals  lactulose Syrup 15 Gram(s) Oral three times a day  levoFLOXacin IVPB 750 milliGRAM(s) IV Intermittent every 48 hours  midodrine 10 milliGRAM(s) Oral every 8 hours  pantoprazole  Injectable 40 milliGRAM(s) IV Push every 12 hours  propranolol 5 milliGRAM(s) Oral daily  rifAXIMin 550 milliGRAM(s) Oral two times a day    PRN MEDICATIONS                                            ------------------------------------------------------------  VITAL SIGNS: Last 24 Hours  T(C): 35.8 (24 Oct 2021 07:55), Max: 36.3 (23 Oct 2021 20:17)  T(F): 96.4 (24 Oct 2021 07:55), Max: 97.4 (23 Oct 2021 20:17)  HR: 70 (24 Oct 2021 07:55) (70 - 91)  BP: 116/63 (24 Oct 2021 07:55) (100/69 - 116/63)  BP(mean): 85 (23 Oct 2021 23:03) (80 - 86)  RR: 18 (24 Oct 2021 07:55) (18 - 20)  SpO2: 99% (24 Oct 2021 07:55) (96% - 99%)      10-23-21 @ 07:01  -  10-24-21 @ 07:00  --------------------------------------------------------  IN: 0 mL / OUT: 800 mL / NET: -800 mL                                             --------------------------------------------------------------  LABS:                        9.6    12.07 )-----------( 110      ( 24 Oct 2021 04:30 )             32.4     10-24    145  |  109  |  83<HH>  ----------------------------<  157<H>  4.6   |  22  |  1.7<H>    Ca    8.3<L>      24 Oct 2021 04:30  Mg     2.3     10-24    TPro  5.8<L>  /  Alb  3.2<L>  /  TBili  2.0<H>  /  DBili  x   /  AST  40  /  ALT  19  /  AlkPhos  142<H>  10-24                                                              -------------------------------------------------------------  RADIOLOGY:                                            --------------------------------------------------------------    PHYSICAL EXAM:  General: alert oriented  HEENT: non remarkable  LUNGS: decreased air sounds on bases  HEART: RRR  ABDOMEN: distended (more today)  EXT: edema up to thighs                                             --------------------------------------------------------------    ASSESSMENT & PLAN    65 yo male, with h/o HTN, drug abuse, Hepatitis C s/p INF, Liver cirrhosis s/p [place] shunt, COPD, DVT? on Eliquis, hepatocellular carcinoma since January 2021 on chemotherapy, presented for weakness and decreased oral intake along with constipation and hematemasis with ammonia (188) on presentation, Impression: Hepatic Encephalopathy    1- Hepatic Encephalopathy:  - Ammonia 188 on presentation  - Patient on rifaximin and lactulose to aim for 3 BM/day  - Mental status improved  - Patient had ascites that was drained through a [place] catheter, removed > 7 liters of fluid, patient expected to build up again in light of his liver decompensation (cirrhosis)  - Given albumin for 3 consecutive days (10/18-10/20)  - Abdomen distended today and Cr increased to 1.7, will remove 2 liters today  - No SBP based on fluid studies taken on 10/20, need only for prophylaxis  - IR consulted for TIPS: recommended against due to multiple contraindications and MELD score >20    2- Variceal Bleed (portal HTN)  - Patient was intubated for airway protection, now extubated  - Hematemesis that was followed by an emergent EGD that showed variceal bleeds that were ligated (in esophagus)  - Patient started on abx prophylaxis after bleed and prophylaxis for SBP: ceftriaxone  - Propranolol 5 mg daily  - Protonix 40 mg IV BID  - Follow up Hb closely     3- HCC:  - Diagnosed in Jan 2021  - Was on chemotherapy to which he developed transaminitis  - Heme/Onc following the patient and recommended:  At present only palliative care.  If his performance status improves with nutrition and physical therapy, we will reevaluate for cancer therapy.  Please let the  see the patient for help with his health insurance issues  - Will follow up with Heme/onc    4- Septic shock:  - Patient had septic shock to which he was started on levophed on 10/4 and was stopped on the 10th of Oct  - Blood cultures are negative so far  - DTA culture showed Stenotrophomonas that is sensitive to levofloxacin: Levofloxacin started on 10/14 (follow up with ID, reasonable to stop at day 10)  - Now resolved    5- Portal Vein Thrombosis:  - Patient currently on prophylactic dose of Heparin  - No bleeding since hematemesis, called the GI service and was told that Portal Vein is invaded by the thrombus itself and not a clot and therefore there is no need for full anticoagulation    6- COPD:  - patient on budesonide/formoterol nebulization    7- Hep C:  - treated with INF    8- EBER:  - Creatinine trending down today (Cr: 1.6)  - Likely due to abdominal compartment syndrome as Cr trended down with ascites drainage  - midodrine 10mg q 8 hr (started on 10/15)  - Albumin given on friday (10/15) monday (10/18), tuesday (10/19) and wednesday (10/20)    9- VT:  - patient had VT on presentation to which he was shocked   - Hyperkalemia at that point was treated and resolved    10- Thrombocytopenia  - HIT Ab negative  - Likely due to cirrhosis                                                                                    ----------------------------------------------------  # DVT prophylaxis Heparin     # GI prophylaxis Protonix     # Diet: dysphagia 3 nectar thick     # Activity Score (AM-PAC)    # Code status Full code    # Disposition: transfer to a regular floor SUZANNE BAZZI 66y Male  MRN#: [id number]   CODE STATUS: Full code    Hospital Day: 20d    Pt is currently admitted with the primary diagnosis of hepatic encephalopathy    Transfer Note:  65 yo male, with h/o HTN, drug abuse, Hepatitis C s/p INF, Liver cirrhosis s/p [place] shunt, COPD, DVT? on Eliquis, hepatocellular carcinoma since January 2021 on chemotherapy, presented for weakness and decreased oral intake along with constipation and hematemasis with ammonia (188) on presentation. Patient was found in VT and was shocked back to sinus rhythm, Patient was intubated and had urgent endoscopy done that showed variceal bleeding that was ligated. Patient was started on cefepime and fagyl. The patient was stabilized, transfused with 2 units of pRBC and remained in CCU. On 10/10, the patient was drained from his [place] cath (3L) and another 2 L were drained from his [place] cath on 10/11. Patient was successfully extubated on 10/13 and was continued on lactulose and rifaximin for hepatic encephalopathy. He as downgraded to CEU on 10/15. He developed an EBER (Cr up to 2.4) as he had build up of fluids in abdomen due to non-drainage. The patient was drained on Monday 10/18 (3 liters) after which Cr started trending down (1.4 lowest).   GOC discussed with patient and girlfriend in lieu of poor prognosis. They  were insistent to keep patient full code and wished they be  despite the poor prognosis of the patient.    Present Today:   - Lorenz:  No [  ], Yes [ x  ] : Indication: retention    - Type of IV Access:       .. CVC/Piccline:  No [  x], Yes [   ] : Indication:       .. Midline: No [x  ], Yes [   ] : Indication:                                             ----------------------------------------------------------  OBJECTIVE  PAST MEDICAL & SURGICAL HISTORY  HTN (hypertension)    Hepatitis C  2007    Drug abuse    Cancer, hepatocellular  January 2021    COPD, mild                                              -----------------------------------------------------------  ALLERGIES:  No Known Allergies                                            ------------------------------------------------------------    HOME MEDICATIONS  Home Medications:  Eliquis 5 mg oral tablet: 1 tab(s) orally 2 times a day (04 Oct 2021 06:13)  fluticasone-salmeterol 55 mcg-14 mcg/inh inhalation powder: 1 puff(s) inhaled 2 times a day (04 Oct 2021 06:14)  Protonix 40 mg oral delayed release tablet: 1 tab(s) orally once a day (04 Oct 2021 06:13)  spironolactone 50 mg oral tablet: 1 tab(s) orally once a day (04 Oct 2021 06:13)                           MEDICATIONS:  STANDING MEDICATIONS  budesonide  80 MICROgram(s)/formoterol 4.5 MICROgram(s) Inhaler 2 Puff(s) Inhalation two times a day  calcium acetate 667 milliGRAM(s) Oral three times a day with meals  chlorhexidine 4% Liquid 1 Application(s) Topical daily  dextrose 40% Gel 15 Gram(s) Oral once  dextrose 5%. 1000 milliLiter(s) IV Continuous <Continuous>  dextrose 5%. 1000 milliLiter(s) IV Continuous <Continuous>  dextrose 50% Injectable 25 Gram(s) IV Push once  dextrose 50% Injectable 12.5 Gram(s) IV Push once  dextrose 50% Injectable 25 Gram(s) IV Push once  furosemide    Tablet 20 milliGRAM(s) Oral daily  glucagon  Injectable 1 milliGRAM(s) IntraMuscular once  heparin   Injectable 5000 Unit(s) SubCutaneous every 8 hours  insulin glargine Injectable (LANTUS) 20 Unit(s) SubCutaneous at bedtime  insulin lispro (ADMELOG) corrective regimen sliding scale   SubCutaneous three times a day before meals  insulin lispro Injectable (ADMELOG) 5 Unit(s) SubCutaneous three times a day before meals  lactulose Syrup 15 Gram(s) Oral three times a day  levoFLOXacin IVPB 750 milliGRAM(s) IV Intermittent every 48 hours  midodrine 10 milliGRAM(s) Oral every 8 hours  pantoprazole  Injectable 40 milliGRAM(s) IV Push every 12 hours  propranolol 5 milliGRAM(s) Oral daily  rifAXIMin 550 milliGRAM(s) Oral two times a day    PRN MEDICATIONS                                            ------------------------------------------------------------  VITAL SIGNS: Last 24 Hours  T(C): 35.8 (24 Oct 2021 07:55), Max: 36.3 (23 Oct 2021 20:17)  T(F): 96.4 (24 Oct 2021 07:55), Max: 97.4 (23 Oct 2021 20:17)  HR: 70 (24 Oct 2021 07:55) (70 - 91)  BP: 116/63 (24 Oct 2021 07:55) (100/69 - 116/63)  BP(mean): 85 (23 Oct 2021 23:03) (80 - 86)  RR: 18 (24 Oct 2021 07:55) (18 - 20)  SpO2: 99% (24 Oct 2021 07:55) (96% - 99%)      10-23-21 @ 07:01  -  10-24-21 @ 07:00  --------------------------------------------------------  IN: 0 mL / OUT: 800 mL / NET: -800 mL                                             --------------------------------------------------------------  LABS:                        9.6    12.07 )-----------( 110      ( 24 Oct 2021 04:30 )             32.4     10-24    145  |  109  |  83<HH>  ----------------------------<  157<H>  4.6   |  22  |  1.7<H>    Ca    8.3<L>      24 Oct 2021 04:30  Mg     2.3     10-24    TPro  5.8<L>  /  Alb  3.2<L>  /  TBili  2.0<H>  /  DBili  x   /  AST  40  /  ALT  19  /  AlkPhos  142<H>  10-24                                                              -------------------------------------------------------------  RADIOLOGY:                                            --------------------------------------------------------------    PHYSICAL EXAM:  General: alert oriented  HEENT: non remarkable  LUNGS: decreased air sounds on bases  HEART: RRR  ABDOMEN: distended (more today)  EXT: edema up to thighs                                             --------------------------------------------------------------    ASSESSMENT & PLAN    65 yo male, with h/o HTN, drug abuse, Hepatitis C s/p INF, Liver cirrhosis s/p [place] shunt, COPD, DVT? on Eliquis, hepatocellular carcinoma since January 2021 on chemotherapy, presented for weakness and decreased oral intake along with constipation and hematemasis with ammonia (188) on presentation, Impression: Hepatic Encephalopathy    1- Hepatic Encephalopathy:  - Ammonia 188 on presentation  - Patient on rifaximin and lactulose to aim for 3 BM/day  - Mental status improved  - Patient had ascites that was drained through a [place] catheter, removed > 7 liters of fluid, patient expected to build up again in light of his liver decompensation (cirrhosis)  - Given albumin for 3 consecutive days (10/18-10/20)  - Abdomen distended today and Cr increased to 1.7, will remove 2 liters today  - No SBP based on fluid studies taken on 10/20, need only for prophylaxis  - IR consulted for TIPS: recommended against due to multiple contraindications and MELD score >20  - Patient usually drains 2 L of fluid from [place] cath, need to do that during hospital stay    2- Variceal Bleed (portal HTN)  - Patient was intubated for airway protection, now extubated  - Hematemesis that was followed by an emergent EGD that showed variceal bleeds that were ligated (in esophagus)  - Patient started on abx prophylaxis after bleed and prophylaxis for SBP: ceftriaxone  - Propranolol 5 mg daily  - Protonix 40 mg IV BID  - Follow up Hb closely     3- HCC:  - Diagnosed in Jan 2021  - Was on chemotherapy to which he developed transaminitis  - Heme/Onc following the patient and recommended:  At present only palliative care.  If his performance status improves with nutrition and physical therapy, we will reevaluate for cancer therapy.  Please let the  see the patient for help with his health insurance issues  - Will follow up with Heme/onc    4- Septic shock:  - Patient had septic shock to which he was started on levophed on 10/4 and was stopped on the 10th of Oct  - Blood cultures are negative so far  - DTA culture showed Stenotrophomonas that is sensitive to levofloxacin: Levofloxacin started on 10/14, will stop today 10/24 --> 10 days  - Now resolved    5- Portal Vein Thrombosis:  - Patient currently on prophylactic dose of Heparin  - No bleeding since hematemesis, called the GI service and was told that Portal Vein is invaded by the thrombus itself and not a clot and therefore there is no need for full anticoagulation    6- COPD:  - patient on budesonide/formoterol nebulization    7- Hep C:  - treated with INF    8- EBER:  - Creatinine trending down today (Cr: 1.6)  - Likely due to abdominal compartment syndrome as Cr trended down with ascites drainage  - midodrine 10mg q 8 hr (started on 10/15)  - Albumin given on friday (10/15) monday (10/18), tuesday (10/19) and wednesday (10/20)    9- VT:  - patient had VT on presentation to which he was shocked   - Hyperkalemia at that point was treated and resolved    10- Thrombocytopenia  - HIT Ab negative  - Likely due to cirrhosis                                                                                    ----------------------------------------------------  # DVT prophylaxis Heparin     # GI prophylaxis Protonix     # Diet: dysphagia 3 nectar thick     # Activity Score (AM-PAC)    # Code status Full code    # Disposition: transfer to a regular floor

## 2021-10-25 LAB
ANION GAP SERPL CALC-SCNC: 12 MMOL/L — SIGNIFICANT CHANGE UP (ref 7–14)
BUN SERPL-MCNC: 80 MG/DL — CRITICAL HIGH (ref 10–20)
CALCIUM SERPL-MCNC: 8.3 MG/DL — LOW (ref 8.5–10.1)
CHLORIDE SERPL-SCNC: 109 MMOL/L — SIGNIFICANT CHANGE UP (ref 98–110)
CO2 SERPL-SCNC: 22 MMOL/L — SIGNIFICANT CHANGE UP (ref 17–32)
CREAT SERPL-MCNC: 1.8 MG/DL — HIGH (ref 0.7–1.5)
GLUCOSE BLDC GLUCOMTR-MCNC: 122 MG/DL — HIGH (ref 70–99)
GLUCOSE BLDC GLUCOMTR-MCNC: 141 MG/DL — HIGH (ref 70–99)
GLUCOSE BLDC GLUCOMTR-MCNC: 155 MG/DL — HIGH (ref 70–99)
GLUCOSE BLDC GLUCOMTR-MCNC: 175 MG/DL — HIGH (ref 70–99)
GLUCOSE BLDC GLUCOMTR-MCNC: 204 MG/DL — HIGH (ref 70–99)
GLUCOSE BLDC GLUCOMTR-MCNC: 60 MG/DL — LOW (ref 70–99)
GLUCOSE BLDC GLUCOMTR-MCNC: 98 MG/DL — SIGNIFICANT CHANGE UP (ref 70–99)
GLUCOSE SERPL-MCNC: 107 MG/DL — HIGH (ref 70–99)
HCT VFR BLD CALC: 32.8 % — LOW (ref 42–52)
HGB BLD-MCNC: 9.9 G/DL — LOW (ref 14–18)
MAGNESIUM SERPL-MCNC: 2.3 MG/DL — SIGNIFICANT CHANGE UP (ref 1.8–2.4)
MCHC RBC-ENTMCNC: 30.2 G/DL — LOW (ref 32–37)
MCHC RBC-ENTMCNC: 30.2 PG — SIGNIFICANT CHANGE UP (ref 27–31)
MCV RBC AUTO: 100 FL — HIGH (ref 80–94)
NRBC # BLD: 0 /100 WBCS — SIGNIFICANT CHANGE UP (ref 0–0)
PLATELET # BLD AUTO: 111 K/UL — LOW (ref 130–400)
POTASSIUM SERPL-MCNC: 5.6 MMOL/L — HIGH (ref 3.5–5)
POTASSIUM SERPL-SCNC: 5.6 MMOL/L — HIGH (ref 3.5–5)
RBC # BLD: 3.28 M/UL — LOW (ref 4.7–6.1)
RBC # FLD: 23.9 % — HIGH (ref 11.5–14.5)
SARS-COV-2 RNA SPEC QL NAA+PROBE: SIGNIFICANT CHANGE UP
SODIUM SERPL-SCNC: 143 MMOL/L — SIGNIFICANT CHANGE UP (ref 135–146)
WBC # BLD: 10.41 K/UL — SIGNIFICANT CHANGE UP (ref 4.8–10.8)
WBC # FLD AUTO: 10.41 K/UL — SIGNIFICANT CHANGE UP (ref 4.8–10.8)

## 2021-10-25 PROCEDURE — 99233 SBSQ HOSP IP/OBS HIGH 50: CPT

## 2021-10-25 RX ORDER — SODIUM ZIRCONIUM CYCLOSILICATE 10 G/10G
5 POWDER, FOR SUSPENSION ORAL ONCE
Refills: 0 | Status: COMPLETED | OUTPATIENT
Start: 2021-10-25 | End: 2021-10-25

## 2021-10-25 RX ORDER — DEXTROSE 50 % IN WATER 50 %
50 SYRINGE (ML) INTRAVENOUS ONCE
Refills: 0 | Status: COMPLETED | OUTPATIENT
Start: 2021-10-25 | End: 2021-10-25

## 2021-10-25 RX ORDER — INSULIN HUMAN 100 [IU]/ML
10 INJECTION, SOLUTION SUBCUTANEOUS ONCE
Refills: 0 | Status: COMPLETED | OUTPATIENT
Start: 2021-10-25 | End: 2021-10-25

## 2021-10-25 RX ADMIN — Medication 667 MILLIGRAM(S): at 17:08

## 2021-10-25 RX ADMIN — CEFTRIAXONE 100 MILLIGRAM(S): 500 INJECTION, POWDER, FOR SOLUTION INTRAMUSCULAR; INTRAVENOUS at 17:07

## 2021-10-25 RX ADMIN — SODIUM ZIRCONIUM CYCLOSILICATE 5 GRAM(S): 10 POWDER, FOR SUSPENSION ORAL at 17:55

## 2021-10-25 RX ADMIN — BUDESONIDE AND FORMOTEROL FUMARATE DIHYDRATE 2 PUFF(S): 160; 4.5 AEROSOL RESPIRATORY (INHALATION) at 07:43

## 2021-10-25 RX ADMIN — Medication 5 UNIT(S): at 11:26

## 2021-10-25 RX ADMIN — INSULIN HUMAN 10 UNIT(S): 100 INJECTION, SOLUTION SUBCUTANEOUS at 17:54

## 2021-10-25 RX ADMIN — Medication 50 MILLILITER(S): at 17:54

## 2021-10-25 RX ADMIN — CHLORHEXIDINE GLUCONATE 1 APPLICATION(S): 213 SOLUTION TOPICAL at 11:57

## 2021-10-25 RX ADMIN — LACTULOSE 15 GRAM(S): 10 SOLUTION ORAL at 22:00

## 2021-10-25 RX ADMIN — MIDODRINE HYDROCHLORIDE 10 MILLIGRAM(S): 2.5 TABLET ORAL at 22:00

## 2021-10-25 RX ADMIN — Medication 5 UNIT(S): at 07:58

## 2021-10-25 RX ADMIN — Medication 667 MILLIGRAM(S): at 08:59

## 2021-10-25 RX ADMIN — MIDODRINE HYDROCHLORIDE 10 MILLIGRAM(S): 2.5 TABLET ORAL at 13:04

## 2021-10-25 RX ADMIN — LACTULOSE 15 GRAM(S): 10 SOLUTION ORAL at 13:05

## 2021-10-25 RX ADMIN — HEPARIN SODIUM 5000 UNIT(S): 5000 INJECTION INTRAVENOUS; SUBCUTANEOUS at 22:00

## 2021-10-25 RX ADMIN — LACTULOSE 15 GRAM(S): 10 SOLUTION ORAL at 05:21

## 2021-10-25 RX ADMIN — Medication 5 UNIT(S): at 16:32

## 2021-10-25 RX ADMIN — PANTOPRAZOLE SODIUM 40 MILLIGRAM(S): 20 TABLET, DELAYED RELEASE ORAL at 05:22

## 2021-10-25 RX ADMIN — HEPARIN SODIUM 5000 UNIT(S): 5000 INJECTION INTRAVENOUS; SUBCUTANEOUS at 05:21

## 2021-10-25 RX ADMIN — MIDODRINE HYDROCHLORIDE 10 MILLIGRAM(S): 2.5 TABLET ORAL at 05:22

## 2021-10-25 RX ADMIN — PANTOPRAZOLE SODIUM 40 MILLIGRAM(S): 20 TABLET, DELAYED RELEASE ORAL at 17:08

## 2021-10-25 RX ADMIN — BUDESONIDE AND FORMOTEROL FUMARATE DIHYDRATE 2 PUFF(S): 160; 4.5 AEROSOL RESPIRATORY (INHALATION) at 20:47

## 2021-10-25 RX ADMIN — Medication 1: at 11:26

## 2021-10-25 RX ADMIN — HEPARIN SODIUM 5000 UNIT(S): 5000 INJECTION INTRAVENOUS; SUBCUTANEOUS at 13:05

## 2021-10-25 RX ADMIN — Medication 667 MILLIGRAM(S): at 11:57

## 2021-10-25 RX ADMIN — Medication 20 MILLIGRAM(S): at 05:24

## 2021-10-25 NOTE — PROGRESS NOTE ADULT - ASSESSMENT
65 yo male, with h/o HTN, drug abuse, Hepatitis C s/p INF, Liver cirrhosis s/p [place] shunt, COPD, DVT? on Eliquis, hepatocellular carcinoma since January 2021 on chemotherapy, presented for weakness and decreased oral intake along with constipation and hematemesis with ammonia (188) on presentation.    Acute gastrointestinal hemorrhage due to variceal bleed  HCC  Decompensated liver cirrhosis due to HCV  Hepatic encephalopathy   Thrombocytopenia  Patient initially presented with worsening hepatic encephalopathy, NH3 on admission 188, hospital course complicated by hematemesis s/p intubation and emergent EGD10/4, s/p EVBL on propranolol   successfully extubated  patient clinically feels well, saturating 99% on 3L, taper down as tolerated   , resume Ceftriaxone for SBP prophylaxis until discharge    repeat Chest xray overall unchanged  seen by s/s, diet advanced   chemotherapy currently on hold given poor functional status, may be considered in the future if the patient improves  patient has a denver catheter due to frequent paracentesis, has had >9L removed (at baseline pt drains 2L Q2days at home), fluid drain on 10/26  s/p drainage 10/21 with 2L removed, no SBP on fluid analysis   drain 2L again today   do not remove >5L of fluid at a time, give albumin with paracentesis  started lasix 20mg daily for worsening LE edema, monitor renal fxn, discussed with GI  renal fxn slightly up. If continues to uptrend, hold lasix  Palliative team following     EBER - r/o hepatorenal syndrome v abdominal compartment syndrome   metabolic acidosis  Hypernatremia  -avoid nephrotoxics, s/p albumin challenge    -continue phoslo as per renal   - monitor BMP, nephrology following  - stable 1.8 today     #hyperkalemia- 5.6 today. will give insulin/glucose and lokelma- follow up cmp in evening      Portal vein thrombosis  Left main and right portal vein thrombus was on eliquis previously  per GI--> tumor thrombus, no need to resume eliquis     Septic shock - resolved   - previously required levophed - d/c'ed on 10/10, now on midodrine  - DTA culture showed Stenotrophomonas, s/p course of levaquin  - monitor BP closely and resume pressors if indicated     COPD - no wheezing on exam    #Progress Note Handoff  Pending (specify):   clinical improvement,  improvement in Scr, PT f/u . Drain ascitic fluid 2L Q48H via denver cath. Patient would like to  his girlfriend while in the hospital. Palliative and  following to assist   Family discussion: Plan of care discussed with patient and significant other at bedside.   Disposition:  from home, agreeable to SNF, requesting [facility]

## 2021-10-25 NOTE — CHART NOTE - NSCHARTNOTEFT_GEN_A_CORE
PALLIATIVE MEDICINE INTERDISCIPLINARY TEAM NOTE    Provider:  [   ]Social Work   [   ]          [   ] Initial visit [   ] Follow up    Family or contact name / phone #   Met with: [   ] Patient  [   ] Family  [   ] Other:    Primary Language: [   ] English [   ] Other*:                      *Interpretation provided by:    SUPPORT DIAGNOSES            (Check all that apply)  [   ] Psychosocial spiritual assessment (PSSA)  [   ] EOL issues  [   ] Cultural / spiritual concerns  [   ] Pain / suffering  [   ] Dementia / AMS  [   ] Other:  [   ] AD issues  [   ] Grief / loss / sadness  [   ] Discharge issues  [   ] Distress / coping    PSYCHOSOCIAL ASSESSMENT OF PATIENT         (Check all that apply)  [   ] Initial Assessment            [   ] Reassessment          [   ] Not Applicable this visit    Pain/suffering acuity:  [   ] None to mild (0-3)           [   ] Moderate (4-6)        [   ] High (7-10)    Mental Status:  [   ] Alert/oriented (x3)          [   ] Confused/Altered(x2/x1)         [   ] Non-resp    Functional status:  [   ] Independent w ADLs      [   ] Needs Assistance             [   ] Bedbound/Full Care    Coping:  [   ] Coping well                     [   ] Coping w/difficulty            [   ] Poor coping    Support system:  [   ] Strong                              [   ] Adequate                        [   ] Inadequate    SPIRITUAL ASSESSMENT  Advent/Spiritual practice: __Catholic_________________________    Role of organized Anglican:  [   ] Important                     [   ] Some (fam tradition, cultural)               [ x  ] None    Effects on medical care:  [   ] Yes, _____________________________________                         [ x  ] None    Cultural/Anabaptism need:  [   ] Yes, _____________________________________                         [ x  ] None    Refer to Pastoral Care:  [   ] Yes           [   ] No, not at this time    SERVICE PROVIDED  [   ]PSSA                                                                             [   ]Discharge support / facilitation  [   ]AD / goals of care counseling                                  [   ]EOL / death / bereavement counseling  [   ]Counseling / support                                                [   ] Family meeting  [ x  ]Prayer / sacrament / ritual                                      [   ] Referral   [   ]Other                                                                       NOTE and Plan of Care (PoC): Pt seems to be worsening and was very lethargic at time of visit. I spoke to his significant other as she expressed feelings fo fatigue frustration, loneliness and anticipatory grief. She broke down as I tried to held her identify coping mechanism.

## 2021-10-25 NOTE — CHART NOTE - NSCHARTNOTEFT_GEN_A_CORE
Pt wants to go home or [facility] for rehab  Afebrile  Tolerating PO diet but ? adequacy of intake    T(F): 96.3 (10-25-21 @ 05:00), Max: 98.1 (10-24-21 @ 21:00)  HR: 78 (10-25-21 @ 05:00) (73 - 78)  BP: 101/70 (10-25-21 @ 05:00) (94/59 - 101/70)  RR: 18 (10-25-21 @ 05:00) (18 - 20)  SpO2: 98% (10-25-21 @ 08:29) (96% - 99%)    I&O's Detail    24 Oct 2021 07:01  -  25 Oct 2021 07:00  --------------------------------------------------------  IN:  Total IN: 0 mL    OUT:    Drain (mL): 2000 mL    Indwelling Catheter - Urethral (mL): 500 mL  Total OUT: 2500 mL    Total NET: -2500 mL    10-25    143  |  109  |  80<HH>  ----------------------------<  107<H>  5.6<H>   |  22  |  1.8<H>    Ca    8.3<L>      25 Oct 2021 04:30  Mg     2.3     10-25    TPro  5.8<L>  /  Alb  3.2<L>  /  TBili  2.0<H>  /  DBili  x   /  AST  40  /  ALT  19  /  AlkPhos  142<H>  10-24                        9.9    10.41 )-----------( 111      ( 25 Oct 2021 04:30 )             32.8     CAPILLARY BLOOD GLUCOSE  POCT Blood Glucose.: 175 mg/dL (25 Oct 2021 11:15)  POCT Blood Glucose.: 141 mg/dL (25 Oct 2021 07:52)  POCT Blood Glucose.: 103 mg/dL (24 Oct 2021 21:02)  POCT Blood Glucose.: 77 mg/dL (24 Oct 2021 17:10)  POCT Blood Glucose.: 63 mg/dL (24 Oct 2021 16:56)    Diet, Dysphagia 3 Soft-Nectar Consistency Fluid (10-21-21 @ 16:10)    ASSESSMENT  65 yo male, with h/o HTN, drug abuse, Hepatitis C s/p INF, Liver cirrhosis s/p [place] shunt, COPD, DVT? on Eliquis, HCC since January 2021 on chemotherapy, presented for weakness    - advanced hepatocellular carcinoma with transaminitis on chemo  - decompensated cirrhosis, ascites with [place] cath  - hematemesis 2nd esophageal, gastric varices  - severe anemia secondary to variceal bleed  - EBER  - hyperkalemia with sustained VT s/p shock  - hep C  - fevers, leukocytosis  - COPD  - sepsis without clear source, resolved    PLAN  - PO diet as tolerated per speech, would give Low salt dietary restriction  - check caleb cts X 2d  - will follow

## 2021-10-25 NOTE — SWALLOW BEDSIDE ASSESSMENT ADULT - DIET PRIOR TO ADMI
regular w thins
regular with thin liquids
regular with thin liquids
soft, thins as per pt's report
regular with thin liquids

## 2021-10-25 NOTE — SWALLOW BEDSIDE ASSESSMENT ADULT - SWALLOW EVAL: DIAGNOSIS
mild oral dysphagia for soft, +toleration for nectar thick liquids; +suspected pharyngeal dysphagia for thin liquids

## 2021-10-25 NOTE — PROGRESS NOTE ADULT - SUBJECTIVE AND OBJECTIVE BOX
Pt seen and examined at bedside. No CP or SOB. Pt states that he feels weak today.       PAST MEDICAL & SURGICAL HISTORY:  HTN (hypertension)    Hepatitis C  2007    Drug abuse    Cancer, hepatocellular  January 2021    COPD, mild        VITAL SIGNS (Last 24 hrs):  T(C): 35.8 (10-25-21 @ 16:27), Max: 36.7 (10-24-21 @ 21:00)  HR: 61 (10-25-21 @ 16:27) (61 - 78)  BP: 149/68 (10-25-21 @ 16:27) (94/59 - 149/68)  RR: 20 (10-25-21 @ 13:30) (18 - 20)  SpO2: 99% (10-25-21 @ 16:27) (98% - 99%)  Wt(kg): --  Daily Height in cm: 172.72 (24 Oct 2021 23:37)    Daily     I&O's Summary    24 Oct 2021 07:01  -  25 Oct 2021 07:00  --------------------------------------------------------  IN: 0 mL / OUT: 2500 mL / NET: -2500 mL        PHYSICAL EXAM:  GENERAL: NAD, well-developed  HEAD:  Atraumatic, Normocephalic  EYES: EOMI, PERRLA, conjunctiva and sclera clear  NECK: Supple, No JVD  CHEST/LUNG: Clear to auscultation bilaterally; No wheeze  HEART: Regular rate and rhythm; No murmurs, rubs, or gallops  ABDOMEN: Soft, Nontender,distended; Bowel sounds present  EXTREMITIES:  2+ Peripheral Pulses, No clubbing, cyanosis, or edema  PSYCH: AAOx3  NEUROLOGY: non-focal  SKIN: No rashes or lesions    Labs Reviewed  Spoke to patient in regards to abnormal labs.    CBC Full  -  ( 25 Oct 2021 04:30 )  WBC Count : 10.41 K/uL  Hemoglobin : 9.9 g/dL  Hematocrit : 32.8 %  Platelet Count - Automated : 111 K/uL  Mean Cell Volume : 100.0 fL  Mean Cell Hemoglobin : 30.2 pg  Mean Cell Hemoglobin Concentration : 30.2 g/dL  Auto Neutrophil # : x  Auto Lymphocyte # : x  Auto Monocyte # : x  Auto Eosinophil # : x  Auto Basophil # : x  Auto Neutrophil % : x  Auto Lymphocyte % : x  Auto Monocyte % : x  Auto Eosinophil % : x  Auto Basophil % : x    BMP:    10-25 @ 04:30    Blood Urea Nitrogen - 80  Calcium - 8.3  Carbond Dioxide - 22  Chloride - 109  Creatinine - 1.8  Glucose - 107  Potassium - 5.6  Sodium - 143      Hemoglobin A1c -     Urine Culture:  10-20 @ 16:51 Urine culture: --    Culture Results: --  Method Type: --  Organism: --  Organism Identification: --  Specimen Source: .Smear Other, ascites fluid        COVID Labs  CRP:  89 (10-10-21)      D-Dimer:        Imaging reviewed independently and reviewed read  < from: Xray Chest 1 View-PORTABLE IMMEDIATE (Xray Chest 1 View-PORTABLE IMMEDIATE .) (10.20.21 @ 11:30) >  Impression:    Bilateral opacifications. Support devices as described. Without change.    < end of copied text >        MEDICATIONS  (STANDING):  budesonide  80 MICROgram(s)/formoterol 4.5 MICROgram(s) Inhaler 2 Puff(s) Inhalation two times a day  calcium acetate 667 milliGRAM(s) Oral three times a day with meals  cefTRIAXone   IVPB 1000 milliGRAM(s) IV Intermittent every 24 hours  chlorhexidine 4% Liquid 1 Application(s) Topical daily  dextrose 40% Gel 15 Gram(s) Oral once  dextrose 5%. 1000 milliLiter(s) (50 mL/Hr) IV Continuous <Continuous>  dextrose 5%. 1000 milliLiter(s) (100 mL/Hr) IV Continuous <Continuous>  dextrose 50% Injectable 12.5 Gram(s) IV Push once  dextrose 50% Injectable 25 Gram(s) IV Push once  dextrose 50% Injectable 25 Gram(s) IV Push once  furosemide    Tablet 20 milliGRAM(s) Oral daily  glucagon  Injectable 1 milliGRAM(s) IntraMuscular once  heparin   Injectable 5000 Unit(s) SubCutaneous every 8 hours  insulin glargine Injectable (LANTUS) 20 Unit(s) SubCutaneous at bedtime  insulin lispro (ADMELOG) corrective regimen sliding scale   SubCutaneous three times a day before meals  insulin lispro Injectable (ADMELOG) 5 Unit(s) SubCutaneous three times a day before meals  lactulose Syrup 15 Gram(s) Oral three times a day  midodrine 10 milliGRAM(s) Oral every 8 hours  pantoprazole  Injectable 40 milliGRAM(s) IV Push every 12 hours  propranolol 5 milliGRAM(s) Oral daily  rifAXIMin 550 milliGRAM(s) Oral two times a day    MEDICATIONS  (PRN):

## 2021-10-25 NOTE — CHART NOTE - NSCHARTNOTEFT_GEN_A_CORE
Patient expressed frustration with reduced physical functioning.  Patient discussed that he feels weaker and expressed concerns about his deterioration.  Support rendered.

## 2021-10-26 LAB
ALBUMIN SERPL ELPH-MCNC: 3.1 G/DL — LOW (ref 3.5–5.2)
ALP SERPL-CCNC: 165 U/L — HIGH (ref 30–115)
ALT FLD-CCNC: 20 U/L — SIGNIFICANT CHANGE UP (ref 0–41)
ANION GAP SERPL CALC-SCNC: 13 MMOL/L — SIGNIFICANT CHANGE UP (ref 7–14)
ANION GAP SERPL CALC-SCNC: 14 MMOL/L — SIGNIFICANT CHANGE UP (ref 7–14)
AST SERPL-CCNC: 49 U/L — HIGH (ref 0–41)
BILIRUB SERPL-MCNC: 1.3 MG/DL — HIGH (ref 0.2–1.2)
BUN SERPL-MCNC: 73 MG/DL — CRITICAL HIGH (ref 10–20)
BUN SERPL-MCNC: 74 MG/DL — CRITICAL HIGH (ref 10–20)
CALCIUM SERPL-MCNC: 8.1 MG/DL — LOW (ref 8.5–10.1)
CALCIUM SERPL-MCNC: 8.3 MG/DL — LOW (ref 8.5–10.1)
CHLORIDE SERPL-SCNC: 103 MMOL/L — SIGNIFICANT CHANGE UP (ref 98–110)
CHLORIDE SERPL-SCNC: 104 MMOL/L — SIGNIFICANT CHANGE UP (ref 98–110)
CO2 SERPL-SCNC: 21 MMOL/L — SIGNIFICANT CHANGE UP (ref 17–32)
CO2 SERPL-SCNC: 21 MMOL/L — SIGNIFICANT CHANGE UP (ref 17–32)
CREAT SERPL-MCNC: 1.7 MG/DL — HIGH (ref 0.7–1.5)
CREAT SERPL-MCNC: 1.8 MG/DL — HIGH (ref 0.7–1.5)
GLUCOSE BLDC GLUCOMTR-MCNC: 112 MG/DL — HIGH (ref 70–99)
GLUCOSE BLDC GLUCOMTR-MCNC: 169 MG/DL — HIGH (ref 70–99)
GLUCOSE BLDC GLUCOMTR-MCNC: 170 MG/DL — HIGH (ref 70–99)
GLUCOSE BLDC GLUCOMTR-MCNC: 74 MG/DL — SIGNIFICANT CHANGE UP (ref 70–99)
GLUCOSE SERPL-MCNC: 81 MG/DL — SIGNIFICANT CHANGE UP (ref 70–99)
GLUCOSE SERPL-MCNC: 96 MG/DL — SIGNIFICANT CHANGE UP (ref 70–99)
HCT VFR BLD CALC: 34.7 % — LOW (ref 42–52)
HGB BLD-MCNC: 10.6 G/DL — LOW (ref 14–18)
MAGNESIUM SERPL-MCNC: 2.2 MG/DL — SIGNIFICANT CHANGE UP (ref 1.8–2.4)
MCHC RBC-ENTMCNC: 30.5 G/DL — LOW (ref 32–37)
MCHC RBC-ENTMCNC: 30.5 PG — SIGNIFICANT CHANGE UP (ref 27–31)
MCV RBC AUTO: 100 FL — HIGH (ref 80–94)
NRBC # BLD: 0 /100 WBCS — SIGNIFICANT CHANGE UP (ref 0–0)
PLATELET # BLD AUTO: 130 K/UL — SIGNIFICANT CHANGE UP (ref 130–400)
POTASSIUM SERPL-MCNC: 5.2 MMOL/L — HIGH (ref 3.5–5)
POTASSIUM SERPL-MCNC: 5.3 MMOL/L — HIGH (ref 3.5–5)
POTASSIUM SERPL-SCNC: 5.2 MMOL/L — HIGH (ref 3.5–5)
POTASSIUM SERPL-SCNC: 5.3 MMOL/L — HIGH (ref 3.5–5)
PROT SERPL-MCNC: 5.9 G/DL — LOW (ref 6–8)
RBC # BLD: 3.47 M/UL — LOW (ref 4.7–6.1)
RBC # FLD: 23.4 % — HIGH (ref 11.5–14.5)
SODIUM SERPL-SCNC: 138 MMOL/L — SIGNIFICANT CHANGE UP (ref 135–146)
SODIUM SERPL-SCNC: 138 MMOL/L — SIGNIFICANT CHANGE UP (ref 135–146)
WBC # BLD: 11.59 K/UL — HIGH (ref 4.8–10.8)
WBC # FLD AUTO: 11.59 K/UL — HIGH (ref 4.8–10.8)

## 2021-10-26 PROCEDURE — 74230 X-RAY XM SWLNG FUNCJ C+: CPT | Mod: 26

## 2021-10-26 PROCEDURE — 99233 SBSQ HOSP IP/OBS HIGH 50: CPT

## 2021-10-26 RX ORDER — SODIUM ZIRCONIUM CYCLOSILICATE 10 G/10G
10 POWDER, FOR SUSPENSION ORAL ONCE
Refills: 0 | Status: COMPLETED | OUTPATIENT
Start: 2021-10-26 | End: 2021-10-26

## 2021-10-26 RX ORDER — NYSTATIN CREAM 100000 [USP'U]/G
1 CREAM TOPICAL
Refills: 0 | Status: DISCONTINUED | OUTPATIENT
Start: 2021-10-26 | End: 2021-11-02

## 2021-10-26 RX ORDER — ALBUMIN HUMAN 25 %
25 VIAL (ML) INTRAVENOUS
Refills: 0 | Status: COMPLETED | OUTPATIENT
Start: 2021-10-26 | End: 2021-10-27

## 2021-10-26 RX ORDER — SODIUM ZIRCONIUM CYCLOSILICATE 10 G/10G
5 POWDER, FOR SUSPENSION ORAL ONCE
Refills: 0 | Status: COMPLETED | OUTPATIENT
Start: 2021-10-26 | End: 2021-10-26

## 2021-10-26 RX ADMIN — Medication 250 GRAM(S): at 21:43

## 2021-10-26 RX ADMIN — SODIUM ZIRCONIUM CYCLOSILICATE 10 GRAM(S): 10 POWDER, FOR SUSPENSION ORAL at 12:26

## 2021-10-26 RX ADMIN — CHLORHEXIDINE GLUCONATE 1 APPLICATION(S): 213 SOLUTION TOPICAL at 12:27

## 2021-10-26 RX ADMIN — Medication 5 UNIT(S): at 16:25

## 2021-10-26 RX ADMIN — Medication 667 MILLIGRAM(S): at 12:26

## 2021-10-26 RX ADMIN — SODIUM ZIRCONIUM CYCLOSILICATE 5 GRAM(S): 10 POWDER, FOR SUSPENSION ORAL at 04:34

## 2021-10-26 RX ADMIN — LACTULOSE 15 GRAM(S): 10 SOLUTION ORAL at 21:42

## 2021-10-26 RX ADMIN — MIDODRINE HYDROCHLORIDE 10 MILLIGRAM(S): 2.5 TABLET ORAL at 12:51

## 2021-10-26 RX ADMIN — LACTULOSE 15 GRAM(S): 10 SOLUTION ORAL at 05:15

## 2021-10-26 RX ADMIN — PANTOPRAZOLE SODIUM 40 MILLIGRAM(S): 20 TABLET, DELAYED RELEASE ORAL at 16:45

## 2021-10-26 RX ADMIN — Medication 1: at 16:25

## 2021-10-26 RX ADMIN — Medication 667 MILLIGRAM(S): at 16:44

## 2021-10-26 RX ADMIN — Medication 20 MILLIGRAM(S): at 05:16

## 2021-10-26 RX ADMIN — PANTOPRAZOLE SODIUM 40 MILLIGRAM(S): 20 TABLET, DELAYED RELEASE ORAL at 05:16

## 2021-10-26 RX ADMIN — HEPARIN SODIUM 5000 UNIT(S): 5000 INJECTION INTRAVENOUS; SUBCUTANEOUS at 05:17

## 2021-10-26 RX ADMIN — CEFTRIAXONE 100 MILLIGRAM(S): 500 INJECTION, POWDER, FOR SOLUTION INTRAMUSCULAR; INTRAVENOUS at 16:44

## 2021-10-26 RX ADMIN — HEPARIN SODIUM 5000 UNIT(S): 5000 INJECTION INTRAVENOUS; SUBCUTANEOUS at 13:00

## 2021-10-26 RX ADMIN — BUDESONIDE AND FORMOTEROL FUMARATE DIHYDRATE 2 PUFF(S): 160; 4.5 AEROSOL RESPIRATORY (INHALATION) at 21:54

## 2021-10-26 RX ADMIN — Medication 5 UNIT(S): at 12:01

## 2021-10-26 RX ADMIN — HEPARIN SODIUM 5000 UNIT(S): 5000 INJECTION INTRAVENOUS; SUBCUTANEOUS at 21:41

## 2021-10-26 RX ADMIN — BUDESONIDE AND FORMOTEROL FUMARATE DIHYDRATE 2 PUFF(S): 160; 4.5 AEROSOL RESPIRATORY (INHALATION) at 07:54

## 2021-10-26 RX ADMIN — INSULIN GLARGINE 20 UNIT(S): 100 INJECTION, SOLUTION SUBCUTANEOUS at 21:42

## 2021-10-26 RX ADMIN — NYSTATIN CREAM 1 APPLICATION(S): 100000 CREAM TOPICAL at 16:45

## 2021-10-26 RX ADMIN — MIDODRINE HYDROCHLORIDE 10 MILLIGRAM(S): 2.5 TABLET ORAL at 05:16

## 2021-10-26 RX ADMIN — MIDODRINE HYDROCHLORIDE 10 MILLIGRAM(S): 2.5 TABLET ORAL at 21:42

## 2021-10-26 RX ADMIN — Medication 250 GRAM(S): at 16:56

## 2021-10-26 RX ADMIN — Medication 667 MILLIGRAM(S): at 07:55

## 2021-10-26 NOTE — SWALLOW VFSS/MBS ASSESSMENT ADULT - DIAGNOSTIC IMPRESSIONS
moderate pharyngoesophageal dysphagia for thin liquids; mild pharyngoesophageal dysphagia for puree, nectar-thick, honey-thick, and mechanical soft consistencies; mild oral dysphagia across all PO trials.

## 2021-10-26 NOTE — CONSULT NOTE ADULT - REASON FOR ADMISSION
weakness  Hyperkalemia  VT

## 2021-10-26 NOTE — SWALLOW VFSS/MBS ASSESSMENT ADULT - SLP PERTINENT HISTORY OF CURRENT PROBLEM
pt is a 67 y/o M w/ PMHx: HTN, drug abuse, Hepatitis C s/p INF, Liver cirrhosis s/p [place] shunt, COPD, DVT? on Eliquis, hepatocellular carcinoma since January 2021 on chemotherapy, presented for weakness and decreased oral intake along with constipation and hematemesis. Pt is being treated for acute gastrointestinal hemorrhage due to variceal bleed s/p EGD 10/4 w/ banding, s/p intubation for airway protection, now extubated. Course further c/b hepatic encephalopathy and episode of desaturation 10/20. pt seen by SLP service multiple times this admission w/ most recent recs for dysphagia 3 w/ nectar-thick liquids.

## 2021-10-26 NOTE — SWALLOW VFSS/MBS ASSESSMENT ADULT - PHARYNGEAL PHASE COMMENTS
pharyngeal impairments characterized by partial superior movement of thyroid cartilage, partial anterior hyoid excursion, partial epiglottic inversion, complete laryngeal vestibular closure w/ no episodes of penetration/aspiration. Diminished pharyngeal stripping wave, partial distention of PES opening, trace column of contrast between tongue base and posterior pharyngeal wall w/ trace pharyngeal residue as noted above. pharyngeal impairments characterized by partial superior movement of thyroid cartilage, partial anterior hyoid excursion, partial epiglottic inversion, incomplete laryngeal vestibular closure w/ instances of laryngeal penetration. Diminished pharyngeal stripping wave, partial distention of PES opening, narrow column of contrast between tongue base and posterior pharyngeal wall w/ collection of residue as noted above. Laryngeal penetration observed <10% t/o study.

## 2021-10-26 NOTE — ADVANCED PRACTICE NURSE CONSULT - ASSESSMENT
67 yo male, with h/o HTN, drug abuse, Hepatitis C s/p INF, Liver cirrhosis s/p [place] shunt, COPD, DVT? on Eliquis, HCC since January 2021 on chemotherapy, presented (10/4) for weakness; Patient reports having weakness, decreased po intake since few weeks. He also has not been sleeping. He reports constipation and hematemesis for 1 day, minimal amount. Denies fever, chills, chest pain, cough, sputum , SOB, diarrhea, dysuria. He has a [place] shunt  that was drained one day prior to admission  ED course : mental status alteration, sustained VT  bpm with BP 82/49 mmHg s/p shock + calcium gluconate Patient has Hyperkalemia 6.8 CO2 14 s/p Bicarb drip, renal called, sp berry with good UO.     Currently admitted to medicine, today is hospital day-22d; initially presented with worsening hepatic encephalopathy, NH3 on admission 188, hospital course complicated by hematemesis s/p intubation and emergent EGD10/4, s/p EVBL on propranolol   successfully extubated; patient clinically feels well, saturating 99% on 3L, taper down as tolerated. Now on 3A,  being managed for Acute gastrointestinal hemorrhage due to variceal bleed; HCC; Decompensated liver cirrhosis due to HCV; Hepatic encephalopathy ; Thrombocytopenia; EBER - r/o hepatorenal syndrome v abdominal compartment syndrome; metabolic acidosis; Hypernatremia; hyperkalemia; Septic shock - resolved ; COPD.     Received patient on 3A, laying supine in bed,  HOB elevated 30 degrees. Pt awake, A&Ox3, made aware of purpose of WOCN visit, agreeable to consult. With assistance,  patient turned to right side for skin assessment. Patient abdomen grossly distended.    Foam Allevyn dressing to coccyx in place, dressing removed for assessment.  Type of wound: Stage 3 pressure injury; evolved from Deep tissue pressure injury/DTPI; pressure component w/ likely hypoperfusion & hypoperfusion r/t Acute resp failure-s/p intubation, pressor usage, sepsis, current medical condition/immunocompromised-Decompensated liver cirrhosis, Hep C,  Hepatocellular carcinoma with transaminitis on chemotherapy; nutritional status; incontinence; immobility   Location: coccyx extending to b/l buttock area    Wound measurements: 6cm x 7cm x 0.3cm  Tunneling/Undermining: none  Wound bed: yellow subcutaneous tissue w/ epidermal budding   Wound edges: attached, flush, irregular, macerated   Periwound: incontinence associated dermatitis (IAD) w/ fungal component to b/l buttock, posterior thighs, perineum & groin area; skin denuded, erythematous w/ satellite lesions to periphery some areas crusting over   Wound exudate: scant amount of serous drainage present on removed dressing   Wound odor: none  Induration, erythema, warmth: none   Wound pain: pt reports tenderness to area during wound bed assessment     Patient mostly bedbound, limited mobility in bed, + berry, reports incontinent of stool. Ordered for dysphagia diet, probably inadequate intake per reported Hadley score.

## 2021-10-26 NOTE — SWALLOW VFSS/MBS ASSESSMENT ADULT - ORAL PHASE
Delayed oral transit time/Reduced anterior - posterior transport/Residue in oral cavity/Uncontrolled bolus / spillover in hypopharynx/Laryngeal penetration before swallow - silent Reduced anterior - posterior transport/Residue in oral cavity/Uncontrolled bolus / spillover in hypopharynx

## 2021-10-26 NOTE — CONSULT NOTE ADULT - ASSESSMENT
IMPRESSION: Rehab of deconditioning / EBER / liver cirrhosis     PRECAUTIONS: [   ] Cardiac  [   ] Respiratory  [   ] Seizures [   ] Contact Isolation  [   ] Droplet Isolation  [   ] Other    Weight Bearing Status:     RECOMMENDATION:    Out of Bed to Chair     DVT/[facility] Prophylaxis    REHAB PLAN:     [ x   ] Bedside P/T 3-5 times a week   [    ]   Bedside O/T  2-3 times a week             [    ] Speech Therapy               [    ]  No Rehab Therapy Indicated   Conditioning/ROM                                    ADL  Bed Mobility                                               Conditioning/ROM  Transfers                                                     Bed Mobility  Sitting /Standing Balance                         Transfers                                        Gait Training                                               Sitting/Standing Balance  Stair Training [   ]Applicable                    Home equipment Eval                                                                        Splinting  [   ] Only      GOALS:   ADL   [    ]   Independent                    Transfers  [  x  ] Independent                          Ambulation  [ x   ] Independent     [ x    ] With device                            [    ]  CG                                                         [    ]  CG                                                                  [    ] CG                            [    ] Min A                                                   [    ] Min A                                                              [    ] Min  A          DISCHARGE PLAN:   [    ]  Good candidate for Intensive Rehabilitation/Hospital based                                             Will tolerate 3hrs Intensive Rehab Daily                                       [ x    ]  Short Term Rehab in Skilled Nursing Facility                                       [     ]  Home with Outpatient or  services                                         [     ]  Possible Candidate for Intensive Hospital based Rehab

## 2021-10-26 NOTE — SWALLOW VFSS/MBS ASSESSMENT ADULT - ORAL PREP COMMENTS
pt p/w oral impairments characterized by posterior escape of greater than half the bolus, delayed initiation of tongue motion, residue lining tongue and palatal surfaces w/ bolus head observed in the pyriforms at the height of the pharyngeal swallow for nectar-thick liquids. (Bolus head observed in valleculae for honey-thick, puree, and mech soft consistencies).

## 2021-10-26 NOTE — CONSULT NOTE ADULT - PROVIDER SPECIALTY LIST ADULT
Cardiology
Gastroenterology
Nephrology
Physiatry
Vascular Surgery
Infectious Disease
Intervent Radiology
Heme/Onc
Palliative Care

## 2021-10-26 NOTE — SWALLOW VFSS/MBS ASSESSMENT ADULT - ESOPHAGEAL STAGE
Esophageal dysmotility noted w/ retrograde flow to the level of the PES. Esophageal dysmotility noted w/ retrograde flow to the level of the PES w/ puree consistency. Esophageal dysmotility noted w/ retrograde flow below the level of the PES w/ puree consistency.

## 2021-10-26 NOTE — CONSULT NOTE ADULT - CONSULT REASON
Fevers + Leukocytosis
Pre-op eval, reported history of "VT s/p cardioversion"
Efe hyperkalemia
hematemesis
portal vein thrombosis
weakness
Ascites
GOC
Hepatocellular Carcinoma

## 2021-10-26 NOTE — CONSULT NOTE ADULT - SUBJECTIVE AND OBJECTIVE BOX
HPI:  65 yo male, with h/o HTN, drug abuse, Hepatitis C s/p INF, Liver cirrhosis s/p [place] shunt, COPD, DVT? on Eliquis, HCC since January 2021 on chemotherapy, presented for weakness  Patient reports having weakness, decreased po intake since few weeks. He also has not been sleeping. He reports constipation and hematemesis for 1 day, minimal amount.  Denies fever, chills, chest pain, cough, sputum , SOB, diarrhea, dysuria  He has a [place] shunt  that was drained one day prior to admission    ED course : mental status alteration, sustained VT  bpm with BP 82/49 mmHg s/p shock + calcium gluconate  Patient has Hyperkalemia 6.8 CO2 14 s/p Bicarb drip, renal called, sp berry with good UO  Hospital course complicated by hematemesis s/p intubation and emergent EGD10/4, s/p EVBL on propranolol   successfully extubated, on O2 via NC, c/o generalized weakness      PAST MEDICAL & SURGICAL HISTORY:  HTN (hypertension)    Hepatitis C  2007    Drug abuse    Cancer, hepatocellular  January 2021    COPD, mild        Hospital Course:    TODAY'S SUBJECTIVE & REVIEW OF SYMPTOMS:     Constitutional WNL   Cardio WNL   Resp WNL   GI WNL  Heme WNL  Endo WNL  Skin WNL  MSK Weakness  Neuro WNL  Cognitive WNL  Psych WNL      MEDICATIONS  (STANDING):  albumin human  5% IVPB 25 Gram(s) IV Intermittent every 3 hours  budesonide  80 MICROgram(s)/formoterol 4.5 MICROgram(s) Inhaler 2 Puff(s) Inhalation two times a day  calcium acetate 667 milliGRAM(s) Oral three times a day with meals  cefTRIAXone   IVPB 1000 milliGRAM(s) IV Intermittent every 24 hours  chlorhexidine 4% Liquid 1 Application(s) Topical daily  dextrose 40% Gel 15 Gram(s) Oral once  dextrose 5%. 1000 milliLiter(s) (50 mL/Hr) IV Continuous <Continuous>  dextrose 5%. 1000 milliLiter(s) (100 mL/Hr) IV Continuous <Continuous>  dextrose 50% Injectable 12.5 Gram(s) IV Push once  dextrose 50% Injectable 25 Gram(s) IV Push once  dextrose 50% Injectable 25 Gram(s) IV Push once  furosemide    Tablet 20 milliGRAM(s) Oral daily  glucagon  Injectable 1 milliGRAM(s) IntraMuscular once  heparin   Injectable 5000 Unit(s) SubCutaneous every 8 hours  insulin glargine Injectable (LANTUS) 20 Unit(s) SubCutaneous at bedtime  insulin lispro (ADMELOG) corrective regimen sliding scale   SubCutaneous three times a day before meals  insulin lispro Injectable (ADMELOG) 5 Unit(s) SubCutaneous three times a day before meals  lactulose Syrup 15 Gram(s) Oral three times a day  midodrine 10 milliGRAM(s) Oral every 8 hours  nystatin Cream 1 Application(s) Topical two times a day  pantoprazole  Injectable 40 milliGRAM(s) IV Push every 12 hours  propranolol 5 milliGRAM(s) Oral daily  rifAXIMin 550 milliGRAM(s) Oral two times a day    MEDICATIONS  (PRN):      FAMILY HISTORY:      Allergies    No Known Allergies    Intolerances        SOCIAL HISTORY:    [  ] Etoh  [  ] Smoking  [  ] Substance abuse     Home Environment:  [   ] Home Alone  [ x  ] Lives with Family  [   ] Home Health Aid    Dwelling:  [ x  ] Apartment  [   ] Private House  [   ] Adult Home  [   ] Skilled Nursing Facility      [   ] Short Term  [   ] Long Term  [ x  ] Stairs       Elevator [   ]    FUNCTIONAL STATUS PTA: (Check all that apply)  Ambulation: [   x ]Independent    [   ] Dependent     [   ] Non-Ambulatory  Assistive Device: [   ] SA Cane  [   ]  Q Cane  [   ] Walker  [   ]  Wheelchair  ADL : [ x  ] Independent  [    ]  Dependent       Vital Signs Last 24 Hrs  T(C): 36 (26 Oct 2021 13:30), Max: 36.1 (26 Oct 2021 04:21)  T(F): 96.8 (26 Oct 2021 13:30), Max: 96.9 (26 Oct 2021 04:21)  HR: 83 (26 Oct 2021 13:30) (61 - 83)  BP: 100/70 (26 Oct 2021 13:30) (100/70 - 149/68)  BP(mean): --  RR: 19 (26 Oct 2021 13:30) (18 - 19)  SpO2: 98% (26 Oct 2021 13:30) (97% - 99%)      PHYSICAL EXAM: Awake & Alert  GENERAL: NAD  HEAD:  Normocephalic  CHEST/LUNG: Clear   HEART: S1S2+  ABDOMEN: Soft, Nontender  EXTREMITIES:  no calf tenderness    NERVOUS SYSTEM:  Cranial Nerves 2-12 intact [   ] Abnormal  [   ]  ROM: WFL all extremities [ x  ]  Abnormal [   ]  Motor Strength: WFL all extremities  [   ]  Abnormal [  x ]4/5 all ext  Sensation: intact to light touch [  x ] Abnormal [   ]    FUNCTIONAL STATUS:  Bed Mobility: Independent [   ]  Supervision [   ]  Needs Assistance [  x ]  N/A [   ]  Transfers: Independent [   ]  Supervision [   ]  Needs Assistance [ x  ]  N/A [   ]   Ambulation: Independent [   ]  Supervision [   ]  Needs Assistance [   ]  N/A [   ]  ADL: Independent [   ] Requires Assistance [   ] N/A [   ]      LABS:                        10.6   11.59 )-----------( 130      ( 26 Oct 2021 06:37 )             34.7     10-26    138  |  104  |  74<HH>  ----------------------------<  81  5.3<H>   |  21  |  1.7<H>    Ca    8.3<L>      26 Oct 2021 06:37  Mg     2.2     10-26    TPro  5.9<L>  /  Alb  3.1<L>  /  TBili  1.3<H>  /  DBili  x   /  AST  49<H>  /  ALT  20  /  AlkPhos  165<H>  10-25          RADIOLOGY & ADDITIONAL STUDIES:

## 2021-10-26 NOTE — PROGRESS NOTE ADULT - ASSESSMENT
67 yo male, with h/o HTN, drug abuse, Hepatitis C s/p INF, Liver cirrhosis s/p [place] shunt, COPD, DVT? on Eliquis, hepatocellular carcinoma since January 2021 on chemotherapy, presented for weakness and decreased oral intake along with constipation and hematemesis with ammonia (188) on presentation.    Acute gastrointestinal hemorrhage due to variceal bleed  HCC  Decompensated liver cirrhosis due to HCV  Hepatic encephalopathy   Thrombocytopenia  Patient initially presented with worsening hepatic encephalopathy, NH3 on admission 188, hospital course complicated by hematemesis s/p intubation and emergent EGD10/4, s/p EVBL on propranolol   successfully extubated  patient clinically feels well, saturating 99% on 3L, taper down as tolerated   resumed Ceftriaxone for SBP prophylaxis until discharge    repeat Chest xray overall unchanged  seen by s/s, diet advanced   chemotherapy currently on hold given poor functional status, may be considered in the future if the patient improves  patient has a [place] catheter due to frequent paracentesis, has had >9L removed (at baseline pt drains 2L Q2days at home), 2-3 L fluid drained on 10/26. Albumin PRN.   s/p drainage 10/21 with 2L removed, no SBP on fluid analysis     do not remove >5L of fluid at a time, give albumin with paracentesis  started lasix 20mg daily for worsening LE edema, monitor renal fxn, discussed with GI  renal fxn slightly up. If continues to uptrend, hold lasix  Palliative team following     EBER - r/o hepatorenal syndrome v abdominal compartment syndrome   metabolic acidosis  Hypernatremia  -avoid nephrotoxics, s/p albumin challenge    -continue phoslo as per renal   - monitor BMP, nephrology following  - stable around 1.7 today     #hyperkalemia- 5.3 today. Lokelma 5 x 1. Recheck in AM       Portal vein thrombosis  Left main and right portal vein thrombus was on eliquis previously  per GI--> tumor thrombus, Gi had discussed with his oncologist that no need to resume eliquis (given the recent variceal bleed)  Revisit this with oncologist in a few days.     Septic shock - resolved   - previously required levophed - d/c'ed on 10/10, now on midodrine  - DTA culture showed Stenotrophomonas, s/p course of levaquin  - monitor BP closely and resume pressors if indicated     COPD - no wheezing on exam    #Progress Note Handoff  Pending (specify):   clinical improvement,  improvement in Scr, PT, ST  f/u . Drain ascitic fluid 2L Q48H via denver cath. Patient would like to  his girlfriend while in the hospital. Palliative and  following to assist   Family discussion: Plan of care discussed with patient and significant other at bedside.   Disposition:  from home, agreeable to SNF, requesting [facility]

## 2021-10-26 NOTE — CONSULT NOTE ADULT - CONSULT REQUESTED DATE/TIME
04-Oct-2021 17:43
06-Oct-2021 15:33
04-Oct-2021 08:57
04-Oct-2021 18:15
04-Oct-2021 18:28
26-Oct-2021 15:48
04-Oct-2021 20:34
05-Oct-2021 12:14
05-Oct-2021 15:00

## 2021-10-26 NOTE — SWALLOW VFSS/MBS ASSESSMENT ADULT - ORAL PHASE COMMENTS
pt p/w oral impairments characterized by posterior escape of greater than half the bolus, delayed initiation of tongue motion, residue lining tongue and palatal surfaces w/ bolus head observed in the pyriforms at the height of the pharyngeal swallow.

## 2021-10-26 NOTE — SWALLOW VFSS/MBS ASSESSMENT ADULT - RECOMMENDED FEEDING/EATING TECHNIQUES
GI precautions, smaller more frequent meals/maintain upright posture during/after eating for 30 mins/position upright (90 degrees)

## 2021-10-26 NOTE — PROGRESS NOTE ADULT - SUBJECTIVE AND OBJECTIVE BOX
S: 2-3L removed via [place] today  eating, sleeping ok.     All other pertinent ROS negative.      10-25-21 @ 07:01  -  10-26-21 @ 07:00  --------------------------------------------------------  IN: 620 mL / OUT: 350 mL / NET: 270 mL      Vital Signs Last 24 Hrs  T(C): 36 (26 Oct 2021 13:30), Max: 36.1 (26 Oct 2021 04:21)  T(F): 96.8 (26 Oct 2021 13:30), Max: 96.9 (26 Oct 2021 04:21)  HR: 83 (26 Oct 2021 13:30) (61 - 83)  BP: 100/70 (26 Oct 2021 13:30) (100/70 - 149/68)  BP(mean): --  RR: 19 (26 Oct 2021 13:30) (18 - 19)  SpO2: 98% (26 Oct 2021 13:30) (97% - 99%)  PHYSICAL EXAM:    Constitutional: NAD, awake and alert, well-developed  HEENT: PERR, EOMI, Normal Hearing, MMM  Neck: Soft and supple, No LAD, No JVD  Respiratory: Breath sounds are clear bilaterally, No wheezing, rales or rhonchi  Cardiovascular: S1 and S2, regular rate and rhythm, no Murmurs, gallops or rubs  Gastrointestinal: Bowel Sounds present, soft, nontender, no guarding, no rebound. Ascites noted.   Extremities: No peripheral edema      MEDICATIONS:  MEDICATIONS  (STANDING):  albumin human  5% IVPB 25 Gram(s) IV Intermittent every 3 hours  budesonide  80 MICROgram(s)/formoterol 4.5 MICROgram(s) Inhaler 2 Puff(s) Inhalation two times a day  calcium acetate 667 milliGRAM(s) Oral three times a day with meals  cefTRIAXone   IVPB 1000 milliGRAM(s) IV Intermittent every 24 hours  chlorhexidine 4% Liquid 1 Application(s) Topical daily  dextrose 40% Gel 15 Gram(s) Oral once  dextrose 5%. 1000 milliLiter(s) (50 mL/Hr) IV Continuous <Continuous>  dextrose 5%. 1000 milliLiter(s) (100 mL/Hr) IV Continuous <Continuous>  dextrose 50% Injectable 12.5 Gram(s) IV Push once  dextrose 50% Injectable 25 Gram(s) IV Push once  dextrose 50% Injectable 25 Gram(s) IV Push once  furosemide    Tablet 20 milliGRAM(s) Oral daily  glucagon  Injectable 1 milliGRAM(s) IntraMuscular once  heparin   Injectable 5000 Unit(s) SubCutaneous every 8 hours  insulin glargine Injectable (LANTUS) 20 Unit(s) SubCutaneous at bedtime  insulin lispro (ADMELOG) corrective regimen sliding scale   SubCutaneous three times a day before meals  insulin lispro Injectable (ADMELOG) 5 Unit(s) SubCutaneous three times a day before meals  lactulose Syrup 15 Gram(s) Oral three times a day  midodrine 10 milliGRAM(s) Oral every 8 hours  nystatin Cream 1 Application(s) Topical two times a day  pantoprazole  Injectable 40 milliGRAM(s) IV Push every 12 hours  propranolol 5 milliGRAM(s) Oral daily  rifAXIMin 550 milliGRAM(s) Oral two times a day      LABS: All Labs Reviewed:                        10.6   11.59 )-----------( 130      ( 26 Oct 2021 06:37 )             34.7     10-26    138  |  104  |  74<HH>  ----------------------------<  81  5.3<H>   |  21  |  1.7<H>    Ca    8.3<L>      26 Oct 2021 06:37  Mg     2.2     10-26    TPro  5.9<L>  /  Alb  3.1<L>  /  TBili  1.3<H>  /  DBili  x   /  AST  49<H>  /  ALT  20  /  AlkPhos  165<H>  10-25          Blood Culture:     Radiology: reviewed

## 2021-10-27 LAB
ANION GAP SERPL CALC-SCNC: 13 MMOL/L — SIGNIFICANT CHANGE UP (ref 7–14)
BUN SERPL-MCNC: 68 MG/DL — CRITICAL HIGH (ref 10–20)
CALCIUM SERPL-MCNC: 8.3 MG/DL — LOW (ref 8.5–10.1)
CHLORIDE SERPL-SCNC: 107 MMOL/L — SIGNIFICANT CHANGE UP (ref 98–110)
CO2 SERPL-SCNC: 19 MMOL/L — SIGNIFICANT CHANGE UP (ref 17–32)
CREAT SERPL-MCNC: 1.5 MG/DL — SIGNIFICANT CHANGE UP (ref 0.7–1.5)
GLUCOSE BLDC GLUCOMTR-MCNC: 134 MG/DL — HIGH (ref 70–99)
GLUCOSE BLDC GLUCOMTR-MCNC: 162 MG/DL — HIGH (ref 70–99)
GLUCOSE BLDC GLUCOMTR-MCNC: 94 MG/DL — SIGNIFICANT CHANGE UP (ref 70–99)
GLUCOSE BLDC GLUCOMTR-MCNC: 99 MG/DL — SIGNIFICANT CHANGE UP (ref 70–99)
GLUCOSE SERPL-MCNC: 98 MG/DL — SIGNIFICANT CHANGE UP (ref 70–99)
HCT VFR BLD CALC: 28.7 % — LOW (ref 42–52)
HGB BLD-MCNC: 8.9 G/DL — LOW (ref 14–18)
MAGNESIUM SERPL-MCNC: 2.1 MG/DL — SIGNIFICANT CHANGE UP (ref 1.8–2.4)
MCHC RBC-ENTMCNC: 30.6 PG — SIGNIFICANT CHANGE UP (ref 27–31)
MCHC RBC-ENTMCNC: 31 G/DL — LOW (ref 32–37)
MCV RBC AUTO: 98.6 FL — HIGH (ref 80–94)
NRBC # BLD: 0 /100 WBCS — SIGNIFICANT CHANGE UP (ref 0–0)
PLATELET # BLD AUTO: 95 K/UL — LOW (ref 130–400)
POTASSIUM SERPL-MCNC: 4 MMOL/L — SIGNIFICANT CHANGE UP (ref 3.5–5)
POTASSIUM SERPL-SCNC: 4 MMOL/L — SIGNIFICANT CHANGE UP (ref 3.5–5)
RBC # BLD: 2.91 M/UL — LOW (ref 4.7–6.1)
RBC # FLD: 22.7 % — HIGH (ref 11.5–14.5)
SODIUM SERPL-SCNC: 139 MMOL/L — SIGNIFICANT CHANGE UP (ref 135–146)
WBC # BLD: 10.28 K/UL — SIGNIFICANT CHANGE UP (ref 4.8–10.8)
WBC # FLD AUTO: 10.28 K/UL — SIGNIFICANT CHANGE UP (ref 4.8–10.8)

## 2021-10-27 PROCEDURE — 99233 SBSQ HOSP IP/OBS HIGH 50: CPT

## 2021-10-27 RX ORDER — PANTOPRAZOLE SODIUM 20 MG/1
40 TABLET, DELAYED RELEASE ORAL
Refills: 0 | Status: DISCONTINUED | OUTPATIENT
Start: 2021-10-27 | End: 2021-11-02

## 2021-10-27 RX ADMIN — HEPARIN SODIUM 5000 UNIT(S): 5000 INJECTION INTRAVENOUS; SUBCUTANEOUS at 05:12

## 2021-10-27 RX ADMIN — INSULIN GLARGINE 20 UNIT(S): 100 INJECTION, SOLUTION SUBCUTANEOUS at 21:23

## 2021-10-27 RX ADMIN — HEPARIN SODIUM 5000 UNIT(S): 5000 INJECTION INTRAVENOUS; SUBCUTANEOUS at 15:09

## 2021-10-27 RX ADMIN — Medication 250 GRAM(S): at 00:52

## 2021-10-27 RX ADMIN — NYSTATIN CREAM 1 APPLICATION(S): 100000 CREAM TOPICAL at 17:30

## 2021-10-27 RX ADMIN — Medication 5 UNIT(S): at 17:26

## 2021-10-27 RX ADMIN — Medication 20 MILLIGRAM(S): at 05:09

## 2021-10-27 RX ADMIN — NYSTATIN CREAM 1 APPLICATION(S): 100000 CREAM TOPICAL at 05:12

## 2021-10-27 RX ADMIN — MIDODRINE HYDROCHLORIDE 10 MILLIGRAM(S): 2.5 TABLET ORAL at 05:09

## 2021-10-27 RX ADMIN — LACTULOSE 15 GRAM(S): 10 SOLUTION ORAL at 21:24

## 2021-10-27 RX ADMIN — Medication 5 UNIT(S): at 12:22

## 2021-10-27 RX ADMIN — MIDODRINE HYDROCHLORIDE 10 MILLIGRAM(S): 2.5 TABLET ORAL at 15:09

## 2021-10-27 RX ADMIN — CEFTRIAXONE 100 MILLIGRAM(S): 500 INJECTION, POWDER, FOR SOLUTION INTRAMUSCULAR; INTRAVENOUS at 15:10

## 2021-10-27 RX ADMIN — CHLORHEXIDINE GLUCONATE 1 APPLICATION(S): 213 SOLUTION TOPICAL at 12:21

## 2021-10-27 RX ADMIN — Medication 1: at 12:22

## 2021-10-27 RX ADMIN — Medication 667 MILLIGRAM(S): at 17:28

## 2021-10-27 RX ADMIN — PANTOPRAZOLE SODIUM 40 MILLIGRAM(S): 20 TABLET, DELAYED RELEASE ORAL at 05:09

## 2021-10-27 RX ADMIN — PANTOPRAZOLE SODIUM 40 MILLIGRAM(S): 20 TABLET, DELAYED RELEASE ORAL at 17:29

## 2021-10-27 RX ADMIN — LACTULOSE 15 GRAM(S): 10 SOLUTION ORAL at 15:09

## 2021-10-27 RX ADMIN — Medication 667 MILLIGRAM(S): at 12:22

## 2021-10-27 RX ADMIN — HEPARIN SODIUM 5000 UNIT(S): 5000 INJECTION INTRAVENOUS; SUBCUTANEOUS at 21:23

## 2021-10-27 RX ADMIN — MIDODRINE HYDROCHLORIDE 10 MILLIGRAM(S): 2.5 TABLET ORAL at 21:22

## 2021-10-27 NOTE — SWALLOW BEDSIDE ASSESSMENT ADULT - NS SPL SWALLOW CLINIC TRIAL FT
No s/s aspiration/penetration, mild oral dysphagia
+ toleration
+ toleration
Mild oral dysphagia w/ ground/soft solids, +toleration for ground soft solids w/ thin liquids without overt s/s of penetration/aspiration
mild oral dysphagia w/no overt s/s of aspiration/penetration
+toleration
suspected pharyngeal dysphagia

## 2021-10-27 NOTE — PROGRESS NOTE ADULT - SUBJECTIVE AND OBJECTIVE BOX
SUZANNE BAZZI 66y Male  MRN#: [id number]   CODE STATUS:________    Hospital Day: 23d    Pt is currently admitted with the primary diagnosis of variceal bleeding, encephalopathy, hcc    SUBJECTIVE  Hospital Course    Overnight events     Subjective complaints     Present Today:   - Gerda:  No [  ], Yes [   ] : Indication:     - Type of IV Access:       .. CVC/Piccline:  No [  ], Yes [   ] : Indication:       .. Midline: No [  ], Yes [   ] : Indication:                                             ----------------------------------------------------------  OBJECTIVE  PAST MEDICAL & SURGICAL HISTORY  HTN (hypertension)    Hepatitis C  2007    Drug abuse    Cancer, hepatocellular  January 2021    COPD, mild                                              -----------------------------------------------------------  ALLERGIES:  No Known Allergies                                            ------------------------------------------------------------    HOME MEDICATIONS  Home Medications:  Eliquis 5 mg oral tablet: 1 tab(s) orally 2 times a day (04 Oct 2021 06:13)  fluticasone-salmeterol 55 mcg-14 mcg/inh inhalation powder: 1 puff(s) inhaled 2 times a day (04 Oct 2021 06:14)  Protonix 40 mg oral delayed release tablet: 1 tab(s) orally once a day (04 Oct 2021 06:13)  spironolactone 50 mg oral tablet: 1 tab(s) orally once a day (04 Oct 2021 06:13)                           MEDICATIONS:  STANDING MEDICATIONS  budesonide  80 MICROgram(s)/formoterol 4.5 MICROgram(s) Inhaler 2 Puff(s) Inhalation two times a day  calcium acetate 667 milliGRAM(s) Oral three times a day with meals  cefTRIAXone   IVPB 1000 milliGRAM(s) IV Intermittent every 24 hours  chlorhexidine 4% Liquid 1 Application(s) Topical daily  dextrose 40% Gel 15 Gram(s) Oral once  dextrose 5%. 1000 milliLiter(s) IV Continuous <Continuous>  dextrose 5%. 1000 milliLiter(s) IV Continuous <Continuous>  dextrose 50% Injectable 25 Gram(s) IV Push once  dextrose 50% Injectable 12.5 Gram(s) IV Push once  dextrose 50% Injectable 25 Gram(s) IV Push once  furosemide    Tablet 20 milliGRAM(s) Oral daily  glucagon  Injectable 1 milliGRAM(s) IntraMuscular once  heparin   Injectable 5000 Unit(s) SubCutaneous every 8 hours  insulin glargine Injectable (LANTUS) 20 Unit(s) SubCutaneous at bedtime  insulin lispro (ADMELOG) corrective regimen sliding scale   SubCutaneous three times a day before meals  insulin lispro Injectable (ADMELOG) 5 Unit(s) SubCutaneous three times a day before meals  lactulose Syrup 15 Gram(s) Oral three times a day  midodrine 10 milliGRAM(s) Oral every 8 hours  nystatin Cream 1 Application(s) Topical two times a day  pantoprazole  Injectable 40 milliGRAM(s) IV Push every 12 hours  propranolol 5 milliGRAM(s) Oral daily  rifAXIMin 550 milliGRAM(s) Oral two times a day    PRN MEDICATIONS                                            ------------------------------------------------------------  VITAL SIGNS: Last 24 Hours  T(C): 36.2 (27 Oct 2021 04:23), Max: 36.2 (27 Oct 2021 04:23)  T(F): 97.2 (27 Oct 2021 04:23), Max: 97.2 (27 Oct 2021 04:23)  HR: 77 (27 Oct 2021 04:23) (77 - 91)  BP: 108/65 (27 Oct 2021 04:23) (100/70 - 108/65)  BP(mean): --  RR: 18 (27 Oct 2021 04:23) (18 - 19)  SpO2: 100% (27 Oct 2021 04:23) (98% - 100%)      10-26-21 @ 07:01  -  10-27-21 @ 07:00  --------------------------------------------------------  IN: 680 mL / OUT: 600 mL / NET: 80 mL                                             --------------------------------------------------------------  LABS:                        8.9    10.28 )-----------( 95       ( 27 Oct 2021 07:33 )             28.7     10-26    138  |  104  |  74<HH>  ----------------------------<  81  5.3<H>   |  21  |  1.7<H>    Ca    8.3<L>      26 Oct 2021 06:37  Mg     2.2     10-26    TPro  5.9<L>  /  Alb  3.1<L>  /  TBili  1.3<H>  /  DBili  x   /  AST  49<H>  /  ALT  20  /  AlkPhos  165<H>  10-25                                                              -------------------------------------------------------------  RADIOLOGY:                                            --------------------------------------------------------------    PHYSICAL EXAM:  GENERAL: NAD, ill appearing   HEENT:  Atraumatic, Normocephalic. EOMI, PERRLA, conjunctiva and sclera clear, No JVD  PULMONARY: Clear to auscultation bilaterally; No wheeze  CARDIOVASCULAR: Regular rate and rhythm; No murmurs, rubs, or gallops  GASTROINTESTINAL: Distended, ascites, firm abdomen with [place] catheter in place   MUSCULOSKELETAL:  2+ Peripheral Pulses, No clubbing, cyanosis,  3+ pitting edema bilateral, legs wrapped   NEUROLOGY: non-focal  SKIN: No rashes or lesions                                             --------------------------------------------------------------    ASSESSMENT & PLAN    65 yo male, with h/o HTN, drug abuse, Hepatitis C s/p INF, Liver cirrhosis s/p [place] shunt, COPD, DVT? on Eliquis, hepatocellular carcinoma since January 2021 on chemotherapy, presented for weakness and decreased oral intake along with constipation and hematemesis with ammonia (188) on presentation.    Acute gastrointestinal hemorrhage due to variceal bleed  HCC  Decompensated liver cirrhosis due to HCV  Hepatic encephalopathy   Thrombocytopenia  Patient initially presented with worsening hepatic encephalopathy, NH3 on admission 188, hospital course complicated by hematemesis s/p intubation and emergent EGD10/4, s/p EVBL on propranolol   successfully extubated  patient clinically feels well, saturating 99% on 3L, taper down as tolerated   resumed Ceftriaxone for SBP prophylaxis until discharge    repeat Chest xray overall unchanged  seen by s/s, diet advanced   chemotherapy currently on hold given poor functional status, may be considered in the future if the patient improves  patient has a denver catheter due to frequent paracentesis, has had >9L removed (at baseline pt drains 2L Q2days at home), 2-3 L fluid drained on 10/26. Albumin PRN.   s/p drainage 10/21 with 2L removed, no SBP on fluid analysis     do not remove >5L of fluid at a time, give albumin with paracentesis  started lasix 20mg daily for worsening LE edema, monitor renal fxn, discussed with GI  renal fxn slightly up. If continues to uptrend, hold lasix  Palliative team following     EBER - r/o hepatorenal syndrome v abdominal compartment syndrome   metabolic acidosis  Hypernatremia  -avoid nephrotoxics, s/p albumin challenge    -continue phoslo as per renal   - monitor BMP, nephrology following  - stable around 1.7 today     #hyperkalemia- 5.3 today. Lokelma 5 x 1. Recheck in AM       Portal vein thrombosis  Left main and right portal vein thrombus was on eliquis previously  per GI--> tumor thrombus, Gi had discussed with his oncologist that no need to resume eliquis (given the recent variceal bleed)  Revisit this with oncologist in a few days.     Septic shock - resolved   - previously required levophed - d/c'ed on 10/10, now on midodrine  - DTA culture showed Stenotrophomonas, s/p course of levaquin  - monitor BP closely and resume pressors if indicated     COPD - no wheezing on exam    #Progress Note Handoff  Pending (specify):   clinical improvement,  improvement in Scr, PT, ST  f/u . Drain ascitic fluid 2L Q48H via denver cath. Patient would like to  his girlfriend while in the hospital. Palliative and  following to assist   Family discussion: Plan of care discussed with patient and significant other at bedside.   Disposition:  from home, agreeable to SNF, requesting [facility]   SUZANNE BAZZI 66y Male  MRN#: [id number]   CODE STATUS:DNR DNI    Hospital Day: 23d    Pt is currently admitted with the primary diagnosis of variceal bleeding, encephalopathy, hcc    SUBJECTIVE    Overnight events: No overnight events    Subjective complaints: He states that he could not sleep due to abdominal pain. Today morning it has improved. Denies bleeding, N/V. Still has abdominal discomfort. States he usually removes 2L at home every 2 days. S/P 2L removal from denver cath yesterday.     Present Today:   - Lorenz:  No [  ], Yes [   ] : Indication:     - Type of IV Access:       .. CVC/Piccline:  No [  ], Yes [   ] : Indication:       .. Midline: No [  ], Yes [   ] : Indication:                                             ----------------------------------------------------------  OBJECTIVE  PAST MEDICAL & SURGICAL HISTORY  HTN (hypertension)    Hepatitis C  2007    Drug abuse    Cancer, hepatocellular  January 2021    COPD, mild                                              -----------------------------------------------------------  ALLERGIES:  No Known Allergies                                            ------------------------------------------------------------    HOME MEDICATIONS  Home Medications:  Eliquis 5 mg oral tablet: 1 tab(s) orally 2 times a day (04 Oct 2021 06:13)  fluticasone-salmeterol 55 mcg-14 mcg/inh inhalation powder: 1 puff(s) inhaled 2 times a day (04 Oct 2021 06:14)  Protonix 40 mg oral delayed release tablet: 1 tab(s) orally once a day (04 Oct 2021 06:13)  spironolactone 50 mg oral tablet: 1 tab(s) orally once a day (04 Oct 2021 06:13)                           MEDICATIONS:  STANDING MEDICATIONS  budesonide  80 MICROgram(s)/formoterol 4.5 MICROgram(s) Inhaler 2 Puff(s) Inhalation two times a day  calcium acetate 667 milliGRAM(s) Oral three times a day with meals  cefTRIAXone   IVPB 1000 milliGRAM(s) IV Intermittent every 24 hours  chlorhexidine 4% Liquid 1 Application(s) Topical daily  dextrose 40% Gel 15 Gram(s) Oral once  dextrose 5%. 1000 milliLiter(s) IV Continuous <Continuous>  dextrose 5%. 1000 milliLiter(s) IV Continuous <Continuous>  dextrose 50% Injectable 25 Gram(s) IV Push once  dextrose 50% Injectable 12.5 Gram(s) IV Push once  dextrose 50% Injectable 25 Gram(s) IV Push once  furosemide    Tablet 20 milliGRAM(s) Oral daily  glucagon  Injectable 1 milliGRAM(s) IntraMuscular once  heparin   Injectable 5000 Unit(s) SubCutaneous every 8 hours  insulin glargine Injectable (LANTUS) 20 Unit(s) SubCutaneous at bedtime  insulin lispro (ADMELOG) corrective regimen sliding scale   SubCutaneous three times a day before meals  insulin lispro Injectable (ADMELOG) 5 Unit(s) SubCutaneous three times a day before meals  lactulose Syrup 15 Gram(s) Oral three times a day  midodrine 10 milliGRAM(s) Oral every 8 hours  nystatin Cream 1 Application(s) Topical two times a day  pantoprazole  Injectable 40 milliGRAM(s) IV Push every 12 hours  propranolol 5 milliGRAM(s) Oral daily  rifAXIMin 550 milliGRAM(s) Oral two times a day    PRN MEDICATIONS                                            ------------------------------------------------------------  VITAL SIGNS: Last 24 Hours  T(C): 36.2 (27 Oct 2021 04:23), Max: 36.2 (27 Oct 2021 04:23)  T(F): 97.2 (27 Oct 2021 04:23), Max: 97.2 (27 Oct 2021 04:23)  HR: 77 (27 Oct 2021 04:23) (77 - 91)  BP: 108/65 (27 Oct 2021 04:23) (100/70 - 108/65)  BP(mean): --  RR: 18 (27 Oct 2021 04:23) (18 - 19)  SpO2: 100% (27 Oct 2021 04:23) (98% - 100%)      10-26-21 @ 07:01  -  10-27-21 @ 07:00  --------------------------------------------------------  IN: 680 mL / OUT: 600 mL / NET: 80 mL                                             --------------------------------------------------------------  LABS:                        8.9    10.28 )-----------( 95       ( 27 Oct 2021 07:33 )             28.7     10-26    138  |  104  |  74<HH>  ----------------------------<  81  5.3<H>   |  21  |  1.7<H>    Ca    8.3<L>      26 Oct 2021 06:37  Mg     2.2     10-26    TPro  5.9<L>  /  Alb  3.1<L>  /  TBili  1.3<H>  /  DBili  x   /  AST  49<H>  /  ALT  20  /  AlkPhos  165<H>  10-25                                                              -------------------------------------------------------------  RADIOLOGY:                                            --------------------------------------------------------------    PHYSICAL EXAM:  GENERAL: NAD, ill appearing   HEENT:  Atraumatic, Normocephalic. EOMI, PERRLA, conjunctiva and sclera clear, No JVD  PULMONARY: Clear to auscultation bilaterally; No wheeze  CARDIOVASCULAR: Regular rate and rhythm; No murmurs, rubs, or gallops  GASTROINTESTINAL: Distended, ascites, firm abdomen with [place] catheter in place   MUSCULOSKELETAL:  2+ Peripheral Pulses, No clubbing, cyanosis,  3+ pitting edema bilateral, legs wrapped   NEUROLOGY: non-focal  SKIN: No rashes or lesions                                             --------------------------------------------------------------    ASSESSMENT & PLAN    67 yo male, with h/o HTN, drug abuse, Hepatitis C s/p INF, Liver cirrhosis s/p [place] shunt, COPD, DVT? on Eliquis, hepatocellular carcinoma since January 2021 on chemotherapy, presented for weakness and decreased oral intake along with constipation and hematemesis with ammonia (188) on presentation.    Acute gastrointestinal hemorrhage due to variceal bleed  HCC  Decompensated liver cirrhosis due to HCV  Hepatic encephalopathy   Thrombocytopenia  Patient initially presented with worsening hepatic encephalopathy, NH3 on admission 188, hospital course complicated by hematemesis s/p intubation and emergent EGD10/4, s/p EVBL on propranolol   successfully extubated  patient clinically feels well, saturating 99% on 3L, taper down as tolerated   resumed Ceftriaxone for SBP prophylaxis until discharge    repeat Chest xray overall unchanged  seen by s/s, diet advanced   chemotherapy currently on hold given poor functional status, may be considered in the future if the patient improves  patient has a denver catheter due to frequent paracentesis, has had >9L removed (at baseline pt drains 2L Q2days at home),   s/p drainage 10/21 with 2L removed, no SBP on fluid analysis   2-3 L fluid drained on 10/26. Albumin PRN.   do not remove >5L of fluid at a time, give albumin with paracentesis  started lasix 20mg daily for worsening LE edema, monitor renal fxn, discussed with GI  renal fxn slightly up. If continues to uptrend, hold lasix  Palliative team following     EBER - r/o hepatorenal syndrome v abdominal compartment syndrome   metabolic acidosis  Hypernatremia  -avoid nephrotoxics, s/p albumin challenge    -continue phoslo as per renal   - monitor BMP, nephrology following  - stable around 1.5 today     #hyperkalemia  - 5.3  - 4.0 today.  - Lokelma if K+ > 5.0      Portal vein thrombosis  Left main and right portal vein thrombus was on eliquis previously  per GI--> tumor thrombus, Gi had discussed with his oncologist that no need to resume eliquis (given the recent variceal bleed)  Revisit this with oncologist in a few days.     Septic shock - resolved   - previously required levophed - d/c'ed on 10/10, now on midodrine  - DTA culture showed Stenotrophomonas, s/p course of levaquin  - monitor BP closely and resume pressors if indicated     COPD - no wheezing on exam    #Progress Note Handoff  Pending (specify):   clinical improvement,  improvement in Scr, PT, ST  f/u . Drain ascitic fluid 2L Q48H via denver cath. Patient would like to  his girlfriend while in the hospital. Palliative and  following to assist   Family discussion: Plan of care discussed with patient and significant other at bedside.   Disposition:  from home, agreeable to SNF, requesting [facility]

## 2021-10-27 NOTE — SWALLOW BEDSIDE ASSESSMENT ADULT - ASR SWALLOW ASPIRATION MONITOR
change of breathing pattern/cough/pneumonia
change of breathing pattern/oral hygiene/position upright (90Y)/cough/gurgly voice/fever/pneumonia/throat clearing/upper respiratory infection

## 2021-10-27 NOTE — SWALLOW BEDSIDE ASSESSMENT ADULT - COMMENTS
SLP provided patient education about VFSS results, diet consistency recommendations and swallow strategies. Pt. verbalized understanding

## 2021-10-27 NOTE — SWALLOW BEDSIDE ASSESSMENT ADULT - SWALLOW EVAL: DIAGNOSIS
Mild oral dysphagia w/ ground/soft, +toleration for ground soft solids w/ thin liquids without overt s/s of penetration/aspiration

## 2021-10-28 LAB
ALBUMIN SERPL ELPH-MCNC: 3.8 G/DL — SIGNIFICANT CHANGE UP (ref 3.5–5.2)
ALP SERPL-CCNC: 169 U/L — HIGH (ref 30–115)
ALT FLD-CCNC: 18 U/L — SIGNIFICANT CHANGE UP (ref 0–41)
ANION GAP SERPL CALC-SCNC: 14 MMOL/L — SIGNIFICANT CHANGE UP (ref 7–14)
AST SERPL-CCNC: 43 U/L — HIGH (ref 0–41)
BASOPHILS # BLD AUTO: 0.02 K/UL — SIGNIFICANT CHANGE UP (ref 0–0.2)
BASOPHILS NFR BLD AUTO: 0.2 % — SIGNIFICANT CHANGE UP (ref 0–1)
BILIRUB SERPL-MCNC: 1.2 MG/DL — SIGNIFICANT CHANGE UP (ref 0.2–1.2)
BUN SERPL-MCNC: 61 MG/DL — CRITICAL HIGH (ref 10–20)
CALCIUM SERPL-MCNC: 8.4 MG/DL — LOW (ref 8.5–10.1)
CHLORIDE SERPL-SCNC: 104 MMOL/L — SIGNIFICANT CHANGE UP (ref 98–110)
CO2 SERPL-SCNC: 22 MMOL/L — SIGNIFICANT CHANGE UP (ref 17–32)
CREAT SERPL-MCNC: 1.4 MG/DL — SIGNIFICANT CHANGE UP (ref 0.7–1.5)
EOSINOPHIL # BLD AUTO: 0 K/UL — SIGNIFICANT CHANGE UP (ref 0–0.7)
EOSINOPHIL NFR BLD AUTO: 0 % — SIGNIFICANT CHANGE UP (ref 0–8)
GLUCOSE BLDC GLUCOMTR-MCNC: 102 MG/DL — HIGH (ref 70–99)
GLUCOSE BLDC GLUCOMTR-MCNC: 125 MG/DL — HIGH (ref 70–99)
GLUCOSE BLDC GLUCOMTR-MCNC: 147 MG/DL — HIGH (ref 70–99)
GLUCOSE BLDC GLUCOMTR-MCNC: 93 MG/DL — SIGNIFICANT CHANGE UP (ref 70–99)
GLUCOSE SERPL-MCNC: 97 MG/DL — SIGNIFICANT CHANGE UP (ref 70–99)
HCT VFR BLD CALC: 32.3 % — LOW (ref 42–52)
HGB BLD-MCNC: 9.9 G/DL — LOW (ref 14–18)
IMM GRANULOCYTES NFR BLD AUTO: 0.4 % — HIGH (ref 0.1–0.3)
LYMPHOCYTES # BLD AUTO: 1.17 K/UL — LOW (ref 1.2–3.4)
LYMPHOCYTES # BLD AUTO: 10.3 % — LOW (ref 20.5–51.1)
MAGNESIUM SERPL-MCNC: 2.1 MG/DL — SIGNIFICANT CHANGE UP (ref 1.8–2.4)
MCHC RBC-ENTMCNC: 30.1 PG — SIGNIFICANT CHANGE UP (ref 27–31)
MCHC RBC-ENTMCNC: 30.7 G/DL — LOW (ref 32–37)
MCV RBC AUTO: 98.2 FL — HIGH (ref 80–94)
MONOCYTES # BLD AUTO: 1.02 K/UL — HIGH (ref 0.1–0.6)
MONOCYTES NFR BLD AUTO: 9 % — SIGNIFICANT CHANGE UP (ref 1.7–9.3)
NEUTROPHILS # BLD AUTO: 9.06 K/UL — HIGH (ref 1.4–6.5)
NEUTROPHILS NFR BLD AUTO: 80.1 % — HIGH (ref 42.2–75.2)
NRBC # BLD: 0 /100 WBCS — SIGNIFICANT CHANGE UP (ref 0–0)
PLATELET # BLD AUTO: 136 K/UL — SIGNIFICANT CHANGE UP (ref 130–400)
POTASSIUM SERPL-MCNC: 4.7 MMOL/L — SIGNIFICANT CHANGE UP (ref 3.5–5)
POTASSIUM SERPL-SCNC: 4.7 MMOL/L — SIGNIFICANT CHANGE UP (ref 3.5–5)
PROT SERPL-MCNC: 6.3 G/DL — SIGNIFICANT CHANGE UP (ref 6–8)
RBC # BLD: 3.29 M/UL — LOW (ref 4.7–6.1)
RBC # FLD: 22.5 % — HIGH (ref 11.5–14.5)
SODIUM SERPL-SCNC: 140 MMOL/L — SIGNIFICANT CHANGE UP (ref 135–146)
WBC # BLD: 11.32 K/UL — HIGH (ref 4.8–10.8)
WBC # FLD AUTO: 11.32 K/UL — HIGH (ref 4.8–10.8)

## 2021-10-28 PROCEDURE — 99233 SBSQ HOSP IP/OBS HIGH 50: CPT

## 2021-10-28 RX ADMIN — CEFTRIAXONE 100 MILLIGRAM(S): 500 INJECTION, POWDER, FOR SOLUTION INTRAMUSCULAR; INTRAVENOUS at 17:35

## 2021-10-28 RX ADMIN — LACTULOSE 15 GRAM(S): 10 SOLUTION ORAL at 13:42

## 2021-10-28 RX ADMIN — PANTOPRAZOLE SODIUM 40 MILLIGRAM(S): 20 TABLET, DELAYED RELEASE ORAL at 17:37

## 2021-10-28 RX ADMIN — Medication 5 UNIT(S): at 11:47

## 2021-10-28 RX ADMIN — HEPARIN SODIUM 5000 UNIT(S): 5000 INJECTION INTRAVENOUS; SUBCUTANEOUS at 21:26

## 2021-10-28 RX ADMIN — HEPARIN SODIUM 5000 UNIT(S): 5000 INJECTION INTRAVENOUS; SUBCUTANEOUS at 05:29

## 2021-10-28 RX ADMIN — PANTOPRAZOLE SODIUM 40 MILLIGRAM(S): 20 TABLET, DELAYED RELEASE ORAL at 05:28

## 2021-10-28 RX ADMIN — Medication 667 MILLIGRAM(S): at 17:36

## 2021-10-28 RX ADMIN — Medication 667 MILLIGRAM(S): at 08:21

## 2021-10-28 RX ADMIN — Medication 20 MILLIGRAM(S): at 05:28

## 2021-10-28 RX ADMIN — HEPARIN SODIUM 5000 UNIT(S): 5000 INJECTION INTRAVENOUS; SUBCUTANEOUS at 13:42

## 2021-10-28 RX ADMIN — MIDODRINE HYDROCHLORIDE 10 MILLIGRAM(S): 2.5 TABLET ORAL at 21:26

## 2021-10-28 RX ADMIN — MIDODRINE HYDROCHLORIDE 10 MILLIGRAM(S): 2.5 TABLET ORAL at 05:28

## 2021-10-28 RX ADMIN — NYSTATIN CREAM 1 APPLICATION(S): 100000 CREAM TOPICAL at 05:30

## 2021-10-28 RX ADMIN — LACTULOSE 15 GRAM(S): 10 SOLUTION ORAL at 05:28

## 2021-10-28 RX ADMIN — CHLORHEXIDINE GLUCONATE 1 APPLICATION(S): 213 SOLUTION TOPICAL at 11:50

## 2021-10-28 RX ADMIN — Medication 5 UNIT(S): at 17:35

## 2021-10-28 RX ADMIN — INSULIN GLARGINE 20 UNIT(S): 100 INJECTION, SOLUTION SUBCUTANEOUS at 21:25

## 2021-10-28 RX ADMIN — NYSTATIN CREAM 1 APPLICATION(S): 100000 CREAM TOPICAL at 17:36

## 2021-10-28 RX ADMIN — LACTULOSE 15 GRAM(S): 10 SOLUTION ORAL at 21:25

## 2021-10-28 RX ADMIN — MIDODRINE HYDROCHLORIDE 10 MILLIGRAM(S): 2.5 TABLET ORAL at 13:41

## 2021-10-28 RX ADMIN — Medication 667 MILLIGRAM(S): at 11:49

## 2021-10-28 NOTE — CHART NOTE - NSCHARTNOTEFT_GEN_A_CORE
Patient expressed concerns for his SO.  Patient discussed that she is feeling overwhelmed.  Patient discussed frustration with length of hospitalization and not getting OOB.  Support rendered.

## 2021-10-28 NOTE — PROGRESS NOTE ADULT - SUBJECTIVE AND OBJECTIVE BOX
SUZANNE BAZZI 66y Male  MRN#: [id number]   CODE STATUS: DNR DNI    Hospital Day: 24d    Pt is currently admitted with the primary diagnosis of variceal bleed, HCC    SUBJECTIVE    Overnight events: No overnight events    Subjective complaints: He would like to be discharged home. He would like to work with physical therapy. At this time he does not have insurance. Denies abdominal discomfort. Denies headaches, fevers, abdominal pain and discomfort. He states that he drains fluid from [place] cath based on discomfort. At this time, he does not want fluid to be drained. Would like to wait till tomorrow.     Present Today:   - Lorenz:  No [  ], Yes [   ] : Indication:     - Type of IV Access:       .. CVC/Piccline:  No [  ], Yes [   ] : Indication:       .. Midline: No [  ], Yes [   ] : Indication:                                             ----------------------------------------------------------  OBJECTIVE  PAST MEDICAL & SURGICAL HISTORY  HTN (hypertension)    Hepatitis C  2007    Drug abuse    Cancer, hepatocellular  January 2021    COPD, mild                                              -----------------------------------------------------------  ALLERGIES:  No Known Allergies                                            ------------------------------------------------------------    HOME MEDICATIONS  Home Medications:  Eliquis 5 mg oral tablet: 1 tab(s) orally 2 times a day (04 Oct 2021 06:13)  fluticasone-salmeterol 55 mcg-14 mcg/inh inhalation powder: 1 puff(s) inhaled 2 times a day (04 Oct 2021 06:14)  Protonix 40 mg oral delayed release tablet: 1 tab(s) orally once a day (04 Oct 2021 06:13)  spironolactone 50 mg oral tablet: 1 tab(s) orally once a day (04 Oct 2021 06:13)                           MEDICATIONS:  STANDING MEDICATIONS  budesonide  80 MICROgram(s)/formoterol 4.5 MICROgram(s) Inhaler 2 Puff(s) Inhalation two times a day  calcium acetate 667 milliGRAM(s) Oral three times a day with meals  cefTRIAXone   IVPB 1000 milliGRAM(s) IV Intermittent every 24 hours  chlorhexidine 4% Liquid 1 Application(s) Topical daily  dextrose 40% Gel 15 Gram(s) Oral once  dextrose 5%. 1000 milliLiter(s) IV Continuous <Continuous>  dextrose 5%. 1000 milliLiter(s) IV Continuous <Continuous>  dextrose 50% Injectable 25 Gram(s) IV Push once  dextrose 50% Injectable 12.5 Gram(s) IV Push once  dextrose 50% Injectable 25 Gram(s) IV Push once  furosemide    Tablet 20 milliGRAM(s) Oral daily  glucagon  Injectable 1 milliGRAM(s) IntraMuscular once  heparin   Injectable 5000 Unit(s) SubCutaneous every 8 hours  insulin glargine Injectable (LANTUS) 20 Unit(s) SubCutaneous at bedtime  insulin lispro (ADMELOG) corrective regimen sliding scale   SubCutaneous three times a day before meals  insulin lispro Injectable (ADMELOG) 5 Unit(s) SubCutaneous three times a day before meals  lactulose Syrup 15 Gram(s) Oral three times a day  midodrine 10 milliGRAM(s) Oral every 8 hours  nystatin Cream 1 Application(s) Topical two times a day  pantoprazole    Tablet 40 milliGRAM(s) Oral two times a day  propranolol 5 milliGRAM(s) Oral daily  rifAXIMin 550 milliGRAM(s) Oral two times a day    PRN MEDICATIONS                                            ------------------------------------------------------------  VITAL SIGNS: Last 24 Hours  T(C): 36.8 (28 Oct 2021 13:00), Max: 36.8 (28 Oct 2021 13:00)  T(F): 98.2 (28 Oct 2021 13:00), Max: 98.2 (28 Oct 2021 13:00)  HR: 89 (28 Oct 2021 13:00) (89 - 103)  BP: 103/73 (28 Oct 2021 13:00) (97/66 - 112/68)  BP(mean): --  RR: 18 (28 Oct 2021 13:00) (18 - 18)  SpO2: 96% (27 Oct 2021 20:28) (96% - 96%)      10-27-21 @ 07:01  -  10-28-21 @ 07:00  --------------------------------------------------------  IN: 650 mL / OUT: 900 mL / NET: -250 mL    10-28-21 @ 07:01  -  10-28-21 @ 17:20  --------------------------------------------------------  IN: 0 mL / OUT: 200 mL / NET: -200 mL                                             --------------------------------------------------------------  LABS:                        9.9    11.32 )-----------( 136      ( 28 Oct 2021 04:30 )             32.3     10-28    140  |  104  |  61<HH>  ----------------------------<  97  4.7   |  22  |  1.4    Ca    8.4<L>      28 Oct 2021 04:30  Mg     2.1     10-28    TPro  6.3  /  Alb  3.8  /  TBili  1.2  /  DBili  x   /  AST  43<H>  /  ALT  18  /  AlkPhos  169<H>  10-28                                                              -------------------------------------------------------------  RADIOLOGY:                                            --------------------------------------------------------------    PHYSICAL EXAM:  GENERAL: NAD, ill appearing   HEENT:  Atraumatic, Normocephalic. EOMI, PERRLA, conjunctiva and sclera clear, No JVD  PULMONARY: Clear to auscultation bilaterally; No wheeze  CARDIOVASCULAR: Regular rate and rhythm; No murmurs, rubs, or gallops  GASTROINTESTINAL: Distended, ascites, firm abdomen with [place] catheter in place   MUSCULOSKELETAL:  2+ Peripheral Pulses, No clubbing, cyanosis,  3+ pitting edema bilateral, legs wrapped   NEUROLOGY: non-focal  SKIN: No rashes or lesions                                           --------------------------------------------------------------    ASSESSMENT & PLAN  65 yo male, with h/o HTN, drug abuse, Hepatitis C s/p INF, Liver cirrhosis s/p [place] shunt, COPD, DVT? on Eliquis, hepatocellular carcinoma since January 2021 on chemotherapy, presented for weakness and decreased oral intake along with constipation and hematemesis with ammonia (188) on presentation.    Acute gastrointestinal hemorrhage due to variceal bleed  HCC  Decompensated liver cirrhosis due to HCV  Hepatic encephalopathy   Thrombocytopenia  Patient initially presented with worsening hepatic encephalopathy, NH3 on admission 188, hospital course complicated by hematemesis s/p intubation and emergent EGD10/4, s/p EVBL on propranolol   successfully extubated  patient clinically feels well, saturating 99% on 3L, taper down as tolerated   resumed Ceftriaxone for SBP prophylaxis until discharge    repeat Chest xray overall unchanged  seen by s/s, diet advanced   chemotherapy currently on hold given poor functional status, may be considered in the future if the patient improves  patient has a denver catheter due to frequent paracentesis, has had >9L removed (at baseline pt drains 2L Q2days at home),   s/p drainage 10/21 with 2L removed, no SBP on fluid analysis   2-3 L fluid drained on 10/26. Albumin PRN. Patient would like to wait till 10/29 to drain fluid.   do not remove >5L of fluid at a time, give albumin with paracentesis  started lasix 20mg daily for worsening LE edema, monitor renal fxn, discussed with GI  renal fxn slightly up. If continues to uptrend, hold lasix  Palliative team following     EBER: Resolving   - r/o hepatorenal syndrome v abdominal compartment syndrome   metabolic acidosis  Hypernatremia  -avoid nephrotoxics, s/p albumin challenge    -continue phoslo as per renal   - monitor BMP, nephrology following  - stable around 1.4 today     #hyperkalemia: Resolving   - 5.3  - 4.7 today.  - Lokelma if K+ > 5.0      Portal vein thrombosis  Left main and right portal vein thrombus was on eliquis previously  per GI--> tumor thrombus, Gi had discussed with his oncologist that no need to resume eliquis (given the recent variceal bleed)  Revisit this with oncologist in a few days.     Septic shock - resolved   - previously required levophed - d/c'ed on 10/10, now on midodrine  - DTA culture showed Stenotrophomonas, s/p course of levaquin  - monitor BP closely and resume pressors if indicated     COPD - no wheezing on exam

## 2021-10-28 NOTE — CHART NOTE - NSCHARTNOTEFT_GEN_A_CORE
PALLIATIVE MEDICINE INTERDISCIPLINARY TEAM NOTE    Provider:  [   ]Social Work   [   ]          [   ] Initial visit [   ] Follow up    Family or contact name / phone #   Met with: [   ] Patient  [   ] Family  [   ] Other:    Primary Language: [   ] English [   ] Other*:                      *Interpretation provided by:    SUPPORT DIAGNOSES            (Check all that apply)  [   ] Psychosocial spiritual assessment (PSSA)  [   ] EOL issues  [   ] Cultural / spiritual concerns  [   ] Pain / suffering  [   ] Dementia / AMS  [   ] Other:  [   ] AD issues  [   ] Grief / loss / sadness  [   ] Discharge issues  [   ] Distress / coping    PSYCHOSOCIAL ASSESSMENT OF PATIENT         (Check all that apply)  [   ] Initial Assessment            [   ] Reassessment          [   ] Not Applicable this visit    Pain/suffering acuity:  [   ] None to mild (0-3)           [   ] Moderate (4-6)        [   ] High (7-10)    Mental Status:  [   ] Alert/oriented (x3)          [   ] Confused/Altered(x2/x1)         [   ] Non-resp    Functional status:  [   ] Independent w ADLs      [   ] Needs Assistance             [   ] Bedbound/Full Care    Coping:  [   ] Coping well                     [   ] Coping w/difficulty            [   ] Poor coping    Support system:  [   ] Strong                              [   ] Adequate                        [   ] Inadequate    SPIRITUAL ASSESSMENT  Adventist/Spiritual practice: _____Catholic______________________    Role of organized Restorationist:  [   ] Important                     [   ] Some (fam tradition, cultural)               [ x  ] None    Effects on medical care:  [   ] Yes, _____________________________________                         [ x  ] None    Cultural/Advent need:  [   ] Yes, _____________________________________                         [ x  ] None    Refer to Pastoral Care:  [   ] Yes           [   ] No, not at this time    SERVICE PROVIDED  [   ]PSSA                                                                             [   ]Discharge support / facilitation  [   ]AD / goals of care counseling                                  [   ]EOL / death / bereavement counseling  [   ]Counseling / support                                                [   ] Family meeting  [ x  ]Prayer / sacrament / ritual                                      [   ] Referral   [   ]Other                                                                       NOTE and Plan of Care (PoC): Pt states he's has no pain but wants therapy so that he can go home. Pt seems to not understand how sick he really is and has a lot of plans for when he leaves the hospital. He has a lot os spiritual and psychosocial issues dealing with. I am journeying  with Pt and wife as they go through this challenges time.

## 2021-10-28 NOTE — PROGRESS NOTE ADULT - SUBJECTIVE AND OBJECTIVE BOX
SUZANNE BAZZI  [facility] T2-3A 014 B ([facility])        Patient was evaluated and examined  by bedside, reports of feeling weak, decreased legs edema.       REVIEW OF SYSTEMS:  please see pertinent positives mentioned above, all other 12 ROS negative      T(C): , Max: 36.4 (10-27-21 @ 20:28)  HR: 90 (10-28-21 @ 05:43)  BP: 97/66 (10-28-21 @ 05:43)  RR: 18 (10-28-21 @ 05:43)  SpO2: 96% (10-27-21 @ 20:28)  CAPILLARY BLOOD GLUCOSE      POCT Blood Glucose.: 93 mg/dL (28 Oct 2021 07:21)  POCT Blood Glucose.: 94 mg/dL (27 Oct 2021 21:09)  POCT Blood Glucose.: 134 mg/dL (27 Oct 2021 16:21)  POCT Blood Glucose.: 162 mg/dL (27 Oct 2021 11:47)      PHYSICAL EXAM:  General: NAD, AAOX3, patient is laying comfortably in bed  HEENT: AT, NC, Supple, NO JVD, NO CB  Lungs: CTA B/L, no wheezing, no rhonchi  CVS: loud  S1, S2, RRR, NO M/G/R  Abdomen: mildly distended , bowel sounds present, non-tender, moderate ascites present   Extremities: plus 2 pitting b/l lower ext.  edema present , no clubbing, no cyanosis, positive peripheral pulses b/l  Neuro: no acute focal neurological deficits  Skin: no rash, no ecchymosis      LAB  CBC  Date: 10-28-21 @ 04:30  Mean cell Obikjjivub32.1  Mean cell Hemoglobin Conc30.7  Mean cell Volum 98.2  Platelet count-Automate 136  RBC Count 3.29  Red Cell Distrib Width22.5  WBC Count11.32  % Albumin, Urine--  Hematocrit 32.3  Hemoglobin 9.9  CBC  Date: 10-27-21 @ 07:33  Mean cell Hnbbohanxv36.6  Mean cell Hemoglobin Conc31.0  Mean cell Volum 98.6  Platelet count-Automate 95  RBC Count 2.91  Red Cell Distrib Width22.7  WBC Count10.28  % Albumin, Urine--  Hematocrit 28.7  Hemoglobin 8.9  CBC  Date: 10-26-21 @ 06:37  Mean cell Ntupjozvwk68.5  Mean cell Hemoglobin Conc30.5  Mean cell Volum 100.0  Platelet count-Automate 130  RBC Count 3.47  Red Cell Distrib Width23.4  WBC Count11.59  % Albumin, Urine--  Hematocrit 34.7  Hemoglobin 10.6  CBC  Date: 10-25-21 @ 04:30  Mean cell Vpsbqibrfx25.2  Mean cell Hemoglobin Conc30.2  Mean cell Volum 100.0  Platelet count-Automate 111  RBC Count 3.28  Red Cell Distrib Width23.9  WBC Count10.41  % Albumin, Urine--  Hematocrit 32.8  Hemoglobin 9.9  CBC  Date: 10-24-21 @ 04:30  Mean cell Kwypvqqfdt64.0  Mean cell Hemoglobin Conc29.6  Mean cell Volum 101.3  Platelet count-Automate 110  RBC Count 3.20  Red Cell Distrib Width25.1  WBC Count12.07  % Albumin, Urine--  Hematocrit 32.4  Hemoglobin 9.6  CBC  Date: 10-23-21 @ 04:30  Mean cell Ofwipsearw43.1  Mean cell Hemoglobin Conc30.2  Mean cell Volum 99.7  Platelet count-Automate 76  RBC Count 3.39  Red Cell Distrib Width25.3  WBC Count11.40  % Albumin, Urine--  Hematocrit 33.8  Hemoglobin 10.2  CBC  Date: 10-22-21 @ 08:47  Mean cell Ssheezwyza87.9  Mean cell Hemoglobin Conc30.0  Mean cell Volum 99.7  Platelet count-Automate 116  RBC Count 3.28  Red Cell Distrib Width25.9  WBC Count13.90  % Albumin, Urine--  Hematocrit 32.7  Hemoglobin 9.8    BMP  10-27-21 @ 07:33  Blood Gas Arterial-Calcium,Ionized--  Blood Urea Nitrogen, Serum 68 mg/dL<HH> [10 - 20] [Critical value:]  Carbon Dioxide, Serum19 mmol/L [17 - 32]  Chloride, Rgbsm653 mmol/L [98 - 110]  Creatinie, Serum1.5 mg/dL [0.7 - 1.5]  Glucose, Serum98 mg/dL [70 - 99]  Potassium, Serum4.0 mmol/L [3.5 - 5.0]  Sodium, Serum 139 mmol/L [135 - 146]  [facility]  10-26-21 @ 06:37  Blood Gas Arterial-Calcium,Ionized--  Blood Urea Nitrogen, Serum 74 mg/dL<HH> [10 - 20] [Critical value:]  Carbon Dioxide, Serum21 mmol/L [17 - 32]  Chloride, Cgkwh425 mmol/L [98 - 110]  Creatinie, Serum1.7 mg/dL<H> [0.7 - 1.5]  Glucose, Serum81 mg/dL [70 - 99]  Potassium, Serum5.3 mmol/L<H> [3.5 - 5.0]  Sodium, Serum 138 mmol/L [135 - 146]  [facility]  10-25-21 @ 20:00  Blood Gas Arterial-Calcium,Ionized--  Blood Urea Nitrogen, Serum 73 mg/dL<HH> [10 - 20] [Critical value:]  Carbon Dioxide, Serum21 mmol/L [17 - 32]  Chloride, Ndglk745 mmol/L [98 - 110]  Creatinie, Serum1.8 mg/dL<H> [0.7 - 1.5]  Glucose, Serum96 mg/dL [70 - 99]  Potassium, Serum5.2 mmol/L<H> [3.5 - 5.0]  Sodium, Serum 138 mmol/L [135 - 146]  [facility]  10-25-21 @ 04:30  Blood Gas Arterial-Calcium,Ionized--  Blood Urea Nitrogen, Serum 80 mg/dL<HH> [10 - 20] [Critical value:]  Carbon Dioxide, Serum22 mmol/L [17 - 32]  Chloride, Kdtrf001 mmol/L [98 - 110]  Creatinie, Serum1.8 mg/dL<H> [0.7 - 1.5]  Glucose, Kydsb635 mg/dL<H> [70 - 99]  Potassium, Serum5.6 mmol/L<H> [3.5 - 5.0]  Sodium, Serum 143 mmol/L [135 - 146]  [facility]  10-24-21 @ 04:30  Blood Gas Arterial-Calcium,Ionized--  Blood Urea Nitrogen, Serum 83 mg/dL<HH> [10 - 20] [Critical value:]  Carbon Dioxide, Serum22 mmol/L [17 - 32]  Chloride, [place]032 mmol/L [98 - 110]  Creatinie, Serum1.7 mg/dL<H> [0.7 - 1.5]  Glucose, Lmphb113 mg/dL<H> [70 - 99]  Potassium, Serum4.6 mmol/L [3.5 - 5.0]  Sodium, Serum 145 mmol/L [135 - 146]              Microbiology:    Culture - Urine (collected 10-13-21 @ 16:00)  Source: Clean Catch Clean Catch (Midstream)  Final Report (10-14-21 @ 22:31):    <10,000 CFU/mL Normal Urogenital Chayo    Culture - Blood (collected 10-13-21 @ 12:09)  Source: .Blood None  Final Report (10-18-21 @ 23:00):    No Growth Final    Culture - Sputum (collected 10-12-21 @ 09:34)  Source: .Sputum Sputum  Gram Stain (10-13-21 @ 07:22):    Numerous polymorphonuclear leukocytes per low power field    Rare Squamous epithelial cells per low power field    Moderate Gram Negative Rods seen per oil power field  Final Report (10-14-21 @ 20:47):    Numerous Stenotrophomonas maltophilia  Organism: Stenotrophomonas maltophilia (10-14-21 @ 20:47)  Organism: Stenotrophomonas maltophilia (10-14-21 @ 20:47)      -  Ceftazidime: R >16      -  Levofloxacin: S <=0.5      -  Trimethoprim/Sulfamethoxazole: S <=0.5/9.5      Method Type: YESSI    Culture - Fungal, Body Fluid (collected 10-05-21 @ 18:53)  Source: .Body Fluid Peritoneal Fluid  Preliminary Report (10-13-21 @ 15:02):    No growth    Culture - Body Fluid with Gram Stain (collected 10-05-21 @ 18:53)  Source: .Body Fluid Peritoneal Fluid  Gram Stain (10-06-21 @ 02:48):    polymorphonuclear leukocytes seen    No organisms seen    by cytocentrifuge  Final Report (10-10-21 @ 17:05):    No growth at 5 days    Culture - Blood (collected 10-04-21 @ 02:15)  Source: .Blood Blood  Final Report (10-09-21 @ 14:01):    No Growth Final    Culture - Blood (collected 10-04-21 @ 02:10)  Source: .Blood Blood  Final Report (10-09-21 @ 14:01):    No Growth Final        Medications:  budesonide  80 MICROgram(s)/formoterol 4.5 MICROgram(s) Inhaler 2 Puff(s) Inhalation two times a day  calcium acetate 667 milliGRAM(s) Oral three times a day with meals  cefTRIAXone   IVPB 1000 milliGRAM(s) IV Intermittent every 24 hours  chlorhexidine 4% Liquid 1 Application(s) Topical daily  dextrose 40% Gel 15 Gram(s) Oral once  dextrose 5%. 1000 milliLiter(s) IV Continuous <Continuous>  dextrose 5%. 1000 milliLiter(s) IV Continuous <Continuous>  dextrose 50% Injectable 25 Gram(s) IV Push once  dextrose 50% Injectable 12.5 Gram(s) IV Push once  dextrose 50% Injectable 25 Gram(s) IV Push once  furosemide    Tablet 20 milliGRAM(s) Oral daily  glucagon  Injectable 1 milliGRAM(s) IntraMuscular once  heparin   Injectable 5000 Unit(s) SubCutaneous every 8 hours  insulin glargine Injectable (LANTUS) 20 Unit(s) SubCutaneous at bedtime  insulin lispro (ADMELOG) corrective regimen sliding scale   SubCutaneous three times a day before meals  insulin lispro Injectable (ADMELOG) 5 Unit(s) SubCutaneous three times a day before meals  lactulose Syrup 15 Gram(s) Oral three times a day  midodrine 10 milliGRAM(s) Oral every 8 hours  nystatin Cream 1 Application(s) Topical two times a day  pantoprazole    Tablet 40 milliGRAM(s) Oral two times a day  propranolol 5 milliGRAM(s) Oral daily  rifAXIMin 550 milliGRAM(s) Oral two times a day        Assessment and Plan:  Patient is a 67 yo male, with h/o HTN, drug abuse, Hepatitis C s/p INF,  advanced Liver cirrhosis s/p [place] shunt with recurrent ascites, COPD, DVT? was on Eliquis at home , hepatocellular carcinoma since January 2021 on chemotherapy, presented for weakness and decreased oral intake along with constipation and hematemesis with ammonia (188) on presentation.    Acute gastrointestinal hemorrhage due to variceal bleed  HCC  Decompensated liver cirrhosis due to HCV  Hepatic encephalopathy -resolved  Thrombocytopenia  Patient initially presented with worsening hepatic encephalopathy, NH3 on admission 188, hospital course complicated by hematemesis s/p intubation and emergent EGD10/4, s/p EVBL on propranolol   successfully extubated  10/28- 98% on 2 L at rest   resumed Ceftriaxone for SBP prophylaxis until discharge  , continue PPI twice daily tx.  chemotherapy currently on hold given poor functional status, may be considered in the future if the patient improves  patient has a den[place] catheter due to frequent paracentesis, has had >9L removed (at baseline pt drains 2L Q2days at home), 2-3 L   s/p drainage 10/26- 2 L removed  do not remove >5L of fluid at a time, give albumin with paracentesis  started lasix 20mg daily with improved  LE edema, monitor renal fxn, discussed with GI  Palliative team following     Esopageal dysmotility- patient was evaluated by speech tx.   - recommended -  dysphagia 2 with thin liquids.     EBER - r/o hepatorenal syndrome v abdominal compartment syndrome   metabolic acidosis  -avoid nephrotoxics, s/p albumin challenge    -continue phoslo as per renal   - monitor BMP, nephrology following  - stable around 1.5 today     #hyperkalemia- corrected post lokelma tx.     Portal vein thrombosis  Left main and right portal vein thrombus was on eliquis previously  per GI--> tumor thrombus, Gi had discussed with his oncologist that no need to resume eliquis (given the recent variceal bleed)      Septic shock - resolved   - previously required levophed - d/c'ed on 10/10, now on midodrine  - DTA culture showed Stenotrophomonas, s/p course of levaquin    COPD - stable, improved hypoxia    overall patient's prognosis- guarded    #Progress Note Handoff  Pending :  with medical optimization start d/c process. will drain abd. ascites tomorrow. PT tx.    Family discussion: Plan of care discussed with patient and significant other at bedside.   Disposition:  from home, agreeable to SNF, requesting EGER    Total time spent to complete patient's bedside assessment, review medical chart, discuss medical plan of care with covering medical team was more than 35 minutes.

## 2021-10-29 LAB
ALBUMIN SERPL ELPH-MCNC: 3.4 G/DL — LOW (ref 3.5–5.2)
ALP SERPL-CCNC: 179 U/L — HIGH (ref 30–115)
ALT FLD-CCNC: 19 U/L — SIGNIFICANT CHANGE UP (ref 0–41)
ANION GAP SERPL CALC-SCNC: 15 MMOL/L — HIGH (ref 7–14)
AST SERPL-CCNC: 48 U/L — HIGH (ref 0–41)
BASOPHILS # BLD AUTO: 0.03 K/UL — SIGNIFICANT CHANGE UP (ref 0–0.2)
BASOPHILS NFR BLD AUTO: 0.3 % — SIGNIFICANT CHANGE UP (ref 0–1)
BILIRUB SERPL-MCNC: 1.3 MG/DL — HIGH (ref 0.2–1.2)
BLD GP AB SCN SERPL QL: SIGNIFICANT CHANGE UP
BUN SERPL-MCNC: 58 MG/DL — HIGH (ref 10–20)
CALCIUM SERPL-MCNC: 8.3 MG/DL — LOW (ref 8.5–10.1)
CHLORIDE SERPL-SCNC: 101 MMOL/L — SIGNIFICANT CHANGE UP (ref 98–110)
CO2 SERPL-SCNC: 19 MMOL/L — SIGNIFICANT CHANGE UP (ref 17–32)
CREAT SERPL-MCNC: 1.5 MG/DL — SIGNIFICANT CHANGE UP (ref 0.7–1.5)
EOSINOPHIL # BLD AUTO: 0 K/UL — SIGNIFICANT CHANGE UP (ref 0–0.7)
EOSINOPHIL NFR BLD AUTO: 0 % — SIGNIFICANT CHANGE UP (ref 0–8)
GLUCOSE BLDC GLUCOMTR-MCNC: 104 MG/DL — HIGH (ref 70–99)
GLUCOSE BLDC GLUCOMTR-MCNC: 118 MG/DL — HIGH (ref 70–99)
GLUCOSE BLDC GLUCOMTR-MCNC: 172 MG/DL — HIGH (ref 70–99)
GLUCOSE BLDC GLUCOMTR-MCNC: 88 MG/DL — SIGNIFICANT CHANGE UP (ref 70–99)
GLUCOSE SERPL-MCNC: 80 MG/DL — SIGNIFICANT CHANGE UP (ref 70–99)
HCT VFR BLD CALC: 31.6 % — LOW (ref 42–52)
HGB BLD-MCNC: 9.7 G/DL — LOW (ref 14–18)
IMM GRANULOCYTES NFR BLD AUTO: 0.4 % — HIGH (ref 0.1–0.3)
LYMPHOCYTES # BLD AUTO: 1.27 K/UL — SIGNIFICANT CHANGE UP (ref 1.2–3.4)
LYMPHOCYTES # BLD AUTO: 11.3 % — LOW (ref 20.5–51.1)
MAGNESIUM SERPL-MCNC: 1.9 MG/DL — SIGNIFICANT CHANGE UP (ref 1.8–2.4)
MCHC RBC-ENTMCNC: 30.1 PG — SIGNIFICANT CHANGE UP (ref 27–31)
MCHC RBC-ENTMCNC: 30.7 G/DL — LOW (ref 32–37)
MCV RBC AUTO: 98.1 FL — HIGH (ref 80–94)
MONOCYTES # BLD AUTO: 1.02 K/UL — HIGH (ref 0.1–0.6)
MONOCYTES NFR BLD AUTO: 9.1 % — SIGNIFICANT CHANGE UP (ref 1.7–9.3)
NEUTROPHILS # BLD AUTO: 8.85 K/UL — HIGH (ref 1.4–6.5)
NEUTROPHILS NFR BLD AUTO: 78.9 % — HIGH (ref 42.2–75.2)
NRBC # BLD: 0 /100 WBCS — SIGNIFICANT CHANGE UP (ref 0–0)
PLATELET # BLD AUTO: 128 K/UL — LOW (ref 130–400)
POTASSIUM SERPL-MCNC: 5 MMOL/L — SIGNIFICANT CHANGE UP (ref 3.5–5)
POTASSIUM SERPL-SCNC: 5 MMOL/L — SIGNIFICANT CHANGE UP (ref 3.5–5)
PROT SERPL-MCNC: 6.1 G/DL — SIGNIFICANT CHANGE UP (ref 6–8)
RBC # BLD: 3.22 M/UL — LOW (ref 4.7–6.1)
RBC # FLD: 22.1 % — HIGH (ref 11.5–14.5)
SODIUM SERPL-SCNC: 135 MMOL/L — SIGNIFICANT CHANGE UP (ref 135–146)
WBC # BLD: 11.22 K/UL — HIGH (ref 4.8–10.8)
WBC # FLD AUTO: 11.22 K/UL — HIGH (ref 4.8–10.8)

## 2021-10-29 PROCEDURE — 99233 SBSQ HOSP IP/OBS HIGH 50: CPT

## 2021-10-29 RX ORDER — ALBUMIN HUMAN 25 %
25 VIAL (ML) INTRAVENOUS
Refills: 0 | Status: COMPLETED | OUTPATIENT
Start: 2021-10-29 | End: 2021-10-29

## 2021-10-29 RX ADMIN — BUDESONIDE AND FORMOTEROL FUMARATE DIHYDRATE 2 PUFF(S): 160; 4.5 AEROSOL RESPIRATORY (INHALATION) at 21:56

## 2021-10-29 RX ADMIN — HEPARIN SODIUM 5000 UNIT(S): 5000 INJECTION INTRAVENOUS; SUBCUTANEOUS at 21:55

## 2021-10-29 RX ADMIN — Medication 667 MILLIGRAM(S): at 12:02

## 2021-10-29 RX ADMIN — HEPARIN SODIUM 5000 UNIT(S): 5000 INJECTION INTRAVENOUS; SUBCUTANEOUS at 16:49

## 2021-10-29 RX ADMIN — Medication 250 GRAM(S): at 13:31

## 2021-10-29 RX ADMIN — LACTULOSE 15 GRAM(S): 10 SOLUTION ORAL at 05:43

## 2021-10-29 RX ADMIN — CHLORHEXIDINE GLUCONATE 1 APPLICATION(S): 213 SOLUTION TOPICAL at 11:53

## 2021-10-29 RX ADMIN — LACTULOSE 15 GRAM(S): 10 SOLUTION ORAL at 16:48

## 2021-10-29 RX ADMIN — CEFTRIAXONE 100 MILLIGRAM(S): 500 INJECTION, POWDER, FOR SOLUTION INTRAMUSCULAR; INTRAVENOUS at 16:52

## 2021-10-29 RX ADMIN — LACTULOSE 15 GRAM(S): 10 SOLUTION ORAL at 21:55

## 2021-10-29 RX ADMIN — Medication 667 MILLIGRAM(S): at 11:48

## 2021-10-29 RX ADMIN — Medication 250 GRAM(S): at 16:50

## 2021-10-29 RX ADMIN — Medication 250 GRAM(S): at 20:58

## 2021-10-29 RX ADMIN — Medication 0: at 11:54

## 2021-10-29 RX ADMIN — PANTOPRAZOLE SODIUM 40 MILLIGRAM(S): 20 TABLET, DELAYED RELEASE ORAL at 06:16

## 2021-10-29 RX ADMIN — NYSTATIN CREAM 1 APPLICATION(S): 100000 CREAM TOPICAL at 06:16

## 2021-10-29 RX ADMIN — Medication 20 MILLIGRAM(S): at 05:43

## 2021-10-29 RX ADMIN — HEPARIN SODIUM 5000 UNIT(S): 5000 INJECTION INTRAVENOUS; SUBCUTANEOUS at 05:43

## 2021-10-29 RX ADMIN — INSULIN GLARGINE 20 UNIT(S): 100 INJECTION, SOLUTION SUBCUTANEOUS at 21:56

## 2021-10-29 RX ADMIN — Medication 5 UNIT(S): at 11:54

## 2021-10-29 RX ADMIN — MIDODRINE HYDROCHLORIDE 10 MILLIGRAM(S): 2.5 TABLET ORAL at 05:43

## 2021-10-29 RX ADMIN — Medication 667 MILLIGRAM(S): at 17:07

## 2021-10-29 RX ADMIN — MIDODRINE HYDROCHLORIDE 10 MILLIGRAM(S): 2.5 TABLET ORAL at 16:48

## 2021-10-29 NOTE — PROGRESS NOTE ADULT - ATTENDING SUPERVISION STATEMENT
Fellow
Resident
Resident
Fellow

## 2021-10-29 NOTE — CHART NOTE - NSCHARTNOTEFT_GEN_A_CORE
3L clear straw colored fluid drained from [place] cath under sterile technique today for therapeutic relief. Patient tolerated well with improvement in discomfort.     25g x3 doses  of albumin ordered stat for post- paracentesis     Virginie Lewis, PGY1  Attending: Dr. Mercedes

## 2021-10-29 NOTE — PROGRESS NOTE ADULT - ATTENDING COMMENTS
Patient is a 65 yo male, with h/o HTN, drug abuse, Hepatitis C s/p INF,  advanced Liver cirrhosis s/p [place] shunt with recurrent ascites, COPD, DVT? was on Eliquis at home , hepatocellular carcinoma since January 2021 on chemotherapy, presented for weakness and decreased oral intake along with constipation and hematemesis with ammonia (188) on presentation.    Acute gastrointestinal hemorrhage due to variceal bleed  HCC  Decompensated liver cirrhosis due to HCV  Hepatic encephalopathy -resolved  Thrombocytopenia  Patient initially presented with worsening hepatic encephalopathy, NH3 on admission 188, hospital course complicated by hematemesis s/p intubation and emergent EGD10/4, s/p EVBL on propranolol   successfully extubated  10/27- 100% on 2 L at rest   resumed Ceftriaxone for SBP prophylaxis until discharge  , continue PPI twice daily tx.  chemotherapy currently on hold given poor functional status, may be considered in the future if the patient improves  patient has a denver catheter due to frequent paracentesis, has had >9L removed (at baseline pt drains 2L Q2days at home), 2-3 L   s/p drainage 10/26- 2 L removed  do not remove >5L of fluid at a time, give albumin with paracentesis  started lasix 20mg daily for worsening LE edema, monitor renal fxn, discussed with GI  Palliative team following     Esopageal dysmotility- patient was evaluated by speech tx.   - recommended -  dysphagia 2 with thin liquids.     EBER - r/o hepatorenal syndrome v abdominal compartment syndrome   metabolic acidosis  -avoid nephrotoxics, s/p albumin challenge    -continue phoslo as per renal   - monitor BMP, nephrology following  - stable around 1.5 today     #hyperkalemia- corrected post lokelma tx.     Portal vein thrombosis  Left main and right portal vein thrombus was on eliquis previously  per GI--> tumor thrombus, Gi had discussed with his oncologist that no need to resume eliquis (given the recent variceal bleed)      Septic shock - resolved   - previously required levophed - d/c'ed on 10/10, now on midodrine  - DTA culture showed Stenotrophomonas, s/p course of levaquin    COPD - stable, improved hypoxia    overall patient's prognosis- guarded    #Progress Note Handoff  Pending :  with medical optimization start d/c process  . Drain ascitic fluid 2L Q48H via denver cath.    Family discussion: Plan of care discussed with patient and significant other at bedside.   Disposition:  from home, agreeable to SNF, requesting EGER    Total time spent to complete patient's bedside assessment, review medical chart, discuss medical plan of care with covering medical team was more than 35 minutes
Patient is a 65 yo male, with h/o HTN, drug abuse, Hepatitis C s/p INF,  advanced Liver cirrhosis s/p [place] shunt with recurrent ascites, COPD, DVT? was on Eliquis at home , hepatocellular carcinoma since January 2021 on chemotherapy, presented for weakness and decreased oral intake along with constipation and hematemesis with ammonia (188) on presentation.    Acute gastrointestinal hemorrhage due to variceal bleed- resolved  HCC  Decompensated liver cirrhosis due to HCV  Hepatic encephalopathy -resolved  Thrombocytopenia  Patient initially presented with worsening hepatic encephalopathy, NH3 on admission 188, hospital course complicated by hematemesis s/p intubation and emergent EGD10/4, s/p EVBL on propranolol   successfully extubated  10/29- 98% on 2 L at rest   resumed Ceftriaxone for SBP prophylaxis until discharge  , continue PPI twice daily tx.  chemotherapy currently on hold given poor functional status, may be considered in the future if the patient improves  patient has a denver catheter due to frequent paracentesis, has had >9L removed (at baseline pt drains 2L Q2days at home), 2-3 L   s/p ther. abdominal drainage 10/29  do not remove >5L of fluid at a time, give albumin with paracentesis  started lasix 20mg daily with improved  LE edema, monitor renal fxn, discussed with GI  Palliative team following     Esophageal dysmotility- patient was evaluated by speech tx.   - recommended -  dysphagia 2 with thin liquids.     EBER - r/o hepatorenal syndrome v abdominal compartment syndrome   metabolic acidosis  -avoid nephrotoxics, s/p albumin challenge    -continue phoslo as per renal   - monitor BMP, nephrology following  - stable around 1.5 today     #hyperkalemia- corrected post lokelma tx.     Portal vein thrombosis  Left main and right portal vein thrombus was on eliquis previously  per GI--> tumor thrombus, Gi had discussed with his oncologist that no need to resume eliquis (given the recent variceal bleed)      Septic shock - resolved   - previously required levophed - d/c'ed on 10/10, now on midodrine  - DTA culture showed Stenotrophomonas, s/p course of levaquin    COPD - stable, improved hypoxia    overall patient's prognosis- guarded    #Progress Note Handoff  Pending :  with medical optimization start d/c process. will drain abd. ascites today. PT tx.    Family discussion: Plan of care discussed with patient and significant other at bedside.   Disposition:  from home, agreeable to SNF, requesting EGER    Total time spent to complete patient's bedside assessment, review medical chart, discuss medical plan of care with covering medical team was more than 35 minutes.
66 year old   male with decompensated HCV cirrhosis Child C MELD 17 ascites with [place] shunt portal thrombus HCC on treatment with bevacizumab + atezolizumab admitted with variceal bleeding and post banding.  Dyspnea worse today   Reports hypoglycemia this am.   Abdomen soft distended   On rifaximin and lactulose  Creatinine improved and Na suggest of pre renal azotemia.   Needs increased free water intake in addition to albumin   WBC elevated and with respiratory sx possible respiratory source.
66 year old   male with decompensated HCV cirrhosis Child C MELD 17 ascites with [place] shunt portal thrombus HCC on treatment with bevacizumab + atezolizumab admitted with variceal bleeding and post banding.  Reports feeling better  Hypernatremia better.  Creatinine stable. Hypokalemia+  Would correct hypokalemia before starting diuretics. He also has Denver catheter.   Patient has poor prognosis and palliative input appreciated.  Will sign off.
66 year old   male with decompensated HCV cirrhosis ascites with [place] shunt portal thrombus HCC on treatment with bevacizumab + atezolizumab admitted with variceal bleeding and post banding.  Has been extubated and sitting comfortably   On rifaximin and lactulose  Can start secondary prophylaxis for GI bleed with betablocker near discharge.  Note palliative team involved and patient does not want aggressive care.  If decides on palliative care will sign off.
IMPRESSION:    Acute resp failure resolved   Hepatic encephalopathy improving   Decompensated liver cirrhosis.  Ascites SP peritoneal PleurX Catheter    Hematemesis resolved ( esophageal/ gastric varices)  Acute portal Vein thrombosis   Hepatocellular carcinoma with transaminitis on chemotherapy  HO Hepatitis C treated with INF  HO COPD  EBER   Stenotrophomonas in sputum sp ABX   Thrombocytopenia HIT negative / Splenic Sequestration?      Plan as outlined above
66 year old   male with decompensated HCV cirrhosis Child C MELD 17 ascites with [place] shunt portal thrombus HCC on treatment with bevacizumab + atezolizumab admitted with variceal bleeding and post banding.  Feels better today. On non breather and on nasal canula  Alert  NPO awaiting swallow evaluation  Abdomen soft distended but not tense. Denver catheter +  Awaiting NG tube placement  On rifaximin and lactulose  Creatinine improving but Na still elevated.  Needs increased free water intake   WBC elevation improving
66 year old   male with decompensated HCV cirrhosis MELD 18 Child C ascites with [place] shunt portal thrombus HCC on treatment with bevacizumab + atezolizumab admitted with variceal bleeding and post banding.  Having a paracentesis done today   On rifaximin and lactulose for HE  Has EBER which is improving with albumin.  EV bleed post EVBL and on propranolol  Await cell count from paracentesis.   Stable but poor prognosis
66 year old   male with decompensated HCV cirrhosis ascites with [place] shunt portal thrombus HCC on treatment with bevacizumab + atezolizumab admitted with variceal bleeding and post banding.  Alert but tangential talk  Abdomen distended and has drainage from ascitic drain.   On rifaximin and lactulose  Treat as HRS if not response to albumin.
66 year old   male with decompensated HCV cirrhosis ascites with [place] shunt portal thrombus HCC on treatment with bevacizumab + atezolizumab admitted with variceal bleeding and post banding.  Patient on ventilator weaned off sedation and communicates today  Patient is on rifaximin. Would resume lactulose and titrate for adequate BM .
Continues to have melena but with stable vitals and no drop in Hgb. Melena is likely residual. Continue variceal bleeding protocol. Avoid overtransfusion. Monitor kidney function and avoid over paracentesis to not trigger HRS. Awaiting cytology and SAAG from fluid. Prognosis remains guarded. Continue rectal lactulose. After extubation (if possible) assess mentation and swallowing and start lactulose. Otherwise we  may place an NGt in 24-48H and start lactulose.
Decompensated liver cirrhosis  Hep C  Variceal bleeding  PSE  HCC (advanced stage? TIV?)  Ascites? malignant/portal HTN (or both)    please check ascitic fluid albumin, ptn and cytology  Continue Abx  Add antifungals  Monitor CBC do not over transfuse  Octerotide  PPi
IMPRESSION:    Acute resp failure resolved   Hepatic encephalopathy improving   Decompensated liver cirrhosis.  Ascites SP peritoneal PleurX Catheter    Hematemesis resolved ( esophageal/ gastric varices)  Acute portal Vein thrombosis   Hepatocellular carcinoma with transaminitis on chemotherapy  HO Hepatitis C treated with INF  HO COPD  EBER   Stenotrophomonas in sputum sp ABX     Plan as outlined above
Patient remains intubated. No evidence of rebleed. Continue EV protocol. On IV abx and antifungal: continue. Avoid over diuresis/LV paracentesis without administration of albumin. Removal of the [place] cathter is not possible reportedly. May place NG. When placed, please start lactulose 15mg Q1-2 hours to achieve 2 large lose BMs, then titrate to achieve 2 BMs per day.
66 year old  with decompensated HCV cirrhosis ascites with denver shunt portal thrombus HCC on treatment with bevacizumab + atezolizumab admitted with variceal bleeding and post banding.  Uncertain if tumor thrombus and if tumor thrombus does not need anticoagulation   Patient intubated and on mechanical ventilation.   Resume lactulose and titrate for adequate BM

## 2021-10-30 LAB
ALBUMIN SERPL ELPH-MCNC: 4 G/DL — SIGNIFICANT CHANGE UP (ref 3.5–5.2)
ALP SERPL-CCNC: 189 U/L — HIGH (ref 30–115)
ALT FLD-CCNC: 15 U/L — SIGNIFICANT CHANGE UP (ref 0–41)
ANION GAP SERPL CALC-SCNC: 12 MMOL/L — SIGNIFICANT CHANGE UP (ref 7–14)
AST SERPL-CCNC: 39 U/L — SIGNIFICANT CHANGE UP (ref 0–41)
BASOPHILS # BLD AUTO: 0.02 K/UL — SIGNIFICANT CHANGE UP (ref 0–0.2)
BASOPHILS NFR BLD AUTO: 0.2 % — SIGNIFICANT CHANGE UP (ref 0–1)
BILIRUB SERPL-MCNC: 0.8 MG/DL — SIGNIFICANT CHANGE UP (ref 0.2–1.2)
BUN SERPL-MCNC: 51 MG/DL — HIGH (ref 10–20)
CALCIUM SERPL-MCNC: 8.3 MG/DL — LOW (ref 8.5–10.1)
CHLORIDE SERPL-SCNC: 100 MMOL/L — SIGNIFICANT CHANGE UP (ref 98–110)
CO2 SERPL-SCNC: 20 MMOL/L — SIGNIFICANT CHANGE UP (ref 17–32)
CREAT SERPL-MCNC: 1.2 MG/DL — SIGNIFICANT CHANGE UP (ref 0.7–1.5)
EOSINOPHIL # BLD AUTO: 0 K/UL — SIGNIFICANT CHANGE UP (ref 0–0.7)
EOSINOPHIL NFR BLD AUTO: 0 % — SIGNIFICANT CHANGE UP (ref 0–8)
GLUCOSE BLDC GLUCOMTR-MCNC: 105 MG/DL — HIGH (ref 70–99)
GLUCOSE BLDC GLUCOMTR-MCNC: 110 MG/DL — HIGH (ref 70–99)
GLUCOSE BLDC GLUCOMTR-MCNC: 129 MG/DL — HIGH (ref 70–99)
GLUCOSE BLDC GLUCOMTR-MCNC: 151 MG/DL — HIGH (ref 70–99)
GLUCOSE SERPL-MCNC: 120 MG/DL — HIGH (ref 70–99)
HCT VFR BLD CALC: 28.6 % — LOW (ref 42–52)
HGB BLD-MCNC: 8.9 G/DL — LOW (ref 14–18)
IMM GRANULOCYTES NFR BLD AUTO: 0.3 % — SIGNIFICANT CHANGE UP (ref 0.1–0.3)
LYMPHOCYTES # BLD AUTO: 0.85 K/UL — LOW (ref 1.2–3.4)
LYMPHOCYTES # BLD AUTO: 8.2 % — LOW (ref 20.5–51.1)
MAGNESIUM SERPL-MCNC: 1.9 MG/DL — SIGNIFICANT CHANGE UP (ref 1.8–2.4)
MCHC RBC-ENTMCNC: 30 PG — SIGNIFICANT CHANGE UP (ref 27–31)
MCHC RBC-ENTMCNC: 31.1 G/DL — LOW (ref 32–37)
MCV RBC AUTO: 96.3 FL — HIGH (ref 80–94)
MONOCYTES # BLD AUTO: 1.08 K/UL — HIGH (ref 0.1–0.6)
MONOCYTES NFR BLD AUTO: 10.4 % — HIGH (ref 1.7–9.3)
NEUTROPHILS # BLD AUTO: 8.43 K/UL — HIGH (ref 1.4–6.5)
NEUTROPHILS NFR BLD AUTO: 80.9 % — HIGH (ref 42.2–75.2)
NRBC # BLD: 0 /100 WBCS — SIGNIFICANT CHANGE UP (ref 0–0)
PLATELET # BLD AUTO: 107 K/UL — LOW (ref 130–400)
POTASSIUM SERPL-MCNC: 4.5 MMOL/L — SIGNIFICANT CHANGE UP (ref 3.5–5)
POTASSIUM SERPL-SCNC: 4.5 MMOL/L — SIGNIFICANT CHANGE UP (ref 3.5–5)
PROT SERPL-MCNC: 6.2 G/DL — SIGNIFICANT CHANGE UP (ref 6–8)
RBC # BLD: 2.97 M/UL — LOW (ref 4.7–6.1)
RBC # FLD: 21.9 % — HIGH (ref 11.5–14.5)
SODIUM SERPL-SCNC: 132 MMOL/L — LOW (ref 135–146)
WBC # BLD: 10.41 K/UL — SIGNIFICANT CHANGE UP (ref 4.8–10.8)
WBC # FLD AUTO: 10.41 K/UL — SIGNIFICANT CHANGE UP (ref 4.8–10.8)

## 2021-10-30 PROCEDURE — 99233 SBSQ HOSP IP/OBS HIGH 50: CPT

## 2021-10-30 RX ORDER — BACITRACIN ZINC NEOMYCIN SULFATE POLYMYXIN B SULFATE 400; 3.5; 5 [IU]/G; MG/G; [IU]/G
1 OINTMENT TOPICAL
Refills: 0 | Status: DISCONTINUED | OUTPATIENT
Start: 2021-10-30 | End: 2021-11-02

## 2021-10-30 RX ORDER — NYSTATIN CREAM 100000 [USP'U]/G
1 CREAM TOPICAL THREE TIMES A DAY
Refills: 0 | Status: DISCONTINUED | OUTPATIENT
Start: 2021-10-30 | End: 2021-11-02

## 2021-10-30 RX ADMIN — Medication 667 MILLIGRAM(S): at 07:54

## 2021-10-30 RX ADMIN — Medication 0: at 11:41

## 2021-10-30 RX ADMIN — LACTULOSE 15 GRAM(S): 10 SOLUTION ORAL at 13:33

## 2021-10-30 RX ADMIN — Medication 0: at 07:53

## 2021-10-30 RX ADMIN — LACTULOSE 15 GRAM(S): 10 SOLUTION ORAL at 06:10

## 2021-10-30 RX ADMIN — CEFTRIAXONE 100 MILLIGRAM(S): 500 INJECTION, POWDER, FOR SOLUTION INTRAMUSCULAR; INTRAVENOUS at 17:47

## 2021-10-30 RX ADMIN — NYSTATIN CREAM 1 APPLICATION(S): 100000 CREAM TOPICAL at 17:48

## 2021-10-30 RX ADMIN — Medication 20 MILLIGRAM(S): at 06:11

## 2021-10-30 RX ADMIN — Medication 1: at 16:45

## 2021-10-30 RX ADMIN — MIDODRINE HYDROCHLORIDE 10 MILLIGRAM(S): 2.5 TABLET ORAL at 13:33

## 2021-10-30 RX ADMIN — BUDESONIDE AND FORMOTEROL FUMARATE DIHYDRATE 2 PUFF(S): 160; 4.5 AEROSOL RESPIRATORY (INHALATION) at 21:12

## 2021-10-30 RX ADMIN — HEPARIN SODIUM 5000 UNIT(S): 5000 INJECTION INTRAVENOUS; SUBCUTANEOUS at 06:11

## 2021-10-30 RX ADMIN — MIDODRINE HYDROCHLORIDE 10 MILLIGRAM(S): 2.5 TABLET ORAL at 06:11

## 2021-10-30 RX ADMIN — PANTOPRAZOLE SODIUM 40 MILLIGRAM(S): 20 TABLET, DELAYED RELEASE ORAL at 06:11

## 2021-10-30 RX ADMIN — Medication 667 MILLIGRAM(S): at 11:41

## 2021-10-30 RX ADMIN — MIDODRINE HYDROCHLORIDE 10 MILLIGRAM(S): 2.5 TABLET ORAL at 21:35

## 2021-10-30 RX ADMIN — Medication 5 UNIT(S): at 16:44

## 2021-10-30 RX ADMIN — PANTOPRAZOLE SODIUM 40 MILLIGRAM(S): 20 TABLET, DELAYED RELEASE ORAL at 17:48

## 2021-10-30 RX ADMIN — HEPARIN SODIUM 5000 UNIT(S): 5000 INJECTION INTRAVENOUS; SUBCUTANEOUS at 13:33

## 2021-10-30 RX ADMIN — Medication 5 UNIT(S): at 11:41

## 2021-10-30 RX ADMIN — CHLORHEXIDINE GLUCONATE 1 APPLICATION(S): 213 SOLUTION TOPICAL at 20:05

## 2021-10-30 RX ADMIN — Medication 5 UNIT(S): at 07:52

## 2021-10-30 RX ADMIN — Medication 667 MILLIGRAM(S): at 17:47

## 2021-10-30 RX ADMIN — INSULIN GLARGINE 20 UNIT(S): 100 INJECTION, SOLUTION SUBCUTANEOUS at 21:35

## 2021-10-30 RX ADMIN — BACITRACIN ZINC NEOMYCIN SULFATE POLYMYXIN B SULFATE 1 APPLICATION(S): 400; 3.5; 5 OINTMENT TOPICAL at 21:50

## 2021-10-30 RX ADMIN — NYSTATIN CREAM 1 APPLICATION(S): 100000 CREAM TOPICAL at 21:36

## 2021-10-30 RX ADMIN — HEPARIN SODIUM 5000 UNIT(S): 5000 INJECTION INTRAVENOUS; SUBCUTANEOUS at 21:35

## 2021-10-30 NOTE — PROGRESS NOTE ADULT - SUBJECTIVE AND OBJECTIVE BOX
SUZANNE BAZZI  [facility] T2-3A 014 B ([facility] T[facility])      Patient was evaluated and examined  by bedside, c/o pain at tip of penis, no dyspnea at rest.         REVIEW OF SYSTEMS:  please see pertinent positives mentioned above, all other 12 ROS negative      T(C): , Max: 36.4 (10-29-21 @ 21:20)  HR: 98 (10-30-21 @ 05:00)  BP: 113/75 (10-30-21 @ 05:00)  RR: 18 (10-30-21 @ 05:00)  SpO2: 98% (10-30-21 @ 05:00)  CAPILLARY BLOOD GLUCOSE      POCT Blood Glucose.: 129 mg/dL (30 Oct 2021 07:48)  POCT Blood Glucose.: 172 mg/dL (29 Oct 2021 21:41)  POCT Blood Glucose.: 88 mg/dL (29 Oct 2021 16:18)  POCT Blood Glucose.: 118 mg/dL (29 Oct 2021 11:25)      PHYSICAL EXAM:  General: NAD, AAOX3, patient is laying comfortably in bed  HEENT: AT, NC, Supple, NO JVD, NO CB  Lungs: CTA B/L, no wheezing, no rhonchi  CVS: normal S1, S2, RRR, NO M/G/R  Abdomen: moderate ascites present, bowel sounds present, non-tender, moderately distended  : erythema at tip of penis present  Extremities: no edema, no clubbing, no cyanosis, positive peripheral pulses b/l  Neuro: no acute focal neurological deficits, diffuse generalized body weakness  Skin: erythrematous rash present in b/l groin area, small open wounds present on lower ext. ,       LAB  CBC  Date: 10-30-21 @ 06:52  Mean cell Dqmleuyjqu29.0  Mean cell Hemoglobin Conc31.1  Mean cell Volum 96.3  Platelet count-Automate 107  RBC Count 2.97  Red Cell Distrib Width21.9  WBC Count10.41  % Albumin, Urine--  Hematocrit 28.6  Hemoglobin 8.9  CBC  Date: 10-29-21 @ 04:30  Mean cell Zzzskfuezb61.1  Mean cell Hemoglobin Conc30.7  Mean cell Volum 98.1  Platelet count-Automate 128  RBC Count 3.22  Red Cell Distrib Width22.1  WBC Count11.22  % Albumin, Urine--  Hematocrit 31.6  Hemoglobin 9.7  CBC  Date: 10-28-21 @ 04:30  Mean cell Ebdtoftrow33.1  Mean cell Hemoglobin Conc30.7  Mean cell Volum 98.2  Platelet count-Automate 136  RBC Count 3.29  Red Cell Distrib Width22.5  WBC Count11.32  % Albumin, Urine--  Hematocrit 32.3  Hemoglobin 9.9  CBC  Date: 10-27-21 @ 07:33  Mean cell Vtlnwapenl19.6  Mean cell Hemoglobin Conc31.0  Mean cell Volum 98.6  Platelet count-Automate 95  RBC Count 2.91  Red Cell Distrib Width22.7  WBC Count10.28  % Albumin, Urine--  Hematocrit 28.7  Hemoglobin 8.9  CBC  Date: 10-26-21 @ 06:37  Mean cell Ogskhniemm30.5  Mean cell Hemoglobin Conc30.5  Mean cell Volum 100.0  Platelet count-Automate 130  RBC Count 3.47  Red Cell Distrib Width23.4  WBC Count11.59  % Albumin, Urine--  Hematocrit 34.7  Hemoglobin 10.6  CBC  Date: 10-25-21 @ 04:30  Mean cell Zsldtegpcr74.2  Mean cell Hemoglobin Conc30.2  Mean cell Volum 100.0  Platelet count-Automate 111  RBC Count 3.28  Red Cell Distrib Width23.9  WBC Count10.41  % Albumin, Urine--  Hematocrit 32.8  Hemoglobin 9.9  CBC  Date: 10-24-21 @ 04:30  Mean cell Uqoqirdmpq37.0  Mean cell Hemoglobin Conc29.6  Mean cell Volum 101.3  Platelet count-Automate 110  RBC Count 3.20  Red Cell Distrib Width25.1  WBC Count12.07  % Albumin, Urine--  Hematocrit 32.4  Hemoglobin 9.6    BMP  10-30-21 @ 06:52  Blood Gas Arterial-Calcium,Ionized--  Blood Urea Nitrogen, Serum 51 mg/dL<H> [10 - 20]  Carbon Dioxide, Serum20 mmol/L [17 - 32]  Chloride, Eloyi300 mmol/L [98 - 110]  Creatinie, Serum1.2 mg/dL [0.7 - 1.5]  Glucose, Vnrrk328 mg/dL<H> [70 - 99]  Potassium, Serum4.5 mmol/L [3.5 - 5.0]  Sodium, Serum 132 mmol/L<L> [135 - 146]  BMP  10-29-21 @ 04:30  Blood Gas Arterial-Calcium,Ionized--  Blood Urea Nitrogen, Serum 58 mg/dL<H> [10 - 20]  Carbon Dioxide, Serum19 mmol/L [17 - 32]  Chloride, Odhrb577 mmol/L [98 - 110]  Creatinie, Serum1.5 mg/dL [0.7 - 1.5]  Glucose, Serum80 mg/dL [70 - 99]  Potassium, Serum5.0 mmol/L [3.5 - 5.0]  Sodium, Serum 135 mmol/L [135 - 146]  [facility]  10-28-21 @ 04:30  Blood Gas Arterial-Calcium,Ionized--  Blood Urea Nitrogen, Serum 61 mg/dL<HH> [10 - 20] [Critical value:]  Carbon Dioxide, Serum22 mmol/L [17 - 32]  Chloride, Fxrgn789 mmol/L [98 - 110]  Creatinie, Serum1.4 mg/dL [0.7 - 1.5]  Glucose, Serum97 mg/dL [70 - 99]  Potassium, Serum4.7 mmol/L [3.5 - 5.0]  Sodium, Serum 140 mmol/L [135 - 146]  [facility]  10-27-21 @ 07:33  Blood Gas Arterial-Calcium,Ionized--  Blood Urea Nitrogen, Serum 68 mg/dL<HH> [10 - 20] [Critical value:]  Carbon Dioxide, Serum19 mmol/L [17 - 32]  Chloride, Nzzwb205 mmol/L [98 - 110]  Creatinie, Serum1.5 mg/dL [0.7 - 1.5]  Glucose, Serum98 mg/dL [70 - 99]  Potassium, Serum4.0 mmol/L [3.5 - 5.0]  Sodium, Serum 139 mmol/L [135 - 146]  [facility]  10-26-21 @ 06:37  Blood Gas Arterial-Calcium,Ionized--  Blood Urea Nitrogen, Serum 74 mg/dL<HH> [10 - 20] [Critical value:]  Carbon Dioxide, Serum21 mmol/L [17 - 32]  Chloride, Rgmvx718 mmol/L [98 - 110]  Creatinie, Serum1.7 mg/dL<H> [0.7 - 1.5]  Glucose, Serum81 mg/dL [70 - 99]  Potassium, Serum5.3 mmol/L<H> [3.5 - 5.0]  Sodium, Serum 138 mmol/L [135 - 146]  [facility]  10-25-21 @ 20:00  Blood Gas Arterial-Calcium,Ionized--  Blood Urea Nitrogen, Serum 73 mg/dL<HH> [10 - 20] [Critical value:]  Carbon Dioxide, Serum21 mmol/L [17 - 32]  Chloride, Vlltw263 mmol/L [98 - 110]  Creatinie, Serum1.8 mg/dL<H> [0.7 - 1.5]  Glucose, Serum96 mg/dL [70 - 99]  Potassium, Serum5.2 mmol/L<H> [3.5 - 5.0]  Sodium, Serum 138 mmol/L [135 - 146]              Microbiology:    Culture - Urine (collected 10-13-21 @ 16:00)  Source: Clean Catch Clean Catch (Midstream)  Final Report (10-14-21 @ 22:31):    <10,000 CFU/mL Normal Urogenital Chayo    Culture - Blood (collected 10-13-21 @ 12:09)  Source: .Blood None  Final Report (10-18-21 @ 23:00):    No Growth Final    Culture - Sputum (collected 10-12-21 @ 09:34)  Source: .Sputum Sputum  Gram Stain (10-13-21 @ 07:22):    Numerous polymorphonuclear leukocytes per low power field    Rare Squamous epithelial cells per low power field    Moderate Gram Negative Rods seen per oil power field  Final Report (10-14-21 @ 20:47):    Numerous Stenotrophomonas maltophilia  Organism: Stenotrophomonas maltophilia (10-14-21 @ 20:47)  Organism: Stenotrophomonas maltophilia (10-14-21 @ 20:47)      -  Ceftazidime: R >16      -  Levofloxacin: S <=0.5      -  Trimethoprim/Sulfamethoxazole: S <=0.5/9.5      Method Type: YESSI    Culture - Fungal, Body Fluid (collected 10-05-21 @ 18:53)  Source: .Body Fluid Peritoneal Fluid  Preliminary Report (10-13-21 @ 15:02):    No growth    Culture - Body Fluid with Gram Stain (collected 10-05-21 @ 18:53)  Source: .Body Fluid Peritoneal Fluid  Gram Stain (10-06-21 @ 02:48):    polymorphonuclear leukocytes seen    No organisms seen    by cytocentrifuge  Final Report (10-10-21 @ 17:05):    No growth at 5 days    Culture - Blood (collected 10-04-21 @ 02:15)  Source: .Blood Blood  Final Report (10-09-21 @ 14:01):    No Growth Final    Culture - Blood (collected 10-04-21 @ 02:10)  Source: .Blood Blood  Final Report (10-09-21 @ 14:01):    No Growth Final      Medications:  budesonide  80 MICROgram(s)/formoterol 4.5 MICROgram(s) Inhaler 2 Puff(s) Inhalation two times a day  calcium acetate 667 milliGRAM(s) Oral three times a day with meals  cefTRIAXone   IVPB 1000 milliGRAM(s) IV Intermittent every 24 hours  chlorhexidine 4% Liquid 1 Application(s) Topical daily  dextrose 40% Gel 15 Gram(s) Oral once  dextrose 5%. 1000 milliLiter(s) IV Continuous <Continuous>  dextrose 5%. 1000 milliLiter(s) IV Continuous <Continuous>  dextrose 50% Injectable 25 Gram(s) IV Push once  dextrose 50% Injectable 12.5 Gram(s) IV Push once  dextrose 50% Injectable 25 Gram(s) IV Push once  furosemide    Tablet 20 milliGRAM(s) Oral daily  glucagon  Injectable 1 milliGRAM(s) IntraMuscular once  heparin   Injectable 5000 Unit(s) SubCutaneous every 8 hours  insulin glargine Injectable (LANTUS) 20 Unit(s) SubCutaneous at bedtime  insulin lispro (ADMELOG) corrective regimen sliding scale   SubCutaneous three times a day before meals  insulin lispro Injectable (ADMELOG) 5 Unit(s) SubCutaneous three times a day before meals  lactulose Syrup 15 Gram(s) Oral three times a day  midodrine 10 milliGRAM(s) Oral every 8 hours  neomycin/bacitracin/polymyxin Topical Ointment 1 Application(s) Topical two times a day  nystatin Cream 1 Application(s) Topical two times a day  nystatin Powder 1 Application(s) Topical three times a day  pantoprazole    Tablet 40 milliGRAM(s) Oral two times a day  propranolol 5 milliGRAM(s) Oral daily  rifAXIMin 550 milliGRAM(s) Oral two times a day        Assessment and Plan:  Patient is a 67 yo male, with h/o HTN, drug abuse, Hepatitis C s/p INF,  advanced Liver cirrhosis s/p [place] shunt with recurrent ascites, COPD, DVT? was on Eliquis at home , hepatocellular carcinoma since January 2021 on chemotherapy, presented for weakness and decreased oral intake along with constipation and hematemesis with ammonia (188) on presentation.    Acute gastrointestinal hemorrhage due to variceal bleed- resolved  HCC  Decompensated liver cirrhosis due to HCV  Hepatic encephalopathy -resolved  Thrombocytopenia  Patient initially presented with worsening hepatic encephalopathy, NH3 on admission 188, hospital course complicated by hematemesis s/p intubation and emergent EGD10/4, s/p EVBL on propranolol   successfully extubated  10/30- 98% on 2 L at rest   resumed Ceftriaxone for SBP prophylaxis until discharge  , continue PPI twice daily tx.  chemotherapy currently on hold given poor functional status, may be considered in the future if the patient improves  patient has a denver catheter due to frequent paracentesis, has had >9L removed (at baseline pt drains 2L Q2days at home), 2-3 L   s/p ther. abdominal drainage 10/29- 3 L removed  do not remove >5L of fluid at a time, give albumin with paracentesis  started lasix 20mg daily with improved  LE edema, monitor renal fxn, discussed with GI  Palliative team following     Esophageal dysmotility- patient was evaluated by speech tx.   - recommended -  dysphagia 2 with thin liquids.     Penile irritation from berry catheter  - 10/30 - d/c catheter with TOV  -apply topical neosporin cream to affected area    EBER - r/o hepatorenal syndrome v abdominal compartment syndrome   metabolic acidosis  -avoid nephrotoxics, s/p albumin challenge    -continue phoslo as per renal   - monitor BMP, nephrology following  - stable around 1.5 today     #hyperkalemia- corrected post lokelma tx.     Portal vein thrombosis  Left main and right portal vein thrombus was on eliquis previously  per GI--> tumor thrombus, Gi had discussed with his oncologist that no need to resume eliquis (given the recent variceal bleed)      Septic shock - resolved   - previously required levophed - d/c'ed on 10/10, now on midodrine  - DTA culture showed Stenotrophomonas, s/p course of levaquin    COPD - stable, improved hypoxia    overall patient's prognosis- guarded    #Progress Note Handoff  Pending : ther. paracentesis on 10/31, TOV today. d/c planning     Family discussion: Plan of care discussed with patient and significant other at bedside.   Disposition:  from home, agreeable to SNF, requesting EGER    Total time spent to complete patient's bedside assessment, review medical chart, discuss medical plan of care with covering medical team was more than 35 minutes.

## 2021-10-30 NOTE — CHART NOTE - NSCHARTNOTEFT_GEN_A_CORE
Seen and examined this am. States he would  like to ambulate and attempt TOV. Currently 98% on 2L.     Plan:  - TOV   - Recommended SNF placement, pt disagrees and wants to be d/c home   - PT ming

## 2021-10-31 LAB
ALBUMIN SERPL ELPH-MCNC: 3.5 G/DL — SIGNIFICANT CHANGE UP (ref 3.5–5.2)
ALP SERPL-CCNC: 169 U/L — HIGH (ref 30–115)
ALT FLD-CCNC: 17 U/L — SIGNIFICANT CHANGE UP (ref 0–41)
ANION GAP SERPL CALC-SCNC: 13 MMOL/L — SIGNIFICANT CHANGE UP (ref 7–14)
AST SERPL-CCNC: 41 U/L — SIGNIFICANT CHANGE UP (ref 0–41)
BASOPHILS # BLD AUTO: 0.03 K/UL — SIGNIFICANT CHANGE UP (ref 0–0.2)
BASOPHILS NFR BLD AUTO: 0.3 % — SIGNIFICANT CHANGE UP (ref 0–1)
BILIRUB SERPL-MCNC: 1.2 MG/DL — SIGNIFICANT CHANGE UP (ref 0.2–1.2)
BUN SERPL-MCNC: 50 MG/DL — HIGH (ref 10–20)
CALCIUM SERPL-MCNC: 8.4 MG/DL — LOW (ref 8.5–10.1)
CHLORIDE SERPL-SCNC: 102 MMOL/L — SIGNIFICANT CHANGE UP (ref 98–110)
CO2 SERPL-SCNC: 20 MMOL/L — SIGNIFICANT CHANGE UP (ref 17–32)
CREAT SERPL-MCNC: 1.2 MG/DL — SIGNIFICANT CHANGE UP (ref 0.7–1.5)
EOSINOPHIL # BLD AUTO: 0.01 K/UL — SIGNIFICANT CHANGE UP (ref 0–0.7)
EOSINOPHIL NFR BLD AUTO: 0.1 % — SIGNIFICANT CHANGE UP (ref 0–8)
GLUCOSE BLDC GLUCOMTR-MCNC: 129 MG/DL — HIGH (ref 70–99)
GLUCOSE BLDC GLUCOMTR-MCNC: 146 MG/DL — HIGH (ref 70–99)
GLUCOSE BLDC GLUCOMTR-MCNC: 146 MG/DL — HIGH (ref 70–99)
GLUCOSE BLDC GLUCOMTR-MCNC: 89 MG/DL — SIGNIFICANT CHANGE UP (ref 70–99)
GLUCOSE SERPL-MCNC: 81 MG/DL — SIGNIFICANT CHANGE UP (ref 70–99)
HCT VFR BLD CALC: 29.6 % — LOW (ref 42–52)
HGB BLD-MCNC: 9.2 G/DL — LOW (ref 14–18)
IMM GRANULOCYTES NFR BLD AUTO: 0.4 % — HIGH (ref 0.1–0.3)
LYMPHOCYTES # BLD AUTO: 1.19 K/UL — LOW (ref 1.2–3.4)
LYMPHOCYTES # BLD AUTO: 10.8 % — LOW (ref 20.5–51.1)
MAGNESIUM SERPL-MCNC: 2 MG/DL — SIGNIFICANT CHANGE UP (ref 1.8–2.4)
MCHC RBC-ENTMCNC: 30.2 PG — SIGNIFICANT CHANGE UP (ref 27–31)
MCHC RBC-ENTMCNC: 31.1 G/DL — LOW (ref 32–37)
MCV RBC AUTO: 97 FL — HIGH (ref 80–94)
MONOCYTES # BLD AUTO: 0.94 K/UL — HIGH (ref 0.1–0.6)
MONOCYTES NFR BLD AUTO: 8.5 % — SIGNIFICANT CHANGE UP (ref 1.7–9.3)
NEUTROPHILS # BLD AUTO: 8.8 K/UL — HIGH (ref 1.4–6.5)
NEUTROPHILS NFR BLD AUTO: 79.9 % — HIGH (ref 42.2–75.2)
NRBC # BLD: 0 /100 WBCS — SIGNIFICANT CHANGE UP (ref 0–0)
PLATELET # BLD AUTO: 173 K/UL — SIGNIFICANT CHANGE UP (ref 130–400)
POTASSIUM SERPL-MCNC: 5 MMOL/L — SIGNIFICANT CHANGE UP (ref 3.5–5)
POTASSIUM SERPL-SCNC: 5 MMOL/L — SIGNIFICANT CHANGE UP (ref 3.5–5)
PROT SERPL-MCNC: 5.9 G/DL — LOW (ref 6–8)
RBC # BLD: 3.05 M/UL — LOW (ref 4.7–6.1)
RBC # FLD: 21.5 % — HIGH (ref 11.5–14.5)
SODIUM SERPL-SCNC: 135 MMOL/L — SIGNIFICANT CHANGE UP (ref 135–146)
WBC # BLD: 11.01 K/UL — HIGH (ref 4.8–10.8)
WBC # FLD AUTO: 11.01 K/UL — HIGH (ref 4.8–10.8)

## 2021-10-31 PROCEDURE — 99233 SBSQ HOSP IP/OBS HIGH 50: CPT

## 2021-10-31 RX ORDER — ALBUMIN HUMAN 25 %
25 VIAL (ML) INTRAVENOUS
Refills: 0 | Status: COMPLETED | OUTPATIENT
Start: 2021-10-31 | End: 2021-11-01

## 2021-10-31 RX ADMIN — NYSTATIN CREAM 1 APPLICATION(S): 100000 CREAM TOPICAL at 06:29

## 2021-10-31 RX ADMIN — NYSTATIN CREAM 1 APPLICATION(S): 100000 CREAM TOPICAL at 17:21

## 2021-10-31 RX ADMIN — BUDESONIDE AND FORMOTEROL FUMARATE DIHYDRATE 2 PUFF(S): 160; 4.5 AEROSOL RESPIRATORY (INHALATION) at 21:30

## 2021-10-31 RX ADMIN — Medication 0: at 11:34

## 2021-10-31 RX ADMIN — Medication 20 MILLIGRAM(S): at 05:30

## 2021-10-31 RX ADMIN — LACTULOSE 15 GRAM(S): 10 SOLUTION ORAL at 14:09

## 2021-10-31 RX ADMIN — Medication 5 UNIT(S): at 11:32

## 2021-10-31 RX ADMIN — PANTOPRAZOLE SODIUM 40 MILLIGRAM(S): 20 TABLET, DELAYED RELEASE ORAL at 05:30

## 2021-10-31 RX ADMIN — PANTOPRAZOLE SODIUM 40 MILLIGRAM(S): 20 TABLET, DELAYED RELEASE ORAL at 17:16

## 2021-10-31 RX ADMIN — Medication 667 MILLIGRAM(S): at 17:16

## 2021-10-31 RX ADMIN — BACITRACIN ZINC NEOMYCIN SULFATE POLYMYXIN B SULFATE 1 APPLICATION(S): 400; 3.5; 5 OINTMENT TOPICAL at 05:29

## 2021-10-31 RX ADMIN — CHLORHEXIDINE GLUCONATE 1 APPLICATION(S): 213 SOLUTION TOPICAL at 05:29

## 2021-10-31 RX ADMIN — MIDODRINE HYDROCHLORIDE 10 MILLIGRAM(S): 2.5 TABLET ORAL at 05:31

## 2021-10-31 RX ADMIN — NYSTATIN CREAM 1 APPLICATION(S): 100000 CREAM TOPICAL at 22:22

## 2021-10-31 RX ADMIN — Medication 5 UNIT(S): at 16:57

## 2021-10-31 RX ADMIN — Medication 0: at 16:57

## 2021-10-31 RX ADMIN — HEPARIN SODIUM 5000 UNIT(S): 5000 INJECTION INTRAVENOUS; SUBCUTANEOUS at 14:09

## 2021-10-31 RX ADMIN — Medication 667 MILLIGRAM(S): at 11:31

## 2021-10-31 RX ADMIN — HEPARIN SODIUM 5000 UNIT(S): 5000 INJECTION INTRAVENOUS; SUBCUTANEOUS at 05:29

## 2021-10-31 RX ADMIN — HEPARIN SODIUM 5000 UNIT(S): 5000 INJECTION INTRAVENOUS; SUBCUTANEOUS at 21:30

## 2021-10-31 RX ADMIN — Medication 667 MILLIGRAM(S): at 11:32

## 2021-10-31 RX ADMIN — MIDODRINE HYDROCHLORIDE 10 MILLIGRAM(S): 2.5 TABLET ORAL at 21:30

## 2021-10-31 RX ADMIN — CHLORHEXIDINE GLUCONATE 1 APPLICATION(S): 213 SOLUTION TOPICAL at 11:34

## 2021-10-31 RX ADMIN — MIDODRINE HYDROCHLORIDE 10 MILLIGRAM(S): 2.5 TABLET ORAL at 14:09

## 2021-10-31 RX ADMIN — BACITRACIN ZINC NEOMYCIN SULFATE POLYMYXIN B SULFATE 1 APPLICATION(S): 400; 3.5; 5 OINTMENT TOPICAL at 22:21

## 2021-10-31 RX ADMIN — INSULIN GLARGINE 20 UNIT(S): 100 INJECTION, SOLUTION SUBCUTANEOUS at 22:21

## 2021-10-31 NOTE — PROGRESS NOTE ADULT - SUBJECTIVE AND OBJECTIVE BOX
SUZANNE BAZZI  [facility] T2-3A 014 B ([facility] T2-3A)      Patient was evaluated and examined  by bedside, reports having worsening abdominal distention.       REVIEW OF SYSTEMS:  please see pertinent positives mentioned above, all other 12 ROS negative      T(C): , Max: 36.6 (10-30-21 @ 14:01)  HR: 92 (10-31-21 @ 05:12)  BP: 120/66 (10-31-21 @ 05:12)  RR: 18 (10-31-21 @ 05:12)  SpO2: 98% (10-31-21 @ 08:48)  CAPILLARY BLOOD GLUCOSE      POCT Blood Glucose.: 89 mg/dL (31 Oct 2021 07:30)  POCT Blood Glucose.: 105 mg/dL (30 Oct 2021 21:12)  POCT Blood Glucose.: 151 mg/dL (30 Oct 2021 16:09)  POCT Blood Glucose.: 110 mg/dL (30 Oct 2021 11:28)      PHYSICAL EXAM:  General: NAD, AAOX3, patient is laying comfortably in bed  HEENT: AT, NC, Supple, NO JVD, NO CB  Lungs: CTA B/L, no wheezing, no rhonchi  CVS: normal S1, S2, RRR, NO M/G/R  Abdomen: moderate ascites present, bowel sounds present, non-tender, moderately distended  : erythema at tip of penis present  Extremities: no edema, no clubbing, no cyanosis, positive peripheral pulses b/l  Neuro: no acute focal neurological deficits, diffuse generalized body weakness  Skin: erythrematous rash present in b/l groin area, small open wounds present on lower ext. ,         LAB  CBC  Date: 10-31-21 @ 08:46  Mean cell Mubmdrvmdn92.2  Mean cell Hemoglobin Conc31.1  Mean cell Volum 97.0  Platelet count-Automate 173  RBC Count 3.05  Red Cell Distrib Width21.5  WBC Count11.01  % Albumin, Urine--  Hematocrit 29.6  Hemoglobin 9.2  CBC  Date: 10-30-21 @ 06:52  Mean cell Okfohrqklz72.0  Mean cell Hemoglobin Conc31.1  Mean cell Volum 96.3  Platelet count-Automate 107  RBC Count 2.97  Red Cell Distrib Width21.9  WBC Count10.41  % Albumin, Urine--  Hematocrit 28.6  Hemoglobin 8.9  CBC  Date: 10-29-21 @ 04:30  Mean cell Unozgzafyo05.1  Mean cell Hemoglobin Conc30.7  Mean cell Volum 98.1  Platelet count-Automate 128  RBC Count 3.22  Red Cell Distrib Width22.1  WBC Count11.22  % Albumin, Urine--  Hematocrit 31.6  Hemoglobin 9.7  CBC  Date: 10-28-21 @ 04:30  Mean cell Btbayykdkn25.1  Mean cell Hemoglobin Conc30.7  Mean cell Volum 98.2  Platelet count-Automate 136  RBC Count 3.29  Red Cell Distrib Width22.5  WBC Count11.32  % Albumin, Urine--  Hematocrit 32.3  Hemoglobin 9.9        BMP  10-30-21 @ 06:52  Blood Gas Arterial-Calcium,Ionized--  Blood Urea Nitrogen, Serum 51 mg/dL<H> [10 - 20]  Carbon Dioxide, Serum20 mmol/L [17 - 32]  Chloride, Yawtp597 mmol/L [98 - 110]  Creatinie, Serum1.2 mg/dL [0.7 - 1.5]  Glucose, Qilhp230 mg/dL<H> [70 - 99]  Potassium, Serum4.5 mmol/L [3.5 - 5.0]  Sodium, Serum 132 mmol/L<L> [135 - 146]  BMP  10-29-21 @ 04:30  Blood Gas Arterial-Calcium,Ionized--  Blood Urea Nitrogen, Serum 58 mg/dL<H> [10 - 20]  Carbon Dioxide, Serum19 mmol/L [17 - 32]  Chloride, Qldbb042 mmol/L [98 - 110]  Creatinie, Serum1.5 mg/dL [0.7 - 1.5]  Glucose, Serum80 mg/dL [70 - 99]  Potassium, Serum5.0 mmol/L [3.5 - 5.0]  Sodium, Serum 135 mmol/L [135 - 146]        Microbiology:    Culture - Urine (collected 10-13-21 @ 16:00)  Source: Clean Catch Clean Catch (Midstream)  Final Report (10-14-21 @ 22:31):    <10,000 CFU/mL Normal Urogenital Chayo    Culture - Blood (collected 10-13-21 @ 12:09)  Source: .Blood None  Final Report (10-18-21 @ 23:00):    No Growth Final    Culture - Sputum (collected 10-12-21 @ 09:34)  Source: .Sputum Sputum  Gram Stain (10-13-21 @ 07:22):    Numerous polymorphonuclear leukocytes per low power field    Rare Squamous epithelial cells per low power field    Moderate Gram Negative Rods seen per oil power field  Final Report (10-14-21 @ 20:47):    Numerous Stenotrophomonas maltophilia  Organism: Stenotrophomonas maltophilia (10-14-21 @ 20:47)  Organism: Stenotrophomonas maltophilia (10-14-21 @ 20:47)      -  Ceftazidime: R >16      -  Levofloxacin: S <=0.5      -  Trimethoprim/Sulfamethoxazole: S <=0.5/9.5      Method Type: YESSI    Culture - Fungal, Body Fluid (collected 10-05-21 @ 18:53)  Source: .Body Fluid Peritoneal Fluid  Preliminary Report (10-13-21 @ 15:02):    No growth    Culture - Body Fluid with Gram Stain (collected 10-05-21 @ 18:53)  Source: .Body Fluid Peritoneal Fluid  Gram Stain (10-06-21 @ 02:48):    polymorphonuclear leukocytes seen    No organisms seen    by cytocentrifuge  Final Report (10-10-21 @ 17:05):    No growth at 5 days    Culture - Blood (collected 10-04-21 @ 02:15)  Source: .Blood Blood  Final Report (10-09-21 @ 14:01):    No Growth Final    Culture - Blood (collected 10-04-21 @ 02:10)  Source: .Blood Blood  Final Report (10-09-21 @ 14:01):    No Growth Final        Medications:  budesonide  80 MICROgram(s)/formoterol 4.5 MICROgram(s) Inhaler 2 Puff(s) Inhalation two times a day  calcium acetate 667 milliGRAM(s) Oral three times a day with meals  chlorhexidine 4% Liquid 1 Application(s) Topical daily  dextrose 40% Gel 15 Gram(s) Oral once  dextrose 5%. 1000 milliLiter(s) IV Continuous <Continuous>  dextrose 5%. 1000 milliLiter(s) IV Continuous <Continuous>  dextrose 50% Injectable 25 Gram(s) IV Push once  dextrose 50% Injectable 12.5 Gram(s) IV Push once  dextrose 50% Injectable 25 Gram(s) IV Push once  furosemide    Tablet 20 milliGRAM(s) Oral daily  glucagon  Injectable 1 milliGRAM(s) IntraMuscular once  heparin   Injectable 5000 Unit(s) SubCutaneous every 8 hours  insulin glargine Injectable (LANTUS) 20 Unit(s) SubCutaneous at bedtime  insulin lispro (ADMELOG) corrective regimen sliding scale   SubCutaneous three times a day before meals  insulin lispro Injectable (ADMELOG) 5 Unit(s) SubCutaneous three times a day before meals  lactulose Syrup 15 Gram(s) Oral three times a day  midodrine 10 milliGRAM(s) Oral every 8 hours  neomycin/bacitracin/polymyxin Topical Ointment 1 Application(s) Topical two times a day  nystatin Cream 1 Application(s) Topical two times a day  nystatin Powder 1 Application(s) Topical three times a day  pantoprazole    Tablet 40 milliGRAM(s) Oral two times a day  propranolol 5 milliGRAM(s) Oral daily  rifAXIMin 550 milliGRAM(s) Oral two times a day        Assessment and Plan:  Patient is a 67 yo male, with h/o HTN, drug abuse, Hepatitis C s/p INF,  advanced Liver cirrhosis s/p [place] shunt with recurrent ascites, COPD, DVT? was on Eliquis at home , hepatocellular carcinoma since January 2021 on chemotherapy, presented for weakness and decreased oral intake along with constipation and hematemesis with ammonia (188) on presentation.    Acute gastrointestinal hemorrhage due to variceal bleed- resolved  HCC  Decompensated liver cirrhosis due to HCV  Hepatic encephalopathy -resolved  Thrombocytopenia  Patient initially presented with worsening hepatic encephalopathy, NH3 on admission 188, hospital course complicated by hematemesis s/p intubation and emergent EGD10/4, s/p EVBL on propranolol   successfully extubated  10/31- 98% on 2 L at rest   resumed Ceftriaxone for SBP prophylaxis until discharge  , continue PPI twice daily tx.  chemotherapy currently on hold given poor functional status, may be considered in the future if the patient improves  patient has a denver catheter due to frequent paracentesis, has had >9L removed (at baseline pt drains 2L Q2days at home), 2-3 L   s/p ther. abdominal drainage 10/29- 3 L removed, will need another drainage today  do not remove >5L of fluid at a time, give albumin with paracentesis  started lasix 20mg daily with improved  LE edema, monitor renal fxn, discussed with GI  Palliative team following     Esophageal dysmotility- patient was evaluated by speech tx.   - recommended -  dysphagia 2 with thin liquids.     Penile irritation from berry catheter  - 10/30 - d/c catheter with TOV  -apply topical neosporin cream to affected area    EBER - r/o hepatorenal syndrome v abdominal compartment syndrome   metabolic acidosis  -avoid nephrotoxics, s/p albumin challenge    -continue phoslo as per renal   - monitor BMP, nephrology following  - stable around 1.5 today     #hyperkalemia- corrected post lokelma tx.     Portal vein thrombosis  Left main and right portal vein thrombus was on eliquis previously  per GI--> tumor thrombus, Gi had discussed with his oncologist that no need to resume eliquis (given the recent variceal bleed)      Septic shock - resolved   - previously required levophed - d/c'ed on 10/10, now on midodrine  - DTA culture showed Stenotrophomonas, s/p course of levaquin    COPD - stable, improved hypoxia    overall patient's prognosis- guarded    #Progress Note Handoff  Pending : ther. paracentesis today, post berry d/c yesterday monitor urine output. d/c planning     Family discussion: Plan of care discussed with patient and significant other at bedside.   Disposition:  from home, agreeable to SNF, requesting EGER    Total time spent to complete patient's bedside assessment, review medical chart, discuss medical plan of care with covering medical team was more than 35 minutes.

## 2021-11-01 LAB
ALBUMIN SERPL ELPH-MCNC: 4.4 G/DL — SIGNIFICANT CHANGE UP (ref 3.5–5.2)
ALP SERPL-CCNC: 144 U/L — HIGH (ref 30–115)
ALT FLD-CCNC: 14 U/L — SIGNIFICANT CHANGE UP (ref 0–41)
ANION GAP SERPL CALC-SCNC: 15 MMOL/L — HIGH (ref 7–14)
AST SERPL-CCNC: 33 U/L — SIGNIFICANT CHANGE UP (ref 0–41)
BASOPHILS # BLD AUTO: 0.03 K/UL — SIGNIFICANT CHANGE UP (ref 0–0.2)
BASOPHILS NFR BLD AUTO: 0.3 % — SIGNIFICANT CHANGE UP (ref 0–1)
BILIRUB SERPL-MCNC: 1.3 MG/DL — HIGH (ref 0.2–1.2)
BUN SERPL-MCNC: 48 MG/DL — HIGH (ref 10–20)
CALCIUM SERPL-MCNC: 8.6 MG/DL — SIGNIFICANT CHANGE UP (ref 8.5–10.1)
CHLORIDE SERPL-SCNC: 101 MMOL/L — SIGNIFICANT CHANGE UP (ref 98–110)
CO2 SERPL-SCNC: 19 MMOL/L — SIGNIFICANT CHANGE UP (ref 17–32)
CREAT SERPL-MCNC: 1.1 MG/DL — SIGNIFICANT CHANGE UP (ref 0.7–1.5)
EOSINOPHIL # BLD AUTO: 0 K/UL — SIGNIFICANT CHANGE UP (ref 0–0.7)
EOSINOPHIL NFR BLD AUTO: 0 % — SIGNIFICANT CHANGE UP (ref 0–8)
GLUCOSE BLDC GLUCOMTR-MCNC: 101 MG/DL — HIGH (ref 70–99)
GLUCOSE BLDC GLUCOMTR-MCNC: 118 MG/DL — HIGH (ref 70–99)
GLUCOSE BLDC GLUCOMTR-MCNC: 144 MG/DL — HIGH (ref 70–99)
GLUCOSE BLDC GLUCOMTR-MCNC: 92 MG/DL — SIGNIFICANT CHANGE UP (ref 70–99)
GLUCOSE SERPL-MCNC: 87 MG/DL — SIGNIFICANT CHANGE UP (ref 70–99)
HCT VFR BLD CALC: 28.4 % — LOW (ref 42–52)
HGB BLD-MCNC: 8.9 G/DL — LOW (ref 14–18)
IMM GRANULOCYTES NFR BLD AUTO: 0.4 % — HIGH (ref 0.1–0.3)
LYMPHOCYTES # BLD AUTO: 1.15 K/UL — LOW (ref 1.2–3.4)
LYMPHOCYTES # BLD AUTO: 11.3 % — LOW (ref 20.5–51.1)
MAGNESIUM SERPL-MCNC: 1.9 MG/DL — SIGNIFICANT CHANGE UP (ref 1.8–2.4)
MCHC RBC-ENTMCNC: 30.5 PG — SIGNIFICANT CHANGE UP (ref 27–31)
MCHC RBC-ENTMCNC: 31.3 G/DL — LOW (ref 32–37)
MCV RBC AUTO: 97.3 FL — HIGH (ref 80–94)
MONOCYTES # BLD AUTO: 0.95 K/UL — HIGH (ref 0.1–0.6)
MONOCYTES NFR BLD AUTO: 9.3 % — SIGNIFICANT CHANGE UP (ref 1.7–9.3)
NEUTROPHILS # BLD AUTO: 8.03 K/UL — HIGH (ref 1.4–6.5)
NEUTROPHILS NFR BLD AUTO: 78.7 % — HIGH (ref 42.2–75.2)
NRBC # BLD: 0 /100 WBCS — SIGNIFICANT CHANGE UP (ref 0–0)
PLATELET # BLD AUTO: 152 K/UL — SIGNIFICANT CHANGE UP (ref 130–400)
POTASSIUM SERPL-MCNC: 5.4 MMOL/L — HIGH (ref 3.5–5)
POTASSIUM SERPL-SCNC: 5.4 MMOL/L — HIGH (ref 3.5–5)
PROT SERPL-MCNC: 6.3 G/DL — SIGNIFICANT CHANGE UP (ref 6–8)
RBC # BLD: 2.92 M/UL — LOW (ref 4.7–6.1)
RBC # FLD: 21.2 % — HIGH (ref 11.5–14.5)
SARS-COV-2 RNA SPEC QL NAA+PROBE: SIGNIFICANT CHANGE UP
SODIUM SERPL-SCNC: 135 MMOL/L — SIGNIFICANT CHANGE UP (ref 135–146)
WBC # BLD: 10.2 K/UL — SIGNIFICANT CHANGE UP (ref 4.8–10.8)
WBC # FLD AUTO: 10.2 K/UL — SIGNIFICANT CHANGE UP (ref 4.8–10.8)

## 2021-11-01 PROCEDURE — 99233 SBSQ HOSP IP/OBS HIGH 50: CPT

## 2021-11-01 RX ADMIN — HEPARIN SODIUM 5000 UNIT(S): 5000 INJECTION INTRAVENOUS; SUBCUTANEOUS at 13:06

## 2021-11-01 RX ADMIN — LACTULOSE 15 GRAM(S): 10 SOLUTION ORAL at 13:07

## 2021-11-01 RX ADMIN — NYSTATIN CREAM 1 APPLICATION(S): 100000 CREAM TOPICAL at 13:06

## 2021-11-01 RX ADMIN — Medication 250 GRAM(S): at 02:56

## 2021-11-01 RX ADMIN — Medication 250 GRAM(S): at 05:28

## 2021-11-01 RX ADMIN — NYSTATIN CREAM 1 APPLICATION(S): 100000 CREAM TOPICAL at 21:47

## 2021-11-01 RX ADMIN — INSULIN GLARGINE 20 UNIT(S): 100 INJECTION, SOLUTION SUBCUTANEOUS at 21:47

## 2021-11-01 RX ADMIN — MIDODRINE HYDROCHLORIDE 10 MILLIGRAM(S): 2.5 TABLET ORAL at 05:29

## 2021-11-01 RX ADMIN — MIDODRINE HYDROCHLORIDE 10 MILLIGRAM(S): 2.5 TABLET ORAL at 13:06

## 2021-11-01 RX ADMIN — Medication 20 MILLIGRAM(S): at 05:29

## 2021-11-01 RX ADMIN — Medication 667 MILLIGRAM(S): at 17:11

## 2021-11-01 RX ADMIN — BACITRACIN ZINC NEOMYCIN SULFATE POLYMYXIN B SULFATE 1 APPLICATION(S): 400; 3.5; 5 OINTMENT TOPICAL at 17:11

## 2021-11-01 RX ADMIN — BUDESONIDE AND FORMOTEROL FUMARATE DIHYDRATE 2 PUFF(S): 160; 4.5 AEROSOL RESPIRATORY (INHALATION) at 21:51

## 2021-11-01 RX ADMIN — PANTOPRAZOLE SODIUM 40 MILLIGRAM(S): 20 TABLET, DELAYED RELEASE ORAL at 17:11

## 2021-11-01 RX ADMIN — Medication 667 MILLIGRAM(S): at 07:48

## 2021-11-01 RX ADMIN — LACTULOSE 15 GRAM(S): 10 SOLUTION ORAL at 05:31

## 2021-11-01 RX ADMIN — HEPARIN SODIUM 5000 UNIT(S): 5000 INJECTION INTRAVENOUS; SUBCUTANEOUS at 05:30

## 2021-11-01 RX ADMIN — Medication 5 UNIT(S): at 17:10

## 2021-11-01 RX ADMIN — MIDODRINE HYDROCHLORIDE 10 MILLIGRAM(S): 2.5 TABLET ORAL at 21:46

## 2021-11-01 RX ADMIN — BUDESONIDE AND FORMOTEROL FUMARATE DIHYDRATE 2 PUFF(S): 160; 4.5 AEROSOL RESPIRATORY (INHALATION) at 07:48

## 2021-11-01 RX ADMIN — HEPARIN SODIUM 5000 UNIT(S): 5000 INJECTION INTRAVENOUS; SUBCUTANEOUS at 21:47

## 2021-11-01 RX ADMIN — LACTULOSE 15 GRAM(S): 10 SOLUTION ORAL at 21:46

## 2021-11-01 RX ADMIN — NYSTATIN CREAM 1 APPLICATION(S): 100000 CREAM TOPICAL at 05:31

## 2021-11-01 RX ADMIN — BACITRACIN ZINC NEOMYCIN SULFATE POLYMYXIN B SULFATE 1 APPLICATION(S): 400; 3.5; 5 OINTMENT TOPICAL at 05:31

## 2021-11-01 RX ADMIN — CHLORHEXIDINE GLUCONATE 1 APPLICATION(S): 213 SOLUTION TOPICAL at 11:32

## 2021-11-01 RX ADMIN — Medication 667 MILLIGRAM(S): at 11:32

## 2021-11-01 RX ADMIN — NYSTATIN CREAM 1 APPLICATION(S): 100000 CREAM TOPICAL at 05:30

## 2021-11-01 RX ADMIN — Medication 5 UNIT(S): at 11:31

## 2021-11-01 RX ADMIN — PANTOPRAZOLE SODIUM 40 MILLIGRAM(S): 20 TABLET, DELAYED RELEASE ORAL at 05:30

## 2021-11-01 RX ADMIN — NYSTATIN CREAM 1 APPLICATION(S): 100000 CREAM TOPICAL at 17:11

## 2021-11-01 RX ADMIN — Medication 250 GRAM(S): at 00:43

## 2021-11-01 NOTE — PROGRESS NOTE ADULT - SUBJECTIVE AND OBJECTIVE BOX
SUZANNE BAZZI  [facility] T2-3A 014 B ([facility] T2-3[facility])      Patient was evaluated and examined  by bedside, reports of insomnia    REVIEW OF SYSTEMS:  please see pertinent positives mentioned above, all other 12 ROS negative      T(C): , Max: 37.1 (10-31-21 @ 20:23)  HR: 73 (11-01-21 @ 05:20)  BP: 109/66 (11-01-21 @ 05:20)  RR: 17 (11-01-21 @ 08:19)  SpO2: 95% (11-01-21 @ 08:19)  CAPILLARY BLOOD GLUCOSE      POCT Blood Glucose.: 144 mg/dL (01 Nov 2021 11:22)  POCT Blood Glucose.: 92 mg/dL (01 Nov 2021 07:36)  POCT Blood Glucose.: 146 mg/dL (31 Oct 2021 22:02)  POCT Blood Glucose.: 129 mg/dL (31 Oct 2021 16:23)      PHYSICAL EXAM:  General: NAD, AAOX3, patient is laying comfortably in bed  HEENT: AT, NC, Supple, NO JVD, NO CB  Lungs: CTA B/L, no wheezing, no rhonchi  CVS: normal S1, S2, RRR, NO M/G/R  Abdomen: moderate ascites present, bowel sounds present, non-tender, moderately distended  : erythema at tip of penis resolving  Extremities: no edema, no clubbing, no cyanosis, positive peripheral pulses b/l  Neuro: no acute focal neurological deficits, diffuse generalized body weakness  Skin: erythrematous rash present in b/l groin area, small open wounds present on lower ext. ,       LAB  CBC  Date: 11-01-21 @ 08:20  Mean cell Yuqomcbtmb49.5  Mean cell Hemoglobin Conc31.3  Mean cell Volum 97.3  Platelet count-Automate 152  RBC Count 2.92  Red Cell Distrib Width21.2  WBC Count10.20  % Albumin, Urine--  Hematocrit 28.4  Hemoglobin 8.9  CBC  Date: 10-31-21 @ 08:46  Mean cell Mwycjrzkes07.2  Mean cell Hemoglobin Conc31.1  Mean cell Volum 97.0  Platelet count-Automate 173  RBC Count 3.05  Red Cell Distrib Width21.5  WBC Count11.01  % Albumin, Urine--  Hematocrit 29.6  Hemoglobin 9.2  CBC  Date: 10-30-21 @ 06:52  Mean cell Izrbvdpdxp70.0  Mean cell Hemoglobin Conc31.1  Mean cell Volum 96.3  Platelet count-Automate 107  RBC Count 2.97  Red Cell Distrib Width21.9  WBC Count10.41  % Albumin, Urine--  Hematocrit 28.6  Hemoglobin 8.9  CBC  Date: 10-29-21 @ 04:30  Mean cell Eupyiukvdr48.1  Mean cell Hemoglobin Conc30.7  Mean cell Volum 98.1  Platelet count-Automate 128  RBC Count 3.22  Red Cell Distrib Width22.1  WBC Count11.22  % Albumin, Urine--  Hematocrit 31.6  Hemoglobin 9.7  CBC  Date: 10-28-21 @ 04:30  Mean cell Bqtulflico19.1  Mean cell Hemoglobin Conc30.7  Mean cell Volum 98.2  Platelet count-Automate 136  RBC Count 3.29  Red Cell Distrib Width22.5  WBC Count11.32  % Albumin, Urine--  Hematocrit 32.3  Hemoglobin 9.9  CBC  Date: 10-27-21 @ 07:33  Mean cell Tmppuzpigg78.6  Mean cell Hemoglobin Conc31.0  Mean cell Volum 98.6  Platelet count-Automate 95  RBC Count 2.91  Red Cell Distrib Width22.7  WBC Count10.28  % Albumin, Urine--  Hematocrit 28.7  Hemoglobin 8.9  CBC  Date: 10-26-21 @ 06:37  Mean cell Fdpuixminv92.5  Mean cell Hemoglobin Conc30.5  Mean cell Volum 100.0  Platelet count-Automate 130  RBC Count 3.47  Red Cell Distrib Width23.4  WBC Count11.59  % Albumin, Urine--  Hematocrit 34.7  Hemoglobin 10.6    [facility]  11-01-21 @ 08:20  Blood Gas Arterial-Calcium,Ionized--  Blood Urea Nitrogen, Serum 48 mg/dL<H> [10 - 20]  Carbon Dioxide, Serum19 mmol/L [17 - 32]  Chloride, Neufc048 mmol/L [98 - 110]  Creatinie, Serum1.1 mg/dL [0.7 - 1.5]  Glucose, Serum87 mg/dL [70 - 99]  Potassium, Serum5.4 mmol/L<H> [3.5 - 5.0]  Sodium, Serum 135 mmol/L [135 - 146]  [facility]  10-31-21 @ 08:46  Blood Gas Arterial-Calcium,Ionized--  Blood Urea Nitrogen, Serum 50 mg/dL<H> [10 - 20]  Carbon Dioxide, Serum20 mmol/L [17 - 32]  Chloride, Fxpbg703 mmol/L [98 - 110]  Creatinie, Serum1.2 mg/dL [0.7 - 1.5]  Glucose, Serum81 mg/dL [70 - 99]  Potassium, Serum5.0 mmol/L [3.5 - 5.0]  Sodium, Serum 135 mmol/L [135 - 146]  [facility]  10-30-21 @ 06:52  Blood Gas Arterial-Calcium,Ionized--  Blood Urea Nitrogen, Serum 51 mg/dL<H> [10 - 20]  Carbon Dioxide, Serum20 mmol/L [17 - 32]  Chloride, Ohytt734 mmol/L [98 - 110]  Creatinie, Serum1.2 mg/dL [0.7 - 1.5]  Glucose, Ucsau042 mg/dL<H> [70 - 99]  Potassium, Serum4.5 mmol/L [3.5 - 5.0]  Sodium, Serum 132 mmol/L<L> [135 - 146]  [facility]  10-29-21 @ 04:30  Blood Gas Arterial-Calcium,Ionized--  Blood Urea Nitrogen, Serum 58 mg/dL<H> [10 - 20]  Carbon Dioxide, Serum19 mmol/L [17 - 32]  Chloride, Xvcxr782 mmol/L [98 - 110]  Creatinie, Serum1.5 mg/dL [0.7 - 1.5]  Glucose, Serum80 mg/dL [70 - 99]  Potassium, Serum5.0 mmol/L [3.5 - 5.0]  Sodium, Serum 135 mmol/L [135 - 146]  [facility]  10-28-21 @ 04:30  Blood Gas Arterial-Calcium,Ionized--  Blood Urea Nitrogen, Serum 61 mg/dL<HH> [10 - 20] [Critical value:]  Carbon Dioxide, Serum22 mmol/L [17 - 32]  Chloride, Lxzqw379 mmol/L [98 - 110]  Creatinie, Serum1.4 mg/dL [0.7 - 1.5]  Glucose, Serum97 mg/dL [70 - 99]  Potassium, Serum4.7 mmol/L [3.5 - 5.0]  Sodium, Serum 140 mmol/L [135 - 146]              Microbiology:    Culture - Urine (collected 10-13-21 @ 16:00)  Source: Clean Catch Clean Catch (Midstream)  Final Report (10-14-21 @ 22:31):    <10,000 CFU/mL Normal Urogenital Chayo    Culture - Blood (collected 10-13-21 @ 12:09)  Source: .Blood None  Final Report (10-18-21 @ 23:00):    No Growth Final    Culture - Sputum (collected 10-12-21 @ 09:34)  Source: .Sputum Sputum  Gram Stain (10-13-21 @ 07:22):    Numerous polymorphonuclear leukocytes per low power field    Rare Squamous epithelial cells per low power field    Moderate Gram Negative Rods seen per oil power field  Final Report (10-14-21 @ 20:47):    Numerous Stenotrophomonas maltophilia  Organism: Stenotrophomonas maltophilia (10-14-21 @ 20:47)  Organism: Stenotrophomonas maltophilia (10-14-21 @ 20:47)      -  Ceftazidime: R >16      -  Levofloxacin: S <=0.5      -  Trimethoprim/Sulfamethoxazole: S <=0.5/9.5      Method Type: YESSI    Culture - Fungal, Body Fluid (collected 10-05-21 @ 18:53)  Source: .Body Fluid Peritoneal Fluid  Preliminary Report (10-13-21 @ 15:02):    No growth    Culture - Body Fluid with Gram Stain (collected 10-05-21 @ 18:53)  Source: .Body Fluid Peritoneal Fluid  Gram Stain (10-06-21 @ 02:48):    polymorphonuclear leukocytes seen    No organisms seen    by cytocentrifuge  Final Report (10-10-21 @ 17:05):    No growth at 5 days    Culture - Blood (collected 10-04-21 @ 02:15)  Source: .Blood Blood  Final Report (10-09-21 @ 14:01):    No Growth Final    Culture - Blood (collected 10-04-21 @ 02:10)  Source: .Blood Blood  Final Report (10-09-21 @ 14:01):    No Growth Final        Medications:  budesonide  80 MICROgram(s)/formoterol 4.5 MICROgram(s) Inhaler 2 Puff(s) Inhalation two times a day  calcium acetate 667 milliGRAM(s) Oral three times a day with meals  chlorhexidine 4% Liquid 1 Application(s) Topical daily  dextrose 40% Gel 15 Gram(s) Oral once  dextrose 5%. 1000 milliLiter(s) IV Continuous <Continuous>  dextrose 5%. 1000 milliLiter(s) IV Continuous <Continuous>  dextrose 50% Injectable 25 Gram(s) IV Push once  dextrose 50% Injectable 12.5 Gram(s) IV Push once  dextrose 50% Injectable 25 Gram(s) IV Push once  furosemide    Tablet 20 milliGRAM(s) Oral daily  glucagon  Injectable 1 milliGRAM(s) IntraMuscular once  heparin   Injectable 5000 Unit(s) SubCutaneous every 8 hours  insulin glargine Injectable (LANTUS) 20 Unit(s) SubCutaneous at bedtime  insulin lispro (ADMELOG) corrective regimen sliding scale   SubCutaneous three times a day before meals  insulin lispro Injectable (ADMELOG) 5 Unit(s) SubCutaneous three times a day before meals  lactulose Syrup 15 Gram(s) Oral three times a day  midodrine 10 milliGRAM(s) Oral every 8 hours  neomycin/bacitracin/polymyxin Topical Ointment 1 Application(s) Topical two times a day  nystatin Cream 1 Application(s) Topical two times a day  nystatin Powder 1 Application(s) Topical three times a day  pantoprazole    Tablet 40 milliGRAM(s) Oral two times a day  propranolol 5 milliGRAM(s) Oral daily  rifAXIMin 550 milliGRAM(s) Oral two times a day        Assessment and Plan:  Patient is a 65 yo male, with h/o HTN, drug abuse, Hepatitis C s/p INF,  advanced Liver cirrhosis s/p [place] shunt with recurrent ascites, COPD, DVT? was on Eliquis at home , hepatocellular carcinoma since January 2021 on chemotherapy, presented for weakness and decreased oral intake along with constipation and hematemesis with ammonia (188) on presentation.    Acute gastrointestinal hemorrhage due to variceal bleed- resolved  HCC  Decompensated liver cirrhosis due to HCV  Hepatic encephalopathy -resolved  Thrombocytopenia  Patient initially presented with worsening hepatic encephalopathy, NH3 on admission 188, hospital course complicated by hematemesis s/p intubation and emergent EGD10/4, s/p EVBL on propranolol   successfully extubated  11/01- 95% on 2 L at rest   resumed Ceftriaxone for SBP prophylaxis until discharge  , continue PPI twice daily tx.  chemotherapy currently on hold given poor functional status, may be considered in the future if the patient improves  patient has a denver catheter due to frequent paracentesis  s/p ther. abdominal drainage 10/31- 3 L removed with albumin tx, will need another drainage in 48 hours  continue lasix 20mg daily with improved  LE edema, monitor renal fxn, discussed with GI  Palliative team following     Esophageal dysmotility- patient was evaluated by speech tx.   - recommended -  dysphagia 2 with thin liquids.     Penile irritation from berry catheter  - 10/30 - d/c catheter with TOV  -apply topical neosporin cream to affected area    EBER - r/o hepatorenal syndrome v abdominal compartment syndrome   metabolic acidosis  -avoid nephrotoxics, s/p albumin challenge    -continue phoslo as per renal   - monitor BMP, nephrology following  - stable around 1.5     #hyperkalemia- corrected post lokelma tx.     Portal vein thrombosis  Left main and right portal vein thrombus was on eliquis previously  per GI--> tumor thrombus, Gi had discussed with his oncologist that no need to resume eliquis (given the recent variceal bleed)      Septic shock - resolved   - previously required levophed - d/c'ed on 10/10, now on midodrine  - DTA culture showed Stenotrophomonas, s/p course of levaquin    COPD - stable, improved hypoxia    overall patient's prognosis- guarded    #Progress Note Handoff  Pending : d/c to SNF once bed is available, continue ther. abd. para every 48 hours, next one on 11/02   Family discussion: Plan of care discussed with patient and significant other at bedside.   Disposition:  from home, agreeable to SNF, requesting EGER    Total time spent to complete patient's bedside assessment, review medical chart, discuss medical plan of care with covering medical team was more than 35 minutes.

## 2021-11-01 NOTE — PROGRESS NOTE ADULT - ASSESSMENT
ASSESSMENT & PLAN    Patient is a 67 yo male, with h/o HTN, drug abuse, Hepatitis C s/p INF,  advanced Liver cirrhosis s/p [place] shunt with recurrent ascites, COPD, DVT? was on Eliquis at home , hepatocellular carcinoma since January 2021 on chemotherapy, presented for weakness and decreased oral intake along with constipation and hematemesis with ammonia (188) on presentation.    #Acute gastrointestinal hemorrhage due to variceal bleed- resolved  #HCC  #Decompensated liver cirrhosis due to HCV  #Hepatic encephalopathy -resolved  #Thrombocytopenia  -Patient initially presented with worsening hepatic encephalopathy, NH3 on admission 188, hospital course complicated by hematemesis s/p intubation and emergent EGD10/4, s/p EVBL on propranolol   -successfully extubated  -11/01- 95% on 2 L at rest   -resumed Ceftriaxone for SBP prophylaxis until discharge  , continue PPI twice daily tx.  -chemotherapy currently on hold given poor functional status, may be considered in the future if the patient improves  -patient has a denver catheter due to frequent paracentesis  -s/p ther. abdominal drainage 10/31- 3 L removed with albumin tx, will need another drainage in 48 hours  -continue lasix 20mg daily with improved  LE edema, monitor renal fxn, discussed with GI  -Palliative team following     #Esophageal dysmotility- patient was evaluated by speech tx.   - recommended -  dysphagia 2 with thin liquids.     #Penile irritation from berry catheter  - 10/30 - d/c catheter with TOV  -apply topical neosporin cream to affected area    #EBER - r/o hepatorenal syndrome v abdominal compartment syndrome   metabolic acidosis  -avoid nephrotoxics, s/p albumin challenge    -continue phoslo as per renal   - monitor BMP, nephrology following  - stable around 1.5     #hyperkalemia- corrected post lokelma tx.     #Portal vein thrombosis  -Left main and right portal vein thrombus was on eliquis previously  -per GI--> tumor thrombus, Gi had discussed with his oncologist that no need to resume eliquis (given the recent variceal bleed)    #Septic shock - resolved   - previously required levophed - d/c'ed on 10/10, now on midodrine  - DTA culture showed Stenotrophomonas, s/p course of levaquin    #COPD - stable, improved hypoxia    #Handoff: Pending bed availability for dispo to SNF, preferably [facility] as per pt. On discharge, pt will continue undergoing therapeutic paracentesis every 2 days. Next ther para on 11/2.                                                                              ----------------------------------------------------  # DVT prophylaxis subq hep    # GI prophylaxis Protonix    # Diet Dysphagia 2 mechanical soft-thin    # Activity Score (AM-PAC)    # Code status Full    # Disposition Pending bed availability at [facility]

## 2021-11-01 NOTE — PROGRESS NOTE ADULT - SUBJECTIVE AND OBJECTIVE BOX
SUZANNE BAZZI 66y Male  MRN#: [id number]   CODE STATUS: Full    Hospital Day: 28d    Pt is currently admitted with the primary diagnosis of variceal bleeding    SUBJECTIVE  Hospital Course  Pt seen and examined at bedside. Pt feels fine today, s/p 3L paracentesis last night 10/31 with administration of albumin. Pt pending placement and will continue undergoing therapeutic violet in the SNF. Removed berry, pt passed TOV and is outputting urine.    Overnight events   None    Subjective complaints   None    Present Today:   - Berry:  No [x], Yes [   ] : Indication:     - Type of IV Access:       .. CVC/Piccline:  No [  ], Yes [   ] : Indication:       .. Midline: No [  ], Yes [   ] : Indication:                                             ----------------------------------------------------------  OBJECTIVE  PAST MEDICAL & SURGICAL HISTORY  HTN (hypertension)    Hepatitis C  2007    Drug abuse    Cancer, hepatocellular  January 2021    COPD, mild                                              -----------------------------------------------------------  ALLERGIES:  No Known Allergies                                            ------------------------------------------------------------    HOME MEDICATIONS  Home Medications:  Eliquis 5 mg oral tablet: 1 tab(s) orally 2 times a day (04 Oct 2021 06:13)  fluticasone-salmeterol 55 mcg-14 mcg/inh inhalation powder: 1 puff(s) inhaled 2 times a day (04 Oct 2021 06:14)  Protonix 40 mg oral delayed release tablet: 1 tab(s) orally once a day (04 Oct 2021 06:13)  spironolactone 50 mg oral tablet: 1 tab(s) orally once a day (04 Oct 2021 06:13)                           MEDICATIONS:  STANDING MEDICATIONS  budesonide  80 MICROgram(s)/formoterol 4.5 MICROgram(s) Inhaler 2 Puff(s) Inhalation two times a day  calcium acetate 667 milliGRAM(s) Oral three times a day with meals  chlorhexidine 4% Liquid 1 Application(s) Topical daily  dextrose 40% Gel 15 Gram(s) Oral once  dextrose 5%. 1000 milliLiter(s) IV Continuous <Continuous>  dextrose 5%. 1000 milliLiter(s) IV Continuous <Continuous>  dextrose 50% Injectable 25 Gram(s) IV Push once  dextrose 50% Injectable 12.5 Gram(s) IV Push once  dextrose 50% Injectable 25 Gram(s) IV Push once  furosemide    Tablet 20 milliGRAM(s) Oral daily  glucagon  Injectable 1 milliGRAM(s) IntraMuscular once  heparin   Injectable 5000 Unit(s) SubCutaneous every 8 hours  insulin glargine Injectable (LANTUS) 20 Unit(s) SubCutaneous at bedtime  insulin lispro (ADMELOG) corrective regimen sliding scale   SubCutaneous three times a day before meals  insulin lispro Injectable (ADMELOG) 5 Unit(s) SubCutaneous three times a day before meals  lactulose Syrup 15 Gram(s) Oral three times a day  midodrine 10 milliGRAM(s) Oral every 8 hours  neomycin/bacitracin/polymyxin Topical Ointment 1 Application(s) Topical two times a day  nystatin Cream 1 Application(s) Topical two times a day  nystatin Powder 1 Application(s) Topical three times a day  pantoprazole    Tablet 40 milliGRAM(s) Oral two times a day  propranolol 5 milliGRAM(s) Oral daily  rifAXIMin 550 milliGRAM(s) Oral two times a day    PRN MEDICATIONS                                            ------------------------------------------------------------  VITAL SIGNS: Last 24 Hours  T(C): 36.2 (01 Nov 2021 13:15), Max: 37.1 (31 Oct 2021 20:23)  T(F): 97.2 (01 Nov 2021 13:15), Max: 98.7 (31 Oct 2021 20:23)  HR: 80 (01 Nov 2021 13:15) (68 - 80)  BP: 105/62 (01 Nov 2021 13:15) (103/59 - 124/71)  BP(mean): --  RR: 18 (01 Nov 2021 13:15) (17 - 18)  SpO2: 95% (01 Nov 2021 08:19) (95% - 96%)                                             --------------------------------------------------------------  LABS:                        8.9    10.20 )-----------( 152      ( 01 Nov 2021 08:20 )             28.4     11-01    135  |  101  |  48<H>  ----------------------------<  87  5.4<H>   |  19  |  1.1    Ca    8.6      01 Nov 2021 08:20  Mg     1.9     11-01    TPro  6.3  /  Alb  4.4  /  TBili  1.3<H>  /  DBili  x   /  AST  33  /  ALT  14  /  AlkPhos  144<H>  11-01                                                -------------------------------------------------------------  RADIOLOGY:                                            --------------------------------------------------------------    PHYSICAL EXAM:  General: NAD, AOx3  HEENT: Normocephalic, atraumatic  LUNGS: Normal breath sounds, no wheezes/crackles  HEART: RRR, no murmurs, rubs, or gallops  ABDOMEN: Soft, NT. Moderate ascites with distension. Denver cath +ve with clean dressing overlying.  EXT: Peripheral pulses +2, no cyanosis/edema  NEURO: grossly normal motor exam  SKIN: b/l LE wounds and groin rash                                           --------------------------------------------------------------

## 2021-11-01 NOTE — DISCHARGE NOTE PROVIDER - CARE PROVIDER_API CALL
[place] [address]  Phone: [phone]  Fax: [phone]  Follow Up Time: 2 weeks   Gume Cobb  MEDICINE  [address] [place] [id number]  Phone: [phone]  Fax: [phone]  Follow Up Time: 2 weeks    Yaya Brown)  Internal Medicine  [address] [place] [address]  Phone: [phone]  Fax: [phone]  Follow Up Time: 1 month

## 2021-11-01 NOTE — PROGRESS NOTE ADULT - REASON FOR ADMISSION
weakness  Hyperkalemia  VT

## 2021-11-01 NOTE — DISCHARGE NOTE PROVIDER - NSDCMRMEDTOKEN_GEN_ALL_CORE_FT
Eliquis 5 mg oral tablet: 1 tab(s) orally 2 times a day  fluticasone-salmeterol 55 mcg-14 mcg/inh inhalation powder: 1 puff(s) inhaled 2 times a day  Protonix 40 mg oral delayed release tablet: 1 tab(s) orally once a day  spironolactone 50 mg oral tablet: 1 tab(s) orally once a day   bacitracin/neomycin/polymyxin B 400 units-3.5 mg-5000 units/g topical ointment: 1 application topically 2 times a day  budesonide-formoterol 80 mcg-4.5 mcg/inh inhalation aerosol: 2 puff(s) inhaled 2 times a day  calcium acetate 667 mg oral tablet: 1 tab(s) orally 3 times a day  Eliquis 5 mg oral tablet: 1 tab(s) orally 2 times a day  furosemide 20 mg oral tablet: 1 tab(s) orally once a day  lactulose 10 g/15 mL oral syrup: 22.5 milliliter(s) orally 3 times a day    -Target 2-3 bowel movements every day  midodrine 10 mg oral tablet: 1 tab(s) orally every 8 hours  nystatin 100,000 units/g topical cream: 1 application topically 2 times a day  nystatin 100,000 units/g topical powder: 1 application topically 3 times a day  propranolol 10 mg oral tablet: 1 tab(s) orally once a day    Give if systolic blood pressure &gt;90  Protonix 40 mg oral delayed release tablet: 1 tab(s) orally once a day  rifAXIMin 550 mg oral tablet: 1 tab(s) orally 2 times a day   bacitracin/neomycin/polymyxin B 400 units-3.5 mg-5000 units/g topical ointment: 1 application topically 2 times a day  budesonide-formoterol 80 mcg-4.5 mcg/inh inhalation aerosol: 2 puff(s) inhaled 2 times a day  calcium acetate 667 mg oral tablet: 1 tab(s) orally 3 times a day  furosemide 20 mg oral tablet: 1 tab(s) orally once a day  lactulose 10 g/15 mL oral syrup: 22.5 milliliter(s) orally 3 times a day    -Target 2-3 bowel movements every day  midodrine 10 mg oral tablet: 1 tab(s) orally every 8 hours  nystatin 100,000 units/g topical cream: 1 application topically 2 times a day  nystatin 100,000 units/g topical powder: 1 application topically 3 times a day  pantoprazole 40 mg oral delayed release tablet: 1 tab(s) orally 2 times a day  propranolol 10 mg oral tablet: 1 tab(s) orally once a day    Give if systolic blood pressure &gt;90  rifAXIMin 550 mg oral tablet: 1 tab(s) orally 2 times a day

## 2021-11-01 NOTE — DISCHARGE NOTE PROVIDER - NSDCFUADDINST_GEN_ALL_CORE_FT
Please take your medications as prescribed and follow up with your doctor's appointments.    On discharge, continue undergoing therapeutic paracentesis every 2 days at the nursing home.

## 2021-11-01 NOTE — ADVANCED PRACTICE NURSE CONSULT - REASON FOR CONSULT
Pressure injury reassessment/follow-up
WOCN consult requested for ? sacral pressure injury assessment & treatment recommendations 
WOCN consult requested for BLE wound assessment & treatment recommendations 
None

## 2021-11-01 NOTE — DISCHARGE NOTE PROVIDER - PROVIDER TOKENS
PROVIDER:[TOKEN:[[id number]],FOLLOWUP:[2 weeks]] PROVIDER:[TOKEN:[[id number]],FOLLOWUP:[2 weeks]],PROVIDER:[TOKEN:[[id number]],FOLLOWUP:[1 month]]

## 2021-11-01 NOTE — ADVANCED PRACTICE NURSE CONSULT - ASSESSMENT
65 yo male, with h/o HTN, drug abuse, Hepatitis C s/p INF, Liver cirrhosis s/p Denver shunt, COPD, DVT? on Eliquis, HCC since January 2021 on chemotherapy, presented (10/4) for weakness; Patient reports having weakness, decreased po intake since few weeks. He also has not been sleeping. He reports constipation and hematemesis for 1 day, minimal amount. Denies fever, chills, chest pain, cough, sputum , SOB, diarrhea, dysuria. He has a Denver shunt  that was drained one day prior to admission  ED course : mental status alteration, sustained VT  bpm with BP 82/49 mmHg s/p shock + calcium gluconate Patient has Hyperkalemia 6.8 CO2 14 s/p Bicarb drip, renal called, sp berry with good UO.     Currently admitted to medicine, today is hospital day-28d; initially presented with worsening hepatic encephalopathy, NH3 on admission 188, hospital course complicated by hematemesis s/p intubation and emergent EGD10/4, s/p EVBL on propranolol, successfully extubated; patient clinically feels well, saturating 99% on 3L, taper down as tolerated. Remains on 3A,  being managed for     Acute gastrointestinal hemorrhage due to variceal bleed; HCC; Decompensated liver cirrhosis due to HCV; Hepatic encephalopathy-resolves ; Thrombocytopenia; Esophageal dysmotility; Penile irritation from berry catheter; EBER - r/o hepatorenal syndrome v abdominal compartment syndrome; metabolic acidosis; Hypernatremia; hyperkalemia; Portal vein thrombosis; Septic shock - resolved ; COPD.     Received patient on 3A, laying supine in bed,  HOB elevated 30 degrees. Pt awake, A&Ox3, with recognition of WOCN from previous visits, made aware of purpose of this WOCN visit, agreeable to consult.     BLEs wrapped w/ Adaptic, ABD pad & Kerlex dressings, dressing removed for assessment.     Ruptured blisters throughout BLEs, wound base marbleized w/ dark pink & yellow subcutaneous tissue, edges  & irregular, moderate amount of serosanguinous drainage present on removed dressing, no odor, induration, erythema, warmth, or crepitus present on assessment.  Pt reports tenderness to area during wound assessment.     With assistance, patient turned to left side for skin assessment. Patient remains abdomen distended.    Type of wound: Stage 3 pressure injury; evolved from Deep tissue pressure injury/DTPI; pressure component w/ likely hypoperfusion & hypoperfusion r/t Acute resp failure-s/p intubation, pressor usage, sepsis, current medical condition/immunocompromised-Decompensated liver cirrhosis, Hep C,  Hepatocellular carcinoma with transaminitis on chemotherapy; nutritional status; incontinence; immobility   Location: coccyx extending to b/l buttock area    Wound measurements: 6cm x 6cm x 0.3cm  Tunneling/Undermining: none  Wound bed: yellow subcutaneous tissue w/ epidermal budding   Wound edges: attached, flush, irregular, macerated   Periwound: incontinence associated dermatitis (IAD) w/ fungal component to b/l buttock, posterior thighs, perineum & groin area; skin denuded, erythematous w/ satellite lesions to periphery some areas crusting over -skin improving  Wound exudate: scant amount of serous drainage present on bedpad underneath patient   Wound odor: none  Induration, erythema, warmth: none   Wound pain: pt reports tenderness to area during wound bed assessment     Patient mostly bedbound, limited mobility in bed, episodes of urinary & fecal incontinence. Ordered for dysphagia diet, probably inadequate intake per reported Hadley score.

## 2021-11-01 NOTE — DISCHARGE NOTE PROVIDER - NSDCCPCAREPLAN_GEN_ALL_CORE_FT
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 Your history of cirrhosis made a condition called esophegeal varices very likely. That means that the veins in your esophagus had a lot of blood pressure as a result of your liver cirrhosis that it caused them to burst and bleed. You were treated with medications as well as with band ligation through an endoscopy. You also presented with altered mental status as a result of eleveted ammonia levels related to your cirrhosis, too. That was treated with lactulose and rifaximin, and your ammonia levels as well as your mental status went back to normal levels.   We also drained over 10 liters of fluid from your stomach through the Denver catheter, and you will continue to undergo paracentesis every 2 days after you are discharged to relieve the fluid collection that continuously builds up there.      SECONDARY DISCHARGE DIAGNOSES  Diagnosis: Hyperkalemia  Assessment and Plan of Treatment: You had a high potassium level on your presentation that needed to be treated with lokelma to bring it down. You were also given calcium gluconate to protect your heart from the adverse effects of the high potassium.    Diagnosis: EBER (acute kidney injury)  Assessment and Plan of Treatment: You were noted to have a temporary insult to your kidney function during your stay here. We have monitored your kidney function with blood work during your time here and you are at a level that no longer requires continued hospital level care. We do recommend that you follow up to continually have your kidney function checked. You can follow up with your primary care doctor routinely.        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 Your history of cirrhosis made a condition called esophegeal varices very likely. That means that the veins in your esophagus had a lot of blood pressure as a result of your liver cirrhosis that it caused them to burst and bleed. You were treated with medications as well as with band ligation through an endoscopy. You also presented with altered mental status as a result of eleveted ammonia levels related to your cirrhosis, too. That was treated with lactulose and rifaximin, and your ammonia levels as well as your mental status went back to normal levels.   We also drained over 10 liters of fluid from your stomach through the Denver catheter, and you will continue to undergo paracentesis every 2 days after you are discharged to relieve the fluid collection that continuously builds up there.      SECONDARY DISCHARGE DIAGNOSES  Diagnosis: Hyperkalemia  Assessment and Plan of Treatment: You had a high potassium level on your presentation that needed to be treated with lokelma to bring it down. You were also given calcium gluconate to protect your heart from the adverse effects of the high potassium.    Diagnosis: EBER (acute kidney injury)  Assessment and Plan of Treatment: You were noted to have a temporary insult to your kidney function during your stay here. We have monitored your kidney function with blood work during your time here and you are at a level that no longer requires continued hospital level care. We do recommend that you follow up to continually have your kidney function checked. You can follow up with your primary care doctor routinely.

## 2021-11-01 NOTE — ADVANCED PRACTICE NURSE CONSULT - RECOMMEDATIONS
1. DTPI coccyx-b/l buttock- Continue w/ previous recs for Triad- Cleanse wound bed w/ normal saline, gently pat dry.  Apply thin layer of Coloplast Triad hydrophilic ointment to absorb wound exudate and provide protective layer. Cover w/ dry gauze & foam Allevyn dressing. Apply Triad & change dressing q12h and prn for strike-through drainage or soiling. If top layer of Triad soiled,  gently remove w/ perineal cleanser and re-apply ointment. Do not scrub off as this may cause further skin breakdown. Do not apply foam Allevyn dressing directly over Triad (use gauze in between) as ointment gets absorbed into dressing as opposed to being in direct contact w/ wound bed.  2. Fungal IAD b/l buttock, posterior thighs, perineum & groin- Contiue w/ prevoius recs- Cleanse skin w/ perineal cleanser, gently pat dry. APply Nystatin cream & Coloplast Serina protective barrier cream q12h & prn after each incontinence episode.  3. Ruptured blisters to BLEs- Cleanse w/ normal saline, gently pat dry. Apply non-adherent Xeroform dressings to provide non-adherent protective layer & maintain clean wound beds, cover w/ ABd pad & wrap w/ kerlex dressings. Change dressings daily & prn for strike-through drainage or soiling.   -Assess skin/wound qshift, report changes to primary provider.     Additional recs:  -Continue to assist patient w/ turning/positioning patient from side-to-side q2h while in bed, q1h when/if OOB to chair, or in accordance w/ pt's plan of care. Continue utilizing pillows and/or z-dieter fluidized positioner to assist w/ turning/positioning. When/if OOB chair, utilize pillows or chair cushion to offload pressure.   -Continue to offload heels from bed surface with offloading boots to BLEs.   -Continue utilizing one underpad underneath patient to contain incontinence episodes; change pad when saturated/soiled.   -If patient w/ consistent urinary incontinence, consider utilizing condom catheter (if patient candidate) to contain any urinary incontinence.   -Continue nutrition consult for optimal wound healing & nutritional status.     Plan of Care: Primary RN Mahsa made aware of above recs. Spoke w/ covering/primary MD Jin (at [phone]) in regards to above. No further needs/recs from [facility] service at this time. Staff RN to perform routine skin/wound assessment and manage wound care. Questions or concerns or if wound worsens and reconsult needed, please contact [facility], [facility][phone].  
1. DTPI coccyx-b/l buttock- Continue w/ previous recs for Triad- Cleanse wound bed w/ normal saline, gently pat dry.  Apply thin layer of Coloplast Triad hydrophilic ointment to absorb wound exudate and provide protective layer. Cover w/ dry gauze & foam Allevyn dressing. Apply Triad & change dressing q12h and prn for strike-through drainage or soiling. If top layer of Triad soiled,  gently remove w/ perineal cleanser and re-apply ointment. Do not scrub off as this may cause further skin breakdown. Do not apply foam Allevyn dressing directly over Triad (use gauze in between) as ointment gets absorbed into dressing as opposed to being in direct contact w/ wound bed.  2. Fungal IAD b/l buttock, posterior thighs, perineum & groin- Cleanse skin w/ perineal cleanser, gently pat dry. APply Nystatin cream & Coloplast Serina protective barrier cream q12h & prn after each incontinence episode.  -Assess skin/wound qshift, report changes to primary provider.     Additional recs:  -Continue to assist patient w/ turning/positioning patient from side-to-side q2h while in bed, q1h when/if OOB to chair, or in accordance w/ pt's plan of care. Continue utilizing pillows and/or z-dieter fluidized positioner to assist w/ turning/positioning. When/if OOB chair, utilize pillows or chair cushion to offload pressure.   -Continue to offload heels from bed surface with offloading boots to BLEs.   -Continue utilizing one underpad underneath patient to contain incontinence episodes; change pad when saturated/soiled.   -When/if brery d/c'ed & patient w/ consistent urinary incontinence, consider utilizing condom catheter (if patient candidate) to contain any urinary incontinence.   -Continue nutrition consult for optimal wound healing & nutritional status.     Plan of Care: Primary RN Jewels made aware of above recs. Spoke w/ covering/primary JASPAL Coburn (at [id number]) in regards to above. No further needs/recs from [facility] service at this time. Staff RN to perform routine skin/wound assessment and manage wound care. Questions or concerns or if wound worsens and reconsult needed, please contact [facility], [facility][phone].  
1. DTPI coccyx-b/l buttock-  Cleanse wound bed w/ normal saline, gently pat dry.  Apply thin layer of Coloplast Triad hydrophilic ointment & Coloplast Serina protective barrier cream to absorb wound exudate and provide protective layer. To keep paste in place to wound bed, cover w/ ABD pad,. Apply Triad & Serina and change dressing q12h and prn for strike-through drainage or soiling. If top layer of Triad & Serina soiled, gently remove w/ perineal cleanser and re-apply ointment. Do not scrub off as this may cause further skin breakdown.  -Assess skin/wound qshift, report changes to primary provider.     Additional recs:  -Continue turning/positioning patient from side-to-side q2h while in bed, or in accordance w/ pt's plan of care. Continue utilizing pillows and/or z-dieter fluidized positioner to assist w/ turning/positioning. When/if OOB chair, utilize pillows or chair cushion to offload pressure.   -Continue to offload heels from bed surface with offloading boots to BLEs.   -Continue utilizing one underpad underneath patient to contain incontinence episodes; change pad when saturated/soiled.   -When/if berry d/c'ed & patient w/ consistent urinary incontinence, consider utilizing condom catheter (if patient candidate) to contain any urinary incontinence.   -Continue nutrition consult for optimal wound healing & nutritional status.     Plan of Care: Primary RN Scooter made aware of above recs. Spoke w/ covering/primary CC[facility] MD Mauricio in regards to above. No further needs/recs from [facility] service at this time. Staff RN to perform routine skin/wound assessment and manage wound care. Questions or concerns or if wound worsens and reconsult needed, please contact [facility], [facility][phone].

## 2021-11-01 NOTE — CHART NOTE - NSCHARTNOTEFT_GEN_A_CORE
PALLIATIVE MEDICINE INTERDISCIPLINARY TEAM NOTE    Provider:  [   ]Social Work   [   ]          [   ] Initial visit [   ] Follow up    Family or contact name / phone #   Met with: [   ] Patient  [   ] Family  [   ] Other:    Primary Language: [   ] English [   ] Other*:                      *Interpretation provided by:    SUPPORT DIAGNOSES            (Check all that apply)  [   ] Psychosocial spiritual assessment (PSSA)  [   ] EOL issues  [   ] Cultural / spiritual concerns  [   ] Pain / suffering  [   ] Dementia / AMS  [   ] Other:  [   ] AD issues  [   ] Grief / loss / sadness  [   ] Discharge issues  [   ] Distress / coping    PSYCHOSOCIAL ASSESSMENT OF PATIENT         (Check all that apply)  [   ] Initial Assessment            [   ] Reassessment          [   ] Not Applicable this visit    Pain/suffering acuity:  [   ] None to mild (0-3)           [   ] Moderate (4-6)        [   ] High (7-10)    Mental Status:  [   ] Alert/oriented (x3)          [   ] Confused/Altered(x2/x1)         [   ] Non-resp    Functional status:  [   ] Independent w ADLs      [   ] Needs Assistance             [   ] Bedbound/Full Care    Coping:  [   ] Coping well                     [   ] Coping w/difficulty            [   ] Poor coping    Support system:  [   ] Strong                              [   ] Adequate                        [   ] Inadequate    SPIRITUAL ASSESSMENT  Orthodoxy/Spiritual practice: ______Catholic_____________________    Role of organized Jew:  [   ] Important                     [ X  ] Some (fam tradition, cultural)               [   ] None    Effects on medical care:  [   ] Yes, _____________________________________                         [ X  ] None    Cultural/Cheondoism need:  [   ] Yes, _____________________________________                         [ X  ] None    Refer to Pastoral Care:  [   ] Yes           [X ] No, not at this time    SERVICE PROVIDED  [   ]PSSA                                                                             [   ]Discharge support / facilitation  [   ]AD / goals of care counseling                                  [   ]EOL / death / bereavement counseling  [   ]Counseling / support                                                [   ] Family meeting  [ X  ]Prayer / sacrament / ritual                                      [   ] Referral   [   ]Other                                                                       NOTE and Plan of Care (PoC): Pt states he feels stronger today. He states he will be going to rehab. He looks forward to leaving the hospital so that he can take of unfinished business. Spiritually he's in a good place. I will follow up as needed.

## 2021-11-01 NOTE — DISCHARGE NOTE PROVIDER - HOSPITAL COURSE
Patient is a 65 yo male, with h/o HTN, drug abuse, Hepatitis C s/p INF,  advanced Liver cirrhosis s/p [place] shunt with recurrent ascites, COPD, DVT? was on Eliquis at home , hepatocellular carcinoma since January 2021 on chemotherapy, presented for weakness and decreased oral intake along with constipation and hematemesis with ammonia (188) on presentation.    #Acute gastrointestinal hemorrhage due to variceal bleed- resolved  #HCC  #Decompensated liver cirrhosis due to HCV  #Hepatic encephalopathy -resolved  #Thrombocytopenia  -Patient initially presented with worsening hepatic encephalopathy, NH3 on admission 188, hospital course complicated by hematemesis s/p intubation and emergent EGD10/4, s/p EVBL on propranolol   -successfully extubated  -11/01- 95% on 2 L at rest   -resumed Ceftriaxone for SBP prophylaxis until discharge, continue PPI twice daily tx.  -chemotherapy currently on hold given poor functional status, may be considered in the future if the patient improves  -patient has a denver catheter due to frequent paracentesis  -s/p therapeutic abdominal drainage 10/31- 3 L removed with albumin tx  -Total of over 10L drained during this admission  -continue lasix 20mg daily with improved  LE edema, monitor renal function    #Esophageal dysmotility- patient was evaluated by speech tx  -dysphagia 2 with thin liquids.     #Penile irritation from berry catheter  -10/30 - d/c catheter with TOV  -apply topical neosporin cream to affected area    #EBER - r/o hepatorenal syndrome v abdominal compartment syndrome   #metabolic acidosis  -s/p albumin challenge    -continue phoslo as per renal   -monitor BMP, nephrology following  -stable around 1.5     #hyperkalemia- corrected post lokelma tx.     #Portal vein thrombosis  -Left main and right portal vein thrombus; was on eliquis previously  -per GI--> tumor thrombus, GI had discussed with his oncologist that no need to resume eliquis (given the recent variceal bleed)    #Septic shock - resolved   - previously required levophed - d/c'ed on 10/10, now on midodrine  - DTA culture showed Stenotrophomonas, s/p course of levaquin    #COPD - stable, improved hypoxia    On discharge, patient to continue receiving therapeutic paracentesis every 2 days. Patient is a 65 yo male, with h/o HTN, drug abuse, Hepatitis C s/p INF,  advanced Liver cirrhosis s/p [place] shunt with recurrent ascites, COPD, DVT? was on Eliquis at home , hepatocellular carcinoma since January 2021 on chemotherapy, presented for weakness and decreased oral intake along with constipation and hematemesis with ammonia (188) on presentation.    #Acute gastrointestinal hemorrhage due to variceal bleed- resolved  #HCC  #Decompensated liver cirrhosis due to HCV  #Hepatic encephalopathy -resolved  #Thrombocytopenia  -Patient initially presented with worsening hepatic encephalopathy, NH3 on admission 188, hospital course complicated by hematemesis s/p intubation and emergent EGD10/4, s/p EVBL on propranolol   -successfully extubated  -11/01- 95% on 2 L at rest   -resumed Ceftriaxone for SBP prophylaxis until discharge, continue PPI twice daily tx.  -chemotherapy currently on hold given poor functional status, may be considered in the future if the patient improves  -patient has a denver catheter due to frequent paracentesis  -s/p therapeutic abdominal drainage 10/31- 3 L removed with albumin tx  -Total of over 10L drained during this admission  -continue lasix 20mg daily with improved  LE edema, monitor renal function    #Esophageal dysmotility- patient was evaluated by speech tx  -dysphagia 2 with thin liquids.     #Penile irritation from berry catheter  -10/30 - d/c catheter with TOV  -apply topical neosporin cream to affected area    #EBER - r/o hepatorenal syndrome v abdominal compartment syndrome   #metabolic acidosis  -s/p albumin challenge    -continue phoslo as per renal   -monitor BMP, nephrology following  -stable around 1.5     #hyperkalemia- corrected post lokelma tx.     #Portal vein thrombosis  -Left main and right portal vein thrombus; was on eliquis previously  -per GI--> tumor thrombus, GI had discussed with his oncologist that no need to resume eliquis (given the recent variceal bleed)    #Septic shock - resolved   - previously required levophed - d/c'ed on 10/10, now on midodrine  - DTA culture showed Stenotrophomonas, s/p course of levaquin    #COPD - stable, improved hypoxia    On discharge, patient to continue receiving therapeutic paracentesis every 2 days.    Attending Note:  Patient seen and examined independently. I personally had a face-to-face encounter with the patient, examined the patient myself, personally reviewed labs & Radiology images,  and reviewed the plan of care with the housestaff. Agree with resident's note but my note supersedes that of the resident in the matters hereby listed.   D/c to NH.   outpatient f/u with PCP, GI.   c/w ascites drainage from denver cath. Around 2.5L every 2 days.

## 2021-11-02 VITALS — SYSTOLIC BLOOD PRESSURE: 95 MMHG | TEMPERATURE: 97 F | DIASTOLIC BLOOD PRESSURE: 58 MMHG | RESPIRATION RATE: 18 BRPM

## 2021-11-02 LAB
ALBUMIN SERPL ELPH-MCNC: 3.8 G/DL — SIGNIFICANT CHANGE UP (ref 3.5–5.2)
ALP SERPL-CCNC: 168 U/L — HIGH (ref 30–115)
ALT FLD-CCNC: 16 U/L — SIGNIFICANT CHANGE UP (ref 0–41)
ANION GAP SERPL CALC-SCNC: 15 MMOL/L — HIGH (ref 7–14)
AST SERPL-CCNC: 37 U/L — SIGNIFICANT CHANGE UP (ref 0–41)
BILIRUB SERPL-MCNC: 0.9 MG/DL — SIGNIFICANT CHANGE UP (ref 0.2–1.2)
BUN SERPL-MCNC: 43 MG/DL — HIGH (ref 10–20)
CALCIUM SERPL-MCNC: 8.4 MG/DL — LOW (ref 8.5–10.1)
CHLORIDE SERPL-SCNC: 100 MMOL/L — SIGNIFICANT CHANGE UP (ref 98–110)
CO2 SERPL-SCNC: 19 MMOL/L — SIGNIFICANT CHANGE UP (ref 17–32)
CREAT SERPL-MCNC: 1.1 MG/DL — SIGNIFICANT CHANGE UP (ref 0.7–1.5)
GLUCOSE BLDC GLUCOMTR-MCNC: 102 MG/DL — HIGH (ref 70–99)
GLUCOSE BLDC GLUCOMTR-MCNC: 118 MG/DL — HIGH (ref 70–99)
GLUCOSE BLDC GLUCOMTR-MCNC: 130 MG/DL — HIGH (ref 70–99)
GLUCOSE BLDC GLUCOMTR-MCNC: 94 MG/DL — SIGNIFICANT CHANGE UP (ref 70–99)
GLUCOSE SERPL-MCNC: 92 MG/DL — SIGNIFICANT CHANGE UP (ref 70–99)
HCT VFR BLD CALC: 29.4 % — LOW (ref 42–52)
HGB BLD-MCNC: 9.3 G/DL — LOW (ref 14–18)
MAGNESIUM SERPL-MCNC: 1.9 MG/DL — SIGNIFICANT CHANGE UP (ref 1.8–2.4)
MCHC RBC-ENTMCNC: 30.4 PG — SIGNIFICANT CHANGE UP (ref 27–31)
MCHC RBC-ENTMCNC: 31.6 G/DL — LOW (ref 32–37)
MCV RBC AUTO: 96.1 FL — HIGH (ref 80–94)
NRBC # BLD: 0 /100 WBCS — SIGNIFICANT CHANGE UP (ref 0–0)
PLATELET # BLD AUTO: 209 K/UL — SIGNIFICANT CHANGE UP (ref 130–400)
POTASSIUM SERPL-MCNC: 5.3 MMOL/L — HIGH (ref 3.5–5)
POTASSIUM SERPL-SCNC: 5.3 MMOL/L — HIGH (ref 3.5–5)
PROT SERPL-MCNC: 6 G/DL — SIGNIFICANT CHANGE UP (ref 6–8)
RBC # BLD: 3.06 M/UL — LOW (ref 4.7–6.1)
RBC # FLD: 21.2 % — HIGH (ref 11.5–14.5)
SODIUM SERPL-SCNC: 134 MMOL/L — LOW (ref 135–146)
WBC # BLD: 13.35 K/UL — HIGH (ref 4.8–10.8)
WBC # FLD AUTO: 13.35 K/UL — HIGH (ref 4.8–10.8)

## 2021-11-02 PROCEDURE — 99239 HOSP IP/OBS DSCHRG MGMT >30: CPT

## 2021-11-02 RX ORDER — PANTOPRAZOLE SODIUM 20 MG/1
1 TABLET, DELAYED RELEASE ORAL
Qty: 0 | Refills: 0 | DISCHARGE

## 2021-11-02 RX ORDER — NYSTATIN CREAM 100000 [USP'U]/G
1 CREAM TOPICAL
Qty: 0 | Refills: 0 | DISCHARGE
Start: 2021-11-02

## 2021-11-02 RX ORDER — APIXABAN 2.5 MG/1
1 TABLET, FILM COATED ORAL
Qty: 0 | Refills: 0 | DISCHARGE

## 2021-11-02 RX ORDER — PANTOPRAZOLE SODIUM 20 MG/1
1 TABLET, DELAYED RELEASE ORAL
Qty: 0 | Refills: 0 | DISCHARGE
Start: 2021-11-02

## 2021-11-02 RX ORDER — MIDODRINE HYDROCHLORIDE 2.5 MG/1
1 TABLET ORAL
Qty: 0 | Refills: 0 | DISCHARGE
Start: 2021-11-02

## 2021-11-02 RX ORDER — CALCIUM ACETATE 667 MG
1 TABLET ORAL
Qty: 0 | Refills: 0 | DISCHARGE

## 2021-11-02 RX ORDER — FUROSEMIDE 40 MG
1 TABLET ORAL
Qty: 0 | Refills: 0 | DISCHARGE
Start: 2021-11-02

## 2021-11-02 RX ORDER — LACTULOSE 10 G/15ML
22.5 SOLUTION ORAL
Qty: 0 | Refills: 0 | DISCHARGE
Start: 2021-11-02

## 2021-11-02 RX ORDER — FLUTICASONE PROPIONATE AND SALMETEROL 50; 250 UG/1; UG/1
1 POWDER ORAL; RESPIRATORY (INHALATION)
Qty: 0 | Refills: 0 | DISCHARGE

## 2021-11-02 RX ORDER — SODIUM ZIRCONIUM CYCLOSILICATE 10 G/10G
10 POWDER, FOR SUSPENSION ORAL ONCE
Refills: 0 | Status: DISCONTINUED | OUTPATIENT
Start: 2021-11-02 | End: 2021-11-02

## 2021-11-02 RX ORDER — SODIUM ZIRCONIUM CYCLOSILICATE 10 G/10G
10 POWDER, FOR SUSPENSION ORAL ONCE
Refills: 0 | Status: COMPLETED | OUTPATIENT
Start: 2021-11-02 | End: 2021-11-02

## 2021-11-02 RX ORDER — BACITRACIN ZINC NEOMYCIN SULFATE POLYMYXIN B SULFATE 400; 3.5; 5 [IU]/G; MG/G; [IU]/G
1 OINTMENT TOPICAL
Qty: 0 | Refills: 0 | DISCHARGE
Start: 2021-11-02

## 2021-11-02 RX ORDER — SPIRONOLACTONE 25 MG/1
1 TABLET, FILM COATED ORAL
Qty: 0 | Refills: 0 | DISCHARGE

## 2021-11-02 RX ORDER — BUDESONIDE AND FORMOTEROL FUMARATE DIHYDRATE 160; 4.5 UG/1; UG/1
2 AEROSOL RESPIRATORY (INHALATION)
Qty: 0 | Refills: 0 | DISCHARGE

## 2021-11-02 RX ORDER — PROPRANOLOL HCL 160 MG
1 CAPSULE, EXTENDED RELEASE 24HR ORAL
Qty: 0 | Refills: 0 | DISCHARGE

## 2021-11-02 RX ADMIN — Medication 667 MILLIGRAM(S): at 08:54

## 2021-11-02 RX ADMIN — NYSTATIN CREAM 1 APPLICATION(S): 100000 CREAM TOPICAL at 14:47

## 2021-11-02 RX ADMIN — BACITRACIN ZINC NEOMYCIN SULFATE POLYMYXIN B SULFATE 1 APPLICATION(S): 400; 3.5; 5 OINTMENT TOPICAL at 18:19

## 2021-11-02 RX ADMIN — HEPARIN SODIUM 5000 UNIT(S): 5000 INJECTION INTRAVENOUS; SUBCUTANEOUS at 14:47

## 2021-11-02 RX ADMIN — MIDODRINE HYDROCHLORIDE 10 MILLIGRAM(S): 2.5 TABLET ORAL at 05:39

## 2021-11-02 RX ADMIN — Medication 0: at 11:53

## 2021-11-02 RX ADMIN — NYSTATIN CREAM 1 APPLICATION(S): 100000 CREAM TOPICAL at 05:40

## 2021-11-02 RX ADMIN — Medication 667 MILLIGRAM(S): at 18:16

## 2021-11-02 RX ADMIN — Medication 0: at 08:52

## 2021-11-02 RX ADMIN — Medication 5 UNIT(S): at 11:53

## 2021-11-02 RX ADMIN — INSULIN GLARGINE 20 UNIT(S): 100 INJECTION, SOLUTION SUBCUTANEOUS at 22:15

## 2021-11-02 RX ADMIN — Medication 0: at 18:14

## 2021-11-02 RX ADMIN — PANTOPRAZOLE SODIUM 40 MILLIGRAM(S): 20 TABLET, DELAYED RELEASE ORAL at 18:17

## 2021-11-02 RX ADMIN — BACITRACIN ZINC NEOMYCIN SULFATE POLYMYXIN B SULFATE 1 APPLICATION(S): 400; 3.5; 5 OINTMENT TOPICAL at 05:43

## 2021-11-02 RX ADMIN — NYSTATIN CREAM 1 APPLICATION(S): 100000 CREAM TOPICAL at 22:16

## 2021-11-02 RX ADMIN — BUDESONIDE AND FORMOTEROL FUMARATE DIHYDRATE 2 PUFF(S): 160; 4.5 AEROSOL RESPIRATORY (INHALATION) at 22:16

## 2021-11-02 RX ADMIN — SODIUM ZIRCONIUM CYCLOSILICATE 10 GRAM(S): 10 POWDER, FOR SUSPENSION ORAL at 07:01

## 2021-11-02 RX ADMIN — Medication 667 MILLIGRAM(S): at 11:54

## 2021-11-02 RX ADMIN — PANTOPRAZOLE SODIUM 40 MILLIGRAM(S): 20 TABLET, DELAYED RELEASE ORAL at 05:39

## 2021-11-02 RX ADMIN — HEPARIN SODIUM 5000 UNIT(S): 5000 INJECTION INTRAVENOUS; SUBCUTANEOUS at 22:15

## 2021-11-02 RX ADMIN — MIDODRINE HYDROCHLORIDE 10 MILLIGRAM(S): 2.5 TABLET ORAL at 22:15

## 2021-11-02 RX ADMIN — NYSTATIN CREAM 1 APPLICATION(S): 100000 CREAM TOPICAL at 18:16

## 2021-11-02 RX ADMIN — CHLORHEXIDINE GLUCONATE 1 APPLICATION(S): 213 SOLUTION TOPICAL at 11:54

## 2021-11-02 RX ADMIN — Medication 5 UNIT(S): at 08:53

## 2021-11-02 RX ADMIN — Medication 5 UNIT(S): at 18:15

## 2021-11-02 RX ADMIN — NYSTATIN CREAM 1 APPLICATION(S): 100000 CREAM TOPICAL at 05:39

## 2021-11-02 RX ADMIN — LACTULOSE 15 GRAM(S): 10 SOLUTION ORAL at 14:47

## 2021-11-02 RX ADMIN — Medication 20 MILLIGRAM(S): at 05:39

## 2021-11-02 RX ADMIN — HEPARIN SODIUM 5000 UNIT(S): 5000 INJECTION INTRAVENOUS; SUBCUTANEOUS at 05:40

## 2021-11-02 RX ADMIN — MIDODRINE HYDROCHLORIDE 10 MILLIGRAM(S): 2.5 TABLET ORAL at 14:47

## 2021-11-02 NOTE — SWALLOW BEDSIDE ASSESSMENT ADULT - SPECIFY REASON(S)
s/p extubation
F/u dysphagia post VFSS
improving secretion management
assess for PO upgrade
assess least restrictive diet
coughing w/ PO
dysphagia follow up
change in respiratory status, concern for aspiration
dysphagia f/u

## 2021-11-02 NOTE — SWALLOW BEDSIDE ASSESSMENT ADULT - SWALLOW EVAL: FEEDING ASSISTANCE
dependent
frequent cues/help required
minimal assistance required
1:1 feed for physical assistance/frequent cues/help required
minimal assistance required
1:1 feed for physical assistance/frequent cues/help required
minimal assistance required
minimal assistance required
1:1 feed for physical assistance/frequent cues/help required
dependent

## 2021-11-02 NOTE — SWALLOW BEDSIDE ASSESSMENT ADULT - POSITIONING
upright (90 degrees)
upright (45 degrees)
upright (90 degrees)
upright (45 degrees)
upright (90 degrees)
upright (45 degrees)
upright (90 degrees)

## 2021-11-02 NOTE — CHART NOTE - NSCHARTNOTEFT_GEN_A_CORE
Registered Dietitian Follow-Up     Patient Profile Reviewed                           Yes [x]   No []     Nutrition History Previously Obtained        Yes [x]  No []       Pertinent Subjective Information: This patient is being followed by NUTRITION SUPPORT TEAM. All recommendations noted. RD was consulted on 10/30 for optimizing wound healing.     Patient reports he always had a good appetite and was very independent however for the past couple of months things have turned around. PO intake ~50% of meals for 2-3 months. Reports he drinks ensure plus ~ 2 times per day to optimize calorie and protein intake. Reports that he has discomfort from the fluid accumulation. Use to take vitamins however oncologist told pt to stop them. Reports a 80 lbs weight loss in a 2-4 months time frame. Reports UBW always use to be ~160 lbs     Reports currently his PO intake is ~50% of meals, has been trying to order food based on his preferences however he does not like the mechanically altered diet.       Patient has been bringing ensure from home.      Pertinent Medical Interventions:  Per internal medicine note:  #Acute gastrointestinal hemorrhage due to variceal bleed- resolved  #HCC  #Decompensated liver cirrhosis due to HCV  #Hepatic encephalopathy -resolved  #Thrombocytopenia  #intubation and emergent EGD10/4, s/p EVBL on propranolol ,successfully extubated-11/01- 95% on 2 L at rest   #chemotherapy currently on hold given poor functional status  #Esophageal dysmotility- patient was evaluated by speech tx. - recommended -  dysphagia 2 with thin liquids.   #EBER - r/o hepatorenal syndrome v abdominal compartment syndrome   #hyperkalemia- corrected post lokelma tx.    Diet order: Diet, Dysphagia 2 Mechanical Soft-Thin Liquids (10-26-21 @ 12:07) [Active]     Anthropometrics:  - Ht. 172.7 cm   - Wt. 84.5 kg -- dosing weight. noted with fluid retention/ edema/ and on lasix, weight fluctuations expected   10/5: 78.3 kg  10/7: 84.9 kg  10/11: 93.7 kg  10/14: 93.7 kg  11/1: 84.2 kg  - %wt change  - BMI 28.63-- using dosing weight   -IBW: 70.0 kg        Pertinent Lab Data: 11/1: RBC: 2.92, H/H: 8.9/28.4, Potassium: 5.4 (high)  BUN: 48, Alkaline phosphatase: 144     Pertinent Meds:  MEDICATIONS  (STANDING):    calcium acetate 667 milliGRAM(s) Oral three times a day with meals  dextrose 40% Gel 15 Gram(s) Oral once  dextrose 5%. 1000 milliLiter(s) (50 mL/Hr) IV Continuous <Continuous>  dextrose 5%. 1000 milliLiter(s) (100 mL/Hr) IV Continuous <Continuous>  dextrose 50% Injectable 25 Gram(s) IV Push once  dextrose 50% Injectable 25 Gram(s) IV Push once  dextrose 50% Injectable 12.5 Gram(s) IV Push once  furosemide    Tablet 20 milliGRAM(s) Oral daily  glucagon  Injectable 1 milliGRAM(s) IntraMuscular once  insulin glargine Injectable (LANTUS) 20 Unit(s) SubCutaneous at bedtime  insulin lispro (ADMELOG) corrective regimen sliding scale   SubCutaneous three times a day before meals  insulin lispro Injectable (ADMELOG) 5 Unit(s) SubCutaneous three times a day before meals  lactulose Syrup 15 Gram(s) Oral three times a day  midodrine 10 milliGRAM(s) Oral every 8 hours  pantoprazole    Tablet 40 milliGRAM(s) Oral two times a day  propranolol 5 milliGRAM(s) Oral daily    Physical Findings:  - Appearance: alert and oriented per RN flow sheets. left leg; right leg edema +2 per RN flow sheets   - GI function:  fecal incontinence, LBM: 11/1 per RN flow sheets   - Tubes: N/A  - Oral/Mouth cavity: SLP: Mild oral dysphagia w/ ground/soft, +toleration for ground soft solids w/ thin liquids without overt s/s of penetration/aspiration. dysphagia 2 diet w/ thin liquids  - Skin: R arm skin tear/ Right:; UQ abdomen/ Left:; lower leg open blister/ Right:; lower leg open blisters/ sacrum stage 3 pressure ulcer per RN flow sheets      Nutrition Requirements  Weight Used: 70 kg --- IBW      Estimated Energy Needs    Continue []  Adjust []   3418-4346 kcal/day (3-350  kcal/kg IBW --- using IBW consider fluid retention- consider stage 3 pressure ulcer and active cancer)     Estimated Protein Needs    Continue [x]  Adjust []   84-  98 gm/day (1.2-1.4 g/kg of dosing weight consider same as above)     Estimated Fluid Needs        Continue [X]  Adjust []  per LIP     Nutrient Intake: Patients PO intake ~50% of meals         [] Previous Nutrition Diagnosis:            [] Ongoing          [] Resolved    [] No active nutrition diagnosis identified at this time     Nutrition Diagnostic #1  Problem: severe protein calorie malnutrition   Etiology: in the setting of Cancer, hepatocellular  Statement: as evidenced by <75% of estimated energy needs requirements for >1 months and severe depletion of the clavicles, temples and subcutaneus fat loss of the buccal fat pads     Nutrition Intervention meals and snacks, medical nutrition supplement, coordination of care     Goal/Expected Outcome: Patient to ideally meet ~75% of estimated energy needs int he next 4d     Indicator/Monitoring: RD to monitor: diet order, body composition, energy intake, nutrition focused physical finding: electrolytes, high risk, will f/u in 4 days    Recommendations:    1) Continue current diet order per SLP: new terminology: minced and moist - thin liquids. Patient reports that he feels he can tolerate the texture better and would like another SLP evaluation, consider reconsulting SLP and texture of diet is affecting PO intake. Food preferences noted and [id number] to kitchen to optimize energy and protein intake     2) Provide Ensure enlive TID to diet order    3) Encourage PO intake and provide assistance prn     Will continue to monitor patients PO intake. NST'S PATIENT, will continue to follow all their recommendations. Registered Dietitian Follow-Up     Patient Profile Reviewed                           Yes [x]   No []     Nutrition History Previously Obtained        Yes [x]  No []       Pertinent Subjective Information: This patient is being followed by NUTRITION SUPPORT TEAM. All recommendations noted. RD was consulted on 10/30 for optimizing wound healing.     Patient reports he always had a good appetite and was very independent however for the past couple of months things have turned around. PO intake ~50% of meals for 2-3 months. Reports he drinks ensure plus ~ 2 times per day to optimize calorie and protein intake. Reports that he has discomfort from the fluid accumulation. Use to take vitamins however oncologist told pt to stop them. Reports a 80 lbs weight loss in a 2-4 months time frame. Reports UBW always use to be ~160 lbs     Reports currently his PO intake is ~50% of meals, has been trying to order food based on his preferences however he does not like the mechanically altered diet.       Patient has been bringing ensure from home.      Pertinent Medical Interventions:  Per internal medicine note:  #Acute gastrointestinal hemorrhage due to variceal bleed- resolved  #HCC  #Decompensated liver cirrhosis due to HCV  #Hepatic encephalopathy -resolved  #Thrombocytopenia  #intubation and emergent EGD10/4, s/p EVBL on propranolol ,successfully extubated-11/01- 95% on 2 L at rest   #chemotherapy currently on hold given poor functional status  #Esophageal dysmotility- patient was evaluated by speech tx. - recommended -  dysphagia 2 with thin liquids.   #EBER - r/o hepatorenal syndrome v abdominal compartment syndrome   #hyperkalemia- corrected post lokelma tx.    Diet order: Diet, Dysphagia 2 Mechanical Soft-Thin Liquids (10-26-21 @ 12:07) [Active]     Anthropometrics:  - Ht. 172.7 cm   - Wt. 84.5 kg -- dosing weight. noted with fluid retention/ edema/ and on lasix, weight fluctuations expected   10/5: 78.3 kg  10/7: 84.9 kg  10/11: 93.7 kg  10/14: 93.7 kg  11/1: 84.2 kg  - %wt change  - BMI 28.63-- using dosing weight   -IBW: 70.0 kg        Pertinent Lab Data: 11/1: RBC: 2.92, H/H: 8.9/28.4, Potassium: 5.4 (high)  BUN: 48, Alkaline phosphatase: 144     Pertinent Meds:  MEDICATIONS  (STANDING):    calcium acetate 667 milliGRAM(s) Oral three times a day with meals  dextrose 40% Gel 15 Gram(s) Oral once  dextrose 5%. 1000 milliLiter(s) (50 mL/Hr) IV Continuous <Continuous>  dextrose 5%. 1000 milliLiter(s) (100 mL/Hr) IV Continuous <Continuous>  dextrose 50% Injectable 25 Gram(s) IV Push once  dextrose 50% Injectable 25 Gram(s) IV Push once  dextrose 50% Injectable 12.5 Gram(s) IV Push once  furosemide    Tablet 20 milliGRAM(s) Oral daily  glucagon  Injectable 1 milliGRAM(s) IntraMuscular once  insulin glargine Injectable (LANTUS) 20 Unit(s) SubCutaneous at bedtime  insulin lispro (ADMELOG) corrective regimen sliding scale   SubCutaneous three times a day before meals  insulin lispro Injectable (ADMELOG) 5 Unit(s) SubCutaneous three times a day before meals  lactulose Syrup 15 Gram(s) Oral three times a day  midodrine 10 milliGRAM(s) Oral every 8 hours  pantoprazole    Tablet 40 milliGRAM(s) Oral two times a day  propranolol 5 milliGRAM(s) Oral daily    Physical Findings:  - Appearance: alert and oriented per RN flow sheets. left leg; right leg edema +2 per RN flow sheets   - GI function:  fecal incontinence, LBM: 11/1 per RN flow sheets   - Tubes: N/A  - Oral/Mouth cavity: SLP: Mild oral dysphagia w/ ground/soft, +toleration for ground soft solids w/ thin liquids without overt s/s of penetration/aspiration. dysphagia 2 diet w/ thin liquids  - Skin: R arm skin tear/ Right:; UQ abdomen/ Left:; lower leg open blister/ Right:; lower leg open blisters/ sacrum stage 3 pressure ulcer per RN flow sheets      Nutrition Requirements  Weight Used: 70 kg --- IBW      Estimated Energy Needs    Continue []  Adjust []   3646-8281 kcal/day (3-350  kcal/kg IBW --- using IBW consider fluid retention- consider stage 3 pressure ulcer and active cancer)     Estimated Protein Needs    Continue [x]  Adjust []   84-  98 gm/day (1.2-1.4 g/kg of dosing weight consider same as above)     Estimated Fluid Needs        Continue [X]  Adjust []  per LIP     Nutrient Intake: Patients PO intake ~50% of meals         [] Previous Nutrition Diagnosis:            [] Ongoing          [] Resolved    [] No active nutrition diagnosis identified at this time     Nutrition Diagnostic #1  Problem: severe protein calorie malnutrition   Etiology: in the setting of Cancer, hepatocellular  Statement: as evidenced by <75% of estimated energy needs requirements for >1 months and severe depletion of the clavicles, temples and subcutaneus fat loss of the buccal fat pads     Nutrition Intervention meals and snacks, medical nutrition supplement, coordination of care     Goal/Expected Outcome: Patient to ideally meet ~75% of estimated energy needs int he next 4d     Indicator/Monitoring: RD to monitor: diet order, body composition, energy intake, nutrition focused physical finding: electrolytes, high risk, will f/u in 4 days    Recommendations:    1) Continue current diet order per SLP: new terminology: minced and moist - thin liquids. Patient reports that he feels he can tolerate the texture better and would like another SLP evaluation, consider reconsulting SLP and texture of diet is affecting PO intake. Food preferences noted and [id number] to kitchen high protein snacks to optimize energy and protein intake     2) Provide Ensure enlive TID to diet order    3) Encourage PO intake and provide assistance prn     Will continue to monitor patients PO intake. NST'S PATIENT, will continue to follow all their recommendations.

## 2021-11-02 NOTE — SWALLOW BEDSIDE ASSESSMENT ADULT - SWALLOW EVAL: RECOMMENDED FEEDING/EATING TECHNIQUES
position upright (90 degrees)/small sips/bites
GI precautions, smaller more frequent meals/crush medication (when feasible)/maintain upright posture during/after eating for 30 mins/position upright (90 degrees)
GI precautions, smaller more frequent meals/maintain upright posture during/after eating for 30 mins/position upright (90 degrees)
place dentures/alternate food with liquid/maintain upright posture during/after eating for 30 mins/position upright (90 degrees)/small sips/bites
place dentures/alternate food with liquid/maintain upright posture during/after eating for 30 mins/position upright (90 degrees)/small sips/bites
NO thin liquids/small sips/bites
small sips/bites
small sips/bites
place dentures/alternate food with liquid/maintain upright posture during/after eating for 30 mins/position upright (90 degrees)/small sips/bites

## 2021-11-02 NOTE — SWALLOW BEDSIDE ASSESSMENT ADULT - SWALLOW EVAL: CURRENT DIET
dysphagia 1 w/ thin liquids per MD orders
dysphagia 1 w/ thin liquids
dysphagia 1 w/ thin liquids
minced and moist w/ thin liquids
soft, thins
Dysphagia 3 with nectar thick liquids
npo
Dysphagia 1 puree and nectar thick liquids
Dysphagia 3 w/ nectar thick liquids
npo

## 2021-11-02 NOTE — SWALLOW BEDSIDE ASSESSMENT ADULT - SLP PERTINENT HISTORY OF CURRENT PROBLEM
65 yo male, with h/o HTN, drug abuse, Hepatitis C s/p INF, Liver cirrhosis s/p [place] shunt, COPD, DVT? on Eliquis, HCC since January 2021 on chemotherapy, presented for weakness
pt is a 65 y/o M w/ PMHx: HTN, drug abuse, Hepatitis C s/p INF, Liver cirrhosis s/p [place] shunt, COPD, DVT? on Eliquis, hepatocellular carcinoma since January 2021 on chemotherapy, presented for weakness and decreased oral intake along with constipation and hematemesis. Pt is being treated for acute gastrointestinal hemorrhage due to variceal bleed s/p EGD 10/4 w/ banding, s/p intubation for airway protection, now extubated. Course further c/b hepatic encephalopathy.
pt is a 65 y/o M w/ PMHx: HTN, drug abuse, Hepatitis C s/p INF, Liver cirrhosis s/p [place] shunt, COPD, DVT? on Eliquis, hepatocellular carcinoma since January 2021 on chemotherapy, presented for weakness and decreased oral intake along with constipation and hematemesis. Pt is being treated for acute gastrointestinal hemorrhage due to variceal bleed s/p EGD 10/4 w/ banding, s/p intubation for airway protection, now extubated. Course further c/b hepatic encephalopathy. pt seen by SLP service multiple times this admission w/ most recent recs for dysphagia 3 w/ thin liquids. SLP reconsulted s/p episode of desaturation this morning a/w poor secretion management, pt placed on 15L NRB.
pt is a 67 y/o M w/ PMHx: HTN, drug abuse, Hepatitis C s/p INF, Liver cirrhosis s/p [place] shunt, COPD, DVT? on Eliquis, hepatocellular carcinoma since January 2021 on chemotherapy, presented for weakness and decreased oral intake along with constipation and hematemesis. Pt is being treated for acute gastrointestinal hemorrhage due to variceal bleed s/p EGD 10/4 w/ banding, s/p intubation for airway protection, now extubated. Course further c/b hepatic encephalopathy - mental status improving.
65 yo male, with h/o HTN, drug abuse, Hepatitis C s/p INF, Liver cirrhosis s/p [place] shunt, COPD, DVT? on Eliquis, HCC since January 2021 on chemotherapy, presented for weakness
pt is a 67 y/o M w/ PMHx: HTN, drug abuse, Hepatitis C s/p INF, Liver cirrhosis s/p [place] shunt, COPD, DVT? on Eliquis, hepatocellular carcinoma since January 2021 on chemotherapy, presented for weakness and decreased oral intake along with constipation and hematemesis. Pt is being treated for acute gastrointestinal hemorrhage due to variceal bleed s/p EGD 10/4 w/ banding, s/p intubation for airway protection, now extubated. Course further c/b hepatic encephalopathy - mental status improving.
pt is a 67 y/o M w/ PMHx: HTN, drug abuse, Hepatitis C s/p INF, Liver cirrhosis s/p [place] shunt, COPD, DVT? on Eliquis, hepatocellular carcinoma since January 2021 on chemotherapy, presented for weakness and decreased oral intake along with constipation and hematemesis. Pt is being treated for acute gastrointestinal hemorrhage due to variceal bleed s/p EGD 10/4 w/ banding, s/p intubation for airway protection, now extubated. Course further c/b hepatic encephalopathy and episode of desaturation 10/20. pt seen by SLP service multiple times this admission s/p VFSS 10/26 w/ recs for dysphagia 2 w/ thin liquids via GI precautions.
65 yo male, with h/o HTN, drug abuse, Hepatitis C s/p INF, Liver cirrhosis s/p [place] shunt, COPD, DVT? on Eliquis, HCC since January 2021 on chemotherapy, presented for weakness. Upper Gi bleed 2' large esophageal varicies s/p banding, decompensated cirrhosis.
pt is a 65 y/o M w/ PMHx: HTN, drug abuse, Hepatitis C s/p INF, Liver cirrhosis s/p [place] shunt, COPD, DVT? on Eliquis, hepatocellular carcinoma since January 2021 on chemotherapy, presented for weakness and decreased oral intake along with constipation and hematemesis. Pt is being treated for acute gastrointestinal hemorrhage due to variceal bleed s/p EGD 10/4 w/ banding, s/p intubation for airway protection, now extubated. Course further c/b hepatic encephalopathy and episode of desaturation 10/20. pt seen by SLP service multiple times this admission w/ most recent recs for dysphagia 3 w/ nectar-thick liquids.
pt is a 67 y/o M w/ PMHx: HTN, drug abuse, Hepatitis C s/p INF, Liver cirrhosis s/p [place] shunt, COPD, DVT? on Eliquis, hepatocellular carcinoma since January 2021 on chemotherapy, presented for weakness and decreased oral intake along with constipation and hematemesis. Pt is being treated for acute gastrointestinal hemorrhage due to variceal bleed s/p EGD 10/4 w/ banding, s/p intubation for airway protection, now extubated. Course further c/b hepatic encephalopathy and episode of desaturation 10/20. pt seen by SLP service multiple times this admission w/ most recent recs for dysphagia 3 w/ nectar-thick liquids.
pt is a 67 y/o M w/ PMHx: HTN, drug abuse, Hepatitis C s/p INF, Liver cirrhosis s/p [place] shunt, COPD, DVT? on Eliquis, hepatocellular carcinoma since January 2021 on chemotherapy, presented for weakness and decreased oral intake along with constipation and hematemesis. Pt is being treated for acute gastrointestinal hemorrhage due to variceal bleed s/p EGD 10/4 w/ banding, s/p intubation for airway protection, now extubated. Course further c/b hepatic encephalopathy. pt seen by SLP service multiple times this admission w/ most recent recs for dysphagia 3 w/ thin liquids. SLP reconsulted s/p episode of desaturation this morning a/w poor secretion management, pt placed on 15L NRB.

## 2021-11-02 NOTE — SWALLOW BEDSIDE ASSESSMENT ADULT - ASR SWALLOW DENTITION
upper and lower dentures
present and adequate
present and adequate/lower dentures
upper and lower dentures
upper and lower dentures
upper dentures loose, lower dentures kept in w/ chewables/upper dentures/lower dentures
upper dentures loose, lower dentures kept in w/ chewables/upper dentures/lower dentures

## 2021-11-03 LAB
CULTURE RESULTS: SIGNIFICANT CHANGE UP
SPECIMEN SOURCE: SIGNIFICANT CHANGE UP

## 2021-12-25 NOTE — CHART NOTE - NSCHARTNOTESELECT_GEN_ALL_CORE
Event Note
Nutrition Support/Nutrition Services
Palliative Care SW Note/Event Note
Palliative Care SW Note/Event Note
Communication/Event Note
EGD/Event Note
Event Note
Gastroenterology/Event Note
Hb drop
Internal Medicine/Event Note
Nutrition Support/Nutrition Services
Palliative Care - Social Work/Event Note
Palliative Care SW Initial Assessment/Event Note
Palliative Care SW Note/Event Note
Palliative Care/Event Note
RD nutrition f/u/Event Note
Transfer Note
dwngrd/Transfer Note

## 2022-01-23 NOTE — ED PROVIDER NOTE - MDM PATIENT STATEMENT FOR ADDL TREATMENT
soft/nondistended/nontender soft Patient with one or more new problems requiring additional work-up/treatment. soft soft soft/nondistended

## 2022-06-08 NOTE — CONSULT NOTE ADULT - SUBJECTIVE AND OBJECTIVE BOX
NEPHROLOGY CONSULTATION NOTE    65 yo male, with h/o HTN, drug abuse, Hepatitis C s/p INF, Liver cirrhosis s/p [place] shunt, COPD, DVT? on Eliquis, HCC since 2021 on chemotherapy, presented for weakness  Patient reports having weakness, decreased po intake since few weeks. He also has not been sleeping. He reports constipation and hematemesis for 1 day, minimal amount.  Denies fever, chills, chest pain, cough, sputum , SOB, diarrhea, dysuria  He has a [place] shunt  that was drained one day prior to admission    ED course : mental status alteration, sustained VT  bpm with BP 82/49 mmHg s/p shock + calcium gluconate  Patient has Hyperkalemia 6.8 CO2 14 s/p Bicarb drip.  Renal was called for high K, acidosis. UO since admission 800 cc.      PAST MEDICAL & SURGICAL HISTORY:  HTN (hypertension)    Hepatitis C      Drug abuse    Cancer, hepatocellular  2021    COPD, mild      Allergies:  No Known Allergies    Home Medications Reviewed  Hospital Medications:   MEDICATIONS  (STANDING):  budesonide  80 MICROgram(s)/formoterol 4.5 MICROgram(s) Inhaler 2 Puff(s) Inhalation two times a day  cefepime   IVPB 2000 milliGRAM(s) IV Intermittent every 8 hours  chlorhexidine 4% Liquid 1 Application(s) Topical daily  dextrose 40% Gel 15 Gram(s) Oral once  dextrose 5%. 1000 milliLiter(s) (50 mL/Hr) IV Continuous <Continuous>  dextrose 5%. 1000 milliLiter(s) (100 mL/Hr) IV Continuous <Continuous>  dextrose 50% Injectable 25 Gram(s) IV Push once  dextrose 50% Injectable 12.5 Gram(s) IV Push once  dextrose 50% Injectable 25 Gram(s) IV Push once  enoxaparin Injectable 70 milliGRAM(s) SubCutaneous every 12 hours  folic acid 1 milliGRAM(s) Oral daily  glucagon  Injectable 1 milliGRAM(s) IntraMuscular once  insulin glargine Injectable (LANTUS) 15 Unit(s) SubCutaneous at bedtime  insulin lispro (ADMELOG) corrective regimen sliding scale   SubCutaneous three times a day before meals  insulin lispro Injectable (ADMELOG) 5 Unit(s) SubCutaneous three times a day before meals  lactulose Syrup 20 Gram(s) Oral three times a day  multivitamin 1 Tablet(s) Oral daily  octreotide  Infusion 50 MICROgram(s)/Hr (10 mL/Hr) IV Continuous <Continuous>  pantoprazole    Tablet 40 milliGRAM(s) Oral every 12 hours  polyethylene glycol 3350 17 Gram(s) Oral daily  propranolol 10 milliGRAM(s) Oral two times a day  rifAXIMin 550 milliGRAM(s) Oral two times a day  senna 2 Tablet(s) Oral at bedtime  sodium bicarbonate  Infusion 0.251 mEq/kG/Hr (125 mL/Hr) IV Continuous <Continuous>  sodium zirconium cyclosilicate 10 Gram(s) Oral two times a day      SOCIAL HISTORY:  Denies ETOH,Smoking,   FAMILY HISTORY:        REVIEW OF SYSTEMS:  CONSTITUTIONAL: No weakness, fevers or chills  EYES/ENT: No visual changes;  No vertigo or throat pain   NECK: No pain or stiffness  RESPIRATORY: No cough, wheezing, hemoptysis; No shortness of breath  CARDIOVASCULAR: No chest pain or palpitations.  GASTROINTESTINAL: No abdominal or epigastric pain. No nausea, vomiting, had hematemesis;   GENITOURINARY: No dysuria, frequency, foamy urine, urinary urgency, incontinence or hematuria  NEUROLOGICAL: No numbness or weakness  VASCULAR: No bilateral lower extremity edema.   All other review of systems is negative unless indicated above.    VITALS:  T(F): 98.6 (10-04-21 @ 15:30), Max: 98.6 (10-04-21 @ 02:55)  HR: 102 (10-04-21 @ 18:03)  BP: 108/58 (10-04-21 @ 18:03)  RR: 22 (10-04-21 @ 18:03)  SpO2: 100% (10-04-21 @ 18:03)    10-04 @ 07:01  -  10-04 @ 18:15  --------------------------------------------------------  IN: 0 mL / OUT: 800 mL / NET: -800 mL        Weight (kg): 74.8 (10-04 @ 18:03)      I&O's Detail    04 Oct 2021 07:01  -  04 Oct 2021 18:15  --------------------------------------------------------  IN:  Total IN: 0 mL    OUT:    Voided (mL): 800 mL  Total OUT: 800 mL    Total NET: -800 mL            PHYSICAL EXAM:  Constitutional: NAD  HEENT: anicteric sclera,  Respiratory: CTAB, no wheezes, rales   Cardiovascular: S1, S2, RRR  Gastrointestinal: BS+, soft, NT/distended, has ascites  Extremities: No peripheral edema  Neurological: Alert awake  Psychiatric: Normal mood, normal affect  : has berry.   Skin: No rashes  Vascular Access:    LABS:  10-04    125<L>  |  91<L>  |  70<HH>  ----------------------------<  160<H>  6.1<HH>   |  15<L>  |  1.0    Ca    7.5<L>      04 Oct 2021 14:42  Mg     2.4     10-04    TPro  5.3<L>  /  Alb  1.8<L>  /  TBili  1.1  /  DBili      /  AST  211<H>  /  ALT  159<H>  /  AlkPhos  260<H>  10-04    Creatinine Trend: 1.0 <--, 1.0 <--, 0.9 <--, 1.1 <--, 1.2 <--                        7.9    26.10 )-----------( 294      ( 04 Oct 2021 14:42 )             24.0     Urine Studies:  Urinalysis Basic - ( 04 Oct 2021 04:38 )    Color: Yellow / Appearance: Clear / S.025 / pH:   Gluc:  / Ketone: Negative  / Bili: Negative / Urobili: <2 mg/dL   Blood:  / Protein: Negative / Nitrite: Negative   Leuk Esterase: Negative / RBC:  / WBC    Sq Epi:  / Non Sq Epi:  / Bacteria:       Osmolality, Random Urine: 606 mos/kg (10-04 @ 11:17)  Sodium, Random Urine: 20.0 mmoL/L (10-04 @ 11:17)  Creatinine, Random Urine: <4 mg/dL (10-04 @ 11:17)            RADIOLOGY & ADDITIONAL STUDIES:  < from: CT Abdomen and Pelvis w/ IV Cont (10.04.21 @ 04:30) >    No evidence for acute pulmonary embolus.    Cirrhosis and portal hypertension.    Innumerable hepatic hypodensities with more focal 8 cm region of hypoattenuation in the posterior right hepatic lobe, limited in evaluation on a single phase of contrast but may correspond to reported history of HCC.    There is hypoattenuation within the a portion of the left, the right and main portal veins with extension into the portal confluence and a portion of the splenic and superior mesenteric veins compatible with portal vein thrombus. The caliber of these veins are larger than the opacified portions of the splenic vein, SMV and IMV and therefore may be acute.    Small volume ascites.    Suggestion of gastric wall thickening. Correlate for gastritis.    --- End of Report ---    < end of copied text >               Other

## 2023-05-10 NOTE — PROGRESS NOTE ADULT - TIME BILLING
[facility] DEPARTMENT OF OPHTHALMOLOGY  ------------------------------------------------------------------------------  Mika Jenkins MD PGY 3  [phone]  ------------------------------------------------------------------------------    Interval History: Patient reports able to open eyes better    MEDICATIONS  (STANDING):  brimonidine 0.2% Ophthalmic Solution 1 Drop(s) Left EYE two times a day  dorzolamide 2%/timolol 0.5% Ophthalmic Solution 1 Drop(s) Left EYE two times a day    MEDICATIONS  (PRN):      VITALS: T(C): 36.7 (05-10-23 @ 12:30)  T(F): 98.1 (05-10-23 @ 12:30), Max: 98.1 (05-10-23 @ 12:30)  HR: 71 (05-10-23 @ 12:30) (70 - 79)  BP: 109/68 (05-10-23 @ 12:30) (109/68 - 139/84)  RR:  (15 - 20)  SpO2:  (100% - 100%)  Wt(kg): --  General: AAO x 3, appropriate mood and affect    Ophthalmology Exam:  Visual acuity (sc): 20/25 OD, 20/25 OS  Pupils: PERRL OU, no APD  Ttono: 19 OD, 19 OS  Extraocular movements (EOMs): Full OD, 40% restriction in adduction, infraduction, and supraduction  Confrontational Visual Field (CVF): Full OD, full OS  Color Plates: 12/12 OD, 12/12 OS    Pen Light Exam (PLE)  External: Flat OD, periorbital edema with tr proptosis OS  Lids/Lashes/Lacrimal Ducts: Flat OD, small laceration at lateral canthus  Sclera/Conjunctiva: W+Q OD, chemosis superiorly and temporally  Cornea: Cl OU  Anterior Chamber: D+F OU    Iris: Flat OU  Lens: Cl OU  
Followup care. Discussed with patient and 
I have personally seen and examined this patient.    I have reviewed all pertinent clinical information and reviewed all relevant imaging and diagnostic studies personally.   I counseled the patient about diagnostic testing and treatment plan. All questions were answered.   I discussed recommendations with the primary team.
as above
I have personally seen and examined this patient.    I have reviewed all pertinent clinical information and reviewed all relevant imaging and diagnostic studies personally.   I counseled the patient about diagnostic testing and treatment plan. All questions were answered.   I discussed recommendations with the primary team.
I have personally seen and examined this patient.    I have reviewed all pertinent clinical information and reviewed all relevant imaging and diagnostic studies personally.   I counseled the patient about diagnostic testing and treatment plan. All questions were answered.   I discussed recommendations with the primary team.
Decompensated cirrhosis
I have personally seen and examined this patient.    I have reviewed all pertinent clinical information and reviewed all relevant imaging and diagnostic studies personally.   I counseled the patient about diagnostic testing and treatment plan. All questions were answered.   I discussed recommendations with the primary team.
I have personally seen and examined this patient.    I have reviewed all pertinent clinical information and reviewed all relevant imaging and diagnostic studies personally.   I counseled the patient about diagnostic testing and treatment plan. All questions were answered.   I discussed recommendations with the primary team.
decompensated cirrhosis
Decompensated cirrhosis

## 2024-04-13 NOTE — PROGRESS NOTE ADULT - ASSESSMENT
Pt with Decompensated liver cirrhosis, Hep C-s/p IFN, ascites -Den[place] cath, HCCa with transaminitis on chemotherapy  presents with weakness, found to have K 6.8 (was on Aldactone) , sustained VT -s/p shock; met acidosis, hepatic encephalopathy - Ammonia 188.      Efe improved with creat to 1.4 and is up to 2.1 again.  Pt reaccumulated a significant amount of ascites (last drain on 10/8 total up to 7 L)  Efe likely due to abd compartment syndrome or HRS  - start Midodrine 10 mg q8h, use pressors as needed  - drain 2-3 L today as per BP and more the next few days  -strict Is and os  Hyperkalemia - improved, cont low K diet  High phos - start Phoslo 667 tid with meals  - off Bicarb drip,   Sepsis - r/o SBP - on Levaquin    Prognosis guarded      Jeremy

## 2024-06-23 NOTE — DISCHARGE NOTE NURSING/CASE MANAGEMENT/SOCIAL WORK - PATIENT PORTAL LINK FT
You can access the FollowMyHealth Patient Portal offered by [facility] by registering at the following website: [email or web address]. By joining [employer]’s FollowMyHealth portal, you will also be able to view your health information using other applications (apps) compatible with our system. show

## 2024-10-14 NOTE — PATIENT PROFILE ADULT - STATED REASON FOR ADMISSION
Resume a diabetic/carbohydrate count diet.       -75 gram carbohydrate meal, 45 gram carbohydrate snack, 5692-5150 calories/day.     **You may need to start off eating softer foods and advance to more regular consistency foods as tolerated.    weakness

## 2025-06-21 NOTE — PROGRESS NOTE ADULT - ASSESSMENT
Regarding: full body rash  ----- Message from Mar MARTÍNEZ sent at 6/21/2025  9:22 AM EDT -----  Full body rash. Please call hanna back @ [phone]     67 yo male, with h/o HTN, hx of drug abuse, Hepatitis C s/p INF, Liver cirrhosis, COPD, portal vein thrombosis on Eliquis, HCC since January 2021 on chemotherapy, presented for weakness He reports constipation and hematemesis for 1 day, minimal amount, admitted to ICU for Upper Gi bleed secondary to large esophageal varices, s/p EGD 10/4 s/p banding.     *Decompensated liver cirrhosis sec to hepatitis C   -MELD Na score 26 at the time of admission, today 17 no INR today  -Child yañez score C  -encephalopathy on admission resolved   -HCC s/p biopsy 2/9/21 on tecentriq and avasin. dr Arthur Gibson  -not a candidate for surgery because of locally advanced disease  -Left main and right portal vein thrombus was on eliquis, tumor thrombus, discussed with his oncologist, no need to resume eliquis   -ascites with denver catheter, PRN tap  -s/p ascitic fluid analysis no evidence of SBP, SAAG 1.6 and TP<1   -EBER, improving, s/p  albumin   -leukocytosis improving, no evidence of SBP    Plan  -when stopping antibiotics, give prophylactic antibiotics during hospital stay given total protein <1 and CPS score of C  -continue lactulose to have 3-4 BM  -rifaximin 550 mg BID  -continue midodrine  -give albumin only after paracentesis, do not remove more than 5 L at a time   -Trend LFT, INR, BMP daily   -low sodium diet < 2g per day    -Limit acetaminophen to 2g per day  -avoid hepatoxic medications     -will sign off   -call as needed, [phone] during weekdays till 5pm and call GI service after 5pm and on weekends [phone]  -can follow up with his GI at [facility]